# Patient Record
Sex: FEMALE | Race: WHITE | Employment: OTHER | URBAN - METROPOLITAN AREA
[De-identification: names, ages, dates, MRNs, and addresses within clinical notes are randomized per-mention and may not be internally consistent; named-entity substitution may affect disease eponyms.]

---

## 2017-01-10 ENCOUNTER — ONCOLOGY VISIT (OUTPATIENT)
Dept: ONCOLOGY | Facility: CLINIC | Age: 62
End: 2017-01-10
Attending: PHYSICIAN ASSISTANT
Payer: COMMERCIAL

## 2017-01-10 ENCOUNTER — OFFICE VISIT (OUTPATIENT)
Dept: AUDIOLOGY | Facility: CLINIC | Age: 62
End: 2017-01-10

## 2017-01-10 ENCOUNTER — APPOINTMENT (OUTPATIENT)
Dept: LAB | Facility: CLINIC | Age: 62
End: 2017-01-10
Attending: PHYSICIAN ASSISTANT
Payer: COMMERCIAL

## 2017-01-10 ENCOUNTER — OFFICE VISIT (OUTPATIENT)
Dept: OTOLARYNGOLOGY | Facility: CLINIC | Age: 62
End: 2017-01-10

## 2017-01-10 VITALS
BODY MASS INDEX: 20.11 KG/M2 | OXYGEN SATURATION: 98 % | TEMPERATURE: 96.9 F | WEIGHT: 110 LBS | HEART RATE: 98 BPM | SYSTOLIC BLOOD PRESSURE: 126 MMHG | RESPIRATION RATE: 76 BRPM | DIASTOLIC BLOOD PRESSURE: 78 MMHG

## 2017-01-10 VITALS — RESPIRATION RATE: 20 BRPM

## 2017-01-10 DIAGNOSIS — C79.31 BRAIN METASTASIS: ICD-10-CM

## 2017-01-10 DIAGNOSIS — C50.919 METASTATIC BREAST CANCER: Primary | ICD-10-CM

## 2017-01-10 DIAGNOSIS — Z71.9 COUNSELING, UNSPECIFIED: ICD-10-CM

## 2017-01-10 DIAGNOSIS — H90.11 CONDUCTIVE HEARING LOSS IN RIGHT EAR: ICD-10-CM

## 2017-01-10 DIAGNOSIS — R53.0 NEOPLASTIC MALIGNANT RELATED FATIGUE: ICD-10-CM

## 2017-01-10 DIAGNOSIS — H90.5 UNILATERAL SENSORINEURAL HEARING LOSS: ICD-10-CM

## 2017-01-10 DIAGNOSIS — H90.71 MIXED HEARING LOSS OF RIGHT EAR: ICD-10-CM

## 2017-01-10 DIAGNOSIS — C50.919 CARCINOMA OF BREAST METASTATIC TO MULTIPLE SITES, UNSPECIFIED LATERALITY (H): ICD-10-CM

## 2017-01-10 DIAGNOSIS — H65.21 CHRONIC SEROUS OTITIS MEDIA, RIGHT EAR: Primary | ICD-10-CM

## 2017-01-10 DIAGNOSIS — H61.23 EXCESSIVE CERUMEN IN BOTH EAR CANALS: ICD-10-CM

## 2017-01-10 DIAGNOSIS — H90.5 SENSORINEURAL HEARING LOSS OF LEFT EAR: Primary | ICD-10-CM

## 2017-01-10 LAB
ALBUMIN SERPL-MCNC: 3.9 G/DL (ref 3.4–5)
ALP SERPL-CCNC: 130 U/L (ref 40–150)
ALT SERPL W P-5'-P-CCNC: 40 U/L (ref 0–50)
ANION GAP SERPL CALCULATED.3IONS-SCNC: 6 MMOL/L (ref 3–14)
AST SERPL W P-5'-P-CCNC: 37 U/L (ref 0–45)
BASOPHILS # BLD AUTO: 0.1 10E9/L (ref 0–0.2)
BASOPHILS NFR BLD AUTO: 1.1 %
BILIRUB SERPL-MCNC: 0.4 MG/DL (ref 0.2–1.3)
BUN SERPL-MCNC: 13 MG/DL (ref 7–30)
CALCIUM SERPL-MCNC: 8.5 MG/DL (ref 8.5–10.1)
CANCER AG27-29 SERPL-ACNC: 22 U/ML (ref 0–39)
CEA SERPL-MCNC: 1.3 UG/L (ref 0–2.5)
CHLORIDE SERPL-SCNC: 108 MMOL/L (ref 94–109)
CO2 SERPL-SCNC: 27 MMOL/L (ref 20–32)
CREAT SERPL-MCNC: 0.83 MG/DL (ref 0.52–1.04)
DIFFERENTIAL METHOD BLD: ABNORMAL
EOSINOPHIL # BLD AUTO: 0 10E9/L (ref 0–0.7)
EOSINOPHIL NFR BLD AUTO: 0 %
ERYTHROCYTE [DISTWIDTH] IN BLOOD BY AUTOMATED COUNT: 16.8 % (ref 10–15)
GFR SERPL CREATININE-BSD FRML MDRD: 70 ML/MIN/1.7M2
GLUCOSE SERPL-MCNC: 83 MG/DL (ref 70–99)
HCT VFR BLD AUTO: 33.8 % (ref 35–47)
HGB BLD-MCNC: 11.2 G/DL (ref 11.7–15.7)
IMM GRANULOCYTES # BLD: 0.1 10E9/L (ref 0–0.4)
IMM GRANULOCYTES NFR BLD: 1.6 %
LDH SERPL L TO P-CCNC: 243 U/L (ref 81–234)
LYMPHOCYTES # BLD AUTO: 1.2 10E9/L (ref 0.8–5.3)
LYMPHOCYTES NFR BLD AUTO: 18.6 %
MAGNESIUM SERPL-MCNC: 2.5 MG/DL (ref 1.6–2.3)
MCH RBC QN AUTO: 33.6 PG (ref 26.5–33)
MCHC RBC AUTO-ENTMCNC: 33.1 G/DL (ref 31.5–36.5)
MCV RBC AUTO: 102 FL (ref 78–100)
MONOCYTES # BLD AUTO: 0.8 10E9/L (ref 0–1.3)
MONOCYTES NFR BLD AUTO: 13.4 %
NEUTROPHILS # BLD AUTO: 4.1 10E9/L (ref 1.6–8.3)
NEUTROPHILS NFR BLD AUTO: 65.3 %
NRBC # BLD AUTO: 0 10*3/UL
NRBC BLD AUTO-RTO: 1 /100
PHOSPHATE SERPL-MCNC: 2.5 MG/DL (ref 2.5–4.5)
PLATELET # BLD AUTO: 275 10E9/L (ref 150–450)
POTASSIUM SERPL-SCNC: 4 MMOL/L (ref 3.4–5.3)
PROT SERPL-MCNC: 6.9 G/DL (ref 6.8–8.8)
RBC # BLD AUTO: 3.33 10E12/L (ref 3.8–5.2)
SODIUM SERPL-SCNC: 140 MMOL/L (ref 133–144)
URATE SERPL-MCNC: 3.5 MG/DL (ref 2.6–6)
WBC # BLD AUTO: 6.3 10E9/L (ref 4–11)

## 2017-01-10 PROCEDURE — 86300 IMMUNOASSAY TUMOR CA 15-3: CPT | Performed by: PHYSICIAN ASSISTANT

## 2017-01-10 PROCEDURE — 99214 OFFICE O/P EST MOD 30 MIN: CPT | Mod: ZP | Performed by: PHYSICIAN ASSISTANT

## 2017-01-10 PROCEDURE — 96375 TX/PRO/DX INJ NEW DRUG ADDON: CPT

## 2017-01-10 PROCEDURE — 80053 COMPREHEN METABOLIC PANEL: CPT | Performed by: PHYSICIAN ASSISTANT

## 2017-01-10 PROCEDURE — 84550 ASSAY OF BLOOD/URIC ACID: CPT | Performed by: PHYSICIAN ASSISTANT

## 2017-01-10 PROCEDURE — 96417 CHEMO IV INFUS EACH ADDL SEQ: CPT

## 2017-01-10 PROCEDURE — 84100 ASSAY OF PHOSPHORUS: CPT | Performed by: PHYSICIAN ASSISTANT

## 2017-01-10 PROCEDURE — 99212 OFFICE O/P EST SF 10 MIN: CPT | Mod: ZF

## 2017-01-10 PROCEDURE — 40000141 ZZH STATISTIC PERIPHERAL IV START W/O US GUIDANCE: Mod: ZF

## 2017-01-10 PROCEDURE — 96413 CHEMO IV INFUSION 1 HR: CPT

## 2017-01-10 PROCEDURE — 25000128 H RX IP 250 OP 636: Mod: ZF | Performed by: PHYSICIAN ASSISTANT

## 2017-01-10 PROCEDURE — 83615 LACTATE (LD) (LDH) ENZYME: CPT | Performed by: PHYSICIAN ASSISTANT

## 2017-01-10 PROCEDURE — 83735 ASSAY OF MAGNESIUM: CPT | Performed by: PHYSICIAN ASSISTANT

## 2017-01-10 PROCEDURE — 25000125 ZZHC RX 250: Mod: ZF | Performed by: PHYSICIAN ASSISTANT

## 2017-01-10 PROCEDURE — 85025 COMPLETE CBC W/AUTO DIFF WBC: CPT | Performed by: PHYSICIAN ASSISTANT

## 2017-01-10 PROCEDURE — 82378 CARCINOEMBRYONIC ANTIGEN: CPT | Performed by: PHYSICIAN ASSISTANT

## 2017-01-10 RX ORDER — OFLOXACIN 3 MG/ML
SOLUTION AURICULAR (OTIC)
Qty: 5 ML | Refills: 0 | Status: SHIPPED | OUTPATIENT
Start: 2017-01-10 | End: 2017-01-27

## 2017-01-10 RX ADMIN — SODIUM CHLORIDE 250 ML: 9 INJECTION, SOLUTION INTRAVENOUS at 11:05

## 2017-01-10 RX ADMIN — TRASTUZUMAB 292 MG: KIT at 12:05

## 2017-01-10 RX ADMIN — GEMCITABINE 1460 MG: 38 INJECTION, SOLUTION INTRAVENOUS at 11:24

## 2017-01-10 RX ADMIN — DEXAMETHASONE SODIUM PHOSPHATE 12 MG: 10 INJECTION, SOLUTION INTRAMUSCULAR; INTRAVENOUS at 11:05

## 2017-01-10 ASSESSMENT — PAIN SCALES - GENERAL: PAINLEVEL: NO PAIN (1)

## 2017-01-10 NOTE — Clinical Note
1/10/2017       RE: Keren Erickson  [address]  [facility] [address]     Dear Colleague,    Thank you for referring your patient, Keren Erickson, to the [facility] EAR NOSE AND THROAT at [facility]. Please see a copy of my visit note below.    Dear Kelly Mireles:    I had the pleasure of meeting Keren Erickson in consultation today at the [facility] at your request.    HISTORY OF PRESENT ILLNESS: HISTORY OF PRESENT ILLNESS:  The patient is a 61-year-old worked into the clinic today.  She was in Audiology and found to have decreased hearing on the right side with an apparent right serous otitis.  She has metastatic breast cancer and has had whole brain radiation.  She has noticed decreased hearing in the right ear since October.  Just pressure and fullness there as well, mild ache but not significant.  There has been no drainage.  Feels a little off balance as well.         ALLERGIES:    Allergies   Allergen Reactions     Nka [No Known Allergies]      Nkda [No Known Drug Allergies]        HABITS:   Alcohol Use: Yes 0.6 oz/week 1 Standard drinks or equivalent per week   Comment: glass of wine wvery once in a while but nothing recently     History   Smoking status     Never Smoker    Smokeless tobacco     Never Used         PAST MEDICAL HISTORY: Please see today's intake form (for the remainder of the PMH) which I reviewed and signed.  Past Medical History   Diagnosis Date     Arthritis      Breast mass, left      bx and told it was benign yrs ago     Breast cancer (H) 9/26/13     Lt breast       FAMILY HISTORY/SOCIAL HISTORY:   Family History   Problem Relation Age of Onset     Hypertension Father      Aneurysm Father 91     AAA     Hypertension Mother      Arthritis Mother      CANCER No family hx of     Social History     Social History     Marital Status: Single     Spouse Name: N/A     Number of Children:  0     Years of Education: N/A     Occupational History     chiropractor Self     Social History Main Topics     Smoking status: Never Smoker      Smokeless tobacco: Never Used     Alcohol Use: 0.6 oz/week     1 Standard drinks or equivalent per week      Comment: glass of wine wvery once in a while but nothing recently     Drug Use: No     Sexual Activity: Not Currently     Other Topics Concern     Not on file     Social History Narrative    Lives alone, has pet chickens       REVIEW OF SYSTEMS: Please see today's intake form (for the remainder of the ROS) which I have reviewed and signed.    PHYSICIAL EXAMINATION:  Constitutional: The patient was well-groomed and in no acute distress.   Skin: Warm and pink.  Psychiatric: The patient's affect was calm, cooperative, and appropriate.   Respiratory: Breathing comfortably without stridor or exertion of accessory muscles.  Eyes: Pupils were equal and reactive. Extraocular movement intact.   Head: Normocephalic and atraumatic. No lesions or scars.  Ears: Both ears examined.  She does have bilateral cerumen.  She was placed in the microscope.  Under high-powered magnification, the right side was cleaned with curettes.  Following cleaning, the tympanic membrane looks intact.  It looks like she has a full middle ear effusion.  Left ear was cleaned with curette and microscope using similar techniques.  Tympanic membrane and middle ear look normal after cleaning.   Nose: Sinuses were nontender. Anterior rhinoscopy revealed midline septum and absence of purulence or polyps.  Oral Cavity: Normal tongue, floor of moth, buccal mucosa, and palate. No lesions or masses on inspection or palpation. No abnormal lymph tissue in the oropharynx.   Neck: The parotid is soft without masses. Supple with normal laryngeal and tracheal landmarks.   Lymphatic: There is no palpable lymphadenopathy or other masses in the neck.   Neurologic: Alert and oriented x 3. Cranial nerves III-XI within  normal limits. Voice quality normal.  Cerebellar Function Tests:  Grossly normal    Audiogram:  AUDIOGRAM:  Audiogram was done and shows a bilateral high-frequency sensorineural hearing loss with a moderate conductive component to the right ear and flat tympanogram.  Tuning forks confirm the conductive loss on the right side.         IMPRESSION AND PLAN: I talked with her for some time and went over the serous otitis, hearing loss, probably secondary to the radiation.  I talked with her about the trial of nasal spray and Valsalvas or just placing a tube.  With her other health issues and a full effusion present I would recommend we just go ahead and place a tube.  Risks of this were all discussed and she agrees and desires to have a tube placed.  Tube was placed without incident.  I am going to have her put drops in the ear for three days to prevent any infection.  If she does well I am just going follow up in six months.      PROCEDURE NOTE:  The patient again placed supine under the microscope.  Under high-powered magnification, the right ear was examined.  A drop of phenol was placed on the anterior inferior quadrant.  A myringotomy was made with the myringotomy blade.  A large amount of serous fluid was found and suctioned free.  A Paparella type 1 tube was placed and the procedure was terminated.         Thank you very much for the opportunity to participate in the care of your patient.    Rick L Nissen MD

## 2017-01-10 NOTE — NURSING NOTE
Chief Complaint   Patient presents with     Blood Draw     Labs drawn via vpt.      /78 mmHg  Pulse 98  Temp(Src) 96.9  F (36.1  C) (Oral)  Resp 76  Wt 49.896 kg (110 lb)  SpO2 98%  LAbs drawn via her right arm. vpt.

## 2017-01-10 NOTE — PROGRESS NOTES
AUDIOLOGY REPORT    SUBJECTIVE:  Keren Erickson is a 61 year old female was seen in Audiology at the [facility] on 1/10/2017 for a hearing evaluation.  The patient has a history of breast cancer, which was diagnosed in 2013. She has since been diagnosed with brain metastases and has undergone both chemotherapy treatment and whole brain radiation. The patient reports that she underwent whole brain radiation treatment in May 2016, and she has since noted an increase in imbalance and right sided aural fullness. That patient reports that the right aural fullness increased in October 2016, and she has now noted a decrease in right sided hearing. She continues to have imbalance, for which she was assisted by a cane and friend when ambulating today.       OBJECTIVE:  Otoscopic exam indicated cerumen in the right ear, which was removed without incident. Left ear was clear of cerumen.      Pure Tone Thresholds assessed using conventional audiometry with good, reliability from 250-8000 Hz bilaterally using insert earphones and circumaural headphones     RIGHT: mild sloping to severe mixed hearing loss    LEFT:   normal sloping to moderate-severe sensorineural hearing loss    High frequency audiometry from 9,000-20,000 Hz was performed and results were in the moderately-severe sloping to profound range bilaterally.  Results were below aged norms, with the exception of 23225 and 91827 Hz in the left ear, which were within aged norms.     Distortion product otoacoustic emissions were performed from 1500-19011 Hz and were generally absent bilaterally. Present emissions were noted at 4000 Hz in the right ear, and 1500 Hz in the left ear.       Tympanogram:    RIGHT: reduced eardrum mobility    LEFT:  normal eardrum mobility    Reflexes (reported by stimulus ear):  RIGHT: Ipsilateral: Did not test due to reduced eardrum mobility  RIGHT: Contralateral: present at an elevated  level  LEFT:   Ipsilateral: present at normal levels  LEFT:   Contralateral: Did not test due to reduced eardrum  mobility       Speech Reception Threshold:    RIGHT: 40 dB HL    LEFT:   25 dB HL  Word Recognition Score:     RIGHT: 92% at 90 dB HL using NU-6 recorded word list.    LEFT:   100% at 65 dB HL using NU-6 recorded word list.      ASSESSMENT:   Results show left normal to moderately-severe sensorienural hearing loss, and right mild to moderately-severe mixed hearing loss. The grade of the patient's hearing loss today is grade 3.     GRADE 1: Adults enrolled on a Monitoring  Program (on a 1, 2, 3, 4, 6 and  8 kHz audiogram): Threshold  shift of 15 - 25 dB averaged at 2  contiguous test frequencies in at  least one ear.  Adults not enrolled in Monitoring  Program: subjective change in  hearing in the absence of  documented hearing loss.    GRADE 2:Adults enrolled in Monitoring  Program (on a 1, 2, 3, 4, 6 and  8 kHz audiogram): Threshold  shift of >25 dB averaged at 2  contiguous test frequencies in at  least one ear.  Adults not enrolled in Monitoring  Program: hearing loss but  hearing aid or intervention not  indicated; limiting instrumental  ADL.    GRADE 3:  Adults enrolled in Monitoring  Program (on a 1, 2, 3, 4, 6 and  8 kHz audiogram): Threshold  shift of >25 dB averaged at 3  contiguous test frequencies in at  least one ear; therapeutic  intervention indicated.  Adults not enrolled in Monitoring  Program: hearing loss with  hearing aid or intervention  indicated; limiting self care ADL.    GRADE 4:  Adults: Decrease in hearing to  profound bilateral loss (absolute  threshold >80 dB HL at 2 kHz  and above); non-servicable  Hearing.      CTCAE4.03 Scale: Grade 3 on the right  CTCAE4.03 Scale: Grade 3 on the left                                                                           PLAN:  Patient was counseled regarding hearing loss and impact on communication.  It is recommended that the patient  follow up with an ear, nose, and throat (ENT) physician due to right middle ear dysfunction and mixed hearing loss. Pending medical treatment and medical clearance, the patient is a candidate for bilateral hearing aids. She should return for a hearing aid consultation after medical treatment. It is also recommended that she continue to monitor her hearing according to her referring physician's recommendations. Please call this clinic with questions regarding these results or recommendations.        Judi Briseno.  Licensed Audiologist  [place] #[id number]

## 2017-01-10 NOTE — PROGRESS NOTES
HEMATOLOGY/ONCOLOGY PROGRESS NOTE  Waqas 10, 2017    REASON FOR VISIT: follow-up of metastatic breast cancer, triple positive    DIAGNOSIS:   The patient is a 60-year-old female who presented to the hospital initially in 09/2013 with complaints of dyspnea. Workup revealed a large pericardial effusion and pleural effusion. Physical examination revealed a large untreated left breast mass encompassing the entire left breast. Biopsies revealed these were ER, NV and HER2-positive breast cancer. She had a thoracentesis and pericardial sclerosis performed. She was originally on Arimidex and Herceptin. In 01/2014, she was switched to tamoxifen and Herceptin due to arthralgias, and she ultimately developed progressive disease and was switched to Faslodex and Herceptin. In 01/2015, she was found to have progressive disease and was switched to T-DM1.     Initially when she was seen in late 02/2015, she complained of some V5 sensory deficit. This was subjective. I was not able to elicit this on examination. This persisted and further workup was performed with a brain MRI. This revealed what was initially thought to be 3 punctate brain metastases. She originally saw Radiation Oncology and Neurosurgery as well as underwent a lumbar puncture. Cytology from the lumbar puncture showed no evidence of leptomeningeal disease. She was treated with Gamma Knife radiation to 6 lesions, performed on 04/16/2015.  She initiated therapy with TDM1 in January 2015 and continued this through 6/26/2015 with overall stable disease. She has since been on a break from treatment. The patient presented earlier this month with recurrent shortness of breath.  Imaging revealed recurrent right-sided pleural effusion.  She has since undergone thoracentesis with cytology pending.  Clinically, however, there is evidence of disease progression. PET scan performed on 11/25/2015 demonstrated progression of disease at multiple sites. She restarted TDM1 on  11/27/2015, however experienced a mild transfusion reaction with shortness of breath and chest tightness, resolved upon stopping TDM1 and 125 mg of SoluMedrol. She had progression of disease on repeat imaging 2/17/2016, as well as new brain metastases. She started TDM1 with Perjeta on 3/4/2016. She underwent gamma knife to brain mets on 3/8/16.       In late May, she was found to have progressive brain metastases.  She received whole brain radiation.  She had a follow up brain MRI August 2016 that was stable.     In September 2016, she enrolled on [facility]  trial and was randomized to the standard of care arm; she is now on gemzar and herceptin.     INTERVAL HISTORY:  Dominga presents with a friend today.  She feels really well. She has not been doing her PT exercises and has felt more weakness in her big muscles in her legs. She is eatnig and drinking better and gaining weight, but her strength has stalled. She is no longer doing formal PT. She mentions she still remains intermittenly wobbly, which she also clarifies today by saying this is not new for her and has been ongoing for months. She doesn't think it is worse. No headaches or new neurologic symptoms. She continues to have some mild aches after treatment. She has no breathing changes, fevers, chills, rashes, swelling, new sites of pain, or bleeding.  ROS: 10 point ROS was conducted and was otherwise negative except for as above.     PHYSICAL EXAMINATION:   /78 mmHg  Pulse 98  Temp(Src) 96.9  F (36.1  C) (Oral)  Resp 76  Wt 49.896 kg (110 lb)  SpO2 98%   Wt Readings from Last 4 Encounters:   12/27/16 49.4 kg (108 lb 14.5 oz)   12/19/16 48.807 kg (107 lb 9.6 oz)   12/07/16 47.4 kg (104 lb 8 oz)   11/29/16 47.31 kg (104 lb 4.8 oz)     Constitutional: Alert, oriented, cachectic female in no visible distress. Walking without difficulty  Eyes: PERRL. Anicteric sclerae.    ENT/Mouth: OM moist and pink without lesions or thrush.  R TM obstructed  by cerumen today  CV: RRR, no murmurs appreciated.  Resp: CTAB slightly diminished R base. Breathing comfortably.  Breasts: R breast soft with nodules or masses, L breast - overall much softer  Abdomen: Soft, non-tender, non-distended. Bowel sounds present. No masses appreciated. No organomegaly noted.  Extremities: No lower extremity edema appreciated.  Skin: Warm, dry. No bruising or petechiae noted. No rash  Lymph: Small posterior cervical node R neck about 1.5 cm. No other cervical, supraclavicular, or axillary nodes palpable  MSK:  No tenderness over spinous processes.  Neuro: CN II-XII grossly intact. Baseline decreased sensation over mandibular branch of facial nerve    LABS:   Results for HEIDI RUIZ (MRN [id number]) as of 1/10/2017 10:04   Ref. Range 1/10/2017 09:19   Sodium Latest Ref Range: 133-144 mmol/L 140   Potassium Latest Ref Range: 3.4-5.3 mmol/L 4.0   Chloride Latest Ref Range:  mmol/L 108   Carbon Dioxide Latest Ref Range: 20-32 mmol/L 27   Urea Nitrogen Latest Ref Range: 7-30 mg/dL 13   Creatinine Latest Ref Range: 0.52-1.04 mg/dL 0.83   GFR Estimate Latest Ref Range: >60 mL/min/1.7m2 70   GFR Estimate If Black Latest Ref Range: >60 mL/min/1.7m2 85   Calcium Latest Ref Range: 8.5-10.1 mg/dL 8.5   Anion Gap Latest Ref Range: 3-14 mmol/L 6   Magnesium Latest Ref Range: 1.6-2.3 mg/dL 2.5 (H)   Phosphorus Latest Ref Range: 2.5-4.5 mg/dL 2.5   Albumin Latest Ref Range: 3.4-5.0 g/dL 3.9   Protein Total Latest Ref Range: 6.8-8.8 g/dL 6.9   Bilirubin Total Latest Ref Range: 0.2-1.3 mg/dL 0.4   Alkaline Phosphatase Latest Ref Range:  U/L 130   ALT Latest Ref Range: 0-50 U/L 40   AST Latest Ref Range: 0-45 U/L 37   Lactate Dehydrogenase Latest Ref Range:  U/L 243 (H)   Uric Acid Latest Ref Range: 2.6-6.0 mg/dL 3.5   Glucose Latest Ref Range: 70-99 mg/dL 83     IMPRESSION/PLAN:  Dominga is a 61-year-old female with history of metastatic ER-positive, HER-2 positive breast  "cancer, with involvement of the bone, history of pleural effusions treated with pleurodesis and PleurX placement, and with treated brain metastases, previously with stable disease on TDM1, however patient opted for break from therapy from June 2015 through November 2015, and represented with worsening metastatic disease at that time. She restarted TDM1 on 11/27/2015. She had progressive disease 2/17/16 and Perjeta was added to TDM1. She had gamma knife to brain mets on 3/8.  She received WBRT.  She is now on gemzar and herceptin on Christian .    1. Breast cancer:  Here today for cycle 6 day 1 gemcitabine/Hercfeptin. Tolerating therapy well.  Will continue current treatment.   She will have restaging with CT CAP and brain MRI at the end of January. She would like to see Dr. Harper, will inquire if this is possible with scheduling.    2. Pleural effusion: Improvement in cough and breathing. No effusion today on exam.     3. Brain mets: Numbness of tongue and V2 and V3 on right are improving. Follow-up brain MRI at end of January. She continues to endorse \"wobbliness\" today, which I do suspect is more related to deconditioning versus chronic serous effusion of her R ear, as she had similar symptoms at time of brain MRI last month. Discussed repeating this to err on the side of caution, however she did not feel strongly about this and wished to keep her appointment for later this month.     4. FEN: Cr, lytes, weight stable.    5. Pain:minimal, uses Aleve prn. No new sites of pain. Not needing oxycodone but has some available if needed.    6. GERD: Continue prilosec.    7. Bone mets: on Xgeva.  Due 1/16.    8. Mood: She is overall coping well.     9. She has an advanced directive.  She has a POLST.  DNR/DNI.  Her power of  is listed in the computer.     10. ENT: Urged Dominga to make ENT appointment for chronic serous effusion of R ear. Unable to visualize 2/2 cerumen today, but effusion has been present on " previous exams.     11. Deconditioning: Encouraged her to continue her PT exercises. She is now covered by a different insurance which does not cover PT per her report. Asked that she call her insurance provider to confirm this.    Deyanira Costello PA-C  Hematology, Oncology, and Transplant  [facility]  [address] [place] [address] [id number] [phone]

## 2017-01-10 NOTE — PATIENT INSTRUCTIONS
Contact Numbers    [facility] Main Line: [phone]  [facility] Triage:  [phone]    Call triage with chills and/or temperature greater than or equal to 100.5, uncontrolled nausea/vomiting, diarrhea, constipation, dizziness, shortness of breath, chest pain, bleeding, unexplained bruising, or any new/concerning symptoms, questions/concerns.     If you are having any concerning symptoms or wish to speak to a provider before your next infusion visit, please call your care coordinator or triage to notify them so we can adequately serve you.       After Hours: [phone]    If after hours, weekends, or holidays, call main hospital  and ask for Oncology doctor on call.         January 2017 Sunday Monday Tuesday Wednesday Thursday Friday Saturday   1     2     3     4     5     6     7       8     9     10     [facility] DRAW    8:45 AM   (15 min.)    [facility] DRAW   [facility] Lab Draw     [facility] RETURN    9:20 AM   (50 min.)   Deyanira Costello PA-C   [facility]     [facility] ONC INFUSION 180   10:30 AM   (180 min.)    ONCOLOGY INFUSION   [facility]     CHEMO AUDIOGRAM    1:30 PM   (60 min.)   Samia Kendrick AUD   [facility] 11     12     13     14       15     16     17     [facility] DRAW   11:15 AM   (15 min.)    [facility] DRAW   [facility] Lab Draw     [facility] RETURN   11:50 AM   (50 min.)   Deyanira Costello PA-C   [facility]    1:00 PM   (60 min.)    ONCOLOGY INFUSION   [facility] 18     19     20     21       22     23     24     25     ECH COMPLETE    9:00 AM   (60 min.)   ECHCR4   [facility] Echo     MR BRAIN WWO   10:00 AM   (45 min.)   MR[address] [place] [address] MRI     CT CHEST/ABDOMEN/PELVIS W   11:20 AM   (20 min.)   CT[address] [place] [address] CT 26     27     28       29     30     [facility] DRAW    3:00 PM   (15 min.)   [facility]  LAB DRAW   [facility] Lab Draw     [facility] RETURN    3:30 PM   (30 min.)   Marlen Harper MD   [facility] 31     [facility] NEW   10:30 AM   (30 min.)   Nissen, Rick L, MD   [facility]     [facility] ONC INFUSION 180   12:30 PM   (180 min.)   [facility]COLOGY INFUSION   [facility]                                February 2017 Sunday Monday Tuesday Wednesday Thursday Friday Saturday                  1     2     3     4       5     6     7     [facility] LAB DRAW   11:15 AM   (15 min.)   [facility] LAB DRAW   [facility] Lab Draw     [facility] RETURN   11:50 AM   (50 min.)   Deyanira Costello PA-C   [facility] ONC INFUSION 60    1:00 PM   (60 min.)    ONCOLOGY INFUSION   [facility] 8     9     10     11       12     13     14     15     16     17     18       19     20     21     22     23     24     25       26     27     28                                      Lab Results:  Recent Results (from the past 12 hour(s))   CBC with platelets differential    Collection Time: 01/10/17  9:19 AM   Result Value Ref Range    WBC 6.3 4.0 - 11.0 10e9/L    RBC Count 3.33 (L) 3.8 - 5.2 10e12/L    Hemoglobin 11.2 (L) 11.7 - 15.7 g/dL    Hematocrit 33.8 (L) 35.0 - 47.0 %     (H) 78 - 100 fl    MCH 33.6 (H) 26.5 - 33.0 pg    MCHC 33.1 31.5 - 36.5 g/dL    RDW 16.8 (H) 10.0 - 15.0 %    Platelet Count 275 150 - 450 10e9/L    Diff Method Automated Method     % Neutrophils 65.3 %    % Lymphocytes 18.6 %    % Monocytes 13.4 %    % Eosinophils 0.0 %    % Basophils 1.1 %    % Immature Granulocytes 1.6 %    Nucleated RBCs 1 (H) 0 /100    Absolute Neutrophil 4.1 1.6 - 8.3 10e9/L    Absolute Lymphocytes 1.2 0.8 - 5.3 10e9/L    Absolute Monocytes 0.8 0.0 - 1.3 10e9/L    Absolute Eosinophils 0.0 0.0 - 0.7 10e9/L    Absolute Basophils 0.1 0.0 - 0.2 10e9/L    Abs Immature Granulocytes 0.1 0 - 0.4 10e9/L    Absolute Nucleated RBC 0.0     Comprehensive metabolic panel    Collection Time: 01/10/17  9:19 AM   Result Value Ref Range    Sodium 140 133 - 144 mmol/L    Potassium 4.0 3.4 - 5.3 mmol/L    Chloride 108 94 - 109 mmol/L    Carbon Dioxide 27 20 - 32 mmol/L    Anion Gap 6 3 - 14 mmol/L    Glucose 83 70 - 99 mg/dL    Urea Nitrogen 13 7 - 30 mg/dL    Creatinine 0.83 0.52 - 1.04 mg/dL    GFR Estimate 70 >60 mL/min/1.7m2    GFR Estimate If Black 85 >60 mL/min/1.7m2    Calcium 8.5 8.5 - 10.1 mg/dL    Bilirubin Total 0.4 0.2 - 1.3 mg/dL    Albumin 3.9 3.4 - 5.0 g/dL    Protein Total 6.9 6.8 - 8.8 g/dL    Alkaline Phosphatase 130 40 - 150 U/L    ALT 40 0 - 50 U/L    AST 37 0 - 45 U/L   Magnesium    Collection Time: 01/10/17  9:19 AM   Result Value Ref Range    Magnesium 2.5 (H) 1.6 - 2.3 mg/dL   Phosphorus    Collection Time: 01/10/17  9:19 AM   Result Value Ref Range    Phosphorus 2.5 2.5 - 4.5 mg/dL   Lactate Dehydrogenase    Collection Time: 01/10/17  9:19 AM   Result Value Ref Range    Lactate Dehydrogenase 243 (H) 81 - 234 U/L   Uric acid    Collection Time: 01/10/17  9:19 AM   Result Value Ref Range    Uric Acid 3.5 2.6 - 6.0 mg/dL

## 2017-01-10 NOTE — PROGRESS NOTES
Infusion Nursing Note:  Keren Erickson presents today for Cycle 6, day 1 Gemzar and Herceptin.    Patient seen by provider today: Yes: RBAULIO Chandra    Note: Patient has no questions or concerns.    Intravenous Access:  Peripheral IV placed by vascular access.    Treatment Conditions:  HGB     11.2   1/10/2017  WBC      6.3   1/10/2017   ANEU      4.1   1/10/2017  PLT      275   1/10/2017     NA      140   1/10/2017                POTASSIUM      4.0   1/10/2017        MAG      2.5   1/10/2017         CR     0.83   1/10/2017                OMA      8.5   1/10/2017             BILITOTAL      0.4   1/10/2017        ALBUMIN      3.9   1/10/2017                 ALT       40   1/10/2017        AST       37   1/10/2017  Results reviewed, labs MET treatment parameters, ok to proceed with treatment.    Post Infusion Assessment:  Patient tolerated infusion without incident.  Patient states sometimes has discomfort during Gemzar.  Infused at 500cc with no complaints.  Blood return noted pre and post infusion.  Site patent and intact, free from redness, edema or discomfort.  No evidence of extravasations.  Access discontinued per protocol.    Discharge Plan:   Patient declined prescription refills.  Discharge instructions reviewed with: Patient.  Patient and/or family verbalized understanding of discharge instructions and all questions answered.  Copy of AVS reviewed with patient and/or family.  Patient will return 1/17/2017 for next appointment.  Departure Mode: Ambulatory.    Britney Ball RN

## 2017-01-10 NOTE — MR AVS SNAPSHOT
After Visit Summary   1/10/2017    Keren Erickson    MRN: [id number]           Patient Information     Date Of Birth          1955        Visit Information        Provider Department      1/10/2017 10:30 AM  22 ATC;  ONCOLOGY INFUSION [facility]        Today's Diagnoses     Metastatic breast cancer (H)    -  1     Carcinoma of breast metastatic to multiple sites, unspecified laterality (H)         Brain metastasis (H)           Care Instructions    Contact Numbers    [facility] Main Line: [phone]  [facility] Triage:  [phone]    Call triage with chills and/or temperature greater than or equal to 100.5, uncontrolled nausea/vomiting, diarrhea, constipation, dizziness, shortness of breath, chest pain, bleeding, unexplained bruising, or any new/concerning symptoms, questions/concerns.     If you are having any concerning symptoms or wish to speak to a provider before your next infusion visit, please call your care coordinator or triage to notify them so we can adequately serve you.       After Hours: [phone]    If after hours, weekends, or holidays, call main hospital  and ask for Oncology doctor on call.         January 2017 Sunday Monday Tuesday Wednesday Thursday Friday Saturday   1     2     3     4     5     6     7       8     9     10     P [facility] DRAW    8:45 AM   (15 min.)    [facility] DRAW   [facility] Lab Draw     [facility] RETURN    9:20 AM   (50 min.)   Deyanira Costello PA-C M [facility]     [facility] ONC INFUSION 180   10:30 AM   (180 min.)    ONCOLOGY INFUSION   [facility]     CHEMO AUDIOGRAM    1:30 PM   (60 min.)   Samia Kendrick AUD M [facility] 11     12     13     14       15     16     17     [facility] DRAW   11:15 AM   (15 min.)    [facility] LAB DRAW   [facility] Lab Draw     UMP RETURN   11:50 AM   (50 min.)   Deyanira Costello PA-C M [facility]  [facility]     UMP ONC INFUSION 60    1:00 PM   (60 min.)    ONCOLOGY INFUSION   [facility] 18     19     20     21       22     23     24     25     ECH COMPLETE    9:00 AM   (60 min.)   ECHCR4   [facility] Echo     MR BRAIN WWO   10:00 AM   (45 min.)   MR1   [facility] MRI     CT CHEST/ABDOMEN/PELVIS W   11:20 AM   (20 min.)   CT1   [facility] CT 26     27     28       29     30     [facility] DRAW    3:00 PM   (15 min.)    [facility] LAB DRAW   [facility] Lab Draw     UMP RETURN    3:30 PM   (30 min.)   Marlen Harper MD   [facility] 31     UMP NEW   10:30 AM   (30 min.)   Nissen, Rick L, MD   [facility]     [facility] ONC INFUSION 180   12:30 PM   (180 min.)    ONCOLOGY INFUSION   [facility]                                February 2017 Sunday Monday Tuesday Wednesday Thursday Friday Saturday                  1     2     3     4       5     6     7     [facility] LAB DRAW   11:15 AM   (15 min.)   [facility] DRAW   [facility] Lab Draw     UMP RETURN   11:50 AM   (50 min.)   [facility]ello [facility]    1:00 PM   (60 min.)    ONCOLOGY INFUSION   [facility] 8     9     10     11       12     13     14     15     16     17     18       19     20     21     22     23     24     25       26     27     28                                      Lab Results:  Recent Results (from the past 12 hour(s))   CBC with platelets differential    Collection Time: 01/10/17  9:19 AM   Result Value Ref Range    WBC 6.3 4.0 - 11.0 10e9/L    RBC Count 3.33 (L) 3.8 - 5.2 10e12/L    Hemoglobin 11.2 (L) 11.7 - 15.7 g/dL    Hematocrit 33.8 (L) 35.0 - 47.0 %     (H) 78 - 100 fl    MCH 33.6 (H) 26.5 - 33.0 pg    MCHC 33.1 31.5 - 36.5 g/dL    RDW 16.8 (H) 10.0 - 15.0 %    Platelet Count 275 150 - 450 10e9/L    Diff  Method Automated Method     % Neutrophils 65.3 %    % Lymphocytes 18.6 %    % Monocytes 13.4 %    % Eosinophils 0.0 %    % Basophils 1.1 %    % Immature Granulocytes 1.6 %    Nucleated RBCs 1 (H) 0 /100    Absolute Neutrophil 4.1 1.6 - 8.3 10e9/L    Absolute Lymphocytes 1.2 0.8 - 5.3 10e9/L    Absolute Monocytes 0.8 0.0 - 1.3 10e9/L    Absolute Eosinophils 0.0 0.0 - 0.7 10e9/L    Absolute Basophils 0.1 0.0 - 0.2 10e9/L    Abs Immature Granulocytes 0.1 0 - 0.4 10e9/L    Absolute Nucleated RBC 0.0    Comprehensive metabolic panel    Collection Time: 01/10/17  9:19 AM   Result Value Ref Range    Sodium 140 133 - 144 mmol/L    Potassium 4.0 3.4 - 5.3 mmol/L    Chloride 108 94 - 109 mmol/L    Carbon Dioxide 27 20 - 32 mmol/L    Anion Gap 6 3 - 14 mmol/L    Glucose 83 70 - 99 mg/dL    Urea Nitrogen 13 7 - 30 mg/dL    Creatinine 0.83 0.52 - 1.04 mg/dL    GFR Estimate 70 >60 mL/min/1.7m2    GFR Estimate If Black 85 >60 mL/min/1.7m2    Calcium 8.5 8.5 - 10.1 mg/dL    Bilirubin Total 0.4 0.2 - 1.3 mg/dL    Albumin 3.9 3.4 - 5.0 g/dL    Protein Total 6.9 6.8 - 8.8 g/dL    Alkaline Phosphatase 130 40 - 150 U/L    ALT 40 0 - 50 U/L    AST 37 0 - 45 U/L   Magnesium    Collection Time: 01/10/17  9:19 AM   Result Value Ref Range    Magnesium 2.5 (H) 1.6 - 2.3 mg/dL   Phosphorus    Collection Time: 01/10/17  9:19 AM   Result Value Ref Range    Phosphorus 2.5 2.5 - 4.5 mg/dL   Lactate Dehydrogenase    Collection Time: 01/10/17  9:19 AM   Result Value Ref Range    Lactate Dehydrogenase 243 (H) 81 - 234 U/L   Uric acid    Collection Time: 01/10/17  9:19 AM   Result Value Ref Range    Uric Acid 3.5 2.6 - 6.0 mg/dL               Follow-ups after your visit        Your next 10 appointments already scheduled     Waqas 10, 2017  1:30 PM   (Arrive by 1:15 PM)   Chemo Audiogram with SHANNAN [facility] ([facility])    [address] [address] [phone]             Jan 17, 2017 11:15 AM   [facility] Lab Draw with  [facility] LAB DRAW   [facility] Lab Draw ([facility])    [address]  2nd Floor  [facility] [address]   [phone]            Jan 17, 2017 11:50 AM   (Arrive by 11:35 AM)   Return Visit with Deyanira Costello PA-C   [facility] ([facility])    [address]  2nd Floor  [facility] [address]   [phone]            Jan 17, 2017  1:00 PM   Infusion 60 with  ONCOLOGY INFUSION,  11 ATC   [facility] ([facility])    [address]  2nd Floor  [facility] [address]   [phone]            Jan 25, 2017  9:00 AM   Ech Complete with UCECHCR4   [facility] Echo ([facility])    [address]  3rd Floor  [facility] [address] [phone]           1.  Please bring or wear a comfortable two-piece outfit. 2.  You may eat, drink and take your normal medicines. 3.  For any questions that cannot be answered, please contact the ordering physician            Jan 25, 2017 10:00 AM   (Arrive by 9:45 AM)   MR BRAIN W/O & W CONTRAST with [facility]   [facility] MRI ([facility])    [address]  [address] [facility] [address] [phone]           Take your medicines as usual, unless your doctor tells you not to. Bring a list of your current medicines to your exam (including vitamins, minerals and over-the-counter drugs).  You will be given intravenous contrast for this exam. To prepare:   The day before your exam, drink extra fluids at least six 8-ounce glasses (unless your doctor tells you to restrict your fluids).   Have a blood test (creatinine test) within 30 days of your exam. Go to your clinic or Diagnostic Imaging Department for this test.  The MRI machine uses a strong magnet. Please wear clothes without metal (snaps, zippers).  A sweatsuit works well, or we may give you a hospital gown.  Please remove any body piercings and hair extensions before you arrive. You will also remove watches, jewelry, hairpins, wallets, dentures, partial dental plates and hearing aids. You may wear contact lenses, and you may be able to wear your rings. We have a safe place to keep your personal items, but it is safer to leave them at home.   **IMPORTANT** THE INSTRUCTIONS BELOW ARE ONLY FOR THOSE PATIENTS WHO HAVE BEEN TOLD THEY WILL RECEIVE SEDATION OR GENERAL ANESTHESIA DURING THEIR MRI PROCEDURE:  IF YOU WILL RECEIVE SEDATION (take medicine to help you relax during your exam):   You must get the medicine from your doctor before you arrive. Bring the medicine to the exam. Do not take it at home.   Arrive one hour early. Bring someone who can take you home after the test. Your medicine will make you sleepy. After the exam, you may not drive, take a bus or take a taxi by yourself.   No eating 8 hours before your exam. You may have clear liquids up until 4 hours before your exam. (Clear liquids include water, clear tea, black coffee and fruit juice without pulp.)  IF YOU WILL RECEIVE ANESTHESIA (be asleep for your exam):   Arrive 1 1/2 hours early. Bring someone who can take you home after the test. You may not drive, take a bus or take a taxi by yourself.   No eating 8 hours before your exam. You may have clear liquids up until 4 hours before your exam. (Clear liquids include water, clear tea, black coffee and fruit juice without pulp.)  Please call the Imaging Department at your exam site with any questions.            Jan 25, 2017 11:20 AM   (Arrive by 11:05 AM)   CT CHEST/ABDOMEN/PELVIS W CONTRAST with [facility]   [facility] CT ([facility])    [address]  [address] [facility] [address] [phone]           Please bring any scans or X-rays taken at other hospitals, if similar tests were done. Also bring a  list of your medicines, including vitamins, minerals and over-the-counter drugs. It is safest to leave personal items at home.  Be sure to tell your doctor:   If you have any allergies.   If there s any chance you are pregnant.   If you are breastfeeding.   If you have any special needs.  You may have contrast for this exam. To prepare:   Do not eat or drink for 2 hours before your exam. If you need to take medicine, you may take it with small sips of water. (We may ask you to take liquid medicine as well.)   The day before your exam, drink extra fluids at least six 8-ounce glasses (unless your doctor tells you to restrict your fluids).  Patients over 70 or patients with diabetes or kidney problems:   If you haven t had a blood test (creatinine test) within the last 30 days, go to your clinic or Diagnostic Imaging Department for this test.  If you have diabetes:   If your kidney function is normal, continue taking your metformin (Avandamet, Glucophage, Glucovance, Metaglip) on the day of your exam.   If your kidney function is abnormal, wait 48 hours before restarting this medicine.  You will have oral contrast for this exam:   You will drink the contrast at home. Get this from your clinic or Diagnostic Imaging Department. Please follow the directions given.  Please wear loose clothing, such as a sweat suit or jogging clothes. Avoid snaps, zippers and other metal. We may ask you to undress and put on a hospital gown.  If you have any questions, please call the Imaging Department where you will have your exam.            Jan 30, 2017  3:00 PM   [facility] Lab Draw with  [facility] LAB DRAW   [facility] Lab Draw ([facility])    [address] [place] [id number] [phone]            Jan 30, 2017  3:30 PM   (Arrive by 3:15 PM)   Return Visit with Marlen Harper MD   [facility] ([facility])    [address] [place] [address]  [address]  2nd Floor  [facility] [address] [phone]              Who to contact     If you have questions or need follow up information about today's clinic visit or your schedule please contact [facility] directly at [phone].  Normal or non-critical lab and imaging results will be communicated to you by MyChart, letter or phone within 4 business days after the clinic has received the results. If you do not hear from us within 7 days, please contact the clinic through [employer] or phone. If you have a critical or abnormal lab result, we will notify you by phone as soon as possible.  Submit refill requests through [employer]se[employer] or call your pharmacy and they will forward the refill request to us. Please allow 3 business days for your refill to be completed.          Additional Information About Your Visit        [employer] Information     [employer]secrets gives you secure access to your electronic health record. If you see a primary care provider, you can also send messages to your care team and make appointments. If you have questions, please call your primary care clinic.  If you do not have a primary care provider, please call [phone] and they will assist you.        Care EveryWhere ID     This is your Care EveryWhere ID. This could be used by other organizations to access your [place] medical records  [id number]        Your Vitals Were     Respirations                   20            Blood Pressure from Last 3 Encounters:   01/10/17 126/78   12/27/16 123/80   12/19/16 126/83    Weight from Last 3 Encounters:   01/10/17 49.896 kg (110 lb)   12/27/16 49.4 kg (108 lb 14.5 oz)   12/19/16 48.807 kg (107 lb 9.6 oz)              We Performed the Following     Beta HCG qual IFA urine     CBC with platelets differential     Comprehensive metabolic panel     Lactate Dehydrogenase     Magnesium     Phosphorus     Research Lab     Treatment Conditions     Uric acid        Primary Care Provider Office Phone #  Fax #    Kelly Bg Hart [phone]       [facility] [address] [facility] [address]        Thank you!     Thank you for choosing [facility]  for your care. Our goal is always to provide you with excellent care. Hearing back from our patients is one way we can continue to improve our services. Please take a few minutes to complete the written survey that you may receive in the mail after your visit with us. Thank you!             Your Updated Medication List - Protect others around you: Learn how to safely use, store and throw away your medicines at www.disposemymeds.org.          This list is accurate as of: 1/10/17 11:51 AM.  Always use your most recent med list.                   Brand Name Dispense Instructions for use    ALEVE PO      Take 220 mg by mouth daily       LORazepam 0.5 MG tablet    ATIVAN    30 tablet    Take 1 tablet (0.5 mg) by mouth every 4 hours as needed (Anxiety, Nausea/Vomiting or Sleep)       Multi-vitamin Tabs tablet      Take 1 tablet by mouth daily       omeprazole 40 MG capsule    priLOSEC    90 capsule    Take 1 capsule (40 mg) by mouth as needed       ondansetron 8 MG tablet    ZOFRAN    30 tablet    Take 1 tablet (8 mg) by mouth every 8 hours as needed for nausea       * order for DME     1 Units    Walker to ensure safe ambulation       * order for DME     1 Units    Cranial prosthesis       oxyCODONE 5 MG IR tablet    ROXICODONE    30 tablet    Take 1 tablet (5 mg) by mouth every 4 hours as needed for moderate to severe pain       PROBIOTIC DAILY PO      States she is eating yogurt       prochlorperazine 10 MG tablet    COMPAZINE    30 tablet    Take 1 tablet (10 mg) by mouth every 6 hours as needed (Nausea/Vomiting)       * Notice:  This list has 2 medication(s) that are the same as other medications prescribed for you. Read the directions carefully, and ask your doctor or other care provider to review them with you.

## 2017-01-10 NOTE — NURSING NOTE
"Keren Erickson is a 61 year old female who presents for:  Chief Complaint   Patient presents with     Blood Draw     Labs drawn via vpt.      Oncology Clinic Visit     breast ca        Initial Vitals:  /78 mmHg  Pulse 98  Temp(Src) 96.9  F (36.1  C) (Oral)  Resp 76  Wt 49.896 kg (110 lb)  SpO2 98% Estimated body mass index is 20.11 kg/(m^2) as calculated from the following:    Height as of 11/15/16: 1.575 m (5' 2\").    Weight as of this encounter: 49.896 kg (110 lb).. There is no height on file to calculate BSA. BP completed using cuff size: NA (Not Taken)  Data Unavailable No LMP recorded. Patient is postmenopausal. Allergies and medications reviewed.     Medications: Medication refills not needed today.  Pharm[facility] entered into [facility]: [facility] [address] [place] [address] [address] AT [facility] [address]    Comments: pt denies pain. patient states all meds are the same and no changes    5 minutes for nursing intake (face to face time)   Jill Mobley CMA          "

## 2017-01-10 NOTE — Clinical Note
1/10/2017      RE: Keren Erickson  [address] [facility] [address]       HEMATOLOGY/ONCOLOGY PROGRESS NOTE  Waqas 10, 2017    REASON FOR VISIT: follow-up of metastatic breast cancer, triple positive    DIAGNOSIS:   The patient is a 60-year-old female who presented to the hospital initially in 09/2013 with complaints of dyspnea. Workup revealed a large pericardial effusion and pleural effusion. Physical examination revealed a large untreated left breast mass encompassing the entire left breast. Biopsies revealed these were ER, VT and HER2-positive breast cancer. She had a thoracentesis and pericardial sclerosis performed. She was originally on Arimidex and Herceptin. In 01/2014, she was switched to tamoxifen and Herceptin due to arthralgias, and she ultimately developed progressive disease and was switched to Faslodex and Herceptin. In 01/2015, she was found to have progressive disease and was switched to T-DM1.     Initially when she was seen in late 02/2015, she complained of some V5 sensory deficit. This was subjective. I was not able to elicit this on examination. This persisted and further workup was performed with a brain MRI. This revealed what was initially thought to be 3 punctate brain metastases. She originally saw Radiation Oncology and Neurosurgery as well as underwent a lumbar puncture. Cytology from the lumbar puncture showed no evidence of leptomeningeal disease. She was treated with Gamma Knife radiation to 6 lesions, performed on 04/16/2015.  She initiated therapy with TDM1 in January 2015 and continued this through 6/26/2015 with overall stable disease. She has since been on a break from treatment. The patient presented earlier this month with recurrent shortness of breath.  Imaging revealed recurrent right-sided pleural effusion.  She has since undergone thoracentesis with cytology pending.  Clinically, however, there is evidence of disease progression. PET scan performed on 11/25/2015  demonstrated progression of disease at multiple sites. She restarted TDM1 on 11/27/2015, however experienced a mild transfusion reaction with shortness of breath and chest tightness, resolved upon stopping TDM1 and 125 mg of SoluMedrol. She had progression of disease on repeat imaging 2/17/2016, as well as new brain metastases. She started TDM1 with Perjeta on 3/4/2016. She underwent gamma knife to brain mets on 3/8/16.       In late May, she was found to have progressive brain metastases.  She received whole brain radiation.  She had a follow up brain MRI August 2016 that was stable.     In September 2016, she enrolled on [place]  trial and was randomized to the standard of care arm; she is now on gemzar and herceptin.     INTERVAL HISTORY:  Dominga presents with a friend today.  She feels really well. She has not been doing her PT exercises and has felt more weakness in her big muscles in her legs. She is eatnig and drinking better and gaining weight, but her strength has stalled. She is no longer doing formal PT. She mentions she still remains intermittenly wobbly, which she also clarifies today by saying this is not new for her and has been ongoing for months. She doesn't think it is worse. No headaches or new neurologic symptoms. She continues to have some mild aches after treatment. She has no breathing changes, fevers, chills, rashes, swelling, new sites of pain, or bleeding.  ROS: 10 point ROS was conducted and was otherwise negative except for as above.     PHYSICAL EXAMINATION:   /78 mmHg  Pulse 98  Temp(Src) 96.9  F (36.1  C) (Oral)  Resp 76  Wt 49.896 kg (110 lb)  SpO2 98%   Wt Readings from Last 4 Encounters:   12/27/16 49.4 kg (108 lb 14.5 oz)   12/19/16 48.807 kg (107 lb 9.6 oz)   12/07/16 47.4 kg (104 lb 8 oz)   11/29/16 47.31 kg (104 lb 4.8 oz)     Constitutional: Alert, oriented, cachectic female in no visible distress. Walking without difficulty  Eyes: PERRL. Anicteric sclerae.     ENT/Mouth: OM moist and pink without lesions or thrush.  R TM obstructed by cerumen today  CV: RRR, no murmurs appreciated.  Resp: CTAB slightly diminished R base. Breathing comfortably.  Breasts: R breast soft with nodules or masses, L breast - overall much softer  Abdomen: Soft, non-tender, non-distended. Bowel sounds present. No masses appreciated. No organomegaly noted.  Extremities: No lower extremity edema appreciated.  Skin: Warm, dry. No bruising or petechiae noted. No rash  Lymph: Small posterior cervical node R neck about 1.5 cm. No other cervical, supraclavicular, or axillary nodes palpable  MSK:  No tenderness over spinous processes.  Neuro: CN II-XII grossly intact. Baseline decreased sensation over mandibular branch of facial nerve    LABS:   Results for HEIDI RUIZ (MRN [id number]) as of 1/10/2017 10:04   Ref. Range 1/10/2017 09:19   Sodium Latest Ref Range: 133-144 mmol/L 140   Potassium Latest Ref Range: 3.4-5.3 mmol/L 4.0   Chloride Latest Ref Range:  mmol/L 108   Carbon Dioxide Latest Ref Range: 20-32 mmol/L 27   Urea Nitrogen Latest Ref Range: 7-30 mg/dL 13   Creatinine Latest Ref Range: 0.52-1.04 mg/dL 0.83   GFR Estimate Latest Ref Range: >60 mL/min/1.7m2 70   GFR Estimate If Black Latest Ref Range: >60 mL/min/1.7m2 85   Calcium Latest Ref Range: 8.5-10.1 mg/dL 8.5   Anion Gap Latest Ref Range: 3-14 mmol/L 6   Magnesium Latest Ref Range: 1.6-2.3 mg/dL 2.5 (H)   Phosphorus Latest Ref Range: 2.5-4.5 mg/dL 2.5   Albumin Latest Ref Range: 3.4-5.0 g/dL 3.9   Protein Total Latest Ref Range: 6.8-8.8 g/dL 6.9   Bilirubin Total Latest Ref Range: 0.2-1.3 mg/dL 0.4   Alkaline Phosphatase Latest Ref Range:  U/L 130   ALT Latest Ref Range: 0-50 U/L 40   AST Latest Ref Range: 0-45 U/L 37   Lactate Dehydrogenase Latest Ref Range:  U/L 243 (H)   Uric Acid Latest Ref Range: 2.6-6.0 mg/dL 3.5   Glucose Latest Ref Range: 70-99 mg/dL 83     IMPRESSION/PLAN:  Dominga ca  "61-year-old female with history of metastatic ER-positive, HER-2 positive breast cancer, with involvement of the bone, history of pleural effusions treated with pleurodesis and PleurX placement, and with treated brain metastases, previously with stable disease on TDM1, however patient opted for break from therapy from June 2015 through November 2015, and represented with worsening metastatic disease at that time. She restarted TDM1 on 11/27/2015. She had progressive disease 2/17/16 and Perjeta was added to TDM1. She had gamma knife to brain mets on 3/8.  She received WBRT.  She is now on gemzar and herceptin on [place] .    1. Breast cancer:  Here today for cycle 6 day 1 gemcitabine/Hercfeptin. Tolerating therapy well.  Will continue current treatment.   She will have restaging with CT CAP and brain MRI at the end of January. She would like to see Dr. Harper, will inquire if this is possible with scheduling.    2. Pleural effusion: Improvement in cough and breathing. No effusion today on exam.     3. Brain mets: Numbness of tongue and V2 and V3 on right are improving. Follow-up brain MRI at end of January. She continues to endorse \"wobbliness\" today, which I do suspect is more related to deconditioning versus chronic serous effusion of her R ear, as she had similar symptoms at time of brain MRI last month. Discussed repeating this to err on the side of caution, however she did not feel strongly about this and wished to keep her appointment for later this month.     4. FEN: Cr, lytes, weight stable.    5. Pain:minimal, uses Aleve prn. No new sites of pain. Not needing oxycodone but has some available if needed.    6. GERD: Continue prilosec.    7. Bone mets: on Xgeva.  Due 1/16.    8. Mood: She is overall coping well.     9. She has an advanced directive.  She has a POLST.  DNR/DNI.  Her power of  is listed in the computer.     10. ENT: Urged Dominga to make ENT appointment for chronic serous effusion " of R ear. Unable to visualize 2/2 cerumen today, but effusion has been present on previous exams.     11. Deconditioning: Encouraged her to continue her PT exercises. She is now covered by a different insurance which does not cover PT per her report. Asked that she call her insurance provider to confirm this.    Deyanira Costello PA-C  Hematology, Oncology, and Transplant  [facility] [place] [address]  [phone]      Research Note for [place] Breast Trial. Pt her for C6D1 of Gemzar/herceptin (MD choice Arm) and continues to do well. Pt surveys completed today and scheduled restaging for 1/25/17. Reviewed con-med and AE tracking-no new AEs noted today. She will have plugged ear assessed today as it is not resolving with OTC ear drops.  Oralia Mcleod RN/ Research [phone]        Deyanira Costello PA-C

## 2017-01-10 NOTE — Clinical Note
1/10/2017       RE: Keren Erickson  [address] [facility] [address]     Dear Colleague,    Thank you for referring your patient, Keren Erickson, to the [facility]. Please see a copy of my visit note below.    HEMATOLOGY/ONCOLOGY PROGRESS NOTE  Waqas 10, 2017    REASON FOR VISIT: follow-up of metastatic breast cancer, triple positive    DIAGNOSIS:   The patient is a 60-year-old female who presented to the hospital initially in 09/2013 with complaints of dyspnea. Workup revealed a large pericardial effusion and pleural effusion. Physical examination revealed a large untreated left breast mass encompassing the entire left breast. Biopsies revealed these were ER, NC and HER2-positive breast cancer. She had a thoracentesis and pericardial sclerosis performed. She was originally on Arimidex and Herceptin. In 01/2014, she was switched to tamoxifen and Herceptin due to arthralgias, and she ultimately developed progressive disease and was switched to Faslodex and Herceptin. In 01/2015, she was found to have progressive disease and was switched to T-DM1.     Initially when she was seen in late 02/2015, she complained of some V5 sensory deficit. This was subjective. I was not able to elicit this on examination. This persisted and further workup was performed with a brain MRI. This revealed what was initially thought to be 3 punctate brain metastases. She originally saw Radiation Oncology and Neurosurgery as well as underwent a lumbar puncture. Cytology from the lumbar puncture showed no evidence of leptomeningeal disease. She was treated with Gamma Knife radiation to 6 lesions, performed on 04/16/2015.  She initiated therapy with TDM1 in January 2015 and continued this through 6/26/2015 with overall stable disease. She has since been on a break from treatment. The patient presented earlier this month with recurrent shortness of breath.  Imaging revealed recurrent right-sided pleural  effusion.  She has since undergone thoracentesis with cytology pending.  Clinically, however, there is evidence of disease progression. PET scan performed on 11/25/2015 demonstrated progression of disease at multiple sites. She restarted TDM1 on 11/27/2015, however experienced a mild transfusion reaction with shortness of breath and chest tightness, resolved upon stopping TDM1 and 125 mg of SoluMedrol. She had progression of disease on repeat imaging 2/17/2016, as well as new brain metastases. She started TDM1 with Perjeta on 3/4/2016. She underwent gamma knife to brain mets on 3/8/16.       In late May, she was found to have progressive brain metastases.  She received whole brain radiation.  She had a follow up brain MRI August 2016 that was stable.     In September 2016, she enrolled on [place]  trial and was randomized to the standard of care arm; she is now on gemzar and herceptin.     INTERVAL HISTORY:  Dominga presents with a friend today.  She feels really well. ***She continues to have some mild aches after treatment. She has no breathing changes, fevers, chills, rashes, swelling, new sites of pain, or bleeding.  ROS: 10 point ROS was conducted and was otherwise negative except for as above.     PHYSICAL EXAMINATION:   There were no vitals taken for this visit.   Wt Readings from Last 4 Encounters:   12/27/16 49.4 kg (108 lb 14.5 oz)   12/19/16 48.807 kg (107 lb 9.6 oz)   12/07/16 47.4 kg (104 lb 8 oz)   11/29/16 47.31 kg (104 lb 4.8 oz)     Constitutional: Alert, oriented, cachectic female in no visible distress. Walking without difficulty  Eyes: PERRL. Anicteric sclerae.    ENT/Mouth: OM moist and pink without lesions or thrush.  TMs clear bilaterally  CV: RRR, no murmurs appreciated.  Resp: CTAB slightly diminished R base. Breathing comfortably.  Breasts: R breast soft with nodules or masses, L breast - overall much softer  Abdomen: Soft, non-tender, non-distended. Bowel sounds present. No masses  appreciated. No organomegaly noted.  Extremities: No lower extremity edema appreciated.  Skin: Warm, dry. No bruising or petechiae noted. No rash  Lymph: Small posterior cervical node R neck about 1.5 cm. No other cervical, supraclavicular, or axillary nodes palpable  MSK:  No tenderness over spinous processes.  Neuro: CN II-XII grossly intact.    LABS:   Results for HEIDI RUIZ (MRN [id number]) as of 1/10/2017 10:04   Ref. Range 1/10/2017 09:19   Sodium Latest Ref Range: 133-144 mmol/L 140   Potassium Latest Ref Range: 3.4-5.3 mmol/L 4.0   Chloride Latest Ref Range:  mmol/L 108   Carbon Dioxide Latest Ref Range: 20-32 mmol/L 27   Urea Nitrogen Latest Ref Range: 7-30 mg/dL 13   Creatinine Latest Ref Range: 0.52-1.04 mg/dL 0.83   GFR Estimate Latest Ref Range: >60 mL/min/1.7m2 70   GFR Estimate If Black Latest Ref Range: >60 mL/min/1.7m2 85   Calcium Latest Ref Range: 8.5-10.1 mg/dL 8.5   Anion Gap Latest Ref Range: 3-14 mmol/L 6   Magnesium Latest Ref Range: 1.6-2.3 mg/dL 2.5 (H)   Phosphorus Latest Ref Range: 2.5-4.5 mg/dL 2.5   Albumin Latest Ref Range: 3.4-5.0 g/dL 3.9   Protein Total Latest Ref Range: 6.8-8.8 g/dL 6.9   Bilirubin Total Latest Ref Range: 0.2-1.3 mg/dL 0.4   Alkaline Phosphatase Latest Ref Range:  U/L 130   ALT Latest Ref Range: 0-50 U/L 40   AST Latest Ref Range: 0-45 U/L 37   Lactate Dehydrogenase Latest Ref Range:  U/L 243 (H)   Uric Acid Latest Ref Range: 2.6-6.0 mg/dL 3.5   Glucose Latest Ref Range: 70-99 mg/dL 83     IMPRESSION/PLAN:  Dominga is a 61-year-old female with history of metastatic ER-positive, HER-2 positive breast cancer, with involvement of the bone, history of pleural effusions treated with pleurodesis and PleurX placement, and with treated brain metastases, previously with stable disease on TDM1, however patient opted for break from therapy from June 2015 through November 2015, and represented with worsening metastatic disease at that time. She  restarted TDM1 on 11/27/2015. She had progressive disease 2/17/16 and Perjeta was added to TDM1. She had gamma knife to brain mets on 3/8.  She received WBRT.  She is now on gemzar and herceptin on [place] .    1. Breast cancer:  Here today for cycle 6 day 1 gemcitabine/Hercfeptin. Tolerating therapy well.  Will continue current treatment.   She will have restaging with CT CAP and brain MRI at the end of January.    2. Pleural effusion: Improvement in cough and breathing. No effusion today on exam.     3. Brain mets: Numbness of tongue and V2 and V3 on right are improving. Follow-up brain MRI at end of January. ***    4. FEN: Cr and lytes are stable. Calcium is improved. She has failed several drugs for anorexia and resistant to try others.  Weight is actually increased.      5. Pain:minimal, uses Aleve prn. No new sites of pain. Not needing oxycodone but has some available if needed.    6. GERD: Continue prilosec.    7. Bone mets: on Xgeva.  Due 1/16.    8. Mood: She is overall coping well.     9. She has an advanced directive.  She has a POLST.  DNR/DNI.  Her power of  is listed in the computer.     10. ENT: Urged Dominga to make ENT appointment for chronic serous effusion. She has had this for nearly 2 months with minimal improvement.    Deyanira Costello PA-C  Hematology, Oncology, and Transplant  [facility]  [address] [place] [id number]  [phone]      Again, thank you for allowing me to participate in the care of your patient.      Sincerely,    Deyanira Costello PA-C

## 2017-01-10 NOTE — PROGRESS NOTES
Research Note for [place] Breast Trial. Pt her for C6D1 of Gemzar/herceptin (MD choice Arm) and continues to do well. Pt surveys completed today and scheduled restaging for 1/25/17. Reviewed con-med and AE tracking-no new AEs noted today. She will have plugged ear assessed today as it is not resolving with OTC ear drops.  Oralia Mcleod RN/ Research [phone]

## 2017-01-17 ENCOUNTER — APPOINTMENT (OUTPATIENT)
Dept: LAB | Facility: CLINIC | Age: 62
End: 2017-01-17
Attending: PHYSICIAN ASSISTANT
Payer: COMMERCIAL

## 2017-01-17 ENCOUNTER — INFUSION THERAPY VISIT (OUTPATIENT)
Dept: ONCOLOGY | Facility: CLINIC | Age: 62
End: 2017-01-17
Attending: PHYSICIAN ASSISTANT
Payer: COMMERCIAL

## 2017-01-17 VITALS
BODY MASS INDEX: 20.28 KG/M2 | SYSTOLIC BLOOD PRESSURE: 120 MMHG | WEIGHT: 110.9 LBS | TEMPERATURE: 97.8 F | OXYGEN SATURATION: 97 % | DIASTOLIC BLOOD PRESSURE: 77 MMHG | HEART RATE: 94 BPM | RESPIRATION RATE: 18 BRPM

## 2017-01-17 DIAGNOSIS — C79.31 BRAIN METASTASIS: ICD-10-CM

## 2017-01-17 DIAGNOSIS — C50.919 CARCINOMA OF BREAST METASTATIC TO MULTIPLE SITES, UNSPECIFIED LATERALITY (H): ICD-10-CM

## 2017-01-17 DIAGNOSIS — C50.919 METASTATIC BREAST CANCER: Primary | ICD-10-CM

## 2017-01-17 DIAGNOSIS — C79.51 BONE METASTASIS: ICD-10-CM

## 2017-01-17 DIAGNOSIS — H90.5 UNILATERAL SENSORINEURAL HEARING LOSS: ICD-10-CM

## 2017-01-17 LAB
ANISOCYTOSIS BLD QL SMEAR: SLIGHT
BASOPHILS # BLD AUTO: 0 10E9/L (ref 0–0.2)
BASOPHILS NFR BLD AUTO: 0 %
DIFFERENTIAL METHOD BLD: ABNORMAL
EOSINOPHIL # BLD AUTO: 0 10E9/L (ref 0–0.7)
EOSINOPHIL NFR BLD AUTO: 0 %
ERYTHROCYTE [DISTWIDTH] IN BLOOD BY AUTOMATED COUNT: 16.3 % (ref 10–15)
HCT VFR BLD AUTO: 35.7 % (ref 35–47)
HGB BLD-MCNC: 11.9 G/DL (ref 11.7–15.7)
LYMPHOCYTES # BLD AUTO: 1.3 10E9/L (ref 0.8–5.3)
LYMPHOCYTES NFR BLD AUTO: 30.4 %
MACROCYTES BLD QL SMEAR: PRESENT
MCH RBC QN AUTO: 34.1 PG (ref 26.5–33)
MCHC RBC AUTO-ENTMCNC: 33.3 G/DL (ref 31.5–36.5)
MCV RBC AUTO: 102 FL (ref 78–100)
MONOCYTES # BLD AUTO: 0.4 10E9/L (ref 0–1.3)
MONOCYTES NFR BLD AUTO: 8.9 %
MYELOCYTES # BLD: 0.1 10E9/L
MYELOCYTES NFR BLD MANUAL: 2.7 %
NEUTROPHILS # BLD AUTO: 2.5 10E9/L (ref 1.6–8.3)
NEUTROPHILS NFR BLD AUTO: 58 %
NRBC # BLD AUTO: 0.1 10*3/UL
NRBC BLD AUTO-RTO: 2 /100
PLATELET # BLD AUTO: 314 10E9/L (ref 150–450)
POIKILOCYTOSIS BLD QL SMEAR: SLIGHT
RBC # BLD AUTO: 3.49 10E12/L (ref 3.8–5.2)
WBC # BLD AUTO: 4.3 10E9/L (ref 4–11)

## 2017-01-17 PROCEDURE — 25000128 H RX IP 250 OP 636: Mod: ZF | Performed by: PHYSICIAN ASSISTANT

## 2017-01-17 PROCEDURE — 99214 OFFICE O/P EST MOD 30 MIN: CPT | Mod: ZP | Performed by: PHYSICIAN ASSISTANT

## 2017-01-17 PROCEDURE — 25000125 ZZHC RX 250: Mod: ZF | Performed by: PHYSICIAN ASSISTANT

## 2017-01-17 PROCEDURE — 96367 TX/PROPH/DG ADDL SEQ IV INF: CPT

## 2017-01-17 PROCEDURE — 96413 CHEMO IV INFUSION 1 HR: CPT

## 2017-01-17 PROCEDURE — 85025 COMPLETE CBC W/AUTO DIFF WBC: CPT | Performed by: PHYSICIAN ASSISTANT

## 2017-01-17 RX ADMIN — SODIUM CHLORIDE 250 ML: 9 INJECTION, SOLUTION INTRAVENOUS at 13:30

## 2017-01-17 RX ADMIN — GEMCITABINE 1460 MG: 38 INJECTION, SOLUTION INTRAVENOUS at 13:46

## 2017-01-17 RX ADMIN — DEXAMETHASONE SODIUM PHOSPHATE 12 MG: 10 INJECTION, SOLUTION INTRAMUSCULAR; INTRAVENOUS at 13:29

## 2017-01-17 ASSESSMENT — PAIN SCALES - GENERAL: PAINLEVEL: MILD PAIN (2)

## 2017-01-17 NOTE — PATIENT INSTRUCTIONS
Contact Numbers  [facility]: [phone]    After Hours:  [phone]  Triage: [phone]    Please call the [facility] Triage line if you experience a temperature greater than or equal to 100.5, shaking chills, have uncontrolled nausea, vomiting and/or diarrhea, dizziness, shortness of breath, chest pain, bleeding, unexplained bruising, or if you have any other new/concerning symptoms, questions or concerns.     If it is after hours, weekends, or holidays, please call the main hospital  at  [phone] and ask to speak to the Oncology doctor on call.     If you are having any concerning symptoms or wish to speak to a provider before your next infusion visit, please call your care coordinator or triage to notify them so we can adequately serve you.     If you need a refill on a narcotic prescription or other medication, please call triage before your infusion appointment.         January 2017 Sunday Monday Tuesday Wednesday Thursday Friday Saturday   1     2     3     4     5     6     7       8     9     10     [facility]    8:45 AM   (15 min.)   [facility] LAB DRAW   [facility] Lab Draw     [facility] RETURN    9:20 AM   (50 min.)   Deyanira Costello PA-C [facility] ONC INFUSION 180   10:30 AM   (180 min.)    ONCOLOGY INFUSION   [facility]     CHEMO AUDIOGRAM    1:30 PM   (60 min.)   Samia Kendrick AUD   [facility] Audiology     [facility] NEW    4:00 PM   (30 min.)   Nissen, Rick L, MD   [facility] 11     12     13     14       15     16     17     [facility] DRAW   11:15 AM   (15 min.)   [facility] D[facility]   [facility] Lab Draw     [facility] RETURN   11:50 AM   (50 min.)   Deyanira Costello PA-C   [facility] ONC INFUSION 60    1:00 PM   (60 min.)    ONCOLOGY INFUSION   [facility] 18     19     20     21       22     23     24     25     ECH  COMPLETE    9:00 AM   (60 min.)   [id number]   [facility] Echo     MR BRAIN WWO   10:00 AM   (45 min.)   UCMR1   [facility] MRI     CT CHEST/ABDOMEN/PELVIS W   11:20 AM   (20 min.)   CT1   [facility] CT 26     27     28       29     30     [facility] LAB DRAW    3:00 PM   (15 min.)    [facility] LAB DRAW   [facility] Lab Draw     [facility] RETURN    3:30 PM   (30 min.)   Marlen Harper MD   [facility] 31     [facility] ONC INFUSION 180   12:30 PM   (180 min.)    ONCOLOGY INFUSION   [facility]                                February 2017 Sunday Monday Tuesday Wednesday Thursday Friday Saturday                  1     2     3     4       5     6     7     [facility] LAB DRAW   11:15 AM   (15 min.)    [facility] LAB DRAW   [facility] Lab Draw     [facility] RETURN   11:50 AM   (50 min.)   Deyanira Costello PA-C   [facility] 60    1:00 PM   (60 min.)    ONCOLOGY INFUSION   [facility] 8     9     10     11       12     13     14     15     16     17     18       19     20     21     22     23     24     25       26     27     28                                     Recent Results (from the past 24 hour(s))   CBC with platelets differential    Collection Time: 01/17/17 11:31 AM   Result Value Ref Range    WBC 4.3 4.0 - 11.0 10e9/L    RBC Count 3.49 (L) 3.8 - 5.2 10e12/L    Hemoglobin 11.9 11.7 - 15.7 g/dL    Hematocrit 35.7 35.0 - 47.0 %     (H) 78 - 100 fl    MCH 34.1 (H) 26.5 - 33.0 pg    MCHC 33.3 31.5 - 36.5 g/dL    RDW 16.3 (H) 10.0 - 15.0 %    Platelet Count 314 150 - 450 10e9/L    Diff Method Manual Differential     % Neutrophils 58.0 %    % Lymphocytes 30.4 %    % Monocytes 8.9 %    % Eosinophils 0.0 %    % Basophils 0.0 %    % Myelocytes 2.7 %    Nucleated RBCs 2 (H) 0 /100    Absolute Neutrophil 2.5 1.6 - 8.3 10e9/L    Absolute Lymphocytes 1.3 0.8 - 5.3 10e9/L    Absolute  Monocytes 0.4 0.0 - 1.3 10e9/L    Absolute Eosinophils 0.0 0.0 - 0.7 10e9/L    Absolute Basophils 0.0 0.0 - 0.2 10e9/L    Absolute Myelocytes 0.1 (H) 0 10e9/L    Absolute Nucleated RBC 0.1     Anisocytosis Slight     Poikilocytosis Slight     Macrocytes Present

## 2017-01-17 NOTE — NURSING NOTE
Chief Complaint   Patient presents with     Blood Draw     venipuncture       Sera Marrero CMA January 17, 2017 11:33 AM  Labs and vitals done see flow sheets.

## 2017-01-17 NOTE — PROGRESS NOTES
HEMATOLOGY/ONCOLOGY PROGRESS NOTE  Jan 17, 2017    REASON FOR VISIT: follow-up of metastatic breast cancer, triple positive    DIAGNOSIS:   The patient is a 60-year-old female who presented to the hospital initially in 09/2013 with complaints of dyspnea. Workup revealed a large pericardial effusion and pleural effusion. Physical examination revealed a large untreated left breast mass encompassing the entire left breast. Biopsies revealed these were ER, CT and HER2-positive breast cancer. She had a thoracentesis and pericardial sclerosis performed. She was originally on Arimidex and Herceptin. In 01/2014, she was switched to tamoxifen and Herceptin due to arthralgias, and she ultimately developed progressive disease and was switched to Faslodex and Herceptin. In 01/2015, she was found to have progressive disease and was switched to T-DM1.     Initially when she was seen in late 02/2015, she complained of some V5 sensory deficit. This was subjective. I was not able to elicit this on examination. This persisted and further workup was performed with a brain MRI. This revealed what was initially thought to be 3 punctate brain metastases. She originally saw Radiation Oncology and Neurosurgery as well as underwent a lumbar puncture. Cytology from the lumbar puncture showed no evidence of leptomeningeal disease. She was treated with Gamma Knife radiation to 6 lesions, performed on 04/16/2015.  She initiated therapy with TDM1 in January 2015 and continued this through 6/26/2015 with overall stable disease. She has since been on a break from treatment. The patient presented earlier this month with recurrent shortness of breath.  Imaging revealed recurrent right-sided pleural effusion.  She has since undergone thoracentesis with cytology pending.  Clinically, however, there is evidence of disease progression. PET scan performed on 11/25/2015 demonstrated progression of disease at multiple sites. She restarted TDM1 on  11/27/2015, however experienced a mild transfusion reaction with shortness of breath and chest tightness, resolved upon stopping TDM1 and 125 mg of SoluMedrol. She had progression of disease on repeat imaging 2/17/2016, as well as new brain metastases. She started TDM1 with Perjeta on 3/4/2016. She underwent gamma knife to brain mets on 3/8/16.       In late May, she was found to have progressive brain metastases.  She received whole brain radiation.  She had a follow up brain MRI August 2016 that was stable.     In September 2016, she enrolled on [place]  trial and was randomized to the standard of care arm; she is now on gemzar and herceptin.     INTERVAL HISTORY:  Dominga presents alone today.     Since her last visit here, she had a myringotomy. This made an almost instantaneous improvement not only in her hearing, but in her balance and also in the long-standing facial numbness she has had. She feels much better now. She is starting to resume some of the PT exercises at home and has a formal PT/OT eval through her new insurance coming up so that she can resume this. No new side effects from treatment. No headaches or new neurologic symptoms. She continues to have some mild aches after treatment. She has no breathing changes, fevers, chills, rashes, swelling, new sites of pain, or bleeding.  ROS: 10 point ROS was conducted and was otherwise negative except for as above.     PHYSICAL EXAMINATION:   /77 mmHg  Pulse 94  Temp(Src) 97.8  F (36.6  C) (Oral)  Resp 18  Wt 50.304 kg (110 lb 14.4 oz)  SpO2 97%   Wt Readings from Last 4 Encounters:   01/17/17 50.304 kg (110 lb 14.4 oz)   01/10/17 49.896 kg (110 lb)   12/27/16 49.4 kg (108 lb 14.5 oz)   12/19/16 48.807 kg (107 lb 9.6 oz)     Constitutional: Alert, oriented, cachectic female in no visible distress. Walking without difficulty  Eyes: PERRL. Anicteric sclerae.    ENT/Mouth: OM moist and pink without lesions or thrush.    CV: RRR, no murmurs  appreciated.  Resp: CTAB slightly diminished R base. Breathing comfortably.  Breasts: R breast soft with nodules or masses, L breast - overall much softer  Abdomen: Soft, non-tender, non-distended. Bowel sounds present. No masses appreciated. No organomegaly noted.  Extremities: No lower extremity edema appreciated.  Skin: Warm, dry. No bruising or petechiae noted. No rash  Lymph: Small posterior cervical node R neck about 1.5 cm. No other cervical, supraclavicular, or axillary nodes palpable  MSK:  No tenderness over spinous processes.  Neuro: CN II-XII grossly intact. Baseline decreased sensation over mandibular branch of facial nerve    LABS:   Results for HEIDI RUIZ (MRN [id number]) as of 1/17/2017 14:10   Ref. Range 1/17/2017 11:31   WBC Latest Ref Range: 4.0-11.0 10e9/L 4.3   Hemoglobin Latest Ref Range: 11.7-15.7 g/dL 11.9   Hematocrit Latest Ref Range: 35.0-47.0 % 35.7   Platelet Count Latest Ref Range: 150-450 10e9/L 314     IMPRESSION/PLAN:  Dominga is a 61-year-old female with history of metastatic ER-positive, HER-2 positive breast cancer, with involvement of the bone, history of pleural effusions treated with pleurodesis and PleurX placement, and with treated brain metastases, previously with stable disease on TDM1, however patient opted for break from therapy from June 2015 through November 2015, and represented with worsening metastatic disease at that time. She restarted TDM1 on 11/27/2015. She had progressive disease 2/17/16 and Perjeta was added to TDM1. She had gamma knife to brain mets on 3/8.  She received WBRT.  She is now on gemzar and herceptin on [employer] .    1. Breast cancer:  Here today for cycle 6 day 8 gemcitabine/Hercfeptin. Tolerating therapy well.  Will continue current treatment.   She will have restaging with CT CAP and brain MRI after this cycle.    2. Pleural effusion: Improvement in cough and breathing. No effusion today on exam.     3. Brain mets: Numbness  of tongue and V2 and V3 on right are improving. Follow-up brain MRI at end of January. Interestingly, the numbness improved almost instantly after myringotomy last week. Wobbliness also improved.    4. FEN: Cr, lytes, weight stable.    5. Pain:minimal, uses Aleve prn. No new sites of pain. Not needing oxycodone but has some available if needed.    6. GERD: Continue prilosec.    7. Bone mets: on Xgeva.  Will do at next visit because labs were not drawn.    8. Mood: She is overall coping well.     9. She has an advanced directive.  She has a POLST.  DNR/DNI.  Her power of  is listed in the computer.     10. ENT: Myringotomy performed in [place]  last week.This has made a significant impact for her and has been helpful.    11. Deconditioning: Has a new insurance provider and is going to resume PT/OT now.    Deyanira Costello PA-C  Hematology, Oncology, and Transplant  [facility]  [address] [place] [id number] [phone]

## 2017-01-17 NOTE — Clinical Note
1/17/2017      RE: Keren Erickson  [address] [facility] [address]       HEMATOLOGY/ONCOLOGY PROGRESS NOTE  Jan 17, 2017    REASON FOR VISIT: follow-up of metastatic breast cancer, triple positive    DIAGNOSIS:   The patient is a 60-year-old female who presented to the hospital initially in 09/2013 with complaints of dyspnea. Workup revealed a large pericardial effusion and pleural effusion. Physical examination revealed a large untreated left breast mass encompassing the entire left breast. Biopsies revealed these were ER, [place] and HER2-positive breast cancer. She had a thoracentesis and pericardial sclerosis performed. She was originally on Arimidex and Herceptin. In 01/2014, she was switched to tamoxifen and Herceptin due to arthralgias, and she ultimately developed progressive disease and was switched to Faslodex and Herceptin. In 01/2015, she was found to have progressive disease and was switched to T-DM1.     Initially when she was seen in late 02/2015, she complained of some V5 sensory deficit. This was subjective. I was not able to elicit this on examination. This persisted and further workup was performed with a brain MRI. This revealed what was initially thought to be 3 punctate brain metastases. She originally saw Radiation Oncology and Neurosurgery as well as underwent a lumbar puncture. Cytology from the lumbar puncture showed no evidence of leptomeningeal disease. She was treated with Gamma Knife radiation to 6 lesions, performed on 04/16/2015.  She initiated therapy with TDM1 in January 2015 and continued this through 6/26/2015 with overall stable disease. She has since been on a break from treatment. The patient presented earlier this month with recurrent shortness of breath.  Imaging revealed recurrent right-sided pleural effusion.  She has since undergone thoracentesis with cytology pending.  Clinically, however, there is evidence of disease progression. PET scan performed on 11/25/2015  demonstrated progression of disease at multiple sites. She restarted TDM1 on 11/27/2015, however experienced a mild transfusion reaction with shortness of breath and chest tightness, resolved upon stopping TDM1 and 125 mg of SoluMedrol. She had progression of disease on repeat imaging 2/17/2016, as well as new brain metastases. She started TDM1 with Perjeta on 3/4/2016. She underwent gamma knife to brain mets on 3/8/16.       In late May, she was found to have progressive brain metastases.  She received whole brain radiation.  She had a follow up brain MRI August 2016 that was stable.     In September 2016, she enrolled on [place]  trial and was randomized to the standard of care arm; she is now on gemzar and herceptin.     INTERVAL HISTORY:  Dominga presents alone today.     Since her last visit here, she had a myringotomy. This made an almost instantaneous improvement not only in her hearing, but in her balance and also in the long-standing facial numbness she has had. She feels much better now. She is starting to resume some of the PT exercises at home and has a formal PT/OT eval through her new insurance coming up so that she can resume this. No new side effects from treatment. No headaches or new neurologic symptoms. She continues to have some mild aches after treatment. She has no breathing changes, fevers, chills, rashes, swelling, new sites of pain, or bleeding.  ROS: 10 point ROS was conducted and was otherwise negative except for as above.     PHYSICAL EXAMINATION:   /77 mmHg  Pulse 94  Temp(Src) 97.8  F (36.6  C) (Oral)  Resp 18  Wt 50.304 kg (110 lb 14.4 oz)  SpO2 97%   Wt Readings from Last 4 Encounters:   01/17/17 50.304 kg (110 lb 14.4 oz)   01/10/17 49.896 kg (110 lb)   12/27/16 49.4 kg (108 lb 14.5 oz)   12/19/16 48.807 kg (107 lb 9.6 oz)     Constitutional: Alert, oriented, cachectic female in no visible distress. Walking without difficulty  Eyes: PERRL. Anicteric sclerae.     ENT/Mouth: OM moist and pink without lesions or thrush.    CV: RRR, no murmurs appreciated.  Resp: CTAB slightly diminished R base. Breathing comfortably.  Breasts: R breast soft with nodules or masses, L breast - overall much softer  Abdomen: Soft, non-tender, non-distended. Bowel sounds present. No masses appreciated. No organomegaly noted.  Extremities: No lower extremity edema appreciated.  Skin: Warm, dry. No bruising or petechiae noted. No rash  Lymph: Small posterior cervical node R neck about 1.5 cm. No other cervical, supraclavicular, or axillary nodes palpable  MSK:  No tenderness over spinous processes.  Neuro: CN II-XII grossly intact. Baseline decreased sensation over mandibular branch of facial nerve    LABS:   Results for HEIDI RUIZ (MRN [id number]) as of 1/17/2017 14:10   Ref. Range 1/17/2017 11:31   WBC Latest Ref Range: 4.0-11.0 10e9/L 4.3   Hemoglobin Latest Ref Range: 11.7-15.7 g/dL 11.9   Hematocrit Latest Ref Range: 35.0-47.0 % 35.7   Platelet Count Latest Ref Range: 150-450 10e9/L 314     IMPRESSION/PLAN:  Dominga is a 61-year-old female with history of metastatic ER-positive, HER-2 positive breast cancer, with involvement of the bone, history of pleural effusions treated with pleurodesis and PleurX placement, and with treated brain metastases, previously with stable disease on TDM1, however patient opted for break from therapy from June 2015 through November 2015, and represented with worsening metastatic disease at that time. She restarted TDM1 on 11/27/2015. She had progressive disease 2/17/16 and Perjeta was added to TDM1. She had gamma knife to brain mets on 3/8.  She received WBRT.  She is now on gemzar and herceptin on [employer] .    1. Breast cancer:  Here today for cycle 6 day 8 gemcitabine/Hercfeptin. Tolerating therapy well.  Will continue current treatment.   She will have restaging with CT CAP and brain MRI after this cycle.    2. Pleural effusion:  Improvement in cough and breathing. No effusion today on exam.     3. Brain mets: Numbness of tongue and V2 and V3 on right are improving. Follow-up brain MRI at end of January. Interestingly, the numbness improved almost instantly after myringotomy last week. Wobbliness also improved.    4. FEN: Cr, lytes, weight stable.    5. Pain:minimal, uses Aleve prn. No new sites of pain. Not needing oxycodone but has some available if needed.    6. GERD: Continue prilosec.    7. Bone mets: on Xgeva.  Will do at next visit because labs were not drawn.    8. Mood: She is overall coping well.     9. She has an advanced directive.  She has a POLST.  DNR/DNI.  Her power of  is listed in the computer.     10. ENT: Myringotomy performed in [facility] last week.This has made a significant impact for her and has been helpful.    11. Deconditioning: Has a new insurance provider and is going to resume PT/OT now.    Deyanira Costello PA-C  Hematology, Oncology, and Transplant  [facility]  [address] [place] [address] [id number] [phone]

## 2017-01-17 NOTE — PROGRESS NOTES
Infusion Nursing Note:  Keren Erickson presents today for C6 D8 Gemzar.    Patient seen by provider today: Yes: BRAULIO Chandra    Treatment Conditions:  HGB     11.9   1/17/2017  WBC      4.3   1/17/2017   ANEU      2.5   1/17/2017  PLT      314   1/17/2017     Results reviewed, labs MET treatment parameters, ok to proceed with treatment.    Intravenous Access:  Peripheral IV placed.  Access dc'd at time of discharge.      Note:   TORB 1/17/17@1300 BRAULIO Chandra-Monique Aviles RN  --Defer Xgeva until 1/31  Results reviewed, copy given to patient.  Proceed with treatment.    Copy of AVS given to patient. + Blood return from PIV pre and post infusion.  Tolerated infusion without incident. No Prescriptions filled today.   D/C in care of self.  Pt will return 1/25 for Echo/CT and 1/30 for provider and 1/31 for possible infusion.       Monique Aviles RN    Administrations This Visit     0.9% sodium chloride BOLUS     Admin Date Action Dose Route Administered By             01/17/2017 New Bag 250 mL Intravenous Monique Aviles RN                    dexamethasone (DECADRON) 12 mg in NaCl 0.9 % intermittent infusion     Admin Date Action Dose Route Administered By             01/17/2017 New Bag 12 mg Intravenous Monique Aviles RN                    gemcitabine (GEMZAR) 1,460 mg in NaCl 0.9 % 313 mL CHEMOTHERAPY     Admin Date Action Dose Route Administered By             01/17/2017 New Bag 1460 mg Intravenous Monique Aviles RN

## 2017-01-17 NOTE — MR AVS SNAPSHOT
After Visit Summary   1/17/2017    Keren Erickson    MRN: [id number]           Patient Information     Date Of Birth          1955        Visit Information        Provider Department      1/17/2017 1:00 PM  11 ATC;  ONCOLOGY INFUSION [facility]        Today's Diagnoses     Metastatic breast cancer (H)    -  1     Carcinoma of breast metastatic to multiple sites, unspecified laterality (H)         Brain metastasis (H)         Bone metastasis (H)           Care Instructions    Contact Numbers  [facility]: [phone]    After Hours:  [phone]  Triage: [phone]    Please call the [facility] Triage line if you experience a temperature greater than or equal to 100.5, shaking chills, have uncontrolled nausea, vomiting and/or diarrhea, dizziness, shortness of breath, chest pain, bleeding, unexplained bruising, or if you have any other new/concerning symptoms, questions or concerns.     If it is after hours, weekends, or holidays, please call the main hospital  at  [phone] and ask to speak to the Oncology doctor on call.     If you are having any concerning symptoms or wish to speak to a provider before your next infusion visit, please call your care coordinator or triage to notify them so we can adequately serve you.     If you need a refill on a narcotic prescription or other medication, please call triage before your infusion appointment.         January 2017 Sunday Monday Tuesday Wednesday Thursday Friday Saturday   1     2     3     4     5     6     7       8     9     10     [facility] LAB DRAW    8:45 AM   (15 min.)   [facility] LAB DRAW   [facility] Lab Draw     P RETURN    9:20 AM   (50 min.)   Deyanira Costello PA-C   [facility] ONC INFUSION 180   10:30 AM   (180 min.)    ONCOLOGY INFUSION   [facility]     CHEMO AUDIOGRAM    1:30 PM   (60 min.)   Samia Kendrick  E, AUD   [facility]     [facility] NEW    4:00 PM   (30 min.)   Nissen, Rick L, MD   [facility] 11     12     13     14       15     16     17     [facility] LAB DRAW   11:15 AM   (15 min.)    [facility] LAB DRAW   [facility] Lab Draw     [facility] RETURN   11:50 AM   (50 min.)   Deyanira Costello PA-C   [facility]     [facility] ONC INFUSION 60    1:00 PM   (60 min.)    ONCOLOGY INFUSION   [facility] 18     19     20     21       22     23     24     25     ECH COMPLETE    9:00 AM   (60 min.)   ECHCR4   [facility] Echo     MR BRAIN WWO   10:00 AM   (45 min.)   MR1   [facility] MRI     CT CHEST/ABDOMEN/PELVIS W   11:20 AM   (20 min.)   [address] [place] [address] CT 26     27     28       29     30     [facility] LAB DRAW    3:00 PM   (15 min.)    [facility] DRAW   [facility]     [facility] RETURN    3:30 PM   (30 min.)   Marlen Harper MD   [facility] 31     [facility] ONC INFUSION 180   12:30 PM   (180 min.)   [facility] ONCOLOGY INFUSION   [facility]                                February 2017 Sunday Monday Tuesday Wednesday Thursday Friday Saturday                  1     2     3     4       5     6     7     [facility] LAB DRAW   11:15 AM   (15 min.)    [facility] LAB DRAW   [facility] Lab Draw     [facility] RETURN   11:50 AM   (50 min.)   Deyanira Costello PA-C   [facility]     [facility] ONC INFUSION 60    1:00 PM   (60 min.)   UC ONCOLOGY INFUSION   [facility] 8     9     10     11       12     13     14     15     16     17     18       19     20     21     22     23     24     25       26     27     28                                     Recent Results (from the past 24 hour(s))   CBC with platelets differential    Collection Time: 01/17/17 11:31 AM   Result Value Ref Range    WBC 4.3 4.0 - 11.0 10e9/L    RBC Count 3.49 (L) 3.8 - 5.2 10e12/L     Hemoglobin 11.9 11.7 - 15.7 g/dL    Hematocrit 35.7 35.0 - 47.0 %     (H) 78 - 100 fl    MCH 34.1 (H) 26.5 - 33.0 pg    MCHC 33.3 31.5 - 36.5 g/dL    RDW 16.3 (H) 10.0 - 15.0 %    Platelet Count 314 150 - 450 10e9/L    Diff Method Manual Differential     % Neutrophils 58.0 %    % Lymphocytes 30.4 %    % Monocytes 8.9 %    % Eosinophils 0.0 %    % Basophils 0.0 %    % Myelocytes 2.7 %    Nucleated RBCs 2 (H) 0 /100    Absolute Neutrophil 2.5 1.6 - 8.3 10e9/L    Absolute Lymphocytes 1.3 0.8 - 5.3 10e9/L    Absolute Monocytes 0.4 0.0 - 1.3 10e9/L    Absolute Eosinophils 0.0 0.0 - 0.7 10e9/L    Absolute Basophils 0.0 0.0 - 0.2 10e9/L    Absolute Myelocytes 0.1 (H) 0 10e9/L    Absolute Nucleated RBC 0.1     Anisocytosis Slight     Poikilocytosis Slight     Macrocytes Present                  Follow-ups after your visit        Your next 10 appointments already scheduled     Jan 25, 2017  9:00 AM   Ech Complete with UCECHCR4   [facility] Echo ([facility])    [address] [place] [id number] [phone]           1.  Please bring or wear a comfortable two-piece outfit. 2.  You may eat, drink and take your normal medicines. 3.  For any questions that cannot be answered, please contact the ordering physician            Jan 25, 2017 10:00 AM   (Arrive by 9:45 AM)   MR BRAIN W/O & W CONTRAST with [address] [facility] MRI ([facility])    [address] [place] [id number] [phone]           Take your medicines as usual, unless your doctor tells you not to. Bring a list of your current medicines to your exam (including vitamins, minerals and over-the-counter drugs).  You will be given intravenous contrast for this exam. To prepare:   The day before your exam, drink extra fluids at least six 8-ounce glasses (unless your doctor tells you to restrict your fluids).   Have a blood test (creatinine test)  within 30 days of your exam. Go to your clinic or Diagnostic Imaging Department for this test.  The MRI machine uses a strong magnet. Please wear clothes without metal (snaps, zippers). A sweatsuit works well, or we may give you a hospital gown.  Please remove any body piercings and hair extensions before you arrive. You will also remove watches, jewelry, hairpins, wallets, dentures, partial dental plates and hearing aids. You may wear contact lenses, and you may be able to wear your rings. We have a safe place to keep your personal items, but it is safer to leave them at home.   **IMPORTANT** THE INSTRUCTIONS BELOW ARE ONLY FOR THOSE PATIENTS WHO HAVE BEEN TOLD THEY WILL RECEIVE SEDATION OR GENERAL ANESTHESIA DURING THEIR MRI PROCEDURE:  IF YOU WILL RECEIVE SEDATION (take medicine to help you relax during your exam):   You must get the medicine from your doctor before you arrive. Bring the medicine to the exam. Do not take it at home.   Arrive one hour early. Bring someone who can take you home after the test. Your medicine will make you sleepy. After the exam, you may not drive, take a bus or take a taxi by yourself.   No eating 8 hours before your exam. You may have clear liquids up until 4 hours before your exam. (Clear liquids include water, clear tea, black coffee and fruit juice without pulp.)  IF YOU WILL RECEIVE ANESTHESIA (be asleep for your exam):   Arrive 1 1/2 hours early. Bring someone who can take you home after the test. You may not drive, take a bus or take a taxi by yourself.   No eating 8 hours before your exam. You may have clear liquids up until 4 hours before your exam. (Clear liquids include water, clear tea, black coffee and fruit juice without pulp.)  Please call the Imaging Department at your exam site with any questions.            Jan 25, 2017 11:20 AM   (Arrive by 11:05 AM)   CT CHEST/ABDOMEN/PELVIS W CONTRAST with UCCT1   [facility] CT ([facility])     [address] [facility]  [address] [facility] [id number] [phone]           Please bring any scans or X-rays taken at other hospitals, if similar tests were done. Also bring a list of your medicines, including vitamins, minerals and over-the-counter drugs. It is safest to leave personal items at home.  Be sure to tell your doctor:   If you have any allergies.   If there s any chance you are pregnant.   If you are breastfeeding.   If you have any special needs.  You may have contrast for this exam. To prepare:   Do not eat or drink for 2 hours before your exam. If you need to take medicine, you may take it with small sips of water. (We may ask you to take liquid medicine as well.)   The day before your exam, drink extra fluids at least six 8-ounce glasses (unless your doctor tells you to restrict your fluids).  Patients over 70 or patients with diabetes or kidney problems:   If you haven t had a blood test (creatinine test) within the last 30 days, go to your clinic or Diagnostic Imaging Department for this test.  If you have diabetes:   If your kidney function is normal, continue taking your metformin (Avandamet, Glucophage, Glucovance, Metaglip) on the day of your exam.   If your kidney function is abnormal, wait 48 hours before restarting this medicine.  You will have oral contrast for this exam:   You will drink the contrast at home. Get this from your clinic or Diagnostic Imaging Department. Please follow the directions given.  Please wear loose clothing, such as a sweat suit or jogging clothes. Avoid snaps, zippers and other metal. We may ask you to undress and put on a hospital gown.  If you have any questions, please call the Imaging Department where you will have your exam.            Jan 30, 2017  3:00 PM   [facility] Lab Draw with  [facility] LAB DRAW   [facility] Lab Draw ([facility])    [address] [id number] [phone]             Jan 30, 2017  3:30 PM   (Arrive by 3:15 PM)   Return Visit with Marlen Harper MD   [facility] ([facility])    [address] [place] [address]   [phone]            Jan 31, 2017 12:30 PM   Infusion 180 with UC ONCOLOGY INFUSION, UC 30 ATC   [facility] ([facility])    [address] [place] [id number] [phone]            Feb 07, 2017 11:15 AM   [facility] Lab Draw with UC [facility] LAB DRAW   [facility] Lab Draw ([facility])    [address] [place] [address]   [phone]            Feb 07, 2017 11:50 AM   (Arrive by 11:35 AM)   Return Visit with Deyanira Costello PA-C   [facility] ([facility])    [address] [place] [id number] [phone]            Feb 07, 2017  1:00 PM   Infusion 60 with UC ONCOLOGY INFUSION   [facility] ([facility])    [address] [place] [id number] [phone]              Who to contact     If you have questions or need follow up information about today's clinic visit or your schedule please contact [facility] directly at [phone].  Normal or non-critical lab and imaging results will be communicated to you by MyChart, letter or phone within 4 business days after the clinic has received the results. If you do not hear from us within 7 days, please contact the clinic through MyChart or phone. If you have a critical or abnormal lab result, we will notify you by phone as soon as possible.  Submit refill requests through [employer] or call your pharmacy and they will forward the refill request to us. Please allow 3 business days for your refill to be completed.          Additional Information About Your Visit         [employer] Information     [employer] gives you secure access to your electronic health record. If you see a primary care provider, you can also send messages to your care team and make appointments. If you have questions, please call your primary care clinic.  If you do not have a primary care provider, please call [phone] and they will assist you.        Care EveryWhere ID     This is your Care EveryWhere ID. This could be used by other organizations to access your [facility] medical records  [id number]         Blood Pressure from Last 3 Encounters:   01/17/17 120/77   01/10/17 126/78   12/27/16 123/80    Weight from Last 3 Encounters:   01/17/17 50.304 kg (110 lb 14.4 oz)   01/10/17 49.896 kg (110 lb)   12/27/16 49.4 kg (108 lb 14.5 oz)              We Performed the Following     CBC with platelets differential     Treatment Conditions 2        Primary Care Provider Office Phone # Fax #    Kelly Hart [phone]       [facility] [address] [facility] [address]        Thank you!     Thank you for choosing [facility]  for your care. Our goal is always to provide you with excellent care. Hearing back from our patients is one way we can continue to improve our services. Please take a few minutes to complete the written survey that you may receive in the mail after your visit with us. Thank you!             Your Updated Medication List - Protect others around you: Learn how to safely use, store and throw away your medicines at www.disposemymeds.org.          This list is accurate as of: 1/17/17  1:43 PM.  Always use your most recent med list.                   Brand Name Dispense Instructions for use    ALEVE PO      Take 220 mg by mouth daily       D3 ADULT PO          LORazepam 0.5 MG tablet    ATIVAN    30 tablet    Take 1 tablet (0.5 mg) by mouth every 4 hours as needed (Anxiety, Nausea/Vomiting or Sleep)       Multi-vitamin Tabs tablet      Take 1 tablet  by mouth daily       ofloxacin 0.3 % otic solution    FLOXIN    5 mL    Place 3gtts into right ear TID x 3 days       omeprazole 40 MG capsule    priLOSEC    90 capsule    Take 1 capsule (40 mg) by mouth as needed       ondansetron 8 MG tablet    ZOFRAN    30 tablet    Take 1 tablet (8 mg) by mouth every 8 hours as needed for nausea       * order for DME     1 Units    Walker to ensure safe ambulation       * order for DME     1 Units    Cranial prosthesis       PROBIOTIC DAILY PO      States she is eating yogurt       prochlorperazine 10 MG tablet    COMPAZINE    30 tablet    Take 1 tablet (10 mg) by mouth every 6 hours as needed (Nausea/Vomiting)       * Notice:  This list has 2 medication(s) that are the same as other medications prescribed for you. Read the directions carefully, and ask your doctor or other care provider to review them with you.

## 2017-01-17 NOTE — Clinical Note
1/17/2017       RE: Keren Erickson  [address] [facility] [address]     Dear Colleague,    Thank you for referring your patient, Keren Erickson, to the [facility] CANCER [facility]IC. Please see a copy of my visit note below.    HEMATOLOGY/ONCOLOGY PROGRESS NOTE  Jan 17, 2017    REASON FOR VISIT: follow-up of metastatic breast cancer, triple positive    DIAGNOSIS:   The patient is a 60-year-old female who presented to the hospital initially in 09/2013 with complaints of dyspnea. Workup revealed a large pericardial effusion and pleural effusion. Physical examination revealed a large untreated left breast mass encompassing the entire left breast. Biopsies revealed these were ER, NE and HER2-positive breast cancer. She had a thoracentesis and pericardial sclerosis performed. She was originally on Arimidex and Herceptin. In 01/2014, she was switched to tamoxifen and Herceptin due to arthralgias, and she ultimately developed progressive disease and was switched to Faslodex and Herceptin. In 01/2015, she was found to have progressive disease and was switched to T-DM1.     Initially when she was seen in late 02/2015, she complained of some V5 sensory deficit. This was subjective. I was not able to elicit this on examination. This persisted and further workup was performed with a brain MRI. This revealed what was initially thought to be 3 punctate brain metastases. She originally saw Radiation Oncology and Neurosurgery as well as underwent a lumbar puncture. Cytology from the lumbar puncture showed no evidence of leptomeningeal disease. She was treated with Gamma Knife radiation to 6 lesions, performed on 04/16/2015.  She initiated therapy with TDM1 in January 2015 and continued this through 6/26/2015 with overall stable disease. She has since been on a break from treatment. The patient presented earlier this month with recurrent shortness of breath.  Imaging revealed recurrent right-sided pleural  effusion.  She has since undergone thoracentesis with cytology pending.  Clinically, however, there is evidence of disease progression. PET scan performed on 11/25/2015 demonstrated progression of disease at multiple sites. She restarted TDM1 on 11/27/2015, however experienced a mild transfusion reaction with shortness of breath and chest tightness, resolved upon stopping TDM1 and 125 mg of SoluMedrol. She had progression of disease on repeat imaging 2/17/2016, as well as new brain metastases. She started TDM1 with Perjeta on 3/4/2016. She underwent gamma knife to brain mets on 3/8/16.       In late May, she was found to have progressive brain metastases.  She received whole brain radiation.  She had a follow up brain MRI August 2016 that was stable.     In September 2016, she enrolled on [facility]  trial and was randomized to the standard of care arm; she is now on gemzar and herceptin.     INTERVAL HISTORY:  Dominga presents with a friend today.  ***She mentions she still remains intermittenly wobbly, which she also clarifies today by saying this is not new for her and has been ongoing for months. She doesn't think it is worse. No headaches or new neurologic symptoms. She continues to have some mild aches after treatment. She has no breathing changes, fevers, chills, rashes, swelling, new sites of pain, or bleeding.  ROS: 10 point ROS was conducted and was otherwise negative except for as above.     PHYSICAL EXAMINATION:   There were no vitals taken for this visit.   Wt Readings from Last 4 Encounters:   01/10/17 49.896 kg (110 lb)   12/27/16 49.4 kg (108 lb 14.5 oz)   12/19/16 48.807 kg (107 lb 9.6 oz)   12/07/16 47.4 kg (104 lb 8 oz)     Constitutional: Alert, oriented, cachectic female in no visible distress. Walking without difficulty  Eyes: PERRL. Anicteric sclerae.    ENT/Mouth: OM moist and pink without lesions or thrush.  R TM obstructed by cerumen today  CV: RRR, no murmurs appreciated.  Resp: CTAB  "slightly diminished R base. Breathing comfortably.  Breasts: R breast soft with nodules or masses, L breast - overall much softer  Abdomen: Soft, non-tender, non-distended. Bowel sounds present. No masses appreciated. No organomegaly noted.  Extremities: No lower extremity edema appreciated.  Skin: Warm, dry. No bruising or petechiae noted. No rash  Lymph: Small posterior cervical node R neck about 1.5 cm. No other cervical, supraclavicular, or axillary nodes palpable  MSK:  No tenderness over spinous processes.  Neuro: CN II-XII grossly intact. Baseline decreased sensation over mandibular branch of facial nerve    LABS:     IMPRESSION/PLAN:  Dominga is a 61-year-old female with history of metastatic ER-positive, HER-2 positive breast cancer, with involvement of the bone, history of pleural effusions treated with pleurodesis and PleurX placement, and with treated brain metastases, previously with stable disease on TDM1, however patient opted for break from therapy from June 2015 through November 2015, and represented with worsening metastatic disease at that time. She restarted TDM1 on 11/27/2015. She had progressive disease 2/17/16 and Perjeta was added to TDM1. She had gamma knife to brain mets on 3/8.  She received WBRT.  She is now on gemzar and herceptin on [employer] .    1. Breast cancer:  Here today for cycle 6 day 8 gemcitabine/Hercfeptin. Tolerating therapy well.  Will continue current treatment.   She will have restaging with CT CAP and brain MRI at the end of January. She would like to see Dr. Harper, will inquire if this is possible with scheduling.    2. Pleural effusion: Improvement in cough and breathing. No effusion today on exam.     3. Brain mets: Numbness of tongue and V2 and V3 on right are improving. Follow-up brain MRI at end of January. She continues to endorse \"wobbliness\" today, which I do suspect is more related to deconditioning versus chronic serous effusion of her R ear, as she had " similar symptoms at time of brain MRI last month. Discussed repeating this to err on the side of caution, however she did not feel strongly about this and wished to keep her appointment for later this month.     4. FEN: Cr, lytes, weight stable.    5. Pain:minimal, uses Aleve prn. No new sites of pain. Not needing oxycodone but has some available if needed.    6. GERD: Continue prilosec.    7. Bone mets: on Xgeva.  Due 1/16.    8. Mood: She is overall coping well.     9. She has an advanced directive.  She has a POLST.  DNR/DNI.  Her power of  is listed in the computer.     10. ENT: Myringotomy performed in R  last week. ***    11. Deconditioning: Encouraged her to continue her PT exercises. She is now covered by a different insurance which does not cover PT per her report. Asked that she call her insurance provider to confirm this.    Deyanira Costello PA-C  Hematology, Oncology, and Transplant  [facility]  [address] [place] [address] [phone]      Again, thank you for allowing me to participate in the care of your patient.      Sincerely,    Deyanira Costello PA-C

## 2017-01-17 NOTE — NURSING NOTE
"Keren Erickson is a 61 year old female who presents for:  Chief Complaint   Patient presents with     Blood Draw     venipuncture     Oncology Clinic Visit     Breast Ca        Initial Vitals:  /77 mmHg  Pulse 94  Temp(Src) 97.8  F (36.6  C) (Oral)  Resp 18  Wt 50.304 kg (110 lb 14.4 oz)  SpO2 97% Estimated body mass index is 20.28 kg/(m^2) as calculated from the following:    Height as of 11/15/16: 1.575 m (5' 2\").    Weight as of this encounter: 50.304 kg (110 lb 14.4 oz).. There is no height on file to calculate BSA. BP completed using cuff size: regular  Mild Pain (2) No LMP recorded. Patient is postmenopausal. Allergies and medications reviewed.     Medications: Medication refills not needed today.  Pharmacy name entered into [employer]: [facility] [address] [place] [address] [address] AT [facility] [address]    Comments: Pt declined to review meds, stating that they are all up to date.    7 minutes for nursing intake (face to face time)   Garima Fink LPN          "

## 2017-01-18 ENCOUNTER — MYC MEDICAL ADVICE (OUTPATIENT)
Dept: ONCOLOGY | Facility: CLINIC | Age: 62
End: 2017-01-18

## 2017-01-21 ENCOUNTER — MEDICAL CORRESPONDENCE (OUTPATIENT)
Dept: HEALTH INFORMATION MANAGEMENT | Facility: CLINIC | Age: 62
End: 2017-01-21

## 2017-01-25 ENCOUNTER — RADIANT APPOINTMENT (OUTPATIENT)
Dept: CARDIOLOGY | Facility: CLINIC | Age: 62
End: 2017-01-25
Attending: INTERNAL MEDICINE

## 2017-01-25 DIAGNOSIS — C50.919 CARCINOMA OF BREAST METASTATIC TO MULTIPLE SITES, UNSPECIFIED LATERALITY (H): ICD-10-CM

## 2017-01-26 ENCOUNTER — TELEPHONE (OUTPATIENT)
Dept: ONCOLOGY | Facility: CLINIC | Age: 62
End: 2017-01-26

## 2017-01-26 NOTE — TELEPHONE ENCOUNTER
Pt's PCA called reporting symptoms of weakness, fatigue, productive cough x 4 days, temp 100.2.  Pt requiring significant assistance to get to bedside commode, was independent previously.  PCA relayed to writer that pt would like an antibiotic ordered.  Writer explained that pt would need to be evaluated before any meds were ordered.  Due to pt's significant decline, writer strongly urged the pt to present to the emergency department to be evaluated.  Caller voiced understanding.

## 2017-01-27 ENCOUNTER — HOSPITAL ENCOUNTER (INPATIENT)
Facility: CLINIC | Age: 62
LOS: 7 days | Discharge: HOME HEALTH | DRG: 194 | End: 2017-02-03
Attending: EMERGENCY MEDICINE | Admitting: INTERNAL MEDICINE
Payer: COMMERCIAL

## 2017-01-27 ENCOUNTER — TELEPHONE (OUTPATIENT)
Dept: NURSING | Facility: CLINIC | Age: 62
End: 2017-01-27

## 2017-01-27 ENCOUNTER — APPOINTMENT (OUTPATIENT)
Dept: CT IMAGING | Facility: CLINIC | Age: 62
DRG: 194 | End: 2017-01-27
Attending: EMERGENCY MEDICINE
Payer: COMMERCIAL

## 2017-01-27 ENCOUNTER — APPOINTMENT (OUTPATIENT)
Dept: GENERAL RADIOLOGY | Facility: CLINIC | Age: 62
DRG: 194 | End: 2017-01-27
Attending: EMERGENCY MEDICINE
Payer: COMMERCIAL

## 2017-01-27 DIAGNOSIS — E83.39 HYPOPHOSPHATEMIA: ICD-10-CM

## 2017-01-27 DIAGNOSIS — E87.6 HYPOKALEMIA: Primary | ICD-10-CM

## 2017-01-27 DIAGNOSIS — E23.2 DIABETES INSIPIDUS, NEUROHYPOPHYSEAL (H): ICD-10-CM

## 2017-01-27 DIAGNOSIS — C50.919 METASTATIC BREAST CANCER: ICD-10-CM

## 2017-01-27 DIAGNOSIS — J18.9 PNEUMONIA OF BOTH LUNGS DUE TO INFECTIOUS ORGANISM, UNSPECIFIED PART OF LUNG: ICD-10-CM

## 2017-01-27 DIAGNOSIS — R19.7 DIARRHEA, UNSPECIFIED TYPE: ICD-10-CM

## 2017-01-27 LAB
ALBUMIN SERPL-MCNC: 3.1 G/DL (ref 3.4–5)
ALP SERPL-CCNC: 162 U/L (ref 40–150)
ALT SERPL W P-5'-P-CCNC: 33 U/L (ref 0–50)
ANION GAP SERPL CALCULATED.3IONS-SCNC: 12 MMOL/L (ref 3–14)
AST SERPL W P-5'-P-CCNC: 36 U/L (ref 0–45)
BASE EXCESS BLDV CALC-SCNC: 0.3 MMOL/L
BASOPHILS # BLD AUTO: 0 10E9/L (ref 0–0.2)
BASOPHILS NFR BLD AUTO: 0.3 %
BILIRUB SERPL-MCNC: 0.6 MG/DL (ref 0.2–1.3)
BUN SERPL-MCNC: 12 MG/DL (ref 7–30)
CALCIUM SERPL-MCNC: 8.8 MG/DL (ref 8.5–10.1)
CHLORIDE SERPL-SCNC: 109 MMOL/L (ref 94–109)
CO2 BLDCOV-SCNC: 21 MMOL/L (ref 21–28)
CO2 SERPL-SCNC: 21 MMOL/L (ref 20–32)
CREAT SERPL-MCNC: 0.72 MG/DL (ref 0.52–1.04)
DIFFERENTIAL METHOD BLD: ABNORMAL
EOSINOPHIL # BLD AUTO: 0 10E9/L (ref 0–0.7)
EOSINOPHIL NFR BLD AUTO: 0.2 %
ERYTHROCYTE [DISTWIDTH] IN BLOOD BY AUTOMATED COUNT: 16.5 % (ref 10–15)
FLUAV+FLUBV AG SPEC QL: NEGATIVE
FLUAV+FLUBV AG SPEC QL: NORMAL
GFR SERPL CREATININE-BSD FRML MDRD: 82 ML/MIN/1.7M2
GLUCOSE SERPL-MCNC: 107 MG/DL (ref 70–99)
HCO3 BLDV-SCNC: 24 MMOL/L (ref 21–28)
HCT VFR BLD AUTO: 33.6 % (ref 35–47)
HGB BLD-MCNC: 11.1 G/DL (ref 11.7–15.7)
IMM GRANULOCYTES # BLD: 0.2 10E9/L (ref 0–0.4)
IMM GRANULOCYTES NFR BLD: 1.6 %
LACTATE BLD-SCNC: 1.6 MMOL/L (ref 0.7–2.1)
LACTATE BLD-SCNC: 1.9 MMOL/L (ref 0.7–2.1)
LYMPHOCYTES # BLD AUTO: 1.1 10E9/L (ref 0.8–5.3)
LYMPHOCYTES NFR BLD AUTO: 9.5 %
MAGNESIUM SERPL-MCNC: 2.4 MG/DL (ref 1.6–2.3)
MCH RBC QN AUTO: 32.5 PG (ref 26.5–33)
MCHC RBC AUTO-ENTMCNC: 33 G/DL (ref 31.5–36.5)
MCV RBC AUTO: 98 FL (ref 78–100)
MONOCYTES # BLD AUTO: 2.1 10E9/L (ref 0–1.3)
MONOCYTES NFR BLD AUTO: 18.5 %
NEUTROPHILS # BLD AUTO: 8 10E9/L (ref 1.6–8.3)
NEUTROPHILS NFR BLD AUTO: 69.9 %
NRBC # BLD AUTO: 0.1 10*3/UL
NRBC BLD AUTO-RTO: 1 /100
PCO2 BLDV: 29 MM HG (ref 40–50)
PCO2 BLDV: 32 MM HG (ref 40–50)
PH BLDV: 7.48 PH (ref 7.32–7.43)
PH BLDV: 7.48 PH (ref 7.32–7.43)
PHOSPHATE SERPL-MCNC: 2.5 MG/DL (ref 2.5–4.5)
PLATELET # BLD AUTO: 153 10E9/L (ref 150–450)
PO2 BLDV: 59 MM HG (ref 25–47)
PO2 BLDV: 60 MM HG (ref 25–47)
POTASSIUM SERPL-SCNC: 3.4 MMOL/L (ref 3.4–5.3)
PROCALCITONIN SERPL-MCNC: 0.66 NG/ML
PROT SERPL-MCNC: 7.2 G/DL (ref 6.8–8.8)
RBC # BLD AUTO: 3.42 10E12/L (ref 3.8–5.2)
SAO2 % BLDV FROM PO2: 93 %
SODIUM SERPL-SCNC: 142 MMOL/L (ref 133–144)
SPECIMEN SOURCE: NORMAL
WBC # BLD AUTO: 11.4 10E9/L (ref 4–11)

## 2017-01-27 PROCEDURE — 96366 THER/PROPH/DIAG IV INF ADDON: CPT | Performed by: EMERGENCY MEDICINE

## 2017-01-27 PROCEDURE — 25000128 H RX IP 250 OP 636: Performed by: EMERGENCY MEDICINE

## 2017-01-27 PROCEDURE — 25500064 ZZH RX 255 OP 636: Performed by: RADIOLOGY

## 2017-01-27 PROCEDURE — 71020 XR CHEST 2 VW: CPT

## 2017-01-27 PROCEDURE — 99285 EMERGENCY DEPT VISIT HI MDM: CPT | Performed by: EMERGENCY MEDICINE

## 2017-01-27 PROCEDURE — 84145 PROCALCITONIN (PCT): CPT | Performed by: EMERGENCY MEDICINE

## 2017-01-27 PROCEDURE — 83605 ASSAY OF LACTIC ACID: CPT | Performed by: EMERGENCY MEDICINE

## 2017-01-27 PROCEDURE — 94640 AIRWAY INHALATION TREATMENT: CPT | Performed by: EMERGENCY MEDICINE

## 2017-01-27 PROCEDURE — 36415 COLL VENOUS BLD VENIPUNCTURE: CPT | Performed by: EMERGENCY MEDICINE

## 2017-01-27 PROCEDURE — 87804 INFLUENZA ASSAY W/OPTIC: CPT | Performed by: EMERGENCY MEDICINE

## 2017-01-27 PROCEDURE — 83735 ASSAY OF MAGNESIUM: CPT | Performed by: EMERGENCY MEDICINE

## 2017-01-27 PROCEDURE — 96367 TX/PROPH/DG ADDL SEQ IV INF: CPT | Performed by: EMERGENCY MEDICINE

## 2017-01-27 PROCEDURE — 25000125 ZZHC RX 250: Performed by: RADIOLOGY

## 2017-01-27 PROCEDURE — 96361 HYDRATE IV INFUSION ADD-ON: CPT | Performed by: EMERGENCY MEDICINE

## 2017-01-27 PROCEDURE — 84100 ASSAY OF PHOSPHORUS: CPT | Performed by: EMERGENCY MEDICINE

## 2017-01-27 PROCEDURE — 82803 BLOOD GASES ANY COMBINATION: CPT

## 2017-01-27 PROCEDURE — 99285 EMERGENCY DEPT VISIT HI MDM: CPT | Mod: Z6 | Performed by: EMERGENCY MEDICINE

## 2017-01-27 PROCEDURE — 80053 COMPREHEN METABOLIC PANEL: CPT | Performed by: EMERGENCY MEDICINE

## 2017-01-27 PROCEDURE — 25000128 H RX IP 250 OP 636: Performed by: INTERNAL MEDICINE

## 2017-01-27 PROCEDURE — 71260 CT THORAX DX C+: CPT

## 2017-01-27 PROCEDURE — 96365 THER/PROPH/DIAG IV INF INIT: CPT | Performed by: EMERGENCY MEDICINE

## 2017-01-27 PROCEDURE — 25000308 HC RX OP HPI UCR WEL MED 250 IP 250: Performed by: EMERGENCY MEDICINE

## 2017-01-27 PROCEDURE — 25000125 ZZHC RX 250: Performed by: INTERNAL MEDICINE

## 2017-01-27 PROCEDURE — 12000001 ZZH R&B MED SURG/OB UMMC

## 2017-01-27 PROCEDURE — 82803 BLOOD GASES ANY COMBINATION: CPT | Performed by: EMERGENCY MEDICINE

## 2017-01-27 PROCEDURE — 83605 ASSAY OF LACTIC ACID: CPT

## 2017-01-27 PROCEDURE — 87040 BLOOD CULTURE FOR BACTERIA: CPT | Performed by: EMERGENCY MEDICINE

## 2017-01-27 PROCEDURE — 85025 COMPLETE CBC W/AUTO DIFF WBC: CPT | Performed by: EMERGENCY MEDICINE

## 2017-01-27 PROCEDURE — 99223 1ST HOSP IP/OBS HIGH 75: CPT | Mod: AI | Performed by: INTERNAL MEDICINE

## 2017-01-27 PROCEDURE — 25000125 ZZHC RX 250: Performed by: EMERGENCY MEDICINE

## 2017-01-27 RX ORDER — POTASSIUM CHLORIDE 7.45 MG/ML
10 INJECTION INTRAVENOUS
Status: DISCONTINUED | OUTPATIENT
Start: 2017-01-27 | End: 2017-02-03 | Stop reason: HOSPADM

## 2017-01-27 RX ORDER — PIPERACILLIN SODIUM, TAZOBACTAM SODIUM 4; .5 G/20ML; G/20ML
4.5 INJECTION, POWDER, LYOPHILIZED, FOR SOLUTION INTRAVENOUS EVERY 6 HOURS
Status: DISCONTINUED | OUTPATIENT
Start: 2017-01-27 | End: 2017-01-31

## 2017-01-27 RX ORDER — LORAZEPAM 2 MG/ML
.5-1 INJECTION INTRAMUSCULAR EVERY 6 HOURS PRN
Status: DISCONTINUED | OUTPATIENT
Start: 2017-01-27 | End: 2017-01-28

## 2017-01-27 RX ORDER — LEVOFLOXACIN 5 MG/ML
750 INJECTION, SOLUTION INTRAVENOUS ONCE
Status: COMPLETED | OUTPATIENT
Start: 2017-01-27 | End: 2017-01-27

## 2017-01-27 RX ORDER — VANCOMYCIN HYDROCHLORIDE 1 G/200ML
1000 INJECTION, SOLUTION INTRAVENOUS EVERY 12 HOURS
Status: DISCONTINUED | OUTPATIENT
Start: 2017-01-27 | End: 2017-01-29

## 2017-01-27 RX ORDER — ALBUTEROL SULFATE 0.83 MG/ML
2.5 SOLUTION RESPIRATORY (INHALATION) ONCE
Status: COMPLETED | OUTPATIENT
Start: 2017-01-27 | End: 2017-01-27

## 2017-01-27 RX ORDER — POTASSIUM CHLORIDE 29.8 MG/ML
20 INJECTION INTRAVENOUS
Status: DISCONTINUED | OUTPATIENT
Start: 2017-01-27 | End: 2017-02-03 | Stop reason: HOSPADM

## 2017-01-27 RX ORDER — ACETAMINOPHEN 325 MG/1
650 TABLET ORAL EVERY 4 HOURS PRN
Status: DISCONTINUED | OUTPATIENT
Start: 2017-01-27 | End: 2017-02-03 | Stop reason: HOSPADM

## 2017-01-27 RX ORDER — POTASSIUM CHLORIDE 750 MG/1
20-40 TABLET, EXTENDED RELEASE ORAL
Status: DISCONTINUED | OUTPATIENT
Start: 2017-01-27 | End: 2017-02-03 | Stop reason: HOSPADM

## 2017-01-27 RX ORDER — MAGNESIUM SULFATE HEPTAHYDRATE 40 MG/ML
4 INJECTION, SOLUTION INTRAVENOUS EVERY 4 HOURS PRN
Status: DISCONTINUED | OUTPATIENT
Start: 2017-01-27 | End: 2017-02-03 | Stop reason: HOSPADM

## 2017-01-27 RX ORDER — ONDANSETRON 2 MG/ML
4 INJECTION INTRAMUSCULAR; INTRAVENOUS EVERY 6 HOURS PRN
Status: DISCONTINUED | OUTPATIENT
Start: 2017-01-27 | End: 2017-02-03 | Stop reason: HOSPADM

## 2017-01-27 RX ORDER — POTASSIUM CHLORIDE 1.5 G/1.58G
20-40 POWDER, FOR SOLUTION ORAL
Status: DISCONTINUED | OUTPATIENT
Start: 2017-01-27 | End: 2017-02-03 | Stop reason: HOSPADM

## 2017-01-27 RX ORDER — PIPERACILLIN SODIUM, TAZOBACTAM SODIUM 4; .5 G/20ML; G/20ML
4.5 INJECTION, POWDER, LYOPHILIZED, FOR SOLUTION INTRAVENOUS ONCE
Status: COMPLETED | OUTPATIENT
Start: 2017-01-27 | End: 2017-01-27

## 2017-01-27 RX ORDER — LEVOFLOXACIN 5 MG/ML
750 INJECTION, SOLUTION INTRAVENOUS EVERY 24 HOURS
Status: DISCONTINUED | OUTPATIENT
Start: 2017-01-28 | End: 2017-02-02

## 2017-01-27 RX ORDER — LORAZEPAM 0.5 MG/1
0.5 TABLET ORAL EVERY 4 HOURS PRN
Status: DISCONTINUED | OUTPATIENT
Start: 2017-01-27 | End: 2017-02-03 | Stop reason: HOSPADM

## 2017-01-27 RX ORDER — NAPROXEN 250 MG/1
250 TABLET ORAL DAILY
Status: DISCONTINUED | OUTPATIENT
Start: 2017-01-28 | End: 2017-02-03 | Stop reason: HOSPADM

## 2017-01-27 RX ORDER — ONDANSETRON 8 MG/1
8 TABLET, FILM COATED ORAL EVERY 8 HOURS PRN
Status: DISCONTINUED | OUTPATIENT
Start: 2017-01-27 | End: 2017-02-03 | Stop reason: HOSPADM

## 2017-01-27 RX ORDER — MULTIPLE VITAMINS W/ MINERALS TAB 9MG-400MCG
1 TAB ORAL DAILY
Status: DISCONTINUED | OUTPATIENT
Start: 2017-01-28 | End: 2017-02-03 | Stop reason: HOSPADM

## 2017-01-27 RX ORDER — LORAZEPAM 0.5 MG/1
.5-1 TABLET ORAL EVERY 6 HOURS PRN
Status: DISCONTINUED | OUTPATIENT
Start: 2017-01-27 | End: 2017-01-28

## 2017-01-27 RX ORDER — PROCHLORPERAZINE MALEATE 5 MG
5-10 TABLET ORAL EVERY 6 HOURS PRN
Status: DISCONTINUED | OUTPATIENT
Start: 2017-01-27 | End: 2017-02-03 | Stop reason: HOSPADM

## 2017-01-27 RX ORDER — SODIUM CHLORIDE 9 MG/ML
INJECTION, SOLUTION INTRAVENOUS CONTINUOUS
Status: DISCONTINUED | OUTPATIENT
Start: 2017-01-27 | End: 2017-01-28

## 2017-01-27 RX ORDER — PROCHLORPERAZINE MALEATE 10 MG
10 TABLET ORAL EVERY 6 HOURS PRN
Status: DISCONTINUED | OUTPATIENT
Start: 2017-01-27 | End: 2017-01-27

## 2017-01-27 RX ORDER — NAPROXEN SODIUM 220 MG
220 TABLET ORAL DAILY
Status: DISCONTINUED | OUTPATIENT
Start: 2017-01-28 | End: 2017-01-27

## 2017-01-27 RX ORDER — IOPAMIDOL 755 MG/ML
50 INJECTION, SOLUTION INTRAVASCULAR ONCE
Status: COMPLETED | OUTPATIENT
Start: 2017-01-27 | End: 2017-01-27

## 2017-01-27 RX ORDER — SODIUM CHLORIDE 9 MG/ML
INJECTION, SOLUTION INTRAVENOUS
Status: DISCONTINUED
Start: 2017-01-27 | End: 2017-01-27 | Stop reason: HOSPADM

## 2017-01-27 RX ADMIN — PIPERACILLIN AND TAZOBACTAM 4.5 G: 4; .5 INJECTION, POWDER, FOR SOLUTION INTRAVENOUS at 21:19

## 2017-01-27 RX ADMIN — SODIUM CHLORIDE: 9 INJECTION, SOLUTION INTRAVENOUS at 19:55

## 2017-01-27 RX ADMIN — VANCOMYCIN HYDROCHLORIDE 1000 MG: 1 INJECTION, SOLUTION INTRAVENOUS at 16:26

## 2017-01-27 RX ADMIN — PIPERACILLIN SODIUM,TAZOBACTAM SODIUM 4.5 G: 4; .5 INJECTION, POWDER, FOR SOLUTION INTRAVENOUS at 13:36

## 2017-01-27 RX ADMIN — SODIUM CHLORIDE: 9 INJECTION, SOLUTION INTRAVENOUS at 21:19

## 2017-01-27 RX ADMIN — SODIUM CHLORIDE: 9 INJECTION, SOLUTION INTRAVENOUS at 11:00

## 2017-01-27 RX ADMIN — SODIUM CHLORIDE, PRESERVATIVE FREE 82 ML: 5 INJECTION INTRAVENOUS at 11:14

## 2017-01-27 RX ADMIN — ENOXAPARIN SODIUM 40 MG: 40 INJECTION SUBCUTANEOUS at 21:19

## 2017-01-27 RX ADMIN — LEVOFLOXACIN 750 MG: 5 INJECTION, SOLUTION INTRAVENOUS at 15:00

## 2017-01-27 RX ADMIN — IOPAMIDOL 50 ML: 755 INJECTION, SOLUTION INTRAVENOUS at 11:13

## 2017-01-27 RX ADMIN — ALBUTEROL SULFATE 2.5 MG: 2.5 SOLUTION RESPIRATORY (INHALATION) at 10:41

## 2017-01-27 NOTE — TELEPHONE ENCOUNTER
Call Type: Triage Call    Presenting Problem: Person Care assistance Tomlin Peterson calling  with Patient  .  Currently : no chest pain . Hx of Cancer in lung  &  pleural effusion . Has cold / cough  .  Doesn't have any o2 or  albuterol Neb available.  Triage Note:  Guideline Title: Breathing Problems  Recommended Disposition: Activate   Original Inclination: Did not know what to do  Override Disposition:  Intended Action: Go to Hospital / ED  Physician Contacted: No  Sudden onset of severe breathing difficulty ?  YES  Not breathing (no air movement from nose/mouth; no chest or abdomen movement) ? NO  Physician Instructions:  Care Advice: Do not give the patient anything to eat or drink.  IMMEDIATE ACTION

## 2017-01-27 NOTE — IP AVS SNAPSHOT
MRN:[id number]                      After Visit Summary   1/27/2017    Keren Erickson    MRN: [id number]           Thank you!     Thank you for choosing [facility] for your care. Our goal is always to provide you with excellent care. Hearing back from our patients is one way we can continue to improve our services. Please take a few minutes to complete the written survey that you may receive in the mail after you visit with us. Thank you!        Patient Information     Date Of Birth          1955        About your hospital stay     You were admitted on:  January 27, 2017 You last received care in the:  [address] [facility]    You were discharged on:  February 3, 2017        Reason for your hospital stay       You had pneumonia that was making you feel short of breath requiring oxygen use and antibiotics.                  Who to Call     For medical emergencies, please call 911.  For non-urgent questions about your medical care, please call your primary care provider or clinic, [phone]          Attending Provider     Provider    Garret Donald MD Fujioka, Naomi, MD       Primary Care Provider Office Phone # Fax #    Kelly Hart [phone]       [facility] [address] [facility] [address]         When to contact your care team       Please call the [facility] at [phone] (Monday - Friday; 8 AM - 4:30 PM) if your shortness of breath worsens, if you have a temperature above 100.4, or for chest pain, headaches, vision changes, bleeding, uncontrolled nausea, vomiting, diarrhea, or pain.    For weekends or after hours, call the main hospital number at [phone], and request to speak with the on-call hematology/oncology physician.                  After Care Instructions     Activity       Your activity upon discharge: activity as tolerated            Diet       Follow this diet upon  discharge: Regular diet                  Follow-up Appointments     Adult [facility] Follow-up and recommended labs and tests       You should follow-up in clinic with oncology sometime this week. We will make an appointment for you and contact you with the day and time of the appointment.    Appointments on [place] and/or [facility] campus (with [facility] or [facility] provider or service). Call [phone] if you haven't heard regarding these appointments within 7 days of discharge.                  Your next 10 appointments already scheduled     Feb 07, 2017 11:15 AM   [facility] with [facility]   [facility] ([facility])    [address] [place] [id number] [phone]            Feb 07, 2017 11:50 AM   (Arrive by 11:35 AM)   Return Visit with Deyanira Costello PA-C   [facility] ([facility])    [address] [place] [id number] [phone]            Feb 07, 2017  1:00 PM   Infusion 60 with  ONCOLOGY INFUSION, UC 11 ATC   [facility] ([facility])    [address] [place] [id number] [phone]              Additional Services     Home Care OT Referral for Hospital Discharge       OT to eval and treat    _____________________  [facility]  Phone  [phone]  Fax  [phone]    ____________________            Home Care PT Referral for Hospital Discharge       PT to eval and treat    _____________________  [facility]  Phone  [phone]  Fax  [phone]    ____________________            Home care nursing referral       The following homecare is recommended:  RN skilled nursing visit. RN to assess vital signs and weight, respiratory and cardiac status, pain level and activity tolerance, hydration, nutrition and bowel status and home safety.  RN to teach medication  "management.  ______________________  [facility]  Phone  [phone]  Fax  [phone]  _____________________                  Future tests that were ordered for you     CBC with platelets differential       Last Lab Result: HEMOGLOBIN (g/dL)       Date                     Value                 02/02/2017               8.7*             ----------            Basic metabolic panel           Magnesium           Phosphorus                 Further instructions from your care team       ____________________________________________________________  D/C'ing nurse; Please fax to home care agency at time of patient d/c.    _____________________  [facility]    Fax  [phone]    ____________________    ____________________________________________________________    Pending Results     No orders found from 1/26/2017 to 1/28/2017.            Statement of Approval     Ordered          02/03/17 1051  I have reviewed and agree with all the recommendations and orders detailed in this document.   EFFECTIVE NOW     Approved and electronically signed by:  Keren Briggs PA-C             Admission Information        Department Dept Phone    1/27/2017 [facility] [phone]      Your Vitals Were     Blood Pressure Pulse Temperature    137/84 mmHg 78 97.3  F (36.3  C) (Oral)    Respirations Height Weight    20 1.6 m (5' 3\") 47.537 kg (104 lb 12.8 oz)    BMI (Body Mass Index) Pulse Oximetry       18.57 kg/m2 93%       MyChart Information     [employer] gives you secure access to your electronic health record. If you see a primary care provider, you can also send messages to your care team and make appointments. If you have questions, please call your primary care clinic.  If you do not have a primary care provider, please call [phone] and they will assist you.        Care EveryWhere ID     This is your Care EveryWhere ID. This could be used by other organizations to access your [facility] medical records  [id number]   "         Review of your medicines      START taking        Dose / Directions    desmopressin 0.1 MG tablet   Commonly known as:  DDAVP   Used for:  Diabetes insipidus, neurohypophyseal (H)        Dose:  0.1 mg   Take 1 tablet (100 mcg) by mouth At Bedtime   Quantity:  30 tablet   Refills:  0       guaiFENesin 600 MG 12 hr tablet   Commonly known as:  MUCINEX   Used for:  Pneumonia of both lungs due to infectious organism, unspecified part of lung        Dose:  600 mg   Take 1 tablet (600 mg) by mouth 2 times daily   Quantity:  60 tablet   Refills:  0       levofloxacin 750 MG tablet   Commonly known as:  LEVAQUIN   Indication:  Healthcare-Associated Pneumonia   Used for:  Pneumonia of both lungs due to infectious organism, unspecified part of lung        Dose:  750 mg   Take 1 tablet (750 mg) by mouth daily for 6 days   Quantity:  6 tablet   Refills:  0       loperamide 2 MG capsule   Commonly known as:  IMODIUM   Used for:  Diarrhea, unspecified type        Dose:  2 mg   Take 1 capsule (2 mg) by mouth 4 times daily as needed for diarrhea   Quantity:  20 capsule   Refills:  0       potassium phosphate (monobasic) 500 MG tablet   Commonly known as:  K-PHOS   Used for:  Hypophosphatemia        Dose:  500 mg   Take 1 tablet (500 mg) by mouth 4 times daily   Quantity:  28 tablet   Refills:  0         CONTINUE these medicines which have NOT CHANGED        Dose / Directions    ALEVE PO        Dose:  220 mg   Take 220 mg by mouth daily   Refills:  0       D3 ADULT PO        Dose:  5000 Units   Take 5,000 Units by mouth daily   Refills:  0       LORazepam 0.5 MG tablet   Commonly known as:  ATIVAN   Used for:  Metastatic breast cancer (H), Carcinoma of breast metastatic to multiple sites, unspecified laterality (H), Brain metastasis (H)        Dose:  0.5 mg   Take 1 tablet (0.5 mg) by mouth every 4 hours as needed (Anxiety, Nausea/Vomiting or Sleep)   Quantity:  30 tablet   Refills:  5       Multi-vitamin Tabs tablet         Dose:  1 tablet   Take 1 tablet by mouth daily   Refills:  0       omeprazole 40 MG capsule   Commonly known as:  priLOSEC   Used for:  Gastroesophageal reflux disease without esophagitis        Dose:  40 mg   Take 1 capsule (40 mg) by mouth as needed   Quantity:  90 capsule   Refills:  3       ondansetron 8 MG tablet   Commonly known as:  ZOFRAN   Used for:  Metastatic breast cancer (H), Carcinoma of breast metastatic to multiple sites, unspecified laterality (H), Brain metastasis (H)        Dose:  8 mg   Take 1 tablet (8 mg) by mouth every 8 hours as needed for nausea   Quantity:  30 tablet   Refills:  3       * order for DME   Used for:  Carcinoma of breast metastatic to multiple sites (H)        Walker to ensure safe ambulation   Quantity:  1 Units   Refills:  1       * order for DME   Used for:  Carcinoma of breast metastatic to multiple sites (H)        Cranial prosthesis   Quantity:  1 Units   Refills:  1       prochlorperazine 10 MG tablet   Commonly known as:  COMPAZINE   Used for:  Metastatic breast cancer (H), Carcinoma of breast metastatic to multiple sites, unspecified laterality (H), Brain metastasis (H)        Dose:  10 mg   Take 1 tablet (10 mg) by mouth every 6 hours as needed (Nausea/Vomiting)   Quantity:  30 tablet   Refills:  5       * Notice:  This list has 2 medication(s) that are the same as other medications prescribed for you. Read the directions carefully, and ask your doctor or other care provider to review them with you.         Where to get your medicines      These medications were sent to [facility] - [place], [place] - [address]  [address] [address]     Phone:  [phone]    - desmopressin 0.1 MG tablet  - guaiFENesin 600 MG 12 hr tablet  - levofloxacin 750 MG tablet  - potassium phosphate (monobasic) 500 MG tablet      Some of these will need a paper prescription and others can be bought over the counter. Ask your nurse if you have  questions.     You don't need a prescription for these medications    - loperamide 2 MG capsule             Protect others around you: Learn how to safely use, store and throw away your medicines at www.disposemymeds.org.             Medication List: This is a list of all your medications and when to take them. Check marks below indicate your daily home schedule. Keep this list as a reference.      Medications           Morning Afternoon Evening Bedtime As Needed    ALEVE PO   Take 220 mg by mouth daily                                D3 ADULT PO   Take 5,000 Units by mouth daily                                desmopressin 0.1 MG tablet   Commonly known as:  DDAVP   Take 1 tablet (100 mcg) by mouth At Bedtime   Last time this was given:  100 mcg on 2/2/2017 10:10 PM                                guaiFENesin 600 MG 12 hr tablet   Commonly known as:  MUCINEX   Take 1 tablet (600 mg) by mouth 2 times daily   Last time this was given:  600 mg on 2/3/2017  8:07 AM                                levofloxacin 750 MG tablet   Commonly known as:  LEVAQUIN   Take 1 tablet (750 mg) by mouth daily for 6 days   Last time this was given:  750 mg on 2/2/2017  3:47 PM                                loperamide 2 MG capsule   Commonly known as:  IMODIUM   Take 1 capsule (2 mg) by mouth 4 times daily as needed for diarrhea   Last time this was given:  2 mg on 1/31/2017  7:59 PM                                LORazepam 0.5 MG tablet   Commonly known as:  ATIVAN   Take 1 tablet (0.5 mg) by mouth every 4 hours as needed (Anxiety, Nausea/Vomiting or Sleep)   Last time this was given:  0.5 mg on 1/31/2017  7:59 PM                                Multi-vitamin Tabs tablet   Take 1 tablet by mouth daily   Last time this was given:  1 tablet on 2/3/2017  8:07 AM                                omeprazole 40 MG capsule   Commonly known as:  priLOSEC   Take 1 capsule (40 mg) by mouth as needed   Last time this was given:  40 mg on 2/3/2017  8:07  AM                                ondansetron 8 MG tablet   Commonly known as:  ZOFRAN   Take 1 tablet (8 mg) by mouth every 8 hours as needed for nausea   Last time this was given:  8 mg on 2/3/2017  7:46 AM                                * order for DME   Walker to ensure safe ambulation                                * order for DME   Cranial prosthesis                                potassium phosphate (monobasic) 500 MG tablet   Commonly known as:  K-PHOS   Take 1 tablet (500 mg) by mouth 4 times daily                                prochlorperazine 10 MG tablet   Commonly known as:  COMPAZINE   Take 1 tablet (10 mg) by mouth every 6 hours as needed (Nausea/Vomiting)                                * Notice:  This list has 2 medication(s) that are the same as other medications prescribed for you. Read the directions carefully, and ask your doctor or other care provider to review them with you.

## 2017-01-27 NOTE — IP AVS SNAPSHOT
[address] [address]    Phone:  [phone]                                       After Visit Summary   1/27/2017    Keren Erickson    MRN: [id number]           After Visit Summary Signature Page     I have received my discharge instructions, and my questions have been answered. I have discussed any challenges I see with this plan with the nurse or doctor.    ..........................................................................................................................................  Patient/Patient Representative Signature      ..........................................................................................................................................  Patient Representative Print Name and Relationship to Patient    ..................................................               ................................................  Date                                            Time    ..........................................................................................................................................  Reviewed by Signature/Title    ...................................................              ..............................................  Date                                                            Time

## 2017-01-27 NOTE — PHARMACY-VANCOMYCIN DOSING SERVICE
Pharmacy Vancomycin Initial Note  Date of Service 2017  Patient's  1955  61 year old, female    Indication: Healthcare-Associated Pneumonia    Current estimated CrCl = Estimated Creatinine Clearance: 64.8 mL/min (based on Cr of 0.72).    Creatinine for last 3 days  2017:  9:56 AM Creatinine 0.72 mg/dL    Recent Vancomycin Level(s) for last 3 days  No results found for requested labs within last 3 days.      Vancomycin IV Administrations (past 72 hours)      No vancomycin orders with administrations in past 72 hours.                Nephrotoxins and other renal medications (Future)    Start     Dose/Rate Route Frequency Ordered Stop    17 1300  vancomycin (VANCOCIN) 1000 mg in dextrose 5% 200 mL PREMIX      1,000 mg Intravenous EVERY 12 HOURS 17 1250      17 1245  piperacillin-tazobactam (ZOSYN) 4.5 g vial to attach to  mL bag      4.5 g  over 1 Hours Intravenous ONCE 17 1244            Contrast Orders - past 72 hours (72h ago through future)    Start     Dose/Rate Route Frequency Ordered Stop    17 1059  iopamidol (ISOVUE-370) solution 50 mL      50 mL Intravenous ONCE 17 1058 17 1113           Plan:  1.  Start vancomycin  1000 mg (20 mg/kg) IV q12h.   2.  Goal Trough Level: 15-20 mg/L. Consult also requested a higher goal trough of 15-20 mg/dL.  3.  Pharmacy will check trough levels as appropriate in 1-3 Days.    4. Serum creatinine levels will be ordered daily for the first week of therapy and at least twice weekly for subsequent weeks. Closely watch renal function given administration of IV contrast on  and high dose Zosyn.  5. [place] method utilized to dose vancomycin therapy: Method 2. Per chart review, patient did not have any historical doses of vancomycin.     Kendal Penn, RoseD

## 2017-01-27 NOTE — ED PROVIDER NOTES
History     Chief Complaint   Patient presents with     Nasal Congestion     increasing     Cough     increasing; CA-breast     HPI  Keren Erickson is a 61 year old metastatic breast cancer patient who states that she got breast cancer 3 years ago and that it spread pretty much everywhere. The patient states she s been treated with radiation and chemo but no surgery. The patient states that she recently underwent a brain MRI as well as a chest CT but states that over the past couple of days she has developed an upper respiratory infection with some nasal congestion and a cough that s been nonproductive in nature with worsening shortness of breath. The patient apparently had a temperature of 103 at home, according to the daughter, and the patient states that she feels that she has pneumonia because she is more short of breath than normal. The patient states she s had no vomiting, no diarrhea, no melena or bright blood per rectum, and she presents here to the ER for evaluation.    I have reviewed the Medications, Allergies, Past Medical and Surgical History, and Social History in the Epic system.    Previous Medications    CHOLECALCIFEROL (D3 ADULT PO)        LORAZEPAM (ATIVAN) 0.5 MG TABLET    Take 1 tablet (0.5 mg) by mouth every 4 hours as needed (Anxiety, Nausea/Vomiting or Sleep)    MULTIVITAMIN, THERAPEUTIC WITH MINERALS (MULTI-VITAMIN) TABS    Take 1 tablet by mouth daily    NAPROXEN SODIUM (ALEVE PO)    Take 220 mg by mouth daily    OFLOXACIN (FLOXIN) 0.3 % OTIC SOLUTION    Place 3gtts into right ear TID x 3 days    OMEPRAZOLE (PRILOSEC) 40 MG CAPSULE    Take 1 capsule (40 mg) by mouth as needed    ONDANSETRON (ZOFRAN) 8 MG TABLET    Take 1 tablet (8 mg) by mouth every 8 hours as needed for nausea    ORDER FOR DME    Walker to ensure safe ambulation    ORDER FOR DME    Cranial prosthesis    PROBIOTIC PRODUCT (PROBIOTIC DAILY PO)    States she is eating yogurt    PROCHLORPERAZINE (COMPAZINE) 10 MG  TABLET    Take 1 tablet (10 mg) by mouth every 6 hours as needed (Nausea/Vomiting)     Past Medical History   Diagnosis Date     Arthritis      Breast mass, left      bx and told it was benign yrs ago     Breast cancer (H) 9/26/13     Lt breast       Past Surgical History   Procedure Laterality Date     Orthopedic surgery       L humerous Fracture     Insert chest tube  10/3/2013     Procedure: INSERT CHEST TUBE;  Percutaneous Right Pleur X Placement ;  Surgeon: Luis Alanis MD;  Location: [facility] OR       Family History   Problem Relation Age of Onset     Hypertension Father      Aneurysm Father 91     AAA     Hypertension Mother      Arthritis Mother      CANCER No family hx of        Social History   Substance Use Topics     Smoking status: Never Smoker      Smokeless tobacco: Never Used     Alcohol Use: 0.6 oz/week     1 Standard drinks or equivalent per week      Comment: glass of wine wvery once in a while but nothing recently        Allergies   Allergen Reactions     Nka [No Known Allergies]      Nkda [No Known Drug Allergies]        Review of Systems   ROS: 10 point ROS neg other than the symptoms noted above in the HPI.    Physical Exam   Resp: 20  SpO2: 96 %  Physical Exam   Constitutional: She is oriented to person, place, and time.   Elderly alert conversant and somewhat cachectic   HENT:   Head: Atraumatic.   Eyes: EOM are normal. Pupils are equal, round, and reactive to light.   Neck: Neck supple.   Cardiovascular: Normal heart sounds.    Pulmonary/Chest:   Patient does have some rhonchi bilaterally   Abdominal: Soft. There is no tenderness.   Musculoskeletal: She exhibits no edema or tenderness.   Neurological: She is alert and oriented to person, place, and time.   Grossly intact and symmetric   Skin: Skin is warm.   Psychiatric: She has a normal mood and affect.       ED Course     Procedures          Results for orders placed or performed during the hospital encounter of 01/27/17   XR Chest  2 Views    Impression    Impression:   1. Stable patchy right basilar opacity.  2. Findings consistent with diffuse sclerotic metastatic disease of  the thorax.   Chest CT, IV contrast only - PE protocol    Impression    Impression:    1. No evidence for pulmonary embolus.  2. New bilateral diffuse poorly defined groundglass nodularity  throughout the lungs, which may represent metastatic disease, atypical  infection or drug reaction.  3. New nodularity of the lung bases which may represent  aspiration/infection.  4. Unchanged diffuse sclerotic metastatic disease to the bony thorax.  5. Unchanged nodularity of the right breast.   CBC with platelets differential   Result Value Ref Range    WBC 11.4 (H) 4.0 - 11.0 10e9/L    RBC Count 3.42 (L) 3.8 - 5.2 10e12/L    Hemoglobin 11.1 (L) 11.7 - 15.7 g/dL    Hematocrit 33.6 (L) 35.0 - 47.0 %    MCV 98 78 - 100 fl    MCH 32.5 26.5 - 33.0 pg    MCHC 33.0 31.5 - 36.5 g/dL    RDW 16.5 (H) 10.0 - 15.0 %    Platelet Count 153 150 - 450 10e9/L    Diff Method Automated Method     % Neutrophils 69.9 %    % Lymphocytes 9.5 %    % Monocytes 18.5 %    % Eosinophils 0.2 %    % Basophils 0.3 %    % Immature Granulocytes 1.6 %    Nucleated RBCs 1 (H) 0 /100    Absolute Neutrophil 8.0 1.6 - 8.3 10e9/L    Absolute Lymphocytes 1.1 0.8 - 5.3 10e9/L    Absolute Monocytes 2.1 (H) 0.0 - 1.3 10e9/L    Absolute Eosinophils 0.0 0.0 - 0.7 10e9/L    Absolute Basophils 0.0 0.0 - 0.2 10e9/L    Abs Immature Granulocytes 0.2 0 - 0.4 10e9/L    Absolute Nucleated RBC 0.1    Comprehensive metabolic panel   Result Value Ref Range    Sodium 142 133 - 144 mmol/L    Potassium 3.4 3.4 - 5.3 mmol/L    Chloride 109 94 - 109 mmol/L    Carbon Dioxide 21 20 - 32 mmol/L    Anion Gap 12 3 - 14 mmol/L    Glucose 107 (H) 70 - 99 mg/dL    Urea Nitrogen 12 7 - 30 mg/dL    Creatinine 0.72 0.52 - 1.04 mg/dL    GFR Estimate 82 >60 mL/min/1.7m2    GFR Estimate If Black >90   GFR Calc   >60 mL/min/1.7m2     Calcium 8.8 8.5 - 10.1 mg/dL    Bilirubin Total 0.6 0.2 - 1.3 mg/dL    Albumin 3.1 (L) 3.4 - 5.0 g/dL    Protein Total 7.2 6.8 - 8.8 g/dL    Alkaline Phosphatase 162 (H) 40 - 150 U/L    ALT 33 0 - 50 U/L    AST 36 0 - 45 U/L   Blood gas venous   Result Value Ref Range    Ph Venous 7.48 (H) 7.32 - 7.43 pH    PCO2 Venous 32 (L) 40 - 50 mm Hg    PO2 Venous 59 (H) 25 - 47 mm Hg    Bicarbonate Venous 24 21 - 28 mmol/L    Base Excess Venous 0.3 mmol/L   Lactic acid whole blood   Result Value Ref Range    Lactic Acid 1.9 0.7 - 2.1 mmol/L   Procalcitonin   Result Value Ref Range    Procalcitonin 0.66 ng/ml   ISTAT gases lactate kristin POCT   Result Value Ref Range    Ph Venous 7.48 (H) 7.32 - 7.43 pH    PCO2 Venous 29 (L) 40 - 50 mm Hg    PO2 Venous 60 (H) 25 - 47 mm Hg    Bicarbonate Venous 21 21 - 28 mmol/L    O2 Sat Venous 93 %    Lactic Acid 1.6 0.7 - 2.1 mmol/L   Influenza A/B antigen   Result Value Ref Range    Influenza A/B Agn Specimen Nasopharyngeal     Influenza A Negative NEG    Influenza B  NEG     Negative   Test results must be correlated with clinical data. If necessary, results   should be confirmed by a molecular assay or viral culture.     Blood culture   Result Value Ref Range    Specimen Description Blood Right Hand     Culture Micro Pending     Micro Report Status Pending        Labs Ordered and Resulted from Time of ED Arrival Up to the Time of Departure from the ED   CBC WITH PLATELETS DIFFERENTIAL - Abnormal; Notable for the following:     WBC 11.4 (*)     RBC Count 3.42 (*)     Hemoglobin 11.1 (*)     Hematocrit 33.6 (*)     RDW 16.5 (*)     Nucleated RBCs 1 (*)     Absolute Monocytes 2.1 (*)     All other components within normal limits   COMPREHENSIVE METABOLIC PANEL - Abnormal; Notable for the following:     Glucose 107 (*)     Albumin 3.1 (*)     Alkaline Phosphatase 162 (*)     All other components within normal limits   BLOOD GAS VENOUS - Abnormal; Notable for the following:     Ph Venous 7.48  (*)     PCO2 Venous 32 (*)     PO2 Venous 59 (*)     All other components within normal limits   ISTAT  GASES LACTATE NANY POCT - Abnormal; Notable for the following:     Ph Venous 7.48 (*)     PCO2 Venous 29 (*)     PO2 Venous 60 (*)     All other components within normal limits   LACTIC ACID WHOLE BLOOD   PROCALCITONIN   PULSE OXIMETRY NURSING   PERIPHERAL IV CATHETER   INFLUENZA A/B ANTIGEN   BLOOD CULTURE   SPUTUM CULTURE AEROBIC BACTERIAL   GRAM STAIN   BLOOD CULTURE       Assessments & Plan (with Medical Decision Making)     I have reviewed the nursing notes.    Medications   sodium chloride (PF) 0.9% PF flush 3 mL (not administered)   sodium chloride (PF) 0.9% PF flush 3 mL (3 mLs Intracatheter Not Given 1/27/17 1207)   0.9% sodium chloride infusion (not administered)   piperacillin-tazobactam (ZOSYN) 4.5 g vial to attach to  mL bag (not administered)   levofloxacin (LEVAQUIN) infusion 750 mg (not administered)   vancomycin (VANCOCIN) 1000 mg in dextrose 5% 200 mL PREMIX (not administered)   NaCl 0.9 % infusion (not administered)   albuterol neb solution 2.5 mg (2.5 mg Nebulization Given 1/27/17 1041)   0.9% sodium chloride BOLUS ( Intravenous New Bag 1/27/17 1100)   iopamidol (ISOVUE-370) solution 50 mL (50 mLs Intravenous Given 1/27/17 1113)   sodium chloride 0.9 % for CT scan flush dose 82 mL (82 mLs Intravenous Given 1/27/17 1114)     Case discussed with oncology and the patient will be admitted at this time.    I have reviewed the findings, diagnosis, and plan with the patient.    Final diagnoses:   Pneumonia of both lungs due to infectious organism, unspecified part of lung   Metastatic breast cancer (H)     Garret Donald MD    1/27/2017   [facility], [place], EMERGENCY DEPARTMENT      Garret Donald MD  01/27/17 1311

## 2017-01-27 NOTE — LETTER
Transition Communication Hand-off for Care Transitions to Next Level of Care Provider    Name: Keren Erickson  MRN #: [id number]  Primary Care Provider: Kelly Hart     Primary Clinic: [facility] [address]  [facility] [address]     Reason for Hospitalization:  Metastatic breast cancer (H) [C50.919, C79.9]  Pneumonia of both lungs due to infectious organism, unspecified part of lung [J18.9]  Admit Date/Time: 1/27/2017  9:12 AM  Discharge Date: 02/01/2017    Discharge Plan:    62 y/o female with met breast CA, admitted with pneumonia.          Discharge Needs Assessment:  Needs       Most Recent Value    Anticipated Changes Related to Illness inability to care for self    Equipment Currently Used at Home cane, straight, other (see comments) [4WW]    Transportation Available family or friend will provide    Home Care [facility] [phone], Fax: [phone] [RN/PT/OT]        Follow-up plan:  Future Appointments  Date Time Provider Department Center   2/2/2017 6:00 AM Yas Sweeney, PT [facility]   2/2/2017 8:45 AM  [facility] DRAW [facility]   2/2/2017 9:10 AM Deyanira Costello PA-C [facility]   2/7/2017 11:15 AM  [facility] DRAW [facility]   2/7/2017 11:50 AM Deyanira Costello PA-C [facility]   2/7/2017 1:00 PM  ONCOLOGY INFUSION [facility]       Any outstanding tests or procedures:        Referrals     Future Labs/Procedures    Home care nursing referral     Comments:    The following homecare is recommended:  RN skilled nursing visit. RN to assess vital signs and weight, respiratory and cardiac status, pain level and activity tolerance, hydration, nutrition and bowel status and home safety.  RN to teach medication management.  ______________________  [facility]  Phone  [phone]  Fax  [phone]  _____________________    Home Care OT Referral for Hospital Discharge     Comments:    OT to eval and  treat    _____________________  [facility]  Phone  [phone]  Fax  [phone]    ____________________    Home Care PT Referral for Hospital Discharge     Comments:    PT to eval and treat    _____________________  [facility]  Phone  [phone]  Fax  [phone]    ____________________    MED THERAPY MANAGE REFERRAL     Comments:    Your provider has referred you to: **[facility] Scheduling (numerous locations) [phone]   [email or web address]  [facility]: [facility] [phone]   [email or web address]    Reason for Referral: Identified as a potential candidate for MTM services.    The [facility] department will contact you to schedule an appointment.  You may also schedule the appointment by calling [phone].  For [facility] patients, please call [phone] to confirm/schedule your appointment on the next business day.    This service is designed to help you get the most from your medications.  A specially trained Pharmacist will work closely with you and your providers to solve any questions, concerns, issues or problems related to your medications.    Please bring all of your prescription and non-prescription medications (such as vitamins, over-the-counter medications, and herbals) or a detailed medication list to your appointment.    If you have a glucose meter or other home monitoring information, please also bring this to your appointment (i.e. blood glucose log, blood pressure log, pain log, etc.).            ________________________________________  Aniyah Andrade RN, BSN    7D/Oncology Care Coordinator  Pager  [phone]  Phone [phone]

## 2017-01-27 NOTE — H&P
"[facility], [place]  History and Physical  Hematology / Oncology     Date of Admission:  1/27/2017  Date of Service (when I saw the patient): 01/27/2017    Assessment and Plan  Keren Erickson is a 61 year old female with metastatic breast cancer who presents with shortness of breath and cough.    #Shortness of breath and cough. Very likely pneumonia. Given acute development of symptoms, as well as rapid increase in the opacities on CT today compared with 1/25 per my review.   Influenza negative.  -Mild leukocytosis with WBC count 11.4  -On Levaquin, Zosyn, Vanco. Blood cultures done. Sputum culture ordered.    #Metastatic breast cancer  -Primary oncologist is Dr. Harper  -Per review of outpatient records: \"60-year-old female who presented to the hospital initially in 09/2013 with complaints of dyspnea. Workup revealed a large pericardial effusion and pleural effusion. Physical examination revealed a large untreated left breast mass encompassing the entire left breast. Biopsies revealed these were ER, TN and HER2-positive breast cancer. She had a thoracentesis and pericardial sclerosis performed. She was originally on Arimidex and Herceptin. In 01/2014, she was switched to tamoxifen and Herceptin due to arthralgias, and she ultimately developed progressive disease and was switched to Faslodex and Herceptin. In 01/2015, she was found to have progressive disease and was switched to T-DM1.    Initially when she was seen in late 02/2015, she complained of some V5 sensory deficit. This was subjective. I was not able to elicit this on examination. This persisted and further workup was performed with a brain MRI. This revealed what was initially thought to be 3 punctate brain metastases. She originally saw Radiation Oncology and Neurosurgery as well as underwent a lumbar puncture. Cytology from the lumbar puncture showed no evidence of leptomeningeal disease. She was treated with Gamma " "Knife radiation to 6 lesions, performed on 04/16/2015.  She initiated therapy with TDM1 in January 2015 and continued this through 6/26/2015 with overall stable disease. She has since been on a break from treatment. The patient presented earlier this month with recurrent shortness of breath.  Imaging revealed recurrent right-sided pleural effusion.  She has since undergone thoracentesis with cytology pending.  Clinically, however, there is evidence of disease progression. PET scan performed on 11/25/2015 demonstrated progression of disease at multiple sites. She restarted TDM1 on 11/27/2015, however experienced a mild transfusion reaction with shortness of breath and chest tightness, resolved upon stopping TDM1 and 125 mg of SoluMedrol. She had progression of disease on repeat imaging 2/17/2016, as well as new brain metastases. She started TDM1 with Perjeta on 3/4/2016. She underwent gamma knife to brain mets on 3/8/16.    In late May, she was found to have progressive brain metastases.  She received whole brain radiation.  She had a follow up brain MRI August 2016 that was stable.   In September 2016, she enrolled on [place]  trial and was randomized to the standard of care arm; she is now on gemzar and herceptin.\" Last treatment 1/17/2017. Per notes, was tolerating this well. Had restaging brain MRI and CT chest/abdomen/pelvis on 1/25/17. MRI brain stable. CT scan stable other than nodular groundglass opacities as above.  Her counts are okay.    #FEN-IVF hydration due to poor oral intake in past 2 days.   Regular diet as tolerated.    Code Status  Requested DNR / DNI    Primary Care Physician  Kelly Hart    Chief Complaint  SOB, cough.    History is obtained from the patient and electronic health record    History of Present Illness  Keren Erickson is a 61 year old female with metastatic breast cancer who presents with cough and shortness of breath.    Keren Erickson is a 61 year " old metastatic breast cancer patient who states that she got breast cancer 3 years ago and that it spread pretty much everywhere. The patient states she s been treated with radiation and chemo but no surgery. The patient states that she recently underwent a brain MRI as well as a chest CT but states that over the past couple of days she has developed an upper respiratory infection with some nasal congestion and a cough that s been nonproductive in nature with worsening shortness of breath. She had temp of 99.9. No CP, mild sore throat she thinks it is from dry air. Poor appetite in last 2 days.  The patient states she s had no vomiting, no diarrhea, no melena or bright blood per rectum, and she presents here to the ER for evaluation.  Denies any other symptoms on full ROS.    Past Medical History   I have reviewed this patient's medical history and updated it with pertinent information if needed.   Past Medical History   Diagnosis Date     Arthritis      Breast mass, left      bx and told it was benign yrs ago     Breast cancer (H) 9/26/13     Lt breast       Past Surgical History  I have reviewed this patient's surgical history and updated it with pertinent information if needed.  Past Surgical History   Procedure Laterality Date     Orthopedic surgery       L humerous Fracture     Insert chest tube  10/3/2013     Procedure: INSERT CHEST TUBE;  Percutaneous Right Pleur X Placement ;  Surgeon: Luis Alanis MD;  Location: [facility] OR       Prior to Admission Medications  Prior to Admission Medications   Prescriptions Last Dose Informant Patient Reported? Taking?   Cholecalciferol (D3 ADULT PO)   Yes No   LORazepam (ATIVAN) 0.5 MG tablet   No No   Sig: Take 1 tablet (0.5 mg) by mouth every 4 hours as needed (Anxiety, Nausea/Vomiting or Sleep)   Naproxen Sodium (ALEVE PO)   Yes No   Sig: Take 220 mg by mouth daily   Probiotic Product (PROBIOTIC DAILY PO)  Self Yes No   Sig: States she is eating yogurt    multivitamin, therapeutic with minerals (MULTI-VITAMIN) TABS  Self Yes No   Sig: Take 1 tablet by mouth daily   ofloxacin (FLOXIN) 0.3 % otic solution   No No   Sig: Place 3gtts into right ear TID x 3 days   omeprazole (PRILOSEC) 40 MG capsule   No No   Sig: Take 1 capsule (40 mg) by mouth as needed   ondansetron (ZOFRAN) 8 MG tablet   No No   Sig: Take 1 tablet (8 mg) by mouth every 8 hours as needed for nausea   order for DME   No No   Sig: Walker to ensure safe ambulation   order for DME   No No   Sig: Cranial prosthesis   prochlorperazine (COMPAZINE) 10 MG tablet   No No   Sig: Take 1 tablet (10 mg) by mouth every 6 hours as needed (Nausea/Vomiting)      Facility-Administered Medications: None     Allergies  Allergies   Allergen Reactions     Nka [No Known Allergies]      Nkda [No Known Drug Allergies]        Social History  I have reviewed this patient's social history and updated it with pertinent information if needed. Keren Erickson  reports that she has never smoked. She has never used smokeless tobacco. She reports that she drinks about 0.6 oz of alcohol per week. She reports that she does not use illicit drugs.    Family History  I have reviewed this patient's family history and updated it with pertinent information if needed.   Family History   Problem Relation Age of Onset     Hypertension Father      Aneurysm Father 91     AAA     Hypertension Mother      Arthritis Mother      CANCER No family hx of        Review of Systems  The 5 point Review of Systems is negative other than noted in the HPI or here.     Physical Exam  Temp: 98.8  F (37.1  C) Temp src: Oral BP: 121/83 mmHg Pulse: 88 Heart Rate: 88 Resp: 20 SpO2: 93 % O2 Device: Nasal cannula Oxygen Delivery: 5 LPM  Vital Signs with Ranges  Temp:  [98.8  F (37.1  C)] 98.8  F (37.1  C)  Pulse:  [86-88] 88  Heart Rate:  [86-88] 88  Resp:  [20] 20  BP: (101-122)/(54-89) 121/83 mmHg  SpO2:  [89 %-99 %] 93 %  110 lbs 3.68 oz    Constitutional:  Awake, alert, cooperative, no apparent distress, and appears stated age.  Eyes: Lids and lashes normal, pupils equal, round and reactive to light, extra ocular muscles intact, sclera clear, conjunctiva normal.  ENT: Normocephalic, without obvious abnormality, atraumatic, dry mucosae, no erythema or thrush.  Respiratory: No increased work of breathing, good air exchange, clear to auscultation bilaterally, few scattered crackles, diminshed AE bases.  Cardiovascular: Normal apical impulse, regular rate and rhythm, normal S1 and S2, no S3 or S4, and no murmur noted.  GI:  normal bowel sounds, soft, non-distended, non-tender, no masses palpated, no hepatosplenomegaly.  Lymph/Hematologic: No cervical lymphadenopathy and no supraclavicular lymphadenopathy.  Genitourinary:    deferred  Skin: No bruising or bleeding, normal skin color, texture, turgor, no redness, warmth, or swelling, no rashes, no lesions, no abnormal moles, nails normal without discoloration or clubbing and no jaundice.  Musculoskeletal: There is no redness, warmth, or swelling of the joints. No edema.   Neurologic: Awake, alert, oriented to name, place and time.  No gross focal defciits.       Data  Results for orders placed or performed during the hospital encounter of 01/27/17 (from the past 24 hour(s))   CBC with platelets differential   Result Value Ref Range    WBC 11.4 (H) 4.0 - 11.0 10e9/L    RBC Count 3.42 (L) 3.8 - 5.2 10e12/L    Hemoglobin 11.1 (L) 11.7 - 15.7 g/dL    Hematocrit 33.6 (L) 35.0 - 47.0 %    MCV 98 78 - 100 fl    MCH 32.5 26.5 - 33.0 pg    MCHC 33.0 31.5 - 36.5 g/dL    RDW 16.5 (H) 10.0 - 15.0 %    Platelet Count 153 150 - 450 10e9/L    Diff Method Automated Method     % Neutrophils 69.9 %    % Lymphocytes 9.5 %    % Monocytes 18.5 %    % Eosinophils 0.2 %    % Basophils 0.3 %    % Immature Granulocytes 1.6 %    Nucleated RBCs 1 (H) 0 /100    Absolute Neutrophil 8.0 1.6 - 8.3 10e9/L    Absolute Lymphocytes 1.1 0.8 - 5.3 10e9/L     Absolute Monocytes 2.1 (H) 0.0 - 1.3 10e9/L    Absolute Eosinophils 0.0 0.0 - 0.7 10e9/L    Absolute Basophils 0.0 0.0 - 0.2 10e9/L    Abs Immature Granulocytes 0.2 0 - 0.4 10e9/L    Absolute Nucleated RBC 0.1    Comprehensive metabolic panel   Result Value Ref Range    Sodium 142 133 - 144 mmol/L    Potassium 3.4 3.4 - 5.3 mmol/L    Chloride 109 94 - 109 mmol/L    Carbon Dioxide 21 20 - 32 mmol/L    Anion Gap 12 3 - 14 mmol/L    Glucose 107 (H) 70 - 99 mg/dL    Urea Nitrogen 12 7 - 30 mg/dL    Creatinine 0.72 0.52 - 1.04 mg/dL    GFR Estimate 82 >60 mL/min/1.7m2    GFR Estimate If Black >90   GFR Calc   >60 mL/min/1.7m2    Calcium 8.8 8.5 - 10.1 mg/dL    Bilirubin Total 0.6 0.2 - 1.3 mg/dL    Albumin 3.1 (L) 3.4 - 5.0 g/dL    Protein Total 7.2 6.8 - 8.8 g/dL    Alkaline Phosphatase 162 (H) 40 - 150 U/L    ALT 33 0 - 50 U/L    AST 36 0 - 45 U/L   Lactic acid whole blood   Result Value Ref Range    Lactic Acid 1.9 0.7 - 2.1 mmol/L   Procalcitonin   Result Value Ref Range    Procalcitonin 0.66 ng/ml   Blood culture   Result Value Ref Range    Specimen Description Blood Right Hand     Culture Micro No growth after 2 hours     Micro Report Status Pending    Blood gas venous   Result Value Ref Range    Ph Venous 7.48 (H) 7.32 - 7.43 pH    PCO2 Venous 32 (L) 40 - 50 mm Hg    PO2 Venous 59 (H) 25 - 47 mm Hg    Bicarbonate Venous 24 21 - 28 mmol/L    Base Excess Venous 0.3 mmol/L   ISTAT gases lactate kristin POCT   Result Value Ref Range    Ph Venous 7.48 (H) 7.32 - 7.43 pH    PCO2 Venous 29 (L) 40 - 50 mm Hg    PO2 Venous 60 (H) 25 - 47 mm Hg    Bicarbonate Venous 21 21 - 28 mmol/L    O2 Sat Venous 93 %    Lactic Acid 1.6 0.7 - 2.1 mmol/L   XR Chest 2 Views    Narrative    XR CHEST 2 VW, 1/27/2017 10:34 AM.    Comparison: 12/7/2015.    History: Shortness of breath.    Findings:   Cardiomediastinal silhouette and pulmonary vasculature within normal  limits. Stable patchy right basilar opacity. No pleural  effusion or  pneumothorax. No soft tissue abnormalities. Diffuse increased density  of bones throughout the chest. Degenerative findings degenerative  findings of shoulders.      Impression    Impression:   1. Stable patchy right basilar opacity.  2. Findings consistent with diffuse sclerotic metastatic disease of  the thorax.    I have personally reviewed the examination and initial interpretation  and I agree with the findings.    KAYLEE CHICAS MD   Influenza A/B antigen   Result Value Ref Range    Influenza A/B Agn Specimen Nasopharyngeal     Influenza A Negative NEG    Influenza B  NEG     Negative   Test results must be correlated with clinical data. If necessary, results   should be confirmed by a molecular assay or viral culture.     Chest CT, IV contrast only - PE protocol    Narrative    CT pulmonary angiogram    History: Cough, hypoxia, cancer. History metastatic breast cancer.    Comparison: 1/25/2017, 12/6/2016, 9/7/2016, 1/27/2014    Technique: Helical acquisition of CT images of the chest from the lung  apices to the kidneys were acquired after the administration of  intravenous contrast according to the CT pulmonary angiogram protocol.  Axial images were reconstructed. Coronal reconstructions performed.    Findings:   The contrast bolus is adequate.  There is no pulmonary embolism. No  right heart strain.     Heart size is prominent. No pericardial effusion.  Normal thoracic  vasculature. Stable 1 cm right hilar lymph node. No pleural effusion  or pneumothorax. Increase in bilateral perihilar predominant  groundglass opacities with multiple areas of interlobular and  intralobular septal thickening. Multiple areas of groundglass  nodularity can be seen scattered throughout the lungs which is new.  Mild debris can be seen, especially in the right main bronchus. Volume  loss with consolidation can be seen in the right middle lobe, most  likely atelectasis. Mild consolidative changes in the lateral  lower  lobes. Scattered nodular opacities in the bilateral lung bases.    Bones and soft tissues: Diffuse sclerotic changes can be seen  throughout the thoracic vertebral bodies, scapula, on the ribs head  and the sternum, which appears similar to prior. Old healing posterior  right-sided rib fractures.    Partially imaged upper abdomen: Limited. Mild fluid within the  esophagus. Small hiatal hernia. Unchanged nodules in the right breast.  Unchanged nipple inversion on the left with foci of calcification  within the left breast.      Impression    Impression:    1. No evidence for pulmonary embolus.  2. New bilateral diffuse poorly defined groundglass nodularity  throughout the lungs, which may represent metastatic disease, atypical  infection or drug reaction.  3. New nodularity of the lung bases which may represent  aspiration/infection.  4. Unchanged diffuse sclerotic metastatic disease to the bony thorax.  5. Unchanged nodularity of the right breast.    I have personally reviewed the examination and initial interpretation  and I agree with the findings.    KAYLEE CHICAS MD   Blood Culture ONE site   Result Value Ref Range    Specimen Description Blood Left Arm     Culture Micro Pending     Micro Report Status Pending

## 2017-01-27 NOTE — LETTER
Health Information Management Services               Recipient:[facility].  Phone  [phone]  Fax  [phone]            Sender:________________________________________  Aniyah LAWRENCEEthan Andrade, RN, BSN    7D/Oncology Care Coordinator  Pager  [phone]  Phone [phone]          Date: February 3, 2017  Patient Name:  Keren Erickson  Routing Message:      D/c orders.      The documents accompanying this notice contain confidential information belonging to the sender.  This information is intended only for the use of the individual or entity named above.  The authorized recipient of this information is prohibited from disclosing this information to any other party and is required to destroy the information after its stated need has been fulfilled, unless otherwise required by state law.      If you are not the intended recipient, you are hereby notified that any disclosure, copying, distribution or action taken in reliance on the contents of these documents is strictly prohibited. If you have received this document in error, please notify [facility] immediately at [phone].  You may return the document via fax ([phone]) or return mail  (Health Information Management, , [address] [place] [address]).

## 2017-01-27 NOTE — ED NOTES
Bed: ED09  Expected date: 1/27/17  Expected time: 9:05 AM  Means of arrival: Ambulance  Comments:  Noel [address]    60 y/o Female    Congestion/possible pneumonia  Hx:  Cancer    Triaged:  Yellow

## 2017-01-27 NOTE — PHARMACY-ADMISSION MEDICATION HISTORY
Admission medication history interview status for the 1/27/2017 admission is complete. See Epic admission navigator for allergy information, pharmacy, prior to admission medications and immunization status.     Medication history interview sources:  Patient    Changes made to PTA medication list (reason)  Added: None  Deleted:   -ofloxacin 0.3% otic solution, Place 3 gtts into right ear TID x 3 days (per patient, she is no longer taking and confirmed that this medication could be deleted)  -probiotic product, eating yogurt (per patient, she is no longer taking and confirmed that this could be removed)  Changed:   -added sig to cholecalciferol-->5000 units PO daily    Additional medication history information (including reliability of information, actions taken by pharmacist):  -Patient was an excellent historian and knew the name, dose, and frequency of her medications.  -Patient reports that she last took cholecalciferol 4 days ago and is currently holding off on taking it. She did express that she wanted the medication to remain on her PTA medication list.  -Patient reports that she quit taking her multivitamin but still wanted the medication to remain on her PTA medication list.  -Patient notes that she takes omeprazole only as needed for which she last used it 2 days ago.  -Patient reports that she alternates between ondansetron and prochlorperazine as needed for nausea.  -Patient reports that she has not received her influenza vaccination this year and prefers to not receive one.    Prior to Admission medications    Medication Sig Last Dose Taking? Auth Provider   omeprazole (PRILOSEC) 40 MG capsule Take 1 capsule (40 mg) by mouth as needed 1/25/2017 Yes Deyanira Costello PA-C   ondansetron (ZOFRAN) 8 MG tablet Take 1 tablet (8 mg) by mouth every 8 hours as needed for nausea 1/26/2017 at AM Yes Mica Gil MD   prochlorperazine (COMPAZINE) 10 MG tablet Take 1 tablet (10 mg) by mouth  every 6 hours as needed (Nausea/Vomiting) 1/23/2017 Yes Mica Gil MD   Naproxen Sodium (ALEVE PO) Take 220 mg by mouth daily 1/27/2017 at AM Yes Reported, Patient   Cholecalciferol (D3 ADULT PO) Take 5,000 Units by mouth daily  1/23/2017  Reported, Patient   LORazepam (ATIVAN) 0.5 MG tablet Take 1 tablet (0.5 mg) by mouth every 4 hours as needed (Anxiety, Nausea/Vomiting or Sleep) More than a month at Unknown time  Mica Gil MD   order for DME Cranial prosthesis   Marlen Harper MD   order for DME Walker to ensure safe ambulation   Marlen Harper MD   multivitamin, therapeutic with minerals (MULTI-VITAMIN) TABS Take 1 tablet by mouth daily More than a month at Unknown time  Reported, Patient     Medication history completed by: Kendal Penn, RoseD

## 2017-01-27 NOTE — LETTER
Health Information Management Services               Recipient:  Admissions           Sender:  CHIN Garcia  Pager: [phone]          Date: February 2, 2017  Patient Name:  Keren Erickson  Routing Message:  Please review for TCU admissions. Pt medically stable and ready for DC to TCU. Thanks!  7D RN: [phone]          The documents accompanying this notice contain confidential information belonging to the sender.  This information is intended only for the use of the individual or entity named above.  The authorized recipient of this information is prohibited from disclosing this information to any other party and is required to destroy the information after its stated need has been fulfilled, unless otherwise required by state law.      If you are not the intended recipient, you are hereby notified that any disclosure, copying, distribution or action taken in reliance on the contents of these documents is strictly prohibited. If you have received this document in error, please notify [facility] immediately at [phone].  You may return the document via fax ([phone]) or return mail  (Health Information Management, , [address] [place] [address]).

## 2017-01-27 NOTE — ED NOTES
"ED TRIAGE    Medical / Trauma C/o:  61-yr female patient - presenting to ED for eval of increasing congestion, cough and joint/body aches; states she may have caught a virus (respiratory?) earlier in the week.  O2 sat's of 85-86%, RA, at home for EMS.  EMS placed patient on 10L, NRB, with subsequent O2 sat's of 96%.  Patient feels, generalized weakness, fatigue, lethargy.  Concern for pneumonia.    Duration of C/o:  For the last few days    Contributing Factors / Concerning HX:  HX of breast CA and metastases     Significant Med's / Tx's:  See med's    Febrile / Afebrile:  Afebrile    Patient Vitals for the past 24 hrs:   BP Temp Temp src Pulse Heart Rate Resp SpO2 Height Weight   01/27/17 0934 - 98.8  F (37.1  C) Oral 88 88 20 93 % - -   01/27/17 0931 116/85 mmHg 98.8  F (37.1  C) Oral 86 86 20 90 % 1.651 m (5' 5\") 50 kg (110 lb 3.7 oz)   01/27/17 0927 - - - - - 20 96 % - -       Alvaro Castillo  January 27, 2017  9:44 AM    "

## 2017-01-28 LAB
ANION GAP SERPL CALCULATED.3IONS-SCNC: 10 MMOL/L (ref 3–14)
BASOPHILS # BLD AUTO: 0 10E9/L (ref 0–0.2)
BASOPHILS NFR BLD AUTO: 0.2 %
BUN SERPL-MCNC: 8 MG/DL (ref 7–30)
CALCIUM SERPL-MCNC: 7.8 MG/DL (ref 8.5–10.1)
CHLORIDE SERPL-SCNC: 114 MMOL/L (ref 94–109)
CO2 SERPL-SCNC: 22 MMOL/L (ref 20–32)
CREAT SERPL-MCNC: 0.64 MG/DL (ref 0.52–1.04)
DIFFERENTIAL METHOD BLD: ABNORMAL
EOSINOPHIL # BLD AUTO: 0.1 10E9/L (ref 0–0.7)
EOSINOPHIL NFR BLD AUTO: 1.4 %
ERYTHROCYTE [DISTWIDTH] IN BLOOD BY AUTOMATED COUNT: 16.7 % (ref 10–15)
GFR SERPL CREATININE-BSD FRML MDRD: ABNORMAL ML/MIN/1.7M2
GLUCOSE SERPL-MCNC: 136 MG/DL (ref 70–99)
GRAM STN SPEC: NORMAL
HCT VFR BLD AUTO: 28.8 % (ref 35–47)
HGB BLD-MCNC: 9.2 G/DL (ref 11.7–15.7)
IMM GRANULOCYTES # BLD: 0.2 10E9/L (ref 0–0.4)
IMM GRANULOCYTES NFR BLD: 2.8 %
LYMPHOCYTES # BLD AUTO: 0.7 10E9/L (ref 0.8–5.3)
LYMPHOCYTES NFR BLD AUTO: 8.4 %
Lab: NORMAL
MAGNESIUM SERPL-MCNC: 2.3 MG/DL (ref 1.6–2.3)
MCH RBC QN AUTO: 31.9 PG (ref 26.5–33)
MCHC RBC AUTO-ENTMCNC: 31.9 G/DL (ref 31.5–36.5)
MCV RBC AUTO: 100 FL (ref 78–100)
MICRO REPORT STATUS: NORMAL
MONOCYTES # BLD AUTO: 1.3 10E9/L (ref 0–1.3)
MONOCYTES NFR BLD AUTO: 15.2 %
NEUTROPHILS # BLD AUTO: 6.1 10E9/L (ref 1.6–8.3)
NEUTROPHILS NFR BLD AUTO: 72 %
NRBC # BLD AUTO: 0.1 10*3/UL
NRBC BLD AUTO-RTO: 1 /100
PHOSPHATE SERPL-MCNC: 1.5 MG/DL (ref 2.5–4.5)
PLATELET # BLD AUTO: 127 10E9/L (ref 150–450)
POTASSIUM SERPL-SCNC: 3.1 MMOL/L (ref 3.4–5.3)
RBC # BLD AUTO: 2.88 10E12/L (ref 3.8–5.2)
SODIUM SERPL-SCNC: 147 MMOL/L (ref 133–144)
SPECIMEN SOURCE: NORMAL
WBC # BLD AUTO: 8.5 10E9/L (ref 4–11)

## 2017-01-28 PROCEDURE — 36415 COLL VENOUS BLD VENIPUNCTURE: CPT | Performed by: INTERNAL MEDICINE

## 2017-01-28 PROCEDURE — 84100 ASSAY OF PHOSPHORUS: CPT | Performed by: INTERNAL MEDICINE

## 2017-01-28 PROCEDURE — 87205 SMEAR GRAM STAIN: CPT | Performed by: INTERNAL MEDICINE

## 2017-01-28 PROCEDURE — 25000132 ZZH RX MED GY IP 250 OP 250 PS 637: Performed by: INTERNAL MEDICINE

## 2017-01-28 PROCEDURE — 25000125 ZZHC RX 250: Performed by: PHYSICIAN ASSISTANT

## 2017-01-28 PROCEDURE — 25000132 ZZH RX MED GY IP 250 OP 250 PS 637

## 2017-01-28 PROCEDURE — 12000001 ZZH R&B MED SURG/OB UMMC

## 2017-01-28 PROCEDURE — 40000141 ZZH STATISTIC PERIPHERAL IV START W/O US GUIDANCE

## 2017-01-28 PROCEDURE — 87070 CULTURE OTHR SPECIMN AEROBIC: CPT | Performed by: INTERNAL MEDICINE

## 2017-01-28 PROCEDURE — 27210995 ZZH RX 272

## 2017-01-28 PROCEDURE — 25000132 ZZH RX MED GY IP 250 OP 250 PS 637: Performed by: PHYSICIAN ASSISTANT

## 2017-01-28 PROCEDURE — 25000125 ZZHC RX 250: Performed by: INTERNAL MEDICINE

## 2017-01-28 PROCEDURE — 80048 BASIC METABOLIC PNL TOTAL CA: CPT | Performed by: INTERNAL MEDICINE

## 2017-01-28 PROCEDURE — 85025 COMPLETE CBC W/AUTO DIFF WBC: CPT | Performed by: INTERNAL MEDICINE

## 2017-01-28 PROCEDURE — 25000125 ZZHC RX 250: Performed by: EMERGENCY MEDICINE

## 2017-01-28 PROCEDURE — 87633 RESP VIRUS 12-25 TARGETS: CPT | Performed by: PHYSICIAN ASSISTANT

## 2017-01-28 PROCEDURE — 83735 ASSAY OF MAGNESIUM: CPT | Performed by: INTERNAL MEDICINE

## 2017-01-28 PROCEDURE — 99233 SBSQ HOSP IP/OBS HIGH 50: CPT | Performed by: INTERNAL MEDICINE

## 2017-01-28 RX ORDER — ALBUTEROL SULFATE 0.83 MG/ML
2.5 SOLUTION RESPIRATORY (INHALATION) EVERY 6 HOURS PRN
Status: DISCONTINUED | OUTPATIENT
Start: 2017-01-28 | End: 2017-02-03 | Stop reason: HOSPADM

## 2017-01-28 RX ORDER — GUAIFENESIN 600 MG/1
600 TABLET, EXTENDED RELEASE ORAL 2 TIMES DAILY
Status: DISCONTINUED | OUTPATIENT
Start: 2017-01-28 | End: 2017-02-03 | Stop reason: HOSPADM

## 2017-01-28 RX ORDER — SODIUM CHLORIDE AND POTASSIUM CHLORIDE 150; 450 MG/100ML; MG/100ML
INJECTION, SOLUTION INTRAVENOUS CONTINUOUS
Status: DISCONTINUED | OUTPATIENT
Start: 2017-01-28 | End: 2017-01-29

## 2017-01-28 RX ADMIN — ENOXAPARIN SODIUM 40 MG: 40 INJECTION SUBCUTANEOUS at 20:44

## 2017-01-28 RX ADMIN — VANCOMYCIN HYDROCHLORIDE 1000 MG: 1 INJECTION, SOLUTION INTRAVENOUS at 04:18

## 2017-01-28 RX ADMIN — POTASSIUM CHLORIDE 10 MEQ: 14.9 INJECTION, SOLUTION, CONCENTRATE PARENTERAL at 22:19

## 2017-01-28 RX ADMIN — OMEPRAZOLE 40 MG: 20 CAPSULE, DELAYED RELEASE ORAL at 09:18

## 2017-01-28 RX ADMIN — SODIUM PHOSPHATE, MONOBASIC, MONOHYDRATE AND SODIUM PHOSPHATE, DIBASIC, ANHYDROUS 20 MMOL: 276; 142 INJECTION, SOLUTION INTRAVENOUS at 22:11

## 2017-01-28 RX ADMIN — NAPROXEN 250 MG: 250 TABLET ORAL at 09:18

## 2017-01-28 RX ADMIN — GUAIFENESIN 600 MG: 600 TABLET, EXTENDED RELEASE ORAL at 13:01

## 2017-01-28 RX ADMIN — PIPERACILLIN AND TAZOBACTAM 4.5 G: 4; .5 INJECTION, POWDER, FOR SOLUTION INTRAVENOUS at 03:02

## 2017-01-28 RX ADMIN — MULTIPLE VITAMINS W/ MINERALS TAB 1 TABLET: TAB at 09:18

## 2017-01-28 RX ADMIN — PIPERACILLIN AND TAZOBACTAM 4.5 G: 4; .5 INJECTION, POWDER, FOR SOLUTION INTRAVENOUS at 15:45

## 2017-01-28 RX ADMIN — PIPERACILLIN AND TAZOBACTAM 4.5 G: 4; .5 INJECTION, POWDER, FOR SOLUTION INTRAVENOUS at 09:18

## 2017-01-28 RX ADMIN — GUAIFENESIN 600 MG: 600 TABLET, EXTENDED RELEASE ORAL at 20:41

## 2017-01-28 RX ADMIN — VANCOMYCIN HYDROCHLORIDE 1000 MG: 1 INJECTION, SOLUTION INTRAVENOUS at 17:14

## 2017-01-28 RX ADMIN — PIPERACILLIN AND TAZOBACTAM 4.5 G: 4; .5 INJECTION, POWDER, FOR SOLUTION INTRAVENOUS at 20:32

## 2017-01-28 RX ADMIN — POTASSIUM CHLORIDE 10 MEQ: 14.9 INJECTION, SOLUTION, CONCENTRATE PARENTERAL at 19:30

## 2017-01-28 RX ADMIN — POTASSIUM CHLORIDE 10 MEQ: 14.9 INJECTION, SOLUTION, CONCENTRATE PARENTERAL at 20:42

## 2017-01-28 RX ADMIN — POTASSIUM CHLORIDE AND SODIUM CHLORIDE: 450; 150 INJECTION, SOLUTION INTRAVENOUS at 09:40

## 2017-01-28 ASSESSMENT — PAIN DESCRIPTION - DESCRIPTORS: DESCRIPTORS: ACHING

## 2017-01-28 NOTE — PROGRESS NOTES
"[facility], [place]    Hematology / Oncology Progress Note    Date of Service (when I saw the patient): 01/28/2017     Assessment and Plan  Keren Erickson is a 61 year old female with metastatic breast cancer who presents with shortness of breath and cough.    #Shortness of breath and cough. Likely 2/2 pneumonia. New opacities seen on 1/27 imaging compared to 1/25. Feels she is already improving on antibiotics.  - Remains afebrile. Mild leukocytosis with WBC count 11.4 --> improved to WNL today  - influenza screen negative, but will r/o additonal virus with RVP  - surveillance BCx NGTD  - awaiting sputum culture if can produce  - continue Levaquin, Zosyn, Vanco.   - add mucinex for expectorant    #Metastatic breast cancer. Primary oncologist is Dr. Harper. Significant history as described in H&P. Last treatment 1/17/2017. Per notes, was tolerating this well. Had restaging brain MRI and CT chest/abdomen/pelvis on 1/25/17. MRI brain stable. CT scan stable other than nodular groundglass opacities as above.    #FEN  - continue IVF hydration (adjusted to 0.45 NS + KCl given hypernatremia/hypokalemia) due to poor oral intake in past 2 days. Creat ok.   - Regular diet as tolerated.  - will also need Phos replaced today    PPx/Misc:  - Lovenox ppx  - continue PPI  - PT consult (pt lives independently at home, and would expect she could return)    Ria Puentes PA-C  Heme/Onc  [phone]    Interval History  Admitted overnight. She has been afebrile. Today, she reports doing \"better than yesterday\". Her talking and ability to get deeper breaths in are better to her. She continues with loose cough, sounding stuck in her throat. She is trying to give a sputum sample but having a difficult time doing so and trying not to force it. States this came on within the last 2 days; she had been doing well at her 1/25 visit. Denies any sinus congestion/pressure, rhinorrhea, or sore throat. Feels \"sore\" " in her chest and abdomen from coughing. No current nausea.     Physical Exam  Temp: 98.3  F (36.8  C) Temp src: Oral BP: 124/82 mmHg Pulse: 87 Heart Rate: 106 Resp: 20 SpO2: 93 % O2 Device: Oxymask Oxygen Delivery: 4 LPM  Filed Vitals:    01/27/17 0931 01/27/17 2033 01/28/17 0915   Weight: 50 kg (110 lb 3.7 oz) 49.5 kg (109 lb 2 oz) 49.17 kg (108 lb 6.4 oz)     Vital Signs with Ranges  Temp:  [95.6  F (35.3  C)-98.3  F (36.8  C)] 98.3  F (36.8  C)  Pulse:  [87-99] 87  Heart Rate:  [] 106  Resp:  [20-24] 20  BP: ()/(61-95) 124/82 mmHg  SpO2:  [89 %-98 %] 93 %  I/O last 3 completed shifts:  In: 765 [I.V.:765]  Out: 625 [Urine:625]  Constitutional: Pleasant, chronically-ill appearing female seen sitting up in bed. She is alert, interactive, NAD. Frequent loose cough.   HEENT: NC/AT, alopecia. No rhinorrhea appreciated. OP pink and moist without erythema/lesions.   Respiratory: Breathing even, non-labored on 5L oximask. Able to speak in avitia sentences, even with having mask off part of discussion. Lung with crackles/course sounding in R base but otherwise clear. Frequent loose cough in upper chest/throat.   Cardiovascular: Mildly tachy but regular rhythm, no murmur noted.  GI: Normal BS. Abd soft, non-distended, non-tender.  Skin: Pale, clean/dry, intact.   Musculoskeletal: Thin, no edema.   Neurologic: Awake, alert, oriented. Grossly nonfocal.   Neuropsychiatric: Calm, normal eye contact, alert, normal affect, memory for past and recent events intact and thought process normal.    Medications    0.45% sodium chloride + KCl 20 mEq/L 75 mL/hr at 01/28/17 0940     - MEDICATION INSTRUCTIONS -         guaiFENesin  600 mg Oral BID     sodium chloride (PF)  3 mL Intracatheter Q8H     vancomycin (VANCOCIN) IV  1,000 mg Intravenous Q12H     [START ON 1/31/2017] cholecalciferol  5,000 Units Oral Daily     multivitamin, therapeutic with minerals  1 tablet Oral Daily     omeprazole  40 mg Oral QAM      piperacillin-tazobactam  4.5 g Intravenous Q6H     levofloxacin  750 mg Intravenous Q24H     enoxaparin  40 mg Subcutaneous Q24H     naproxen  250 mg Oral Daily       Data  Results for orders placed or performed during the hospital encounter of 01/27/17 (from the past 24 hour(s))   Blood Culture ONE site   Result Value Ref Range    Specimen Description Blood Left Arm     Culture Micro No growth after 14 hours     Micro Report Status Pending    CBC with platelets differential   Result Value Ref Range    WBC 8.5 4.0 - 11.0 10e9/L    RBC Count 2.88 (L) 3.8 - 5.2 10e12/L    Hemoglobin 9.2 (L) 11.7 - 15.7 g/dL    Hematocrit 28.8 (L) 35.0 - 47.0 %     78 - 100 fl    MCH 31.9 26.5 - 33.0 pg    MCHC 31.9 31.5 - 36.5 g/dL    RDW 16.7 (H) 10.0 - 15.0 %    Platelet Count 127 (L) 150 - 450 10e9/L    Diff Method Automated Method     % Neutrophils 72.0 %    % Lymphocytes 8.4 %    % Monocytes 15.2 %    % Eosinophils 1.4 %    % Basophils 0.2 %    % Immature Granulocytes 2.8 %    Nucleated RBCs 1 (H) 0 /100    Absolute Neutrophil 6.1 1.6 - 8.3 10e9/L    Absolute Lymphocytes 0.7 (L) 0.8 - 5.3 10e9/L    Absolute Monocytes 1.3 0.0 - 1.3 10e9/L    Absolute Eosinophils 0.1 0.0 - 0.7 10e9/L    Absolute Basophils 0.0 0.0 - 0.2 10e9/L    Abs Immature Granulocytes 0.2 0 - 0.4 10e9/L    Absolute Nucleated RBC 0.1    Basic metabolic panel   Result Value Ref Range    Sodium 147 (H) 133 - 144 mmol/L    Potassium 3.1 (L) 3.4 - 5.3 mmol/L    Chloride 114 (H) 94 - 109 mmol/L    Carbon Dioxide 22 20 - 32 mmol/L    Anion Gap 10 3 - 14 mmol/L    Glucose 136 (H) 70 - 99 mg/dL    Urea Nitrogen 8 7 - 30 mg/dL    Creatinine 0.64 0.52 - 1.04 mg/dL    GFR Estimate >90  Non  GFR Calc   >60 mL/min/1.7m2    GFR Estimate If Black >90   GFR Calc   >60 mL/min/1.7m2    Calcium 7.8 (L) 8.5 - 10.1 mg/dL   Magnesium   Result Value Ref Range    Magnesium 2.3 1.6 - 2.3 mg/dL   Phosphorus   Result Value Ref Range    Phosphorus 1.5  (L) 2.5 - 4.5 mg/dL

## 2017-01-28 NOTE — ED NOTES
1830  Attempted to call report to 7D x5 attempts; room not available yet at each call.  Report accepted at 1830 but room cleaning is in process now. 7D to call ED when room ready.    1906  Room still not clean on 7D.

## 2017-01-28 NOTE — PLAN OF CARE
Problem: Pneumonia (Adult)  Goal: Signs and Symptoms of Listed Potential Problems Will be Absent or Manageable (Pneumonia)  Signs and symptoms of listed potential problems will be absent or manageable by discharge/transition of care (reference Pneumonia (Adult) CPG).   Outcome: No Change    Afebrile. Tachycardic 100's, On 2 LPM O2 via Oxy mask, sating 93-94%.  Pain is comfortably managed with schedule naproxen.  Productive weak cough, sputum specimen collected,  Up with assist of 1, uses bedside commode.  Pt reported pain and discomfort of PIV on R forearm, infusion stopped Vas Acc consult order placed.  K= 3.1 and Phos =1.5 will be replaced on evenings.  Continue to monitor and POC.

## 2017-01-28 NOTE — PROGRESS NOTES
Nursing Focus: Admission  D: Arrived at 2040 from ED via patient transport. Patient accompanied by self. Admitted for SOB and cough. Reports Oxygen adequate with 6L Oxymask.  I: Admission process began.  Patient oriented to room, enviroment, call light.  MD (Dr. Carolina) notified of patients arrival on unit; seen pt in ED; S&H orders released.   A: O2 @ 95% via Oxymask. Vital signs stable, afebrile.  Patient stable at this time.  Denies pain, nausea. Skin intact. Reports decreased appetite in last 3 days; no BM in last 3 days.  P: Implement plan of care when available. Continue to monitor patient. Nursing interventions as appropriate. Notify MD with changes in pt status.

## 2017-01-28 NOTE — PLAN OF CARE
Problem: Goal Outcome Summary  Goal: Goal Outcome Summary  Outcome: No Change  O2 @ 5L Oxymask, sating 96-97%. Pt reports does not wear any oxygen at home. OVSS, afebrile. Reports general bone pain, comfortably managed. Productive and weak cough, awaiting sputum collection; pt reports nonproductive overnight. Uses commode at bedside; Assist x1, generalized weakness BLE. Continue with POC.

## 2017-01-29 ENCOUNTER — APPOINTMENT (OUTPATIENT)
Dept: PHYSICAL THERAPY | Facility: CLINIC | Age: 62
DRG: 194 | End: 2017-01-29
Attending: PHYSICIAN ASSISTANT
Payer: COMMERCIAL

## 2017-01-29 LAB
ALBUMIN SERPL-MCNC: 2 G/DL (ref 3.4–5)
ALP SERPL-CCNC: 122 U/L (ref 40–150)
ALT SERPL W P-5'-P-CCNC: 19 U/L (ref 0–50)
ANION GAP SERPL CALCULATED.3IONS-SCNC: 11 MMOL/L (ref 3–14)
ANION GAP SERPL CALCULATED.3IONS-SCNC: 9 MMOL/L (ref 3–14)
ANISOCYTOSIS BLD QL SMEAR: SLIGHT
AST SERPL W P-5'-P-CCNC: 32 U/L (ref 0–45)
BASOPHILS # BLD AUTO: 0.1 10E9/L (ref 0–0.2)
BASOPHILS NFR BLD AUTO: 0.9 %
BILIRUB SERPL-MCNC: 0.4 MG/DL (ref 0.2–1.3)
BUN SERPL-MCNC: 5 MG/DL (ref 7–30)
BUN SERPL-MCNC: 7 MG/DL (ref 7–30)
CALCIUM SERPL-MCNC: 7.2 MG/DL (ref 8.5–10.1)
CALCIUM SERPL-MCNC: 7.4 MG/DL (ref 8.5–10.1)
CHLORIDE SERPL-SCNC: 115 MMOL/L (ref 94–109)
CHLORIDE SERPL-SCNC: 121 MMOL/L (ref 94–109)
CO2 SERPL-SCNC: 20 MMOL/L (ref 20–32)
CO2 SERPL-SCNC: 22 MMOL/L (ref 20–32)
CREAT SERPL-MCNC: 0.63 MG/DL (ref 0.52–1.04)
CREAT SERPL-MCNC: 0.65 MG/DL (ref 0.52–1.04)
DACRYOCYTES BLD QL SMEAR: ABNORMAL
DIFFERENTIAL METHOD BLD: ABNORMAL
EOSINOPHIL # BLD AUTO: 0.1 10E9/L (ref 0–0.7)
EOSINOPHIL NFR BLD AUTO: 0.9 %
ERYTHROCYTE [DISTWIDTH] IN BLOOD BY AUTOMATED COUNT: 16.7 % (ref 10–15)
FLUAV H1 2009 PAND RNA SPEC QL NAA+PROBE: NEGATIVE
FLUAV H1 RNA SPEC QL NAA+PROBE: NEGATIVE
FLUAV H3 RNA SPEC QL NAA+PROBE: NEGATIVE
FLUAV RNA SPEC QL NAA+PROBE: NEGATIVE
FLUBV RNA SPEC QL NAA+PROBE: NEGATIVE
GFR SERPL CREATININE-BSD FRML MDRD: ABNORMAL ML/MIN/1.7M2
GFR SERPL CREATININE-BSD FRML MDRD: ABNORMAL ML/MIN/1.7M2
GLUCOSE SERPL-MCNC: 111 MG/DL (ref 70–99)
GLUCOSE SERPL-MCNC: 77 MG/DL (ref 70–99)
HADV DNA SPEC QL NAA+PROBE: NEGATIVE
HADV DNA SPEC QL NAA+PROBE: NEGATIVE
HCT VFR BLD AUTO: 26.6 % (ref 35–47)
HGB BLD-MCNC: 8.7 G/DL (ref 11.7–15.7)
HMPV RNA SPEC QL NAA+PROBE: NEGATIVE
HPIV1 RNA SPEC QL NAA+PROBE: NEGATIVE
HPIV2 RNA SPEC QL NAA+PROBE: NEGATIVE
HPIV3 RNA SPEC QL NAA+PROBE: NEGATIVE
LYMPHOCYTES # BLD AUTO: 0.9 10E9/L (ref 0.8–5.3)
LYMPHOCYTES NFR BLD AUTO: 10.6 %
MAGNESIUM SERPL-MCNC: 2.1 MG/DL (ref 1.6–2.3)
MCH RBC QN AUTO: 32.2 PG (ref 26.5–33)
MCHC RBC AUTO-ENTMCNC: 32.7 G/DL (ref 31.5–36.5)
MCV RBC AUTO: 99 FL (ref 78–100)
METAMYELOCYTES # BLD: 0.1 10E9/L
METAMYELOCYTES NFR BLD MANUAL: 1.8 %
MICROBIOLOGIST REVIEW: NORMAL
MONOCYTES # BLD AUTO: 0.5 10E9/L (ref 0–1.3)
MONOCYTES NFR BLD AUTO: 6.2 %
MYELOCYTES # BLD: 0.1 10E9/L
MYELOCYTES NFR BLD MANUAL: 0.9 %
NEUTROPHILS # BLD AUTO: 6.5 10E9/L (ref 1.6–8.3)
NEUTROPHILS NFR BLD AUTO: 77.8 %
PHOSPHATE SERPL-MCNC: 2.2 MG/DL (ref 2.5–4.5)
PLATELET # BLD AUTO: 156 10E9/L (ref 150–450)
PLATELET # BLD EST: ABNORMAL 10*3/UL
POIKILOCYTOSIS BLD QL SMEAR: ABNORMAL
POTASSIUM SERPL-SCNC: 2.7 MMOL/L (ref 3.4–5.3)
POTASSIUM SERPL-SCNC: 3.4 MMOL/L (ref 3.4–5.3)
PROMYELOCYTES # BLD MANUAL: 0.1 10E9/L
PROMYELOCYTES NFR BLD MANUAL: 0.9 %
PROT SERPL-MCNC: 5.3 G/DL (ref 6.8–8.8)
RBC # BLD AUTO: 2.7 10E12/L (ref 3.8–5.2)
RHINOVIRUS RNA SPEC QL NAA+PROBE: NEGATIVE
RSV RNA SPEC QL NAA+PROBE: NEGATIVE
RSV RNA SPEC QL NAA+PROBE: NEGATIVE
SODIUM SERPL-SCNC: 146 MMOL/L (ref 133–144)
SODIUM SERPL-SCNC: 149 MMOL/L (ref 133–144)
SODIUM SERPL-SCNC: 152 MMOL/L (ref 133–144)
SPECIMEN SOURCE: NORMAL
TOXIC GRANULES BLD QL SMEAR: PRESENT
WBC # BLD AUTO: 8.3 10E9/L (ref 4–11)

## 2017-01-29 PROCEDURE — 97530 THERAPEUTIC ACTIVITIES: CPT | Mod: GP | Performed by: REHABILITATION PRACTITIONER

## 2017-01-29 PROCEDURE — 40000141 ZZH STATISTIC PERIPHERAL IV START W/O US GUIDANCE

## 2017-01-29 PROCEDURE — 12000008 ZZH R&B INTERMEDIATE UMMC

## 2017-01-29 PROCEDURE — 25000132 ZZH RX MED GY IP 250 OP 250 PS 637

## 2017-01-29 PROCEDURE — 40000193 ZZH STATISTIC PT WARD VISIT: Performed by: REHABILITATION PRACTITIONER

## 2017-01-29 PROCEDURE — 36415 COLL VENOUS BLD VENIPUNCTURE: CPT | Performed by: INTERNAL MEDICINE

## 2017-01-29 PROCEDURE — 25000132 ZZH RX MED GY IP 250 OP 250 PS 637: Performed by: PHYSICIAN ASSISTANT

## 2017-01-29 PROCEDURE — 25000132 ZZH RX MED GY IP 250 OP 250 PS 637: Performed by: INTERNAL MEDICINE

## 2017-01-29 PROCEDURE — 83735 ASSAY OF MAGNESIUM: CPT | Performed by: INTERNAL MEDICINE

## 2017-01-29 PROCEDURE — 84295 ASSAY OF SERUM SODIUM: CPT | Performed by: PHYSICIAN ASSISTANT

## 2017-01-29 PROCEDURE — 99233 SBSQ HOSP IP/OBS HIGH 50: CPT | Performed by: INTERNAL MEDICINE

## 2017-01-29 PROCEDURE — 80053 COMPREHEN METABOLIC PANEL: CPT | Performed by: INTERNAL MEDICINE

## 2017-01-29 PROCEDURE — 25000125 ZZHC RX 250: Performed by: EMERGENCY MEDICINE

## 2017-01-29 PROCEDURE — 80048 BASIC METABOLIC PNL TOTAL CA: CPT | Performed by: PHYSICIAN ASSISTANT

## 2017-01-29 PROCEDURE — 25000125 ZZHC RX 250: Performed by: INTERNAL MEDICINE

## 2017-01-29 PROCEDURE — 85025 COMPLETE CBC W/AUTO DIFF WBC: CPT | Performed by: INTERNAL MEDICINE

## 2017-01-29 PROCEDURE — 27210995 ZZH RX 272

## 2017-01-29 PROCEDURE — 97161 PT EVAL LOW COMPLEX 20 MIN: CPT | Mod: GP | Performed by: REHABILITATION PRACTITIONER

## 2017-01-29 PROCEDURE — 97110 THERAPEUTIC EXERCISES: CPT | Mod: GP | Performed by: REHABILITATION PRACTITIONER

## 2017-01-29 PROCEDURE — 36415 COLL VENOUS BLD VENIPUNCTURE: CPT | Performed by: PHYSICIAN ASSISTANT

## 2017-01-29 PROCEDURE — 25000125 ZZHC RX 250

## 2017-01-29 PROCEDURE — 25000125 ZZHC RX 250: Performed by: PHYSICIAN ASSISTANT

## 2017-01-29 PROCEDURE — 84100 ASSAY OF PHOSPHORUS: CPT | Performed by: INTERNAL MEDICINE

## 2017-01-29 RX ORDER — DEXTROSE MONOHYDRATE 50 MG/ML
INJECTION, SOLUTION INTRAVENOUS CONTINUOUS
Status: DISCONTINUED | OUTPATIENT
Start: 2017-01-29 | End: 2017-01-29

## 2017-01-29 RX ADMIN — POTASSIUM CHLORIDE 10 MEQ: 14.9 INJECTION, SOLUTION, CONCENTRATE PARENTERAL at 00:16

## 2017-01-29 RX ADMIN — SODIUM PHOSPHATE, MONOBASIC, MONOHYDRATE AND SODIUM PHOSPHATE, DIBASIC, ANHYDROUS 15 MMOL: 276; 142 INJECTION, SOLUTION INTRAVENOUS at 10:55

## 2017-01-29 RX ADMIN — POTASSIUM CHLORIDE 10 MEQ: 14.9 INJECTION, SOLUTION, CONCENTRATE PARENTERAL at 23:28

## 2017-01-29 RX ADMIN — ENOXAPARIN SODIUM 40 MG: 40 INJECTION SUBCUTANEOUS at 20:15

## 2017-01-29 RX ADMIN — DEXTROSE MONOHYDRATE: 50 INJECTION, SOLUTION INTRAVENOUS at 08:54

## 2017-01-29 RX ADMIN — POTASSIUM CHLORIDE 10 MEQ: 14.9 INJECTION, SOLUTION, CONCENTRATE PARENTERAL at 22:24

## 2017-01-29 RX ADMIN — NAPROXEN 250 MG: 250 TABLET ORAL at 08:40

## 2017-01-29 RX ADMIN — OMEPRAZOLE 40 MG: 20 CAPSULE, DELAYED RELEASE ORAL at 08:40

## 2017-01-29 RX ADMIN — PIPERACILLIN AND TAZOBACTAM 4.5 G: 4; .5 INJECTION, POWDER, FOR SOLUTION INTRAVENOUS at 03:08

## 2017-01-29 RX ADMIN — VANCOMYCIN HYDROCHLORIDE 1000 MG: 1 INJECTION, SOLUTION INTRAVENOUS at 03:08

## 2017-01-29 RX ADMIN — LORAZEPAM 0.5 MG: 0.5 TABLET ORAL at 20:16

## 2017-01-29 RX ADMIN — PIPERACILLIN AND TAZOBACTAM 4.5 G: 4; .5 INJECTION, POWDER, FOR SOLUTION INTRAVENOUS at 14:16

## 2017-01-29 RX ADMIN — GUAIFENESIN 600 MG: 600 TABLET, EXTENDED RELEASE ORAL at 20:16

## 2017-01-29 RX ADMIN — GUAIFENESIN 600 MG: 600 TABLET, EXTENDED RELEASE ORAL at 08:40

## 2017-01-29 RX ADMIN — LEVOFLOXACIN 750 MG: 5 INJECTION, SOLUTION INTRAVENOUS at 16:29

## 2017-01-29 RX ADMIN — MULTIPLE VITAMINS W/ MINERALS TAB 1 TABLET: TAB at 08:40

## 2017-01-29 RX ADMIN — PIPERACILLIN AND TAZOBACTAM 4.5 G: 4; .5 INJECTION, POWDER, FOR SOLUTION INTRAVENOUS at 08:55

## 2017-01-29 NOTE — PROGRESS NOTES
01/29/17 1642   Quick Adds   Type of Visit Initial PT Evaluation   Living Environment   Lives With alone   Living Arrangements house   Home Accessibility stairs to enter home;stairs (1 railing present)   Number of Stairs to Enter Home 5   Stair Railings at Home outside, present on right side   Transportation Available family or friend will provide   Living Environment Comment Pt reports she lives at home alone, but has 6.5 hours of PCA care daily. Pt also has home care RN, PT and OT services.    Self-Care   Dominant Hand right   Usual Activity Tolerance fair   Current Activity Tolerance poor   Regular Exercise yes   Activity/Exercise Type other (see comments);walking  (Pt reports she had just started home OT.)   Exercise Amount/Frequency 3-5 times/wk   Equipment Currently Used at Home cane, straight;other (see comments)  (4WW)   Activity/Exercise/Self-Care Comment Pt reports she has 6.5 hours of PCA care daily for cleaning and shopping. Pt reports she also receives meals from a service that supplies a week of meals every week.    Functional Level Prior   Ambulation 1-->assistive equipment   Transferring 1-->assistive equipment   Toileting 1-->assistive equipment   Bathing 1-->assistive equipment   Dressing 1-->assistive equipment   Eating 0-->independent   Communication 0-->understands/communicates without difficulty   Swallowing 0-->swallows foods/liquids without difficulty   Cognition 0 - no cognition issues reported   Fall history within last six months yes   Number of times patient has fallen within last six months 2   Which of the above functional risks had a recent onset or change? ambulation   Prior Functional Level Comment Pt reports she had just started home OT services, but had not yet started PT services. Pt also reports that she receives weekly meals. Pt was using 4WW and SPC for mobility, 4WW for community distance or clinic visits.   General Information   Onset of Illness/Injury or Date of Surgery -  "Date 01/27/17   Referring Physician Ria Puentes PA-C   Patient/Family Goals Statement \"I want to get stronger\"   Pertinent History of Current Problem (include personal factors and/or comorbidities that impact the POC) Pt admitted to [facility] with metastatic breast cancer who presents with shortness of breath and cough. Pt reports she has good support of PCA, family and meals delivered weekly. Pt has been working on gaining weight, building strength and improving endurance. Pt has few co-morbidities and personal factors that will impact POC.    Precautions/Limitations fall precautions;oxygen therapy device and L/min  (3 lpm via Oxyplus facemask)   Heart Disease Risk Factors Age;Gender;Medical history   General Observations Pt in supine position at PT arrival, pt on 3 lpm via oxyplus.   Cognitive Status Examination   Orientation orientation to person, place and time   Level of Consciousness alert   Follows Commands and Answers Questions 100% of the time   Personal Safety and Judgment intact   Memory intact   Pain Assessment   Patient Currently in Pain No   Integumentary/Edema   Integumentary/Edema no deficits were identifed   Posture    Posture Forward head position   Range of Motion (ROM)   ROM Quick Adds No deficits were identified   Strength   Strength Comments BLE 3/5 throughout   Bed Mobility   Bed Mobility Comments Pt tx supine->sit with supervision.   Transfer Skills   Transfer Comments Pt tx sit->stand with SBA.    Gait   Gait Comments Pt performed marching in place at bedside but did not feel that she could progress to walking, PT brought in 4WW.   Balance   Balance no deficits were identified   Sensory Examination   Sensory Perception no deficits were identified   General Therapy Interventions   Planned Therapy Interventions gait training;neuromuscular re-education;transfer training;strengthening;progressive activity/exercise;home program guidelines   Clinical Impression   Criteria for Skilled " "Therapeutic Intervention yes, treatment indicated   PT Diagnosis Activity Intolerance   Influenced by the following impairments decreased strength, poor endurance, activity intolerance   Functional limitations due to impairments gait, balance, transfers, endurance   Clinical Presentation Stable/Uncomplicated   Clinical Presentation Rationale Pt presents to PT with decreased strength, poor endurance with a h/o metastatic cancer. Pt will benefit from skilled PT to address strength, endurance, balance and stair training in preparation for home discharge.    Clinical Decision Making (Complexity) Low complexity   Therapy Frequency` daily   Predicted Duration of Therapy Intervention (days/wks) 2/7/17   Anticipated Discharge Disposition Home with Assist;Home with Home Therapy   Risk & Benefits of therapy have been explained Yes   Patient, Family & other staff in agreement with plan of care Yes   [facility] [employer] \"6 Clicks\"   2016, Trustees of [facility], under license to [employer].  All rights reserved.   6 Clicks Short Forms Basic Mobility Inpatient Short Form   [facility] [employer]  \"6 Clicks\" V.2 Basic Mobility Inpatient Short Form   1. Turning from your back to your side while in a flat bed without using bedrails? 4 - None   2. Moving from lying on your back to sitting on the side of a flat bed without using bedrails? 4 - None   3. Moving to and from a bed to a chair (including a wheelchair)? 4 - None   4. Standing up from a chair using your arms (e.g., wheelchair, or bedside chair)? 4 - None   5. To walk in hospital room? 3 - A Little   6. Climbing 3-5 steps with a railing? 3 - A Little   Basic Mobility Raw Score (Score out of 24.Lower scores equate to lower levels of function) 22   Total Evaluation Time   Total Evaluation Time (Minutes) 10     "

## 2017-01-29 NOTE — PROGRESS NOTES
[facility], [place]    Hematology / Oncology Progress Note    Date of Service (when I saw the patient): 01/29/2017     Assessment and Plan  Keren Erickson is a 61 year old female with metastatic breast cancer who presents with shortness of breath and cough.    #Shortness of breath and cough.   #Presumed healthcare associated PNA. New opacities seen on 1/27 imaging compared to 1/25. No PE identified on scan. Procal 0.66 on admission. Pt notes ongoing symptomatic improvement since admission. Supplemental O2 needs improved.  - Remains afebrile. Mild leukocytosis on admission (WBC count 11.4K) --> improved to WNL and stable today  - influenza screen negative, but will r/o additonal virus with RVP (pending)  - surveillance BCx (1/27) NGTD  - 1/28 sputum culture with mixed gram positive/gram negative bacteria on GS, culture with normal [place] to date.  - continue Levaquin, Zosyn  - will d/c Vanco today given afebrile, no positive cultures thus far  - continue mucinex for expectorant, pt thinks this is helping  - wean O2 as tolerated    #Metastatic breast cancer. Primary oncologist is Dr. Harper. Significant history as described in H&P. Last treatment 1/17/2017. Per notes, was tolerating this well. Had restaging brain MRI and CT chest/abdomen/pelvis on 1/25/17. MRI brain stable. CT scan stable other than nodular groundglass opacities as above.    #FEN. Regular diet as tolerated.  #Hypernatremia. Asymptomatic. Na 147 (1/28)-->152 this AM. Adjusted to D5W @75 cc/hr. Recheck after ~4hrs is 149. Next check in ~6hrs to determine further fluid management (signed out to cross-cover).    PPx/Misc:  - Lovenox ppx  - continue PPI  - PT consult (pt lives independently at home, and would expect she could return)    Ria Puentes PA-C  Heme/Onc  [phone]    Interval History  No acute events overnight. Afebrile. This morning she is just waking up so hard to say how she is doing, but does think she feels  better again today. She notes less coughing with deeper breathing and talking compared to yesterday. Her cough is less rattling and she has been better able to cough stuff up. Soreness in her chest and abdomen from coughing is also better today. Denies any current nausea or change in bowels. Has been using the bedside commode on her own.     Physical Exam  Temp: 95.5  F (35.3  C) Temp src: Oral BP: 130/82 mmHg Pulse: 86 Heart Rate: 90 Resp: 20 SpO2: 94 % O2 Device: Oxymask Oxygen Delivery: 3 LPM  Filed Vitals:    01/27/17 0931 01/27/17 2033 01/28/17 0915   Weight: 50 kg (110 lb 3.7 oz) 49.5 kg (109 lb 2 oz) 49.17 kg (108 lb 6.4 oz)     Vital Signs with Ranges  Temp:  [95.5  F (35.3  C)-98.6  F (37  C)] 95.5  F (35.3  C)  Pulse:  [86-88] 86  Heart Rate:  [85-92] 90  Resp:  [18-20] 20  BP: (111-133)/(74-92) 130/82 mmHg  SpO2:  [89 %-95 %] 94 %  I/O last 3 completed shifts:  In: 1839 [P.O.:489; I.V.:1350]  Out: 1950 [Urine:1950]  Constitutional: Pleasant, chronically-ill appearing female seen sitting up in bed. She is alert, interactive, NAD.  HEENT: NC/AT, alopecia. No rhinorrhea appreciated. OP pink and moist without erythema/lesions.   Respiratory: Breathing even, non-labored on 2L oximask. Able to speak in full sentences, even with having mask off at start of assessment again today. Lungs slightly course in R base, but otherwise clear. Less frequent and less loose-sounding cough today.   Cardiovascular: RRR, no murmur noted.  GI: Normal BS. Abd soft, non-distended, non-tender.  Skin: Pale, clean/dry, intact.   Musculoskeletal: Thin, no edema.   Neurologic: Awake, alert, oriented. Grossly nonfocal.   Neuropsychiatric: Calm, normal eye contact, alert, affect congruent.     Medications    D5W 75 mL/hr at 01/29/17 0854     - MEDICATION INSTRUCTIONS -         dextrose 5% and 0.9% NaCl         guaiFENesin  600 mg Oral BID     sodium chloride (PF)  3 mL Intracatheter Q8H     [START ON 1/31/2017] cholecalciferol  5,000  Units Oral Daily     multivitamin, therapeutic with minerals  1 tablet Oral Daily     omeprazole  40 mg Oral QAM     piperacillin-tazobactam  4.5 g Intravenous Q6H     levofloxacin  750 mg Intravenous Q24H     enoxaparin  40 mg Subcutaneous Q24H     naproxen  250 mg Oral Daily       Data  Results for orders placed or performed during the hospital encounter of 01/27/17 (from the past 24 hour(s))   CBC with platelets differential   Result Value Ref Range    WBC 8.3 4.0 - 11.0 10e9/L    RBC Count 2.70 (L) 3.8 - 5.2 10e12/L    Hemoglobin 8.7 (L) 11.7 - 15.7 g/dL    Hematocrit 26.6 (L) 35.0 - 47.0 %    MCV 99 78 - 100 fl    MCH 32.2 26.5 - 33.0 pg    MCHC 32.7 31.5 - 36.5 g/dL    RDW 16.7 (H) 10.0 - 15.0 %    Platelet Count 156 150 - 450 10e9/L    Diff Method Manual Differential     % Neutrophils 77.8 %    % Lymphocytes 10.6 %    % Monocytes 6.2 %    % Eosinophils 0.9 %    % Basophils 0.9 %    % Metamyelocytes 1.8 %    % Myelocytes 0.9 %    % Promyelocytes 0.9 %    Absolute Neutrophil 6.5 1.6 - 8.3 10e9/L    Absolute Lymphocytes 0.9 0.8 - 5.3 10e9/L    Absolute Monocytes 0.5 0.0 - 1.3 10e9/L    Absolute Eosinophils 0.1 0.0 - 0.7 10e9/L    Absolute Basophils 0.1 0.0 - 0.2 10e9/L    Absolute Metamyelocytes 0.1 (H) 0 10e9/L    Absolute Myelocytes 0.1 (H) 0 10e9/L    Absolute Promyeloctyes 0.1 (H) 0 10e9/L    Anisocytosis Slight     Poikilocytosis Moderate     Teardrop Cells Moderate     Toxic Granulation Present     Platelet Estimate Confirming automated cell count    Comprehensive metabolic panel   Result Value Ref Range    Sodium 152 (H) 133 - 144 mmol/L    Potassium 3.4 3.4 - 5.3 mmol/L    Chloride 121 (H) 94 - 109 mmol/L    Carbon Dioxide 22 20 - 32 mmol/L    Anion Gap 9 3 - 14 mmol/L    Glucose 111 (H) 70 - 99 mg/dL    Urea Nitrogen 7 7 - 30 mg/dL    Creatinine 0.65 0.52 - 1.04 mg/dL    GFR Estimate >90  Non  GFR Calc   >60 mL/min/1.7m2    GFR Estimate If Black >90   GFR Calc   >60  mL/min/1.7m2    Calcium 7.2 (L) 8.5 - 10.1 mg/dL    Bilirubin Total 0.4 0.2 - 1.3 mg/dL    Albumin 2.0 (L) 3.4 - 5.0 g/dL    Protein Total 5.3 (L) 6.8 - 8.8 g/dL    Alkaline Phosphatase 122 40 - 150 U/L    ALT 19 0 - 50 U/L    AST 32 0 - 45 U/L   Magnesium   Result Value Ref Range    Magnesium 2.1 1.6 - 2.3 mg/dL   Phosphorus   Result Value Ref Range    Phosphorus 2.2 (L) 2.5 - 4.5 mg/dL   Sodium   Result Value Ref Range    Sodium 149 (H) 133 - 144 mmol/L

## 2017-01-29 NOTE — PLAN OF CARE
"Problem: Pneumonia (Adult)  Goal: Signs and Symptoms of Listed Potential Problems Will be Absent or Manageable (Pneumonia)  Signs and symptoms of listed potential problems will be absent or manageable by discharge/transition of care (reference Pneumonia (Adult) CPG).   Outcome: No Change    Phos 2.2, replaced and recheck with 1/30/17 am labs.  Afebrile.  VSS sating 94% on 3 LPM O2 via Oxymask.  RV panel negative, droplet precautions d/c. Na slightly elevated, IVs fluids d/c, encouraged pt to increase water intake.  Appetite/ PO intake slightly improving.  Uses bedside commode independently.  Please NS lock PIV whenever possible, pt states she feels \"confined and restricted\".  Pt will work with PT this afternoon.  Continue to monitor and POC.        "

## 2017-01-29 NOTE — PLAN OF CARE
Problem: Goal Outcome Summary  Goal: Goal Outcome Summary    Afebrile. BP trending around 130's/90's, provider notified. On 2 LPM O2 via Oxy mask, sating 92-94%. No complaints of pain. Productive weak cough; RSV sample pending, contact/droplet until then. Up with assist of 1, uses bedside commode. PIV on R forearm taken out, new one placed and working well. K= 3.1, running replacement dose 3 of 4, one more dose needed, recheck 2 hours after last dose. Phos =1.5, replacement running, recheck two hours after/with morning labs. Continue to monitor and POC.

## 2017-01-29 NOTE — PLAN OF CARE
Problem: Goal Outcome Summary  Goal: Goal Outcome Summary  PT-7D- PT Evaluation Completed, intervention initiated, recommend home with home PCA services, home care RN, PT and OT. Pt with significant weakness and activity intolerance, pt unable to progress to ambulation this date. Pt performed standing marching 3 sets x 10 reps at EOB on 6 lpm via oxyplus O2 sat 95%, HR 99 bpm. Pt will need to demonstrate ambulation and stairs prior to home discharge.

## 2017-01-29 NOTE — PLAN OF CARE
Problem: Goal Outcome Summary  Goal: Goal Outcome Summary  Outcome: No Change  O2 @ 90% on 2L, mild SOB, increased O2 to 3L. OVSS, afebrile. Frequent cough, clear . Pain comfortably managed. Dose #4/4 of Potassium IV given, recheck this AM. Phos replacement completed infusing, recheck this AM. Otherwise, slept well in between cares. Continue with POC.

## 2017-01-30 ENCOUNTER — APPOINTMENT (OUTPATIENT)
Dept: PHYSICAL THERAPY | Facility: CLINIC | Age: 62
DRG: 194 | End: 2017-01-30
Payer: COMMERCIAL

## 2017-01-30 LAB
ALBUMIN UR-MCNC: NEGATIVE MG/DL
ANION GAP SERPL CALCULATED.3IONS-SCNC: 10 MMOL/L (ref 3–14)
APPEARANCE UR: CLEAR
BACTERIA SPEC CULT: NORMAL
BILIRUB UR QL STRIP: NEGATIVE
BUN SERPL-MCNC: 5 MG/DL (ref 7–30)
C DIFF TOX B STL QL: NORMAL
CALCIUM SERPL-MCNC: 7.3 MG/DL (ref 8.5–10.1)
CHLORIDE SERPL-SCNC: 125 MMOL/L (ref 94–109)
CO2 SERPL-SCNC: 21 MMOL/L (ref 20–32)
COLOR UR AUTO: NORMAL
CREAT SERPL-MCNC: 0.63 MG/DL (ref 0.52–1.04)
ERYTHROCYTE [DISTWIDTH] IN BLOOD BY AUTOMATED COUNT: 17 % (ref 10–15)
GFR SERPL CREATININE-BSD FRML MDRD: ABNORMAL ML/MIN/1.7M2
GLUCOSE SERPL-MCNC: 86 MG/DL (ref 70–99)
GLUCOSE UR STRIP-MCNC: NEGATIVE MG/DL
HCT VFR BLD AUTO: 26.6 % (ref 35–47)
HGB BLD-MCNC: 8.7 G/DL (ref 11.7–15.7)
HGB UR QL STRIP: NEGATIVE
KETONES UR STRIP-MCNC: NEGATIVE MG/DL
LEUKOCYTE ESTERASE UR QL STRIP: NEGATIVE
MAGNESIUM SERPL-MCNC: 1.7 MG/DL (ref 1.6–2.3)
MCH RBC QN AUTO: 32.3 PG (ref 26.5–33)
MCHC RBC AUTO-ENTMCNC: 32.7 G/DL (ref 31.5–36.5)
MCV RBC AUTO: 99 FL (ref 78–100)
MICRO REPORT STATUS: NORMAL
NITRATE UR QL: NEGATIVE
OSMOLALITY SERPL: 301 MMOL/KG (ref 280–301)
OSMOLALITY SERPL: 310 MMOL/KG (ref 280–301)
OSMOLALITY UR: 131 MMOL/KG (ref 100–1200)
PH UR STRIP: 6 PH (ref 5–7)
PHOSPHATE SERPL-MCNC: 1.6 MG/DL (ref 2.5–4.5)
PHOSPHATE SERPL-MCNC: 2 MG/DL (ref 2.5–4.5)
PLATELET # BLD AUTO: 195 10E9/L (ref 150–450)
POTASSIUM SERPL-SCNC: 2.9 MMOL/L (ref 3.4–5.3)
POTASSIUM SERPL-SCNC: 3.8 MMOL/L (ref 3.4–5.3)
RBC # BLD AUTO: 2.69 10E12/L (ref 3.8–5.2)
RBC #/AREA URNS AUTO: 0 /HPF (ref 0–2)
SODIUM SERPL-SCNC: 148 MMOL/L (ref 133–144)
SODIUM SERPL-SCNC: 156 MMOL/L (ref 133–144)
SODIUM UR-SCNC: 40 MMOL/L
SP GR UR STRIP: 1 (ref 1–1.03)
SPECIMEN SOURCE: NORMAL
SPECIMEN SOURCE: NORMAL
URN SPEC COLLECT METH UR: NORMAL
UROBILINOGEN UR STRIP-MCNC: NORMAL MG/DL (ref 0–2)
WBC # BLD AUTO: 8.7 10E9/L (ref 4–11)
WBC #/AREA URNS AUTO: 0 /HPF (ref 0–2)

## 2017-01-30 PROCEDURE — 83935 ASSAY OF URINE OSMOLALITY: CPT | Performed by: INTERNAL MEDICINE

## 2017-01-30 PROCEDURE — 25000132 ZZH RX MED GY IP 250 OP 250 PS 637: Performed by: INTERNAL MEDICINE

## 2017-01-30 PROCEDURE — 12000008 ZZH R&B INTERMEDIATE UMMC

## 2017-01-30 PROCEDURE — 97530 THERAPEUTIC ACTIVITIES: CPT | Mod: GP | Performed by: PHYSICAL THERAPIST

## 2017-01-30 PROCEDURE — 80048 BASIC METABOLIC PNL TOTAL CA: CPT | Performed by: INTERNAL MEDICINE

## 2017-01-30 PROCEDURE — 85027 COMPLETE CBC AUTOMATED: CPT | Performed by: INTERNAL MEDICINE

## 2017-01-30 PROCEDURE — 84100 ASSAY OF PHOSPHORUS: CPT | Performed by: INTERNAL MEDICINE

## 2017-01-30 PROCEDURE — 25000125 ZZHC RX 250: Performed by: INTERNAL MEDICINE

## 2017-01-30 PROCEDURE — 83930 ASSAY OF BLOOD OSMOLALITY: CPT | Performed by: INTERNAL MEDICINE

## 2017-01-30 PROCEDURE — 84295 ASSAY OF SERUM SODIUM: CPT | Performed by: INTERNAL MEDICINE

## 2017-01-30 PROCEDURE — 81001 URINALYSIS AUTO W/SCOPE: CPT | Performed by: INTERNAL MEDICINE

## 2017-01-30 PROCEDURE — 25000132 ZZH RX MED GY IP 250 OP 250 PS 637

## 2017-01-30 PROCEDURE — 84300 ASSAY OF URINE SODIUM: CPT | Performed by: INTERNAL MEDICINE

## 2017-01-30 PROCEDURE — 83930 ASSAY OF BLOOD OSMOLALITY: CPT | Performed by: PHYSICIAN ASSISTANT

## 2017-01-30 PROCEDURE — 25000132 ZZH RX MED GY IP 250 OP 250 PS 637: Performed by: PHYSICIAN ASSISTANT

## 2017-01-30 PROCEDURE — 83735 ASSAY OF MAGNESIUM: CPT | Performed by: INTERNAL MEDICINE

## 2017-01-30 PROCEDURE — 97116 GAIT TRAINING THERAPY: CPT | Mod: GP | Performed by: PHYSICAL THERAPIST

## 2017-01-30 PROCEDURE — 36415 COLL VENOUS BLD VENIPUNCTURE: CPT | Performed by: INTERNAL MEDICINE

## 2017-01-30 PROCEDURE — 87493 C DIFF AMPLIFIED PROBE: CPT | Performed by: PHYSICIAN ASSISTANT

## 2017-01-30 PROCEDURE — 40000193 ZZH STATISTIC PT WARD VISIT: Performed by: PHYSICAL THERAPIST

## 2017-01-30 PROCEDURE — 84132 ASSAY OF SERUM POTASSIUM: CPT | Performed by: INTERNAL MEDICINE

## 2017-01-30 PROCEDURE — 25000128 H RX IP 250 OP 636: Performed by: INTERNAL MEDICINE

## 2017-01-30 PROCEDURE — 99233 SBSQ HOSP IP/OBS HIGH 50: CPT | Performed by: INTERNAL MEDICINE

## 2017-01-30 PROCEDURE — 87086 URINE CULTURE/COLONY COUNT: CPT | Performed by: INTERNAL MEDICINE

## 2017-01-30 RX ORDER — DEXTROSE MONOHYDRATE 50 MG/ML
INJECTION, SOLUTION INTRAVENOUS CONTINUOUS
Status: DISCONTINUED | OUTPATIENT
Start: 2017-01-30 | End: 2017-02-01

## 2017-01-30 RX ORDER — GUAIFENESIN 600 MG/1
600 TABLET, EXTENDED RELEASE ORAL 2 TIMES DAILY
Qty: 60 TABLET | Refills: 0 | Status: SHIPPED | OUTPATIENT
Start: 2017-01-30 | End: 2017-02-08

## 2017-01-30 RX ADMIN — LORAZEPAM 0.5 MG: 0.5 TABLET ORAL at 19:30

## 2017-01-30 RX ADMIN — NAPROXEN 250 MG: 250 TABLET ORAL at 08:36

## 2017-01-30 RX ADMIN — POTASSIUM CHLORIDE 10 MEQ: 14.9 INJECTION, SOLUTION, CONCENTRATE PARENTERAL at 23:06

## 2017-01-30 RX ADMIN — POTASSIUM CHLORIDE 10 MEQ: 14.9 INJECTION, SOLUTION, CONCENTRATE PARENTERAL at 02:39

## 2017-01-30 RX ADMIN — PIPERACILLIN AND TAZOBACTAM 4.5 G: 4; .5 INJECTION, POWDER, FOR SOLUTION INTRAVENOUS at 21:45

## 2017-01-30 RX ADMIN — OMEPRAZOLE 40 MG: 20 CAPSULE, DELAYED RELEASE ORAL at 08:36

## 2017-01-30 RX ADMIN — LEVOFLOXACIN 750 MG: 5 INJECTION, SOLUTION INTRAVENOUS at 17:35

## 2017-01-30 RX ADMIN — POTASSIUM CHLORIDE 10 MEQ: 14.9 INJECTION, SOLUTION, CONCENTRATE PARENTERAL at 06:00

## 2017-01-30 RX ADMIN — GUAIFENESIN 600 MG: 600 TABLET, EXTENDED RELEASE ORAL at 08:36

## 2017-01-30 RX ADMIN — GUAIFENESIN 600 MG: 600 TABLET, EXTENDED RELEASE ORAL at 19:26

## 2017-01-30 RX ADMIN — POTASSIUM CHLORIDE 10 MEQ: 14.9 INJECTION, SOLUTION, CONCENTRATE PARENTERAL at 00:40

## 2017-01-30 RX ADMIN — SODIUM PHOSPHATE, MONOBASIC, MONOHYDRATE AND SODIUM PHOSPHATE, DIBASIC, ANHYDROUS 20 MMOL: 276; 142 INJECTION, SOLUTION INTRAVENOUS at 09:33

## 2017-01-30 RX ADMIN — MULTIPLE VITAMINS W/ MINERALS TAB 1 TABLET: TAB at 08:36

## 2017-01-30 RX ADMIN — POTASSIUM CHLORIDE 10 MEQ: 14.9 INJECTION, SOLUTION, CONCENTRATE PARENTERAL at 19:26

## 2017-01-30 RX ADMIN — DEXTROSE MONOHYDRATE: 50 INJECTION, SOLUTION INTRAVENOUS at 16:01

## 2017-01-30 RX ADMIN — POTASSIUM CHLORIDE 10 MEQ: 14.9 INJECTION, SOLUTION, CONCENTRATE PARENTERAL at 04:49

## 2017-01-30 RX ADMIN — POTASSIUM CHLORIDE 10 MEQ: 14.9 INJECTION, SOLUTION, CONCENTRATE PARENTERAL at 20:46

## 2017-01-30 RX ADMIN — PIPERACILLIN AND TAZOBACTAM 4.5 G: 4; .5 INJECTION, POWDER, FOR SOLUTION INTRAVENOUS at 09:33

## 2017-01-30 RX ADMIN — ENOXAPARIN SODIUM 40 MG: 40 INJECTION SUBCUTANEOUS at 23:06

## 2017-01-30 RX ADMIN — DEXTROSE MONOHYDRATE: 50 INJECTION, SOLUTION INTRAVENOUS at 08:43

## 2017-01-30 RX ADMIN — PIPERACILLIN AND TAZOBACTAM 4.5 G: 4; .5 INJECTION, POWDER, FOR SOLUTION INTRAVENOUS at 03:46

## 2017-01-30 RX ADMIN — PIPERACILLIN AND TAZOBACTAM 4.5 G: 4; .5 INJECTION, POWDER, FOR SOLUTION INTRAVENOUS at 16:01

## 2017-01-30 NOTE — PROGRESS NOTES
[facility], [place]    Hematology / Oncology Progress Note    Date of Service (when I saw the patient): 01/30/2017     Assessment and Plan  Keren Erickson is a 61 year old female with metastatic breast cancer who presents with shortness of breath and cough.    #Shortness of breath and cough.   #Presumed healthcare associated PNA. New opacities seen on 1/27 imaging compared to 1/25. No PE identified on scan. Procal 0.66 on admission. Pt notes ongoing symptomatic improvement since admission. Supplemental O2 needs improved, no O2 needed during PT session today. Vanc d/c on 1/29 given afebrile, no positive cultures thus far.  - Remains afebrile. Mild leukocytosis on admission (WBC count 11.4K) --> improved to WNL and stable today  - influenza screen negative, RVP negative  - surveillance BCx (1/27) NGTD  - 1/28 sputum culture with mixed gram positive/gram negative bacteria on GS, culture with normal rolando to date.  - continue Levaquin, Zosyn  - continue mucinex for expectorant, pt thinks this is helping  - wean O2 as tolerated    #Metastatic breast cancer. Primary oncologist is Dr. Harper. Significant history as described in H&P. Last treatment 1/17/2017. Per notes, was tolerating this well. Had restaging brain MRI and CT chest/abdomen/pelvis on 1/25/17. MRI brain stable. CT scan stable other than nodular groundglass opacities as above.    #Hypernatremia. Asymptomatic. Na 147 (1/28)-->156 this AM. Improved with D5W 1/29, but now increased again.  - Endocrinology consulted, appreciate recs  - D5W @75 cc/hr  - Recheck Na at 1600    #FEN. Regular diet as tolerated.    PPx/Misc:  - Lovenox ppx  - continue PPI  - PT consult - home with PCA    Keren Briggs PA-C  Hematology/Oncology  Pager: [phone]     Interval History  No acute events overnight. Afebrile.Reports it is hard to know how she is doing until around 10 AM,but she states she notices increased energy compared to  yesterday so far. She is able to talk without losing her breath as much. Her cough is less rattling and she has been better able to cough stuff up. She has been using O2 less.    Physical Exam  Temp: 95.3  F (35.2  C) Temp src: Oral BP: 133/87 mmHg Pulse: 78 Heart Rate: 77 Resp: 18 SpO2: 98 % O2 Device: Oxymask Oxygen Delivery: 3 LPM  Filed Vitals:    01/27/17 2033 01/28/17 0915 01/30/17 0905   Weight: 49.5 kg (109 lb 2 oz) 49.17 kg (108 lb 6.4 oz) 50.395 kg (111 lb 1.6 oz)     Vital Signs with Ranges  Temp:  [95.3  F (35.2  C)-98.2  F (36.8  C)] 95.3  F (35.2  C)  Pulse:  [78-92] 78  Heart Rate:  [77-80] 77  Resp:  [18-20] 18  BP: (106-134)/(58-88) 133/87 mmHg  SpO2:  [94 %-98 %] 98 %  I/O last 3 completed shifts:  In: 3160 [P.O.:1380; I.V.:1780]  Out: 3200 [Urine:1600; Other:1000; Stool:600]  Constitutional: Pleasant, chronically-ill appearing female seen sitting up in bed. She is alert, interactive, NAD.  HEENT: NC/AT, alopecia. No rhinorrhea appreciated. OP pink and moist without erythema/lesions.   Respiratory: Breathing even, non-labored room air. Able to speak in full sentences. Lungs slightly course b/l in upper fields, decrease breath sounds at bases.  Cardiovascular: RRR, no murmur noted.  GI: Normal BS. Abd soft, non-distended, non-tender.  Skin: Pale, clean/dry, intact.   Musculoskeletal: Thin, no edema.   Neurologic: Awake, alert, oriented. Grossly nonfocal.   Neuropsychiatric: Calm, normal eye contact, alert, affect congruent.     Medications    D5W 75 mL/hr at 01/30/17 0843     - MEDICATION INSTRUCTIONS -         guaiFENesin  600 mg Oral BID     sodium chloride (PF)  3 mL Intracatheter Q8H     [START ON 1/31/2017] cholecalciferol  5,000 Units Oral Daily     multivitamin, therapeutic with minerals  1 tablet Oral Daily     omeprazole  40 mg Oral QAM     piperacillin-tazobactam  4.5 g Intravenous Q6H     levofloxacin  750 mg Intravenous Q24H     enoxaparin  40 mg Subcutaneous Q24H     naproxen  250 mg  Oral Daily       Data  Results for orders placed or performed during the hospital encounter of 01/27/17 (from the past 24 hour(s))   Basic metabolic panel   Result Value Ref Range    Sodium 146 (H) 133 - 144 mmol/L    Potassium 2.7 (L) 3.4 - 5.3 mmol/L    Chloride 115 (H) 94 - 109 mmol/L    Carbon Dioxide 20 20 - 32 mmol/L    Anion Gap 11 3 - 14 mmol/L    Glucose 77 70 - 99 mg/dL    Urea Nitrogen 5 (L) 7 - 30 mg/dL    Creatinine 0.63 0.52 - 1.04 mg/dL    GFR Estimate >90  Non  GFR Calc   >60 mL/min/1.7m2    GFR Estimate If Black >90   GFR Calc   >60 mL/min/1.7m2    Calcium 7.4 (L) 8.5 - 10.1 mg/dL   Basic metabolic panel   Result Value Ref Range    Sodium 156 (H) 133 - 144 mmol/L    Potassium 3.8 3.4 - 5.3 mmol/L    Chloride 125 (H) 94 - 109 mmol/L    Carbon Dioxide 21 20 - 32 mmol/L    Anion Gap 10 3 - 14 mmol/L    Glucose 86 70 - 99 mg/dL    Urea Nitrogen 5 (L) 7 - 30 mg/dL    Creatinine 0.63 0.52 - 1.04 mg/dL    GFR Estimate >90  Non  GFR Calc   >60 mL/min/1.7m2    GFR Estimate If Black >90   GFR Calc   >60 mL/min/1.7m2    Calcium 7.3 (L) 8.5 - 10.1 mg/dL   CBC with platelets   Result Value Ref Range    WBC 8.7 4.0 - 11.0 10e9/L    RBC Count 2.69 (L) 3.8 - 5.2 10e12/L    Hemoglobin 8.7 (L) 11.7 - 15.7 g/dL    Hematocrit 26.6 (L) 35.0 - 47.0 %    MCV 99 78 - 100 fl    MCH 32.3 26.5 - 33.0 pg    MCHC 32.7 31.5 - 36.5 g/dL    RDW 17.0 (H) 10.0 - 15.0 %    Platelet Count 195 150 - 450 10e9/L   Magnesium   Result Value Ref Range    Magnesium 1.7 1.6 - 2.3 mg/dL   Phosphorus   Result Value Ref Range    Phosphorus 1.6 (L) 2.5 - 4.5 mg/dL

## 2017-01-30 NOTE — DISCHARGE INSTRUCTIONS
____________________________________________________________  D/C'ing nurse; Please fax to home care agency at time of patient d/c.    _____________________  [facility]    Fax  [phone]    ____________________    ____________________________________________________________

## 2017-01-30 NOTE — PLAN OF CARE
Problem: Goal Outcome Summary  Goal: Goal Outcome Summary  Outcome: No Change  O2 @ 95-97% 3L Oxymask. Stable. OVSS, afebrile. Denies pain, nausea. Potassium 2.7, replacement given, recheck at 0900. Pt slept well in between cares. Continue with POC.

## 2017-01-30 NOTE — CONSULTS
[facility] Endocrinology Consultation    Keren Erickson MRN# [id number]   Age: 61 year old YOB: 1955   Date of Admission: 1/27/2017     Reason for consult: Hypernatremia.        Requesting physician Heme/Onc team.        Level of consult: Consult and follow for daily recommendations           Assessment and Plan:   Assessment and Plan: 60 yo female with metastatic breast cancer s/p chemoradiation treatment  who was admitted on 1/27/2017 with possible health care associated pneumonia on antibiotics; reports interval improvement of symptoms.  Patient was clinically doing well from pneumonia stand point when she was noted to have a rising sodium level. Consulted for the same.     Hypernatremia: Given her metastatic lesion to the brain from the primary tumor; the concern is does she have Diabetes Insipidus? Based on her clinical symptoms and metastatic lesion not close to the pituitary stack/hypothalamus; we don't think she has diabetes insipidus.  However, can't rule it out. Work up as listed below.  We checked her chemotherapy regimen to see if these might be contributing; however we didn't find supportive evidence on that line.  DDx for hypernatremia is narrow: unreplaced GI loss is a possible but less likely given no impairment in thirst or access to water.     We recommend strict input and output measurement, close monitoring of Na and correct as needed and checking plasma and urine osmolality and urine Na.         Check strict input and output.     Check serial Na levels (monitor closely q 6 hours).     Check plasma and urine osmolality and urine Na.     Plan discussed with primary team.              Chief Complaint:   Hypernatremia.     60 yo female with past medical history of metastatic breast cancer; currently on [place]  trial.  She is on Xgeva, Gemzar and Herceptin. Her last treatment/infusion was 1/17/2017.  She has been on this trial since September 2016.    She came  in with worsening shortness of breath and cough.  CT scan showed new poorly defined groundglass nodularity and new nodularity of the lung bases which might represent  infection/aspiration per radiology report.  She is currently on antibiotics and she reports that her symptoms are overall stable.       Ref. Range 1/28/2017 06:15 1/29/2017 05:44 1/29/2017 10:22 1/29/2017 21:22 1/30/2017 06:23   Sodium Latest Ref Range: 133-144 mmol/L 147 (H) 152 (H) 149 (H) 146 (H) 156 (H)     She was noted to have Na level rising up starting from 1/28/17; baseline 140-144. She denies polyuria, polydipsia or nocturia.  She noticed diarrhea up to 4 BMs since yesterday. No nausea or vomiting. No change in her mental status. Since it was noted that her Na is rising up she has been getting D5; currently on D5 @ 75 cc/hr.          Past Medical History:     Past Medical History   Diagnosis Date     Arthritis      Breast mass, left      bx and told it was benign yrs ago     Breast cancer (H) 9/26/13     Lt breast             Past Surgical History:     Past Surgical History   Procedure Laterality Date     Orthopedic surgery       L humerous Fracture     Insert chest tube  10/3/2013     Procedure: INSERT CHEST TUBE;  Percutaneous Right Pleur X Placement ;  Surgeon: Luis Alanis MD;  Location:  OR             Social History:     Social History   Substance Use Topics     Smoking status: Never Smoker      Smokeless tobacco: Never Used     Alcohol Use: 0.6 oz/week     1 Standard drinks or equivalent per week      Comment: glass of wine wvery once in a while but nothing recently             Family History:     Family History   Problem Relation Age of Onset     Hypertension Father      Aneurysm Father 91     AAA     Hypertension Mother      Arthritis Mother      CANCER No family hx of             Allergies:     Allergies   Allergen Reactions     Nka [No Known Allergies]      Nkda [No Known Drug Allergies]              Medications:  "      guaiFENesin  600 mg Oral BID     sodium chloride (PF)  3 mL Intracatheter Q8H     [START ON 1/31/2017] cholecalciferol  5,000 Units Oral Daily     multivitamin, therapeutic with minerals  1 tablet Oral Daily     omeprazole  40 mg Oral QAM     piperacillin-tazobactam  4.5 g Intravenous Q6H     levofloxacin  750 mg Intravenous Q24H     enoxaparin  40 mg Subcutaneous Q24H     naproxen  250 mg Oral Daily             Review of Systems:   The Review of Systems is negative other than noted in the HPI; reported that since she has had the radiation treatment to the brain; she has been having issues with balance and coordination.     Physical examination:  /89 mmHg  Pulse 78  Temp(Src) 96.8  F (36  C) (Oral)  Resp 18  Ht 1.6 m (5' 3\")  Wt 50.395 kg (111 lb 1.6 oz)  BMI 19.69 kg/m2  SpO2 96%  Body mass index is 19.69 kg/(m^2).  Constitutional: on oxygen, mild cardiopulmonary distress. Very pleasant.   Eyes: no lid lag or retraction  Ears, Nose and Throat: dry buccal mucosa, no thyromegaly.  Cardiovascular: regular rate and rhythm, normal S1 and S2.   Respiratory: coarse crackles scattered.    Gastrointestinal: positive bowel sounds, nontender, ecchymosis    Musculoskeletal:no edema   Skin: no jaundice   Neurological: alert and oriented. Reflexes at patella and ankle normal.   Psychological: appropriate mood.          Data:   Last Basic Metabolic Panel:  NA      156   1/30/2017   POTASSIUM      3.8   1/30/2017  CHLORIDE      125   1/30/2017  OMA      7.3   1/30/2017  CO2       21   1/30/2017  BUN        5   1/30/2017  CR     0.63   1/30/2017  GLC       86   1/30/2017     Patient seen and discussed with Endocrinology Attending .     Mitra Juarez MD  Endocrinology Fellow/[phone]      Patient seen by me with fellow Dr Juarez.  Very pleasant patient with onset of hypernatremia noted during hospitalization for pneumonia.  Pt does not give a clear history to suggest polydipsia/polyuria as outpt.  " Has been having some diarrhea in hospital - possible source of free water loss.  Multiple sodium levels in past have always been normal altho recent ones have been in the low-mid 140 range.  Will eval with ur and sr osm, strict IO; may need DDAVP challenge if this appears to be DI.  Findings and plan as above.    Ritesh Mendieta MD   [phone]

## 2017-01-30 NOTE — PLAN OF CARE
Problem: Goal Outcome Summary  Goal: Goal Outcome Summary    Afebrile. VSS sating 94% on 3 LPM O2 via Oxymask. Continue to encouraged fluids. Uses bedside commode independently. Given 1x PRN ativan this evening to help her relax and have a better evening then last night. Pt had two PIV taken out this shift and one new one placed.     Currently running dose #1 of potassium 10 mEq w/ lidocaine. Needs five more doses this evening with two hour check after last dose. Potassium came back this evening at 2.7.  Continue to monitor and POC.

## 2017-01-30 NOTE — PROGRESS NOTES
Care Coordinator- Discharge Planning     Admission Date/Time:  1/27/2017  Attending MD:  Esmer Wright MD     Data  Date of initial CC assessment:  1/27  Chart reviewed, discussed with interdisciplinary team.   Patient was admitted for:   1. Hypokalemia    2. Pneumonia of both lungs due to infectious organism, unspecified part of lung    3. Metastatic breast cancer (H)         Assessment  --Currently receives home RN/PT/OT per [facility].  Resumption instructions entered into AVS.  Will meet w/ pt prior to d/c.        Plan  Anticipated Discharge Date:  1-2 days  Anticipated Discharge Plan:  home    CTS Handoff completed:  At d/c.    ________________________________________  Aniyah Andrade, RN, BSN    7D/Oncology Care Coordinator  Pager  [phone]  Phone [phone]

## 2017-01-30 NOTE — PLAN OF CARE
Problem: Goal Outcome Summary  Goal: Goal Outcome Summary  PT 7D- pt was taken off O2 and she had 96% O2 and HR of 83 at rest. While walking on RA pt had O2 sat of 94%. After climbed stairs pt had O2 sat of 94%. When doing last walk of 90 feet pt had O2 sat of 91% and HR of 93. Pt did 4 walks with 4ww for distances of 130 feet, 55 feet, 80 feet, and 90 feet. Pt climbed up and down 4 stairs using 2 railings. Pt fatigued quickly but was safe with her walking using a 4ww as she has at home. Recommend that pt return home with assist and home PT,OT, RN, PCA services.

## 2017-01-30 NOTE — PLAN OF CARE
Problem: Goal Outcome Summary  Goal: Goal Outcome Summary    Sodium 156. D5W started at 75 cc/hr. Pt alert and oriented. Urine tests ordered- pt aware and hat is in commode. Watery stools today- she states she had 3 yesterday and numerous today. Sample sent for c-diff. Wet, congested cough- pt walked in the halls and did stairs with PT on RA and sats remained in the 90's. Wearing oxygen for comfort while in bed. Reviewed use of IS with patient today and she is using independently.

## 2017-01-31 ENCOUNTER — APPOINTMENT (OUTPATIENT)
Dept: PHYSICAL THERAPY | Facility: CLINIC | Age: 62
DRG: 194 | End: 2017-01-31
Payer: COMMERCIAL

## 2017-01-31 LAB
ANION GAP SERPL CALCULATED.3IONS-SCNC: 10 MMOL/L (ref 3–14)
BACTERIA SPEC CULT: NO GROWTH
BUN SERPL-MCNC: 3 MG/DL (ref 7–30)
CALCIUM SERPL-MCNC: 7.8 MG/DL (ref 8.5–10.1)
CHLORIDE SERPL-SCNC: 124 MMOL/L (ref 94–109)
CO2 SERPL-SCNC: 21 MMOL/L (ref 20–32)
CREAT SERPL-MCNC: 0.67 MG/DL (ref 0.52–1.04)
ERYTHROCYTE [DISTWIDTH] IN BLOOD BY AUTOMATED COUNT: 17.3 % (ref 10–15)
GFR SERPL CREATININE-BSD FRML MDRD: 89 ML/MIN/1.7M2
GLUCOSE SERPL-MCNC: 102 MG/DL (ref 70–99)
HCT VFR BLD AUTO: 28.4 % (ref 35–47)
HGB BLD-MCNC: 9.4 G/DL (ref 11.7–15.7)
Lab: NORMAL
MCH RBC QN AUTO: 32.6 PG (ref 26.5–33)
MCHC RBC AUTO-ENTMCNC: 33.1 G/DL (ref 31.5–36.5)
MCV RBC AUTO: 99 FL (ref 78–100)
MICRO REPORT STATUS: NORMAL
OSMOLALITY UR: 192 MMOL/KG (ref 100–1200)
OSMOLALITY UR: 413 MMOL/KG (ref 100–1200)
OSMOLALITY UR: 504 MMOL/KG (ref 100–1200)
OSMOLALITY UR: 530 MMOL/KG (ref 100–1200)
PHOSPHATE SERPL-MCNC: 1.7 MG/DL (ref 2.5–4.5)
PHOSPHATE SERPL-MCNC: 3.2 MG/DL (ref 2.5–4.5)
PLATELET # BLD AUTO: 240 10E9/L (ref 150–450)
POTASSIUM SERPL-SCNC: 3.7 MMOL/L (ref 3.4–5.3)
POTASSIUM SERPL-SCNC: 4 MMOL/L (ref 3.4–5.3)
RBC # BLD AUTO: 2.88 10E12/L (ref 3.8–5.2)
SODIUM SERPL-SCNC: 143 MMOL/L (ref 133–144)
SODIUM SERPL-SCNC: 152 MMOL/L (ref 133–144)
SODIUM SERPL-SCNC: 155 MMOL/L (ref 133–144)
SPECIMEN SOURCE: NORMAL
WBC # BLD AUTO: 9.5 10E9/L (ref 4–11)

## 2017-01-31 PROCEDURE — 83935 ASSAY OF URINE OSMOLALITY: CPT | Performed by: PHYSICIAN ASSISTANT

## 2017-01-31 PROCEDURE — 25000125 ZZHC RX 250: Performed by: INTERNAL MEDICINE

## 2017-01-31 PROCEDURE — 83935 ASSAY OF URINE OSMOLALITY: CPT | Performed by: INTERNAL MEDICINE

## 2017-01-31 PROCEDURE — 25000308 HC RX OP HPI UCR WEL MED 250 IP 250: Performed by: PHYSICIAN ASSISTANT

## 2017-01-31 PROCEDURE — 99233 SBSQ HOSP IP/OBS HIGH 50: CPT | Performed by: INTERNAL MEDICINE

## 2017-01-31 PROCEDURE — 25000132 ZZH RX MED GY IP 250 OP 250 PS 637: Performed by: INTERNAL MEDICINE

## 2017-01-31 PROCEDURE — 25000132 ZZH RX MED GY IP 250 OP 250 PS 637

## 2017-01-31 PROCEDURE — 84295 ASSAY OF SERUM SODIUM: CPT | Performed by: PHYSICIAN ASSISTANT

## 2017-01-31 PROCEDURE — 84100 ASSAY OF PHOSPHORUS: CPT | Performed by: INTERNAL MEDICINE

## 2017-01-31 PROCEDURE — 25000128 H RX IP 250 OP 636: Performed by: INTERNAL MEDICINE

## 2017-01-31 PROCEDURE — 25000125 ZZHC RX 250: Performed by: PHYSICIAN ASSISTANT

## 2017-01-31 PROCEDURE — 85027 COMPLETE CBC AUTOMATED: CPT | Performed by: INTERNAL MEDICINE

## 2017-01-31 PROCEDURE — 36415 COLL VENOUS BLD VENIPUNCTURE: CPT | Performed by: INTERNAL MEDICINE

## 2017-01-31 PROCEDURE — 80048 BASIC METABOLIC PNL TOTAL CA: CPT | Performed by: INTERNAL MEDICINE

## 2017-01-31 PROCEDURE — 40000193 ZZH STATISTIC PT WARD VISIT: Performed by: PHYSICAL THERAPIST

## 2017-01-31 PROCEDURE — 25000132 ZZH RX MED GY IP 250 OP 250 PS 637: Performed by: PHYSICIAN ASSISTANT

## 2017-01-31 PROCEDURE — 84295 ASSAY OF SERUM SODIUM: CPT | Performed by: INTERNAL MEDICINE

## 2017-01-31 PROCEDURE — 97110 THERAPEUTIC EXERCISES: CPT | Mod: GP | Performed by: PHYSICAL THERAPIST

## 2017-01-31 PROCEDURE — 12000008 ZZH R&B INTERMEDIATE UMMC

## 2017-01-31 PROCEDURE — 94640 AIRWAY INHALATION TREATMENT: CPT

## 2017-01-31 PROCEDURE — 40000275 ZZH STATISTIC RCP TIME EA 10 MIN

## 2017-01-31 PROCEDURE — 84132 ASSAY OF SERUM POTASSIUM: CPT | Performed by: INTERNAL MEDICINE

## 2017-01-31 RX ORDER — DESMOPRESSIN ACETATE 4 UG/ML
2 INJECTION, SOLUTION INTRAVENOUS; SUBCUTANEOUS ONCE
Status: COMPLETED | OUTPATIENT
Start: 2017-01-31 | End: 2017-01-31

## 2017-01-31 RX ORDER — DESMOPRESSIN ACETATE 0.1 MG/1
100 TABLET ORAL AT BEDTIME
Status: DISCONTINUED | OUTPATIENT
Start: 2017-01-31 | End: 2017-02-03 | Stop reason: HOSPADM

## 2017-01-31 RX ORDER — LOPERAMIDE HCL 2 MG
2 CAPSULE ORAL 4 TIMES DAILY PRN
Status: DISCONTINUED | OUTPATIENT
Start: 2017-01-31 | End: 2017-02-03 | Stop reason: HOSPADM

## 2017-01-31 RX ADMIN — POTASSIUM PHOSPHATE, MONOBASIC AND POTASSIUM PHOSPHATE, DIBASIC 20 MMOL: 224; 236 INJECTION, SOLUTION INTRAVENOUS at 13:31

## 2017-01-31 RX ADMIN — POTASSIUM PHOSPHATE, MONOBASIC AND POTASSIUM PHOSPHATE, DIBASIC 15 MMOL: 224; 236 INJECTION, SOLUTION INTRAVENOUS at 04:26

## 2017-01-31 RX ADMIN — LEVOFLOXACIN 750 MG: 5 INJECTION, SOLUTION INTRAVENOUS at 18:03

## 2017-01-31 RX ADMIN — ALBUTEROL SULFATE 2.5 MG: 2.5 SOLUTION RESPIRATORY (INHALATION) at 09:54

## 2017-01-31 RX ADMIN — NAPROXEN 250 MG: 250 TABLET ORAL at 08:34

## 2017-01-31 RX ADMIN — MULTIPLE VITAMINS W/ MINERALS TAB 1 TABLET: TAB at 08:34

## 2017-01-31 RX ADMIN — OMEPRAZOLE 40 MG: 20 CAPSULE, DELAYED RELEASE ORAL at 08:34

## 2017-01-31 RX ADMIN — GUAIFENESIN 600 MG: 600 TABLET, EXTENDED RELEASE ORAL at 19:59

## 2017-01-31 RX ADMIN — DESMOPRESSIN ACETATE 2 MCG: 4 INJECTION INTRAVENOUS at 13:27

## 2017-01-31 RX ADMIN — ENOXAPARIN SODIUM 40 MG: 40 INJECTION SUBCUTANEOUS at 19:59

## 2017-01-31 RX ADMIN — DESMOPRESSIN ACETATE 100 MCG: 0.1 TABLET ORAL at 21:46

## 2017-01-31 RX ADMIN — PIPERACILLIN AND TAZOBACTAM 4.5 G: 4; .5 INJECTION, POWDER, FOR SOLUTION INTRAVENOUS at 03:19

## 2017-01-31 RX ADMIN — CHOLECALCIFEROL CAP 125 MCG (5000 UNIT) 5000 UNITS: 125 CAP at 08:34

## 2017-01-31 RX ADMIN — POTASSIUM CHLORIDE 10 MEQ: 14.9 INJECTION, SOLUTION, CONCENTRATE PARENTERAL at 01:08

## 2017-01-31 RX ADMIN — LORAZEPAM 0.5 MG: 0.5 TABLET ORAL at 19:59

## 2017-01-31 RX ADMIN — LOPERAMIDE HYDROCHLORIDE 2 MG: 2 CAPSULE ORAL at 19:59

## 2017-01-31 RX ADMIN — GUAIFENESIN 600 MG: 600 TABLET, EXTENDED RELEASE ORAL at 08:34

## 2017-01-31 RX ADMIN — POTASSIUM CHLORIDE 10 MEQ: 14.9 INJECTION, SOLUTION, CONCENTRATE PARENTERAL at 00:09

## 2017-01-31 RX ADMIN — POTASSIUM CHLORIDE 10 MEQ: 14.9 INJECTION, SOLUTION, CONCENTRATE PARENTERAL at 02:11

## 2017-01-31 RX ADMIN — LOPERAMIDE HYDROCHLORIDE 2 MG: 2 CAPSULE ORAL at 08:34

## 2017-01-31 ASSESSMENT — VISUAL ACUITY: OU: BASELINE

## 2017-01-31 NOTE — PROGRESS NOTES
[facility], [place]    Hematology / Oncology Progress Note    Date of Service (when I saw the patient): 01/31/2017     Assessment and Plan  Keren Erickson is a 61 year old female with metastatic breast cancer who presents with shortness of breath and cough.    #Shortness of breath and cough.   #Presumed healthcare associated PNA. New opacities seen on 1/27 imaging compared to 1/25. No PE identified on scan. Procal 0.66 on admission. Pt notes ongoing symptomatic improvement since admission. Supplemental O2 needs improved, no O2 needed during PT session today. Vanc d/c on 1/29 given afebrile, no positive cultures thus far.  - Remains afebrile. Mild leukocytosis on admission (WBC count 11.4K) --> improved to WNL and stable today  - influenza screen negative, RVP negative  - surveillance BCx (1/27) NGTD  - 1/28 sputum culture with mixed gram positive/gram negative bacteria on GS, culture with normal rloando to date.  - continue Levaquin, discontinue Zosyn  - continue mucinex for expectorant, pt thinks this is helping  - wean O2 as tolerated    #Metastatic breast cancer. Primary oncologist is Dr. Harper. Currently enrolled in [place]  trial, randomized to standard care. Last treatment with Gemzar and Herceptin 1/17/2017. Per notes, was tolerating this well. Had restaging brain MRI and CT chest/abdomen/pelvis on 1/25/17. MRI brain stable. CT scan stable other than nodular groundglass opacities as above.    #Hypernatremia. Asymptomatic. Na 147 (1/28)-->156 this AM. Improved with D5W 1/29, but now increased again.  - Endocrinology consulted, appreciate recs  - D5W @ 100 cc/hr  - Recheck Na q6h  - Desmopressin challenge this afternoon with urine osmolality and volume collected q30 mins after administered for 2 hours    # Diarrhea. Several loose bowel movements yesterday and today. C diff negative. Relieved with Imodium.    #FEN. Regular diet as tolerated.    PPx/Misc:  - Lovenox  ppx  - continue PPI  - PT consult - home with PCA    Keren Briggs PA-C  Hematology/Oncology  Pager: [phone]     Interval History  No acute events overnight. Afebrile. Reports cough is less frequent, able to cough up sputum. Breathing continues to improve, no longer using O2. Has had several episodes of diarrhea, which has been bothersome.    Physical Exam  Temp: 97.2  F (36.2  C) Temp src: Oral BP: 118/80 mmHg   Heart Rate: 87 Resp: 20 SpO2: 94 % O2 Device: None (Room air) Oxygen Delivery: 3 LPM  Filed Vitals:    01/28/17 0915 01/30/17 0905 01/31/17 0723   Weight: 49.17 kg (108 lb 6.4 oz) 50.395 kg (111 lb 1.6 oz) 49.805 kg (109 lb 12.8 oz)     Vital Signs with Ranges  Temp:  [95.4  F (35.2  C)-97.2  F (36.2  C)] 97.2  F (36.2  C)  Heart Rate:  [64-87] 87  Resp:  [16-20] 20  BP: (114-133)/(69-89) 118/80 mmHg  SpO2:  [91 %-96 %] 94 %  I/O last 3 completed shifts:  In: 4335 [P.O.:2360; I.V.:1975]  Out: 6925 [Urine:3925; Other:2400; Stool:600]  Constitutional: Pleasant, chronically-ill appearing female seen sitting up in bed. She is alert, interactive, NAD.  HEENT: NC/AT, alopecia. No rhinorrhea appreciated. OP pink and moist without erythema/lesions.   Respiratory: Breathing even, non-labored room air. Able to speak in full sentences. Lungs slightly course b/l in upper fields with rhonchi, decrease breath sounds at bases.  Cardiovascular: RRR, no murmur noted.  GI: Normal BS. Abd soft, non-distended, non-tender.  Skin: Pale, clean/dry, intact.   Musculoskeletal: Thin, no edema.   Neurologic: Awake, alert, oriented. Grossly nonfocal.   Neuropsychiatric: Calm, normal eye contact, alert, affect congruent.     Medications    D5W Stopped (01/31/17 1332)     - MEDICATION INSTRUCTIONS -         guaiFENesin  600 mg Oral BID     sodium chloride (PF)  3 mL Intracatheter Q8H     cholecalciferol  5,000 Units Oral Daily     multivitamin, therapeutic with minerals  1 tablet Oral Daily     omeprazole  40 mg Oral QAM      levofloxacin  750 mg Intravenous Q24H     enoxaparin  40 mg Subcutaneous Q24H     naproxen  250 mg Oral Daily       Data  Results for orders placed or performed during the hospital encounter of 01/27/17 (from the past 24 hour(s))   Osmolality urine   Result Value Ref Range    Urine Osmolality 131 100 - 1200 mmol/kg   Sodium random urine   Result Value Ref Range    Sodium Urine mmol/L 40 mmol/L   Urine Culture Aerobic Bacterial   Result Value Ref Range    Specimen Description Midstream Urine     Special Requests Specimen received in preservative     Culture Micro Culture negative < 24 hours, reincubate     Micro Report Status Pending    UA with Microscopic reflex to Culture   Result Value Ref Range    Color Urine Straw     Appearance Urine Clear     Glucose Urine Negative NEG mg/dL    Bilirubin Urine Negative NEG    Ketones Urine Negative NEG mg/dL    Specific Gravity Urine 1.003 1.003 - 1.035    Blood Urine Negative NEG    pH Urine 6.0 5.0 - 7.0 pH    Protein Albumin Urine Negative NEG mg/dL    Urobilinogen mg/dL Normal 0.0 - 2.0 mg/dL    Nitrite Urine Negative NEG    Leukocyte Esterase Urine Negative NEG    Source Midstream Urine     WBC Urine 0 0 - 2 /HPF    RBC Urine 0 0 - 2 /HPF   Phosphorus   Result Value Ref Range    Phosphorus 2.0 (L) 2.5 - 4.5 mg/dL   Potassium   Result Value Ref Range    Potassium 2.9 (L) 3.4 - 5.3 mmol/L   Osmolality   Result Value Ref Range    Osmolality 301 280 - 301 mmol/kg   Sodium   Result Value Ref Range    Sodium 148 (H) 133 - 144 mmol/L   Basic metabolic panel   Result Value Ref Range    Sodium 155 (H) 133 - 144 mmol/L    Potassium 4.0 3.4 - 5.3 mmol/L    Chloride 124 (H) 94 - 109 mmol/L    Carbon Dioxide 21 20 - 32 mmol/L    Anion Gap 10 3 - 14 mmol/L    Glucose 102 (H) 70 - 99 mg/dL    Urea Nitrogen 3 (L) 7 - 30 mg/dL    Creatinine 0.67 0.52 - 1.04 mg/dL    GFR Estimate 89 >60 mL/min/1.7m2    GFR Estimate If Black >90   GFR Calc   >60 mL/min/1.7m2    Calcium  7.8 (L) 8.5 - 10.1 mg/dL   CBC with platelets   Result Value Ref Range    WBC 9.5 4.0 - 11.0 10e9/L    RBC Count 2.88 (L) 3.8 - 5.2 10e12/L    Hemoglobin 9.4 (L) 11.7 - 15.7 g/dL    Hematocrit 28.4 (L) 35.0 - 47.0 %    MCV 99 78 - 100 fl    MCH 32.6 26.5 - 33.0 pg    MCHC 33.1 31.5 - 36.5 g/dL    RDW 17.3 (H) 10.0 - 15.0 %    Platelet Count 240 150 - 450 10e9/L   Sodium   Result Value Ref Range    Sodium 152 (H) 133 - 144 mmol/L   Phosphorus   Result Value Ref Range    Phosphorus 1.7 (L) 2.5 - 4.5 mg/dL

## 2017-01-31 NOTE — PLAN OF CARE
Problem: Goal Outcome Summary  Goal: Goal Outcome Summary  Outcome: Improving  Patient remains stable with vitals in the normal range. Reports he appetite is picking up slowly. Electrolytes are out of norm. Soldium was 148, which is better than 156 on days. Potassium was 2.9. Replacement is underway. She will need 3*10meq of potassium chloride with lidocaine bags. Will also need a phosphorus replpacement for 2.0. C-diff lab was negative. Patient reported no diarrhea this evening. Will discontinue enteric isolation if continues without diarrhea.

## 2017-01-31 NOTE — PLAN OF CARE
Problem: Goal Outcome Summary  Goal: Goal Outcome Summary  Outcome: No Change  Patients last sodium check was done  on afternoon shift and trending down but per note from endocrinology sodium values were  to be drawn q 6 hours but none ordered - Patient has received 30 meq kcl  on night shift with a total of 60 meq kcl in all' Also potassium phos was started after 04 and rescheduled for phosphorous - lab draw at 1030. Denies pain and reports nausea controlled. Up voiding large amounts.Follow POC.

## 2017-01-31 NOTE — PROGRESS NOTES
"[facility] Endocrinology Progress Note          Assessment and Plan:   Assessment and Plan: 60 yo female with metastatic breast cancer s/p chemoradiation treatment  who was admitted on 1/27/2017 with possible health care associated pneumonia on antibiotics; reports interval improvement of symptoms from the pneumonia.  Patient was clinically doing well from pneumonia stand point when she was noted to have a rising sodium level. Consulted for the same.     Hypernatremia: Given her metastatic lesion to the brain from the primary tumor; the concern is does she have Diabetes Insipidus? Based on her clinical symptoms and metastatic lesion not close to the pituitary stack/hypothalamus; we didn't think she has diabetes insipidus.  However, urine osmolality low at 131 with urine Na of 40 in a setting of plasma osmolality at 310 and plasma sodium 155; suggests that she might have DI.      We will proceed with a challenge with DDAVP 2 mcg IV once and measure urine volume and osmolality. Continue to monitor sodium q 6 hours.            Give one dose of desmopressin 2 mcg IV     Measure urine osmolality and volume every 30 minutes for two hours after administration of desmopressin.      Further plan based on the results.                       Interval History:   Reports polydipsia (increased thirst) and polyuria.   Urine output over the last 6 hours was: ->1 liters.            Medications:       guaiFENesin  600 mg Oral BID     sodium chloride (PF)  3 mL Intracatheter Q8H     cholecalciferol  5,000 Units Oral Daily     multivitamin, therapeutic with minerals  1 tablet Oral Daily     omeprazole  40 mg Oral QAM     piperacillin-tazobactam  4.5 g Intravenous Q6H     levofloxacin  750 mg Intravenous Q24H     enoxaparin  40 mg Subcutaneous Q24H     naproxen  250 mg Oral Daily        Physical Examinations:  /75 mmHg  Pulse 78  Temp(Src) 95.4  F (35.2  C) (Oral)  Resp 20  Ht 1.6 m (5' 3\")  Wt 49.805 kg (109 lb 12.8 " oz)  BMI 19.46 kg/m2  SpO2 93%  Body mass index is 19.46 kg/(m^2).  Constitutional: mild cardiopulmonary distress. Very pleasant.    Neurological: alert and oriented.            Data:   Results for HEIDI RUIZ (MRN [id number]) as of 1/31/2017 08:37   Ref. Range 1/30/2017 15:21   Urine Osmolality Latest Ref Range: 100-1200 mmol/kg 131   Results for HEIDI RUIZ (MRN [id number]) as of 1/31/2017 08:37   Ref. Range 1/30/2017 15:21   Sodium Urine mmol/L Latest Units: mmol/L 40   Results for HEIDI RUIZ (MRN [id number]) as of 1/31/2017 08:37   Ref. Range 1/31/2017 06:41   Sodium Latest Ref Range: 133-144 mmol/L 155 (H)   Results for HEIDI RUIZ (MRN [id number]) as of 1/31/2017 08:37   Ref. Range 1/30/2017 06:23   Osmolality Latest Ref Range: 280-301 mmol/kg 310 (H)     Patient's management discussed with Endocrinology attending Dr. Mendieta.       Mitra Juarez MD  Endocrinology Fellow /[phone]      Patient seen by me with fellow.  Findings and plan as above.    Ritesh Mendieta MD   [phone]

## 2017-01-31 NOTE — PLAN OF CARE
Problem: Goal Outcome Summary  Goal: Goal Outcome Summary  PT 7D- pt fatigued today and declined out of bed activity. Pt performed 12 reps in bed LE exercise. Pt will need to get up and move at next session. She should be able to go home with assist.

## 2017-01-31 NOTE — PROGRESS NOTES
"[facility] Endocrinology Progress Note          Assessment and Plan:   Assessment and Plan: 60 yo female with metastatic breast cancer s/p chemoradiation treatment  who was admitted on 1/27/2017 with possible health care associated pneumonia on antibiotics; reports interval improvement of symptoms from the pneumonia.  Patient was clinically doing well from pneumonia stand point when she was noted to have a rising sodium level. Consulted for the same.     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  There seems to be some clinical improvement in the frequency of urination but too early to say. Urine osmolality @ 60 minutes went up to 413 from 192.  Urine osmolality from 60 and 90 minutes pending. If urine osmallity continues to rise up; please given desmopressin 100 mcg @ hs.               Awaiting Urine osmolality from 90 minutes.    Give desmopressin 100 mcg at bedtime tonight.       Addendum: urine osmolality following @60 and 90 minutes 530 and 504 respectively.  Again confirming likely central DI.  Will proceed with the above plan of giving additional desmopressin oral at bedtime.                            Interval History:   Reports there might be some improvement in the frequency of urination since she received the desmopressin at 1:30.            Medications:       guaiFENesin  600 mg Oral BID     sodium chloride (PF)  3 mL Intracatheter Q8H     cholecalciferol  5,000 Units Oral Daily     multivitamin, therapeutic with minerals  1 tablet Oral Daily     omeprazole  40 mg Oral QAM     levofloxacin  750 mg Intravenous Q24H     enoxaparin  40 mg Subcutaneous Q24H     naproxen  250 mg Oral Daily        Physical Examinations:  /89 mmHg  Pulse 78  Temp(Src) 96.9  F (36.1  C) (Oral)  Resp 20  Ht 1.6 m (5' 3\")  Wt 49.805 kg (109 lb 12.8 oz)  BMI 19.46 kg/m2  SpO2 91%  Body mass index is 19.46 kg/(m^2).    Constitutional: mild cardiopulmonary " distress. Very pleasant.    Neurological: alert and oriented.          Data:   Results for HEIDI RUIZ (MRN [id number]) as of 1/31/2017 16:40   Ref. Range 1/31/2017 14:33   Urine Osmolality Latest Ref Range: 100-1200 mmol/kg 413     Patient seen and discussed with Endocrinology attending Dr. Mendieta.       Mitra Juarez MD  Endocrinology Fellow /[phone]      Patient seen by me with fellow Dr Juarez.  Clinical picture c/w diabetes insipidus and response to DDAVP today indicates that this is central.  ? Etiology - ? Brain mets, hx of whole brain radiation.  Will start HS DDAVP tonight and monitor UOP, sr Na.  Findings and plan as above.    Ritesh Mendieta MD   [phone]

## 2017-01-31 NOTE — PLAN OF CARE
Problem: Goal Outcome Summary  Goal: Goal Outcome Summary    Diarrhea this am and Immodium given. No further BM afterwards. Lungs with wheezes this am and nebulizer given by RT.  DDAVP given IV and currently collecting urine osmolalities q 30 min for 2 hours. Phosphorus re-check was 1.7 and phosphorus infusing now over 4 hours.

## 2017-02-01 ENCOUNTER — APPOINTMENT (OUTPATIENT)
Dept: PHYSICAL THERAPY | Facility: CLINIC | Age: 62
DRG: 194 | End: 2017-02-01
Payer: COMMERCIAL

## 2017-02-01 LAB
ANION GAP SERPL CALCULATED.3IONS-SCNC: 10 MMOL/L (ref 3–14)
ANISOCYTOSIS BLD QL SMEAR: SLIGHT
BASOPHILS # BLD AUTO: 0 10E9/L (ref 0–0.2)
BASOPHILS NFR BLD AUTO: 0 %
BUN SERPL-MCNC: 2 MG/DL (ref 7–30)
CALCIUM SERPL-MCNC: 6.8 MG/DL (ref 8.5–10.1)
CHLORIDE SERPL-SCNC: 108 MMOL/L (ref 94–109)
CO2 SERPL-SCNC: 21 MMOL/L (ref 20–32)
CREAT SERPL-MCNC: 0.58 MG/DL (ref 0.52–1.04)
DIFFERENTIAL METHOD BLD: ABNORMAL
EOSINOPHIL # BLD AUTO: 0.3 10E9/L (ref 0–0.7)
EOSINOPHIL NFR BLD AUTO: 2.6 %
ERYTHROCYTE [DISTWIDTH] IN BLOOD BY AUTOMATED COUNT: 17.5 % (ref 10–15)
GFR SERPL CREATININE-BSD FRML MDRD: ABNORMAL ML/MIN/1.7M2
GLUCOSE SERPL-MCNC: 84 MG/DL (ref 70–99)
HCT VFR BLD AUTO: 25.3 % (ref 35–47)
HGB BLD-MCNC: 8.3 G/DL (ref 11.7–15.7)
LYMPHOCYTES # BLD AUTO: 0.8 10E9/L (ref 0.8–5.3)
LYMPHOCYTES NFR BLD AUTO: 7 %
MAGNESIUM SERPL-MCNC: 1.9 MG/DL (ref 1.6–2.3)
MCH RBC QN AUTO: 32.2 PG (ref 26.5–33)
MCHC RBC AUTO-ENTMCNC: 32.8 G/DL (ref 31.5–36.5)
MCV RBC AUTO: 98 FL (ref 78–100)
METAMYELOCYTES # BLD: 0.5 10E9/L
METAMYELOCYTES NFR BLD MANUAL: 4.4 %
MONOCYTES # BLD AUTO: 0.5 10E9/L (ref 0–1.3)
MONOCYTES NFR BLD AUTO: 4.4 %
MYELOCYTES # BLD: 1.1 10E9/L
MYELOCYTES NFR BLD MANUAL: 9.6 %
NEUTROPHILS # BLD AUTO: 7.5 10E9/L (ref 1.6–8.3)
NEUTROPHILS NFR BLD AUTO: 68.5 %
NRBC # BLD AUTO: 0.3 10*3/UL
NRBC BLD AUTO-RTO: 3 /100
OSMOLALITY UR: 469 MMOL/KG (ref 100–1200)
OVALOCYTES BLD QL SMEAR: SLIGHT
PHOSPHATE SERPL-MCNC: 1.8 MG/DL (ref 2.5–4.5)
PHOSPHATE SERPL-MCNC: 2.3 MG/DL (ref 2.5–4.5)
PLATELET # BLD AUTO: 225 10E9/L (ref 150–450)
POIKILOCYTOSIS BLD QL SMEAR: SLIGHT
POTASSIUM SERPL-SCNC: 3.2 MMOL/L (ref 3.4–5.3)
POTASSIUM SERPL-SCNC: 4.3 MMOL/L (ref 3.4–5.3)
PROMYELOCYTES # BLD MANUAL: 0.4 10E9/L
PROMYELOCYTES NFR BLD MANUAL: 3.5 %
RBC # BLD AUTO: 2.58 10E12/L (ref 3.8–5.2)
SODIUM SERPL-SCNC: 137 MMOL/L (ref 133–144)
SODIUM SERPL-SCNC: 139 MMOL/L (ref 133–144)
SODIUM SERPL-SCNC: 140 MMOL/L (ref 133–144)
SODIUM SERPL-SCNC: NORMAL MMOL/L (ref 133–144)
WBC # BLD AUTO: 11 10E9/L (ref 4–11)

## 2017-02-01 PROCEDURE — 85025 COMPLETE CBC W/AUTO DIFF WBC: CPT | Performed by: INTERNAL MEDICINE

## 2017-02-01 PROCEDURE — 84295 ASSAY OF SERUM SODIUM: CPT | Performed by: STUDENT IN AN ORGANIZED HEALTH CARE EDUCATION/TRAINING PROGRAM

## 2017-02-01 PROCEDURE — 40000193 ZZH STATISTIC PT WARD VISIT

## 2017-02-01 PROCEDURE — 25000125 ZZHC RX 250: Performed by: STUDENT IN AN ORGANIZED HEALTH CARE EDUCATION/TRAINING PROGRAM

## 2017-02-01 PROCEDURE — 84132 ASSAY OF SERUM POTASSIUM: CPT | Performed by: STUDENT IN AN ORGANIZED HEALTH CARE EDUCATION/TRAINING PROGRAM

## 2017-02-01 PROCEDURE — 80076 HEPATIC FUNCTION PANEL: CPT | Performed by: STUDENT IN AN ORGANIZED HEALTH CARE EDUCATION/TRAINING PROGRAM

## 2017-02-01 PROCEDURE — 99233 SBSQ HOSP IP/OBS HIGH 50: CPT | Performed by: INTERNAL MEDICINE

## 2017-02-01 PROCEDURE — 83735 ASSAY OF MAGNESIUM: CPT | Performed by: STUDENT IN AN ORGANIZED HEALTH CARE EDUCATION/TRAINING PROGRAM

## 2017-02-01 PROCEDURE — 25000132 ZZH RX MED GY IP 250 OP 250 PS 637: Performed by: INTERNAL MEDICINE

## 2017-02-01 PROCEDURE — 84100 ASSAY OF PHOSPHORUS: CPT | Performed by: STUDENT IN AN ORGANIZED HEALTH CARE EDUCATION/TRAINING PROGRAM

## 2017-02-01 PROCEDURE — 84295 ASSAY OF SERUM SODIUM: CPT | Performed by: INTERNAL MEDICINE

## 2017-02-01 PROCEDURE — 27210995 ZZH RX 272: Performed by: STUDENT IN AN ORGANIZED HEALTH CARE EDUCATION/TRAINING PROGRAM

## 2017-02-01 PROCEDURE — 36415 COLL VENOUS BLD VENIPUNCTURE: CPT | Performed by: STUDENT IN AN ORGANIZED HEALTH CARE EDUCATION/TRAINING PROGRAM

## 2017-02-01 PROCEDURE — 82040 ASSAY OF SERUM ALBUMIN: CPT | Performed by: PHYSICIAN ASSISTANT

## 2017-02-01 PROCEDURE — 80048 BASIC METABOLIC PNL TOTAL CA: CPT | Performed by: STUDENT IN AN ORGANIZED HEALTH CARE EDUCATION/TRAINING PROGRAM

## 2017-02-01 PROCEDURE — 25000125 ZZHC RX 250: Performed by: INTERNAL MEDICINE

## 2017-02-01 PROCEDURE — 25000132 ZZH RX MED GY IP 250 OP 250 PS 637: Performed by: PHYSICIAN ASSISTANT

## 2017-02-01 PROCEDURE — 97530 THERAPEUTIC ACTIVITIES: CPT | Mod: GP

## 2017-02-01 PROCEDURE — 25000132 ZZH RX MED GY IP 250 OP 250 PS 637

## 2017-02-01 PROCEDURE — 40000141 ZZH STATISTIC PERIPHERAL IV START W/O US GUIDANCE

## 2017-02-01 PROCEDURE — 25000128 H RX IP 250 OP 636: Performed by: INTERNAL MEDICINE

## 2017-02-01 PROCEDURE — 12000008 ZZH R&B INTERMEDIATE UMMC

## 2017-02-01 PROCEDURE — 36415 COLL VENOUS BLD VENIPUNCTURE: CPT | Performed by: INTERNAL MEDICINE

## 2017-02-01 PROCEDURE — 83935 ASSAY OF URINE OSMOLALITY: CPT | Performed by: PHYSICIAN ASSISTANT

## 2017-02-01 RX ADMIN — POTASSIUM CHLORIDE 40 MEQ: 750 TABLET, EXTENDED RELEASE ORAL at 09:31

## 2017-02-01 RX ADMIN — GUAIFENESIN 600 MG: 600 TABLET, EXTENDED RELEASE ORAL at 20:17

## 2017-02-01 RX ADMIN — GUAIFENESIN 600 MG: 600 TABLET, EXTENDED RELEASE ORAL at 09:30

## 2017-02-01 RX ADMIN — SODIUM CHLORIDE: 234 INJECTION INTRAMUSCULAR; INTRAVENOUS; SUBCUTANEOUS at 04:37

## 2017-02-01 RX ADMIN — MULTIPLE VITAMINS W/ MINERALS TAB 1 TABLET: TAB at 09:30

## 2017-02-01 RX ADMIN — CHOLECALCIFEROL CAP 125 MCG (5000 UNIT) 5000 UNITS: 125 CAP at 09:31

## 2017-02-01 RX ADMIN — OMEPRAZOLE 40 MG: 20 CAPSULE, DELAYED RELEASE ORAL at 09:29

## 2017-02-01 RX ADMIN — ONDANSETRON HYDROCHLORIDE 8 MG: 8 TABLET, FILM COATED ORAL at 09:53

## 2017-02-01 RX ADMIN — ENOXAPARIN SODIUM 40 MG: 40 INJECTION SUBCUTANEOUS at 20:18

## 2017-02-01 RX ADMIN — LEVOFLOXACIN 750 MG: 5 INJECTION, SOLUTION INTRAVENOUS at 16:14

## 2017-02-01 RX ADMIN — POTASSIUM PHOSPHATE, MONOBASIC AND POTASSIUM PHOSPHATE, DIBASIC 20 MMOL: 224; 236 INJECTION, SOLUTION INTRAVENOUS at 09:38

## 2017-02-01 RX ADMIN — POTASSIUM PHOSPHATE, MONOBASIC AND POTASSIUM PHOSPHATE, DIBASIC 15 MMOL: 224; 236 INJECTION, SOLUTION INTRAVENOUS at 22:48

## 2017-02-01 RX ADMIN — DESMOPRESSIN ACETATE 100 MCG: 0.1 TABLET ORAL at 22:48

## 2017-02-01 RX ADMIN — POTASSIUM CHLORIDE 20 MEQ: 750 TABLET, EXTENDED RELEASE ORAL at 14:01

## 2017-02-01 RX ADMIN — NAPROXEN 250 MG: 250 TABLET ORAL at 09:30

## 2017-02-01 ASSESSMENT — VISUAL ACUITY
OU: BASELINE

## 2017-02-01 NOTE — PROGRESS NOTES
[facility] Endocrinology Progress Note          Assessment and Plan:   Assessment and Plan: 60 yo female with metastatic breast cancer s/p chemoradiation treatment  who was admitted on 1/27/2017 for health care associated pneumonia.  Treated with antibiotics with interval improvement however, developed hypernatremia.     Work up for the hypernatremia consistent with diabetes insipidus; based on low urine osmolality in a setting of high serum Na level.  Challenged with desmopressin and urine osmolality improved >100% with resolution of clinical symptoms suggesting the diabetes insipidus is central in origin.     Central diabetes insipidus:  She has known metastasis to the brain from the breast cancer and her latest MRI from 1/25/17 was reviewed with neuroradiology.  Based on this brain MRI which is not dedicated for evaluation of the pituitary; there is no gross abnormality in the hypothalamus, pituitary or infundibulum area. Therefore the central DI could be from very small metastasis we are not able to see on the MRI from 1/25/17 or previous radiation treatment. Getting a dedicated pituitary MRI will not  however, might be reasonable to check other pituitary axis.     She responded well to the desmopressin.  Plan: continue desmopressin 100 mcg at bed time. This issues could very well be transient therefore she needs close monitoring of serum sodium after discharge.  Agree with observing her in the hospital one more day with her current dose of DDAVP off IV fluids.  Please discontinue all IV fluids.     Other Pituitary axis work up: Please check pituitary axis labs including: early am cortisol @ 8:00 am with ACTH, TSH with free T4 level and FSH with morning labs. Will follow up on the results.     Needs follow up at endocrinology clinic after discharge in 1 month; close follow up with oncology or primary in the meantime.      ADDENDUM: reviewed pituitary axis lab results: cortisol pending  "other labs unremarkable except low FSH. Low FSH could be in a setting of acute illness; can be followed as an outpatient with another test.  Await cortisol result. Regarding DI no change in management; continue bedtime desmopressin with close monitoring of electrolytes as an outpatient and clinical symptoms.                             Reviewed and discussed with fellow.  Plan to continue HS DDAVP for now and follow UOP, Na levels.    Ritesh Mendieta MD   [phone]            Interval History:   'I didn't feel well this morning but now I have started feeling better'  Ate breakfast for the first time. Didn't have frequent urination last night.            Medications:       desmopressin  100 mcg Oral At Bedtime     guaiFENesin  600 mg Oral BID     sodium chloride (PF)  3 mL Intracatheter Q8H     cholecalciferol  5,000 Units Oral Daily     multivitamin, therapeutic with minerals  1 tablet Oral Daily     omeprazole  40 mg Oral QAM     levofloxacin  750 mg Intravenous Q24H     enoxaparin  40 mg Subcutaneous Q24H     naproxen  250 mg Oral Daily        Physical Examinations:  BP 95/62 mmHg  Pulse 78  Temp(Src) 97.8  F (36.6  C) (Oral)  Resp 20  Ht 1.6 m (5' 3\")  Wt 50.077 kg (110 lb 6.4 oz)  BMI 19.56 kg/m2  SpO2 90%  Body mass index is 19.56 kg/(m^2).    Neuro examination: alert and oriented.          Data:   Last Basic Metabolic Panel:  NA      137   2/1/2017   POTASSIUM      3.2   2/1/2017  CHLORIDE      108   2/1/2017  OMA      6.8   2/1/2017  CO2       21   2/1/2017  BUN        2   2/1/2017  CR     0.58   2/1/2017  GLC       84   2/1/2017    Patient seen and discussed with Endocrinology attending: Dr. Mendieta.      Mitra Juarez MD  Endocrinology Fellow /[phone]        Patient seen by me with fellow Dr Juarez.  Pt c/o overall weakness.  No N/V, not lot of appetite but no N/V.  Taking fluids ok.  Appropriate response to DDAVP yest evening.  Would stop IV fluids so we can assess pts status with ad prosper " intake and DDAVP.  Clinically does not appear to have anterior pituitary def but will assess with appropriate hormone tests.  Findings and plan as above.    Ritesh Mendieta MD   [phone]

## 2017-02-01 NOTE — PLAN OF CARE
Problem: Goal Outcome Summary  Goal: Goal Outcome Summary  Outcome: Improving  Patient remains stable with vitals in the normal range. Phosphorus was 3.2, potassium 3.7 and sodium 143. Receiving D5W at 50 ml/hr. Also received 100 mcg of desmopressin at bed time. Base line neuro check done and are with in normal limits. Reported that her appetite is not much better today. Eating oatmeal, cashew milk and apple juice. Reported one loose stool and received a loperamide. Hoping to go home in the next few days.

## 2017-02-01 NOTE — PLAN OF CARE
Afebrile, VSS. Weaned off o2. Pt c/o increasing fatigue/weakness, see PT note. IVMF d/c'd as sodium is stable. K replaced and Phos infusing. Cluster recheck with next Na draw. Pt doesn't want ativan this osmel as she feels this is what is making her so weak and in a fog. Call [facility] with updates or changes. Continue with POC.

## 2017-02-01 NOTE — PLAN OF CARE
Problem: Goal Outcome Summary  Goal: Goal Outcome Summary  PT/7D: Pt w/ inc. Fatigue this day. VSS throughout, O2 > 92% on rm air. Pt mod I for all bed mobility w/ HOB elevated and use of hand rails. Pt declining ambulation secondary to fatigue/weakness. Pt performed 3x STS w/ 4WW, SBA for safety, and elevated bed height. Pt unable to tolerate standing for > 1 min. Pt w/ significant difficutly performing standing marches and is unable to clear BLE from ground at this time. Discussed decreased functional mobility w/ RN and NP    Due to increased weakness/fatigue and decreased functional mobility, recommend DC to TCU to improve function and strength for safe return home as pt is not safe to do so at this time .

## 2017-02-01 NOTE — PROGRESS NOTES
[facility], [place]    Hematology / Oncology Progress Note    Date of Service (when I saw the patient): 02/01/2017     Assessment and Plan  Keren Erickson is a 61 year old female with metastatic breast cancer who presents with shortness of breath and cough.    #Shortness of breath and cough.   #Presumed healthcare associated PNA. New opacities seen on 1/27 imaging compared to 1/25. No PE identified on scan. Procal 0.66 on admission. Pt notes ongoing symptomatic improvement since admission. No longer needed supplemental O2. Vanc d/c on 1/29 given afebrile, no positive cultures thus far.   - Remains afebrile. Mild leukocytosis on admission (WBC count 11.4K) --> improved to WNL and stable today  - influenza screen negative, RVP negative  - surveillance BCx (1/27) NGTD  - 1/28 sputum culture with mixed gram positive/gram negative bacteria on GS, culture with normal [place] to date.  - continue Levaquin   - continue mucinex for expectorant, pt thinks this is helping    #Metastatic breast cancer. Primary oncologist is Dr. Harper. Currently enrolled in [place]  trial, randomized to standard care. Last treatment with Gemzar and Herceptin 1/17/2017. Per notes, was tolerating this well. Had restaging brain MRI and CT chest/abdomen/pelvis on 1/25/17. MRI brain stable. CT scan stable other than nodular groundglass opacities as above.    #Hypernatremia. Asymptomatic. Na 147 (1/28)-->156 this AM. Improved with D5W 1/29, but increased again when D5 stopped. D5 restarted. Endocrinology consulted, appreciate recs. Responded to DDVAP challenge yesterday, suggests central DI. Na WNL for 24 hrs.  - Continue desmopressin 100 mcg tonight  - Stop IVF  - continue Na checks q6h    # Diarrhea. Several loose bowel movements yesterday and today. C diff negative. Relieved with Imodium.    #FEN. Regular diet as tolerated.    PPx/Misc:  - Lovenox ppx  - continue PPI  - PT consult - now recommending  TCU    Disposition: PT now recommending TCU given poor performance during therapy today. Will reassess tomorrow as previously she has been doing well.    The above plan of care has been discussed with Dr. Meneses.    Keren Briggs PA-C  Hematology/Oncology  Pager: [phone]     Interval History  Denia is feeling more weak today than yesterday. She says her legs don't feel like they can hold her up. Reports continued improvement in cough. Using 1.5 L O2 overnight, but now doing well on room air.     Physical Exam  Temp: 97.8  F (36.6  C) Temp src: Oral BP: 95/62 mmHg   Heart Rate: 89 Resp: 20 SpO2: 90 % O2 Device: None (Room air) Oxygen Delivery: 1 LPM  Filed Vitals:    01/30/17 0905 01/31/17 0723 02/01/17 0727   Weight: 50.395 kg (111 lb 1.6 oz) 49.805 kg (109 lb 12.8 oz) 50.077 kg (110 lb 6.4 oz)     Vital Signs with Ranges  Temp:  [96.2  F (35.7  C)-97.8  F (36.6  C)] 97.8  F (36.6  C)  Heart Rate:  [77-94] 89  Resp:  [18-20] 20  BP: ()/(62-89) 95/62 mmHg  SpO2:  [89 %-93 %] 90 %  I/O last 3 completed shifts:  In: 3015 [P.O.:1280; I.V.:1735]  Out: 3160 [Urine:2510; Other:650]  Constitutional: Pleasant, chronically-ill appearing female seen sitting up in bed. She is alert, interactive, NAD.  HEENT: NC/AT, alopecia. No rhinorrhea appreciated. OP pink and moist without erythema/lesions.   Respiratory: Breathing even, non-labored room air. Able to speak in full sentences. Lungs slightly course b/l in upper fields with rhonchi, decrease breath sounds at bases.  Cardiovascular: RRR, no murmur noted.  GI: Normal BS. Abd soft, non-distended, non-tender.  Skin: Pale, clean/dry, intact.   Musculoskeletal: Thin, no edema.   Neurologic: Awake, alert, oriented. Grossly nonfocal.   Neuropsychiatric: Calm, normal eye contact, alert, affect congruent.     Medications    2% sodium chloride infusion 50 mL/hr at 02/01/17 0437     - MEDICATION INSTRUCTIONS -         desmopressin  100 mcg Oral At Bedtime      guaiFENesin  600 mg Oral BID     sodium chloride (PF)  3 mL Intracatheter Q8H     cholecalciferol  5,000 Units Oral Daily     multivitamin, therapeutic with minerals  1 tablet Oral Daily     omeprazole  40 mg Oral QAM     levofloxacin  750 mg Intravenous Q24H     enoxaparin  40 mg Subcutaneous Q24H     naproxen  250 mg Oral Daily       Data  Results for orders placed or performed during the hospital encounter of 01/27/17 (from the past 24 hour(s))   Osmolality urine   Result Value Ref Range    Urine Osmolality 192 100 - 1200 mmol/kg   Osmolality urine   Result Value Ref Range    Urine Osmolality 413 100 - 1200 mmol/kg   Osmolality urine   Result Value Ref Range    Urine Osmolality 530 100 - 1200 mmol/kg   Osmolality urine   Result Value Ref Range    Urine Osmolality 504 100 - 1200 mmol/kg   Sodium   Result Value Ref Range    Sodium 143 133 - 144 mmol/L   Potassium   Result Value Ref Range    Potassium 3.7 3.4 - 5.3 mmol/L   Phosphorus   Result Value Ref Range    Phosphorus 3.2 2.5 - 4.5 mg/dL   Sodium   Result Value Ref Range    Sodium 139 133 - 144 mmol/L   CBC with platelets differential   Result Value Ref Range    WBC 11.0 4.0 - 11.0 10e9/L    RBC Count 2.58 (L) 3.8 - 5.2 10e12/L    Hemoglobin 8.3 (L) 11.7 - 15.7 g/dL    Hematocrit 25.3 (L) 35.0 - 47.0 %    MCV 98 78 - 100 fl    MCH 32.2 26.5 - 33.0 pg    MCHC 32.8 31.5 - 36.5 g/dL    RDW 17.5 (H) 10.0 - 15.0 %    Platelet Count 225 150 - 450 10e9/L    Diff Method Manual Differential     % Neutrophils 68.5 %    % Lymphocytes 7.0 %    % Monocytes 4.4 %    % Eosinophils 2.6 %    % Basophils 0.0 %    % Metamyelocytes 4.4 %    % Myelocytes 9.6 %    % Promyelocytes 3.5 %    Nucleated RBCs 3 (H) 0 /100    Absolute Neutrophil 7.5 1.6 - 8.3 10e9/L    Absolute Lymphocytes 0.8 0.8 - 5.3 10e9/L    Absolute Monocytes 0.5 0.0 - 1.3 10e9/L    Absolute Eosinophils 0.3 0.0 - 0.7 10e9/L    Absolute Basophils 0.0 0.0 - 0.2 10e9/L    Absolute Metamyelocytes 0.5 (H) 0 10e9/L     Absolute Myelocytes 1.1 (H) 0 10e9/L    Absolute Promyeloctyes 0.4 (H) 0 10e9/L    Absolute Nucleated RBC 0.3     Anisocytosis Slight     Poikilocytosis Slight     Ovalocytes Slight    Basic metabolic panel   Result Value Ref Range    Sodium 140 133 - 144 mmol/L    Potassium 3.2 (L) 3.4 - 5.3 mmol/L    Chloride 108 94 - 109 mmol/L    Carbon Dioxide 21 20 - 32 mmol/L    Anion Gap 10 3 - 14 mmol/L    Glucose 84 70 - 99 mg/dL    Urea Nitrogen 2 (L) 7 - 30 mg/dL    Creatinine 0.58 0.52 - 1.04 mg/dL    GFR Estimate >90  Non  GFR Calc   >60 mL/min/1.7m2    GFR Estimate If Black >90   GFR Calc   >60 mL/min/1.7m2    Calcium 6.8 (L) 8.5 - 10.1 mg/dL   Magnesium   Result Value Ref Range    Magnesium 1.9 1.6 - 2.3 mg/dL   Phosphorus   Result Value Ref Range    Phosphorus 1.8 (L) 2.5 - 4.5 mg/dL   Osmolality urine   Result Value Ref Range    Urine Osmolality 469 100 - 1200 mmol/kg   Sodium   Result Value Ref Range    Sodium 137 133 - 144 mmol/L

## 2017-02-02 ENCOUNTER — APPOINTMENT (OUTPATIENT)
Dept: PHYSICAL THERAPY | Facility: CLINIC | Age: 62
DRG: 194 | End: 2017-02-02
Payer: COMMERCIAL

## 2017-02-02 LAB
ACTH PLAS-MCNC: 10 PG/ML
ALBUMIN SERPL-MCNC: 2.1 G/DL (ref 3.4–5)
ALP SERPL-CCNC: 110 U/L (ref 40–150)
ALT SERPL W P-5'-P-CCNC: 20 U/L (ref 0–50)
ANION GAP SERPL CALCULATED.3IONS-SCNC: 10 MMOL/L (ref 3–14)
ANISOCYTOSIS BLD QL SMEAR: SLIGHT
AST SERPL W P-5'-P-CCNC: 32 U/L (ref 0–45)
BACTERIA SPEC CULT: NO GROWTH
BACTERIA SPEC CULT: NO GROWTH
BASOPHILS # BLD AUTO: 0 10E9/L (ref 0–0.2)
BASOPHILS NFR BLD AUTO: 0 %
BILIRUB DIRECT SERPL-MCNC: <0.1 MG/DL (ref 0–0.2)
BILIRUB SERPL-MCNC: 0.2 MG/DL (ref 0.2–1.3)
BUN SERPL-MCNC: 3 MG/DL (ref 7–30)
CALCIUM SERPL-MCNC: 7.7 MG/DL (ref 8.5–10.1)
CHLORIDE SERPL-SCNC: 103 MMOL/L (ref 94–109)
CO2 SERPL-SCNC: 22 MMOL/L (ref 20–32)
CORTIS SERPL-MCNC: 22.7 UG/DL (ref 4–22)
CREAT SERPL-MCNC: 0.58 MG/DL (ref 0.52–1.04)
DACRYOCYTES BLD QL SMEAR: SLIGHT
DIFFERENTIAL METHOD BLD: ABNORMAL
EOSINOPHIL # BLD AUTO: 0.2 10E9/L (ref 0–0.7)
EOSINOPHIL NFR BLD AUTO: 1.8 %
ERYTHROCYTE [DISTWIDTH] IN BLOOD BY AUTOMATED COUNT: 17.2 % (ref 10–15)
FSH SERPL-ACNC: 5.5 IU/L
GFR SERPL CREATININE-BSD FRML MDRD: ABNORMAL ML/MIN/1.7M2
GLUCOSE SERPL-MCNC: 77 MG/DL (ref 70–99)
HCT VFR BLD AUTO: 26.3 % (ref 35–47)
HGB BLD-MCNC: 8.7 G/DL (ref 11.7–15.7)
LYMPHOCYTES # BLD AUTO: 1.1 10E9/L (ref 0.8–5.3)
LYMPHOCYTES NFR BLD AUTO: 8.9 %
MCH RBC QN AUTO: 32 PG (ref 26.5–33)
MCHC RBC AUTO-ENTMCNC: 33.1 G/DL (ref 31.5–36.5)
MCV RBC AUTO: 97 FL (ref 78–100)
METAMYELOCYTES # BLD: 0.7 10E9/L
METAMYELOCYTES NFR BLD MANUAL: 6.2 %
MICRO REPORT STATUS: NORMAL
MICRO REPORT STATUS: NORMAL
MONOCYTES # BLD AUTO: 0.5 10E9/L (ref 0–1.3)
MONOCYTES NFR BLD AUTO: 4.5 %
MYELOCYTES # BLD: 0.4 10E9/L
MYELOCYTES NFR BLD MANUAL: 3.6 %
NEUTROPHILS # BLD AUTO: 8.7 10E9/L (ref 1.6–8.3)
NEUTROPHILS NFR BLD AUTO: 73.2 %
OVALOCYTES BLD QL SMEAR: SLIGHT
PHOSPHATE SERPL-MCNC: 2.4 MG/DL (ref 2.5–4.5)
PLATELET # BLD AUTO: 252 10E9/L (ref 150–450)
PLATELET # BLD EST: ABNORMAL 10*3/UL
POIKILOCYTOSIS BLD QL SMEAR: SLIGHT
POTASSIUM SERPL-SCNC: 4.1 MMOL/L (ref 3.4–5.3)
PROMYELOCYTES # BLD MANUAL: 0.2 10E9/L
PROMYELOCYTES NFR BLD MANUAL: 1.8 %
PROT SERPL-MCNC: 5.2 G/DL (ref 6.8–8.8)
RBC # BLD AUTO: 2.72 10E12/L (ref 3.8–5.2)
SODIUM SERPL-SCNC: 133 MMOL/L (ref 133–144)
SODIUM SERPL-SCNC: 134 MMOL/L (ref 133–144)
SPECIMEN SOURCE: NORMAL
SPECIMEN SOURCE: NORMAL
T4 FREE SERPL-MCNC: 0.97 NG/DL (ref 0.76–1.46)
TSH SERPL DL<=0.05 MIU/L-ACNC: 3.67 MU/L (ref 0.4–4)
WBC # BLD AUTO: 11.9 10E9/L (ref 4–11)

## 2017-02-02 PROCEDURE — 36415 COLL VENOUS BLD VENIPUNCTURE: CPT | Performed by: STUDENT IN AN ORGANIZED HEALTH CARE EDUCATION/TRAINING PROGRAM

## 2017-02-02 PROCEDURE — 82533 TOTAL CORTISOL: CPT | Performed by: PHYSICIAN ASSISTANT

## 2017-02-02 PROCEDURE — 25000125 ZZHC RX 250: Performed by: INTERNAL MEDICINE

## 2017-02-02 PROCEDURE — 25000132 ZZH RX MED GY IP 250 OP 250 PS 637: Performed by: INTERNAL MEDICINE

## 2017-02-02 PROCEDURE — 99232 SBSQ HOSP IP/OBS MODERATE 35: CPT | Performed by: INTERNAL MEDICINE

## 2017-02-02 PROCEDURE — 84295 ASSAY OF SERUM SODIUM: CPT | Performed by: STUDENT IN AN ORGANIZED HEALTH CARE EDUCATION/TRAINING PROGRAM

## 2017-02-02 PROCEDURE — 97530 THERAPEUTIC ACTIVITIES: CPT | Mod: GP | Performed by: PHYSICAL THERAPIST

## 2017-02-02 PROCEDURE — 85025 COMPLETE CBC W/AUTO DIFF WBC: CPT | Performed by: INTERNAL MEDICINE

## 2017-02-02 PROCEDURE — 25000128 H RX IP 250 OP 636: Performed by: INTERNAL MEDICINE

## 2017-02-02 PROCEDURE — 12000008 ZZH R&B INTERMEDIATE UMMC

## 2017-02-02 PROCEDURE — 84439 ASSAY OF FREE THYROXINE: CPT | Performed by: INTERNAL MEDICINE

## 2017-02-02 PROCEDURE — 25000132 ZZH RX MED GY IP 250 OP 250 PS 637: Performed by: PHYSICIAN ASSISTANT

## 2017-02-02 PROCEDURE — 25000132 ZZH RX MED GY IP 250 OP 250 PS 637

## 2017-02-02 PROCEDURE — 97110 THERAPEUTIC EXERCISES: CPT | Mod: GP | Performed by: PHYSICAL THERAPIST

## 2017-02-02 PROCEDURE — 80048 BASIC METABOLIC PNL TOTAL CA: CPT | Performed by: INTERNAL MEDICINE

## 2017-02-02 PROCEDURE — 83001 ASSAY OF GONADOTROPIN (FSH): CPT | Performed by: INTERNAL MEDICINE

## 2017-02-02 PROCEDURE — 84443 ASSAY THYROID STIM HORMONE: CPT | Performed by: INTERNAL MEDICINE

## 2017-02-02 PROCEDURE — 36415 COLL VENOUS BLD VENIPUNCTURE: CPT | Performed by: INTERNAL MEDICINE

## 2017-02-02 PROCEDURE — 84100 ASSAY OF PHOSPHORUS: CPT | Performed by: INTERNAL MEDICINE

## 2017-02-02 PROCEDURE — 40000193 ZZH STATISTIC PT WARD VISIT: Performed by: PHYSICAL THERAPIST

## 2017-02-02 PROCEDURE — 82024 ASSAY OF ACTH: CPT | Performed by: PHYSICIAN ASSISTANT

## 2017-02-02 PROCEDURE — 97116 GAIT TRAINING THERAPY: CPT | Mod: GP | Performed by: PHYSICAL THERAPIST

## 2017-02-02 RX ORDER — LEVOFLOXACIN 750 MG/1
750 TABLET, FILM COATED ORAL DAILY
Qty: 6 TABLET | Refills: 0 | Status: SHIPPED | OUTPATIENT
Start: 2017-02-02 | End: 2017-02-08

## 2017-02-02 RX ORDER — LOPERAMIDE HCL 2 MG
2 CAPSULE ORAL 4 TIMES DAILY PRN
Qty: 20 CAPSULE | COMMUNITY
Start: 2017-02-02 | End: 2017-03-07

## 2017-02-02 RX ORDER — LEVOFLOXACIN 750 MG/1
750 TABLET, FILM COATED ORAL DAILY
Status: DISCONTINUED | OUTPATIENT
Start: 2017-02-02 | End: 2017-02-03 | Stop reason: HOSPADM

## 2017-02-02 RX ADMIN — ENOXAPARIN SODIUM 40 MG: 40 INJECTION SUBCUTANEOUS at 20:29

## 2017-02-02 RX ADMIN — GUAIFENESIN 600 MG: 600 TABLET, EXTENDED RELEASE ORAL at 20:29

## 2017-02-02 RX ADMIN — MULTIPLE VITAMINS W/ MINERALS TAB 1 TABLET: TAB at 07:58

## 2017-02-02 RX ADMIN — DESMOPRESSIN ACETATE 100 MCG: 0.1 TABLET ORAL at 22:10

## 2017-02-02 RX ADMIN — PROCHLORPERAZINE EDISYLATE 10 MG: 5 INJECTION INTRAMUSCULAR; INTRAVENOUS at 08:19

## 2017-02-02 RX ADMIN — ONDANSETRON HYDROCHLORIDE 8 MG: 8 TABLET, FILM COATED ORAL at 19:15

## 2017-02-02 RX ADMIN — LEVOFLOXACIN 750 MG: 750 TABLET, FILM COATED ORAL at 15:47

## 2017-02-02 RX ADMIN — ONDANSETRON HYDROCHLORIDE 8 MG: 8 TABLET, FILM COATED ORAL at 03:45

## 2017-02-02 RX ADMIN — POTASSIUM PHOSPHATE, MONOBASIC AND POTASSIUM PHOSPHATE, DIBASIC 15 MMOL: 224; 236 INJECTION, SOLUTION INTRAVENOUS at 08:57

## 2017-02-02 RX ADMIN — CHOLECALCIFEROL CAP 125 MCG (5000 UNIT) 5000 UNITS: 125 CAP at 07:58

## 2017-02-02 RX ADMIN — OMEPRAZOLE 40 MG: 20 CAPSULE, DELAYED RELEASE ORAL at 07:58

## 2017-02-02 RX ADMIN — GUAIFENESIN 600 MG: 600 TABLET, EXTENDED RELEASE ORAL at 07:58

## 2017-02-02 RX ADMIN — NAPROXEN 250 MG: 250 TABLET ORAL at 07:58

## 2017-02-02 ASSESSMENT — VISUAL ACUITY
OU: BASELINE

## 2017-02-02 NOTE — PLAN OF CARE
Problem: Goal Outcome Summary  Goal: Goal Outcome Summary  Outcome: Improving  Patient remains stable with vitals in the normal range. Sodium was 137 again this evening and stable, potassium 4.3 and phosphorus 2.3. Phosphorus replacement is underway. Neuro status is with in normal limits. Hopes to discharge tomorrow.

## 2017-02-02 NOTE — PROGRESS NOTES
[facility], [place]    Hematology / Oncology Progress Note    Date of Service (when I saw the patient): 02/02/2017     Assessment and Plan  Keren Erickson is a 61 year old female with metastatic breast cancer who presents with shortness of breath and cough.    #Shortness of breath and cough.   #Presumed healthcare associated PNA. New opacities seen on 1/27 imaging compared to 1/25. No PE identified on scan. Procal 0.66 on admission. Pt notes ongoing symptomatic improvement since admission. No longer needed supplemental O2. Vanc d/c on 1/29 given afebrile, no positive cultures thus far.   - Remains afebrile. Mild leukocytosis on admission (WBC count 11.4K)  -- slight bump in WBCs this morning at 11.9  - influenza screen negative, RVP negative  - surveillance BCx (1/27) NGTD  - 1/28 sputum culture with mixed gram positive/gram negative bacteria on GS, culture with normal rolando to date.  - continue Levaquin (x1/28)  - continue mucinex for expectorant prn    #Metastatic breast cancer. Primary oncologist is Dr. Harper. Currently enrolled in [place]  trial, randomized to standard care. Last treatment with Gemzar and Herceptin 1/17/2017. Per notes, was tolerating this well. Had restaging brain MRI and CT chest/abdomen/pelvis on 1/25/17. MRI brain stable. CT scan stable other than nodular groundglass opacities as above.    #Hypernatremia. Asymptomatic. Na 147 (1/28)-->156. Improved with D5W 1/29, but increased again when D5 stopped. Endocrinology consulted, appreciate recs. Responded to DDVAP challenge, suggests central DI. Na WNL for 24 hrs w/o IVF.  - Continue desmopressin 100 mcg tonight  - Additional hormone labs added per Endocrine: thyroid function, ACTH appear normal, awaiting additional labs    # Diarrhea. Resolved. Several loose bowel movements yesterday and today. C diff negative. Relieved with Imodium.    #FEN.   - Regular diet as tolerated.  - Lytes replaced per  protocol    PPx/Misc:  - Lovenox ppx  - continue PPI  - PT consult - now recommending TCU    Disposition: PT now recommending TCU given poor performance during therapy yesterday and today.     The above plan of care has been discussed with Dr. Meneses.    Keren Briggs PA-C  Hematology/Oncology  Pager: [phone]     Interval History  Denia is feeling more weak today than yesterday. She says her legs don't feel like they can hold her up. Reports continued improvement in cough. Using 1.5 L O2 overnight, but now doing well on room air.     Physical Exam  Temp: 95.7  F (35.4  C) Temp src: Oral BP: 120/81 mmHg   Heart Rate: 79 Resp: 16 SpO2: 91 % O2 Device: None (Room air)    Filed Vitals:    01/31/17 0723 02/01/17 0727 02/02/17 0722   Weight: 49.805 kg (109 lb 12.8 oz) 50.077 kg (110 lb 6.4 oz) 49.578 kg (109 lb 4.8 oz)     Vital Signs with Ranges  Temp:  [95.7  F (35.4  C)-97.3  F (36.3  C)] 95.7  F (35.4  C)  Heart Rate:  [76-86] 79  Resp:  [16-20] 16  BP: (109-125)/(71-81) 120/81 mmHg  SpO2:  [90 %-94 %] 91 %  I/O last 3 completed shifts:  In: -   Out: 2540 [Urine:1240; Other:1300]  Constitutional: Pleasant, chronically-ill appearing female seen sitting up in bed. She is alert, interactive, NAD.  HEENT: NC/AT, alopecia. No rhinorrhea appreciated. OP pink and moist without erythema/lesions.   Respiratory: Breathing even, non-labored room air. Able to speak in full sentences. Lungs slightly course b/l in upper fields with rhonchi, decrease breath sounds at bases.  Cardiovascular: RRR, no murmur noted.  GI: Normal BS. Abd soft, non-distended, non-tender.  Skin: Pale, clean/dry, intact.   Musculoskeletal: Thin, no edema.   Neurologic: Awake, alert, oriented. Grossly nonfocal.   Neuropsychiatric: Calm, normal eye contact, alert, affect congruent.     Medications    - MEDICATION INSTRUCTIONS -         levofloxacin  750 mg Oral Daily     desmopressin  100 mcg Oral At Bedtime     guaiFENesin  600 mg Oral BID      sodium chloride (PF)  3 mL Intracatheter Q8H     cholecalciferol  5,000 Units Oral Daily     multivitamin, therapeutic with minerals  1 tablet Oral Daily     omeprazole  40 mg Oral QAM     enoxaparin  40 mg Subcutaneous Q24H     naproxen  250 mg Oral Daily       Data  Results for orders placed or performed during the hospital encounter of 01/27/17 (from the past 24 hour(s))   Sodium   Result Value Ref Range    Sodium 137 133 - 144 mmol/L   Sodium   Result Value Ref Range    Sodium 137 133 - 144 mmol/L   Potassium   Result Value Ref Range    Potassium 4.3 3.4 - 5.3 mmol/L   Phosphorus   Result Value Ref Range    Phosphorus 2.3 (L) 2.5 - 4.5 mg/dL   Sodium   Result Value Ref Range    Sodium  133 - 144 mmol/L     Duplicate request   Canceled, Test credited  SEE [id number]     Sodium   Result Value Ref Range    Sodium 134 133 - 144 mmol/L   Basic metabolic panel   Result Value Ref Range    Sodium 134 133 - 144 mmol/L    Potassium 4.1 3.4 - 5.3 mmol/L    Chloride 103 94 - 109 mmol/L    Carbon Dioxide 22 20 - 32 mmol/L    Anion Gap 10 3 - 14 mmol/L    Glucose 77 70 - 99 mg/dL    Urea Nitrogen 3 (L) 7 - 30 mg/dL    Creatinine 0.58 0.52 - 1.04 mg/dL    GFR Estimate >90  Non  GFR Calc   >60 mL/min/1.7m2    GFR Estimate If Black >90   GFR Calc   >60 mL/min/1.7m2    Calcium 7.7 (L) 8.5 - 10.1 mg/dL   CBC with platelets differential   Result Value Ref Range    WBC 11.9 (H) 4.0 - 11.0 10e9/L    RBC Count 2.72 (L) 3.8 - 5.2 10e12/L    Hemoglobin 8.7 (L) 11.7 - 15.7 g/dL    Hematocrit 26.3 (L) 35.0 - 47.0 %    MCV 97 78 - 100 fl    MCH 32.0 26.5 - 33.0 pg    MCHC 33.1 31.5 - 36.5 g/dL    RDW 17.2 (H) 10.0 - 15.0 %    Platelet Count 252 150 - 450 10e9/L    Diff Method Manual Differential     % Neutrophils 73.2 %    % Lymphocytes 8.9 %    % Monocytes 4.5 %    % Eosinophils 1.8 %    % Basophils 0.0 %    % Metamyelocytes 6.2 %    % Myelocytes 3.6 %    % Promyelocytes 1.8 %    Absolute Neutrophil 8.7 (H) 1.6  - 8.3 10e9/L    Absolute Lymphocytes 1.1 0.8 - 5.3 10e9/L    Absolute Monocytes 0.5 0.0 - 1.3 10e9/L    Absolute Eosinophils 0.2 0.0 - 0.7 10e9/L    Absolute Basophils 0.0 0.0 - 0.2 10e9/L    Absolute Metamyelocytes 0.7 (H) 0 10e9/L    Absolute Myelocytes 0.4 (H) 0 10e9/L    Absolute Promyeloctyes 0.2 (H) 0 10e9/L    Anisocytosis Slight     Poikilocytosis Slight     Teardrop Cells Slight     Ovalocytes Slight     Platelet Estimate Confirming automated cell count    Adrenal corticotropin   Result Value Ref Range    Adrenal Corticotropin 10 <47 pg/mL   TSH   Result Value Ref Range    TSH 3.67 0.40 - 4.00 mU/L   T4 free   Result Value Ref Range    T4 Free 0.97 0.76 - 1.46 ng/dL   Follicle stimulating hormone   Result Value Ref Range    FSH 5.5 IU/L   Phosphorus   Result Value Ref Range    Phosphorus 2.4 (L) 2.5 - 4.5 mg/dL   Sodium   Result Value Ref Range    Sodium 134 133 - 144 mmol/L

## 2017-02-02 NOTE — PROGRESS NOTES
Social Work Services Progress Note    Hospital Day: 7  Collaborated with:  Oncology team, 7D RN staff, pt's PCA (see RN note), and TCU admissions  Data:  Received update from team re: medically ready for DC to TCU. PT/OT recommendation for TCU.  Pt's bedside RN spoke with pt's PCA whom states both pt and PCA in agreement with TCU and request [facility].  Intervention:  Referral made to [facility], currently assessing.  Anticipate able to accept, pending insurance auth.  Assessment:  see bedside RN, PT/OT and provider notes  Plan:    Anticipated Disposition:  Facility:  TCU    Barriers to d/c plan:  Acceptance / insurance auth    Follow Up:  SW will continue to follow and assist with DC planning.    CHIN Garcia, MSW  7D  (Hem/Onc)  Pager: [phone]  2/2/2017

## 2017-02-02 NOTE — PLAN OF CARE
Problem: Goal Outcome Summary  Goal: Goal Outcome Summary  Outcome: No Change  Patients neuros unchanged from 24 hours ago. Patient reports feeling better and stronger.Has some nausea treated with zofran.  No new reports of nausea.Afebrile ovss.Last sodium = 134.Sodium phos replacement completed by 0330. Lab ordered for 0530 for recheck

## 2017-02-02 NOTE — PLAN OF CARE
Problem: Goal Outcome Summary  Goal: Goal Outcome Summary  PT 7D- pt seen this am for gait, transfers and LE exercise. Pt very fatigued and weak. Pt was exhausted just getting on and off the commode. Pt only able to walk 22 feet with 4ww before needed to sit and rest. Pt unable to climb stairs today 2nd fatigue. Her muscles shake with any activity. At this time would recommend TCU at discharge. Pt is too weak to climb the 12 steps she has at home. Even though she has 6 hrs PCA care do not think pt would be safe at home at this time. PT discussed this with pt who reluctantly agreed.

## 2017-02-02 NOTE — PLAN OF CARE
Afebrile, vss. Fatigue fatigue/weakness unchanged. Plan is for [facility] at [facility], pending insurance. Phos replaced. Please recheck with next Na draw to cluster needle sticks. C/o nausea this am relieved with zofran. Decreased appetite. Call [facility] with updates or changes [phone]. Continue to monitor.

## 2017-02-03 ENCOUNTER — CARE COORDINATION (OUTPATIENT)
Dept: CARDIOLOGY | Facility: CLINIC | Age: 62
End: 2017-02-03

## 2017-02-03 ENCOUNTER — APPOINTMENT (OUTPATIENT)
Dept: PHYSICAL THERAPY | Facility: CLINIC | Age: 62
DRG: 194 | End: 2017-02-03
Payer: COMMERCIAL

## 2017-02-03 VITALS
OXYGEN SATURATION: 93 % | HEIGHT: 63 IN | HEART RATE: 78 BPM | BODY MASS INDEX: 18.57 KG/M2 | SYSTOLIC BLOOD PRESSURE: 137 MMHG | WEIGHT: 104.8 LBS | TEMPERATURE: 97.3 F | DIASTOLIC BLOOD PRESSURE: 84 MMHG | RESPIRATION RATE: 20 BRPM

## 2017-02-03 LAB
ANION GAP SERPL CALCULATED.3IONS-SCNC: 10 MMOL/L (ref 3–14)
ANISOCYTOSIS BLD QL SMEAR: ABNORMAL
BASOPHILS # BLD AUTO: 0 10E9/L (ref 0–0.2)
BASOPHILS NFR BLD AUTO: 0 %
BUN SERPL-MCNC: 5 MG/DL (ref 7–30)
CALCIUM SERPL-MCNC: 7.8 MG/DL (ref 8.5–10.1)
CHLORIDE SERPL-SCNC: 107 MMOL/L (ref 94–109)
CO2 SERPL-SCNC: 23 MMOL/L (ref 20–32)
CREAT SERPL-MCNC: 0.68 MG/DL (ref 0.52–1.04)
DIFFERENTIAL METHOD BLD: ABNORMAL
EOSINOPHIL # BLD AUTO: 0.1 10E9/L (ref 0–0.7)
EOSINOPHIL NFR BLD AUTO: 0.9 %
ERYTHROCYTE [DISTWIDTH] IN BLOOD BY AUTOMATED COUNT: 17.4 % (ref 10–15)
GFR SERPL CREATININE-BSD FRML MDRD: 88 ML/MIN/1.7M2
GLUCOSE SERPL-MCNC: 78 MG/DL (ref 70–99)
HCT VFR BLD AUTO: 26.7 % (ref 35–47)
HGB BLD-MCNC: 8.9 G/DL (ref 11.7–15.7)
LYMPHOCYTES # BLD AUTO: 1 10E9/L (ref 0.8–5.3)
LYMPHOCYTES NFR BLD AUTO: 8.6 %
MACROCYTES BLD QL SMEAR: PRESENT
MAGNESIUM SERPL-MCNC: 2.3 MG/DL (ref 1.6–2.3)
MCH RBC QN AUTO: 32 PG (ref 26.5–33)
MCHC RBC AUTO-ENTMCNC: 33.3 G/DL (ref 31.5–36.5)
MCV RBC AUTO: 96 FL (ref 78–100)
METAMYELOCYTES # BLD: 0.9 10E9/L
METAMYELOCYTES NFR BLD MANUAL: 7.8 %
MONOCYTES # BLD AUTO: 0.6 10E9/L (ref 0–1.3)
MONOCYTES NFR BLD AUTO: 5.2 %
MYELOCYTES # BLD: 1.1 10E9/L
MYELOCYTES NFR BLD MANUAL: 10.3 %
NEUTROPHILS # BLD AUTO: 7.1 10E9/L (ref 1.6–8.3)
NEUTROPHILS NFR BLD AUTO: 63.8 %
OVALOCYTES BLD QL SMEAR: SLIGHT
PHOSPHATE SERPL-MCNC: 2.2 MG/DL (ref 2.5–4.5)
PLATELET # BLD AUTO: 297 10E9/L (ref 150–450)
PLATELET # BLD EST: ABNORMAL 10*3/UL
POIKILOCYTOSIS BLD QL SMEAR: SLIGHT
POTASSIUM SERPL-SCNC: 4.1 MMOL/L (ref 3.4–5.3)
PROMYELOCYTES # BLD MANUAL: 0.4 10E9/L
PROMYELOCYTES NFR BLD MANUAL: 3.4 %
RBC # BLD AUTO: 2.78 10E12/L (ref 3.8–5.2)
SODIUM SERPL-SCNC: 139 MMOL/L (ref 133–144)
WBC # BLD AUTO: 11.1 10E9/L (ref 4–11)

## 2017-02-03 PROCEDURE — 80048 BASIC METABOLIC PNL TOTAL CA: CPT | Performed by: INTERNAL MEDICINE

## 2017-02-03 PROCEDURE — 25000132 ZZH RX MED GY IP 250 OP 250 PS 637: Performed by: PHYSICIAN ASSISTANT

## 2017-02-03 PROCEDURE — 25000132 ZZH RX MED GY IP 250 OP 250 PS 637

## 2017-02-03 PROCEDURE — 25000132 ZZH RX MED GY IP 250 OP 250 PS 637: Performed by: INTERNAL MEDICINE

## 2017-02-03 PROCEDURE — 84100 ASSAY OF PHOSPHORUS: CPT | Performed by: INTERNAL MEDICINE

## 2017-02-03 PROCEDURE — 36415 COLL VENOUS BLD VENIPUNCTURE: CPT | Performed by: INTERNAL MEDICINE

## 2017-02-03 PROCEDURE — 83735 ASSAY OF MAGNESIUM: CPT | Performed by: INTERNAL MEDICINE

## 2017-02-03 PROCEDURE — 97116 GAIT TRAINING THERAPY: CPT | Mod: GP

## 2017-02-03 PROCEDURE — 25000125 ZZHC RX 250: Performed by: INTERNAL MEDICINE

## 2017-02-03 PROCEDURE — 85025 COMPLETE CBC W/AUTO DIFF WBC: CPT | Performed by: INTERNAL MEDICINE

## 2017-02-03 PROCEDURE — 40000193 ZZH STATISTIC PT WARD VISIT

## 2017-02-03 PROCEDURE — 99238 HOSP IP/OBS DSCHRG MGMT 30/<: CPT | Performed by: INTERNAL MEDICINE

## 2017-02-03 RX ORDER — DESMOPRESSIN ACETATE 0.1 MG/1
0.1 TABLET ORAL AT BEDTIME
Qty: 30 TABLET | Refills: 0 | Status: SHIPPED | OUTPATIENT
Start: 2017-02-03 | End: 2017-02-28

## 2017-02-03 RX ADMIN — NAPROXEN 250 MG: 250 TABLET ORAL at 08:07

## 2017-02-03 RX ADMIN — MULTIPLE VITAMINS W/ MINERALS TAB 1 TABLET: TAB at 08:07

## 2017-02-03 RX ADMIN — OMEPRAZOLE 40 MG: 20 CAPSULE, DELAYED RELEASE ORAL at 08:07

## 2017-02-03 RX ADMIN — POTASSIUM PHOSPHATE, MONOBASIC AND POTASSIUM PHOSPHATE, DIBASIC 15 MMOL: 224; 236 INJECTION, SOLUTION INTRAVENOUS at 10:33

## 2017-02-03 RX ADMIN — ONDANSETRON HYDROCHLORIDE 8 MG: 8 TABLET, FILM COATED ORAL at 07:46

## 2017-02-03 RX ADMIN — GUAIFENESIN 600 MG: 600 TABLET, EXTENDED RELEASE ORAL at 08:07

## 2017-02-03 RX ADMIN — CHOLECALCIFEROL CAP 125 MCG (5000 UNIT) 5000 UNITS: 125 CAP at 08:07

## 2017-02-03 ASSESSMENT — VISUAL ACUITY
OU: BASELINE
OU: BASELINE

## 2017-02-03 NOTE — DISCHARGE SUMMARY
[facility], [place]    Discharge Summary  Hematology / Oncology    Date of Admission:  1/27/2017  Date of Discharge:  02/03/2017  Discharging Provider: Keren Briggs  Date of Service (when I saw the patient): 02/03/2017    Discharge Diagnoses  Metastatic breast cancer  Presumed HCAP  Central DI  Hypophosphatemia    History of Present Illness  Adapted from H&P:    Keren Erickson is an 61 year old female with metastatic breast cancer patient who states that she got breast cancer 3 years ago and that it spread pretty much everywhere. The patient states she s been treated with radiation and chemo but no surgery. The patient states that she recently underwent a brain MRI as well as a chest CT but states that over the past couple of days she has developed an upper respiratory infection with some nasal congestion and a cough that s been nonproductive in nature with worsening shortness of breath. She had temp of 99.9. No CP, mild sore throat she thinks it is from dry air. Poor appetite in last 2 days.  The patient states she s had no vomiting, no diarrhea, no melena or bright blood per rectum, and she presents here to the ER for evaluation. Denies any other symptoms on full ROS.    Hospital Course  Keren Erickson was admitted on 1/27/2017.  The following problems were addressed during her hospitalization:    #Shortness of breath and cough.    #Presumed healthcare associated PNA.   Afebrile while admitted. No positive cultures. Mild leukocytosis on discharge at 11.1. Will continue Levaquin until 2/7. Continue mucinex for expectorant prn.    #Metastatic breast cancer. Primary oncologist is Dr. Harper. Currently enrolled in [place]  trial, randomized to standard care. Last treatment with Gemzar and Herceptin 1/17/2017. Per notes, was tolerating this well. Had restaging brain MRI and CT chest/abdomen/pelvis on 1/25/17. MRI brain stable. CT scan stable other than  nodular groundglass opacities as above. Follow-up in clinic with Deyaniraroby Costello on 2/7    #Hypernatremia.   #Central DI.  Asymptomatic. Endocrinology consulted. Responded to DDVAP challenge, which suggests neurogenic DI. Will continue on desmopressin 100 mcg qhs. Additional labs showed normal thyroid and adrenal function, but low FSH. Follow-up as outpatient with Endocrinology in 1 month. Will have labs checked on f/u appointment.    #Diarrhea. Resolved. C diff negative. Relieved with Imodium.    #Hypophosphatemia. She has received phosphorous replacement for past several days. Will discharge with one week K phos 500 mg QID. Labs to be rechecked on 2/7, could consider discontinuing phos replacement.    Above plan has been discussed with Dr. Meneses.    Keren Briggs PA-C  Hematology/Oncology  Pager: [phone]    Code Status  DNR / DNI    Primary Care Physician  Kelly Hart    Vital Signs with Ranges  Temp:  [96.4  F (35.8  C)-97.5  F (36.4  C)] 97.3  F (36.3  C)  Heart Rate:  [79-87] 83  Resp:  [16-20] 20  BP: (104-137)/(67-85) 137/84 mmHg  SpO2:  [92 %-94 %] 93 %  104 lbs 12.8 oz    Physical Exam  Constitutional: Pleasant, chronically-ill appearing female seen sitting up in bed. She is alert, interactive, NAD.  HEENT: NC/AT, alopecia. No rhinorrhea appreciated. OP pink and moist without erythema/lesions.    Respiratory: Breathing even, non-labored room air. Able to speak in full sentences. Lungs slightly course b/l in upper fields with rhonchi, decrease breath sounds at bases.  Cardiovascular: RRR, no murmur noted.  GI: Normal BS. Abd soft, non-distended, non-tender.  Skin: Pale, clean/dry, intact.    Musculoskeletal: Thin, no edema.    Neurologic: Awake, alert, oriented. Grossly nonfocal.    Neuropsychiatric: Calm, normal eye contact, alert, affect congruent.     Discharge Disposition  Discharged to home  Condition at discharge: Good    Consultations This Hospital Stay  PHARMACY TO DOSE  VANCO  MEDICATION HISTORY IP PHARMACY CONSULT  PHARMACY TO DOSE VANCO  PHYSICAL THERAPY ADULT IP CONSULT  VASCULAR ACCESS CARE ADULT IP CONSULT  VASCULAR ACCESS CARE ADULT IP CONSULT  ENDOCRINOLOGY IP CONSULT  NEPHROLOGY GENERAL ADULT IP CONSULT  VASCULAR ACCESS CARE ADULT IP CONSULT  VASCULAR ACCESS CARE ADULT IP CONSULT    Discharge Orders    Phosphorus     Basic metabolic panel     CBC with platelets differential   Last Lab Result: HEMOGLOBIN (g/dL)      Date                     Value                02/02/2017               8.7*             ----------     Magnesium     Home care nursing referral     Home Care PT Referral for Hospital Discharge     Home Care OT Referral for Hospital Discharge     Reason for your hospital stay   You had pneumonia that was making you feel short of breath requiring oxygen use and antibiotics.     Adult [facility] Follow-up and recommended labs and tests   You should follow-up in clinic with oncology sometime this week. We will make an appointment for you and contact you with the day and time of the appointment.    Appointments on [place] and/or [facility] (with [facility] or [facility] provider or service). Call [phone] if you haven't heard regarding these appointments within 7 days of discharge.     When to contact your care team   Please call the [facility] at [phone] (Monday - Friday; 8 AM - 4:30 PM) if your shortness of breath worsens, if you have a temperature above 100.4, or for chest pain, headaches, vision changes, bleeding, uncontrolled nausea, vomiting, diarrhea, or pain.    For weekends or after hours, call the main hospital number at [phone], and request to speak with the on-call hematology/oncology physician.     Activity   Your activity upon discharge: activity as tolerated     Adult [facility] Follow-up and recommended labs and tests   Follow-up appointment with Endocrinology for Diabetes Insipidus and low FSH labs  in 1 month.    Appointments on [place] and/or [facility] (with [facility] or [facility] provider or service). Call [phone] if you haven't heard regarding these appointments within 7 days of discharge.     DNR/DNI     Diet   Follow this diet upon discharge: Regular diet       Discharge Medications  Discharge Medication List as of 2/3/2017  2:28 PM      START taking these medications    Details   desmopressin (DDAVP) 0.1 MG tablet Take 1 tablet (100 mcg) by mouth At Bedtime, Disp-30 tablet, R-0, E-Prescribe      levofloxacin (LEVAQUIN) 750 MG tablet Take 1 tablet (750 mg) by mouth daily for 6 days, Disp-6 tablet, R-0, E-Prescribe      potassium phosphate, monobasic, (K-PHOS) 500 MG tablet Take 1 tablet (500 mg) by mouth 4 times daily, Disp-28 tablet, R-0, E-Prescribe      loperamide (IMODIUM) 2 MG capsule Take 1 capsule (2 mg) by mouth 4 times daily as needed for diarrhea, Disp-20 capsule, OTC      guaiFENesin (MUCINEX) 600 MG 12 hr tablet Take 1 tablet (600 mg) by mouth 2 times daily, Disp-60 tablet, R-0, E-Prescribe         CONTINUE these medications which have NOT CHANGED    Details   omeprazole (PRILOSEC) 40 MG capsule Take 1 capsule (40 mg) by mouth as needed, Disp-90 capsule, R-3, E-Prescribe      ondansetron (ZOFRAN) 8 MG tablet Take 1 tablet (8 mg) by mouth every 8 hours as needed for nausea, Disp-30 tablet, R-3, E-Prescribe      prochlorperazine (COMPAZINE) 10 MG tablet Take 1 tablet (10 mg) by mouth every 6 hours as needed (Nausea/Vomiting), Disp-30 tablet, R-5, E-Prescribe      Naproxen Sodium (ALEVE PO) Take 220 mg by mouth daily, Historical      Cholecalciferol (D3 ADULT PO) Take 5,000 Units by mouth daily , Historical      LORazepam (ATIVAN) 0.5 MG tablet Take 1 tablet (0.5 mg) by mouth every 4 hours as needed (Anxiety, Nausea/Vomiting or Sleep), Disp-30 tablet, R-5, Local Print      !! order for DME Cranial prosthesisDisp-1 Units, R-1, Local Print      !! order for DME [facility] to ensure safe  ambulationDisp-1 Units, R-1, Local Print      multivitamin, therapeutic with minerals (MULTI-VITAMIN) TABS Take 1 tablet by mouth daily, Historical       !! - Potential duplicate medications found. Please discuss with provider.        Allergies  Allergies   Allergen Reactions     Nka [No Known Allergies]      Nkda [No Known Drug Allergies]        Data  Most Recent 3 CBC's:  Recent Labs   Lab Test  02/03/17   0621 02/02/17 0616  02/01/17   0410   WBC  11.1*  11.9*  11.0   HGB  8.9*  8.7*  8.3*   MCV  96  97  98   PLT  297  252  225     Most Recent 3 BMP's:  Recent Labs   Lab Test  02/03/17   0621 02/02/17   1322  02/02/17   0914  02/02/17 0616 02/01/17   1947   02/01/17   0410   NA  139  133  134  134   < >  Duplicate request   Canceled, Test credited  SEE [id number]    137   < >  140   POTASSIUM  4.1   --    --   4.1   --   4.3   --   3.2*   CHLORIDE  107   --    --   103   --    --    --   108   CO2  23   --    --   22   --    --    --   21   BUN  5*   --    --   3*   --    --    --   2*   CR  0.68   --    --   0.58   --    --    --   0.58   ANIONGAP  10   --    --   10   --    --    --   10   OMA  7.8*   --    --   7.7*   --    --    --   6.8*   GLC  78   --    --   77   --    --    --   84    < > = values in this interval not displayed.     Most Recent 2 LFT's:  Recent Labs   Lab Test  02/01/17 0410 01/29/17   0544   AST  32  32   ALT  20  19   ALKPHOS  110  122   BILITOTAL  0.2  0.4     Most Recent TSH and T4:  Recent Labs   Lab Test  02/02/17 0616   TSH  3.67   T4  0.97

## 2017-02-03 NOTE — PROGRESS NOTES
Social Work Services Progress Note    Hospital Day: 8  Collaborated with:  RN, medical team  Data:  SW providing coverage for unit 7D .  Chart reviewed and discussed during rounds.   Intervention:  Discharge planning.   Assessment:  SW received call from bedside RN who reports that PT came to assess pt and reports that pt can now return home with home care and PCA services.  Rn reports that she will update medical team.  SW put referral to [facility] on hold.   Plan:    Anticipated Disposition:  Likely home    Barriers to d/c plan:  Pt's medical stability     Follow Up:  Continue to follow to facilitate discharge    KASHMIR Ibrahim for 7C  [phone] or [phone]

## 2017-02-03 NOTE — PLAN OF CARE
Problem: Goal Outcome Summary  Goal: Goal Outcome Summary  Outcome: No Change  AVSS, afebrile. Denies pain. Reports mild nausea, Zofran PO x1 given, good relief, no emesis. Rested well. Ambulated to bedside commode w/ SBA. Move independently in bed. Continue with POC.

## 2017-02-03 NOTE — PLAN OF CARE
Problem: Goal Outcome Summary  Goal: Goal Outcome Summary  PT/7D: Patient demonstrates improved tolerance to activity today. Able to negotiate 4 stairs x 2 with B rails and SBA. Ambulates 200' with FWW and SBA. VSS, denies SOB or needing a rest break during walk. Patient states she has HEP established and can get help as needed at home. PCA will be able to help with stairs to get into the home. Patient agreeable to resume home care therapies as well.     Recommend discharge to home with assist/PCA services and home care therapy.      Physical Therapy Discharge Summary    Reason for therapy discharge:    Discharged to home with home therapy.    Progress towards therapy goal(s). See goals on Care Plan in [facility] electronic health record for goal details.  Goals partially met.  Barriers to achieving goals:   discharge from facility.    Therapy recommendation(s):    Continued therapy is recommended.  Rationale/Recommendations:  to continue to progress strength and improve independence with all functional mobility.

## 2017-02-03 NOTE — PLAN OF CARE
Nursing Focus: Discharge    D: Patient discharged to home at 1400. Patient stable and accompanied by PCA.    I: Discharge prescriptions sent to discharge pharmacy to be filled. All discharge medications and instructions reviewed with pt and PCA. Patient instructed to call clinic triage nurse if she experiences a fever >100.4, uncontrolled nausea, vomiting, diarrhea, or pain; or experiences any signs or symptoms of bleeding. Other phone numbers to call with questions or concerns after discharge reviewed. PIV removed. Education completed.    A: pt verbalized understanding of discharge medications and instructions. Patient will  medications at discharge pharmacy.     P: Patient to follow-up in clinic on 2/7 with Deyanira Costello.

## 2017-02-03 NOTE — PROGRESS NOTES
Patient will return soon next week, before 7 days after DC so she can have post DC follow up at Return Visit on February 7th, 2017 Feb 07, 2017 11:50 AM   (Arrive by 11:35 AM)   Return Visit with Deyanira Costello PA-C   [facility] ([facility])

## 2017-02-07 ENCOUNTER — ONCOLOGY VISIT (OUTPATIENT)
Dept: ONCOLOGY | Facility: CLINIC | Age: 62
End: 2017-02-07
Attending: PHYSICIAN ASSISTANT
Payer: COMMERCIAL

## 2017-02-07 ENCOUNTER — APPOINTMENT (OUTPATIENT)
Dept: LAB | Facility: CLINIC | Age: 62
End: 2017-02-07
Attending: PHYSICIAN ASSISTANT
Payer: COMMERCIAL

## 2017-02-07 ENCOUNTER — OFFICE VISIT (OUTPATIENT)
Dept: PHARMACY | Facility: CLINIC | Age: 62
End: 2017-02-07
Payer: COMMERCIAL

## 2017-02-07 VITALS
TEMPERATURE: 97.8 F | HEART RATE: 94 BPM | BODY MASS INDEX: 19.1 KG/M2 | DIASTOLIC BLOOD PRESSURE: 81 MMHG | OXYGEN SATURATION: 96 % | SYSTOLIC BLOOD PRESSURE: 112 MMHG | RESPIRATION RATE: 16 BRPM | WEIGHT: 107.8 LBS

## 2017-02-07 DIAGNOSIS — C79.31 BRAIN METASTASIS: ICD-10-CM

## 2017-02-07 DIAGNOSIS — C50.919 METASTATIC BREAST CANCER: ICD-10-CM

## 2017-02-07 DIAGNOSIS — C50.919 METASTATIC BREAST CANCER: Primary | ICD-10-CM

## 2017-02-07 DIAGNOSIS — E87.6 HYPOKALEMIA: ICD-10-CM

## 2017-02-07 DIAGNOSIS — C50.919 CARCINOMA OF BREAST METASTATIC TO MULTIPLE SITES, UNSPECIFIED LATERALITY (H): ICD-10-CM

## 2017-02-07 DIAGNOSIS — E83.39 HYPOPHOSPHATEMIA: Primary | ICD-10-CM

## 2017-02-07 DIAGNOSIS — E23.2 DIABETES INSIPIDUS, NEUROHYPOPHYSEAL (H): ICD-10-CM

## 2017-02-07 DIAGNOSIS — J18.9 PNEUMONIA OF BOTH LUNGS DUE TO INFECTIOUS ORGANISM, UNSPECIFIED PART OF LUNG: ICD-10-CM

## 2017-02-07 DIAGNOSIS — C50.919 CARCINOMA OF BREAST METASTATIC TO MULTIPLE SITES, UNSPECIFIED LATERALITY (H): Primary | ICD-10-CM

## 2017-02-07 DIAGNOSIS — E83.39 HYPOPHOSPHATEMIA: ICD-10-CM

## 2017-02-07 DIAGNOSIS — M19.90 OSTEOARTHRITIS, UNSPECIFIED OSTEOARTHRITIS TYPE, UNSPECIFIED SITE: ICD-10-CM

## 2017-02-07 DIAGNOSIS — J18.9 HCAP (HEALTHCARE-ASSOCIATED PNEUMONIA): Primary | ICD-10-CM

## 2017-02-07 DIAGNOSIS — R11.2 CHEMOTHERAPY INDUCED NAUSEA AND VOMITING: ICD-10-CM

## 2017-02-07 DIAGNOSIS — E63.9 NUTRITIONAL DEFICIENCY: ICD-10-CM

## 2017-02-07 DIAGNOSIS — E23.2 DIABETES INSIPIDUS (H): ICD-10-CM

## 2017-02-07 DIAGNOSIS — T45.1X5A CHEMOTHERAPY INDUCED NAUSEA AND VOMITING: ICD-10-CM

## 2017-02-07 DIAGNOSIS — C79.51 BONE METASTASIS: ICD-10-CM

## 2017-02-07 LAB
ANION GAP SERPL CALCULATED.3IONS-SCNC: 9 MMOL/L (ref 3–14)
ANISOCYTOSIS BLD QL SMEAR: ABNORMAL
BASOPHILS # BLD AUTO: 0.2 10E9/L (ref 0–0.2)
BASOPHILS NFR BLD AUTO: 1.8 %
BUN SERPL-MCNC: 13 MG/DL (ref 7–30)
CALCIUM SERPL-MCNC: 9.5 MG/DL (ref 8.5–10.1)
CANCER AG27-29 SERPL-ACNC: 26 U/ML (ref 0–39)
CEA SERPL-MCNC: 1.7 UG/L (ref 0–2.5)
CHLORIDE SERPL-SCNC: 104 MMOL/L (ref 94–109)
CO2 SERPL-SCNC: 24 MMOL/L (ref 20–32)
CREAT SERPL-MCNC: 0.68 MG/DL (ref 0.52–1.04)
DIFFERENTIAL METHOD BLD: ABNORMAL
EOSINOPHIL # BLD AUTO: 0 10E9/L (ref 0–0.7)
EOSINOPHIL NFR BLD AUTO: 0 %
ERYTHROCYTE [DISTWIDTH] IN BLOOD BY AUTOMATED COUNT: 18.6 % (ref 10–15)
GFR SERPL CREATININE-BSD FRML MDRD: 88 ML/MIN/1.7M2
GLUCOSE SERPL-MCNC: 92 MG/DL (ref 70–99)
HCT VFR BLD AUTO: 32.8 % (ref 35–47)
HGB BLD-MCNC: 10.9 G/DL (ref 11.7–15.7)
LYMPHOCYTES # BLD AUTO: 1.8 10E9/L (ref 0.8–5.3)
LYMPHOCYTES NFR BLD AUTO: 16.1 %
MAGNESIUM SERPL-MCNC: 2.3 MG/DL (ref 1.6–2.3)
MCH RBC QN AUTO: 33.1 PG (ref 26.5–33)
MCHC RBC AUTO-ENTMCNC: 33.2 G/DL (ref 31.5–36.5)
MCV RBC AUTO: 100 FL (ref 78–100)
METAMYELOCYTES # BLD: 0.4 10E9/L
METAMYELOCYTES NFR BLD MANUAL: 3.6 %
MONOCYTES # BLD AUTO: 0.9 10E9/L (ref 0–1.3)
MONOCYTES NFR BLD AUTO: 8 %
MYELOCYTES # BLD: 0.1 10E9/L
MYELOCYTES NFR BLD MANUAL: 0.9 %
NEUTROPHILS # BLD AUTO: 7.9 10E9/L (ref 1.6–8.3)
NEUTROPHILS NFR BLD AUTO: 69.6 %
NRBC # BLD AUTO: 0.1 10*3/UL
NRBC BLD AUTO-RTO: 1 /100
PHOSPHATE SERPL-MCNC: 3.8 MG/DL (ref 2.5–4.5)
PLATELET # BLD AUTO: 475 10E9/L (ref 150–450)
POIKILOCYTOSIS BLD QL SMEAR: SLIGHT
POLYCHROMASIA BLD QL SMEAR: SLIGHT
POTASSIUM SERPL-SCNC: 4.6 MMOL/L (ref 3.4–5.3)
RBC # BLD AUTO: 3.29 10E12/L (ref 3.8–5.2)
SODIUM SERPL-SCNC: 138 MMOL/L (ref 133–144)
WBC # BLD AUTO: 11.3 10E9/L (ref 4–11)

## 2017-02-07 PROCEDURE — 99605 MTMS BY PHARM NP 15 MIN: CPT | Performed by: PHARMACIST

## 2017-02-07 PROCEDURE — 25000125 ZZHC RX 250: Mod: ZF | Performed by: PHYSICIAN ASSISTANT

## 2017-02-07 PROCEDURE — 85025 COMPLETE CBC W/AUTO DIFF WBC: CPT | Performed by: PHYSICIAN ASSISTANT

## 2017-02-07 PROCEDURE — 40000141 ZZH STATISTIC PERIPHERAL IV START W/O US GUIDANCE: Mod: ZF

## 2017-02-07 PROCEDURE — 99214 OFFICE O/P EST MOD 30 MIN: CPT | Mod: ZP | Performed by: PHYSICIAN ASSISTANT

## 2017-02-07 PROCEDURE — 36415 COLL VENOUS BLD VENIPUNCTURE: CPT | Performed by: PHYSICIAN ASSISTANT

## 2017-02-07 PROCEDURE — 84100 ASSAY OF PHOSPHORUS: CPT | Performed by: PHYSICIAN ASSISTANT

## 2017-02-07 PROCEDURE — 82378 CARCINOEMBRYONIC ANTIGEN: CPT | Performed by: PHYSICIAN ASSISTANT

## 2017-02-07 PROCEDURE — 25000128 H RX IP 250 OP 636: Mod: ZF | Performed by: PHYSICIAN ASSISTANT

## 2017-02-07 PROCEDURE — 83735 ASSAY OF MAGNESIUM: CPT | Performed by: PHYSICIAN ASSISTANT

## 2017-02-07 PROCEDURE — 86300 IMMUNOASSAY TUMOR CA 15-3: CPT | Performed by: PHYSICIAN ASSISTANT

## 2017-02-07 PROCEDURE — 80048 BASIC METABOLIC PNL TOTAL CA: CPT | Performed by: PHYSICIAN ASSISTANT

## 2017-02-07 PROCEDURE — 96367 TX/PROPH/DG ADDL SEQ IV INF: CPT

## 2017-02-07 PROCEDURE — 96372 THER/PROPH/DIAG INJ SC/IM: CPT | Mod: 59

## 2017-02-07 PROCEDURE — 96413 CHEMO IV INFUSION 1 HR: CPT

## 2017-02-07 RX ADMIN — SODIUM CHLORIDE 250 ML: 9 INJECTION, SOLUTION INTRAVENOUS at 15:18

## 2017-02-07 RX ADMIN — DEXAMETHASONE SODIUM PHOSPHATE 12 MG: 10 INJECTION, SOLUTION INTRAMUSCULAR; INTRAVENOUS at 15:20

## 2017-02-07 RX ADMIN — DENOSUMAB 120 MG: 120 INJECTION SUBCUTANEOUS at 14:32

## 2017-02-07 RX ADMIN — TRASTUZUMAB 292 MG: KIT at 15:48

## 2017-02-07 NOTE — PROGRESS NOTES
Research note- pt seen today for f/u after hospitalization for bilat pneumonia and also was dx with diabetes insipidus. GIL also saw patient today and wanted to give her another week without gemzar but give herceptin today. Per the protocol and discussion with Neelam Ribera Research RN ok to give herceptin without the gemzar today. Orders to be written by GIL for treatment today and then the dose of gemzar next week. Infusion called as wel  To double check being able to give herceptin today, Dr Harper had already given ok at that point per Cherise in infusion.   Kristy Salcedo -[phone]

## 2017-02-07 NOTE — Clinical Note
2/7/2017      RE: Keren Erickson  [address] [facility] [id number]       HEMATOLOGY/ONCOLOGY PROGRESS NOTE  Feb 7, 2017    REASON FOR VISIT: follow-up of metastatic breast cancer, triple positive    DIAGNOSIS:   The patient is a 60-year-old female who presented to the hospital initially in 09/2013 with complaints of dyspnea. Workup revealed a large pericardial effusion and pleural effusion. Physical examination revealed a large untreated left breast mass encompassing the entire left breast. Biopsies revealed these were ER, ND and HER2-positive breast cancer. She had a thoracentesis and pericardial sclerosis performed. She was originally on Arimidex and Herceptin. In 01/2014, she was switched to tamoxifen and Herceptin due to arthralgias, and she ultimately developed progressive disease and was switched to Faslodex and Herceptin. In 01/2015, she was found to have progressive disease and was switched to T-DM1.     Initially when she was seen in late 02/2015, she complained of some V5 sensory deficit. This was subjective. I was not able to elicit this on examination. This persisted and further workup was performed with a brain MRI. This revealed what was initially thought to be 3 punctate brain metastases. She originally saw Radiation Oncology and Neurosurgery as well as underwent a lumbar puncture. Cytology from the lumbar puncture showed no evidence of leptomeningeal disease. She was treated with Gamma Knife radiation to 6 lesions, performed on 04/16/2015.  She initiated therapy with TDM1 in January 2015 and continued this through 6/26/2015 with overall stable disease. She has since been on a break from treatment. The patient presented earlier this month with recurrent shortness of breath.  Imaging revealed recurrent right-sided pleural effusion.  She has since undergone thoracentesis with cytology pending.  Clinically, however, there is evidence of disease progression. PET scan performed on 11/25/2015  "demonstrated progression of disease at multiple sites. She restarted TDM1 on 11/27/2015, however experienced a mild transfusion reaction with shortness of breath and chest tightness, resolved upon stopping TDM1 and 125 mg of SoluMedrol. She had progression of disease on repeat imaging 2/17/2016, as well as new brain metastases. She started TDM1 with Perjeta on 3/4/2016. She underwent gamma knife to brain mets on 3/8/16.       In late May, she was found to have progressive brain metastases.  She received whole brain radiation.  She had a follow up brain MRI August 2016 that was stable.     In September 2016, she enrolled on [place]  trial and was randomized to the standard of care arm; she is now on gemzar and herceptin. She was recently hospitalized 1/27-2/3/17 for presumed HCAP.    INTERVAL HISTORY:  Dominga presents for follow-up today.  She feels she is largely improved but still working on getting back her strength. She has had no SOB but has a residual cough, which is improving. She still has sensation of ear fullness and popping b/l, which is improving. She had this in the hospital and at one point, was having kathleen clear drainage from her R ear (which has tubes). She is still feeling deconditioned and is back in the wheelchair today to \"conserve energy\". She is back to using the walker at home. PT/OT re-evaluated her and she is going to resume this later this week. She feels the new medicine for the diabetes insipidus is helping tremendously; she feels she is no longer constantly thirsty and urinating normally now. She continues to have some body aches, which she gets after chemo, but also thinks this is now more related to the increased PT activities she has been doing. She hasn't had any other new infection symptoms, no new neurologic symptoms. Otherwise feeling well.  ROS: 10 point ROS was conducted and was otherwise negative except for as above.     PHYSICAL EXAMINATION:   /81 mmHg  Pulse " 94  Temp(Src) 97.8  F (36.6  C) (Oral)  Resp 16  Wt 48.898 kg (107 lb 12.8 oz)  SpO2 96%   Wt Readings from Last 4 Encounters:   02/07/17 48.898 kg (107 lb 12.8 oz)   02/03/17 47.537 kg (104 lb 12.8 oz)   01/17/17 50.304 kg (110 lb 14.4 oz)   01/10/17 49.896 kg (110 lb)     Constitutional: Alert, oriented, cachectic female in no visible distress. In wheelchair today  Eyes: PERRL. Anicteric sclerae.    ENT/Mouth: OM moist and pink without lesions or thrush.    CV: RRR, no murmurs appreciated.  Resp: CTAB slightly diminished R base, stable. Breathing comfortably.  Abdomen: Soft, non-tender, non-distended. Bowel sounds present. No masses appreciated. No organomegaly noted.  Extremities: No lower extremity edema appreciated.  Skin: Warm, dry. No bruising or petechiae noted. No rash  Lymph: Small posterior cervical node R neck about 1.5 cm. No other cervical, supraclavicular, or axillary nodes palpable  MSK:  No tenderness over spinous processes.  Neuro: CN II-XII grossly intact. Baseline decreased sensation over mandibular branch of facial nerve    LABS:   Results for HEIDI RUIZ (MRN [id number]) as of 2/7/2017 12:40   Ref. Range 2/3/2017 06:21 2/7/2017 11:54   Sodium Latest Ref Range: 133-144 mmol/L 139 138   Potassium Latest Ref Range: 3.4-5.3 mmol/L 4.1 4.6   Chloride Latest Ref Range:  mmol/L 107 104   Carbon Dioxide Latest Ref Range: 20-32 mmol/L 23 24   Urea Nitrogen Latest Ref Range: 7-30 mg/dL 5 (L) 13   Creatinine Latest Ref Range: 0.52-1.04 mg/dL 0.68 0.68   GFR Estimate Latest Ref Range: >60 mL/min/1.7m2 88 88   GFR Estimate If Black Latest Ref Range: >60 mL/min/1.7m2 >90... >90...   Calcium Latest Ref Range: 8.5-10.1 mg/dL 7.8 (L) 9.5   Anion Gap Latest Ref Range: 3-14 mmol/L 10 9   Magnesium Latest Ref Range: 1.6-2.3 mg/dL 2.3 2.3   Phosphorus Latest Ref Range: 2.5-4.5 mg/dL 2.2 (L) 3.8   Results for LUISANABEATRIZ HEIDIARVIND MOSER (MRN [id number]) as of 2/7/2017 12:30   Ref. Range 2/2/2017  06:16 2/3/2017 06:21 2/7/2017 11:54   WBC Latest Ref Range: 4.0-11.0 10e9/L 11.9 (H) 11.1 (H) 11.3 (H)   Hemoglobin Latest Ref Range: 11.7-15.7 g/dL 8.7 (L) 8.9 (L) 10.9 (L)   Hematocrit Latest Ref Range: 35.0-47.0 % 26.3 (L) 26.7 (L) 32.8 (L)   Platelet Count Latest Ref Range: 150-450 10e9/L 252 297 475 (H)   RBC Count Latest Ref Range: 3.8-5.2 10e12/L 2.72 (L) 2.78 (L) 3.29 (L)   MCV Latest Ref Range:  fl 97 96 100   MCH Latest Ref Range: 26.5-33.0 pg 32.0 32.0 33.1 (H)   MCHC Latest Ref Range: 31.5-36.5 g/dL 33.1 33.3 33.2   RDW Latest Ref Range: 10.0-15.0 % 17.2 (H) 17.4 (H) 18.6 (H)     IMPRESSION/PLAN:  Dominga is a 61-year-old female with history of metastatic ER-positive, HER-2 positive breast cancer, with involvement of the bone, history of pleural effusions treated with pleurodesis and PleurX placement, and with treated brain metastases, previously with stable disease on TDM1, however patient opted for break from therapy from June 2015 through November 2015, and represented with worsening metastatic disease at that time. She restarted TDM1 on 11/27/2015. She had progressive disease 2/17/16 and Perjeta was added to TDM1. She had gamma knife to brain mets on 3/8.  She received WBRT.  She is now on gemzar and herceptin on [place] .    1. Breast cancer:  Continues on Gemzar/Herceptin on [place] trial with stable disease and tumor markers. Restaging brain MRI and CT CAP 1/25/2017 with stable disease. Her treatment has been on hold due to HCAP, delayed by one week. She is due for cycle 7 day 1 Herceptin/Gemzar today. Due to her deconditioning and that she is not yet completed with antibiotics and still with mild leukocytosis, I'd like to give her Herceptin only today, and give her an additional week to recover prior to restarting chemotherapy. She does get fatigued and worn down with Gemcitabine. Discussed with research RN that this is acceptable on the trial. She will return next week for GIL  visit and Gemcitabine per study protocol.    2. Pulm/HCAP: Continues on Levaquin through tomorrow. She feels she is largely improved. Some residual cough. She still has sensation of ear fullness and popping b/l, which is improving. She understands to call with worsening symptoms or fevers.  - History of pleural effusion requiring thoracentesis with progressive disease off treatment. This has not recurred since resuming treatment. Exam is stable.    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 She started desmopressin 100 mcg with good response clinically.   - Follow-up with endocrine in 1 month    4. Brain mets: Numbness of tongue and V2 and V3 on right are improving. Most recent brain MRI stable.    5. FEN: Cr, lytes, weight stable. OK to stop the Kphos neutral supplement. Will recheck K, Phos next week to ensure these are stable off supplementation.    6. Pain: Chronic mild aches/pains secondary to disease and with myalgias on Gemzar. No new sites of pain. Not needing oxycodone but has some available if needed. Manages with one Aleve daily.    7. GERD: Continue prilosec.    8. Bone mets: on Xgeva.  Due today.     9. Mood: She is overall coping well.     10. She has an advanced directive.  She has a POLST.  DNR/DNI.  Her power of  is listed in the computer.     10. Deconditioning: She had been walking independently or with cane in clinic prior to pneumonia, now back in wheelchair here and using walker at home. Met with PT/OT this week for consultation, and starting therapy later this week.    Deyanira Costello PA-C  Hematology, Oncology, and Transplant  [facility]  [address] [place] [id number]  [phone]      Research note- pt seen today for f/u after hospitalization for bilat pneumonia and also was dx with diabetes insipidus. GIL also saw patient today and wanted to give her another week without gemzar but give herceptin today. Per the protocol and  discussion with Neelam Ambrose RN ok to give herceptin without the gemzar today. Orders to be written by GIL for treatment today and then the dose of gemzar next week. Infusion called as wel  To double check being able to give herceptin today, Dr Harper had already given ok at that point per Cherise in infusion.   Kristy Salcedo -[phone]      Deyanira Costello PA-C

## 2017-02-07 NOTE — PROGRESS NOTES
Infusion Nursing Note:  Keren Erickson presents today for C7D1 Herceptin, C9D1 Xgeva   Patient seen by provider today: Yes: Deyanira COOK    Note: TORB: Dr. Harper/ Deyanira COOK/ Cherise Patel RN:   - OK to hold Gemzar today but still treat with Herceptin despite study guidelines.    Intravenous Access:  Peripheral IV placed by vascular access      Treatment Conditions:  HGB     10.9   2/7/2017  WBC     11.3   2/7/2017   ANEU      7.9   2/7/2017  PLT      475   2/7/2017     NA      138   2/7/2017                POTASSIUM      4.6   2/7/2017        MAG      2.3   2/7/2017         CR     0.68   2/7/2017                OMA      9.5   2/7/2017             BILITOTAL      0.2   2/1/2017        ALBUMIN      2.1   2/1/2017                 ALT       20   2/1/2017        AST       32   2/1/2017  Results reviewed, labs MET treatment parameters, ok to proceed with treatment.  ECHO/MUGA completed 1/25/17  EF 60-65%.      Post Infusion Assessment:  Patient tolerated infusion without incident.  Patient tolerated one Xgeva injection without incident to right abdomen.  Blood return noted pre and post infusion.  Site patent and intact, free from redness, edema or discomfort.  No evidence of extravasations.  Access discontinued per protocol.    Discharge Plan:   Patient declined prescription refills.  Discharge instructions reviewed with: Patient.  Patient and/or family verbalized understanding of discharge instructions and all questions answered.  Copy of AVS reviewed with patient and/or family.  Patient will return 2/14/17 for next appointment.  Patient discharged in stable condition accompanied by: friend.  Departure Mode: wheelchair.    Cherise Patel, RN

## 2017-02-07 NOTE — NURSING NOTE
Chief Complaint   Patient presents with     Blood Draw     IV placed in by RN, Vitals taken by EDILIA Esteves, EDILIA

## 2017-02-07 NOTE — PATIENT INSTRUCTIONS
Contact Numbers    [facility] Main Line: [phone]    Triage Nurse Line: [phone]      Please call the [facility] Nurse Triage line if you experience a temperature greater than or equal to 100.5, shaking chills, have uncontrolled nausea, vomiting and/or diarrhea, dizziness, shortness of breath, bleeding not relieved with pressure, or if you have any other questions or concerns.     If it is after hours, weekends, or holidays, please call the main hospital  at  [phone] and ask to speak to the adult Oncology doctor on call.     If you are having any concerning symptoms or wish to speak to a provider before your next infusion visit, please call your care coordinator or triage them so we can adequately serve you.      If you need to refill your narcotic prescription or other medication, please call triage before your infusion appointment.        February 2017 Sunday Monday Tuesday Wednesday Thursday Friday Saturday                  1     IP TREATMENT    6:00 AM   (30 min.)   Charity Zelaya, PT   [facility], [place], Physical Therapy 2     IP TREATMENT    6:00 AM   (30 min.)   Yas Sweeney, PT   [facility], [place], Physical Therapy 3     IP TREATMENT    6:00 AM   (30 min.)   Abbey Ray   [facility], [place], Physical Therapy 4       5     6     7     [facility] DRAW   11:15 AM   (15 min.)    [facility] LAB DRAW   [facility] Lab Draw     [facility] RETURN   11:50 AM   (50 min.)   Deyanira Costello PA-C   [facility]     LONG    1:00 PM   (60 min.)   Barbara Lakhani [facility] Medication Therapy Management     [facility] ONC INFUSION 60    1:00 PM   (60 min.)    ONCOLOGY INFUSION   [facility] 8     9     10     11       12     13     14     [facility] DRAW    3:45 PM   (15 min.)    [facility] LAB DRAW   [facility] Lab Draw     [facility] RETURN    4:20 PM   (50 min.)   Esmer Oconnor PA   [facility] ONC  INFUSION 60    5:00 PM   (60 min.)   [facility] ONCOLOGY INFUSION   [facility] 15     16     17     18       19     20     21     22     23     24     25       26     27     28     [facility] DRAW   11:30 AM   (15 min.)    [facility] LAB DRAW   [facility] Lab Draw     [facility] RETURN   12:10 PM   (50 min.)   Esmer Oconnor PA   [facility]     [facility] ONC INFUSION 120    2:00 PM   (120 min.)   [facility] ONCOLOGY INFUSION   [facility]                                March 2017 Sunday Monday Tuesday Wednesday Thursday Friday Saturday                  1     2     3     4       5     6     7     [facility] DRAW   11:30 AM   (15 min.)    [facility] LAB DRAW   [facility] Lab Draw     [facility] RETURN   12:10 PM   (50 min.)   Esmer Oconnor PA   [facility]     [facility] ONC INFUSION 120    1:30 PM   (120 min.)   [facility] ONCOLOGY INFUSION   [facility] 8     9     10     11       12     13     [facility] RETURN ENDOCRINE   12:00 PM   (60 min.)   Rolando Mishra MD   [facility] Endocrinology 14     15     16     17     18       19     20     21     22     23     24     25       26     27     28     29     30     31                      Recent Results (from the past 24 hour(s))   Phosphorus    Collection Time: 02/07/17 11:54 AM   Result Value Ref Range    Phosphorus 3.8 2.5 - 4.5 mg/dL   Basic metabolic panel    Collection Time: 02/07/17 11:54 AM   Result Value Ref Range    Sodium 138 133 - 144 mmol/L    Potassium 4.6 3.4 - 5.3 mmol/L    Chloride 104 94 - 109 mmol/L    Carbon Dioxide 24 20 - 32 mmol/L    Anion Gap 9 3 - 14 mmol/L    Glucose 92 70 - 99 mg/dL    Urea Nitrogen 13 7 - 30 mg/dL    Creatinine 0.68 0.52 - 1.04 mg/dL    GFR Estimate 88 >60 mL/min/1.7m2    GFR Estimate If Black >90   GFR Calc   >60 mL/min/1.7m2    Calcium 9.5 8.5 - 10.1 mg/dL   CBC with platelets differential    Collection Time: 02/07/17  11:54 AM   Result Value Ref Range    WBC 11.3 (H) 4.0 - 11.0 10e9/L    RBC Count 3.29 (L) 3.8 - 5.2 10e12/L    Hemoglobin 10.9 (L) 11.7 - 15.7 g/dL    Hematocrit 32.8 (L) 35.0 - 47.0 %     78 - 100 fl    MCH 33.1 (H) 26.5 - 33.0 pg    MCHC 33.2 31.5 - 36.5 g/dL    RDW 18.6 (H) 10.0 - 15.0 %    Platelet Count 475 (H) 150 - 450 10e9/L    Diff Method Manual Differential     % Neutrophils 69.6 %    % Lymphocytes 16.1 %    % Monocytes 8.0 %    % Eosinophils 0.0 %    % Basophils 1.8 %    % Metamyelocytes 3.6 %    % Myelocytes 0.9 %    Nucleated RBCs 1 (H) 0 /100    Absolute Neutrophil 7.9 1.6 - 8.3 10e9/L    Absolute Lymphocytes 1.8 0.8 - 5.3 10e9/L    Absolute Monocytes 0.9 0.0 - 1.3 10e9/L    Absolute Eosinophils 0.0 0.0 - 0.7 10e9/L    Absolute Basophils 0.2 0.0 - 0.2 10e9/L    Absolute Metamyelocytes 0.4 (H) 0 10e9/L    Absolute Myelocytes 0.1 (H) 0 10e9/L    Absolute Nucleated RBC 0.1     Anisocytosis Moderate     Poikilocytosis Slight     Polychromasia Slight    Magnesium    Collection Time: 02/07/17 11:54 AM   Result Value Ref Range    Magnesium 2.3 1.6 - 2.3 mg/dL   CEA    Collection Time: 02/07/17 11:54 AM   Result Value Ref Range    CEA 1.7 0 - 2.5 ug/L   Ca27.29  breast tumor marker    Collection Time: 02/07/17 11:54 AM   Result Value Ref Range    CA 27-29 26 0 - 39 U/mL

## 2017-02-07 NOTE — MR AVS SNAPSHOT
After Visit Summary   2/7/2017    Keren Erickson    MRN: [id number]           Patient Information     Date Of Birth          1955        Visit Information        Provider Department      2/7/2017 1:00 PM  11 ATC;  ONCOLOGY INFUSION [facility]        Today's Diagnoses     Hypophosphatemia    -  1     Hypokalemia         Metastatic breast cancer (H)         Carcinoma of breast metastatic to multiple sites, unspecified laterality (H)         Brain metastasis (H)         Bone metastasis (H)           Care Instructions    Contact Numbers    [facility] Main Line: [phone]    Triage Nurse Line: [phone]      Please call the [facility] Nurse Triage line if you experience a temperature greater than or equal to 100.5, shaking chills, have uncontrolled nausea, vomiting and/or diarrhea, dizziness, shortness of breath, bleeding not relieved with pressure, or if you have any other questions or concerns.     If it is after hours, weekends, or holidays, please call the main hospital  at  [phone] and ask to speak to the adult Oncology doctor on call.     If you are having any concerning symptoms or wish to speak to a provider before your next infusion visit, please call your care coordinator or triage them so we can adequately serve you.      If you need to refill your narcotic prescription or other medication, please call triage before your infusion appointment.        February 2017 Sunday Monday Tuesday Wednesday Thursday Friday Saturday                  1     IP TREATMENT    6:00 AM   (30 min.)   Charity Zelaya, PT   [facility] [place], Physical Therapy 2     IP TREATMENT    6:00 AM   (30 min.)   Yas Sweeney, PT   [facility] [place], Physical Therapy 3     IP TREATMENT    6:00 AM   (30 min.)   Abbey Ray   [facility] [place], Physical Therapy 4       5     6     7     [facility]   11:15 AM   (15 min.)    [facility] DRAW   [facility]  [facility] Draw     [facility] RETURN   11:50 AM   (50 min.)   Deyanira Costello PA-C   [facility]     LONG    1:00 PM   (60 min.)   Barbara Lakhani [facility] Medication Therapy Management     [facility] ONC INFUSION 60    1:00 PM   (60 min.)    ONCOLOGY INFUSION   [facility] 8     9     10     11       12     13     14     [facility] DRAW    3:45 PM   (15 min.)    [facility] LAB DRAW   [facility] Lab Draw     [facility] RETURN    4:20 PM   (50 min.)   Esmer Oconnor PA   [facility]     [facility] ONC INFUSION 60    5:00 PM   (60 min.)    ONCOLOGY INFUSION   [facility] 15     16     17     18       19     20     21     22     23     24     25       26     27     28     [facility] DRAW   11:30 AM   (15 min.)    [facility] DRAW   [facility] Lab Draw     [facility] RETURN   12:10 PM   (50 min.)   Esmer Oconnor PA   [facility]     [facility] ONC INFUSION 120    2:00 PM   (120 min.)    ONCOLOGY INFUSION   [facility]                                March 2017 Sunday Monday Tuesday Wednesday Thursday Friday Saturday                  1     2     3     4       5     6     7     [facility] DRAW   11:30 AM   (15 min.)    [facility] DRAW   [facility] Lab Draw     [facility] RETURN   12:10 PM   (50 min.)   Esmer Oconnor PA   [facility]     [facility] ONC INFUSION 120    1:30 PM   (120 min.)    ONCOLOGY INFUSION   [facility] 8     9     10     11       12     13     [facility] RETURN ENDOCRINE   12:00 PM   (60 min.)   Rolando Mishra MD   [facility] Endocrinology 14     15     16     17     18       19     20     21     22     23     24     25       26     27     28     29     30     31                      Recent Results (from the past 24 hour(s))   Phosphorus    Collection Time: 02/07/17 11:54 AM   Result Value Ref Range    Phosphorus  3.8 2.5 - 4.5 mg/dL   Basic metabolic panel    Collection Time: 02/07/17 11:54 AM   Result Value Ref Range    Sodium 138 133 - 144 mmol/L    Potassium 4.6 3.4 - 5.3 mmol/L    Chloride 104 94 - 109 mmol/L    Carbon Dioxide 24 20 - 32 mmol/L    Anion Gap 9 3 - 14 mmol/L    Glucose 92 70 - 99 mg/dL    Urea Nitrogen 13 7 - 30 mg/dL    Creatinine 0.68 0.52 - 1.04 mg/dL    GFR Estimate 88 >60 mL/min/1.7m2    GFR Estimate If Black >90   GFR Calc   >60 mL/min/1.7m2    Calcium 9.5 8.5 - 10.1 mg/dL   CBC with platelets differential    Collection Time: 02/07/17 11:54 AM   Result Value Ref Range    WBC 11.3 (H) 4.0 - 11.0 10e9/L    RBC Count 3.29 (L) 3.8 - 5.2 10e12/L    Hemoglobin 10.9 (L) 11.7 - 15.7 g/dL    Hematocrit 32.8 (L) 35.0 - 47.0 %     78 - 100 fl    MCH 33.1 (H) 26.5 - 33.0 pg    MCHC 33.2 31.5 - 36.5 g/dL    RDW 18.6 (H) 10.0 - 15.0 %    Platelet Count 475 (H) 150 - 450 10e9/L    Diff Method Manual Differential     % Neutrophils 69.6 %    % Lymphocytes 16.1 %    % Monocytes 8.0 %    % Eosinophils 0.0 %    % Basophils 1.8 %    % Metamyelocytes 3.6 %    % Myelocytes 0.9 %    Nucleated RBCs 1 (H) 0 /100    Absolute Neutrophil 7.9 1.6 - 8.3 10e9/L    Absolute Lymphocytes 1.8 0.8 - 5.3 10e9/L    Absolute Monocytes 0.9 0.0 - 1.3 10e9/L    Absolute Eosinophils 0.0 0.0 - 0.7 10e9/L    Absolute Basophils 0.2 0.0 - 0.2 10e9/L    Absolute Metamyelocytes 0.4 (H) 0 10e9/L    Absolute Myelocytes 0.1 (H) 0 10e9/L    Absolute Nucleated RBC 0.1     Anisocytosis Moderate     Poikilocytosis Slight     Polychromasia Slight    Magnesium    Collection Time: 02/07/17 11:54 AM   Result Value Ref Range    Magnesium 2.3 1.6 - 2.3 mg/dL   CEA    Collection Time: 02/07/17 11:54 AM   Result Value Ref Range    CEA 1.7 0 - 2.5 ug/L   Ca27.29  breast tumor marker    Collection Time: 02/07/17 11:54 AM   Result Value Ref Range    CA 27-29 26 0 - 39 U/mL                 Follow-ups after your visit        Your next 10  appointments already scheduled     Feb 14, 2017  3:45 PM   [facility] Draw with [facility] DRAW   [facility] Draw ([facility])    [address] [address]   [phone]            Feb 14, 2017  4:20 PM   (Arrive by 4:05 PM)   Return Visit with BRAULIO Van   [facility] ([facility])    [address] [address]   [phone]            Feb 14, 2017  5:00 PM   Infusion 60 with UC ONCOLOGY INFUSION, UC 10 ATC   [facility] ([facility])    [address] [address]   [phone]            Feb 28, 2017 11:30 AM   [facility] Draw with [facility] DRAW   [facility] Draw ([facility])    [address] [address]   [phone]            Feb 28, 2017 12:10 PM   (Arrive by 11:55 AM)   Return Visit with BRAULIO Van   [facility] ([facility])    [address] [address]   [phone]            Feb 28, 2017  2:00 PM   Infusion 120 with UC ONCOLOGY INFUSION, UC 14 ATC   [facility] ([facility])    [address] [address]   [phone]            Mar 07, 2017 11:30 AM   [facility] Draw with [facility] DRAW   [facility] Draw ([facility])    [address] [address]   [phone]            Mar 07, 2017 12:10 PM   (Arrive by 11:55 AM)   Return Visit with BRAULIO Van   [facility] ([facility])    [address] [address]   [phone]              Who to contact     If you have questions  or need follow up information about today's clinic visit or your schedule please contact [facility] directly at [phone].  Normal or non-critical lab and imaging results will be communicated to you by MyChart, letter or phone within 4 business days after the clinic has received the results. If you do not hear from us within 7 days, please contact the clinic through [employer] or phone. If you have a critical or abnormal lab result, we will notify you by phone as soon as possible.  Submit refill requests through [employer] or call your pharmacy and they will forward the refill request to us. Please allow 3 business days for your refill to be completed.          Additional Information About Your Visit        [employer] Information     [employer] gives you secure access to your electronic health record. If you see a primary care provider, you can also send messages to your care team and make appointments. If you have questions, please call your primary care clinic.  If you do not have a primary care provider, please call [phone] and they will assist you.        Care EveryWhere ID     This is your Care EveryWhere ID. This could be used by other organizations to access your [place] medical records  [id number]         Blood Pressure from Last 3 Encounters:   02/07/17 112/81   02/03/17 137/84   01/17/17 120/77    Weight from Last 3 Encounters:   02/07/17 48.898 kg (107 lb 12.8 oz)   02/03/17 47.537 kg (104 lb 12.8 oz)   01/17/17 50.304 kg (110 lb 14.4 oz)              We Performed the Following     Basic metabolic panel     Ca27.29  breast tumor marker     CBC with platelets differential     CEA     Magnesium     Phosphorus        Primary Care Provider Office Phone # Fax #    Kelly Hart [phone]       [facility] [address]  [facility] [address]        Thank you!     Thank you for choosing [facility]  for your care. Our goal is always to provide  you with excellent care. Hearing back from our patients is one way we can continue to improve our services. Please take a few minutes to complete the written survey that you may receive in the mail after your visit with us. Thank you!             Your Updated Medication List - Protect others around you: Learn how to safely use, store and throw away your medicines at www.disposemymeds.org.          This list is accurate as of: 2/7/17  3:51 PM.  Always use your most recent med list.                   Brand Name Dispense Instructions for use    ALEVE PO      Take 220 mg by mouth daily       D3 ADULT PO      Take 5,000 Units by mouth daily       desmopressin 0.1 MG tablet    DDAVP    30 tablet    Take 1 tablet (100 mcg) by mouth At Bedtime       guaiFENesin 600 MG 12 hr tablet    MUCINEX    60 tablet    Take 1 tablet (600 mg) by mouth 2 times daily       levofloxacin 750 MG tablet    LEVAQUIN    6 tablet    Take 1 tablet (750 mg) by mouth daily for 6 days       loperamide 2 MG capsule    IMODIUM    20 capsule    Take 1 capsule (2 mg) by mouth 4 times daily as needed for diarrhea       LORazepam 0.5 MG tablet    ATIVAN    30 tablet    Take 1 tablet (0.5 mg) by mouth every 4 hours as needed (Anxiety, Nausea/Vomiting or Sleep)       Multi-vitamin Tabs tablet      Take 1 tablet by mouth daily       omeprazole 40 MG capsule    priLOSEC    90 capsule    Take 1 capsule (40 mg) by mouth as needed       ondansetron 8 MG tablet    ZOFRAN    30 tablet    Take 1 tablet (8 mg) by mouth every 8 hours as needed for nausea       * order for DME     1 Units    Walker to ensure safe ambulation       * order for DME     1 Units    Cranial prosthesis       potassium phosphate (monobasic) 500 MG tablet    K-PHOS    28 tablet    Take 1 tablet (500 mg) by mouth 4 times daily       prochlorperazine 10 MG tablet    COMPAZINE    30 tablet    Take 1 tablet (10 mg) by mouth every 6 hours as needed (Nausea/Vomiting)       * Notice:  This list has 2  medication(s) that are the same as other medications prescribed for you. Read the directions carefully, and ask your doctor or other care provider to review them with you.

## 2017-02-07 NOTE — PROGRESS NOTES
HEMATOLOGY/ONCOLOGY PROGRESS NOTE  Feb 7, 2017    REASON FOR VISIT: follow-up of metastatic breast cancer, triple positive    DIAGNOSIS:   The patient is a 60-year-old female who presented to the hospital initially in 09/2013 with complaints of dyspnea. Workup revealed a large pericardial effusion and pleural effusion. Physical examination revealed a large untreated left breast mass encompassing the entire left breast. Biopsies revealed these were ER, MS and HER2-positive breast cancer. She had a thoracentesis and pericardial sclerosis performed. She was originally on Arimidex and Herceptin. In 01/2014, she was switched to tamoxifen and Herceptin due to arthralgias, and she ultimately developed progressive disease and was switched to Faslodex and Herceptin. In 01/2015, she was found to have progressive disease and was switched to T-DM1.     Initially when she was seen in late 02/2015, she complained of some V5 sensory deficit. This was subjective. I was not able to elicit this on examination. This persisted and further workup was performed with a brain MRI. This revealed what was initially thought to be 3 punctate brain metastases. She originally saw Radiation Oncology and Neurosurgery as well as underwent a lumbar puncture. Cytology from the lumbar puncture showed no evidence of leptomeningeal disease. She was treated with Gamma Knife radiation to 6 lesions, performed on 04/16/2015.  She initiated therapy with TDM1 in January 2015 and continued this through 6/26/2015 with overall stable disease. She has since been on a break from treatment. The patient presented earlier this month with recurrent shortness of breath.  Imaging revealed recurrent right-sided pleural effusion.  She has since undergone thoracentesis with cytology pending.  Clinically, however, there is evidence of disease progression. PET scan performed on 11/25/2015 demonstrated progression of disease at multiple sites. She restarted TDM1 on  "11/27/2015, however experienced a mild transfusion reaction with shortness of breath and chest tightness, resolved upon stopping TDM1 and 125 mg of SoluMedrol. She had progression of disease on repeat imaging 2/17/2016, as well as new brain metastases. She started TDM1 with Perjeta on 3/4/2016. She underwent gamma knife to brain mets on 3/8/16.       In late May, she was found to have progressive brain metastases.  She received whole brain radiation.  She had a follow up brain MRI August 2016 that was stable.     In September 2016, she enrolled on [place]  trial and was randomized to the standard of care arm; she is now on gemzar and herceptin. She was recently hospitalized 1/27-2/3/17 for presumed HCAP.    INTERVAL HISTORY:  Dominga presents for follow-up today.  She feels she is largely improved but still working on getting back her strength. She has had no SOB but has a residual cough, which is improving. She still has sensation of ear fullness and popping b/l, which is improving. She had this in the hospital and at one point, was having kathleen clear drainage from her R ear (which has tubes). She is still feeling deconditioned and is back in the wheelchair today to \"conserve energy\". She is back to using the walker at home. PT/OT re-evaluated her and she is going to resume this later this week. She feels the new medicine for the diabetes insipidus is helping tremendously; she feels she is no longer constantly thirsty and urinating normally now. She continues to have some body aches, which she gets after chemo, but also thinks this is now more related to the increased PT activities she has been doing. She hasn't had any other new infection symptoms, no new neurologic symptoms. Otherwise feeling well.  ROS: 10 point ROS was conducted and was otherwise negative except for as above.     PHYSICAL EXAMINATION:   /81 mmHg  Pulse 94  Temp(Src) 97.8  F (36.6  C) (Oral)  Resp 16  Wt 48.898 kg (107 lb 12.8 " oz)  SpO2 96%   Wt Readings from Last 4 Encounters:   02/07/17 48.898 kg (107 lb 12.8 oz)   02/03/17 47.537 kg (104 lb 12.8 oz)   01/17/17 50.304 kg (110 lb 14.4 oz)   01/10/17 49.896 kg (110 lb)     Constitutional: Alert, oriented, cachectic female in no visible distress. In wheelchair today  Eyes: PERRL. Anicteric sclerae.    ENT/Mouth: OM moist and pink without lesions or thrush.    CV: RRR, no murmurs appreciated.  Resp: CTAB slightly diminished R base, stable. Breathing comfortably.  Abdomen: Soft, non-tender, non-distended. Bowel sounds present. No masses appreciated. No organomegaly noted.  Extremities: No lower extremity edema appreciated.  Skin: Warm, dry. No bruising or petechiae noted. No rash  Lymph: Small posterior cervical node R neck about 1.5 cm. No other cervical, supraclavicular, or axillary nodes palpable  MSK:  No tenderness over spinous processes.  Neuro: CN II-XII grossly intact. Baseline decreased sensation over mandibular branch of facial nerve    LABS:   Results for HEIDI RUIZ (MRN [id number]) as of 2/7/2017 12:40   Ref. Range 2/3/2017 06:21 2/7/2017 11:54   Sodium Latest Ref Range: 133-144 mmol/L 139 138   Potassium Latest Ref Range: 3.4-5.3 mmol/L 4.1 4.6   Chloride Latest Ref Range:  mmol/L 107 104   Carbon Dioxide Latest Ref Range: 20-32 mmol/L 23 24   Urea Nitrogen Latest Ref Range: 7-30 mg/dL 5 (L) 13   Creatinine Latest Ref Range: 0.52-1.04 mg/dL 0.68 0.68   GFR Estimate Latest Ref Range: >60 mL/min/1.7m2 88 88   GFR Estimate If Black Latest Ref Range: >60 mL/min/1.7m2 >90... >90...   Calcium Latest Ref Range: 8.5-10.1 mg/dL 7.8 (L) 9.5   Anion Gap Latest Ref Range: 3-14 mmol/L 10 9   Magnesium Latest Ref Range: 1.6-2.3 mg/dL 2.3 2.3   Phosphorus Latest Ref Range: 2.5-4.5 mg/dL 2.2 (L) 3.8   Results for SARA HEIDI MOSER (MRN [id number]) as of 2/7/2017 12:30   Ref. Range 2/2/2017 06:16 2/3/2017 06:21 2/7/2017 11:54   WBC Latest Ref Range: 4.0-11.0 10e9/L  11.9 (H) 11.1 (H) 11.3 (H)   Hemoglobin Latest Ref Range: 11.7-15.7 g/dL 8.7 (L) 8.9 (L) 10.9 (L)   Hematocrit Latest Ref Range: 35.0-47.0 % 26.3 (L) 26.7 (L) 32.8 (L)   Platelet Count Latest Ref Range: 150-450 10e9/L 252 297 475 (H)   RBC Count Latest Ref Range: 3.8-5.2 10e12/L 2.72 (L) 2.78 (L) 3.29 (L)   MCV Latest Ref Range:  fl 97 96 100   MCH Latest Ref Range: 26.5-33.0 pg 32.0 32.0 33.1 (H)   MCHC Latest Ref Range: 31.5-36.5 g/dL 33.1 33.3 33.2   RDW Latest Ref Range: 10.0-15.0 % 17.2 (H) 17.4 (H) 18.6 (H)     IMPRESSION/PLAN:  Dominga is a 61-year-old female with history of metastatic ER-positive, HER-2 positive breast cancer, with involvement of the bone, history of pleural effusions treated with pleurodesis and PleurX placement, and with treated brain metastases, previously with stable disease on TDM1, however patient opted for break from therapy from June 2015 through November 2015, and represented with worsening metastatic disease at that time. She restarted TDM1 on 11/27/2015. She had progressive disease 2/17/16 and Perjeta was added to TDM1. She had gamma knife to brain mets on 3/8.  She received WBRT.  She is now on gemzar and herceptin on [place] .    1. Breast cancer:  Continues on Gemzar/Herceptin on [place] trial with stable disease and tumor markers. Restaging brain MRI and CT CAP 1/25/2017 with stable disease. Her treatment has been on hold due to HCAP, delayed by one week. She is due for cycle 7 day 1 Herceptin/Gemzar today. Due to her deconditioning and that she is not yet completed with antibiotics and still with mild leukocytosis, I'd like to give her Herceptin only today, and give her an additional week to recover prior to restarting chemotherapy. She does get fatigued and worn down with Gemcitabine. Discussed with research RN that this is acceptable on the trial. She will return next week for GIL visit and Gemcitabine per study protocol.    2. Pulm/HCAP: Continues on  Levaquin through tomorrow. She feels she is largely improved. Some residual cough. She still has sensation of ear fullness and popping b/l, which is improving. She understands to call with worsening symptoms or fevers.  - History of pleural effusion requiring thoracentesis with progressive disease off treatment. This has not recurred since resuming treatment. Exam is stable.    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 She started desmopressin 100 mcg with good response clinically.   - Follow-up with endocrine in 1 month    4. Brain mets: Numbness of tongue and V2 and V3 on right are improving. Most recent brain MRI stable.    5. FEN: Cr, lytes, weight stable. OK to stop the Kphos neutral supplement. Will recheck K, Phos next week to ensure these are stable off supplementation.    6. Pain: Chronic mild aches/pains secondary to disease and with myalgias on Gemzar. No new sites of pain. Not needing oxycodone but has some available if needed. Manages with one Aleve daily.    7. GERD: Continue prilosec.    8. Bone mets: on Xgeva.  Due today.     9. Mood: She is overall coping well.     10. She has an advanced directive.  She has a POLST.  DNR/DNI.  Her power of  is listed in the computer.     10. Deconditioning: She had been walking independently or with cane in clinic prior to pneumonia, now back in wheelchair here and using walker at home. Met with PT/OT this week for consultation, and starting therapy later this week.    Deyanira Costello PA-C  Hematology, Oncology, and Transplant  [facility]  [address] [place] [id number]  [phone]

## 2017-02-07 NOTE — Clinical Note
2/7/2017       RE: Keren Erickson  [address] [facility] [address]     Dear Colleague,    Thank you for referring your patient, Keren Erickson, to the [facility]. Please see a copy of my visit note below.    HEMATOLOGY/ONCOLOGY PROGRESS NOTE  Feb 7, 2017    REASON FOR VISIT: follow-up of metastatic breast cancer, triple positive    DIAGNOSIS:   The patient is a 60-year-old female who presented to the hospital initially in 09/2013 with complaints of dyspnea. Workup revealed a large pericardial effusion and pleural effusion. Physical examination revealed a large untreated left breast mass encompassing the entire left breast. Biopsies revealed these were ER, MN and HER2-positive breast cancer. She had a thoracentesis and pericardial sclerosis performed. She was originally on Arimidex and Herceptin. In 01/2014, she was switched to tamoxifen and Herceptin due to arthralgias, and she ultimately developed progressive disease and was switched to Faslodex and Herceptin. In 01/2015, she was found to have progressive disease and was switched to T-DM1.     Initially when she was seen in late 02/2015, she complained of some V5 sensory deficit. This was subjective. I was not able to elicit this on examination. This persisted and further workup was performed with a brain MRI. This revealed what was initially thought to be 3 punctate brain metastases. She originally saw Radiation Oncology and Neurosurgery as well as underwent a lumbar puncture. Cytology from the lumbar puncture showed no evidence of leptomeningeal disease. She was treated with Gamma Knife radiation to 6 lesions, performed on 04/16/2015.  She initiated therapy with TDM1 in January 2015 and continued this through 6/26/2015 with overall stable disease. She has since been on a break from treatment. The patient presented earlier this month with recurrent shortness of breath.  Imaging revealed recurrent right-sided pleural  "effusion.  She has since undergone thoracentesis with cytology pending.  Clinically, however, there is evidence of disease progression. PET scan performed on 11/25/2015 demonstrated progression of disease at multiple sites. She restarted TDM1 on 11/27/2015, however experienced a mild transfusion reaction with shortness of breath and chest tightness, resolved upon stopping TDM1 and 125 mg of SoluMedrol. She had progression of disease on repeat imaging 2/17/2016, as well as new brain metastases. She started TDM1 with Perjeta on 3/4/2016. She underwent gamma knife to brain mets on 3/8/16.       In late May, she was found to have progressive brain metastases.  She received whole brain radiation.  She had a follow up brain MRI August 2016 that was stable.     In September 2016, she enrolled on [place]  trial and was randomized to the standard of care arm; she is now on gemzar and herceptin. She was recently hospitalized 1/27-2/3/17 for presumed HCAP.    INTERVAL HISTORY:  Dominga presents for follow-up today.  She feels she is largely improved but still working on getting back her strength. She has had no SOB but has a residual cough, which is improving. She still has sensation of ear fullness and popping b/l, which is improving. She had this in the hospital and at one point, was having kathleen clear drainage from her R ear (which has tubes). She is still feeling deconditioned and is back in the wheelchair today to \"conserve energy\". She is back to using the walker at home. PT/OT re-evaluated her and she is going to resume this later this week. She feels the new medicine for the diabetes insipidus is helping tremendously; she feels she is no longer constantly thirsty and urinating normally now.   ROS: 10 point ROS was conducted and was otherwise negative except for as above.     PHYSICAL EXAMINATION:   /81 mmHg  Pulse 94  Temp(Src) 97.8  F (36.6  C) (Oral)  Resp 16  Wt 48.898 kg (107 lb 12.8 oz)  SpO2 " 96%   Wt Readings from Last 4 Encounters:   02/07/17 48.898 kg (107 lb 12.8 oz)   02/03/17 47.537 kg (104 lb 12.8 oz)   01/17/17 50.304 kg (110 lb 14.4 oz)   01/10/17 49.896 kg (110 lb)     Constitutional: Alert, oriented, cachectic female in no visible distress. Walking without difficulty  Eyes: PERRL. Anicteric sclerae.    ENT/Mouth: OM moist and pink without lesions or thrush.    CV: RRR, no murmurs appreciated.  Resp: CTAB slightly diminished R base. Breathing comfortably.  Breasts: R breast soft with nodules or masses, L breast - overall much softer  Abdomen: Soft, non-tender, non-distended. Bowel sounds present. No masses appreciated. No organomegaly noted.  Extremities: No lower extremity edema appreciated.  Skin: Warm, dry. No bruising or petechiae noted. No rash  Lymph: Small posterior cervical node R neck about 1.5 cm. No other cervical, supraclavicular, or axillary nodes palpable  MSK:  No tenderness over spinous processes.  Neuro: CN II-XII grossly intact. Baseline decreased sensation over mandibular branch of facial nerve    LABS:   Results for HEIDI RUIZ (MRN [id number]) as of 2/7/2017 12:40   Ref. Range 2/3/2017 06:21 2/7/2017 11:54   Sodium Latest Ref Range: 133-144 mmol/L 139 138   Potassium Latest Ref Range: 3.4-5.3 mmol/L 4.1 4.6   Chloride Latest Ref Range:  mmol/L 107 104   Carbon Dioxide Latest Ref Range: 20-32 mmol/L 23 24   Urea Nitrogen Latest Ref Range: 7-30 mg/dL 5 (L) 13   Creatinine Latest Ref Range: 0.52-1.04 mg/dL 0.68 0.68   GFR Estimate Latest Ref Range: >60 mL/min/1.7m2 88 88   GFR Estimate If Black Latest Ref Range: >60 mL/min/1.7m2 >90... >90...   Calcium Latest Ref Range: 8.5-10.1 mg/dL 7.8 (L) 9.5   Anion Gap Latest Ref Range: 3-14 mmol/L 10 9   Magnesium Latest Ref Range: 1.6-2.3 mg/dL 2.3 2.3   Phosphorus Latest Ref Range: 2.5-4.5 mg/dL 2.2 (L) 3.8   Results for HEIDI RUIZ (MRN [id number]) as of 2/7/2017 12:30   Ref. Range 2/2/2017 06:16  2/3/2017 06:21 2/7/2017 11:54   WBC Latest Ref Range: 4.0-11.0 10e9/L 11.9 (H) 11.1 (H) 11.3 (H)   Hemoglobin Latest Ref Range: 11.7-15.7 g/dL 8.7 (L) 8.9 (L) 10.9 (L)   Hematocrit Latest Ref Range: 35.0-47.0 % 26.3 (L) 26.7 (L) 32.8 (L)   Platelet Count Latest Ref Range: 150-450 10e9/L 252 297 475 (H)   RBC Count Latest Ref Range: 3.8-5.2 10e12/L 2.72 (L) 2.78 (L) 3.29 (L)   MCV Latest Ref Range:  fl 97 96 100   MCH Latest Ref Range: 26.5-33.0 pg 32.0 32.0 33.1 (H)   MCHC Latest Ref Range: 31.5-36.5 g/dL 33.1 33.3 33.2   RDW Latest Ref Range: 10.0-15.0 % 17.2 (H) 17.4 (H) 18.6 (H)     IMPRESSION/PLAN:  Dominga is a 61-year-old female with history of metastatic ER-positive, HER-2 positive breast cancer, with involvement of the bone, history of pleural effusions treated with pleurodesis and PleurX placement, and with treated brain metastases, previously with stable disease on TDM1, however patient opted for break from therapy from June 2015 through November 2015, and represented with worsening metastatic disease at that time. She restarted TDM1 on 11/27/2015. She had progressive disease 2/17/16 and Perjeta was added to TDM1. She had gamma knife to brain mets on 3/8.  She received WBRT.  She is now on gemzar and herceptin on [place] .    1. Breast cancer:  Restaging brain MRI and CT CAP 1/25/2017 with stable disease. Her treatment has been on hold due to HCAP. *** Due for cycle 7 day 1 today. Due to her deconditioning and that she is not yet completed with antibiotics and still with mild leukocytosis, I'd like to give her Herceptin only today, and give her an additional week to recover prior to restarting chemotherapy. She does get fatigued and worn down with Gemcitabine.    2. Pulm/HCAP: Continues on Levaquin through tomorrow. She feels she is largely improved. Some residual cough. She still has sensation of ear fullness and popping b/l, which is improving. She is still feeling deconditioned and is back  in the wheelchair today. Still with slight leukocytosis at 11.3.    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 She started desmopressin 100 mcg with good response clinically.  - Follow-up with endocrine in 1 month    4. Brain mets: Numbness of tongue and V2 and V3 on right are improving. Most recent brain MRI stable.    5. FEN: Cr, lytes, weight stable. OK to stop the Kphos neutral supplement.    6. Pain:minimal, uses Aleve prn. No new sites of pain. Not needing oxycodone but has some available if needed.    7. GERD: Continue prilosec.    8. Bone mets: on Xgeva.  Due today.     9. Mood: She is overall coping well.     10. She has an advanced directive.  She has a POLST.  DNR/DNI.  Her power of  is listed in the computer.     10. Deconditioning: Has a new insurance provider and is going to resume PT/OT now.    Deyanira Costello PA-C  Hematology, Oncology, and Transplant  [facility]  [address] [place] [id number]  [phone]      Again, thank you for allowing me to participate in the care of your patient.      Sincerely,    Deyanira Costello PA-C

## 2017-02-07 NOTE — NURSING NOTE
"Keren Erickson is a 61 year old female who presents for:  Chief Complaint   Patient presents with     Blood Draw     IV placed in by RN, Vitals taken by [facility]     Oncology Clinic Visit     Return patient visit- Breast Ca        Initial Vitals:  /81 mmHg  Pulse 94  Temp(Src) 97.8  F (36.6  C) (Oral)  Resp 16  Wt 48.898 kg (107 lb 12.8 oz)  SpO2 96% Estimated body mass index is 19.1 kg/(m^2) as calculated from the following:    Height as of 1/27/17: 1.6 m (5' 3\").    Weight as of this encounter: 48.898 kg (107 lb 12.8 oz).. There is no height on file to calculate BSA. BP completed using cuff size: NA (Not Taken)  Data Unavailable No LMP recorded. Patient is postmenopausal. Allergies and medications reviewed.     Medications: Medication refills not needed today.  Pharmacy name entered into [employer]: [facility] [id number] [place] [address] [address] AT [facility] [address]    Comments: vitals taken in lab    5 minutes for nursing intake (face to face time)   Valerie Olivares CMA          "

## 2017-02-08 NOTE — PROGRESS NOTES
SUBJECTIVE/OBJECTIVE:                                                    Keren Erickson is a 61 year old female coming in for a transitions of care visit.  She was discharged from [facility] on 2/3/17 for pneumonia.     Chief Complaint: medication review.    The primary oncologist for this patient is Dr Marlen Harper.  The PCP for this patient is Dr Kelly Hart.    Cancer diagnosis: Breast cancer    Allergies/ADRs: None  Tobacco: No tobacco use   Alcohol: occasional  Caffeine: 1-2 cups/day of coffee  Activity: none  PMH: Reviewed in Epic    Medication Adherence: no issues reported    Breast cancer:  Current medications include: Current treatment includes Gemzar and Herceptin.  Today, the Gemzar is being held to allow patient more time to recover from pneumonia.      Pneumonia:  Current medications include: levofloxacin 750mg daily x 6 days.  Treatment will be finished tomorrow.  Patient states her cough is better.  This did cause her diarrhea and nausea, but these are better.    Central diabetes insipidus:  Current medications include: desmopressin 0.1mg PO QHS.  Patient reports that her nighttime urination has decreased, and she's happy about that.  No side effects noted.    Nausea:  Current medications include: prochlorperazine PRN, PRN ondansetron, and lorazepam.  Patient states these work OK to control her nausea.  She doesn't like taking the lorazepam as it causes some morning hangover.    Arthritis:  Current medications include: naproxen 220mg daily. Patient says this is working well for her and she denies stomach upset.    Supplements:  Current medications include: multivitamin daily.    Of note, patient is not longer needing the following medications: Imodium, Mucinex, omeprazole.     Current labs include:  BP Readings from Last 3 Encounters:   02/07/17 112/81   02/03/17 137/84   01/17/17 120/77     Latest Ht and Wt:  Wt Readings from Last 2 Encounters:   02/07/17 107 lb 12.8 oz (48.898 kg)   02/03/17 104  "lb 12.8 oz (47.537 kg)     Ht Readings from Last 2 Encounters:   01/27/17 5' 3\" (1.6 m)   11/15/16 5' 2\" (1.575 m)        Latest vitals:  /81 P 94    Current labs include:  BUN       13   2/7/2017  CR     0.68   2/7/2017  POTASSIUM      4.6   2/7/2017  ALT       20   2/1/2017  AST       32   2/1/2017  BILITOTAL      0.2   2/1/2017  ALBUMIN      2.1   2/1/2017  WBC     11.3   2/7/2017  HGB     10.9   2/7/2017  PLT      475   2/7/2017  ABSOLUTE NEUTROPHIL   Date Value Ref Range Status   02/07/2017 7.9 1.6 - 8.3 10e9/L Final       ASSESSMENT:                                                       Current medications were reviewed today.     Medication Adherence: no issues identified    Breast cancer: Stable. No changes at this time.    Pneumonia: Stable. Patient would benefit from finishing antibiotic course.    Central diabetes insipidus: Improved. Current treatment effective/sodium level normalized.    Nausea: Stable. Current treatment effective.    Arthritis: Stable. No change needed.    Supplements: Stable. No change needed.        PLAN:                                                      Post Discharge Medication Reconciliation Status: discharge medications reconciled, continue medications without change.    No changes needed today.    I spent 30 minutes with this patient today.  A copy of the visit note was provided to the patient's primary care provider.    Will follow up in 1-2 months.    The patient was sent via [employer] a summary of these recommendations as an after visit summary.    Barbara Lakhani, PharmD, BCOP, BCPS  Clinical Pharmacy Specialist/  Oncology Medication Therapy Management Pharmacist  [facility]  Pager [phone]  Phone [phone]          "

## 2017-02-08 NOTE — PATIENT INSTRUCTIONS
Recommendations from today's MTM visit:                                                    MTM (medication therapy management) is a service provided by a clinical pharmacist designed to help you get the most of out of your medicines.   Today we reviewed what your medicines are for, how to know if they are working, that your medicines are safe and how to make your medicine regimen as easy as possible.     1. No medication changes at this time.    Next MTM visit: 1-2 days    To schedule another MTM appointment, please call the clinic directly or you may call the MTM scheduling line at [phone] or toll-free at [phone].     My Clinical Pharmacist's contact information:                                                      It was a pleasure seeing you today!  Please feel free to contact me with any questions or concerns you have.      Barbara Lakhani, PharmD, BCOP, BCPS  Clinical Pharmacy Specialist/  Oncology Medication Therapy Management Pharmacist  [facility]  Pager [phone]  Phone [phone]          You may receive a survey about the MTM services you received.  I would appreciate your feedback to help me serve you better in the future. Please fill it out and return it when you can. Your comments will be anonymous.

## 2017-02-14 ENCOUNTER — ONCOLOGY VISIT (OUTPATIENT)
Dept: ONCOLOGY | Facility: CLINIC | Age: 62
End: 2017-02-14
Attending: INTERNAL MEDICINE
Payer: COMMERCIAL

## 2017-02-14 VITALS
TEMPERATURE: 97.9 F | WEIGHT: 108 LBS | HEART RATE: 79 BPM | OXYGEN SATURATION: 93 % | BODY MASS INDEX: 19.13 KG/M2 | SYSTOLIC BLOOD PRESSURE: 119 MMHG | DIASTOLIC BLOOD PRESSURE: 80 MMHG

## 2017-02-14 VITALS
TEMPERATURE: 97.9 F | WEIGHT: 108.9 LBS | SYSTOLIC BLOOD PRESSURE: 119 MMHG | OXYGEN SATURATION: 93 % | HEART RATE: 76 BPM | BODY MASS INDEX: 19.29 KG/M2 | DIASTOLIC BLOOD PRESSURE: 80 MMHG

## 2017-02-14 DIAGNOSIS — C50.919 METASTATIC BREAST CANCER: Primary | ICD-10-CM

## 2017-02-14 DIAGNOSIS — C50.919 CARCINOMA OF BREAST METASTATIC TO MULTIPLE SITES, UNSPECIFIED LATERALITY (H): ICD-10-CM

## 2017-02-14 DIAGNOSIS — C79.31 BRAIN METASTASIS: ICD-10-CM

## 2017-02-14 DIAGNOSIS — C50.919 METASTATIC BREAST CANCER: ICD-10-CM

## 2017-02-14 DIAGNOSIS — C50.919 CARCINOMA OF BREAST METASTATIC TO MULTIPLE SITES, UNSPECIFIED LATERALITY (H): Primary | ICD-10-CM

## 2017-02-14 LAB
ANION GAP SERPL CALCULATED.3IONS-SCNC: 10 MMOL/L (ref 3–14)
BASOPHILS # BLD AUTO: 0.1 10E9/L (ref 0–0.2)
BASOPHILS NFR BLD AUTO: 1.1 %
BUN SERPL-MCNC: 14 MG/DL (ref 7–30)
CALCIUM SERPL-MCNC: 8.7 MG/DL (ref 8.5–10.1)
CHLORIDE SERPL-SCNC: 104 MMOL/L (ref 94–109)
CO2 SERPL-SCNC: 24 MMOL/L (ref 20–32)
CREAT SERPL-MCNC: 0.71 MG/DL (ref 0.52–1.04)
DIFFERENTIAL METHOD BLD: ABNORMAL
EOSINOPHIL # BLD AUTO: 0 10E9/L (ref 0–0.7)
EOSINOPHIL NFR BLD AUTO: 0 %
ERYTHROCYTE [DISTWIDTH] IN BLOOD BY AUTOMATED COUNT: 18.3 % (ref 10–15)
GFR SERPL CREATININE-BSD FRML MDRD: 84 ML/MIN/1.7M2
GLUCOSE SERPL-MCNC: 100 MG/DL (ref 70–99)
HCT VFR BLD AUTO: 33.6 % (ref 35–47)
HGB BLD-MCNC: 11.2 G/DL (ref 11.7–15.7)
IMM GRANULOCYTES # BLD: 0.2 10E9/L (ref 0–0.4)
IMM GRANULOCYTES NFR BLD: 2.1 %
LYMPHOCYTES # BLD AUTO: 1.7 10E9/L (ref 0.8–5.3)
LYMPHOCYTES NFR BLD AUTO: 18 %
MCH RBC QN AUTO: 33.3 PG (ref 26.5–33)
MCHC RBC AUTO-ENTMCNC: 33.3 G/DL (ref 31.5–36.5)
MCV RBC AUTO: 100 FL (ref 78–100)
MONOCYTES # BLD AUTO: 1 10E9/L (ref 0–1.3)
MONOCYTES NFR BLD AUTO: 10.9 %
NEUTROPHILS # BLD AUTO: 6.3 10E9/L (ref 1.6–8.3)
NEUTROPHILS NFR BLD AUTO: 67.9 %
NRBC # BLD AUTO: 0.1 10*3/UL
NRBC BLD AUTO-RTO: 1 /100
PHOSPHATE SERPL-MCNC: 2.7 MG/DL (ref 2.5–4.5)
PLATELET # BLD AUTO: 345 10E9/L (ref 150–450)
POTASSIUM SERPL-SCNC: 3.8 MMOL/L (ref 3.4–5.3)
RBC # BLD AUTO: 3.36 10E12/L (ref 3.8–5.2)
SODIUM SERPL-SCNC: 139 MMOL/L (ref 133–144)
WBC # BLD AUTO: 9.2 10E9/L (ref 4–11)

## 2017-02-14 PROCEDURE — 96413 CHEMO IV INFUSION 1 HR: CPT

## 2017-02-14 PROCEDURE — 80048 BASIC METABOLIC PNL TOTAL CA: CPT | Performed by: PHYSICIAN ASSISTANT

## 2017-02-14 PROCEDURE — 84100 ASSAY OF PHOSPHORUS: CPT | Performed by: PHYSICIAN ASSISTANT

## 2017-02-14 PROCEDURE — 85025 COMPLETE CBC W/AUTO DIFF WBC: CPT | Performed by: PHYSICIAN ASSISTANT

## 2017-02-14 PROCEDURE — 96375 TX/PRO/DX INJ NEW DRUG ADDON: CPT

## 2017-02-14 PROCEDURE — 36415 COLL VENOUS BLD VENIPUNCTURE: CPT

## 2017-02-14 PROCEDURE — 99212 OFFICE O/P EST SF 10 MIN: CPT | Mod: ZF

## 2017-02-14 PROCEDURE — 25000128 H RX IP 250 OP 636: Mod: ZF | Performed by: PHYSICIAN ASSISTANT

## 2017-02-14 PROCEDURE — 99214 OFFICE O/P EST MOD 30 MIN: CPT | Mod: ZP | Performed by: PHYSICIAN ASSISTANT

## 2017-02-14 PROCEDURE — 25000125 ZZHC RX 250: Mod: ZF | Performed by: PHYSICIAN ASSISTANT

## 2017-02-14 RX ADMIN — GEMCITABINE 1460 MG: 38 INJECTION, SOLUTION INTRAVENOUS at 17:33

## 2017-02-14 RX ADMIN — SODIUM CHLORIDE 250 ML: 9 INJECTION, SOLUTION INTRAVENOUS at 17:21

## 2017-02-14 RX ADMIN — DEXAMETHASONE SODIUM PHOSPHATE 12 MG: 10 INJECTION, SOLUTION INTRAMUSCULAR; INTRAVENOUS at 17:22

## 2017-02-14 ASSESSMENT — PAIN SCALES - GENERAL: PAINLEVEL: MILD PAIN (2)

## 2017-02-14 NOTE — MR AVS SNAPSHOT
After Visit Summary   2/14/2017    Keren Erickson    MRN: [id number]           Patient Information     Date Of Birth          1955        Visit Information        Provider Department      2/14/2017 5:00 PM  10 ATC;  ONCOLOGY INFUSION [facility]        Today's Diagnoses     Metastatic breast cancer (H)    -  1    Carcinoma of breast metastatic to multiple sites, unspecified laterality (H)        Brain metastasis (H)          Care Instructions    Contact Numbers  [facility] Nurse Triage: [phone]  After Hours Nurse Line:  [phone]  Hospital Main Line:  [phone]    Please call the [facility] Triage line if you experience a temperature greater than or equal to 100.5, shaking chills, have uncontrolled nausea, vomiting and/or diarrhea, dizziness, shortness of breath, chest pain, bleeding, unexplained bruising, or if you have any other new/concerning symptoms, questions or concerns.     If it is after hours, weekends, or holidays, please call either the after hours nurse line listed above or the main hospital  at  [phone] and ask to speak to the Oncology doctor on call.     If you are having any concerning symptoms or wish to speak to a provider before your next infusion visit, please call your care coordinator or triage to notify them so we can adequately serve you.     If you need a refill on a narcotic prescription or other medication, please call triage before your infusion appointment.   .[email or web address]          Follow-ups after your visit        Your next 10 appointments already scheduled     Feb 28, 2017 11:30 AM [facility] Lab Draw with [facility] LAB DRAW   [facility] Lab Draw ([facility])    [address] [place] [id number] [phone]            Feb 28, 2017 12:10 PM CST   (Arrive by 11:55 AM)   Return Visit with BRAULIO Van   [facility] (  [facility])    [address] [facility]  2nd [facility] [address]   [phone]            Feb 28, 2017  2:00 PM CST   Infusion 120 with UC ONCOLOGY INFUSION, UC 14 ATC   [facility] ([facility])    [address] [place] [address]   [phone]            Mar 07, 2017 11:30 AM CST   [facility] Lab Draw with [facility] LAB DRAW   [facility] Lab Draw ([facility])    [address] [place] [address]   [phone]            Mar 07, 2017 12:10 PM CST   (Arrive by 11:55 AM)   Return Visit with BRAULIO Van   [facility] ([facility])    [address] [place] [address]   [phone]            Mar 07, 2017  1:30 PM CST   Infusion 120 with UC ONCOLOGY INFUSION, UC 27 ATC   [facility] ([facility])    [address] [place] [address]   [phone]            Mar 13, 2017 12:00 PM CDT   (Arrive by 11:45 AM)   RETURN ENDOCRINE with Rolando Mishra MD   [facility] Endocrinology ([facility])    [address] [place] [address]  3rd [facility] [address] [phone]              Future tests that were ordered for you today     Open Future Orders        Priority Expected Expires Ordered    CT Chest/Abdomen/Pelvis w Contrast Routine  2/15/2018 2/14/2017    Echocardiogram Complete Routine  2/14/2018 2/14/2017            Who to contact     If you have questions or need follow up information about today's clinic visit or your schedule please contact [facility] directly at [phone].  Normal or non-critical lab and imaging results will be communicated to you by MyChart, letter or phone within 4 business days after the clinic has received the results. If you do not  hear from us within 7 days, please contact the clinic through [employer] or phone. If you have a critical or abnormal lab result, we will notify you by phone as soon as possible.  Submit refill requests through [employer] or call your pharmacy and they will forward the refill request to us. Please allow 3 business days for your refill to be completed.          Additional Information About Your Visit        [email or web address] Information     [employer] gives you secure access to your electronic health record. If you see a primary care provider, you can also send messages to your care team and make appointments. If you have questions, please call your primary care clinic.  If you do not have a primary care provider, please call [phone] and they will assist you.        Care EveryWhere ID     This is your Care EveryWhere ID. This could be used by other organizations to access your [facility] medical records  [id number]         Blood Pressure from Last 3 Encounters:   02/14/17 119/80   02/14/17 119/80   02/07/17 112/81    Weight from Last 3 Encounters:   02/14/17 49 kg (108 lb)   02/14/17 49.4 kg (108 lb 14.4 oz)   02/07/17 48.9 kg (107 lb 12.8 oz)              We Performed the Following     Basic metabolic panel     CBC with platelets differential     Phosphorus     Treatment Conditions 2          Today's Medication Changes          These changes are accurate as of: 2/14/17  6:03 PM.  If you have any questions, ask your nurse or doctor.               Stop taking these medicines if you haven't already. Please contact your care team if you have questions.     D3 ADULT PO   Stopped by:  Deyanira Costello PA-C                    Primary Care Provider Office Phone # Fax #    Kelly Hart [phone]       [facility] [address] [facility] [address]        Thank you!     Thank you for choosing [facility]  for your care. Our goal is always to provide you with excellent care. Hearing  back from our patients is one way we can continue to improve our services. Please take a few minutes to complete the written survey that you may receive in the mail after your visit with us. Thank you!             Your Updated Medication List - Protect others around you: Learn how to safely use, store and throw away your medicines at www.disposemymeds.org.          This list is accurate as of: 2/14/17  6:03 PM.  Always use your most recent med list.                   Brand Name Dispense Instructions for use    ALEVE PO      Take 220 mg by mouth daily       desmopressin 0.1 MG tablet    DDAVP    30 tablet    Take 1 tablet (100 mcg) by mouth At Bedtime       loperamide 2 MG capsule    IMODIUM    20 capsule    Take 1 capsule (2 mg) by mouth 4 times daily as needed for diarrhea       LORazepam 0.5 MG tablet    ATIVAN    30 tablet    Take 1 tablet (0.5 mg) by mouth every 4 hours as needed (Anxiety, Nausea/Vomiting or Sleep)       Multi-vitamin Tabs tablet      Take 1 tablet by mouth daily       omeprazole 40 MG capsule    priLOSEC    90 capsule    Take 1 capsule (40 mg) by mouth as needed       ondansetron 8 MG tablet    ZOFRAN    30 tablet    Take 1 tablet (8 mg) by mouth every 8 hours as needed for nausea       * order for DME     1 Units    Walker to ensure safe ambulation       * order for DME     1 Units    Cranial prosthesis       prochlorperazine 10 MG tablet    COMPAZINE    30 tablet    Take 1 tablet (10 mg) by mouth every 6 hours as needed (Nausea/Vomiting)       * Notice:  This list has 2 medication(s) that are the same as other medications prescribed for you. Read the directions carefully, and ask your doctor or other care provider to review them with you.

## 2017-02-14 NOTE — PROGRESS NOTES
Infusion Nursing Note:  Keren Erickson presents today for Cycle 7 Day 8 Gemzar.    Patient seen by provider today: Yes: BRAULIO Chandra    Intravenous Access:  Peripheral IV placed.    Treatment Conditions:  Lab Results   Component Value Date    HGB 11.2 02/14/2017     Lab Results   Component Value Date    WBC 9.2 02/14/2017      Lab Results   Component Value Date    ANEU 6.3 02/14/2017     Lab Results   Component Value Date     02/14/2017      Lab Results   Component Value Date     02/14/2017                   Lab Results   Component Value Date    POTASSIUM 3.8 02/14/2017           Lab Results   Component Value Date    MAG 2.3 02/07/2017            Lab Results   Component Value Date    CR 0.71 02/14/2017                   Lab Results   Component Value Date    OMA 8.7 02/14/2017                Lab Results   Component Value Date    BILITOTAL 0.2 02/01/2017           Lab Results   Component Value Date    ALBUMIN 2.1 02/01/2017                    Lab Results   Component Value Date    ALT 20 02/01/2017           Lab Results   Component Value Date    AST 32 02/01/2017     Results reviewed, labs MET treatment parameters, ok to proceed with treatment.      Post Infusion Assessment:  Patient tolerated infusion without incident.  Blood return noted pre and post infusion.  Site patent and intact, free from redness, edema or discomfort.  No evidence of extravasations. Access discontinued per protocol.    Discharge Plan:   Copy of AVS reviewed with patient and/or family.  Patient will return 2/28 for next appointment.  Patient discharged in stable condition accompanied by: friend.  Departure Mode: Ambulatory.    Taty Pompa RN

## 2017-02-14 NOTE — MR AVS SNAPSHOT
After Visit Summary   2/14/2017    Keren Erickson    MRN: [id number]           Patient Information     Date Of Birth          1955        Visit Information        Provider Department      2/14/2017 4:10 PM Deyanira Costello PA-C [facility]        Today's Diagnoses     Carcinoma of breast metastatic to multiple sites, unspecified laterality (H)    -  1       Follow-ups after your visit        Your next 10 appointments already scheduled     Feb 28, 2017 11:30 AM CST   [facility] Draw with [facility] DRAW   [facility] ([facility])    [address] [place] [address]   [phone]            Feb 28, 2017 12:10 PM CST   (Arrive by 11:55 AM)   Return Visit with BRAULIO Van   [facility] ([facility])    [address] [place] [address]   [phone]            Feb 28, 2017  2:00 PM CST   Infusion 120 with UC ONCOLOGY INFUSION, UC 14 ATC   [facility] ([facility])    [address] [place] [address]   [phone]            Mar 07, 2017 11:30 AM CST   [facility] Lab Draw with [facility] LAB DRAW   [facility] ([facility])    [address] [address]   [phone]            Mar 07, 2017 12:10 PM CST   (Arrive by 11:55 AM)   Return Visit with BRAULIO Van   [facility] ([facility])    [address] [place] [address]   [phone]            Mar 07, 2017  1:30 PM CST   Infusion 120 with UC ONCOLOGY INFUSION, UC 27 ATC   [facility] ([facility])    [address] [place] [address]   [phone]            Mar 13, 2017 12:00 PM  CDT   (Arrive by 11:45 AM)   RETURN ENDOCRINE with Rolando Mishra MD   [facility] ([facility])    [address] [facility]  3rd Floor  [facility] [address] [phone]              Future tests that were ordered for you today     Open Future Orders        Priority Expected Expires Ordered    CT Chest/Abdomen/Pelvis w Contrast Routine  2/15/2018 2/14/2017    Echocardiogram Complete Routine  2/14/2018 2/14/2017            Who to contact     If you have questions or need follow up information about today's clinic visit or your schedule please contact [facility] directly at [phone].  Normal or non-critical lab and imaging results will be communicated to you by [employer], letter or phone within 4 business days after the clinic has received the results. If you do not hear from us within 7 days, please contact the clinic through [employer] or phone. If you have a critical or abnormal lab result, we will notify you by phone as soon as possible.  Submit refill requests through [employer] or call your pharmacy and they will forward the refill request to us. Please allow 3 business days for your refill to be completed.          Additional Information About Your Visit        [employer] Information     [employer] gives you secure access to your electronic health record. If you see a primary care provider, you can also send messages to your care team and make appointments. If you have questions, please call your primary care clinic.  If you do not have a primary care provider, please call [phone] and they will assist you.        Care EveryWhere ID     This is your Care EveryWhere ID. This could be used by other organizations to access your [facility] medical records  [id number]        Your Vitals Were     Pulse Temperature Pulse Oximetry BMI (Body Mass Index)          79 97.9  F (36.6  C) (Oral) 93% 19.13 kg/m2         Blood Pressure from Last 3 Encounters:    02/14/17 119/80   02/14/17 119/80   02/07/17 112/81    Weight from Last 3 Encounters:   02/14/17 49 kg (108 lb)   02/14/17 49.4 kg (108 lb 14.4 oz)   02/07/17 48.9 kg (107 lb 12.8 oz)                 Today's Medication Changes          These changes are accurate as of: 2/14/17  5:31 PM.  If you have any questions, ask your nurse or doctor.               Stop taking these medicines if you haven't already. Please contact your care team if you have questions.     D3 ADULT PO   Stopped by:  Deyanira Costello PA-C                    Primary Care Provider Office Phone # Fax #    Kelly Hart [phone]       [facility] [address] [facility] [address]        Thank you!     Thank you for choosing [facility]  for your care. Our goal is always to provide you with excellent care. Hearing back from our patients is one way we can continue to improve our services. Please take a few minutes to complete the written survey that you may receive in the mail after your visit with us. Thank you!             Your Updated Medication List - Protect others around you: Learn how to safely use, store and throw away your medicines at www.disposemymeds.org.          This list is accurate as of: 2/14/17  5:31 PM.  Always use your most recent med list.                   Brand Name Dispense Instructions for use    ALEVE PO      Take 220 mg by mouth daily       desmopressin 0.1 MG tablet    DDAVP    30 tablet    Take 1 tablet (100 mcg) by mouth At Bedtime       loperamide 2 MG capsule    IMODIUM    20 capsule    Take 1 capsule (2 mg) by mouth 4 times daily as needed for diarrhea       LORazepam 0.5 MG tablet    ATIVAN    30 tablet    Take 1 tablet (0.5 mg) by mouth every 4 hours as needed (Anxiety, Nausea/Vomiting or Sleep)       Multi-vitamin Tabs tablet      Take 1 tablet by mouth daily       omeprazole 40 MG capsule    priLOSEC    90 capsule    Take 1 capsule (40 mg) by mouth as  needed       ondansetron 8 MG tablet    ZOFRAN    30 tablet    Take 1 tablet (8 mg) by mouth every 8 hours as needed for nausea       * order for DME     1 Units    Walker to ensure safe ambulation       * order for DME     1 Units    Cranial prosthesis       prochlorperazine 10 MG tablet    COMPAZINE    30 tablet    Take 1 tablet (10 mg) by mouth every 6 hours as needed (Nausea/Vomiting)       * Notice:  This list has 2 medication(s) that are the same as other medications prescribed for you. Read the directions carefully, and ask your doctor or other care provider to review them with you.

## 2017-02-14 NOTE — PROGRESS NOTES
HEMATOLOGY/ONCOLOGY PROGRESS NOTE  Feb 14, 2017    REASON FOR VISIT: follow-up of metastatic breast cancer, triple positive    DIAGNOSIS:   The patient is a 60-year-old female who presented to the hospital initially in 09/2013 with complaints of dyspnea. Workup revealed a large pericardial effusion and pleural effusion. Physical examination revealed a large untreated left breast mass encompassing the entire left breast. Biopsies revealed these were ER, [place] and HER2-positive breast cancer. She had a thoracentesis and pericardial sclerosis performed. She was originally on Arimidex and Herceptin. In 01/2014, she was switched to tamoxifen and Herceptin due to arthralgias, and she ultimately developed progressive disease and was switched to Faslodex and Herceptin. In 01/2015, she was found to have progressive disease and was switched to T-DM1.     Initially when she was seen in late 02/2015, she complained of some V5 sensory deficit. This was subjective. I was not able to elicit this on examination. This persisted and further workup was performed with a brain MRI. This revealed what was initially thought to be 3 punctate brain metastases. She originally saw Radiation Oncology and Neurosurgery as well as underwent a lumbar puncture. Cytology from the lumbar puncture showed no evidence of leptomeningeal disease. She was treated with Gamma Knife radiation to 6 lesions, performed on 04/16/2015.  She initiated therapy with TDM1 in January 2015 and continued this through 6/26/2015 with overall stable disease. She has since been on a break from treatment. The patient presented earlier this month with recurrent shortness of breath.  Imaging revealed recurrent right-sided pleural effusion.  She has since undergone thoracentesis with cytology pending.  Clinically, however, there is evidence of disease progression. PET scan performed on 11/25/2015 demonstrated progression of disease at multiple sites. She restarted TDM1 on  "11/27/2015, however experienced a mild transfusion reaction with shortness of breath and chest tightness, resolved upon stopping TDM1 and 125 mg of SoluMedrol. She had progression of disease on repeat imaging 2/17/2016, as well as new brain metastases. She started TDM1 with Perjeta on 3/4/2016. She underwent gamma knife to brain mets on 3/8/16.       In late May, she was found to have progressive brain metastases.  She received whole brain radiation.  She had a follow up brain MRI August 2016 that was stable.     In September 2016, she enrolled on [place]  trial and was randomized to the standard of care arm/Physician's Choice; started on gemzar and herceptin. She was recently hospitalized 1/27-2/3/17 for presumed HCAP.    INTERVAL HISTORY:  Dominga presents for follow-up today.  She is feeling very good today. She continues to work with PT and get stronger. She is feeling she has more strength now. All of the PNA symptoms have completely resolved; no further fevers, no cough, and breathing without SOB or PASTOR. She has some \"popping\" in her L ear intermittently, which is also improved. No ear pain. She hasn't had any nausea apart from nasuea on the days she comes here, anticipatory to coming here. No vomiting. Eating and drinking well. Bowels improving with 2 semi-formed stools daily now. No bleeding. Urination remains better with the DDAVP. No new pains. She is sore from her exercises but this is typical for her after performing these maneuvers. She feels well to continue with treatment today.    ROS: 10 point ROS was conducted and was otherwise negative except for as above.     PHYSICAL EXAMINATION:     Vital Signs 2/14/2017   Systolic 119   Diastolic 80   Pulse 76   Temperature 97.9   Respirations    Weight (LB) 108 lb 14.4 oz   Height    BMI    Pain    O2 93     Wt Readings from Last 4 Encounters:   02/14/17 49.4 kg (108 lb 14.4 oz)   02/07/17 48.9 kg (107 lb 12.8 oz)   02/03/17 47.5 kg (104 lb 12.8 oz) "   01/17/17 50.3 kg (110 lb 14.4 oz)     Constitutional: Alert, oriented, cachectic female in no visible distress. In wheelchair today  Eyes: PERRL. Anicteric sclerae.    ENT/Mouth: OM moist and pink without lesions or thrush.    CV: RRR, no murmurs appreciated.  Resp: CTAB slightly diminished R base, stable. Breathing comfortably.  Abdomen: Soft, non-tender, non-distended. Bowel sounds present. No masses appreciated. No organomegaly noted.  Extremities: No lower extremity edema appreciated.  Skin: Warm, dry. No bruising or petechiae noted. No rash  Lymph: Small posterior cervical node R neck about 1.5 cm. No other cervical, supraclavicular, or axillary nodes palpable  MSK:  No tenderness over spinous processes.  Neuro: CN II-XII grossly intact. Baseline decreased sensation over mandibular branch of facial nerve    LABS:   Results for HEIDI RUIZ (MRN [id number]) as of 2/14/2017 17:06   Ref. Range 2/14/2017 16:44   WBC Latest Ref Range: 4.0 - 11.0 10e9/L 9.2   Hemoglobin Latest Ref Range: 11.7 - 15.7 g/dL 11.2 (L)   Hematocrit Latest Ref Range: 35.0 - 47.0 % 33.6 (L)   Platelet Count Latest Ref Range: 150 - 450 10e9/L 345     IMPRESSION/PLAN:  Dominga is a 61-year-old female with history of metastatic ER-positive, HER-2 positive breast cancer, with involvement of the bone, history of pleural effusions treated with pleurodesis and PleurX placement, and with treated brain metastases, previously with stable disease on TDM1, however patient opted for break from therapy from June 2015 through November 2015, and represented with worsening metastatic disease at that time. She restarted TDM1 on 11/27/2015. She had progressive disease 2/17/16 and Perjeta was added to TDM1. She had gamma knife to brain mets on 3/8.  She received WBRT.  She was started on [place]  clinical trial and randomized to physician's choice, and started on Gemzar/Herceptin.    1. Breast cancer:  Continues on Gemzar/Herceptin with  stable disease on restaging 1/25. Cycle 7 was delayed secondary to HCAP. She resumed cycle 7 on 2/7 however received Herceptin alone given she was still recovering from PNA. Today is cycle 7 day 8, Gemzar. Her PNA symptoms are completely resolved and strenght is improved. Will continue today. The clinical trial is now closed and thus will continue on Gemzar/Herceptin off study. With this, and given that Dominga tolerates this well, will follow-up with day 1 of cycles only from now on.    2. Pulm/HCAP: Completed course of Levaquin 2/8 with resolution of symptoms.   - History of pleural effusion requiring thoracentesis with progressive disease off treatment. This has not recurred since resuming treatment. Exam is stable.    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 She started desmopressin 100 mcg with good response clinically.   - Follow-up with endocrine in 1 month    4. Brain mets: Numbness of tongue and V2 and V3 on right are improving. Most recent brain MRI stable.    5. FEN: Cr, lytes stable. Weight improved back to pre-PNA baseline.    6. Pain: Chronic mild aches/pains secondary to disease and with myalgias on Gemzar. No new sites of pain. Not needing oxycodone but has some available if needed. Manages with one Aleve daily.    7. GERD: Continue prilosec.    8. Bone mets: on Xgeva.     9. Mood: She is overall coping well.     10. She has an advanced directive.  She has a POLST.  DNR/DNI.  Her power of  is listed in the computer.     10. Deconditioning: She had been walking independently or with cane in clinic prior to pneumonia, working on getting her strength back.  - Continue PT/OT      Deyanira Costello PA-C  Hematology, Oncology, and Transplant  [facility]  [address] [place] [address] [phone]

## 2017-02-14 NOTE — LETTER
2/14/2017      RE: Keren Erickson  [address] [facility] [address]       HEMATOLOGY/ONCOLOGY PROGRESS NOTE  Feb 14, 2017    REASON FOR VISIT: follow-up of metastatic breast cancer, triple positive    DIAGNOSIS:   The patient is a 60-year-old female who presented to the hospital initially in 09/2013 with complaints of dyspnea. Workup revealed a large pericardial effusion and pleural effusion. Physical examination revealed a large untreated left breast mass encompassing the entire left breast. Biopsies revealed these were ER, [place] and HER2-positive breast cancer. She had a thoracentesis and pericardial sclerosis performed. She was originally on Arimidex and Herceptin. In 01/2014, she was switched to tamoxifen and Herceptin due to arthralgias, and she ultimately developed progressive disease and was switched to Faslodex and Herceptin. In 01/2015, she was found to have progressive disease and was switched to T-DM1.     Initially when she was seen in late 02/2015, she complained of some V5 sensory deficit. This was subjective. I was not able to elicit this on examination. This persisted and further workup was performed with a brain MRI. This revealed what was initially thought to be 3 punctate brain metastases. She originally saw Radiation Oncology and Neurosurgery as well as underwent a lumbar puncture. Cytology from the lumbar puncture showed no evidence of leptomeningeal disease. She was treated with Gamma Knife radiation to 6 lesions, performed on 04/16/2015.  She initiated therapy with TDM1 in January 2015 and continued this through 6/26/2015 with overall stable disease. She has since been on a break from treatment. The patient presented earlier this month with recurrent shortness of breath.  Imaging revealed recurrent right-sided pleural effusion.  She has since undergone thoracentesis with cytology pending.  Clinically, however, there is evidence of disease progression. PET scan performed on 11/25/2015  "demonstrated progression of disease at multiple sites. She restarted TDM1 on 11/27/2015, however experienced a mild transfusion reaction with shortness of breath and chest tightness, resolved upon stopping TDM1 and 125 mg of SoluMedrol. She had progression of disease on repeat imaging 2/17/2016, as well as new brain metastases. She started TDM1 with Perjeta on 3/4/2016. She underwent gamma knife to brain mets on 3/8/16.       In late May, she was found to have progressive brain metastases.  She received whole brain radiation.  She had a follow up brain MRI August 2016 that was stable.     In September 2016, she enrolled on [place]  trial and was randomized to the standard of care arm/Physician's Choice; started on gemzar and herceptin. She was recently hospitalized 1/27-2/3/17 for presumed HCAP.    INTERVAL HISTORY:  Dominga presents for follow-up today.  She is feeling very good today. She continues to work with PT and get stronger. She is feeling she has more strength now. All of the PNA symptoms have completely resolved; no further fevers, no cough, and breathing without SOB or PASTOR. She has some \"popping\" in her L ear intermittently, which is also improved. No ear pain. She hasn't had any nausea apart from nasuea on the days she comes here, anticipatory to coming here. No vomiting. Eating and drinking well. Bowels improving with 2 semi-formed stools daily now. No bleeding. Urination remains better with the DDAVP. No new pains. She is sore from her exercises but this is typical for her after performing these maneuvers. She feels well to continue with treatment today.    ROS: 10 point ROS was conducted and was otherwise negative except for as above.     PHYSICAL EXAMINATION:     Vital Signs 2/14/2017   Systolic 119   Diastolic 80   Pulse 76   Temperature 97.9   Respirations    Weight (LB) 108 lb 14.4 oz   Height    BMI    Pain    O2 93     Wt Readings from Last 4 Encounters:   02/14/17 49.4 kg (108 lb 14.4 " oz)   02/07/17 48.9 kg (107 lb 12.8 oz)   02/03/17 47.5 kg (104 lb 12.8 oz)   01/17/17 50.3 kg (110 lb 14.4 oz)     Constitutional: Alert, oriented, cachectic female in no visible distress. In wheelchair today  Eyes: PERRL. Anicteric sclerae.    ENT/Mouth: OM moist and pink without lesions or thrush.    CV: RRR, no murmurs appreciated.  Resp: CTAB slightly diminished R base, stable. Breathing comfortably.  Abdomen: Soft, non-tender, non-distended. Bowel sounds present. No masses appreciated. No organomegaly noted.  Extremities: No lower extremity edema appreciated.  Skin: Warm, dry. No bruising or petechiae noted. No rash  Lymph: Small posterior cervical node R neck about 1.5 cm. No other cervical, supraclavicular, or axillary nodes palpable  MSK:  No tenderness over spinous processes.  Neuro: CN II-XII grossly intact. Baseline decreased sensation over mandibular branch of facial nerve    LABS:   Results for HEIDI RUIZ (MRN [id number]) as of 2/14/2017 17:06   Ref. Range 2/14/2017 16:44   WBC Latest Ref Range: 4.0 - 11.0 10e9/L 9.2   Hemoglobin Latest Ref Range: 11.7 - 15.7 g/dL 11.2 (L)   Hematocrit Latest Ref Range: 35.0 - 47.0 % 33.6 (L)   Platelet Count Latest Ref Range: 150 - 450 10e9/L 345     IMPRESSION/PLAN:  Dominga is a 61-year-old female with history of metastatic ER-positive, HER-2 positive breast cancer, with involvement of the bone, history of pleural effusions treated with pleurodesis and PleurX placement, and with treated brain metastases, previously with stable disease on TDM1, however patient opted for break from therapy from June 2015 through November 2015, and represented with worsening metastatic disease at that time. She restarted TDM1 on 11/27/2015. She had progressive disease 2/17/16 and Perjeta was added to TDM1. She had gamma knife to brain mets on 3/8.  She received WBRT.  She was started on [place]  clinical trial and randomized to physician's choice, and started on  Gemzar/Herceptin.    1. Breast cancer:  Continues on Gemzar/Herceptin with stable disease on restaging 1/25. Cycle 7 was delayed secondary to HCAP. She resumed cycle 7 on 2/7 however received Herceptin alone given she was still recovering from PNA. Today is cycle 7 day 8, Gemzar. Her PNA symptoms are completely resolved and strenght is improved. Will continue today. The clinical trial is now closed and thus will continue on Gemzar/Herceptin off study. With this, and given that Dominga tolerates this well, will follow-up with day 1 of cycles only from now on.    2. Pulm/HCAP: Completed course of Levaquin 2/8 with resolution of symptoms.   - History of pleural effusion requiring thoracentesis with progressive disease off treatment. This has not recurred since resuming treatment. Exam is stable.    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 She started desmopressin 100 mcg with good response clinically.   - Follow-up with endocrine in 1 month    4. Brain mets: Numbness of tongue and V2 and V3 on right are improving. Most recent brain MRI stable.    5. FEN: Cr, lytes stable. Weight improved back to pre-PNA baseline.    6. Pain: Chronic mild aches/pains secondary to disease and with myalgias on Gemzar. No new sites of pain. Not needing oxycodone but has some available if needed. Manages with one Aleve daily.    7. GERD: Continue prilosec.    8. Bone mets: on Xgeva.     9. Mood: She is overall coping well.     10. She has an advanced directive.  She has a POLST.  DNR/DNI.  Her power of  is listed in the computer.     10. Deconditioning: She had been walking independently or with cane in clinic prior to pneumonia, working on getting her strength back.  - Continue PT/OT      Deyanira Costello PA-C  Hematology, Oncology, and Transplant  [facility]  [address] [place] [id number]  [phone]

## 2017-02-14 NOTE — NURSING NOTE
Chief Complaint   Patient presents with     Blood Draw     Pt with hx of breast ca labs collected via venipuncture.

## 2017-02-14 NOTE — Clinical Note
Not urgent, but if possible to make Blaes visit in advance, that could be helpful given she is full most of the time Going to infusion

## 2017-02-14 NOTE — NURSING NOTE
"Keren Erickson is a 61 year old female who presents for:  Chief Complaint   Patient presents with     Blood Draw     Pt with hx of breast ca labs collected via venipuncture.      Oncology Clinic Visit     Return: Metastatic breast cancer         Initial Vitals:  /80  Pulse 79  Temp 97.9  F (36.6  C) (Oral)  Wt 49 kg (108 lb)  SpO2 93%  BMI 19.13 kg/m2 Estimated body mass index is 19.13 kg/(m^2) as calculated from the following:    Height as of 1/27/17: 1.6 m (5' 3\").    Weight as of this encounter: 49 kg (108 lb).. Body surface area is 1.48 meters squared. BP completed using cuff size: BP was taken in LAB INTAKE  Mild Pain (2) No LMP recorded. Patient is postmenopausal. Allergies and medications reviewed.     Medications: Medication refills not needed today.  Pharmacy name entered into [employer]: [facility] [address] [place] [address] [address] AT [facility] [address]    Comments:     6 minutes for nursing intake (face to face time)   Liudmila Esteves CMA          "

## 2017-02-15 ENCOUNTER — RESEARCH ENCOUNTER (OUTPATIENT)
Dept: ONCOLOGY | Facility: CLINIC | Age: 62
End: 2017-02-15

## 2017-02-15 NOTE — PROGRESS NOTES
Research Note: [place] Trial MM-302 VS MD choice in advanced Breast Cancer ([id number]) has been suspended and closed to further patient participation. Patient was being treated on the MD Choice of Gemzar and Herceptin and may continue on this therapy but further off study oversight. Patient was informed of this at her infusion visit yesterday. All her questions were answered and understands this change in her treatment plan.   Oralia Mcleod RN/ Research [phone]

## 2017-02-15 NOTE — PATIENT INSTRUCTIONS
Contact Numbers  [facility] Nurse Triage: [phone]  After Hours Nurse Line:  [phone]  Hospital Main Line:  [phone]    Please call the [facility] Triage line if you experience a temperature greater than or equal to 100.5, shaking chills, have uncontrolled nausea, vomiting and/or diarrhea, dizziness, shortness of breath, chest pain, bleeding, unexplained bruising, or if you have any other new/concerning symptoms, questions or concerns.     If it is after hours, weekends, or holidays, please call either the after hours nurse line listed above or the main hospital  at  [phone] and ask to speak to the Oncology doctor on call.     If you are having any concerning symptoms or wish to speak to a provider before your next infusion visit, please call your care coordinator or triage to notify them so we can adequately serve you.     If you need a refill on a narcotic prescription or other medication, please call triage before your infusion appointment.   .[email or web address]

## 2017-02-21 DIAGNOSIS — C50.919: Primary | ICD-10-CM

## 2017-02-21 RX ORDER — LIDOCAINE/PRILOCAINE 2.5 %-2.5%
CREAM (GRAM) TOPICAL
Qty: 30 G | Refills: 1 | Status: SHIPPED | OUTPATIENT
Start: 2017-02-21 | End: 2017-03-13

## 2017-02-23 RX ORDER — HEPARIN SODIUM (PORCINE) LOCK FLUSH IV SOLN 100 UNIT/ML 100 UNIT/ML
5 SOLUTION INTRAVENOUS
Status: COMPLETED | OUTPATIENT
Start: 2017-02-23 | End: 2017-02-27

## 2017-02-24 ENCOUNTER — ANESTHESIA EVENT (OUTPATIENT)
Dept: SURGERY | Facility: AMBULATORY SURGERY CENTER | Age: 62
End: 2017-02-24

## 2017-02-27 ENCOUNTER — HOSPITAL ENCOUNTER (OUTPATIENT)
Facility: AMBULATORY SURGERY CENTER | Age: 62
End: 2017-02-27
Attending: PHYSICIAN ASSISTANT

## 2017-02-27 ENCOUNTER — ANESTHESIA (OUTPATIENT)
Dept: SURGERY | Facility: AMBULATORY SURGERY CENTER | Age: 62
End: 2017-02-27

## 2017-02-27 VITALS
RESPIRATION RATE: 16 BRPM | OXYGEN SATURATION: 99 % | SYSTOLIC BLOOD PRESSURE: 128 MMHG | DIASTOLIC BLOOD PRESSURE: 85 MMHG | TEMPERATURE: 97.6 F

## 2017-02-27 LAB — INR PPP: 0.94 (ref 0.86–1.14)

## 2017-02-27 DEVICE — IMPLANTABLE DEVICE: Type: IMPLANTABLE DEVICE | Site: CHEST  WALL | Status: FUNCTIONAL

## 2017-02-27 RX ORDER — NALOXONE HYDROCHLORIDE 0.4 MG/ML
.1-.4 INJECTION, SOLUTION INTRAMUSCULAR; INTRAVENOUS; SUBCUTANEOUS
Status: DISCONTINUED | OUTPATIENT
Start: 2017-02-27 | End: 2017-02-28 | Stop reason: HOSPADM

## 2017-02-27 RX ORDER — ONDANSETRON 2 MG/ML
4 INJECTION INTRAMUSCULAR; INTRAVENOUS EVERY 30 MIN PRN
Status: DISCONTINUED | OUTPATIENT
Start: 2017-02-27 | End: 2017-02-28 | Stop reason: HOSPADM

## 2017-02-27 RX ORDER — ONDANSETRON 4 MG/1
4 TABLET, ORALLY DISINTEGRATING ORAL EVERY 30 MIN PRN
Status: DISCONTINUED | OUTPATIENT
Start: 2017-02-27 | End: 2017-02-28 | Stop reason: HOSPADM

## 2017-02-27 RX ORDER — LIDOCAINE 40 MG/G
CREAM TOPICAL
Status: DISCONTINUED | OUTPATIENT
Start: 2017-02-27 | End: 2017-02-28 | Stop reason: HOSPADM

## 2017-02-27 RX ORDER — ACETAMINOPHEN 325 MG/1
975 TABLET ORAL ONCE
Status: DISCONTINUED | OUTPATIENT
Start: 2017-02-27 | End: 2017-02-28 | Stop reason: HOSPADM

## 2017-02-27 RX ORDER — LIDOCAINE HYDROCHLORIDE 20 MG/ML
INJECTION, SOLUTION INFILTRATION; PERINEURAL PRN
Status: DISCONTINUED | OUTPATIENT
Start: 2017-02-27 | End: 2017-02-27

## 2017-02-27 RX ORDER — HEPARIN SODIUM,PORCINE 10 UNIT/ML
5 VIAL (ML) INTRAVENOUS EVERY 24 HOURS
Status: DISCONTINUED | OUTPATIENT
Start: 2017-02-27 | End: 2017-02-28 | Stop reason: HOSPADM

## 2017-02-27 RX ORDER — SODIUM CHLORIDE, SODIUM LACTATE, POTASSIUM CHLORIDE, CALCIUM CHLORIDE 600; 310; 30; 20 MG/100ML; MG/100ML; MG/100ML; MG/100ML
INJECTION, SOLUTION INTRAVENOUS CONTINUOUS
Status: DISCONTINUED | OUTPATIENT
Start: 2017-02-27 | End: 2017-02-28 | Stop reason: HOSPADM

## 2017-02-27 RX ORDER — SODIUM CHLORIDE 9 MG/ML
INJECTION, SOLUTION INTRAVENOUS CONTINUOUS
Status: DISCONTINUED | OUTPATIENT
Start: 2017-02-27 | End: 2017-02-28 | Stop reason: HOSPADM

## 2017-02-27 RX ORDER — FENTANYL CITRATE 50 UG/ML
25-50 INJECTION, SOLUTION INTRAMUSCULAR; INTRAVENOUS
Status: DISCONTINUED | OUTPATIENT
Start: 2017-02-27 | End: 2017-02-28 | Stop reason: HOSPADM

## 2017-02-27 RX ORDER — HEPARIN SODIUM (PORCINE) LOCK FLUSH IV SOLN 100 UNIT/ML 100 UNIT/ML
5 SOLUTION INTRAVENOUS
Status: DISCONTINUED | OUTPATIENT
Start: 2017-02-27 | End: 2017-02-28 | Stop reason: HOSPADM

## 2017-02-27 RX ORDER — PROPOFOL 10 MG/ML
INJECTION, EMULSION INTRAVENOUS PRN
Status: DISCONTINUED | OUTPATIENT
Start: 2017-02-27 | End: 2017-02-27

## 2017-02-27 RX ORDER — GABAPENTIN 300 MG/1
300 CAPSULE ORAL ONCE
Status: DISCONTINUED | OUTPATIENT
Start: 2017-02-27 | End: 2017-02-28 | Stop reason: HOSPADM

## 2017-02-27 RX ADMIN — PROPOFOL 10 MG: 10 INJECTION, EMULSION INTRAVENOUS at 12:41

## 2017-02-27 RX ADMIN — LIDOCAINE HYDROCHLORIDE 50 MG: 20 INJECTION, SOLUTION INFILTRATION; PERINEURAL at 12:27

## 2017-02-27 RX ADMIN — PROPOFOL 20 MG: 10 INJECTION, EMULSION INTRAVENOUS at 12:31

## 2017-02-27 RX ADMIN — SODIUM CHLORIDE, SODIUM LACTATE, POTASSIUM CHLORIDE, CALCIUM CHLORIDE: 600; 310; 30; 20 INJECTION, SOLUTION INTRAVENOUS at 12:24

## 2017-02-27 RX ADMIN — PROPOFOL 15 MG: 10 INJECTION, EMULSION INTRAVENOUS at 12:38

## 2017-02-27 RX ADMIN — PROPOFOL 10 MG: 10 INJECTION, EMULSION INTRAVENOUS at 12:54

## 2017-02-27 RX ADMIN — PROPOFOL 10 MG: 10 INJECTION, EMULSION INTRAVENOUS at 12:34

## 2017-02-27 NOTE — ANESTHESIA CARE TRANSFER NOTE
Patient: Keren Erickson    Procedure(s):  Single Lumen Chest Power Port Placement - Wound Class: I-Clean    Diagnosis: Carcinoma of Breast Metastatic to Multiple Site  Diagnosis Additional Information: No value filed.    Anesthesia Type:   MAC     Note:  Airway :Room Air  Patient transferred to:Phase II  Comments: To Phase 2.   Alert and oriented.   VSS   Denies discomfort.   Report to RN      Vitals: (Last set prior to Anesthesia Care Transfer)    CRNA VITALS  2/27/2017 1246 - 2/27/2017 1316      2/27/2017             Pulse: 70    SpO2: 92 %    Resp Rate (set): 10                Electronically Signed By: ESPINOZA Wang CRNA  February 27, 2017  1:16 PM

## 2017-02-27 NOTE — PROGRESS NOTES
Interventional Radiology Pre-Procedure Focused H&P Assessment   Time of Assessment: 10:53 AM    Procedure: Chest port placement    ROS: Review of systems negative since discharge from hospital 3 weeks ago when she was diagnosed and treated for pneumonia and found out she had diabetes insipitus.    Mallampati: Grade 2:  Soft palate, base of uvula, tonsillar pillars, and portion of posterior pharyngeal wall visible    Lungs: Lungs Clear with good breath sounds bilaterally    Heart: Normal heart sounds and rate    Gorge Kebede PA-C

## 2017-02-27 NOTE — DISCHARGE INSTRUCTIONS
Discharge Instructions following Vascular Access Device Placement    Today you had a vascular access device placed. Derma Mart (Skin Glue) was used to cover the incision(s) where the device was inserted into your skin. Absorbable sutures were also used under your skin. If after 10 days there are visible sutures, they may be trimmed at the infusion clinic or by your primary doctor. At your next visit to the infusion clinic, nursing staff will instruct you on the maintenance and care of your device. If you have questions prior to this appointment, contact the Infusion Care Coordinator at [phone].    Site Care:  - Do not apply any ointment over the port site or insertion site. The thin layer of glue will wear off in 7-10 days. If still present after 10 days, you may gently remove it with soap and water.   - Do not submerge in a tub bath, hot tub, or swimming pool if you have a central venous catheter. If you have a port, wait for 14 days before you submerge yourself under water.  -It is okay to shower and get the insertion site wet, but immediately pat dry after showering. If you have a central venous catheter, or your port is accessed, completely cover the dressing with plastic to keep it dry when showering.  -Do not put Emla cream over the port area for 7 days.  -If there is any oozing or bleeding from the site, apply direct pressure for 5-10 minutes with a gauze pad.  -If bleeding continues after 10 minutes, call the infusion care coordinator. If bleeding can not be controlled with direct pressure, call [phone].    Contact the Infusion Care Coordinator at [phone] if:  -Bleeding, as above  -Swelling in your neck or arm on the side of the port insertion  -Sudden shortness of breath, light headedness, or palpitations  -Fever greater than 100.5 degrees F.  -Other signs of infection such as redness, tenderness, or drainage from the site    Additional Instructions:  -You may resume a regular diet  -No heavy lifting  greater than 10 pounds for three days  -You may use ice packs 3-4 times per day for 15 minutes for minor swelling or pain  -You may take acetaminophen or ibuprofen as needed if you have discomfort (and if okay with your primary care physician)  -If you are on Coumadin, restart tonight. Follow up with your Coumadin Clinic or Primary Care MD to have your INR rechecked.      DID YOU RECEIVE SEDATION TODAY?  Yes    If you received sedation please follow these additional safety measures:  Sedation medicine, if given, may remain active for many hours. It is important for the next 24 hours that you do not:  -Drive a car  -Operate machines or power tools  -Consume alcohol  -Sign any important papers or legal documents    You must have a responsible adult take you home and stay with you for a least 6 hours.    To contact a Doctor, call:  [phone] Monday thru Friday 8:00 am to 4:30 pm,   or Toll Free:  [phone]                            After hours:  dial [phone] and ask for the resident on call for:  Interventional Radiology  For emergency care, call the: [place]:  [phone] (TTY for hearing impaired: [phone])

## 2017-02-27 NOTE — PROCEDURES
Interventional Radiology Brief Post Procedure Note    Procedure: Chest port placement    Proceduralist: Rajan Kebede PA-C    Assistant: None    Time Out: Prior to the start of the procedure and with procedural staff participation, I verbally confirmed the patient s identity using two indicators, relevant allergies, that the procedure was appropriate and matched the consent or emergent situation, and that the correct equipment/implants were available. Immediately prior to starting the procedure I conducted the Time Out with the procedural staff and re-confirmed the patient s name, procedure, and site/side. (The Joint Commission universal protocol was followed.)  Yes    Sedation: Monitored Anesthesia Care (MAC) administered and documented by Anesthesia Care Provider    Findings: Completed image-guided placement of 6 Malay 21.5 cm single lumen power-injectable central venous port via right IJ. Aspirates and flushes freely, heparin locked and is ready for immediate use.    Estimated Blood Loss: Less than 10 ml    Fluoroscopy Time: 0.6 minutes    SPECIMENS: None    Complications: 1. None     Condition: Stable    Plan: Follow-up per primary team. Return for removal as indicated.    Comments: See dictated procedure note for full details.    Gorge Kebede PA-C

## 2017-02-27 NOTE — IP AVS SNAPSHOT
[facility]    [address] [place] [id number]    Phone:  [phone]    Fax:  [phone]                                       After Visit Summary   2/27/2017    Keren Erickson    MRN: [id number]           After Visit Summary Signature Page     I have received my discharge instructions, and my questions have been answered. I have discussed any challenges I see with this plan with the nurse or doctor.    ..........................................................................................................................................  Patient/Patient Representative Signature      ..........................................................................................................................................  Patient Representative Print Name and Relationship to Patient    ..................................................               ................................................  Date                                            Time    ..........................................................................................................................................  Reviewed by Signature/Title    ...................................................              ..............................................  Date                                                            Time

## 2017-02-27 NOTE — ANESTHESIA POSTPROCEDURE EVALUATION
Patient: Keren Erickson    Procedure(s):  Single Lumen Chest Power Port Placement - Wound Class: I-Clean    Diagnosis:Carcinoma of Breast Metastatic to Multiple Site  Diagnosis Additional Information: No value filed.    Anesthesia Type:  MAC    Note:  Anesthesia Post Evaluation    Patient location during evaluation: PACU  Patient participation: Able to fully participate in evaluation  Level of consciousness: awake and alert  Pain management: adequate  Airway patency: patent  Cardiovascular status: hemodynamically stable  Respiratory status: nonlabored ventilation, spontaneous ventilation and room air  Hydration status: euvolemic  PONV: none     Anesthetic complications: None          Last vitals:  Vitals:    02/27/17 1042   BP: 132/85   Resp: 16   SpO2: 100%         Electronically Signed By: Abdulaziz Bro MD  February 27, 2017  1:19 PM

## 2017-02-27 NOTE — ANESTHESIA PREPROCEDURE EVALUATION
Anesthesia Evaluation     . Pt has had prior anesthetic.       ROS/MED HX    ENT/Pulmonary: Comment: Brain metastases, central diabetes insipidus on desmopressin PO      Neurologic:  - neg neurologic ROS     Cardiovascular:  - neg cardiovascular ROS       METS/Exercise Tolerance:     Hematologic:  - neg hematologic  ROS       Musculoskeletal:  - neg musculoskeletal ROS       GI/Hepatic:  - neg GI/hepatic ROS       Renal/Genitourinary:  - ROS Renal section negative       Endo:  - neg endo ROS       Psychiatric:  - neg psychiatric ROS       Infectious Disease:  - neg infectious disease ROS       Malignancy:   (+) Malignancy History of Breast          Other:               Physical Exam  Normal systems: dental    Airway   Mallampati: II  TM distance: >3 FB  Neck ROM: full    Dental     Cardiovascular   Rhythm and rate: regular      Pulmonary    breath sounds clear to auscultation                    Anesthesia Plan      History & Physical Review  History and physical reviewed and following examination; no interval change.    ASA Status:  2 .    NPO Status:  > 8 hours    Plan for MAC with Intravenous induction. Maintenance will be TIVA.    PONV prophylaxis:  Ondansetron (or other 5HT-3)       Postoperative Care  Postoperative pain management:  Oral pain medications.      Consents  Anesthetic plan, risks, benefits and alternatives discussed with:  Patient..                          .

## 2017-02-27 NOTE — IP AVS SNAPSHOT
MRN:[id number]                      After Visit Summary   2/27/2017    Keren Erickson    MRN: [id number]           Thank you!     Thank you for choosing [facility] for your care. Our goal is always to provide you with excellent care. Hearing back from our patients is one way we can continue to improve our services. Please take a few minutes to complete the written survey that you may receive in the mail after you visit with us. Thank you!        Patient Information     Date Of Birth          1955        About your hospital stay     You were admitted on:  February 27, 2017 You last received care in the:  [facility]    You were discharged on:  February 27, 2017       Who to Call     For medical emergencies, please call 911.  For non-urgent questions about your medical care, please call your primary care provider or clinic, [phone]  For questions related to your surgery, please call your surgery clinic        Attending Provider     Provider Rajan Gaitan PA-C Physician Assistant       Primary Care Provider Office Phone # Fax #    Kelly Hart [phone]       [place] [address] [address] [facility] [address]        Your next 10 appointments already scheduled     Feb 28, 2017 11:30 AM CST   Masonic Lab Draw with  MASONIC LAB DRAW   [facility] Lab Draw ([facility])    [address] [place] [id number] [phone]            Feb 28, 2017 12:10 PM CST   (Arrive by 11:55 AM)   Return Visit with BRAULIO aVn   [facility] ([facility])    [address] [place] [id number] [phone]            Feb 28, 2017  2:00 PM CST   Infusion 120 with UC ONCOLOGY INFUSION, UC 14 ATC   [facility] ([facility])    [address] [place] [address]  [facility] [address]   [phone]            Mar 07, 2017 11:30 AM CST   Masonic Lab Draw with  MASONIC LAB DRAW   [facility] Lab Draw ([facility])    [address] [place] [address]   [phone]            Mar 07, 2017 12:10 PM CST   (Arrive by 11:55 AM)   Return Visit with BRAULIO Van   [facility] ([facility])    [address] [place] [address]   [phone]            Mar 07, 2017  1:30 PM CST   Infusion 120 with  ONCOLOGY INFUSION, UC 27 ATC   [facility] ([facility])    [address] [place] [address]   [phone]            Mar 13, 2017 12:00 PM CDT   (Arrive by 11:45 AM)   RETURN ENDOCRINE with Rolando Mishra MD   [facility])    [address] [place] [address]   [phone]              Further instructions from your care team       Discharge Instructions following Vascular Access Device Placement    Today you had a vascular access device placed. Derma Mart (Skin Glue) was used to cover the incision(s) where the device was inserted into your skin. Absorbable sutures were also used under your skin. If after 10 days there are visible sutures, they may be trimmed at the infusion clinic or by your primary doctor. At your next visit to the infusion clinic, nursing staff will instruct you on the maintenance and care of your device. If you have questions prior to this appointment, contact the Infusion Care Coordinator at [phone].    Site Care:  - Do not apply any ointment over the port site or insertion site. The thin layer of glue will wear off in 7-10 days. If still present after 10 days, you may gently remove it with soap and water.   - Do not submerge in a tub bath, hot tub, or  swimming pool if you have a central venous catheter. If you have a port, wait for 14 days before you submerge yourself under water.  -It is okay to shower and get the insertion site wet, but immediately pat dry after showering. If you have a central venous catheter, or your port is accessed, completely cover the dressing with plastic to keep it dry when showering.  -Do not put Emla cream over the port area for 7 days.  -If there is any oozing or bleeding from the site, apply direct pressure for 5-10 minutes with a gauze pad.  -If bleeding continues after 10 minutes, call the infusion care coordinator. If bleeding can not be controlled with direct pressure, call 911.    Contact the Infusion Care Coordinator at [phone] if:  -Bleeding, as above  -Swelling in your neck or arm on the side of the port insertion  -Sudden shortness of breath, light headedness, or palpitations  -Fever greater than 100.5 degrees F.  -Other signs of infection such as redness, tenderness, or drainage from the site    Additional Instructions:  -You may resume a regular diet  -No heavy lifting greater than 10 pounds for three days  -You may use ice packs 3-4 times per day for 15 minutes for minor swelling or pain  -You may take acetaminophen or ibuprofen as needed if you have discomfort (and if okay with your primary care physician)  -If you are on Coumadin, restart tonight. Follow up with your Coumadin Clinic or Primary Care MD to have your INR rechecked.      DID YOU RECEIVE SEDATION TODAY?  Yes    If you received sedation please follow these additional safety measures:  Sedation medicine, if given, may remain active for many hours. It is important for the next 24 hours that you do not:  -Drive a car  -Operate machines or power tools  -Consume alcohol  -Sign any important papers or legal documents    You must have a responsible adult take you home and stay with you for a least 6 hours.    To contact a Doctor, call:  [phone] Monday  thru Friday 8:00 am to 4:30 pm,   or Toll Free:  [phone]                            After hours:  dial [phone] and ask for the resident on call for:  Interventional Radiology  For emergency care, call the: [place]:  [phone] (TTY for hearing impaired: [phone])          Pending Results     Date and Time Order Name Status Description    2/27/2017 1206 IR CHEST PORT PLACEMENT > 5 YRS OF AGE In process             Admission Information     Date & Time Provider Department Dept. Phone    2/27/2017 Rajan Kebede PA-C [facility] [phone]      Your Vitals Were     Blood Pressure Respirations Pulse Oximetry             132/85 (Cuff Size: Adult Small) 16 100%         [employer] Information     [employer] gives you secure access to your electronic health record. If you see a primary care provider, you can also send messages to your care team and make appointments. If you have questions, please call your primary care clinic.  If you do not have a primary care provider, please call [phone] and they will assist you.      [employer] is an electronic gateway that provides easy, online access to your medical records. With [employer], you can request a clinic appointment, read your test results, renew a prescription or communicate with your care team.     To access your existing account, please contact your [facility] or call [phone] for assistance.        Care EveryWhere ID     This is your Care EveryWhere ID. This could be used by other organizations to access your [facility] medical records  [id number]           Review of your medicines      UNREVIEWED medicines. Ask your doctor about these medicines        Dose / Directions    ALEVE PO        Dose:  220 mg   Take 220 mg by mouth daily   Refills:  0       desmopressin 0.1 MG tablet   Commonly known as:  DDAVP   Used for:  Diabetes insipidus, neurohypophyseal (H)        Dose:  0.1 mg   Take 1  tablet (100 mcg) by mouth At Bedtime   Quantity:  30 tablet   Refills:  0       lidocaine-prilocaine cream   Commonly known as:  EMLA   Used for:  Carcinoma of breast metastatic to multiple sites (H)        Apply to port site one hour prior to port access 10 days to two weeks after glue from incision is gone   Quantity:  30 g   Refills:  1       loperamide 2 MG capsule   Commonly known as:  IMODIUM   Used for:  Diarrhea, unspecified type        Dose:  2 mg   Take 1 capsule (2 mg) by mouth 4 times daily as needed for diarrhea   Quantity:  20 capsule   Refills:  0       LORazepam 0.5 MG tablet   Commonly known as:  ATIVAN   Used for:  Metastatic breast cancer (H), Carcinoma of breast metastatic to multiple sites, unspecified laterality (H), Brain metastasis (H)        Dose:  0.5 mg   Take 1 tablet (0.5 mg) by mouth every 4 hours as needed (Anxiety, Nausea/Vomiting or Sleep)   Quantity:  30 tablet   Refills:  5       Multi-vitamin Tabs tablet        Dose:  1 tablet   Take 1 tablet by mouth daily   Refills:  0       omeprazole 40 MG capsule   Commonly known as:  priLOSEC   Used for:  Gastroesophageal reflux disease without esophagitis        Dose:  40 mg   Take 1 capsule (40 mg) by mouth as needed   Quantity:  90 capsule   Refills:  3       ondansetron 8 MG tablet   Commonly known as:  ZOFRAN   Used for:  Metastatic breast cancer (H), Carcinoma of breast metastatic to multiple sites, unspecified laterality (H), Brain metastasis (H)        Dose:  8 mg   Take 1 tablet (8 mg) by mouth every 8 hours as needed for nausea   Quantity:  30 tablet   Refills:  3       prochlorperazine 10 MG tablet   Commonly known as:  COMPAZINE   Used for:  Metastatic breast cancer (H), Carcinoma of breast metastatic to multiple sites, unspecified laterality (H), Brain metastasis (H)        Dose:  10 mg   Take 1 tablet (10 mg) by mouth every 6 hours as needed (Nausea/Vomiting)   Quantity:  30 tablet   Refills:  5         CONTINUE these medicines  which have NOT CHANGED        Dose / Directions    * order for DME   Used for:  Carcinoma of breast metastatic to multiple sites (H)        Walker to ensure safe ambulation   Quantity:  1 Units   Refills:  1       * order for DME   Used for:  Carcinoma of breast metastatic to multiple sites (H)        Cranial prosthesis   Quantity:  1 Units   Refills:  1       * Notice:  This list has 2 medication(s) that are the same as other medications prescribed for you. Read the directions carefully, and ask your doctor or other care provider to review them with you.             Protect others around you: Learn how to safely use, store and throw away your medicines at www.disposemymeds.org.             Medication List: This is a list of all your medications and when to take them. Check marks below indicate your daily home schedule. Keep this list as a reference.      Medications           Morning Afternoon Evening Bedtime As Needed    ALEVE PO   Take 220 mg by mouth daily                                desmopressin 0.1 MG tablet   Commonly known as:  DDAVP   Take 1 tablet (100 mcg) by mouth At Bedtime                                lidocaine-prilocaine cream   Commonly known as:  EMLA   Apply to port site one hour prior to port access 10 days to two weeks after glue from incision is gone                                loperamide 2 MG capsule   Commonly known as:  IMODIUM   Take 1 capsule (2 mg) by mouth 4 times daily as needed for diarrhea                                LORazepam 0.5 MG tablet   Commonly known as:  ATIVAN   Take 1 tablet (0.5 mg) by mouth every 4 hours as needed (Anxiety, Nausea/Vomiting or Sleep)                                Multi-vitamin Tabs tablet   Take 1 tablet by mouth daily                                omeprazole 40 MG capsule   Commonly known as:  priLOSEC   Take 1 capsule (40 mg) by mouth as needed                                ondansetron 8 MG tablet   Commonly known as:  ZOFRAN   Take 1 tablet  (8 mg) by mouth every 8 hours as needed for nausea                                * order for DME   Walker to ensure safe ambulation                                * order for DME   Cranial prosthesis                                prochlorperazine 10 MG tablet   Commonly known as:  COMPAZINE   Take 1 tablet (10 mg) by mouth every 6 hours as needed (Nausea/Vomiting)                                * Notice:  This list has 2 medication(s) that are the same as other medications prescribed for you. Read the directions carefully, and ask your doctor or other care provider to review them with you.

## 2017-02-28 ENCOUNTER — ONCOLOGY VISIT (OUTPATIENT)
Dept: ONCOLOGY | Facility: CLINIC | Age: 62
End: 2017-02-28
Attending: INTERNAL MEDICINE
Payer: COMMERCIAL

## 2017-02-28 VITALS
SYSTOLIC BLOOD PRESSURE: 118 MMHG | BODY MASS INDEX: 19.29 KG/M2 | OXYGEN SATURATION: 96 % | RESPIRATION RATE: 16 BRPM | WEIGHT: 108.9 LBS | DIASTOLIC BLOOD PRESSURE: 78 MMHG | TEMPERATURE: 98.2 F | HEART RATE: 95 BPM

## 2017-02-28 DIAGNOSIS — C79.31 BRAIN METASTASIS: ICD-10-CM

## 2017-02-28 DIAGNOSIS — C50.919 CARCINOMA OF BREAST METASTATIC TO MULTIPLE SITES, UNSPECIFIED LATERALITY (H): Primary | ICD-10-CM

## 2017-02-28 DIAGNOSIS — M21.861 HYPEREXTENSION DEFORMITY OF RIGHT KNEE: ICD-10-CM

## 2017-02-28 DIAGNOSIS — C50.919 CARCINOMA OF BREAST METASTATIC TO MULTIPLE SITES, UNSPECIFIED LATERALITY (H): ICD-10-CM

## 2017-02-28 DIAGNOSIS — C50.919 METASTATIC BREAST CANCER: Primary | ICD-10-CM

## 2017-02-28 DIAGNOSIS — E23.2 DIABETES INSIPIDUS, NEUROHYPOPHYSEAL (H): ICD-10-CM

## 2017-02-28 LAB
ALBUMIN SERPL-MCNC: 3.3 G/DL (ref 3.4–5)
ALP SERPL-CCNC: 113 U/L (ref 40–150)
ALT SERPL W P-5'-P-CCNC: 23 U/L (ref 0–50)
ANION GAP SERPL CALCULATED.3IONS-SCNC: 9 MMOL/L (ref 3–14)
AST SERPL W P-5'-P-CCNC: 25 U/L (ref 0–45)
BASOPHILS # BLD AUTO: 0.1 10E9/L (ref 0–0.2)
BASOPHILS NFR BLD AUTO: 0.7 %
BILIRUB SERPL-MCNC: 0.4 MG/DL (ref 0.2–1.3)
BUN SERPL-MCNC: 12 MG/DL (ref 7–30)
CALCIUM SERPL-MCNC: 8.9 MG/DL (ref 8.5–10.1)
CANCER AG27-29 SERPL-ACNC: 28 U/ML (ref 0–39)
CEA SERPL-MCNC: 1.5 UG/L (ref 0–2.5)
CHLORIDE SERPL-SCNC: 106 MMOL/L (ref 94–109)
CO2 SERPL-SCNC: 25 MMOL/L (ref 20–32)
CREAT SERPL-MCNC: 0.62 MG/DL (ref 0.52–1.04)
DIFFERENTIAL METHOD BLD: ABNORMAL
EOSINOPHIL # BLD AUTO: 0 10E9/L (ref 0–0.7)
EOSINOPHIL NFR BLD AUTO: 0.1 %
ERYTHROCYTE [DISTWIDTH] IN BLOOD BY AUTOMATED COUNT: 17.6 % (ref 10–15)
GFR SERPL CREATININE-BSD FRML MDRD: ABNORMAL ML/MIN/1.7M2
GLUCOSE SERPL-MCNC: 80 MG/DL (ref 70–99)
HCT VFR BLD AUTO: 32.2 % (ref 35–47)
HGB BLD-MCNC: 10.8 G/DL (ref 11.7–15.7)
IMM GRANULOCYTES # BLD: 0 10E9/L (ref 0–0.4)
IMM GRANULOCYTES NFR BLD: 0.4 %
LDH SERPL L TO P-CCNC: 213 U/L (ref 81–234)
LYMPHOCYTES # BLD AUTO: 1.2 10E9/L (ref 0.8–5.3)
LYMPHOCYTES NFR BLD AUTO: 17.2 %
MAGNESIUM SERPL-MCNC: 2.4 MG/DL (ref 1.6–2.3)
MCH RBC QN AUTO: 32.6 PG (ref 26.5–33)
MCHC RBC AUTO-ENTMCNC: 33.5 G/DL (ref 31.5–36.5)
MCV RBC AUTO: 97 FL (ref 78–100)
MONOCYTES # BLD AUTO: 0.8 10E9/L (ref 0–1.3)
MONOCYTES NFR BLD AUTO: 11.9 %
NEUTROPHILS # BLD AUTO: 4.8 10E9/L (ref 1.6–8.3)
NEUTROPHILS NFR BLD AUTO: 69.7 %
NRBC # BLD AUTO: 0 10*3/UL
NRBC BLD AUTO-RTO: 0 /100
PHOSPHATE SERPL-MCNC: 2.3 MG/DL (ref 2.5–4.5)
PLATELET # BLD AUTO: 244 10E9/L (ref 150–450)
POTASSIUM SERPL-SCNC: 3.9 MMOL/L (ref 3.4–5.3)
PROT SERPL-MCNC: 6.8 G/DL (ref 6.8–8.8)
RBC # BLD AUTO: 3.31 10E12/L (ref 3.8–5.2)
SODIUM SERPL-SCNC: 140 MMOL/L (ref 133–144)
URATE SERPL-MCNC: 2.9 MG/DL (ref 2.6–6)
WBC # BLD AUTO: 6.9 10E9/L (ref 4–11)

## 2017-02-28 PROCEDURE — 84100 ASSAY OF PHOSPHORUS: CPT | Performed by: INTERNAL MEDICINE

## 2017-02-28 PROCEDURE — 25000125 ZZHC RX 250: Mod: ZF | Performed by: PHYSICIAN ASSISTANT

## 2017-02-28 PROCEDURE — 84550 ASSAY OF BLOOD/URIC ACID: CPT | Performed by: INTERNAL MEDICINE

## 2017-02-28 PROCEDURE — 80053 COMPREHEN METABOLIC PANEL: CPT | Performed by: INTERNAL MEDICINE

## 2017-02-28 PROCEDURE — 96375 TX/PRO/DX INJ NEW DRUG ADDON: CPT

## 2017-02-28 PROCEDURE — 25000128 H RX IP 250 OP 636: Mod: ZF | Performed by: PHYSICIAN ASSISTANT

## 2017-02-28 PROCEDURE — 85025 COMPLETE CBC W/AUTO DIFF WBC: CPT | Performed by: INTERNAL MEDICINE

## 2017-02-28 PROCEDURE — 83615 LACTATE (LD) (LDH) ENZYME: CPT | Performed by: INTERNAL MEDICINE

## 2017-02-28 PROCEDURE — 96417 CHEMO IV INFUS EACH ADDL SEQ: CPT

## 2017-02-28 PROCEDURE — 86300 IMMUNOASSAY TUMOR CA 15-3: CPT | Performed by: INTERNAL MEDICINE

## 2017-02-28 PROCEDURE — 82378 CARCINOEMBRYONIC ANTIGEN: CPT | Performed by: INTERNAL MEDICINE

## 2017-02-28 PROCEDURE — 99215 OFFICE O/P EST HI 40 MIN: CPT | Mod: ZP | Performed by: PHYSICIAN ASSISTANT

## 2017-02-28 PROCEDURE — 25000128 H RX IP 250 OP 636: Mod: ZF | Performed by: INTERNAL MEDICINE

## 2017-02-28 PROCEDURE — 25000128 H RX IP 250 OP 636: Performed by: INTERNAL MEDICINE

## 2017-02-28 PROCEDURE — 96413 CHEMO IV INFUSION 1 HR: CPT

## 2017-02-28 PROCEDURE — 83735 ASSAY OF MAGNESIUM: CPT | Performed by: INTERNAL MEDICINE

## 2017-02-28 RX ORDER — ALBUTEROL SULFATE 0.83 MG/ML
2.5 SOLUTION RESPIRATORY (INHALATION)
Status: CANCELLED | OUTPATIENT
Start: 2017-03-07

## 2017-02-28 RX ORDER — DIPHENHYDRAMINE HYDROCHLORIDE 50 MG/ML
50 INJECTION INTRAMUSCULAR; INTRAVENOUS
Status: CANCELLED
Start: 2017-02-28

## 2017-02-28 RX ORDER — SODIUM CHLORIDE 9 MG/ML
1000 INJECTION, SOLUTION INTRAVENOUS CONTINUOUS PRN
Status: CANCELLED
Start: 2017-02-28

## 2017-02-28 RX ORDER — ALBUTEROL SULFATE 90 UG/1
1-2 AEROSOL, METERED RESPIRATORY (INHALATION)
Status: CANCELLED
Start: 2017-02-28

## 2017-02-28 RX ORDER — LORAZEPAM 2 MG/ML
0.5 INJECTION INTRAMUSCULAR EVERY 4 HOURS PRN
Status: CANCELLED
Start: 2017-03-07

## 2017-02-28 RX ORDER — EPINEPHRINE 0.3 MG/.3ML
0.3 INJECTION SUBCUTANEOUS EVERY 5 MIN PRN
Status: CANCELLED | OUTPATIENT
Start: 2017-02-28

## 2017-02-28 RX ORDER — EPINEPHRINE 0.3 MG/.3ML
0.3 INJECTION SUBCUTANEOUS EVERY 5 MIN PRN
Status: CANCELLED | OUTPATIENT
Start: 2017-03-07

## 2017-02-28 RX ORDER — ALBUTEROL SULFATE 0.83 MG/ML
2.5 SOLUTION RESPIRATORY (INHALATION)
Status: CANCELLED | OUTPATIENT
Start: 2017-02-28

## 2017-02-28 RX ORDER — ALBUTEROL SULFATE 90 UG/1
1-2 AEROSOL, METERED RESPIRATORY (INHALATION)
Status: CANCELLED
Start: 2017-03-07

## 2017-02-28 RX ORDER — MEPERIDINE HYDROCHLORIDE 25 MG/ML
25 INJECTION INTRAMUSCULAR; INTRAVENOUS; SUBCUTANEOUS EVERY 30 MIN PRN
Status: CANCELLED | OUTPATIENT
Start: 2017-02-28

## 2017-02-28 RX ORDER — DIPHENHYDRAMINE HCL 25 MG
50 CAPSULE ORAL
Status: CANCELLED | OUTPATIENT
Start: 2017-02-28

## 2017-02-28 RX ORDER — HEPARIN SODIUM (PORCINE) LOCK FLUSH IV SOLN 100 UNIT/ML 100 UNIT/ML
5 SOLUTION INTRAVENOUS ONCE
Status: COMPLETED | OUTPATIENT
Start: 2017-02-28 | End: 2017-02-28

## 2017-02-28 RX ORDER — MEPERIDINE HYDROCHLORIDE 25 MG/ML
25 INJECTION INTRAMUSCULAR; INTRAVENOUS; SUBCUTANEOUS EVERY 30 MIN PRN
Status: CANCELLED | OUTPATIENT
Start: 2017-03-07

## 2017-02-28 RX ORDER — DESMOPRESSIN ACETATE 0.1 MG/1
0.1 TABLET ORAL AT BEDTIME
Qty: 30 TABLET | Refills: 0 | Status: SHIPPED | OUTPATIENT
Start: 2017-02-28 | End: 2017-03-29

## 2017-02-28 RX ORDER — SODIUM CHLORIDE 9 MG/ML
1000 INJECTION, SOLUTION INTRAVENOUS CONTINUOUS PRN
Status: CANCELLED
Start: 2017-03-07

## 2017-02-28 RX ORDER — LORAZEPAM 2 MG/ML
0.5 INJECTION INTRAMUSCULAR EVERY 4 HOURS PRN
Status: CANCELLED
Start: 2017-02-28

## 2017-02-28 RX ORDER — METHYLPREDNISOLONE SODIUM SUCCINATE 125 MG/2ML
125 INJECTION, POWDER, LYOPHILIZED, FOR SOLUTION INTRAMUSCULAR; INTRAVENOUS
Status: CANCELLED
Start: 2017-03-07

## 2017-02-28 RX ORDER — HEPARIN SODIUM (PORCINE) LOCK FLUSH IV SOLN 100 UNIT/ML 100 UNIT/ML
5 SOLUTION INTRAVENOUS EVERY 8 HOURS
Status: DISCONTINUED | OUTPATIENT
Start: 2017-02-28 | End: 2017-03-08 | Stop reason: HOSPADM

## 2017-02-28 RX ORDER — METHYLPREDNISOLONE SODIUM SUCCINATE 125 MG/2ML
125 INJECTION, POWDER, LYOPHILIZED, FOR SOLUTION INTRAMUSCULAR; INTRAVENOUS
Status: CANCELLED
Start: 2017-02-28

## 2017-02-28 RX ORDER — DIPHENHYDRAMINE HYDROCHLORIDE 50 MG/ML
50 INJECTION INTRAMUSCULAR; INTRAVENOUS
Status: CANCELLED
Start: 2017-03-07

## 2017-02-28 RX ORDER — ACETAMINOPHEN 325 MG/1
650 TABLET ORAL
Status: CANCELLED | OUTPATIENT
Start: 2017-02-28

## 2017-02-28 RX ORDER — LIDOCAINE/PRILOCAINE 2.5 %-2.5%
CREAM (GRAM) TOPICAL
Qty: 30 G | Refills: 1 | Status: SHIPPED | OUTPATIENT
Start: 2017-02-28 | End: 2017-03-13

## 2017-02-28 RX ADMIN — DEXAMETHASONE SODIUM PHOSPHATE 12 MG: 10 INJECTION, SOLUTION INTRAMUSCULAR; INTRAVENOUS at 14:03

## 2017-02-28 RX ADMIN — GEMCITABINE 1460 MG: 38 INJECTION, SOLUTION INTRAVENOUS at 14:19

## 2017-02-28 RX ADMIN — SODIUM CHLORIDE 250 ML: 9 INJECTION, SOLUTION INTRAVENOUS at 14:02

## 2017-02-28 RX ADMIN — TRASTUZUMAB 292 MG: KIT at 14:52

## 2017-02-28 RX ADMIN — SODIUM CHLORIDE, PRESERVATIVE FREE 5 ML: 5 INJECTION INTRAVENOUS at 15:26

## 2017-02-28 RX ADMIN — SODIUM CHLORIDE, PRESERVATIVE FREE 5 ML: 5 INJECTION INTRAVENOUS at 11:35

## 2017-02-28 ASSESSMENT — PAIN SCALES - GENERAL: PAINLEVEL: MODERATE PAIN (5)

## 2017-02-28 NOTE — MR AVS SNAPSHOT
After Visit Summary   2/28/2017    Keren Erickson    MRN: [id number]           Patient Information     Date Of Birth          1955        Visit Information        Provider Department      2/28/2017 12:10 PM Esmer Oconnor PA [facility]        Today's Diagnoses     Carcinoma of breast metastatic to multiple sites, unspecified laterality (H)    -  1    Hyperextension deformity of right knee        Diabetes insipidus, neurohypophyseal (H)        Brain metastasis (H)           Follow-ups after your visit        Your next 10 appointments already scheduled     Feb 28, 2017  2:00 PM CST   Infusion 120 with UC ONCOLOGY INFUSION, UC 14 ATC   [facility] ([facility])    [address] [place] [address]  [address]nd [facility] [address]   [phone]            Mar 07, 2017  1:00 PM CST   Masonic Lab Draw with UC MASONIC LAB DRAW   [facility] Lab Draw ([facility])    [address] [place] [address]  [address] [facility] [address]   [phone]            Mar 07, 2017  1:30 PM CST   Infusion 120 with UC ONCOLOGY INFUSION, UC 27 ATC   [facility] ([facility])    [address] [place] [address]  [address] [facility] [address]   [phone]            Mar 13, 2017 12:00 PM CDT   (Arrive by 11:45 AM)   RETURN ENDOCRINE with Rolando Mishra MD   [facility] ([facility])    [address] [place] [address]  3rd Floor  [facility] [address] [phone]              Future tests that were ordered for you today     Open Future Orders        Priority Expected Expires Ordered    MRI Brain w & w/o contrast Routine 4/11/2017 5/29/2017 2/28/2017            Who to contact     If you have questions or need follow up information about today's clinic visit or your schedule please contact [facility] directly at  [phone].  Normal or non-critical lab and imaging results will be communicated to you by [employer], letter or phone within 4 business days after the clinic has received the results. If you do not hear from us within 7 days, please contact the clinic through [employer] or phone. If you have a critical or abnormal lab result, we will notify you by phone as soon as possible.  Submit refill requests through [employer] or call your pharmacy and they will forward the refill request to us. Please allow 3 business days for your refill to be completed.          Additional Information About Your Visit        [employer] Information     [employer] gives you secure access to your electronic health record. If you see a primary care provider, you can also send messages to your care team and make appointments. If you have questions, please call your primary care clinic.  If you do not have a primary care provider, please call [phone] and they will assist you.        Care EveryWhere ID     This is your Care EveryWhere ID. This could be used by other organizations to access your [facility] medical records  [id number]        Your Vitals Were     Pulse Temperature Respirations Pulse Oximetry BMI (Body Mass Index)       95 98.2  F (36.8  C) (Oral) 16 96% 19.29 kg/m2        Blood Pressure from Last 3 Encounters:   02/28/17 118/78   02/27/17 128/85   02/14/17 119/80    Weight from Last 3 Encounters:   02/28/17 49.4 kg (108 lb 14.4 oz)   02/14/17 49 kg (108 lb)   02/14/17 49.4 kg (108 lb 14.4 oz)                 Today's Medication Changes          These changes are accurate as of: 2/28/17 12:57 PM.  If you have any questions, ask your nurse or doctor.               These medicines have changed or have updated prescriptions.        Dose/Directions    * lidocaine-prilocaine cream   Commonly known as:  EMLA   This may have changed:  Another medication with the same name was added. Make sure you understand how and when to take each.   Used for:   Carcinoma of breast metastatic to multiple sites (H)   Changed by:  Shobha Alanis, RN        Apply to port site one hour prior to port access 10 days to two weeks after glue from incision is gone   Quantity:  30 g   Refills:  1       * lidocaine-prilocaine cream   Commonly known as:  EMLA   This may have changed:  You were already taking a medication with the same name, and this prescription was added. Make sure you understand how and when to take each.   Used for:  Carcinoma of breast metastatic to multiple sites, unspecified laterality (H)   Changed by:  Esmer Oconnor PA        Apply a thick layer over port site 45 minutes in advance of port access   Quantity:  30 g   Refills:  1       * order for DME   This may have changed:  Another medication with the same name was added. Make sure you understand how and when to take each.   Used for:  Carcinoma of breast metastatic to multiple sites (H)   Changed by:  Marlen Harper MD        Walker to ensure safe ambulation   Quantity:  1 Units   Refills:  1       * order for DME   This may have changed:  Another medication with the same name was added. Make sure you understand how and when to take each.   Used for:  Carcinoma of breast metastatic to multiple sites (H)   Changed by:  Marlen Harper MD        Cranial prosthesis   Quantity:  1 Units   Refills:  1       * order for DME   This may have changed:  You were already taking a medication with the same name, and this prescription was added. Make sure you understand how and when to take each.   Used for:  Hyperextension deformity of right knee   Changed by:  Esmer Oconnor PA        Equipment being ordered: R knee brace--please measure and fit. To avoid knee hyperextension   Quantity:  1 Units   Refills:  0       * Notice:  This list has 5 medication(s) that are the same as other medications prescribed for you. Read the directions carefully, and ask your doctor or other care provider to  review them with you.         Where to get your medicines      These medications were sent to [facility] - [place], [place] - [address]  [address], [facility] [address]    Hours:  TRANSPLANT PHONE NUMBER [phone] Phone:  [phone]     desmopressin 0.1 MG tablet    lidocaine-prilocaine cream         Some of these will need a paper prescription and others can be bought over the counter.  Ask your nurse if you have questions.     Bring a paper prescription for each of these medications     order for DME                Primary Care Provider Office Phone # Fax #    Kelly Hart [phone]       [facility] [address]  [facility] [address]        Thank you!     Thank you for choosing [facility]  for your care. Our goal is always to provide you with excellent care. Hearing back from our patients is one way we can continue to improve our services. Please take a few minutes to complete the written survey that you may receive in the mail after your visit with us. Thank you!             Your Updated Medication List - Protect others around you: Learn how to safely use, store and throw away your medicines at www.disposemymeds.org.          This list is accurate as of: 2/28/17 12:57 PM.  Always use your most recent med list.                   Brand Name Dispense Instructions for use    ALEVE PO      Take 220 mg by mouth daily       desmopressin 0.1 MG tablet    DDAVP    30 tablet    Take 1 tablet (100 mcg) by mouth At Bedtime       * lidocaine-prilocaine cream    EMLA    30 g    Apply to port site one hour prior to port access 10 days to two weeks after glue from incision is gone       * lidocaine-prilocaine cream    EMLA    30 g    Apply a thick layer over port site 45 minutes in advance of port access       loperamide 2 MG capsule    IMODIUM    20 capsule    Take 1 capsule (2 mg) by mouth 4 times daily as needed  for diarrhea       LORazepam 0.5 MG tablet    ATIVAN    30 tablet    Take 1 tablet (0.5 mg) by mouth every 4 hours as needed (Anxiety, Nausea/Vomiting or Sleep)       Multi-vitamin Tabs tablet      Take 1 tablet by mouth daily       omeprazole 40 MG capsule    priLOSEC    90 capsule    Take 1 capsule (40 mg) by mouth as needed       ondansetron 8 MG tablet    ZOFRAN    30 tablet    Take 1 tablet (8 mg) by mouth every 8 hours as needed for nausea       * order for DME     1 Units    Walker to ensure safe ambulation       * order for DME     1 Units    Cranial prosthesis       * order for DME     1 Units    Equipment being ordered: R knee brace--please measure and fit. To avoid knee hyperextension       prochlorperazine 10 MG tablet    COMPAZINE    30 tablet    Take 1 tablet (10 mg) by mouth every 6 hours as needed (Nausea/Vomiting)       * Notice:  This list has 5 medication(s) that are the same as other medications prescribed for you. Read the directions carefully, and ask your doctor or other care provider to review them with you.

## 2017-02-28 NOTE — NURSING NOTE
"Keren Erickson is a 61 year old female who presents for:  Chief Complaint   Patient presents with     Blood Draw     port draw by RN, vitals taken by [facility]     Oncology Clinic Visit     Return for Breast Ca         Initial Vitals:  /78 (BP Location: Left arm, Patient Position: Chair, Cuff Size: Adult Regular)  Pulse 95  Temp 98.2  F (36.8  C) (Oral)  Resp 16  Wt 49.4 kg (108 lb 14.4 oz)  SpO2 96%  BMI 19.29 kg/m2 Estimated body mass index is 19.29 kg/(m^2) as calculated from the following:    Height as of 1/27/17: 1.6 m (5' 3\").    Weight as of this encounter: 49.4 kg (108 lb 14.4 oz).. Body surface area is 1.48 meters squared. BP completed using cuff size: NA (Not Taken)  Moderate Pain (5) No LMP recorded. Patient is postmenopausal. Allergies and medications reviewed.     Medications: MEDICATION REFILLS NEEDED TODAY.  Pharmacy name entered into [employer]: [id number] - [place], [place] - [address] AT [facility] [address]    Comments:     5  minutes for nursing intake (face to face time)   Marlyn Newton MA        "

## 2017-02-28 NOTE — PROGRESS NOTES
Infusion Nursing Note:  Keren Erickson presents today for Cycle 8, day 1 Gemzar and Herceptin.    Patient seen by provider today: Yes: BRAULIO Beaulieu    Note: Patient presents to clinic today feeling well with no questions.      Intravenous Access:  Implanted Port.    Treatment Conditions:  Lab Results   Component Value Date    HGB 10.8 02/28/2017     Lab Results   Component Value Date    WBC 6.9 02/28/2017      Lab Results   Component Value Date    ANEU 4.8 02/28/2017     Lab Results   Component Value Date     02/28/2017      Lab Results   Component Value Date     02/28/2017                   Lab Results   Component Value Date    POTASSIUM 3.9 02/28/2017           Lab Results   Component Value Date    MAG 2.4 02/28/2017            Lab Results   Component Value Date    CR 0.62 02/28/2017                   Lab Results   Component Value Date    OMA 8.9 02/28/2017                Lab Results   Component Value Date    BILITOTAL 0.4 02/28/2017           Lab Results   Component Value Date    ALBUMIN 3.3 02/28/2017                    Lab Results   Component Value Date    ALT 23 02/28/2017           Lab Results   Component Value Date    AST 25 02/28/2017     Results reviewed, labs MET treatment parameters, ok to proceed with treatment.  ECHO/MUGA completed 1/25/17  EF 60-65%.    Post Infusion Assessment:  Patient tolerated infusion without incident.  Blood return noted pre and post infusion.  Site patent and intact.  No evidence of extravasations.  Access discontinued per protocol.    Discharge Plan:   Prescription refills given for Emla cream and DDAVP will be mailed.  Discharge instructions reviewed with: Patient.  Patient and/or family verbalized understanding of discharge instructions and all questions answered.  Copy of AVS reviewed with patient and/or family.  Patient will return 3/7/2017 for next appointment.  Departure Mode: Wheelchair.    Britney Ball RN

## 2017-02-28 NOTE — LETTER
2/28/2017      RE: Keren Erickson  [address] [facility] [id number]       HEMATOLOGY/ONCOLOGY PROGRESS NOTE  Feb 28, 2017    REASON FOR VISIT: follow-up of metastatic breast cancer, triple positive    DIAGNOSIS:   The patient is a 60-year-old female who presented to the hospital initially in 09/2013 with complaints of dyspnea. Workup revealed a large pericardial effusion and pleural effusion. Physical examination revealed a large untreated left breast mass encompassing the entire left breast. Biopsies revealed these were ER, HI and HER2-positive breast cancer. She had a thoracentesis and pericardial sclerosis performed. She was originally on Arimidex and Herceptin. In 01/2014, she was switched to tamoxifen and Herceptin due to arthralgias, and she ultimately developed progressive disease and was switched to Faslodex and Herceptin. In 01/2015, she was found to have progressive disease and was switched to T-DM1.     Initially when she was seen in late 02/2015, she complained of some V5 sensory deficit. This was subjective. I was not able to elicit this on examination. This persisted and further workup was performed with a brain MRI. This revealed what was initially thought to be 3 punctate brain metastases. She originally saw Radiation Oncology and Neurosurgery as well as underwent a lumbar puncture. Cytology from the lumbar puncture showed no evidence of leptomeningeal disease. She was treated with Gamma Knife radiation to 6 lesions, performed on 04/16/2015.  She initiated therapy with TDM1 in January 2015 and continued this through 6/26/2015 with overall stable disease. She has since been on a break from treatment. The patient presented earlier this month with recurrent shortness of breath.  Imaging revealed recurrent right-sided pleural effusion.  She has since undergone thoracentesis with cytology pending.  Clinically, however, there is evidence of disease progression. PET scan performed on 11/25/2015  "demonstrated progression of disease at multiple sites. She restarted TDM1 on 11/27/2015, however experienced a mild transfusion reaction with shortness of breath and chest tightness, resolved upon stopping TDM1 and 125 mg of SoluMedrol. She had progression of disease on repeat imaging 2/17/2016, as well as new brain metastases. She started TDM1 with Perjeta on 3/4/2016. She underwent gamma knife to brain mets on 3/8/16.       In late May, she was found to have progressive brain metastases.  She received whole brain radiation.  She had a follow up brain MRI August 2016 that was stable.     In September 2016, she enrolled on [place]  trial and was randomized to the standard of care arm/Physician's Choice; started on gemzar and herceptin. She was recently hospitalized 1/27-2/3/17 for presumed HCAP. Trial was suspended. She continued on gemzar and heceptin with stable disease.     INTERVAL HISTORY:  Dominga presents for follow-up today prior to next cycle of treatment. She is doing well. No concerns. She continues to recover from PNA. Still feels a little weaker and more tired from PNA but PT will be starting and she continues OT. Her weight has improved and is almost back to her baseline. Tolerating chemotherapy really well with mild nausea. Bowels have improved since being on desmopressin. She is less thirsty, urinating less, overall feeling less \"foggy\". She is reaching fluid goals. No new pains and pain is well-controlled on 1 aleve per day. She had port placed yesterday. Feels a little sore from this. Facial and tongue paraesthesias having improved. No new neuro sxs. No fevers/chills or infectious concerns.     ROS: 10 point ROS was conducted and was otherwise negative except for as above.     PHYSICAL EXAMINATION:     /78 (BP Location: Left arm, Patient Position: Chair, Cuff Size: Adult Regular)  Pulse 95  Temp 98.2  F (36.8  C) (Oral)  Resp 16  Wt 49.4 kg (108 lb 14.4 oz)  SpO2 96%  BMI 19.29 " kg/m2    Wt Readings from Last 4 Encounters:   02/28/17 49.4 kg (108 lb 14.4 oz)   02/14/17 49 kg (108 lb)   02/14/17 49.4 kg (108 lb 14.4 oz)   02/07/17 48.9 kg (107 lb 12.8 oz)     Constitutional: Alert, oriented, cachectic female in no visible distress. In wheelchair today  Eyes: PERRL. Anicteric sclerae.    ENT/Mouth: OM moist and pink without lesions or thrush.    CV: RRR, no murmurs appreciated.  Resp: CTAB slightly diminished R base, stable. Breathing comfortably.  Abdomen: Soft, non-tender, non-distended. Bowel sounds present. No masses appreciated. No organomegaly noted.  Extremities: No lower extremity edema appreciated.  Skin: Warm, dry. No bruising or petechiae noted. No rash  Lymph: Small posterior cervical node R neck about 1.5 cm. No other cervical, supraclavicular, or axillary nodes palpable  Neuro: CN II-XII grossly intact. Baseline decreased sensation over mandibular branch of facial nerve    LABS:   2/28/2017 11:27   Sodium 140   Potassium 3.9   Chloride 106   Carbon Dioxide 25   Urea Nitrogen 12   Creatinine 0.62   GFR Estimate >90...   GFR Estimate If Black >90...   Calcium 8.9   Anion Gap 9   Magnesium 2.4 (H)   Phosphorus 2.3 (L)   Albumin 3.3 (L)   Protein Total 6.8   Bilirubin Total 0.4   Alkaline Phosphatase 113   ALT 23   AST 25   Lactate Dehydrogenase 213   Uric Acid 2.9   Glucose 80   WBC 6.9   Hemoglobin 10.8 (L)   Hematocrit 32.2 (L)   Platelet Count 244   RBC Count 3.31 (L)   MCV 97   MCH 32.6   MCHC 33.5   RDW 17.6 (H)   Diff Method Automated Method   % Neutrophils 69.7   % Lymphocytes 17.2   % Monocytes 11.9   % Eosinophils 0.1   % Basophils 0.7   % Immature Granulocytes 0.4   Nucleated RBCs 0   Absolute Neutrophil 4.8   Absolute Lymphocytes 1.2   Absolute Monocytes 0.8   Absolute Eosinophils 0.0   Absolute Basophils 0.1   Abs Immature Granulocytes 0.0   Absolute Nucleated RBC 0.0         IMPRESSION/PLAN:  Dominga is a 61-year-old female with history of metastatic ER-positive,  HER-2 positive breast cancer, with involvement of the bone, history of pleural effusions treated with pleurodesis and PleurX placement, and with treated brain metastases, previously with stable disease on TDM1, however patient opted for break from therapy from June 2015 through November 2015, and represented with worsening metastatic disease at that time. She restarted TDM1 on 11/27/2015. She had progressive disease 2/17/16 and Perjeta was added to TDM1. She had gamma knife to brain mets on 3/8.  She received WBRT.  She was started on [place]  clinical trial and randomized to physician's choice, and started on Gemzar/Herceptin.    1. Breast cancer:  Continues on Gemzar/Herceptin with stable disease on restaging 1/25. Cycle 7 was delayed secondary to HCAP. She resumed cycle 7 on 2/7 however received Herceptin alone given she was still recovering from PNA. Gemzar resumed day 8. She continues to recovered so will proceed with Gemcitabine and herceptin today. No follow-up needed with day 8. Will follow-up in 3 weeks and plan for restaging in 6 weeks. Tumor markers having been normal, pending today.     2. Pulm/HCAP: Completed course of Levaquin 2/8 with resolution of symptoms.   - History of pleural effusion requiring thoracentesis with progressive disease off treatment. This has not recurred since resuming treatment. Exam is stable.    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 She started desmopressin 100 mcg with good response clinically.   - Follow-up with endocrine 3/13    4. Brain mets: Numbness of tongue and V2 and V3 on right are improving. Most recent brain MRI stable.    5. FEN: Cr, Na, and K stable. Mildly low phos--dietary interventions reviewed. Weight improved back to pre-PNA baseline.    6. Pain: Chronic mild aches/pains secondary to disease and with myalgias on Gemzar. No new sites of pain. Not needing oxycodone but has some available if needed. Manages with  one Aleve daily.    7. GERD: Continue prilosec.    8. Bone mets: on Xgeva. Due next week.     9. Mood: She is overall coping well.     10. She has an advanced directive.  She has a POLST.  DNR/DNI.  Her power of  is listed in the computer.     11. Deconditioning: She had been walking independently or with cane in clinic prior to pneumonia, working on getting her strength back.  - Continue PT/OT. Ordered knee brace.     Esmer Oconnor PA-C    [facility]  [address] [place] [id number]  [phone]                  Chief Complaint   Patient presents with     Blood Draw     port draw by RN, vitals taken by EDILIA Esteves CMA

## 2017-02-28 NOTE — MR AVS SNAPSHOT
After Visit Summary   2/28/2017    Keren Erickson    MRN: [id number]           Patient Information     Date Of Birth          1955        Visit Information        Provider Department      2/28/2017 2:00 PM  14 ATC;  ONCOLOGY INFUSION [facility]        Today's Diagnoses     Metastatic breast cancer (H)    -  1    Carcinoma of breast metastatic to multiple sites, unspecified laterality (H)        Brain metastasis (H)          Care Instructions    Contact Numbers    [facility] [place]: [phone]  [facility] Triage:  [phone]    Call triage with chills and/or temperature greater than or equal to 100.5, uncontrolled nausea/vomiting, diarrhea, constipation, dizziness, shortness of breath, chest pain, bleeding, unexplained bruising, or any new/concerning symptoms, questions/concerns.     If you are having any concerning symptoms or wish to speak to a provider before your next infusion visit, please call your care coordinator or triage to notify them so we can adequately serve you.       After Hours: [phone]    If after hours, weekends, or holidays, call main hospital  and ask for Oncology doctor on call.         February 2017 Sunday Monday Tuesday Wednesday Thursday Friday Saturday                  1     IP TREATMENT    6:00 AM   (30 min.)   Charity Zelaya, PT   [facility] [place], Physical Therapy 2     IP TREATMENT    6:00 AM   (30 min.)   Yas Sweeney, PT   [facility] [place], Physical Therapy 3     IP TREATMENT    6:00 AM   (30 min.)   Abbey Ray   [facility] [place], Physical Therapy 4       5     6     7     [facility] MASONIC LAB DRAW   11:15 AM   (15 min.)    MASONIC LAB DRAW   [facility] Masonic Lab Draw     [facility] RETURN   11:35 AM   (50 min.)   Deyanira Costello PA-C   [facility]     LONG   12:45 PM   (60 min.)   Barbara Lakhani, [facility] Medication Therapy Management     [facility] ONC INFUSION 60    1:00 PM   (60 min.)    ONCOLOGY  INFUSION   [facility] 8     9     10     11       12     13     14     [facility] DRAW    3:45 PM   (15 min.)    MASONIC LAB DRAW   [facility] Masonic Lab Draw     [facility] RETURN    3:55 PM   (50 min.)   Deyanira Costello PA-C   [facility]     [facility] ONC INFUSION 60    5:00 PM   (60 min.)    ONCOLOGY INFUSION   [facility] 15     16     17     18       19     20     21     22     23     24     25       26     27     Outpatient Visit    9:38 AM   [facility]     IR CHEST PORT PLACEMENT >5 YRS   11:00 AM   (60 min.)   [id number]   [facility] ASC Imaging     INSERT PORT VASCULAR ACCESS   11:00 AM   Rajan Kebede PA-C   [facility] OR 28     [facility] LAB DRAW   11:30 AM   (15 min.)    MASONIC LAB DRAW   [facility] Lab Draw     [facility] RETURN   11:55 AM   (50 min.)   Esmer Oconnor PA   [facility]     [facility] ONC INFUSION 120    2:00 PM   (120 min.)    ONCOLOGY INFUSION   [facility]                                March 2017 Sunday Monday Tuesday Wednesday Thursday Friday Saturday                  1     2     3     4       5     6     7     [facility] LAB DRAW    1:00 PM   (15 min.)    MASONIC LAB DRAW   [facility] Lab Draw     [facility] ONC INFUSION 120    1:30 PM   (120 min.)    ONCOLOGY INFUSION   [facility] 8     9     10     11       12     13     [facility] RETURN ENDOCRINE   11:45 AM   (60 min.)   Rolando Mishra MD   [facility] Endocrinology 14     15     16     17     18       19     20     21     22     23     24     25       26     27     28     29     30     31                       Lab Results:  Recent Results (from the past 12 hour(s))   CBC with platelets differential    Collection Time: 02/28/17 11:27 AM   Result Value Ref Range    WBC 6.9 4.0 - 11.0 10e9/L    RBC Count 3.31 (L) 3.8 - 5.2 10e12/L    Hemoglobin 10.8 (L) 11.7 - 15.7 g/dL     Hematocrit 32.2 (L) 35.0 - 47.0 %    MCV 97 78 - 100 fl    MCH 32.6 26.5 - 33.0 pg    MCHC 33.5 31.5 - 36.5 g/dL    RDW 17.6 (H) 10.0 - 15.0 %    Platelet Count 244 150 - 450 10e9/L    Diff Method Automated Method     % Neutrophils 69.7 %    % Lymphocytes 17.2 %    % Monocytes 11.9 %    % Eosinophils 0.1 %    % Basophils 0.7 %    % Immature Granulocytes 0.4 %    Nucleated RBCs 0 0 /100    Absolute Neutrophil 4.8 1.6 - 8.3 10e9/L    Absolute Lymphocytes 1.2 0.8 - 5.3 10e9/L    Absolute Monocytes 0.8 0.0 - 1.3 10e9/L    Absolute Eosinophils 0.0 0.0 - 0.7 10e9/L    Absolute Basophils 0.1 0.0 - 0.2 10e9/L    Abs Immature Granulocytes 0.0 0 - 0.4 10e9/L    Absolute Nucleated RBC 0.0    Comprehensive metabolic panel    Collection Time: 02/28/17 11:27 AM   Result Value Ref Range    Sodium 140 133 - 144 mmol/L    Potassium 3.9 3.4 - 5.3 mmol/L    Chloride 106 94 - 109 mmol/L    Carbon Dioxide 25 20 - 32 mmol/L    Anion Gap 9 3 - 14 mmol/L    Glucose 80 70 - 99 mg/dL    Urea Nitrogen 12 7 - 30 mg/dL    Creatinine 0.62 0.52 - 1.04 mg/dL    GFR Estimate >90  Non  GFR Calc   >60 mL/min/1.7m2    GFR Estimate If Black >90   GFR Calc   >60 mL/min/1.7m2    Calcium 8.9 8.5 - 10.1 mg/dL    Bilirubin Total 0.4 0.2 - 1.3 mg/dL    Albumin 3.3 (L) 3.4 - 5.0 g/dL    Protein Total 6.8 6.8 - 8.8 g/dL    Alkaline Phosphatase 113 40 - 150 U/L    ALT 23 0 - 50 U/L    AST 25 0 - 45 U/L   Magnesium    Collection Time: 02/28/17 11:27 AM   Result Value Ref Range    Magnesium 2.4 (H) 1.6 - 2.3 mg/dL   Phosphorus    Collection Time: 02/28/17 11:27 AM   Result Value Ref Range    Phosphorus 2.3 (L) 2.5 - 4.5 mg/dL   Lactate Dehydrogenase    Collection Time: 02/28/17 11:27 AM   Result Value Ref Range    Lactate Dehydrogenase 213 81 - 234 U/L   Uric acid    Collection Time: 02/28/17 11:27 AM   Result Value Ref Range    Uric Acid 2.9 2.6 - 6.0 mg/dL   CEA    Collection Time: 02/28/17 11:27 AM   Result Value Ref Range     CEA 1.5 0 - 2.5 ug/L   Ca27.29  breast tumor marker    Collection Time: 02/28/17 11:27 AM   Result Value Ref Range    CA 27-29 28 0 - 39 U/mL             Follow-ups after your visit        Your next 10 appointments already scheduled     Mar 07, 2017  1:00 PM CST   Masonic Lab Draw with UC [facility]ON[facility] LAB DRAW   [facility] Lab Draw ([facility])    [address]  [address] [facility] [address] [phone]            Mar 07, 2017  1:30 PM CST   Infusion 120 with UC ONCOLOGY INFUSION, UC 27 ATC   [facility] ([facility])    [address]  [address] [facility] [id number] [phone]            Mar 13, 2017 12:00 PM CDT   (Arrive by 11:45 AM)   RETURN ENDOCRINE with Rolando Mishra MD   [facility] ([facility])    [address] [place] [address]  [address] [facility] [id number] [phone]              Future tests that were ordered for you today     Open Future Orders        Priority Expected Expires Ordered    MRI Brain w & w/o contrast Routine 4/11/2017 5/29/2017 2/28/2017            Who to contact     If you have questions or need follow up information about today's clinic visit or your schedule please contact [facility] CANCER [facility] directly at [phone].  Normal or non-critical lab and imaging results will be communicated to you by MyChart, letter or phone within 4 business days after the clinic has received the results. If you do not hear from us within 7 days, please contact the clinic through MyChart or phone. If you have a critical or abnormal lab result, we will notify you by phone as soon as possible.  Submit refill requests through [employer] or call your pharmacy and they will forward the refill request to us. Please allow 3 business days for your refill to be completed.          Additional Information About Your Visit        MyChart Information      [employer] gives you secure access to your electronic health record. If you see a primary care provider, you can also send messages to your care team and make appointments. If you have questions, please call your primary care clinic.  If you do not have a primary care provider, please call [phone] and they will assist you.        Care EveryWhere ID     This is your Care EveryWhere ID. This could be used by other organizations to access your [place] medical records  [id number]         Blood Pressure from Last 3 Encounters:   02/28/17 118/78   02/27/17 128/85   02/14/17 119/80    Weight from Last 3 Encounters:   02/28/17 49.4 kg (108 lb 14.4 oz)   02/14/17 49 kg (108 lb)   02/14/17 49.4 kg (108 lb 14.4 oz)              We Performed the Following     Ca27.29  breast tumor marker     CBC with platelets differential     CEA     Comprehensive metabolic panel     Lactate Dehydrogenase     Magnesium     Phosphorus     Treatment Conditions     Uric acid          Today's Medication Changes          These changes are accurate as of: 2/28/17  2:39 PM.  If you have any questions, ask your nurse or doctor.               These medicines have changed or have updated prescriptions.        Dose/Directions    * lidocaine-prilocaine cream   Commonly known as:  EMLA   This may have changed:  Another medication with the same name was added. Make sure you understand how and when to take each.   Used for:  Carcinoma of breast metastatic to multiple sites (H)   Changed by:  Shobha Alanis, RN        Apply to port site one hour prior to port access 10 days to two weeks after glue from incision is gone   Quantity:  30 g   Refills:  1       * lidocaine-prilocaine cream   Commonly known as:  EMLA   This may have changed:  You were already taking a medication with the same name, and this prescription was added. Make sure you understand how and when to take each.   Used for:  Carcinoma of breast metastatic to multiple sites, unspecified  laterality (H)   Changed by:  Esmer Oconnor PA        Apply a thick layer over port site 45 minutes in advance of port access   Quantity:  30 g   Refills:  1       * order for DME   This may have changed:  Another medication with the same name was added. Make sure you understand how and when to take each.   Used for:  Carcinoma of breast metastatic to multiple sites (H)   Changed by:  Marlen Harper MD        Walker to ensure safe ambulation   Quantity:  1 Units   Refills:  1       * order for DME   This may have changed:  Another medication with the same name was added. Make sure you understand how and when to take each.   Used for:  Carcinoma of breast metastatic to multiple sites (H)   Changed by:  Marlen Harper MD        Cranial prosthesis   Quantity:  1 Units   Refills:  1       * order for DME   This may have changed:  You were already taking a medication with the same name, and this prescription was added. Make sure you understand how and when to take each.   Used for:  Hyperextension deformity of right knee   Changed by:  Esmre Oconnor PA        Equipment being ordered: R knee brace--please measure and fit. To avoid knee hyperextension   Quantity:  1 Units   Refills:  0       * Notice:  This list has 5 medication(s) that are the same as other medications prescribed for you. Read the directions carefully, and ask your doctor or other care provider to review them with you.         Where to get your medicines      These medications were sent to [facility] [place], [place] - [address] [address]  [address] [place] [address] [facility] [address]    Hours:  TRANSPLANT PHONE NUMBER [phone] Phone:  [phone]     desmopressin 0.1 MG tablet    lidocaine-prilocaine cream         Some of these will need a paper prescription and others can be bought over the counter.  Ask your nurse if you have questions.     Bring a paper prescription for each of these  medications     order for DME                Primary Care Provider Office Phone # Fax #    Kelly Bg Hart [phone]       [facility] [address]  [facility] [address]        Thank you!     Thank you for choosing [facility] CANC[facility]IC  for your care. Our goal is always to provide you with excellent care. Hearing back from our patients is one way we can continue to improve our services. Please take a few minutes to complete the written survey that you may receive in the mail after your visit with us. Thank you!             Your Updated Medication List - Protect others around you: Learn how to safely use, store and throw away your medicines at www.disposemymeds.org.          This list is accurate as of: 2/28/17  2:39 PM.  Always use your most recent med list.                   Brand Name Dispense Instructions for use    ALEVE PO      Take 220 mg by mouth daily       desmopressin 0.1 MG tablet    DDAVP    30 tablet    Take 1 tablet (100 mcg) by mouth At Bedtime       * lidocaine-prilocaine cream    EMLA    30 g    Apply to port site one hour prior to port access 10 days to two weeks after glue from incision is gone       * lidocaine-prilocaine cream    EMLA    30 g    Apply a thick layer over port site 45 minutes in advance of port access       loperamide 2 MG capsule    IMODIUM    20 capsule    Take 1 capsule (2 mg) by mouth 4 times daily as needed for diarrhea       LORazepam 0.5 MG tablet    ATIVAN    30 tablet    Take 1 tablet (0.5 mg) by mouth every 4 hours as needed (Anxiety, Nausea/Vomiting or Sleep)       Multi-vitamin Tabs tablet      Take 1 tablet by mouth daily       omeprazole 40 MG capsule    priLOSEC    90 capsule    Take 1 capsule (40 mg) by mouth as needed       ondansetron 8 MG tablet    ZOFRAN    30 tablet    Take 1 tablet (8 mg) by mouth every 8 hours as needed for nausea       * order for DME     1 Units    Walker to ensure safe ambulation       * order for DME      1 Units    Cranial prosthesis       * order for DME     1 Units    Equipment being ordered: R knee brace--please measure and fit. To avoid knee hyperextension       prochlorperazine 10 MG tablet    COMPAZINE    30 tablet    Take 1 tablet (10 mg) by mouth every 6 hours as needed (Nausea/Vomiting)       * Notice:  This list has 5 medication(s) that are the same as other medications prescribed for you. Read the directions carefully, and ask your doctor or other care provider to review them with you.

## 2017-02-28 NOTE — PATIENT INSTRUCTIONS
Contact Numbers    [facility] Main Line: [phone]  [facility] Triage:  [phone]    Call triage with chills and/or temperature greater than or equal to 100.5, uncontrolled nausea/vomiting, diarrhea, constipation, dizziness, shortness of breath, chest pain, bleeding, unexplained bruising, or any new/concerning symptoms, questions/concerns.     If you are having any concerning symptoms or wish to speak to a provider before your next infusion visit, please call your care coordinator or triage to notify them so we can adequately serve you.       After Hours: [phone]    If after hours, weekends, or holidays, call main hospital  and ask for Oncology doctor on call.         February 2017 Sunday Monday Tuesday Wednesday Thursday Friday Saturday                  1     IP TREATMENT    6:00 AM   (30 min.)   Charity Zelaya, PT   [facility] [place], Physical Therapy 2     IP TREATMENT    6:00 AM   (30 min.)   Yas Sweeney, PT   [facility] [place], Physical Therapy 3     IP TREATMENT    6:00 AM   (30 min.)   Abbey Ray   [facility] [place], Physical Therapy 4       5     6     7     [facility] MASONIC LAB DRAW   11:15 AM   (15 min.)    MASONIC LAB DRAW   [facility]     P RETURN   11:35 AM   (50 min.)   Deyanira Costello PA-C   [facility]     LONG   12:45 PM   (60 min.)   Barbara Lakhani, [facility] Medication Therapy Management     [facility] ONC INFUSION 60    1:00 PM   (60 min.)    ONCOLOGY INFUSION   [facility] 8     9     10     11       12     13     14     P MASONIC LAB DRAW    3:45 PM   (15 min.)    MASONIC LAB DRAW   [facility] Lab Draw     [facility] RETURN    3:55 PM   (50 min.)   Deyanira Costello PA-C   [facility] ONC INFUSION 60    5:00 PM   (60 min.)    ONCOLOGY INFUSION   [facility] 15     16     17     18       19     20     21     22     23     24     25       26     27     Outpatient  Visit    9:38 AM   [facility]     IR CHEST PORT PLACEMENT >5 YRS   11:00 AM   (60 min.)   ASCCARM6   [facility] ASC Imaging     INSERT PORT VASCULAR ACCESS   11:00 AM   Rajan Kebede PA-C   [facility] OR 28     [facility] LAB DRAW   11:30 AM   (15 min.)    [facility]     [facility] RETURN   11:55 AM   (50 min.)   Esmer Oconnor PA   [facility]     [facility] ONC INFUSION 120    2:00 PM   (120 min.)    ONCOLOGY INFUSION   [facility]                                March 2017 Sunday Monday Tuesday Wednesday Thursday Friday Saturday                  1     2     3     4       5     6     7     [facility] LAB DRAW    1:00 PM   (15 min.)    MASONIC LAB DRAW   [facility] Masonic Lab Draw     [facility] ONC INFUSION 120    1:30 PM   (120 min.)    ONCOLOGY INFUSION   [facility] 8     9     10     11       12     13     [facility] RETURN ENDOCRINE   11:45 AM   (60 min.)   Rolando Mishra MD   [facility] Endocrinology 14     15     16     17     18       19     20     21     22     23     24     25       26     27     28     29     30     31                       Lab Results:  Recent Results (from the past 12 hour(s))   CBC with platelets differential    Collection Time: 02/28/17 11:27 AM   Result Value Ref Range    WBC 6.9 4.0 - 11.0 10e9/L    RBC Count 3.31 (L) 3.8 - 5.2 10e12/L    Hemoglobin 10.8 (L) 11.7 - 15.7 g/dL    Hematocrit 32.2 (L) 35.0 - 47.0 %    MCV 97 78 - 100 fl    MCH 32.6 26.5 - 33.0 pg    MCHC 33.5 31.5 - 36.5 g/dL    RDW 17.6 (H) 10.0 - 15.0 %    Platelet Count 244 150 - 450 10e9/L    Diff Method Automated Method     % Neutrophils 69.7 %    % Lymphocytes 17.2 %    % Monocytes 11.9 %    % Eosinophils 0.1 %    % Basophils 0.7 %    % Immature Granulocytes 0.4 %    Nucleated RBCs 0 0 /100    Absolute Neutrophil 4.8 1.6 - 8.3 10e9/L    Absolute Lymphocytes 1.2 0.8 - 5.3 10e9/L    Absolute  Monocytes 0.8 0.0 - 1.3 10e9/L    Absolute Eosinophils 0.0 0.0 - 0.7 10e9/L    Absolute Basophils 0.1 0.0 - 0.2 10e9/L    Abs Immature Granulocytes 0.0 0 - 0.4 10e9/L    Absolute Nucleated RBC 0.0    Comprehensive metabolic panel    Collection Time: 02/28/17 11:27 AM   Result Value Ref Range    Sodium 140 133 - 144 mmol/L    Potassium 3.9 3.4 - 5.3 mmol/L    Chloride 106 94 - 109 mmol/L    Carbon Dioxide 25 20 - 32 mmol/L    Anion Gap 9 3 - 14 mmol/L    Glucose 80 70 - 99 mg/dL    Urea Nitrogen 12 7 - 30 mg/dL    Creatinine 0.62 0.52 - 1.04 mg/dL    GFR Estimate >90  Non  GFR Calc   >60 mL/min/1.7m2    GFR Estimate If Black >90   GFR Calc   >60 mL/min/1.7m2    Calcium 8.9 8.5 - 10.1 mg/dL    Bilirubin Total 0.4 0.2 - 1.3 mg/dL    Albumin 3.3 (L) 3.4 - 5.0 g/dL    Protein Total 6.8 6.8 - 8.8 g/dL    Alkaline Phosphatase 113 40 - 150 U/L    ALT 23 0 - 50 U/L    AST 25 0 - 45 U/L   Magnesium    Collection Time: 02/28/17 11:27 AM   Result Value Ref Range    Magnesium 2.4 (H) 1.6 - 2.3 mg/dL   Phosphorus    Collection Time: 02/28/17 11:27 AM   Result Value Ref Range    Phosphorus 2.3 (L) 2.5 - 4.5 mg/dL   Lactate Dehydrogenase    Collection Time: 02/28/17 11:27 AM   Result Value Ref Range    Lactate Dehydrogenase 213 81 - 234 U/L   Uric acid    Collection Time: 02/28/17 11:27 AM   Result Value Ref Range    Uric Acid 2.9 2.6 - 6.0 mg/dL   CEA    Collection Time: 02/28/17 11:27 AM   Result Value Ref Range    CEA 1.5 0 - 2.5 ug/L   Ca27.29  breast tumor marker    Collection Time: 02/28/17 11:27 AM   Result Value Ref Range    CA 27-29 28 0 - 39 U/mL

## 2017-02-28 NOTE — PROGRESS NOTES
HEMATOLOGY/ONCOLOGY PROGRESS NOTE  Feb 28, 2017    REASON FOR VISIT: follow-up of metastatic breast cancer, triple positive    DIAGNOSIS:   The patient is a 60-year-old female who presented to the hospital initially in 09/2013 with complaints of dyspnea. Workup revealed a large pericardial effusion and pleural effusion. Physical examination revealed a large untreated left breast mass encompassing the entire left breast. Biopsies revealed these were ER, GA and HER2-positive breast cancer. She had a thoracentesis and pericardial sclerosis performed. She was originally on Arimidex and Herceptin. In 01/2014, she was switched to tamoxifen and Herceptin due to arthralgias, and she ultimately developed progressive disease and was switched to Faslodex and Herceptin. In 01/2015, she was found to have progressive disease and was switched to T-DM1.     Initially when she was seen in late 02/2015, she complained of some V5 sensory deficit. This was subjective. I was not able to elicit this on examination. This persisted and further workup was performed with a brain MRI. This revealed what was initially thought to be 3 punctate brain metastases. She originally saw Radiation Oncology and Neurosurgery as well as underwent a lumbar puncture. Cytology from the lumbar puncture showed no evidence of leptomeningeal disease. She was treated with Gamma Knife radiation to 6 lesions, performed on 04/16/2015.  She initiated therapy with TDM1 in January 2015 and continued this through 6/26/2015 with overall stable disease. She has since been on a break from treatment. The patient presented earlier this month with recurrent shortness of breath.  Imaging revealed recurrent right-sided pleural effusion.  She has since undergone thoracentesis with cytology pending.  Clinically, however, there is evidence of disease progression. PET scan performed on 11/25/2015 demonstrated progression of disease at multiple sites. She restarted TDM1 on  "11/27/2015, however experienced a mild transfusion reaction with shortness of breath and chest tightness, resolved upon stopping TDM1 and 125 mg of SoluMedrol. She had progression of disease on repeat imaging 2/17/2016, as well as new brain metastases. She started TDM1 with Perjeta on 3/4/2016. She underwent gamma knife to brain mets on 3/8/16.       In late May, she was found to have progressive brain metastases.  She received whole brain radiation.  She had a follow up brain MRI August 2016 that was stable.     In September 2016, she enrolled on [place]  trial and was randomized to the standard of care arm/Physician's Choice; started on gemzar and herceptin. She was recently hospitalized 1/27-2/3/17 for presumed HCAP. Trial was suspended. She continued on gemzar and heceptin with stable disease.     INTERVAL HISTORY:  Dominga presents for follow-up today prior to next cycle of treatment. She is doing well. No concerns. She continues to recover from PNA. Still feels a little weaker and more tired from PNA but PT will be starting and she continues OT. Her weight has improved and is almost back to her baseline. Tolerating chemotherapy really well with mild nausea. Bowels have improved since being on desmopressin. She is less thirsty, urinating less, overall feeling less \"foggy\". She is reaching fluid goals. No new pains and pain is well-controlled on 1 aleve per day. She had port placed yesterday. Feels a little sore from this. Facial and tongue paraesthesias having improved. No new neuro sxs. No fevers/chills or infectious concerns.     ROS: 10 point ROS was conducted and was otherwise negative except for as above.     PHYSICAL EXAMINATION:     /78 (BP Location: Left arm, Patient Position: Chair, Cuff Size: Adult Regular)  Pulse 95  Temp 98.2  F (36.8  C) (Oral)  Resp 16  Wt 49.4 kg (108 lb 14.4 oz)  SpO2 96%  BMI 19.29 kg/m2    Wt Readings from Last 4 Encounters:   02/28/17 49.4 kg (108 lb 14.4 oz) "   02/14/17 49 kg (108 lb)   02/14/17 49.4 kg (108 lb 14.4 oz)   02/07/17 48.9 kg (107 lb 12.8 oz)     Constitutional: Alert, oriented, cachectic female in no visible distress. In wheelchair today  Eyes: PERRL. Anicteric sclerae.    ENT/Mouth: OM moist and pink without lesions or thrush.    CV: RRR, no murmurs appreciated.  Resp: CTAB slightly diminished R base, stable. Breathing comfortably.  Abdomen: Soft, non-tender, non-distended. Bowel sounds present. No masses appreciated. No organomegaly noted.  Extremities: No lower extremity edema appreciated.  Skin: Warm, dry. No bruising or petechiae noted. No rash  Lymph: Small posterior cervical node R neck about 1.5 cm. No other cervical, supraclavicular, or axillary nodes palpable  Neuro: CN II-XII grossly intact. Baseline decreased sensation over mandibular branch of facial nerve    LABS:   2/28/2017 11:27   Sodium 140   Potassium 3.9   Chloride 106   Carbon Dioxide 25   Urea Nitrogen 12   Creatinine 0.62   GFR Estimate >90...   GFR Estimate If Black >90...   Calcium 8.9   Anion Gap 9   Magnesium 2.4 (H)   Phosphorus 2.3 (L)   Albumin 3.3 (L)   Protein Total 6.8   Bilirubin Total 0.4   Alkaline Phosphatase 113   ALT 23   AST 25   Lactate Dehydrogenase 213   Uric Acid 2.9   Glucose 80   WBC 6.9   Hemoglobin 10.8 (L)   Hematocrit 32.2 (L)   Platelet Count 244   RBC Count 3.31 (L)   MCV 97   MCH 32.6   MCHC 33.5   RDW 17.6 (H)   Diff Method Automated Method   % Neutrophils 69.7   % Lymphocytes 17.2   % Monocytes 11.9   % Eosinophils 0.1   % Basophils 0.7   % Immature Granulocytes 0.4   Nucleated RBCs 0   Absolute Neutrophil 4.8   Absolute Lymphocytes 1.2   Absolute Monocytes 0.8   Absolute Eosinophils 0.0   Absolute Basophils 0.1   Abs Immature Granulocytes 0.0   Absolute Nucleated RBC 0.0         IMPRESSION/PLAN:  Dominga is a 61-year-old female with history of metastatic ER-positive, HER-2 positive breast cancer, with involvement of the bone, history of pleural  effusions treated with pleurodesis and PleurX placement, and with treated brain metastases, previously with stable disease on TDM1, however patient opted for break from therapy from June 2015 through November 2015, and represented with worsening metastatic disease at that time. She restarted TDM1 on 11/27/2015. She had progressive disease 2/17/16 and Perjeta was added to TDM1. She had gamma knife to brain mets on 3/8.  She received WBRT.  She was started on [place]  clinical trial and randomized to physician's choice, and started on Gemzar/Herceptin.    1. Breast cancer:  Continues on Gemzar/Herceptin with stable disease on restaging 1/25. Cycle 7 was delayed secondary to HCAP. She resumed cycle 7 on 2/7 however received Herceptin alone given she was still recovering from PNA. Gemzar resumed day 8. She continues to recovered so will proceed with Gemcitabine and herceptin today. No follow-up needed with day 8. Will follow-up in 3 weeks and plan for restaging in 6 weeks. Tumor markers having been normal, pending today.     2. Pulm/HCAP: Completed course of Levaquin 2/8 with resolution of symptoms.   - History of pleural effusion requiring thoracentesis with progressive disease off treatment. This has not recurred since resuming treatment. Exam is stable.    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 She started desmopressin 100 mcg with good response clinically.   - Follow-up with endocrine 3/13    4. Brain mets: Numbness of tongue and V2 and V3 on right are improving. Most recent brain MRI stable.    5. FEN: Cr, Na, and K stable. Mildly low phos--dietary interventions reviewed. Weight improved back to pre-PNA baseline.    6. Pain: Chronic mild aches/pains secondary to disease and with myalgias on Gemzar. No new sites of pain. Not needing oxycodone but has some available if needed. Manages with one Aleve daily.    7. GERD: Continue prilosec.    8. Bone mets: on Xgeva. Due  next week.     9. Mood: She is overall coping well.     10. She has an advanced directive.  She has a POLST.  DNR/DNI.  Her power of  is listed in the computer.     11. Deconditioning: She had been walking independently or with cane in clinic prior to pneumonia, working on getting her strength back.  - Continue PT/OT. Ordered knee brace.     Esmer Oconnor PA-C    [facility]  [address] [place] [id number]  [phone]

## 2017-02-28 NOTE — PROGRESS NOTES
Chief Complaint   Patient presents with     Blood Draw     port draw by RN, vitals taken by EDILIA Esteves CMA

## 2017-03-07 ENCOUNTER — APPOINTMENT (OUTPATIENT)
Dept: LAB | Facility: CLINIC | Age: 62
End: 2017-03-07
Attending: INTERNAL MEDICINE
Payer: COMMERCIAL

## 2017-03-07 ENCOUNTER — INFUSION THERAPY VISIT (OUTPATIENT)
Dept: ONCOLOGY | Facility: CLINIC | Age: 62
End: 2017-03-07
Attending: INTERNAL MEDICINE
Payer: COMMERCIAL

## 2017-03-07 VITALS
RESPIRATION RATE: 14 BRPM | OXYGEN SATURATION: 96 % | HEART RATE: 84 BPM | WEIGHT: 109.4 LBS | TEMPERATURE: 97.2 F | BODY MASS INDEX: 19.38 KG/M2 | SYSTOLIC BLOOD PRESSURE: 116 MMHG | DIASTOLIC BLOOD PRESSURE: 81 MMHG

## 2017-03-07 DIAGNOSIS — C50.919 CARCINOMA OF BREAST METASTATIC TO MULTIPLE SITES, UNSPECIFIED LATERALITY (H): ICD-10-CM

## 2017-03-07 DIAGNOSIS — C79.51 BONE METASTASIS: ICD-10-CM

## 2017-03-07 DIAGNOSIS — C50.919 METASTATIC BREAST CANCER: Primary | ICD-10-CM

## 2017-03-07 DIAGNOSIS — C79.31 BRAIN METASTASIS: ICD-10-CM

## 2017-03-07 LAB
ALBUMIN SERPL-MCNC: 3.6 G/DL (ref 3.4–5)
ALP SERPL-CCNC: 104 U/L (ref 40–150)
ALT SERPL W P-5'-P-CCNC: 23 U/L (ref 0–50)
ANION GAP SERPL CALCULATED.3IONS-SCNC: 9 MMOL/L (ref 3–14)
AST SERPL W P-5'-P-CCNC: 25 U/L (ref 0–45)
BASOPHILS # BLD AUTO: 0.1 10E9/L (ref 0–0.2)
BASOPHILS NFR BLD AUTO: 1.6 %
BILIRUB SERPL-MCNC: 0.4 MG/DL (ref 0.2–1.3)
BUN SERPL-MCNC: 15 MG/DL (ref 7–30)
CALCIUM SERPL-MCNC: 9 MG/DL (ref 8.5–10.1)
CHLORIDE SERPL-SCNC: 106 MMOL/L (ref 94–109)
CO2 SERPL-SCNC: 26 MMOL/L (ref 20–32)
CREAT SERPL-MCNC: 0.58 MG/DL (ref 0.52–1.04)
DIFFERENTIAL METHOD BLD: ABNORMAL
EOSINOPHIL # BLD AUTO: 0 10E9/L (ref 0–0.7)
EOSINOPHIL NFR BLD AUTO: 0 %
ERYTHROCYTE [DISTWIDTH] IN BLOOD BY AUTOMATED COUNT: 17.2 % (ref 10–15)
GFR SERPL CREATININE-BSD FRML MDRD: NORMAL ML/MIN/1.7M2
GLUCOSE SERPL-MCNC: 83 MG/DL (ref 70–99)
HCT VFR BLD AUTO: 33.6 % (ref 35–47)
HGB BLD-MCNC: 11.1 G/DL (ref 11.7–15.7)
IMM GRANULOCYTES # BLD: 0.2 10E9/L (ref 0–0.4)
IMM GRANULOCYTES NFR BLD: 4.1 %
LYMPHOCYTES # BLD AUTO: 1.3 10E9/L (ref 0.8–5.3)
LYMPHOCYTES NFR BLD AUTO: 33.6 %
MCH RBC QN AUTO: 32.8 PG (ref 26.5–33)
MCHC RBC AUTO-ENTMCNC: 33 G/DL (ref 31.5–36.5)
MCV RBC AUTO: 99 FL (ref 78–100)
MONOCYTES # BLD AUTO: 0.5 10E9/L (ref 0–1.3)
MONOCYTES NFR BLD AUTO: 12.1 %
NEUTROPHILS # BLD AUTO: 1.9 10E9/L (ref 1.6–8.3)
NEUTROPHILS NFR BLD AUTO: 48.6 %
NRBC # BLD AUTO: 0 10*3/UL
NRBC BLD AUTO-RTO: 1 /100
PLATELET # BLD AUTO: 311 10E9/L (ref 150–450)
POTASSIUM SERPL-SCNC: 3.9 MMOL/L (ref 3.4–5.3)
PROT SERPL-MCNC: 7 G/DL (ref 6.8–8.8)
RBC # BLD AUTO: 3.38 10E12/L (ref 3.8–5.2)
SODIUM SERPL-SCNC: 141 MMOL/L (ref 133–144)
WBC # BLD AUTO: 3.9 10E9/L (ref 4–11)

## 2017-03-07 PROCEDURE — 85025 COMPLETE CBC W/AUTO DIFF WBC: CPT | Performed by: PHYSICIAN ASSISTANT

## 2017-03-07 PROCEDURE — 96413 CHEMO IV INFUSION 1 HR: CPT

## 2017-03-07 PROCEDURE — 96372 THER/PROPH/DIAG INJ SC/IM: CPT | Mod: XS

## 2017-03-07 PROCEDURE — 25000128 H RX IP 250 OP 636: Mod: ZF | Performed by: PHYSICIAN ASSISTANT

## 2017-03-07 PROCEDURE — 25000125 ZZHC RX 250: Mod: ZF | Performed by: PHYSICIAN ASSISTANT

## 2017-03-07 PROCEDURE — 25000128 H RX IP 250 OP 636: Mod: ZF | Performed by: INTERNAL MEDICINE

## 2017-03-07 PROCEDURE — 96401 CHEMO ANTI-NEOPL SQ/IM: CPT

## 2017-03-07 PROCEDURE — 80053 COMPREHEN METABOLIC PANEL: CPT | Performed by: INTERNAL MEDICINE

## 2017-03-07 PROCEDURE — 96367 TX/PROPH/DG ADDL SEQ IV INF: CPT

## 2017-03-07 RX ORDER — HEPARIN SODIUM (PORCINE) LOCK FLUSH IV SOLN 100 UNIT/ML 100 UNIT/ML
500 SOLUTION INTRAVENOUS ONCE
Status: COMPLETED | OUTPATIENT
Start: 2017-03-07 | End: 2017-03-07

## 2017-03-07 RX ADMIN — GEMCITABINE 1460 MG: 38 INJECTION, SOLUTION INTRAVENOUS at 14:41

## 2017-03-07 RX ADMIN — SODIUM CHLORIDE, PRESERVATIVE FREE 500 UNITS: 5 INJECTION INTRAVENOUS at 13:38

## 2017-03-07 RX ADMIN — DEXAMETHASONE SODIUM PHOSPHATE 12 MG: 10 INJECTION, SOLUTION INTRAMUSCULAR; INTRAVENOUS at 14:23

## 2017-03-07 RX ADMIN — SODIUM CHLORIDE 250 ML: 9 INJECTION, SOLUTION INTRAVENOUS at 14:23

## 2017-03-07 RX ADMIN — DENOSUMAB 120 MG: 120 INJECTION SUBCUTANEOUS at 15:22

## 2017-03-07 RX ADMIN — SODIUM CHLORIDE, PRESERVATIVE FREE 500 UNITS: 5 INJECTION INTRAVENOUS at 15:15

## 2017-03-07 NOTE — PROGRESS NOTES
Infusion Nursing Note:  Keren Erickson presents today for Cycle 8 Day 8 Gemzar and xgeva    Patient seen by provider today: No    Treatment Conditions:  Lab Results   Component Value Date    HGB 11.1 03/07/2017     Lab Results   Component Value Date    WBC 3.9 03/07/2017      Lab Results   Component Value Date    ANEU 1.9 03/07/2017     Lab Results   Component Value Date     03/07/2017      Lab Results   Component Value Date     02/28/2017                   Lab Results   Component Value Date    POTASSIUM 3.9 02/28/2017           Lab Results   Component Value Date    MAG 2.4 02/28/2017            Lab Results   Component Value Date    CR 0.62 02/28/2017                   Lab Results   Component Value Date    OMA 8.9 02/28/2017                Lab Results   Component Value Date    BILITOTAL 0.4 02/28/2017           Lab Results   Component Value Date    ALBUMIN 3.3 02/28/2017                    Lab Results   Component Value Date    ALT 23 02/28/2017           Lab Results   Component Value Date    AST 25 02/28/2017     Results reviewed, labs MET treatment parameters, ok to proceed with treatment.    Intravenous Access:  Implanted Port.    Note: Pt with no concerns.       Post Infusion Assessment:  Patient tolerated infusion and injection without incident.  Blood return noted pre and post infusion.  Access discontinued per protocol.    Discharge Plan:   Patient declined prescription refills.  Discharge instructions reviewed with: Patient.  Patient and/or family verbalized understanding of discharge instructions and all questions answered.  Copy of AVS reviewed with patient and/or family.  Patient will return 3/22/2017 for next appointment.  Patient discharged in stable condition accompanied by: daughter.  Departure Mode: Ambulatory.  Face to Face time: 0.    Rolanda Godinez RN

## 2017-03-07 NOTE — PATIENT INSTRUCTIONS
Contact Numbers    [facility] Main Line: [phone]  [facility] Triage:  [phone]    Call triage with chills and/or temperature greater than or equal to 100.5, uncontrolled nausea/vomiting, diarrhea, constipation, dizziness, shortness of breath, chest pain, bleeding, unexplained bruising, or any new/concerning symptoms, questions/concerns.     If you are having any concerning symptoms or wish to speak to a provider before your next infusion visit, please call your care coordinator or triage to notify them so we can adequately serve you.       After Hours: [phone]    If after hours, weekends, or holidays, call main hospital  and ask for Oncology doctor on call.           March 2017 Sunday Monday Tuesday Wednesday Thursday Friday Saturday                  1     2     3     4       5     6     7     [facility]    1:00 PM   (15 min.)   [facility]   [facility]     [facility]C INFUSION 120    1:30 PM   (120 min.)    ONCOLOGY INFUSION   [facility] 8     9     10     11       12     13     [facility] RETURN ENDOCRINE   11:45 AM   (60 min.)   Rolando Mishra MD   [facility] 14     15     16     17     18       19     20     21     22     [facility] DRAW   12:30 PM   (15 min.)   [facility]   [facility] Draw     [facility] RETURN   12:45 PM   (50 min.)   Esmer Oconnor PA   [facility]     [facility] ONC INFUSION 120    1:30 PM   (120 min.)   [facility] ONCOLOGY INFUSION   [facility] 23     24     25       26     27     28     [facility] D[facility]    2:30 PM   (15 min.)   [facility] D[facility]   [facility] Draw     [facility] ONC INFUSION 60    3:00 PM   (60 min.)   [facility] ONCOLOGY INFUSION   [facility] 29     30     31 April 2017 Sunday Monday Tuesday Wednesday Thursday Friday Saturday                                 1       2     3     4     5     6     7      8       9     10     [facility] DRAW   12:30 PM   (15 min.)   [facility] D[facility]   [facility] Lab Draw     [facility] ONC INFUSION 120    1:00 PM   (120 min.)    ONCOLOGY INFUSION   [facility] 11     12     13     14     15       16     17     18     [facility] DRAW    2:00 PM   (15 min.)   [facility]   [facility] Lab Draw     [facility] ONC INFUSION 60    2:30 PM   (60 min.)    ONCOLOGY INFUSION   [facility] 19     20     21     22       23     24     25     26     27     28     29       30                                               Recent Results (from the past 24 hour(s))   CBC with platelets differential    Collection Time: 03/07/17  1:48 PM   Result Value Ref Range    WBC 3.9 (L) 4.0 - 11.0 10e9/L    RBC Count 3.38 (L) 3.8 - 5.2 10e12/L    Hemoglobin 11.1 (L) 11.7 - 15.7 g/dL    Hematocrit 33.6 (L) 35.0 - 47.0 %    MCV 99 78 - 100 fl    MCH 32.8 26.5 - 33.0 pg    MCHC 33.0 31.5 - 36.5 g/dL    RDW 17.2 (H) 10.0 - 15.0 %    Platelet Count 311 150 - 450 10e9/L    Diff Method Automated Method     % Neutrophils 48.6 %    % Lymphocytes 33.6 %    % Monocytes 12.1 %    % Eosinophils 0.0 %    % Basophils 1.6 %    % Immature Granulocytes 4.1 %    Nucleated RBCs 1 (H) 0 /100    Absolute Neutrophil 1.9 1.6 - 8.3 10e9/L    Absolute Lymphocytes 1.3 0.8 - 5.3 10e9/L    Absolute Monocytes 0.5 0.0 - 1.3 10e9/L    Absolute Eosinophils 0.0 0.0 - 0.7 10e9/L    Absolute Basophils 0.1 0.0 - 0.2 10e9/L    Abs Immature Granulocytes 0.2 0 - 0.4 10e9/L    Absolute Nucleated RBC 0.0    Comprehensive metabolic panel    Collection Time: 03/07/17  1:49 PM   Result Value Ref Range    Sodium 141 133 - 144 mmol/L    Potassium 3.9 3.4 - 5.3 mmol/L    Chloride 106 94 - 109 mmol/L    Carbon Dioxide 26 20 - 32 mmol/L    Anion Gap 9 3 - 14 mmol/L    Glucose 83 70 - 99 mg/dL    Urea Nitrogen 15 7 - 30 mg/dL    Creatinine 0.58 0.52 - 1.04 mg/dL    GFR Estimate >90  Non   GFR Calc   >60 mL/min/1.7m2    GFR Estimate If Black >90   GFR Calc   >60 mL/min/1.7m2    Calcium 9.0 8.5 - 10.1 mg/dL    Bilirubin Total 0.4 0.2 - 1.3 mg/dL    Albumin 3.6 3.4 - 5.0 g/dL    Protein Total 7.0 6.8 - 8.8 g/dL    Alkaline Phosphatase 104 40 - 150 U/L    ALT 23 0 - 50 U/L    AST 25 0 - 45 U/L

## 2017-03-07 NOTE — MR AVS SNAPSHOT
After Visit Summary   3/7/2017    Keren Erickson    MRN: [id number]           Patient Information     Date Of Birth          1955        Visit Information        Provider Department      3/7/2017 1:30 PM  27 ATC;  ONCOLOGY INFUSION [facility]        Today's Diagnoses     Metastatic breast cancer (H)    -  1    Carcinoma of breast metastatic to multiple sites, unspecified laterality (H)        Brain metastasis (H)        Bone metastasis (H)          Care Instructions    Contact Numbers    [facility] Main Line: [phone]  [facility] Triage:  [phone]    Call triage with chills and/or temperature greater than or equal to 100.5, uncontrolled nausea/vomiting, diarrhea, constipation, dizziness, shortness of breath, chest pain, bleeding, unexplained bruising, or any new/concerning symptoms, questions/concerns.     If you are having any concerning symptoms or wish to speak to a provider before your next infusion visit, please call your care coordinator or triage to notify them so we can adequately serve you.       After Hours: [phone]    If after hours, weekends, or holidays, call main hospital  and ask for Oncology doctor on call.           March 2017 Sunday Monday Tuesday Wednesday Thursday Friday Saturday                  1     2     3     4       5     6     7     [facility]    1:00 PM   (15 min.)    [facility] DRAW   [facility] Lab Draw     [facility] ONC INFUSION 120    1:30 PM   (120 min.)    ONCOLOGY INFUSION   [facility] 8     9     10     11       12     13     [facility] RETURN ENDOCRINE   11:45 AM   (60 min.)   Rolando Mishra MD   [facility] Endocrinology 14     15     16     17     18       19     20     21     22     [facility] D[facility]   12:30 PM   (15 min.)    [facility] DRAW   [facility] Lab Draw     [facility] RETURN   12:45 PM   (50 min.)   Esmer Oconnor PA   [facility]      [facility] ONC INFUSION 120    1:30 PM   (120 min.)    ONCOLOGY INFUSION   [facility] 23     24     25       26     27     28     [facility] DRAW    2:30 PM   (15 min.)    [facility] DRAW   [facility] Draw     [facility] ONC INFUSION 60    3:00 PM   (60 min.)    ONCOLOGY INFUSION   [facility] 29     30     31 April 2017 Sunday Monday Tuesday Wednesday Thursday Friday Saturday                                 1       2     3     4     5     6     7     8       9     10     [facility] D[facility]   12:30 PM   (15 min.)    [facility]   [facility]     [facility] ONC INFUSION 120    1:00 PM   (120 min.)    ONCOLOGY INFUSION   [facility] 11     12     13     14     15       16     17     18     [facility] D[facility]    2:00 PM   (15 min.)    [facility] DRAW   [facility] Draw     [facility] ONC INFUSION 60    2:30 PM   (60 min.)    ONCOLOGY INFUSION   [facility] 19     20     21     22       23     24     25     26     27     28     29       30                                               Recent Results (from the past 24 hour(s))   CBC with platelets differential    Collection Time: 03/07/17  1:48 PM   Result Value Ref Range    WBC 3.9 (L) 4.0 - 11.0 10e9/L    RBC Count 3.38 (L) 3.8 - 5.2 10e12/L    Hemoglobin 11.1 (L) 11.7 - 15.7 g/dL    Hematocrit 33.6 (L) 35.0 - 47.0 %    MCV 99 78 - 100 fl    MCH 32.8 26.5 - 33.0 pg    MCHC 33.0 31.5 - 36.5 g/dL    RDW 17.2 (H) 10.0 - 15.0 %    Platelet Count 311 150 - 450 10e9/L    Diff Method Automated Method     % Neutrophils 48.6 %    % Lymphocytes 33.6 %    % Monocytes 12.1 %    % Eosinophils 0.0 %    % Basophils 1.6 %    % Immature Granulocytes 4.1 %    Nucleated RBCs 1 (H) 0 /100    Absolute Neutrophil 1.9 1.6 - 8.3 10e9/L    Absolute Lymphocytes 1.3 0.8 - 5.3 10e9/L    Absolute Monocytes 0.5 0.0 - 1.3 10e9/L    Absolute Eosinophils 0.0 0.0 - 0.7  10e9/L    Absolute Basophils 0.1 0.0 - 0.2 10e9/L    Abs Immature Granulocytes 0.2 0 - 0.4 10e9/L    Absolute Nucleated RBC 0.0    Comprehensive metabolic panel    Collection Time: 03/07/17  1:49 PM   Result Value Ref Range    Sodium 141 133 - 144 mmol/L    Potassium 3.9 3.4 - 5.3 mmol/L    Chloride 106 94 - 109 mmol/L    Carbon Dioxide 26 20 - 32 mmol/L    Anion Gap 9 3 - 14 mmol/L    Glucose 83 70 - 99 mg/dL    Urea Nitrogen 15 7 - 30 mg/dL    Creatinine 0.58 0.52 - 1.04 mg/dL    GFR Estimate >90  Non  GFR Calc   >60 mL/min/1.7m2    GFR Estimate If Black >90   GFR Calc   >60 mL/min/1.7m2    Calcium 9.0 8.5 - 10.1 mg/dL    Bilirubin Total 0.4 0.2 - 1.3 mg/dL    Albumin 3.6 3.4 - 5.0 g/dL    Protein Total 7.0 6.8 - 8.8 g/dL    Alkaline Phosphatase 104 40 - 150 U/L    ALT 23 0 - 50 U/L    AST 25 0 - 45 U/L             Follow-ups after your visit        Your next 10 appointments already scheduled     Mar 13, 2017 12:00 PM CDT   (Arrive by 11:45 AM)   RETURN ENDOCRINE with Rolando Mishra MD   [facility] Endocrinology [facility])    [address] [place] [address]  3rd [facility] [id number] [phone]            Mar 22, 2017 12:30 PM CDT   [facility] Draw with [facility]   [facility] Lab Draw ([facility])    [address] [place] [address]  2nd [facility] [id number] [phone]            Mar 22, 2017  1:00 PM CDT   (Arrive by 12:45 PM)   Return Visit with BRAULIO Van   [facility] ([facility])    [address] [place] [id number] [phone]            Mar 22, 2017  1:30 PM CDT   Infusion 120 with  ONCOLOGY INFUSION, UC 32 ATC   [facility] ([facility])    [address] [place] [address] Floor  [facility] [address]   [phone]            Mar 28, 2017  2:30 PM LUZ Em  Lab Draw with [facility] DRAW   [facility] Lab Draw ([facility])    [address] [facility]  2nd [facility] [address]   [phone]            Mar 28, 2017  3:00 PM CDT   Infusion 60 with UC ONCOLOGY INFUSION, UC 26 ATC   [facility] ([facility])    [address] [address]   [phone]            Apr 10, 2017 12:30 PM CDT   [facility]   [facility] Lab Draw ([facility])    [address] [address]   [phone]            Apr 10, 2017  1:00 PM CDT   Infusion 120 with UC ONCOLOGY INFUSION   [facility] ([facility])    [address] [place] [address]   [phone]              Who to contact     If you have questions or need follow up information about today's clinic visit or your schedule please contact [facility] directly at [phone].  Normal or non-critical lab and imaging results will be communicated to you by [employer], letter or phone within 4 business days after the clinic has received the results. If you do not hear from us within 7 days, please contact the clinic through [employer] or phone. If you have a critical or abnormal lab result, we will notify you by phone as soon as possible.  Submit refill requests through "[employer] or call your pharmacy and they will forward the refill request to us. Please allow 3 business days for your refill to be completed.          Additional Information About Your Visit        [employer] Information     "[employer] gives you secure access to your electronic health record. If you see a primary care provider, you can also send messages to your care team and make appointments. If you have questions, please call your primary care clinic.  If you do not have a primary care  provider, please call [phone] and they will assist you.        Care EveryWhere ID     This is your Care EveryWhere ID. This could be used by other organizations to access your [facility] medical records  [id number]        Your Vitals Were     Pulse Temperature Respirations Pulse Oximetry BMI (Body Mass Index)       84 97.2  F (36.2  C) (Oral) 14 96% 19.38 kg/m2        Blood Pressure from Last 3 Encounters:   03/07/17 116/81   02/28/17 118/78   02/27/17 128/85    Weight from Last 3 Encounters:   03/07/17 49.6 kg (109 lb 6.4 oz)   02/28/17 49.4 kg (108 lb 14.4 oz)   02/14/17 49 kg (108 lb)              We Performed the Following     CBC with platelets differential     Comprehensive metabolic panel     MD Instruction for Protocol     Nursing Communication 1     Treatment Conditions 2        Primary Care Provider Office Phone # Fax #    Kelly Hart [phone]       [facility] [address] [facility] [address]        Thank you!     Thank you for choosing [facility]  for your care. Our goal is always to provide you with excellent care. Hearing back from our patients is one way we can continue to improve our services. Please take a few minutes to complete the written survey that you may receive in the mail after your visit with us. Thank you!             Your Updated Medication List - Protect others around you: Learn how to safely use, store and throw away your medicines at www.disposemymeds.org.          This list is accurate as of: 3/7/17  3:01 PM.  Always use your most recent med list.                   Brand Name Dispense Instructions for use    ALEVE PO      Take 220 mg by mouth daily       desmopressin 0.1 MG tablet    DDAVP    30 tablet    Take 1 tablet (100 mcg) by mouth At Bedtime       * lidocaine-prilocaine cream    EMLA    30 g    Apply to port site one hour prior to port access 10 days to two weeks after glue from incision is gone       *  lidocaine-prilocaine cream    EMLA    30 g    Apply a thick layer over port site 45 minutes in advance of port access       LORazepam 0.5 MG tablet    ATIVAN    30 tablet    Take 1 tablet (0.5 mg) by mouth every 4 hours as needed (Anxiety, Nausea/Vomiting or Sleep)       Multi-vitamin Tabs tablet      Take 1 tablet by mouth daily       omeprazole 40 MG capsule    priLOSEC    90 capsule    Take 1 capsule (40 mg) by mouth as needed       ondansetron 8 MG tablet    ZOFRAN    30 tablet    Take 1 tablet (8 mg) by mouth every 8 hours as needed for nausea       * order for DME     1 Units    Walker to ensure safe ambulation       * order for DME     1 Units    Cranial prosthesis       * order for DME     1 Units    Equipment being ordered: R knee brace--please measure and fit. To avoid knee hyperextension       prochlorperazine 10 MG tablet    COMPAZINE    30 tablet    Take 1 tablet (10 mg) by mouth every 6 hours as needed (Nausea/Vomiting)       * Notice:  This list has 5 medication(s) that are the same as other medications prescribed for you. Read the directions carefully, and ask your doctor or other care provider to review them with you.

## 2017-03-13 ENCOUNTER — OFFICE VISIT (OUTPATIENT)
Dept: ENDOCRINOLOGY | Facility: CLINIC | Age: 62
End: 2017-03-13

## 2017-03-13 VITALS
DIASTOLIC BLOOD PRESSURE: 81 MMHG | SYSTOLIC BLOOD PRESSURE: 118 MMHG | HEART RATE: 89 BPM | WEIGHT: 113 LBS | BODY MASS INDEX: 20.02 KG/M2 | HEIGHT: 63 IN

## 2017-03-13 DIAGNOSIS — E23.2 DIABETES INSIPIDUS (H): Primary | ICD-10-CM

## 2017-03-13 ASSESSMENT — PAIN SCALES - GENERAL: PAINLEVEL: NO PAIN (0)

## 2017-03-13 NOTE — LETTER
3/13/2017       RE: Keren Erickson  [address]  [facility] [address]     Dear Colleague,    Thank you for referring your patient, Keren Erickson, to the [facility] ENDOCRINOLOGY at [facility]. Please see a copy of my visit note below.    Endocrinology follow up note  Patient is seen in the clinic for follow up. Had seen Dr Mendieta as inpatient in Jan 2017    Hypernatremia.   60 yo female with history of ER DE and HER-2 positive metastatic breast cancer;   -- Treated initially with Arimidex and Herceptin  -- Switched to Tamoxifen and Herceptin in 1/14  -- Sensory deficit in 2/2015 --> MRI showed 3 punctate brain mets  -- Gamma Knife radiation to 6 lesions, performed on 04/16/2015  -- TDM1 in January 2015 and continued this through 6/26/2015   --  PET scan performed on 11/25/2015 demonstrated progression of disease at multiple sites  -- She restarted TDM1 on 11/27/2015, however experienced a mild transfusion reaction with shortness of breath and chest tightness, resolved upon stopping TDM1 and 125 mg of SoluMedrol  -- In late May, she was found to have progressive brain metastases. She received whole brain radiation.  -- currently on [facility]  trial randomized to standard care arm. She was on Xgeva, Gemzar and Herceptin. Her last treatment/infusion was 1/17/2017. Trial suspended after HCAP 1/17  -- Now resuming Gemzar and Herceptin.     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   -- Labs showed high Na and low urine osm   -- more than 100 % improved with Desmopressin.      Ref. Range 1/28/2017 06:15 1/29/2017 05:44 1/29/2017 10:22 1/29/2017 21:22 1/30/2017 06:23   Sodium Latest Ref Range: 133-144 mmol/L 147 (H) 152 (H) 149 (H) 146 (H) 156 (H)       Na normalized since starting Desmopressin at  night.   Currently on 50 mcg (0.5 tab at night)     Review of systems    She feels well today, and is here with friend Iekr (Steffanie)   She does not wake up at night and her thinking has become clearer after being on Desmopressin    HPA [place]:   Medications Not on steroids  Feeling well, has fatigue but is chronic and unchanged.   No dizziness or syncope.   Energy level is better since being on Desmopressin.     Thyroid Axis  Energy level at baseline  Appetite is improved.   Some weight gain  BM normal  Is not feeling excessively hot or cold  No skin or hair changes    Sex hormones.   History of normal FSH for post-menopausal female.   Labs tested during acute illness.       Posterior pit  No polyuria or polydipsia after being on desmopressin.   No nocturia  Urination slightly increased during day but states that it is <2 L a day     Local compression  No headaches   No loss of vision  No diplopia  Has nystagmus, fast component to left.     Past Medical History   Diagnosis Date     Arthritis      Breast cancer (H) 9/26/13     Lt breast     Breast mass, left      bx and told it was benign yrs ago     Family History   Problem Relation Age of Onset     Hypertension Father      Aneurysm Father 91     AAA     Hypertension Mother      Arthritis Mother      CANCER No family hx of      Social History     Social History     Marital status: Single     Spouse name: N/A     Number of children: 0     Years of education: N/A     Occupational History     chiropractor Self     Social History Main Topics     Smoking status: Never Smoker     Smokeless tobacco: Never Used     Alcohol use 0.6 oz/week     1 Standard drinks or equivalent per week      Comment: glass of wine wvery once in a while but nothing recently     Drug use: No     Sexual activity: Not Currently     Other Topics Concern     Not on file     Social History Narrative    Lives alone, has pet chickens       Current Outpatient Prescriptions   Medication     desmopressin  "(DDAVP) 0.1 MG tablet     Naproxen Sodium (ALEVE PO)     No current facility-administered medications for this visit.      Menstrual history  Had been pregnant but does not have children  Cycles were regular in past. During college years had 10 month of amenorrhea.   Menopause 55 year age.    Exam    /81  Pulse 89  Ht 1.6 m (5' 3\")  Wt 51.3 kg (113 lb)  BMI 20.02 kg/m2  General no distress, appears older than stated age.   Eyes Normal visual field, nystagmus (fast component to left, chronic)   Diplopia +  ENT No adenopathy  Thyroid no goiter  Chest Clear to auscultation.   CVS S1 S2   Abdomen soft BS+  nontender  Reflexes normal,   Skin no rash  No peripheral edema.   Gait is unsteady. Uses a cane.     Assessment  61 year old female with metastatic breast cancer with whole brain irradiation who was found to have DI with low urine osm in the setting of high Na improved with desmopressin.    Central diabetes insipidus:  She has known metastasis to the brain from the breast cancer and her latest MRI from 1/25/17 showed no gross abnormality in the hypothalamus, pituitary or infundibulum area. Therefore the central DI could be from very small metastasis we are not able to see on the MRI from 1/25/17 or previous radiation treatment.     ACTH, TSH and FSH tested, FSH is normal - but expected to be high in postmenopausal female. We will repeat this test along with other pituitary hormones. She was pregnant in past, but does not have children, post menopausal since age 55 with brief menopausal symptoms.     Plan: Obtain pituitary labs and electrolytes with next set of labs, preferably in the morning.   Continue Desmopressin. If increased thirst and urination during the day time, plan to start 25 mcg daily   Symptoms of pituitary insuff explained, patient to notify with change in symptoms and will do labs if she has new issues.     Orders Placed This Encounter   Procedures     TSH with free T4 reflex     Lutropin     " Follicle stimulating hormone     Prolactin     Basic metabolic panel     Cortisol     Adrenal corticotropin     Insulin growth factor 1     Osmolality urine     Rolando Mishra MD  [id number]  Endocrinology Service

## 2017-03-13 NOTE — NURSING NOTE
Chief Complaint   Patient presents with     RECHECK     F/U DIABETES INSIPIDUS     [facility]  Endocrinology & Diabetes 3G

## 2017-03-13 NOTE — MR AVS SNAPSHOT
After Visit Summary   3/13/2017    Keren Erickson    MRN: [id number]           Patient Information     Date Of Birth          1955        Visit Information        Provider Department      3/13/2017 12:00 PM Rolando Mishra MD [facility]        Today's Diagnoses     Diabetes insipidus (H)    -  1      Care Instructions    Continue Desmopressin 50 mcg a day at night     Recheck pituitary labs with your next labs.     Contact clinic if there are new symptoms or change in symptoms.         Follow-ups after your visit        Follow-up notes from your care team     Return in about 6 months (around 9/13/2017).      Your next 10 appointments already scheduled     Mar 22, 2017 12:30 PM CDT   [facility] Lab Draw with UC MASONIC LAB DRAW   [facility] ([facility]    [address] [place] [address]   [phone]            Mar 22, 2017  1:00 PM CDT   (Arrive by 12:45 PM)   Return Visit with BRAULIO Van   [facility] ([facility])    [address] [place] [address]   [phone]            Mar 22, 2017  1:30 PM CDT   Infusion 120 with UC ONCOLOGY INFUSION, UC 32 ATC   [facility] ([facility])    [address] [place] [address]   [phone]            Mar 28, 2017  2:30 PM CDT   Masonic Lab Draw with UC MASONIC LAB DRAW   [facility] Masonic Lab Draw ([facility])    [address] [place] [address]   [phone]            Mar 28, 2017  3:00 PM CDT   Infusion 60 with UC ONCOLOGY INFUSION, UC 26 ATC   [facility] ([facility])    [address] [place] [address]   [phone]            Apr 07, 2017 11:00 AM CDT   Masonic Lab Draw with UC  MASONIC LAB DRAW   [facility] Lab Draw ([facility])    [address]  2nd Floor  [facility] [id number] [phone]            Apr 07, 2017 11:40 AM CDT   (Arrive by 11:25 AM)   CT CHEST/ABDOMEN/PELVIS W CONTRAST with UCCT1   [facility] CT ([facility])    [address]  [address] [facility] [id number] [phone]           Please bring any scans or X-rays taken at other hospitals, if similar tests were done. Also bring a list of your medicines, including vitamins, minerals and over-the-counter drugs. It is safest to leave personal items at home.  Be sure to tell your doctor:   If you have any allergies.   If there s any chance you are pregnant.   If you are breastfeeding.   If you have any special needs.  You may have contrast for this exam. To prepare:   Do not eat or drink for 2 hours before your exam. If you need to take medicine, you may take it with small sips of water. (We may ask you to take liquid medicine as well.)   The day before your exam, drink extra fluids at least six 8-ounce glasses (unless your doctor tells you to restrict your fluids).  Patients over 70 or patients with diabetes or kidney problems:   If you haven t had a blood test (creatinine test) within the last 30 days, go to your clinic or Diagnostic Imaging Department for this test.  If you have diabetes:   If your kidney function is normal, continue taking your metformin (Avandamet, Glucophage, Glucovance, Metaglip) on the day of your exam.   If your kidney function is abnormal, wait 48 hours before restarting this medicine.  You will have oral contrast for this exam:   You will drink the contrast at home. Get this from your clinic or Diagnostic Imaging Department. Please follow the directions given.  Please wear loose clothing, such as a sweat suit or jogging clothes. Avoid snaps, zippers and other metal. We may ask you to undress and put on a  hospital gown.  If you have any questions, please call the Imaging Department where you will have your exam.            Apr 07, 2017 12:15 PM CDT   (Arrive by 12:00 PM)   MR BRAIN W/O & W CONTRAST with [address] MRI ([facility])    [address] [address] [phone]           Take your medicines as usual, unless your doctor tells you not to. Bring a list of your current medicines to your exam (including vitamins, minerals and over-the-counter drugs).  You will be given intravenous contrast for this exam. To prepare:   The day before your exam, drink extra fluids at least six 8-ounce glasses (unless your doctor tells you to restrict your fluids).   Have a blood test (creatinine test) within 30 days of your exam. Go to your clinic or Diagnostic Imaging Department for this test.  The MRI machine uses a strong magnet. Please wear clothes without metal (snaps, zippers). A sweatsuit works well, or we may give you a hospital gown.  Please remove any body piercings and hair extensions before you arrive. You will also remove watches, jewelry, hairpins, wallets, dentures, partial dental plates and hearing aids. You may wear contact lenses, and you may be able to wear your rings. We have a safe place to keep your personal items, but it is safer to leave them at home.   **IMPORTANT** THE INSTRUCTIONS BELOW ARE ONLY FOR THOSE PATIENTS WHO HAVE BEEN TOLD THEY WILL RECEIVE SEDATION OR GENERAL ANESTHESIA DURING THEIR MRI PROCEDURE:  IF YOU WILL RECEIVE SEDATION (take medicine to help you relax during your exam):   You must get the medicine from your doctor before you arrive. Bring the medicine to the exam. Do not take it at home.   Arrive one hour early. Bring someone who can take you home after the test. Your medicine will make you sleepy. After the exam, you may not drive, take a bus or take a taxi by yourself.   No eating 8 hours before your  exam. You may have clear liquids up until 4 hours before your exam. (Clear liquids include water, clear tea, black coffee and fruit juice without pulp.)  IF YOU WILL RECEIVE ANESTHESIA (be asleep for your exam):   Arrive 1 1/2 hours early. Bring someone who can take you home after the test. You may not drive, take a bus or take a taxi by yourself.   No eating 8 hours before your exam. You may have clear liquids up until 4 hours before your exam. (Clear liquids include water, clear tea, black coffee and fruit juice without pulp.)  Please call the Imaging Department at your exam site with any questions.              Future tests that were ordered for you today     Open Future Orders        Priority Expected Expires Ordered    TSH with free T4 reflex Routine  3/8/2018 3/13/2017    Lutropin Routine  3/8/2018 3/13/2017    Follicle stimulating hormone Routine  3/8/2018 3/13/2017    Prolactin Routine  3/8/2018 3/13/2017    Basic metabolic panel Routine  3/13/2018 3/13/2017    Cortisol Routine  3/13/2018 3/13/2017    Adrenal corticotropin Routine  3/13/2018 3/13/2017    Insulin growth factor 1 Routine  3/13/2018 3/13/2017    Osmolality urine Routine  3/13/2018 3/13/2017            Who to contact     Please call your clinic at [phone] to:    Ask questions about your health    Make or cancel appointments    Discuss your medicines    Learn about your test results    Speak to your doctor   If you have compliments or concerns about an experience at your clinic, or if you wish to file a complaint, please contact [facility] Patient Relations at [phone] or email us at [email or web address]         Additional Information About Your Visit        MyChart Information     [email or web address] gives you secure access to your electronic health record. If you see a primary care provider, you can also send messages to your care team and make appointments. If you have questions, please call your primary care  "clinic.  If you do not have a primary care provider, please call [phone] and they will assist you.      [employer] is an electronic gateway that provides easy, online access to your medical records. With [employer], you can request a clinic appointment, read your test results, renew a prescription or communicate with your care team.     To access your existing account, please contact your [facility] or call [phone] for assistance.        Care EveryWhere ID     This is your Care EveryWhere ID. This could be used by other organizations to access your [place] medical records  [id number]        Your Vitals Were     Pulse Height BMI (Body Mass Index)             89 1.6 m (5' 3\") 20.02 kg/m2          Blood Pressure from Last 3 Encounters:   03/13/17 118/81   03/07/17 116/81   02/28/17 118/78    Weight from Last 3 Encounters:   03/13/17 51.3 kg (113 lb)   03/07/17 49.6 kg (109 lb 6.4 oz)   02/28/17 49.4 kg (108 lb 14.4 oz)                 Today's Medication Changes          These changes are accurate as of: 3/13/17  2:13 PM.  If you have any questions, ask your nurse or doctor.               Stop taking these medicines if you haven't already. Please contact your care team if you have questions.     lidocaine-prilocaine cream   Commonly known as:  EMLA   Stopped by:  Rolando Mishra MD           LORazepam 0.5 MG tablet   Commonly known as:  ATIVAN   Stopped by:  Rolando Mishra MD           Multi-vitamin Tabs tablet   Stopped by:  Rolando Mishra MD           omeprazole 40 MG capsule   Commonly known as:  priLOSEC   Stopped by:  Rolando Mishra MD           ondansetron 8 MG tablet   Commonly known as:  ZOFRAN   Stopped by:  Rolando Mishra MD           order for DME   Stopped by:  Rolando Mishra MD           prochlorperazine 10 MG tablet   Commonly known as:  COMPAZINE   Stopped by:  Rolando Mishra" Jaron Harding MD                    Primary Care Provider Office Phone # Fax #    Kelly Bg Hart [phone]       [facility] [address] [facility] [address]        Thank you!     Thank you for choosing [facility]  for your care. Our goal is always to provide you with excellent care. Hearing back from our patients is one way we can continue to improve our services. Please take a few minutes to complete the written survey that you may receive in the mail after your visit with us. Thank you!             Your Updated Medication List - Protect others around you: Learn how to safely use, store and throw away your medicines at www.disposemymeds.org.          This list is accurate as of: 3/13/17  2:13 PM.  Always use your most recent med list.                   Brand Name Dispense Instructions for use    ALEVE PO      Take 220 mg by mouth daily       desmopressin 0.1 MG tablet    DDAVP    30 tablet    Take 1 tablet (100 mcg) by mouth At Bedtime

## 2017-03-13 NOTE — PROGRESS NOTES
Endocrinology follow up note  Patient is seen in the clinic for follow up. Had seen Dr Mendieta as inpatient in Jan 2017    Hypernatremia.   60 yo female with history of ER NC and HER-2 positive metastatic breast cancer;   -- Treated initially with Arimidex and Herceptin  -- Switched to Tamoxifen and Herceptin in 1/14  -- Sensory deficit in 2/2015 --> MRI showed 3 punctate brain mets  -- Gamma Knife radiation to 6 lesions, performed on 04/16/2015  -- TDM1 in January 2015 and continued this through 6/26/2015   --  PET scan performed on 11/25/2015 demonstrated progression of disease at multiple sites  -- She restarted TDM1 on 11/27/2015, however experienced a mild transfusion reaction with shortness of breath and chest tightness, resolved upon stopping TDM1 and 125 mg of SoluMedrol  -- In late May, she was found to have progressive brain metastases. She received whole brain radiation.  -- currently on Grand  trial randomized to standard care arm. She was on Xgeva, Gemzar and Herceptin. Her last treatment/infusion was 1/17/2017. Trial suspended after HCAP 1/17  -- Now resuming Gemzar and Herceptin.     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   -- Labs showed high Na and low urine osm   -- more than 100 % improved with Desmopressin.      Ref. Range 1/28/2017 06:15 1/29/2017 05:44 1/29/2017 10:22 1/29/2017 21:22 1/30/2017 06:23   Sodium Latest Ref Range: 133-144 mmol/L 147 (H) 152 (H) 149 (H) 146 (H) 156 (H)       Na normalized since starting Desmopressin at night.   Currently on 50 mcg (0.5 tab at night)     Review of systems    She feels well today, and is here with friend Iker (Steffanie)   She does not wake up at night and her thinking has become clearer after being on Desmopressin    HPA [place]:   Medications Not on steroids  Feeling well, has  fatigue but is chronic and unchanged.   No dizziness or syncope.   Energy level is better since being on Desmopressin.     Thyroid Axis  Energy level at baseline  Appetite is improved.   Some weight gain  BM normal  Is not feeling excessively hot or cold  No skin or hair changes    Sex hormones.   History of normal FSH for post-menopausal female.   Labs tested during acute illness.       Posterior pit  No polyuria or polydipsia after being on desmopressin.   No nocturia  Urination slightly increased during day but states that it is <2 L a day     Local compression  No headaches   No loss of vision  No diplopia  Has nystagmus, fast component to left.     Past Medical History   Diagnosis Date     Arthritis      Breast cancer (H) 9/26/13     Lt breast     Breast mass, left      bx and told it was benign yrs ago     Family History   Problem Relation Age of Onset     Hypertension Father      Aneurysm Father 91     AAA     Hypertension Mother      Arthritis Mother      CANCER No family hx of      Social History     Social History     Marital status: Single     Spouse name: N/A     Number of children: 0     Years of education: N/A     Occupational History     chiropractor Self     Social History Main Topics     Smoking status: Never Smoker     Smokeless tobacco: Never Used     Alcohol use 0.6 oz/week     1 Standard drinks or equivalent per week      Comment: glass of wine wvery once in a while but nothing recently     Drug use: No     Sexual activity: Not Currently     Other Topics Concern     Not on file     Social History Narrative    Lives alone, has pet chickens       Current Outpatient Prescriptions   Medication     desmopressin (DDAVP) 0.1 MG tablet     Naproxen Sodium (ALEVE PO)     No current facility-administered medications for this visit.      Menstrual history  Had been pregnant but does not have children  Cycles were regular in past. During college years had 10 month of amenorrhea.   Menopause 55 year  "age.    Exam    /81  Pulse 89  Ht 1.6 m (5' 3\")  Wt 51.3 kg (113 lb)  BMI 20.02 kg/m2  General no distress, appears older than stated age.   Eyes Normal visual field, nystagmus (fast component to left, chronic)   Diplopia +  ENT No adenopathy  Thyroid no goiter  Chest Clear to auscultation.   CVS S1 S2   Abdomen soft BS+  nontender  Reflexes normal,   Skin no rash  No peripheral edema.   Gait is unsteady. Uses a cane.     Assessment  61 year old female with metastatic breast cancer with whole brain irradiation who was found to have DI with low urine osm in the setting of high Na improved with desmopressin.    Central diabetes insipidus:  She has known metastasis to the brain from the breast cancer and her latest MRI from 1/25/17 showed no gross abnormality in the hypothalamus, pituitary or infundibulum area. Therefore the central DI could be from very small metastasis we are not able to see on the MRI from 1/25/17 or previous radiation treatment.     ACTH, TSH and FSH tested, FSH is normal - but expected to be high in postmenopausal female. We will repeat this test along with other pituitary hormones. She was pregnant in past, but does not have children, post menopausal since age 55 with brief menopausal symptoms.     Plan: Obtain pituitary labs and electrolytes with next set of labs, preferably in the morning.   Continue Desmopressin. If increased thirst and urination during the day time, plan to start 25 mcg daily   Symptoms of pituitary insuff explained, patient to notify with change in symptoms and will do labs if she has new issues.     Orders Placed This Encounter   Procedures     TSH with free T4 reflex     Lutropin     Follicle stimulating hormone     Prolactin     Basic metabolic panel     Cortisol     Adrenal corticotropin     Insulin growth factor 1     Osmolality urine     Rolando Mishra MD  [id number]  Endocrinology Service    "

## 2017-03-13 NOTE — PATIENT INSTRUCTIONS
Continue Desmopressin 50 mcg a day at night     Recheck pituitary labs with your next labs.     Contact clinic if there are new symptoms or change in symptoms.

## 2017-03-22 ENCOUNTER — ONCOLOGY VISIT (OUTPATIENT)
Dept: ONCOLOGY | Facility: CLINIC | Age: 62
End: 2017-03-22
Attending: INTERNAL MEDICINE
Payer: COMMERCIAL

## 2017-03-22 ENCOUNTER — APPOINTMENT (OUTPATIENT)
Dept: LAB | Facility: CLINIC | Age: 62
End: 2017-03-22
Attending: INTERNAL MEDICINE
Payer: COMMERCIAL

## 2017-03-22 VITALS
HEART RATE: 82 BPM | RESPIRATION RATE: 14 BRPM | WEIGHT: 113.1 LBS | TEMPERATURE: 96.8 F | DIASTOLIC BLOOD PRESSURE: 85 MMHG | BODY MASS INDEX: 20.03 KG/M2 | OXYGEN SATURATION: 98 % | SYSTOLIC BLOOD PRESSURE: 133 MMHG

## 2017-03-22 DIAGNOSIS — C50.919 METASTATIC BREAST CANCER: Primary | ICD-10-CM

## 2017-03-22 DIAGNOSIS — E23.2 DIABETES INSIPIDUS (H): ICD-10-CM

## 2017-03-22 DIAGNOSIS — C50.919 CARCINOMA OF BREAST METASTATIC TO MULTIPLE SITES, UNSPECIFIED LATERALITY (H): ICD-10-CM

## 2017-03-22 DIAGNOSIS — C79.31 BRAIN METASTASIS: ICD-10-CM

## 2017-03-22 LAB
ALBUMIN SERPL-MCNC: 3.7 G/DL (ref 3.4–5)
ALP SERPL-CCNC: 95 U/L (ref 40–150)
ALT SERPL W P-5'-P-CCNC: 20 U/L (ref 0–50)
ANION GAP SERPL CALCULATED.3IONS-SCNC: 7 MMOL/L (ref 3–14)
AST SERPL W P-5'-P-CCNC: 24 U/L (ref 0–45)
BASOPHILS # BLD AUTO: 0.1 10E9/L (ref 0–0.2)
BASOPHILS NFR BLD AUTO: 0.8 %
BILIRUB SERPL-MCNC: 0.4 MG/DL (ref 0.2–1.3)
BUN SERPL-MCNC: 15 MG/DL (ref 7–30)
CALCIUM SERPL-MCNC: 8.9 MG/DL (ref 8.5–10.1)
CANCER AG27-29 SERPL-ACNC: 25 U/ML (ref 0–39)
CEA SERPL-MCNC: 1.3 UG/L (ref 0–2.5)
CHLORIDE SERPL-SCNC: 106 MMOL/L (ref 94–109)
CO2 SERPL-SCNC: 27 MMOL/L (ref 20–32)
CORTIS SERPL-MCNC: 12.8 UG/DL (ref 4–22)
CREAT SERPL-MCNC: 0.59 MG/DL (ref 0.52–1.04)
DIFFERENTIAL METHOD BLD: ABNORMAL
EOSINOPHIL # BLD AUTO: 0 10E9/L (ref 0–0.7)
EOSINOPHIL NFR BLD AUTO: 0.1 %
ERYTHROCYTE [DISTWIDTH] IN BLOOD BY AUTOMATED COUNT: 17.3 % (ref 10–15)
FSH SERPL-ACNC: 7.8 IU/L
GFR SERPL CREATININE-BSD FRML MDRD: NORMAL ML/MIN/1.7M2
GLUCOSE SERPL-MCNC: 74 MG/DL (ref 70–99)
HCT VFR BLD AUTO: 33.9 % (ref 35–47)
HGB BLD-MCNC: 11.1 G/DL (ref 11.7–15.7)
IMM GRANULOCYTES # BLD: 0.1 10E9/L (ref 0–0.4)
IMM GRANULOCYTES NFR BLD: 0.8 %
LDH SERPL L TO P-CCNC: 243 U/L (ref 81–234)
LH SERPL-ACNC: 3 IU/L
LYMPHOCYTES # BLD AUTO: 1.4 10E9/L (ref 0.8–5.3)
LYMPHOCYTES NFR BLD AUTO: 17.2 %
MAGNESIUM SERPL-MCNC: 2.4 MG/DL (ref 1.6–2.3)
MCH RBC QN AUTO: 32.6 PG (ref 26.5–33)
MCHC RBC AUTO-ENTMCNC: 32.7 G/DL (ref 31.5–36.5)
MCV RBC AUTO: 100 FL (ref 78–100)
MONOCYTES # BLD AUTO: 1 10E9/L (ref 0–1.3)
MONOCYTES NFR BLD AUTO: 12.5 %
NEUTROPHILS # BLD AUTO: 5.4 10E9/L (ref 1.6–8.3)
NEUTROPHILS NFR BLD AUTO: 68.6 %
NRBC # BLD AUTO: 0 10*3/UL
NRBC BLD AUTO-RTO: 0 /100
OSMOLALITY UR: 306 MMOL/KG (ref 100–1200)
PHOSPHATE SERPL-MCNC: 2.8 MG/DL (ref 2.5–4.5)
PLATELET # BLD AUTO: 409 10E9/L (ref 150–450)
POTASSIUM SERPL-SCNC: 3.9 MMOL/L (ref 3.4–5.3)
PROLACTIN SERPL-MCNC: 10 UG/L (ref 3–27)
PROT SERPL-MCNC: 6.9 G/DL (ref 6.8–8.8)
RBC # BLD AUTO: 3.4 10E12/L (ref 3.8–5.2)
SODIUM SERPL-SCNC: 141 MMOL/L (ref 133–144)
T4 FREE SERPL-MCNC: 0.93 NG/DL (ref 0.76–1.46)
TSH SERPL DL<=0.005 MIU/L-ACNC: 4.99 MU/L (ref 0.4–4)
URATE SERPL-MCNC: 3 MG/DL (ref 2.6–6)
WBC # BLD AUTO: 7.9 10E9/L (ref 4–11)

## 2017-03-22 PROCEDURE — 84443 ASSAY THYROID STIM HORMONE: CPT | Performed by: INTERNAL MEDICINE

## 2017-03-22 PROCEDURE — 85025 COMPLETE CBC W/AUTO DIFF WBC: CPT | Performed by: INTERNAL MEDICINE

## 2017-03-22 PROCEDURE — 80053 COMPREHEN METABOLIC PANEL: CPT | Performed by: INTERNAL MEDICINE

## 2017-03-22 PROCEDURE — 83002 ASSAY OF GONADOTROPIN (LH): CPT | Performed by: INTERNAL MEDICINE

## 2017-03-22 PROCEDURE — 83735 ASSAY OF MAGNESIUM: CPT | Performed by: INTERNAL MEDICINE

## 2017-03-22 PROCEDURE — 84439 ASSAY OF FREE THYROXINE: CPT | Performed by: INTERNAL MEDICINE

## 2017-03-22 PROCEDURE — 83615 LACTATE (LD) (LDH) ENZYME: CPT | Performed by: INTERNAL MEDICINE

## 2017-03-22 PROCEDURE — 25000125 ZZHC RX 250: Mod: ZF | Performed by: PHYSICIAN ASSISTANT

## 2017-03-22 PROCEDURE — 82378 CARCINOEMBRYONIC ANTIGEN: CPT | Performed by: INTERNAL MEDICINE

## 2017-03-22 PROCEDURE — 82533 TOTAL CORTISOL: CPT | Performed by: INTERNAL MEDICINE

## 2017-03-22 PROCEDURE — 84550 ASSAY OF BLOOD/URIC ACID: CPT | Performed by: INTERNAL MEDICINE

## 2017-03-22 PROCEDURE — 84146 ASSAY OF PROLACTIN: CPT | Performed by: INTERNAL MEDICINE

## 2017-03-22 PROCEDURE — 84305 ASSAY OF SOMATOMEDIN: CPT | Performed by: INTERNAL MEDICINE

## 2017-03-22 PROCEDURE — 96413 CHEMO IV INFUSION 1 HR: CPT

## 2017-03-22 PROCEDURE — 83001 ASSAY OF GONADOTROPIN (FSH): CPT | Performed by: INTERNAL MEDICINE

## 2017-03-22 PROCEDURE — 96375 TX/PRO/DX INJ NEW DRUG ADDON: CPT

## 2017-03-22 PROCEDURE — 84100 ASSAY OF PHOSPHORUS: CPT | Performed by: INTERNAL MEDICINE

## 2017-03-22 PROCEDURE — 82024 ASSAY OF ACTH: CPT | Performed by: INTERNAL MEDICINE

## 2017-03-22 PROCEDURE — 83935 ASSAY OF URINE OSMOLALITY: CPT | Performed by: INTERNAL MEDICINE

## 2017-03-22 PROCEDURE — 25000128 H RX IP 250 OP 636: Mod: ZF | Performed by: PHYSICIAN ASSISTANT

## 2017-03-22 PROCEDURE — 96417 CHEMO IV INFUS EACH ADDL SEQ: CPT

## 2017-03-22 PROCEDURE — 86300 IMMUNOASSAY TUMOR CA 15-3: CPT | Performed by: INTERNAL MEDICINE

## 2017-03-22 PROCEDURE — 99214 OFFICE O/P EST MOD 30 MIN: CPT | Mod: ZP | Performed by: PHYSICIAN ASSISTANT

## 2017-03-22 RX ORDER — ALBUTEROL SULFATE 90 UG/1
1-2 AEROSOL, METERED RESPIRATORY (INHALATION)
Status: CANCELLED
Start: 2017-03-28

## 2017-03-22 RX ORDER — EPINEPHRINE 0.3 MG/.3ML
0.3 INJECTION SUBCUTANEOUS EVERY 5 MIN PRN
Status: CANCELLED | OUTPATIENT
Start: 2017-03-28

## 2017-03-22 RX ORDER — METHYLPREDNISOLONE SODIUM SUCCINATE 125 MG/2ML
125 INJECTION, POWDER, LYOPHILIZED, FOR SOLUTION INTRAMUSCULAR; INTRAVENOUS
Status: CANCELLED
Start: 2017-03-28

## 2017-03-22 RX ORDER — EPINEPHRINE 0.3 MG/.3ML
0.3 INJECTION SUBCUTANEOUS EVERY 5 MIN PRN
Status: CANCELLED | OUTPATIENT
Start: 2017-03-22

## 2017-03-22 RX ORDER — ALBUTEROL SULFATE 90 UG/1
1-2 AEROSOL, METERED RESPIRATORY (INHALATION)
Status: CANCELLED
Start: 2017-03-22

## 2017-03-22 RX ORDER — HEPARIN SODIUM (PORCINE) LOCK FLUSH IV SOLN 100 UNIT/ML 100 UNIT/ML
500 SOLUTION INTRAVENOUS ONCE
Status: COMPLETED | OUTPATIENT
Start: 2017-03-22 | End: 2017-03-22

## 2017-03-22 RX ORDER — LORAZEPAM 2 MG/ML
0.5 INJECTION INTRAMUSCULAR EVERY 4 HOURS PRN
Status: CANCELLED
Start: 2017-03-22

## 2017-03-22 RX ORDER — ALBUTEROL SULFATE 0.83 MG/ML
2.5 SOLUTION RESPIRATORY (INHALATION)
Status: CANCELLED | OUTPATIENT
Start: 2017-03-22

## 2017-03-22 RX ORDER — DIPHENHYDRAMINE HYDROCHLORIDE 50 MG/ML
50 INJECTION INTRAMUSCULAR; INTRAVENOUS
Status: CANCELLED
Start: 2017-03-28

## 2017-03-22 RX ORDER — SODIUM CHLORIDE 9 MG/ML
1000 INJECTION, SOLUTION INTRAVENOUS CONTINUOUS PRN
Status: CANCELLED
Start: 2017-03-22

## 2017-03-22 RX ORDER — HEPARIN SODIUM (PORCINE) LOCK FLUSH IV SOLN 100 UNIT/ML 100 UNIT/ML
5 SOLUTION INTRAVENOUS EVERY 8 HOURS
Status: DISCONTINUED | OUTPATIENT
Start: 2017-03-22 | End: 2017-03-22 | Stop reason: HOSPADM

## 2017-03-22 RX ORDER — METHYLPREDNISOLONE SODIUM SUCCINATE 125 MG/2ML
125 INJECTION, POWDER, LYOPHILIZED, FOR SOLUTION INTRAMUSCULAR; INTRAVENOUS
Status: CANCELLED
Start: 2017-03-22

## 2017-03-22 RX ORDER — HEPARIN SODIUM (PORCINE) LOCK FLUSH IV SOLN 100 UNIT/ML 100 UNIT/ML
5 SOLUTION INTRAVENOUS EVERY 8 HOURS
Status: CANCELLED
Start: 2017-03-28

## 2017-03-22 RX ORDER — DIPHENHYDRAMINE HCL 25 MG
50 CAPSULE ORAL
Status: CANCELLED | OUTPATIENT
Start: 2017-03-22

## 2017-03-22 RX ORDER — ALBUTEROL SULFATE 0.83 MG/ML
2.5 SOLUTION RESPIRATORY (INHALATION)
Status: CANCELLED | OUTPATIENT
Start: 2017-03-28

## 2017-03-22 RX ORDER — ACETAMINOPHEN 325 MG/1
650 TABLET ORAL
Status: CANCELLED | OUTPATIENT
Start: 2017-03-22

## 2017-03-22 RX ORDER — MEPERIDINE HYDROCHLORIDE 25 MG/ML
25 INJECTION INTRAMUSCULAR; INTRAVENOUS; SUBCUTANEOUS EVERY 30 MIN PRN
Status: CANCELLED | OUTPATIENT
Start: 2017-03-28

## 2017-03-22 RX ORDER — SODIUM CHLORIDE 9 MG/ML
1000 INJECTION, SOLUTION INTRAVENOUS CONTINUOUS PRN
Status: CANCELLED
Start: 2017-03-28

## 2017-03-22 RX ORDER — LORAZEPAM 2 MG/ML
0.5 INJECTION INTRAMUSCULAR EVERY 4 HOURS PRN
Status: CANCELLED
Start: 2017-03-28

## 2017-03-22 RX ORDER — MEPERIDINE HYDROCHLORIDE 25 MG/ML
25 INJECTION INTRAMUSCULAR; INTRAVENOUS; SUBCUTANEOUS EVERY 30 MIN PRN
Status: CANCELLED | OUTPATIENT
Start: 2017-03-22

## 2017-03-22 RX ORDER — DIPHENHYDRAMINE HYDROCHLORIDE 50 MG/ML
50 INJECTION INTRAMUSCULAR; INTRAVENOUS
Status: CANCELLED
Start: 2017-03-22

## 2017-03-22 RX ORDER — HEPARIN SODIUM (PORCINE) LOCK FLUSH IV SOLN 100 UNIT/ML 100 UNIT/ML
5 SOLUTION INTRAVENOUS EVERY 8 HOURS
Status: CANCELLED
Start: 2017-03-22

## 2017-03-22 RX ADMIN — SODIUM CHLORIDE 250 ML: 9 INJECTION, SOLUTION INTRAVENOUS at 13:51

## 2017-03-22 RX ADMIN — DEXAMETHASONE SODIUM PHOSPHATE 12 MG: 10 INJECTION, SOLUTION INTRAMUSCULAR; INTRAVENOUS at 13:51

## 2017-03-22 RX ADMIN — SODIUM CHLORIDE, PRESERVATIVE FREE 500 UNITS: 5 INJECTION INTRAVENOUS at 12:43

## 2017-03-22 RX ADMIN — GEMCITABINE 1460 MG: 38 INJECTION, SOLUTION INTRAVENOUS at 14:42

## 2017-03-22 RX ADMIN — SODIUM CHLORIDE, PRESERVATIVE FREE 5 ML: 5 INJECTION INTRAVENOUS at 15:45

## 2017-03-22 RX ADMIN — TRASTUZUMAB 300 MG: KIT at 15:15

## 2017-03-22 ASSESSMENT — PAIN SCALES - GENERAL: PAINLEVEL: NO PAIN (1)

## 2017-03-22 NOTE — NURSING NOTE
"Keren Erickson is a 61 year old female who presents for:  Chief Complaint   Patient presents with     Port Draw     Labs Drawn      Oncology Clinic Visit     Breast Cancer        Initial Vitals:  /85  Pulse 82  Temp 96.8  F (36  C) (Oral)  Resp 14  Wt 51.3 kg (113 lb 1.6 oz)  SpO2 98%  BMI 20.03 kg/m2 Estimated body mass index is 20.03 kg/(m^2) as calculated from the following:    Height as of 3/13/17: 1.6 m (5' 3\").    Weight as of this encounter: 51.3 kg (113 lb 1.6 oz).. Body surface area is 1.51 meters squared. BP completed using cuff size: NA (Not Taken)  No Pain (1) No LMP recorded. Patient is postmenopausal. Allergies and medications reviewed.     Medications: Medication refills not needed today.  Pharmacy [facility] entered into [employer]: [facility] [address] [place] [address] [address] AT [facility] [address]    Comments: Patient is at a pain level of 1 today, R hip and knee pain.     6 minutes for nursing intake (face to face time)   Tamika Tate CMA       "

## 2017-03-22 NOTE — MR AVS SNAPSHOT
After Visit Summary   3/22/2017    Keren Erickson    MRN: [id number]           Patient Information     Date Of Birth          1955        Visit Information        Provider Department      3/22/2017 1:30 PM  32 ATC;  ONCOLOGY INFUSION [facility]        Today's Diagnoses     Metastatic breast cancer (H)    -  1    Carcinoma of breast metastatic to multiple sites, unspecified laterality (H)        Brain metastasis (H)        Diabetes insipidus (H)          Care Instructions    Contact Numbers  [facility] Nurse Triage: [phone]  After Hours Nurse Line:  [phone]    Please call the [facility] Nurse Triage line or after hours number if you experience a temperature greater than or equal to 100.5, shaking chills, have uncontrolled nausea, vomiting and/or diarrhea, dizziness, shortness of breath, chest pain, bleeding, unexplained bruising, or if you have any other new/concerning symptoms, questions or concerns.     If you are having any concerning symptoms or wish to speak to a provider before your next infusion visit, please call your care coordinator or triage to notify them so we can adequately serve you.     If you need a refill on a narcotic prescription or other medication, please call triage before your infusion appointment.         March 2017 Sunday Monday Tuesday Wednesday Thursday Friday Saturday                  1     2     3     4       5     6     7     [facility] LAB DRAW    1:00 PM   (15 min.)   [facility] DRAW   [facility] Lab Draw     [facility] ONC INFUSION 120    1:30 PM   (120 min.)    ONCOLOGY INFUSION   [facility] 8     9     10     11       12     13     [facility] RETURN ENDOCRINE   11:45 AM   (60 min.)   Rolando Mishra MD   [facility] Endocrinology 14     15     16     17     18       19     20     21     22     P [facility]ON[facility] LAB DRAW   12:30 PM   (15 min.)   [facility] DRAW   [facility] Lab  Draw     [facility] RETURN   12:45 PM   (50 min.)   Esmer Oconnor PA   [facility]     [facility] ONC INFUSION 120    1:30 PM   (120 min.)   UC ONCOLOGY INFUSION   [facility] 23     24     25       26     27     28     [facility] DRAW    2:30 PM   (15 min.)   [facility]   [facility]     [facility] ONC INFUSION 60    3:00 PM   (60 min.)   UC ONCOLOGY INFUSION   [facility] 29 30 31 April 2017 Sunday Monday Tuesday Wednesday Thursday Friday Saturday                                 1       2     3     4     5     6     7     [facility] DRAW   11:00 AM   (15 min.)    M[facility] LAB DRAW   [facility] Mason[facility] Lab Draw     CT CHEST/ABDOMEN/PELVIS W   11:25 AM   (20 min.)   [address] [place] [address] CT     MR BRAIN WWO   12:00 PM   (45 min.)   [address] [place] [address] MRI 8       9     10     [facility] DRAW    1:00 PM   (15 min.)    MASONIC LAB DRAW   [facility] Lab Draw     [facility] RETURN    1:15 PM   (30 min.)   Marlen Harper MD   [facility]     [facility] ONC INFUSION 120    2:00 PM   (120 min.)    ONCOLOGY INFUSION   [facility] 11     12     13     14     15       16     17     18     [facility] DRAW    2:00 PM   (15 min.)    MASON[facility] LAB DRAW   [facility] Lab Draw     [facility] ONC INFUSION 60    2:30 PM   (60 min.)    ONCOLOGY INFUSION   [facility] 19     20     21     22       23     24     25     26     27     28     29       30                                                Lab Results:  Recent Results (from the past 12 hour(s))   CBC with platelets differential    Collection Time: 03/22/17 12:54 PM   Result Value Ref Range    WBC 7.9 4.0 - 11.0 10e9/L    RBC Count 3.40 (L) 3.8 - 5.2 10e12/L    Hemoglobin 11.1 (L) 11.7 - 15.7 g/dL    Hematocrit 33.9 (L) 35.0 - 47.0 %     78 - 100 fl    MCH 32.6 26.5 - 33.0 pg     MCHC 32.7 31.5 - 36.5 g/dL    RDW 17.3 (H) 10.0 - 15.0 %    Platelet Count 409 150 - 450 10e9/L    Diff Method Automated Method     % Neutrophils 68.6 %    % Lymphocytes 17.2 %    % Monocytes 12.5 %    % Eosinophils 0.1 %    % Basophils 0.8 %    % Immature Granulocytes 0.8 %    Nucleated RBCs 0 0 /100    Absolute Neutrophil 5.4 1.6 - 8.3 10e9/L    Absolute Lymphocytes 1.4 0.8 - 5.3 10e9/L    Absolute Monocytes 1.0 0.0 - 1.3 10e9/L    Absolute Eosinophils 0.0 0.0 - 0.7 10e9/L    Absolute Basophils 0.1 0.0 - 0.2 10e9/L    Abs Immature Granulocytes 0.1 0 - 0.4 10e9/L    Absolute Nucleated RBC 0.0    Comprehensive metabolic panel    Collection Time: 03/22/17 12:54 PM   Result Value Ref Range    Sodium 141 133 - 144 mmol/L    Potassium 3.9 3.4 - 5.3 mmol/L    Chloride 106 94 - 109 mmol/L    Carbon Dioxide 27 20 - 32 mmol/L    Anion Gap 7 3 - 14 mmol/L    Glucose 74 70 - 99 mg/dL    Urea Nitrogen 15 7 - 30 mg/dL    Creatinine 0.59 0.52 - 1.04 mg/dL    GFR Estimate >90  Non  GFR Calc   >60 mL/min/1.7m2    GFR Estimate If Black >90   GFR Calc   >60 mL/min/1.7m2    Calcium 8.9 8.5 - 10.1 mg/dL    Bilirubin Total 0.4 0.2 - 1.3 mg/dL    Albumin 3.7 3.4 - 5.0 g/dL    Protein Total 6.9 6.8 - 8.8 g/dL    Alkaline Phosphatase 95 40 - 150 U/L    ALT 20 0 - 50 U/L    AST 24 0 - 45 U/L   Magnesium    Collection Time: 03/22/17 12:54 PM   Result Value Ref Range    Magnesium 2.4 (H) 1.6 - 2.3 mg/dL   Phosphorus    Collection Time: 03/22/17 12:54 PM   Result Value Ref Range    Phosphorus 2.8 2.5 - 4.5 mg/dL   Lactate Dehydrogenase    Collection Time: 03/22/17 12:54 PM   Result Value Ref Range    Lactate Dehydrogenase 243 (H) 81 - 234 U/L   Uric acid    Collection Time: 03/22/17 12:54 PM   Result Value Ref Range    Uric Acid 3.0 2.6 - 6.0 mg/dL   TSH with free T4 reflex    Collection Time: 03/22/17 12:54 PM   Result Value Ref Range    TSH 4.99 (H) 0.40 - 4.00 mU/L             Follow-ups after your visit         Your next 10 appointments already scheduled     Mar 28, 2017  2:30 PM CDT   [facility] Lab Draw with UC [facility]ON[facility] LAB DRAW   [facility] Mason[facility] Lab Draw ([facility])    [address]  2nd Floor  [facility] [address]   [phone]            Mar 28, 2017  3:00 PM CDT   Infusion 60 with UC ONCOLOGY INFUSION, UC 26 ATC   [facility]    [address]  2nd [facility] [address]   [phone]            Apr 07, 2017 11:00 AM CDT   Masonic Lab Draw with UC [facility]ON[facility] LAB DRAW   [facility] Lab Draw ([facility])    [address]  2nd [facility] [address]   [phone]            Apr 07, 2017 11:40 AM CDT   (Arrive by 11:25 AM)   CT CHEST/ABDOMEN/PELVIS W CONTRAST with UCCT1   [facility] CT ([facility])    [address] [place] [address]  1st Floor  [facility] [address] [phone]           Please bring any scans or X-rays taken at other hospitals, if similar tests were done. Also bring a list of your medicines, including vitamins, minerals and over-the-counter drugs. It is safest to leave personal items at home.  Be sure to tell your doctor:   If you have any allergies.   If there s any chance you are pregnant.   If you are breastfeeding.   If you have any special needs.  You may have contrast for this exam. To prepare:   Do not eat or drink for 2 hours before your exam. If you need to take medicine, you may take it with small sips of water. (We may ask you to take liquid medicine as well.)   The day before your exam, drink extra fluids at least six 8-ounce glasses (unless your doctor tells you to restrict your fluids).  Patients over 70 or patients with diabetes or kidney problems:   If you haven t had a blood test (creatinine test) within the last 30 days, go to your clinic or Diagnostic Imaging Department for this test.  If you  have diabetes:   If your kidney function is normal, continue taking your metformin (Avandamet, Glucophage, Glucovance, Metaglip) on the day of your exam.   If your kidney function is abnormal, wait 48 hours before restarting this medicine.  You will have oral contrast for this exam:   You will drink the contrast at home. Get this from your clinic or Diagnostic Imaging Department. Please follow the directions given.  Please wear loose clothing, such as a sweat suit or jogging clothes. Avoid snaps, zippers and other metal. We may ask you to undress and put on a hospital gown.  If you have any questions, please call the Imaging Department where you will have your exam.            Apr 07, 2017 12:15 PM CDT   (Arrive by 12:00 PM)   MR BRAIN W/O & W CONTRAST with [address] MRI ([facility])    [address] [address] [phone]           Take your medicines as usual, unless your doctor tells you not to. Bring a list of your current medicines to your exam (including vitamins, minerals and over-the-counter drugs).  You will be given intravenous contrast for this exam. To prepare:   The day before your exam, drink extra fluids at least six 8-ounce glasses (unless your doctor tells you to restrict your fluids).   Have a blood test (creatinine test) within 30 days of your exam. Go to your clinic or Diagnostic Imaging Department for this test.  The MRI machine uses a strong magnet. Please wear clothes without metal (snaps, zippers). A sweatsuit works well, or we may give you a hospital gown.  Please remove any body piercings and hair extensions before you arrive. You will also remove watches, jewelry, hairpins, wallets, dentures, partial dental plates and hearing aids. You may wear contact lenses, and you may be able to wear your rings. We have a safe place to keep your personal items, but it is safer to leave them at home.   **IMPORTANT** THE  INSTRUCTIONS BELOW ARE ONLY FOR THOSE PATIENTS WHO HAVE BEEN TOLD THEY WILL RECEIVE SEDATION OR GENERAL ANESTHESIA DURING THEIR MRI PROCEDURE:  IF YOU WILL RECEIVE SEDATION (take medicine to help you relax during your exam):   You must get the medicine from your doctor before you arrive. Bring the medicine to the exam. Do not take it at home.   Arrive one hour early. Bring someone who can take you home after the test. Your medicine will make you sleepy. After the exam, you may not drive, take a bus or take a taxi by yourself.   No eating 8 hours before your exam. You may have clear liquids up until 4 hours before your exam. (Clear liquids include water, clear tea, black coffee and fruit juice without pulp.)  IF YOU WILL RECEIVE ANESTHESIA (be asleep for your exam):   Arrive 1 1/2 hours early. Bring someone who can take you home after the test. You may not drive, take a bus or take a taxi by yourself.   No eating 8 hours before your exam. You may have clear liquids up until 4 hours before your exam. (Clear liquids include water, clear tea, black coffee and fruit juice without pulp.)  Please call the Imaging Department at your exam site with any questions.            Apr 10, 2017  1:00 PM CDT   Masonic Lab Draw with  MASONIC LAB DRAW   [facility] Lab Draw ([facility])    [address] [place] [place] [id number] [phone]            Apr 10, 2017  1:30 PM CDT   (Arrive by 1:15 PM)   Return Visit with Marlen Harper MD   [facility] ([facility])    [address] [place] [place] [id number] [phone]            Apr 10, 2017  2:00 PM CDT   Infusion 120 with  ONCOLOGY INFUSION, UC 18 ATC   [facility] ([facility])    [address] [place] [place] [id number] [phone]              Who to contact     If you have questions or need  follow up information about today's clinic visit or your schedule please contact [facility]ER CLINIC directly at [phone].  Normal or non-critical lab and imaging results will be communicated to you by Apprityhart, letter or phone within 4 business days after the clinic has received the results. If you do not hear from us within 7 days, please contact the clinic through Apprityhart or phone. If you have a critical or abnormal lab result, we will notify you by phone as soon as possible.  Submit refill requests through in2apps or call your pharmacy and they will forward the refill request to us. Please allow 3 business days for your refill to be completed.          Additional Information About Your Visit        ApprityharFriendfer Information     in2apps gives you secure access to your electronic health record. If you see a primary care provider, you can also send messages to your care team and make appointments. If you have questions, please call your primary care clinic.  If you do not have a primary care provider, please call [phone] and they will assist you.        Care EveryWhere ID     This is your Care EveryWhere ID. This could be used by other organizations to access your [place] medical records  [id number]         Blood Pressure from Last 3 Encounters:   03/22/17 133/85   03/13/17 118/81   03/07/17 116/81    Weight from Last 3 Encounters:   03/22/17 51.3 kg (113 lb 1.6 oz)   03/13/17 51.3 kg (113 lb)   03/07/17 49.6 kg (109 lb 6.4 oz)              We Performed the Following     Adrenal corticotropin     Ca27.29  breast tumor marker     CBC with platelets differential     CEA     Comprehensive metabolic panel     Cortisol     Follicle stimulating hormone     Insulin growth factor 1     Lactate Dehydrogenase     Lutropin     Magnesium     Osmolality urine     Phosphorus     Prolactin     T4 free     Treatment Conditions     TSH with free T4 reflex     Uric acid        Primary Care Provider Office Phone # Fax  #    Kelly Hart [phone]       [facility] [address] [facility] [address]        Thank you!     Thank you for choosing [facility] CANCER [facility]  for your care. Our goal is always to provide you with excellent care. Hearing back from our patients is one way we can continue to improve our services. Please take a few minutes to complete the written survey that you may receive in the mail after your visit with us. Thank you!             Your Updated Medication List - Protect others around you: Learn how to safely use, store and throw away your medicines at www.disposemymeds.org.          This list is accurate as of: 3/22/17  2:40 PM.  Always use your most recent med list.                   Brand Name Dispense Instructions for use    ALEVE PO      Take 220 mg by mouth daily       desmopressin 0.1 MG tablet    DDAVP    30 tablet    Take 1 tablet (100 mcg) by mouth At Bedtime

## 2017-03-22 NOTE — LETTER
3/22/2017      RE: Keren Erickson  [address] [facility] [address]       HEMATOLOGY/ONCOLOGY PROGRESS NOTE  Mar 22, 2017    REASON FOR VISIT: follow-up of metastatic breast cancer, triple positive    DIAGNOSIS:   The patient is a 60-year-old female who presented to the hospital initially in 09/2013 with complaints of dyspnea. Workup revealed a large pericardial effusion and pleural effusion. Physical examination revealed a large untreated left breast mass encompassing the entire left breast. Biopsies revealed these were ER, NE and HER2-positive breast cancer. She had a thoracentesis and pericardial sclerosis performed. She was originally on Arimidex and Herceptin. In 01/2014, she was switched to tamoxifen and Herceptin due to arthralgias, and she ultimately developed progressive disease and was switched to Faslodex and Herceptin. In 01/2015, she was found to have progressive disease and was switched to T-DM1.     Initially when she was seen in late 02/2015, she complained of some V5 sensory deficit. This was subjective. I was not able to elicit this on examination. This persisted and further workup was performed with a brain MRI. This revealed what was initially thought to be 3 punctate brain metastases. She originally saw Radiation Oncology and Neurosurgery as well as underwent a lumbar puncture. Cytology from the lumbar puncture showed no evidence of leptomeningeal disease. She was treated with Gamma Knife radiation to 6 lesions, performed on 04/16/2015.  She initiated therapy with TDM1 in January 2015 and continued this through 6/26/2015 with overall stable disease. She has since been on a break from treatment. The patient presented earlier this month with recurrent shortness of breath.  Imaging revealed recurrent right-sided pleural effusion.  She has since undergone thoracentesis with cytology pending.  Clinically, however, there is evidence of disease progression. PET scan performed on 11/25/2015  demonstrated progression of disease at multiple sites. She restarted TDM1 on 11/27/2015, however experienced a mild transfusion reaction with shortness of breath and chest tightness, resolved upon stopping TDM1 and 125 mg of SoluMedrol. She had progression of disease on repeat imaging 2/17/2016, as well as new brain metastases. She started TDM1 with Perjeta on 3/4/2016. She underwent gamma knife to brain mets on 3/8/16.       In late May, she was found to have progressive brain metastases.  She received whole brain radiation.  She had a follow up brain MRI August 2016 that was stable.     In September 2016, she enrolled on [place]  trial and was randomized to the standard of care arm/Physician's Choice; started on gemzar and herceptin. She was recently hospitalized 1/27-2/3/17 for presumed HCAP. Trial was suspended. She continued on gemzar and heceptin with stable disease.     INTERVAL HISTORY:  Dominga presents for follow-up today prior to next cycle of treatment. She is doing well. No concerns today. She has continued to recover from PNA and feels to be at 80% of baseline. She is now out of the wheelchair and using a walker. Has been having some intermittent nausea and GERD from chemotherapy. Not worse right after infusions. No bowel changes. She has some mild fatigue from treatment as well but again is able to build stamina. Weight is up to 113. No new pains and pain is well-controlled on 1 aleve per day. Facial and tongue paraesthesias having improved. No new neuro sxs. No fevers/chills or infectious concerns.     ROS: 10 point ROS was conducted and was otherwise negative except for as above.     PHYSICAL EXAMINATION:     /85  Pulse 82  Temp 96.8  F (36  C) (Oral)  Resp 14  Wt 51.3 kg (113 lb 1.6 oz)  SpO2 98%  BMI 20.03 kg/m2    Wt Readings from Last 4 Encounters:   03/13/17 51.3 kg (113 lb)   03/07/17 49.6 kg (109 lb 6.4 oz)   02/28/17 49.4 kg (108 lb 14.4 oz)   02/14/17 49 kg (108 lb)      Constitutional: Alert, oriented, cachectic female in no visible distress. Examined in chair   Eyes: PERRL. Anicteric sclerae.    ENT/Mouth: OM moist and pink without lesions or thrush.    CV: RRR, no murmurs appreciated.  Resp: CTAB slightly diminished R base, stable. Breathing comfortably.  Abdomen: Soft, non-tender, non-distended. Bowel sounds present. No masses appreciated. No organomegaly noted.  Extremities: No lower extremity edema appreciated.  Skin: Warm, dry. No bruising or petechiae noted. No rash  Lymph: No palpable LAD  Neuro: CN II-XII grossly intact. Baseline decreased sensation over mandibular branch of facial nerve    LABS:    3/22/2017 12:54   WBC 7.9   Hemoglobin 11.1 (L)   Hematocrit 33.9 (L)   Platelet Count 409   RBC Count 3.40 (L)      MCH 32.6   MCHC 32.7   RDW 17.3 (H)   Diff Method Automated Method   % Neutrophils 68.6   % Lymphocytes 17.2   % Monocytes 12.5   % Eosinophils 0.1   % Basophils 0.8   % Immature Granulocytes 0.8   Nucleated RBCs 0   Absolute Neutrophil 5.4   Absolute Lymphocytes 1.4   Absolute Monocytes 1.0   Absolute Eosinophils 0.0   Absolute Basophils 0.1   Abs Immature Granulocytes 0.1   Absolute Nucleated RBC 0.0     CMP pending      IMPRESSION/PLAN:  Dominga is a 61-year-old female with history of metastatic ER-positive, HER-2 positive breast cancer, with involvement of the bone, history of pleural effusions treated with pleurodesis and PleurX placement, and with treated brain metastases, previously with stable disease on TDM1, however patient opted for break from therapy from June 2015 through November 2015, and represented with worsening metastatic disease at that time. She restarted TDM1 on 11/27/2015. She had progressive disease 2/17/16 and Perjeta was added to TDM1. She had gamma knife to brain mets on 3/8.  She received WBRT.  She was started on [place]  clinical trial and randomized to physician's choice, and started on Gemzar/Herceptin.    1.  Breast cancer:  Continues on Gemzar/Herceptin with stable disease on restaging 1/25. Cycle 7 was delayed secondary to HCAP. She resumed cycle 7 on 2/7 however received Herceptin alone given she was still recovering from PNA. Gemzar resumed day 8. She continues to recovered so will proceed with Gemcitabine and herceptin today. No follow-up needed with day 8. Will follow-up with restaging in 3 weeks. Tumor markers having been normal, pending today.     2. Pulm/HCAP: Completed course of Levaquin 2/8 with resolution of symptoms.   - History of pleural effusion requiring thoracentesis with progressive disease off treatment. This has not recurred since resuming treatment. Exam is stable.    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 She started desmopressin 50 mcg with good response clinically.   -Had endocrine follow-up on 3/20 with no changes    4. Brain mets: Numbness of tongue and V2 and V3 on right are improving. Most recent brain MRI stable.    5. FEN: CMP pending today. Weight continues to improve.     6. Pain: Chronic mild aches/pains secondary to disease and with myalgias on Gemzar. No new sites of pain. Not needing oxycodone but has some available if needed. Manages with one Aleve daily.    7. GERD/nausea: Continue prilosec. Antiemetics prn.     8. Bone mets: on Xgeva. Due in 3 weeks.     9. Mood: She is overall coping well.     10. She has an advanced directive.  She has a POLST.  DNR/DNI.  Her power of  is listed in the computer.     11. Deconditioning: She had been walking independently or with cane in clinic prior to pneumonia, working on getting her strength back.  - Recently completed PT/OT. Continue home exercises. She is now using knee brace.     Esmer Oconnor PA-C    [facility]  [address] [place] [id number]  [phone]

## 2017-03-22 NOTE — MR AVS SNAPSHOT
After Visit Summary   3/22/2017    Keren Erickson    MRN: [id number]           Patient Information     Date Of Birth          1955        Visit Information        Provider Department      3/22/2017 1:00 PM Esmer Oconnor PA [facility]        Today's Diagnoses     Metastatic breast cancer (H)    -  1       Follow-ups after your visit        Your next 10 appointments already scheduled     Mar 28, 2017  2:30 PM CDT   M[facility]ic Lab Draw with [facility] LAB DRAW   [facility]    [address] [place] [id number]   [phone]            Mar 28, 2017  3:00 PM CDT   Infusion 60 with UC ONCOLOGY INFUSION, UC 26 ATC   [facility] ([facility])    [address] [place] [id number]   [phone]            Apr 07, 2017 11:00 AM CDT   Masonic Lab Draw with UC MASONIC LAB DRAW   [facility] Draw ([facility])    [address] [place] [id number]   [phone]            Apr 07, 2017 11:40 AM CDT   (Arrive by 11:25 AM)   CT CHEST/ABDOMEN/PELVIS W CONTRAST with UCCT1   [facility] ([facility])    [address] [place] [address]   [phone]           Please bring any scans or X-rays taken at other hospitals, if similar tests were done. Also bring a list of your medicines, including vitamins, minerals and over-the-counter drugs. It is safest to leave personal items at home.  Be sure to tell your doctor:   If you have any allergies.   If there s any chance you are pregnant.   If you are breastfeeding.   If you have any special needs.  You may have contrast for this exam. To prepare:   Do not eat or drink for 2 hours before your exam. If you need to take medicine, you may take it with small sips of water. (We  may ask you to take liquid medicine as well.)   The day before your exam, drink extra fluids at least six 8-ounce glasses (unless your doctor tells you to restrict your fluids).  Patients over 70 or patients with diabetes or kidney problems:   If you haven t had a blood test (creatinine test) within the last 30 days, go to your clinic or Diagnostic Imaging Department for this test.  If you have diabetes:   If your kidney function is normal, continue taking your metformin (Avandamet, Glucophage, Glucovance, Metaglip) on the day of your exam.   If your kidney function is abnormal, wait 48 hours before restarting this medicine.  You will have oral contrast for this exam:   You will drink the contrast at home. Get this from your clinic or Diagnostic Imaging Department. Please follow the directions given.  Please wear loose clothing, such as a sweat suit or jogging clothes. Avoid snaps, zippers and other metal. We may ask you to undress and put on a hospital gown.  If you have any questions, please call the Imaging Department where you will have your exam.            Apr 07, 2017 12:15 PM CDT   (Arrive by 12:00 PM)   MR BRAIN W/O & W CONTRAST with [address] [facility] ([facility])    [address] [address] [phone]           Take your medicines as usual, unless your doctor tells you not to. Bring a list of your current medicines to your exam (including vitamins, minerals and over-the-counter drugs).  You will be given intravenous contrast for this exam. To prepare:   The day before your exam, drink extra fluids at least six 8-ounce glasses (unless your doctor tells you to restrict your fluids).   Have a blood test (creatinine test) within 30 days of your exam. Go to your clinic or Diagnostic Imaging Department for this test.  The MRI machine uses a strong magnet. Please wear clothes without metal (snaps, zippers). A sweatsuit works well, or  we may give you a hospital gown.  Please remove any body piercings and hair extensions before you arrive. You will also remove watches, jewelry, hairpins, wallets, dentures, partial dental plates and hearing aids. You may wear contact lenses, and you may be able to wear your rings. We have a safe place to keep your personal items, but it is safer to leave them at home.   **IMPORTANT** THE INSTRUCTIONS BELOW ARE ONLY FOR THOSE PATIENTS WHO HAVE BEEN TOLD THEY WILL RECEIVE SEDATION OR GENERAL ANESTHESIA DURING THEIR MRI PROCEDURE:  IF YOU WILL RECEIVE SEDATION (take medicine to help you relax during your exam):   You must get the medicine from your doctor before you arrive. Bring the medicine to the exam. Do not take it at home.   Arrive one hour early. Bring someone who can take you home after the test. Your medicine will make you sleepy. After the exam, you may not drive, take a bus or take a taxi by yourself.   No eating 8 hours before your exam. You may have clear liquids up until 4 hours before your exam. (Clear liquids include water, clear tea, black coffee and fruit juice without pulp.)  IF YOU WILL RECEIVE ANESTHESIA (be asleep for your exam):   Arrive 1 1/2 hours early. Bring someone who can take you home after the test. You may not drive, take a bus or take a taxi by yourself.   No eating 8 hours before your exam. You may have clear liquids up until 4 hours before your exam. (Clear liquids include water, clear tea, black coffee and fruit juice without pulp.)  Please call the Imaging Department at your exam site with any questions.            Apr 10, 2017  1:00 PM CDT   Heatwave Interactive Lab Draw with  [employer] LAB DRAW   [facility] Interactive Lab Draw ([facility])    [address] [id number] [phone]            Apr 10, 2017  1:30 PM CDT   (Arrive by 1:15 PM)   Return Visit with Marlen Harper MD   [facility] ([facility])    [address] [facility]  [address] [facility] [address] [phone]            Apr 10, 2017  2:00 PM CDT   Infusion 120 with UC ONCOLOGY INFUSION, UC 18 ATC   [facility] ([facility])    [address] [place] [address] [phone]              Who to contact     If you have questions or need follow up information about today's clinic visit or your schedule please contact [facility] directly at [phone].  Normal or non-critical lab and imaging results will be communicated to you by [email or web address], letter or phone within 4 business days after the clinic has received the results. If you do not hear from us within 7 days, please contact the clinic through Indigiot or phone. If you have a critical or abnormal lab result, we will notify you by phone as soon as possible.  Submit refill requests through CrowdChat or call your pharmacy and they will forward the refill request to us. Please allow 3 business days for your refill to be completed.          Additional Information About Your Visit        MyChart Information     CrowdChat gives you secure access to your electronic health record. If you see a primary care provider, you can also send messages to your care team and make appointments. If you have questions, please call your primary care clinic.  If you do not have a primary care provider, please call [phone] and they will assist you.        Care EveryWhere ID     This is your Care EveryWhere ID. This could be used by other organizations to access your [place] medical records  [id number]        Your Vitals Were     Pulse Temperature Respirations Pulse Oximetry BMI (Body Mass Index)       82 96.8  F (36  C) (Oral) 14 98% 20.03 kg/m2        Blood Pressure from Last 3 Encounters:   03/22/17 133/85   03/13/17 118/81   03/07/17 116/81    Weight from Last 3 Encounters:   03/22/17 51.3 kg (113 lb 1.6 oz)    03/13/17 51.3 kg (113 lb)   03/07/17 49.6 kg (109 lb 6.4 oz)              Today, you had the following     No orders found for display       Primary Care Provider Office Phone # Fax #    Kelly Hart [phone]       [facility] [address] [facility] [address]        Thank you!     Thank you for choosing [facility]  for your care. Our goal is always to provide you with excellent care. Hearing back from our patients is one way we can continue to improve our services. Please take a few minutes to complete the written survey that you may receive in the mail after your visit with us. Thank you!             Your Updated Medication List - Protect others around you: Learn how to safely use, store and throw away your medicines at www.disposemymeds.org.          This list is accurate as of: 3/22/17  1:33 PM.  Always use your most recent med list.                   Brand Name Dispense Instructions for use    ALEVE PO      Take 220 mg by mouth daily       desmopressin 0.1 MG tablet    DDAVP    30 tablet    Take 1 tablet (100 mcg) by mouth At Bedtime

## 2017-03-22 NOTE — PROGRESS NOTES
Infusion Nursing Note:  Keren Erickson presents today for Cycle 9 Day 1 Gemzar, Herceptin.    Patient seen by provider today: Yes: BRAULIO Beaulieu   present during visit today: Not Applicable.    Note: N/A.    Intravenous Access:  Implanted Port.    Treatment Conditions:  Lab Results   Component Value Date    HGB 11.1 03/22/2017     Lab Results   Component Value Date    WBC 7.9 03/22/2017      Lab Results   Component Value Date    ANEU 5.4 03/22/2017     Lab Results   Component Value Date     03/22/2017      Lab Results   Component Value Date     03/07/2017                   Lab Results   Component Value Date    POTASSIUM 3.9 03/07/2017           Lab Results   Component Value Date    MAG 2.4 02/28/2017            Lab Results   Component Value Date    CR 0.58 03/07/2017                   Lab Results   Component Value Date    OMA 9.0 03/07/2017                Lab Results   Component Value Date    BILITOTAL 0.4 03/07/2017           Lab Results   Component Value Date    ALBUMIN 3.6 03/07/2017                    Lab Results   Component Value Date    ALT 23 03/07/2017           Lab Results   Component Value Date    AST 25 03/07/2017     Results reviewed, labs MET treatment parameters, ok to proceed with treatment.  ECHO/MUGA completed 1/25  EF 60-65%.    Post Infusion Assessment:  Patient tolerated infusion without incident.  Blood return noted pre and post infusion.  Site patent and intact, free from redness, edema or discomfort.  No evidence of extravasations. Access discontinued per protocol.    Discharge Plan:   Copy of AVS reviewed with patient and/or family.  Patient will return 3/28 for next appointment.  Patient discharged in stable condition accompanied by: self.  Departure Mode: Ambulatory with walker.    Taty Pompa RN     THE [facility] Emergency Department in [address] [facility]    Phone:  [phone]    Fax:  [phone]           Magnus Smith   MRN: [id number]    Department:  THE [facility] Emergency Department in [place]   Date of Vis Nausea.             Where to Get Your Medications      You can get these medications from any pharmacy     Bring a paper prescription for each of these medications    - Butalbital-APAP-Caffeine -40 MG Tabs  - ondansetron 4 MG Tbdp              Dischar return to your personal doctor) about any new or lasting problems. The primary care or specialist physician will see patients referred from the [facility] Emergency Department. Follow-up care is at the discretion of that Physician.     IF THERE IS ANY - If you have concerns related to behavioral health issues or thoughts of harming yourself, contact [address] [place] [address] at [phone].     - If you don’t have insurance, [facility] has partnered with [employer]

## 2017-03-22 NOTE — PATIENT INSTRUCTIONS
Contact Numbers  [facility] Nurse Triage: [phone]  After Hours Nurse Line:  [phone]    Please call the [facility] Nurse Triage line or after hours number if you experience a temperature greater than or equal to 100.5, shaking chills, have uncontrolled nausea, vomiting and/or diarrhea, dizziness, shortness of breath, chest pain, bleeding, unexplained bruising, or if you have any other new/concerning symptoms, questions or concerns.     If you are having any concerning symptoms or wish to speak to a provider before your next infusion visit, please call your care coordinator or triage to notify them so we can adequately serve you.     If you need a refill on a narcotic prescription or other medication, please call triage before your infusion appointment.         March 2017 Sunday Monday Tuesday Wednesday Thursday Friday Saturday                  1     2     3     4       5     6     7     [facility] LAB DRAW    1:00 PM   (15 min.)    [facility]     [facility] ONC INFUSION 120    1:30 PM   (120 min.)    ONCOLOGY INFUSION   [facility] 8     9     10     11       12     13     [facility] RETURN ENDOCRINE   11:45 AM   (60 min.)   Rolando Mishra MD   [facility] Endocrinology 14     15     16     17     18       19     20     21     22     [facility] LAB DRAW   12:30 PM   (15 min.)    MASONIC LAB DRAW   [facility] Lab Draw     [facility] RETURN   12:45 PM   (50 min.)   Esmer Oconnor PA   [facility]     [facility] ONC INFUSION 120    1:30 PM   (120 min.)    ONCOLOGY INFUSION   [facility] 23     24     25       26     27     28     [facility] LAB DRAW    2:30 PM   (15 min.)    [facility]     [facility] ONC INFUSION 60    3:00 PM   (60 min.)    ONCOLOGY INFUSION   [facility] 29 30 31 April 2017 Sunday Monday Tuesday Wednesday  Thursday Friday Saturday                                 1       2     3     4     5     6     7     [facility] LAB DRAW   11:00 AM   (15 min.)    MASONIC LAB DRAW   [facility] Lab Draw     CT CHEST/ABDOMEN/PELVIS W   11:25 AM   (20 min.)   CT1   [facility]     MR BRAIN WWO   12:00 PM   (45 min.)   MR1   [facility] 8       9     10     [facility] LAB DRAW    1:00 PM   (15 min.)    [facility] LAB [facility]RAW   [facility]     [facility] RETURN    1:15 PM   (30 min.)   Marlen Harper MD   [facility]     [facility] ONC INFUSION 120    2:00 PM   (120 min.)    ONCOLOGY INFUSION   [facility] 11     12     13     14     15       16     17     18     [facility] LAB DRAW    2:00 PM   (15 min.)    MASONIC LAB DRAW   [facility] Lab Draw     [facility] ONC INFUSION 60    2:30 PM   (60 min.)    ONCOLOGY INFUSION   [facility] 19     20     21     22       23     24     25     26     27     28     29       30                                                Lab Results:  Recent Results (from the past 12 hour(s))   CBC with platelets differential    Collection Time: 03/22/17 12:54 PM   Result Value Ref Range    WBC 7.9 4.0 - 11.0 10e9/L    RBC Count 3.40 (L) 3.8 - 5.2 10e12/L    Hemoglobin 11.1 (L) 11.7 - 15.7 g/dL    Hematocrit 33.9 (L) 35.0 - 47.0 %     78 - 100 fl    MCH 32.6 26.5 - 33.0 pg    MCHC 32.7 31.5 - 36.5 g/dL    RDW 17.3 (H) 10.0 - 15.0 %    Platelet Count 409 150 - 450 10e9/L    Diff Method Automated Method     % Neutrophils 68.6 %    % Lymphocytes 17.2 %    % Monocytes 12.5 %    % Eosinophils 0.1 %    % Basophils 0.8 %    % Immature Granulocytes 0.8 %    Nucleated RBCs 0 0 /100    Absolute Neutrophil 5.4 1.6 - 8.3 10e9/L    Absolute Lymphocytes 1.4 0.8 - 5.3 10e9/L    Absolute Monocytes 1.0 0.0 - 1.3 10e9/L    Absolute Eosinophils 0.0 0.0 - 0.7 10e9/L    Absolute Basophils 0.1 0.0 - 0.2 10e9/L    Abs  Immature Granulocytes 0.1 0 - 0.4 10e9/L    Absolute Nucleated RBC 0.0    Comprehensive metabolic panel    Collection Time: 03/22/17 12:54 PM   Result Value Ref Range    Sodium 141 133 - 144 mmol/L    Potassium 3.9 3.4 - 5.3 mmol/L    Chloride 106 94 - 109 mmol/L    Carbon Dioxide 27 20 - 32 mmol/L    Anion Gap 7 3 - 14 mmol/L    Glucose 74 70 - 99 mg/dL    Urea Nitrogen 15 7 - 30 mg/dL    Creatinine 0.59 0.52 - 1.04 mg/dL    GFR Estimate >90  Non  GFR Calc   >60 mL/min/1.7m2    GFR Estimate If Black >90   GFR Calc   >60 mL/min/1.7m2    Calcium 8.9 8.5 - 10.1 mg/dL    Bilirubin Total 0.4 0.2 - 1.3 mg/dL    Albumin 3.7 3.4 - 5.0 g/dL    Protein Total 6.9 6.8 - 8.8 g/dL    Alkaline Phosphatase 95 40 - 150 U/L    ALT 20 0 - 50 U/L    AST 24 0 - 45 U/L   Magnesium    Collection Time: 03/22/17 12:54 PM   Result Value Ref Range    Magnesium 2.4 (H) 1.6 - 2.3 mg/dL   Phosphorus    Collection Time: 03/22/17 12:54 PM   Result Value Ref Range    Phosphorus 2.8 2.5 - 4.5 mg/dL   Lactate Dehydrogenase    Collection Time: 03/22/17 12:54 PM   Result Value Ref Range    Lactate Dehydrogenase 243 (H) 81 - 234 U/L   Uric acid    Collection Time: 03/22/17 12:54 PM   Result Value Ref Range    Uric Acid 3.0 2.6 - 6.0 mg/dL   TSH with free T4 reflex    Collection Time: 03/22/17 12:54 PM   Result Value Ref Range    TSH 4.99 (H) 0.40 - 4.00 mU/L

## 2017-03-22 NOTE — PROGRESS NOTES
HEMATOLOGY/ONCOLOGY PROGRESS NOTE  Mar 22, 2017    REASON FOR VISIT: follow-up of metastatic breast cancer, triple positive    DIAGNOSIS:   The patient is a 60-year-old female who presented to the hospital initially in 09/2013 with complaints of dyspnea. Workup revealed a large pericardial effusion and pleural effusion. Physical examination revealed a large untreated left breast mass encompassing the entire left breast. Biopsies revealed these were ER, MI and HER2-positive breast cancer. She had a thoracentesis and pericardial sclerosis performed. She was originally on Arimidex and Herceptin. In 01/2014, she was switched to tamoxifen and Herceptin due to arthralgias, and she ultimately developed progressive disease and was switched to Faslodex and Herceptin. In 01/2015, she was found to have progressive disease and was switched to T-DM1.     Initially when she was seen in late 02/2015, she complained of some V5 sensory deficit. This was subjective. I was not able to elicit this on examination. This persisted and further workup was performed with a brain MRI. This revealed what was initially thought to be 3 punctate brain metastases. She originally saw Radiation Oncology and Neurosurgery as well as underwent a lumbar puncture. Cytology from the lumbar puncture showed no evidence of leptomeningeal disease. She was treated with Gamma Knife radiation to 6 lesions, performed on 04/16/2015.  She initiated therapy with TDM1 in January 2015 and continued this through 6/26/2015 with overall stable disease. She has since been on a break from treatment. The patient presented earlier this month with recurrent shortness of breath.  Imaging revealed recurrent right-sided pleural effusion.  She has since undergone thoracentesis with cytology pending.  Clinically, however, there is evidence of disease progression. PET scan performed on 11/25/2015 demonstrated progression of disease at multiple sites. She restarted TDM1 on  11/27/2015, however experienced a mild transfusion reaction with shortness of breath and chest tightness, resolved upon stopping TDM1 and 125 mg of SoluMedrol. She had progression of disease on repeat imaging 2/17/2016, as well as new brain metastases. She started TDM1 with Perjeta on 3/4/2016. She underwent gamma knife to brain mets on 3/8/16.       In late May, she was found to have progressive brain metastases.  She received whole brain radiation.  She had a follow up brain MRI August 2016 that was stable.     In September 2016, she enrolled on [place]  trial and was randomized to the standard of care arm/Physician's Choice; started on gemzar and herceptin. She was recently hospitalized 1/27-2/3/17 for presumed HCAP. Trial was suspended. She continued on gemzar and heceptin with stable disease.     INTERVAL HISTORY:  Dominga presents for follow-up today prior to next cycle of treatment. She is doing well. No concerns today. She has continued to recover from PNA and feels to be at 80% of baseline. She is now out of the wheelchair and using a walker. Has been having some intermittent nausea and GERD from chemotherapy. Not worse right after infusions. No bowel changes. She has some mild fatigue from treatment as well but again is able to build stamina. Weight is up to 113. No new pains and pain is well-controlled on 1 aleve per day. Facial and tongue paraesthesias having improved. No new neuro sxs. No fevers/chills or infectious concerns.     ROS: 10 point ROS was conducted and was otherwise negative except for as above.     PHYSICAL EXAMINATION:     /85  Pulse 82  Temp 96.8  F (36  C) (Oral)  Resp 14  Wt 51.3 kg (113 lb 1.6 oz)  SpO2 98%  BMI 20.03 kg/m2    Wt Readings from Last 4 Encounters:   03/13/17 51.3 kg (113 lb)   03/07/17 49.6 kg (109 lb 6.4 oz)   02/28/17 49.4 kg (108 lb 14.4 oz)   02/14/17 49 kg (108 lb)     Constitutional: Alert, oriented, cachectic female in no visible distress.  Examined in chair   Eyes: PERRL. Anicteric sclerae.    ENT/Mouth: OM moist and pink without lesions or thrush.    CV: RRR, no murmurs appreciated.  Resp: CTAB slightly diminished R base, stable. Breathing comfortably.  Abdomen: Soft, non-tender, non-distended. Bowel sounds present. No masses appreciated. No organomegaly noted.  Extremities: No lower extremity edema appreciated.  Skin: Warm, dry. No bruising or petechiae noted. No rash  Lymph: No palpable LAD  Neuro: CN II-XII grossly intact. Baseline decreased sensation over mandibular branch of facial nerve    LABS:    3/22/2017 12:54   WBC 7.9   Hemoglobin 11.1 (L)   Hematocrit 33.9 (L)   Platelet Count 409   RBC Count 3.40 (L)      MCH 32.6   MCHC 32.7   RDW 17.3 (H)   Diff Method Automated Method   % Neutrophils 68.6   % Lymphocytes 17.2   % Monocytes 12.5   % Eosinophils 0.1   % Basophils 0.8   % Immature Granulocytes 0.8   Nucleated RBCs 0   Absolute Neutrophil 5.4   Absolute Lymphocytes 1.4   Absolute Monocytes 1.0   Absolute Eosinophils 0.0   Absolute Basophils 0.1   Abs Immature Granulocytes 0.1   Absolute Nucleated RBC 0.0     CMP pending      IMPRESSION/PLAN:  Dominga is a 61-year-old female with history of metastatic ER-positive, HER-2 positive breast cancer, with involvement of the bone, history of pleural effusions treated with pleurodesis and PleurX placement, and with treated brain metastases, previously with stable disease on TDM1, however patient opted for break from therapy from June 2015 through November 2015, and represented with worsening metastatic disease at that time. She restarted TDM1 on 11/27/2015. She had progressive disease 2/17/16 and Perjeta was added to TDM1. She had gamma knife to brain mets on 3/8.  She received WBRT.  She was started on [place]  clinical trial and randomized to physician's choice, and started on Gemzar/Herceptin.    1. Breast cancer:  Continues on Gemzar/Herceptin with stable disease on restaging  1/25. Cycle 7 was delayed secondary to HCAP. She resumed cycle 7 on 2/7 however received Herceptin alone given she was still recovering from PNA. Gemzar resumed day 8. She continues to recovered so will proceed with Gemcitabine and herceptin today. No follow-up needed with day 8. Will follow-up with restaging in 3 weeks. Tumor markers having been normal, pending today.     2. Pulm/HCAP: Completed course of Levaquin 2/8 with resolution of symptoms.   - History of pleural effusion requiring thoracentesis with progressive disease off treatment. This has not recurred since resuming treatment. Exam is stable.    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 She started desmopressin 50 mcg with good response clinically.   -Had endocrine follow-up on 3/20 with no changes    4. Brain mets: Numbness of tongue and V2 and V3 on right are improving. Most recent brain MRI stable.    5. FEN: CMP pending today. Weight continues to improve.     6. Pain: Chronic mild aches/pains secondary to disease and with myalgias on Gemzar. No new sites of pain. Not needing oxycodone but has some available if needed. Manages with one Aleve daily.    7. GERD/nausea: Continue prilosec. Antiemetics prn.     8. Bone mets: on Xgeva. Due in 3 weeks.     9. Mood: She is overall coping well.     10. She has an advanced directive.  She has a POLST.  DNR/DNI.  Her power of  is listed in the computer.     11. Deconditioning: She had been walking independently or with cane in clinic prior to pneumonia, working on getting her strength back.  - Recently completed PT/OT. Continue home exercises. She is now using knee brace.     Esmer Oconnor PA-C    [facility]  [address] [place] [id number] [phone]

## 2017-03-23 LAB — ACTH PLAS-MCNC: 11 PG/ML

## 2017-03-24 LAB — IGF-I BLD-MCNC: 53 NG/ML (ref 41–243)

## 2017-03-28 ENCOUNTER — APPOINTMENT (OUTPATIENT)
Dept: LAB | Facility: CLINIC | Age: 62
End: 2017-03-28
Attending: INTERNAL MEDICINE
Payer: COMMERCIAL

## 2017-03-28 ENCOUNTER — INFUSION THERAPY VISIT (OUTPATIENT)
Dept: ONCOLOGY | Facility: CLINIC | Age: 62
End: 2017-03-28
Attending: INTERNAL MEDICINE
Payer: COMMERCIAL

## 2017-03-28 ENCOUNTER — TELEPHONE (OUTPATIENT)
Dept: ENDOCRINOLOGY | Facility: CLINIC | Age: 62
End: 2017-03-28

## 2017-03-28 VITALS
HEART RATE: 85 BPM | RESPIRATION RATE: 14 BRPM | SYSTOLIC BLOOD PRESSURE: 137 MMHG | OXYGEN SATURATION: 96 % | DIASTOLIC BLOOD PRESSURE: 90 MMHG | BODY MASS INDEX: 20.11 KG/M2 | TEMPERATURE: 97 F | WEIGHT: 113.5 LBS

## 2017-03-28 DIAGNOSIS — C79.31 BRAIN METASTASIS: ICD-10-CM

## 2017-03-28 DIAGNOSIS — C50.919 METASTATIC BREAST CANCER: Primary | ICD-10-CM

## 2017-03-28 DIAGNOSIS — C50.919 CARCINOMA OF BREAST METASTATIC TO MULTIPLE SITES, UNSPECIFIED LATERALITY (H): ICD-10-CM

## 2017-03-28 DIAGNOSIS — E23.2 DIABETES INSIPIDUS (H): Primary | ICD-10-CM

## 2017-03-28 LAB
BASOPHILS # BLD AUTO: 0.1 10E9/L (ref 0–0.2)
BASOPHILS NFR BLD AUTO: 1.5 %
DIFFERENTIAL METHOD BLD: ABNORMAL
EOSINOPHIL # BLD AUTO: 0 10E9/L (ref 0–0.7)
EOSINOPHIL NFR BLD AUTO: 0 %
ERYTHROCYTE [DISTWIDTH] IN BLOOD BY AUTOMATED COUNT: 17 % (ref 10–15)
HCT VFR BLD AUTO: 32.2 % (ref 35–47)
HGB BLD-MCNC: 10.7 G/DL (ref 11.7–15.7)
IMM GRANULOCYTES # BLD: 0.1 10E9/L (ref 0–0.4)
IMM GRANULOCYTES NFR BLD: 3 %
LYMPHOCYTES # BLD AUTO: 1.1 10E9/L (ref 0.8–5.3)
LYMPHOCYTES NFR BLD AUTO: 24.2 %
MCH RBC QN AUTO: 33.1 PG (ref 26.5–33)
MCHC RBC AUTO-ENTMCNC: 33.2 G/DL (ref 31.5–36.5)
MCV RBC AUTO: 100 FL (ref 78–100)
MONOCYTES # BLD AUTO: 0.2 10E9/L (ref 0–1.3)
MONOCYTES NFR BLD AUTO: 4.1 %
NEUTROPHILS # BLD AUTO: 3.1 10E9/L (ref 1.6–8.3)
NEUTROPHILS NFR BLD AUTO: 67.2 %
NRBC # BLD AUTO: 0 10*3/UL
NRBC BLD AUTO-RTO: 0 /100
PLATELET # BLD AUTO: 362 10E9/L (ref 150–450)
RBC # BLD AUTO: 3.23 10E12/L (ref 3.8–5.2)
WBC # BLD AUTO: 4.7 10E9/L (ref 4–11)

## 2017-03-28 PROCEDURE — 96375 TX/PRO/DX INJ NEW DRUG ADDON: CPT

## 2017-03-28 PROCEDURE — 96413 CHEMO IV INFUSION 1 HR: CPT

## 2017-03-28 PROCEDURE — 25000128 H RX IP 250 OP 636: Mod: ZF | Performed by: INTERNAL MEDICINE

## 2017-03-28 PROCEDURE — 99212 OFFICE O/P EST SF 10 MIN: CPT | Mod: 25

## 2017-03-28 PROCEDURE — 25000125 ZZHC RX 250: Mod: ZF | Performed by: PHYSICIAN ASSISTANT

## 2017-03-28 PROCEDURE — 85025 COMPLETE CBC W/AUTO DIFF WBC: CPT | Performed by: PHYSICIAN ASSISTANT

## 2017-03-28 PROCEDURE — 25000128 H RX IP 250 OP 636: Mod: ZF | Performed by: PHYSICIAN ASSISTANT

## 2017-03-28 RX ORDER — HEPARIN SODIUM (PORCINE) LOCK FLUSH IV SOLN 100 UNIT/ML 100 UNIT/ML
5 SOLUTION INTRAVENOUS EVERY 8 HOURS
Status: DISCONTINUED | OUTPATIENT
Start: 2017-03-28 | End: 2017-03-28 | Stop reason: HOSPADM

## 2017-03-28 RX ORDER — HEPARIN SODIUM (PORCINE) LOCK FLUSH IV SOLN 100 UNIT/ML 100 UNIT/ML
500 SOLUTION INTRAVENOUS ONCE
Status: COMPLETED | OUTPATIENT
Start: 2017-03-28 | End: 2017-03-28

## 2017-03-28 RX ADMIN — SODIUM CHLORIDE, PRESERVATIVE FREE 5 ML: 5 INJECTION INTRAVENOUS at 17:04

## 2017-03-28 RX ADMIN — GEMCITABINE 1460 MG: 38 INJECTION, SOLUTION INTRAVENOUS at 16:31

## 2017-03-28 RX ADMIN — SODIUM CHLORIDE, PRESERVATIVE FREE 500 UNITS: 5 INJECTION INTRAVENOUS at 15:23

## 2017-03-28 RX ADMIN — DEXAMETHASONE SODIUM PHOSPHATE 12 MG: 10 INJECTION, SOLUTION INTRAMUSCULAR; INTRAVENOUS at 16:14

## 2017-03-28 RX ADMIN — SODIUM CHLORIDE 250 ML: 9 INJECTION, SOLUTION INTRAVENOUS at 16:14

## 2017-03-28 NOTE — PROGRESS NOTES
Infusion Nursing Note:  Keren Erickson presents today for Ccyle 9, day 8 Gemzar.    Patient seen by provider today: No    Note: Patient presents to clinic today feeling generally well with no questions; continues to have fatigue and weakness.  Continuing to do home PT/OT exercises; encouraged.  Patient requested refill DDAVP, per PA, endocrine should be managing; called refill request to endo, [phone] and asked patient to f/u on request tomorrow.  Patient verbalized understanding.       Intravenous Access:  Implanted Port.    Treatment Conditions:  Lab Results   Component Value Date    HGB 10.7 03/28/2017     Lab Results   Component Value Date    WBC 4.7 03/28/2017      Lab Results   Component Value Date    ANEU 3.1 03/28/2017     Lab Results   Component Value Date     03/28/2017      Results reviewed, labs MET treatment parameters, ok to proceed with treatment.    Post Infusion Assessment:  Patient tolerated infusion without incident.  Blood return noted pre and post infusion.  Site patent and intact, free from redness, edema or discomfort.  No evidence of extravasations.  Access discontinued per protocol.    Discharge Plan:   Discharge instructions reviewed with: Patient.  Patient and/or family verbalized understanding of discharge instructions and all questions answered.  Copy of AVS reviewed with patient and/or family.  Patient will return 4/10/2017 for next appointment.  Departure Mode: Wheelchair/walker.  Face to Face time: 5 minutes.    Britney Ball RN

## 2017-03-28 NOTE — TELEPHONE ENCOUNTER
Pt requesting DDAVP Rx. [facility] PA has been writing it, now asking endo to do it. Please advise, send to [facility]. Sent to refill pool.

## 2017-03-28 NOTE — PATIENT INSTRUCTIONS
Contact Numbers    [facility] Main Line: [phone]  [facility] Triage:  [phone]    Call triage with chills and/or temperature greater than or equal to 100.5, uncontrolled nausea/vomiting, diarrhea, constipation, dizziness, shortness of breath, chest pain, bleeding, unexplained bruising, or any new/concerning symptoms, questions/concerns.     If you are having any concerning symptoms or wish to speak to a provider before your next infusion visit, please call your care coordinator or triage to notify them so we can adequately serve you.       After Hours: [phone]    If after hours, weekends, or holidays, call main hospital  and ask for Oncology doctor on call.         March 2017 Sunday Monday Tuesday Wednesday Thursday Friday Saturday                  1     2     3     4       5     6     7     [facility] D[facility]    1:00 PM   (15 min.)    [facility]   [facility]     [facility] ONC INFUSION 120    1:30 PM   (120 min.)    ONCOLOGY INFUSION   [facility] 8     9     10     11       12     13     [facility] RETURN ENDOCRINE   11:45 AM   (60 min.)   Rolando Mishra MD   [facility] Endocrinology 14     15     16     17     18       19     20     21     22     [facility]   12:30 PM   (15 min.)   [facility]   [facility] Lab Draw     [facility] RETURN   12:45 PM   (50 min.)   Esmer Oconnor PA   [facility]     [facility] ONC INFUSION 120    1:30 PM   (120 min.)   [facility] ONCOLOGY INFUSION   [facility] 23     24     25       26     27     28     [facility] DRAW    2:30 PM   (15 min.)   [facility]   [facility] Lab Draw     [facility] ONC INFUSION 60    3:00 PM   (60 min.)   [facility] ONCOLOGY INFUSION   [facility] 29     30     31 April 2017 Sunday Monday Tuesday Wednesday Thursday Friday Saturday                                 1       2     3     4     5     6     7     [facility] DRAW   11:00 AM   (15 min.)    [facility] DRAW   [facility] Lab Draw     CT CHEST/ABDOMEN/PELVIS W   11:25 AM   (20 min.)   CT1   [facility] CT     MR BRAIN WWO   12:00 PM   (45 min.)   MR1   [facility] MRI 8       9     10     [facility] D[facility]    1:00 PM   (15 min.)    [facility]     [facility] RETURN    1:15 PM   (30 min.)   Marlen Harper MD   [facility]     [facility] ONC INFUSION 120    2:00 PM   (120 min.)    ONCOLOGY INFUSION   [facility] 11     12     13     14     15       16     17     18     [facility] D[facility]    2:00 PM   (15 min.)    [facility]     P ONC INFUSION 60    2:30 PM   (60 min.)    ONCOLOGY INFUSION   [facility] 19     20     21     22       23     24     25     26     27     28     29       30                                                Lab Results:  Recent Results (from the past 12 hour(s))   CBC with platelets differential    Collection Time: 03/28/17  3:31 PM   Result Value Ref Range    WBC 4.7 4.0 - 11.0 10e9/L    RBC Count 3.23 (L) 3.8 - 5.2 10e12/L    Hemoglobin 10.7 (L) 11.7 - 15.7 g/dL    Hematocrit 32.2 (L) 35.0 - 47.0 %     78 - 100 fl    MCH 33.1 (H) 26.5 - 33.0 pg    MCHC 33.2 31.5 - 36.5 g/dL    RDW 17.0 (H) 10.0 - 15.0 %    Platelet Count 362 150 - 450 10e9/L    Diff Method Automated Method     % Neutrophils 67.2 %    % Lymphocytes 24.2 %    % Monocytes 4.1 %    % Eosinophils 0.0 %    % Basophils 1.5 %    % Immature Granulocytes 3.0 %    Nucleated RBCs 0 0 /100    Absolute Neutrophil 3.1 1.6 - 8.3 10e9/L    Absolute Lymphocytes 1.1 0.8 - 5.3 10e9/L    Absolute Monocytes 0.2 0.0 - 1.3 10e9/L    Absolute Eosinophils 0.0 0.0 - 0.7 10e9/L    Absolute Basophils 0.1 0.0 - 0.2 10e9/L    Abs Immature Granulocytes 0.1 0 - 0.4 10e9/L    Absolute Nucleated RBC 0.0

## 2017-03-29 DIAGNOSIS — E23.2 DIABETES INSIPIDUS, NEUROHYPOPHYSEAL (H): ICD-10-CM

## 2017-03-29 RX ORDER — DESMOPRESSIN ACETATE 0.1 MG/1
0.1 TABLET ORAL AT BEDTIME
Qty: 90 TABLET | Refills: 3 | Status: SHIPPED | OUTPATIENT
Start: 2017-03-29 | End: 2017-08-25

## 2017-03-29 NOTE — TELEPHONE ENCOUNTER
DDAVP  Last Written Prescription Date:  2/28/17  Last Fill Quantity: 30,   # refills: 0  Last Office Visit : 3/13/17  Future Office visit:  9/11/17    Routing refill request to provider for review/approval because:  Changing to Dr. Mishra as provider

## 2017-04-04 ENCOUNTER — TELEPHONE (OUTPATIENT)
Dept: ONCOLOGY | Facility: CLINIC | Age: 62
End: 2017-04-04

## 2017-04-04 NOTE — TELEPHONE ENCOUNTER
Client Certification Form for free delivered meals from [employer] completed. Faxed to [phone]. Copy for scanning and mailed to patient.

## 2017-04-07 PROCEDURE — 96523 IRRIG DRUG DELIVERY DEVICE: CPT

## 2017-04-07 PROCEDURE — 25000128 H RX IP 250 OP 636: Performed by: INTERNAL MEDICINE

## 2017-04-07 RX ORDER — HEPARIN SODIUM (PORCINE) LOCK FLUSH IV SOLN 100 UNIT/ML 100 UNIT/ML
5 SOLUTION INTRAVENOUS EVERY 8 HOURS
Status: DISCONTINUED | OUTPATIENT
Start: 2017-04-07 | End: 2017-04-15 | Stop reason: HOSPADM

## 2017-04-07 RX ORDER — HEPARIN SODIUM (PORCINE) LOCK FLUSH IV SOLN 100 UNIT/ML 100 UNIT/ML
500 SOLUTION INTRAVENOUS ONCE
Status: COMPLETED | OUTPATIENT
Start: 2017-04-07 | End: 2017-04-07

## 2017-04-07 RX ADMIN — SODIUM CHLORIDE, PRESERVATIVE FREE 5 ML: 5 INJECTION INTRAVENOUS at 11:14

## 2017-04-07 RX ADMIN — SODIUM CHLORIDE, PRESERVATIVE FREE 500 UNITS: 5 INJECTION INTRAVENOUS at 12:51

## 2017-04-07 NOTE — PROGRESS NOTES
Dear Balbir,     The pituitary lab testing is unchanged compared to the last labs in the hospital. The TSH level is slightly high, but it is subtle and no need for any medications for this. The LH and FSH that controls the female hormones are normal - I did expect it to be high (after menopause these hormones are elevated). The cortisol levels, growth hormone levels are normal. Therefore although there is no need to have new medications, we need to be vigilant about the pituitary functions. If you develop new symptoms - low energy, constipation, fatigue, dizziness, etc, I recommend you contact us (we will need to do labs sooner). Please perform a repeat lab in about 1 week prior to your next visit with us in early September. .     Rolando Mishra MD  [id number]  Endocrinology Service

## 2017-04-07 NOTE — NURSING NOTE
Chief Complaint   Patient presents with     Port Flush     Patient here today to accessed via her Port by RN and receieve a port flush.

## 2017-04-10 ENCOUNTER — ONCOLOGY VISIT (OUTPATIENT)
Dept: ONCOLOGY | Facility: CLINIC | Age: 62
End: 2017-04-10
Attending: INTERNAL MEDICINE
Payer: COMMERCIAL

## 2017-04-10 VITALS
HEART RATE: 75 BPM | TEMPERATURE: 98 F | BODY MASS INDEX: 20.58 KG/M2 | SYSTOLIC BLOOD PRESSURE: 120 MMHG | OXYGEN SATURATION: 96 % | RESPIRATION RATE: 16 BRPM | WEIGHT: 116.2 LBS | DIASTOLIC BLOOD PRESSURE: 81 MMHG

## 2017-04-10 DIAGNOSIS — C79.31 BRAIN METASTASIS: ICD-10-CM

## 2017-04-10 DIAGNOSIS — C79.51 BONE METASTASIS: ICD-10-CM

## 2017-04-10 DIAGNOSIS — C50.919 CARCINOMA OF BREAST METASTATIC TO MULTIPLE SITES, UNSPECIFIED LATERALITY (H): ICD-10-CM

## 2017-04-10 DIAGNOSIS — C50.919 METASTATIC BREAST CANCER: Primary | ICD-10-CM

## 2017-04-10 DIAGNOSIS — C50.919 METASTATIC BREAST CANCER: ICD-10-CM

## 2017-04-10 LAB
ALBUMIN SERPL-MCNC: 3.6 G/DL (ref 3.4–5)
ALP SERPL-CCNC: 110 U/L (ref 40–150)
ALT SERPL W P-5'-P-CCNC: 36 U/L (ref 0–50)
ANION GAP SERPL CALCULATED.3IONS-SCNC: 7 MMOL/L (ref 3–14)
AST SERPL W P-5'-P-CCNC: 35 U/L (ref 0–45)
BASOPHILS # BLD AUTO: 0.1 10E9/L (ref 0–0.2)
BASOPHILS NFR BLD AUTO: 0.9 %
BILIRUB SERPL-MCNC: 0.3 MG/DL (ref 0.2–1.3)
BUN SERPL-MCNC: 13 MG/DL (ref 7–30)
CALCIUM SERPL-MCNC: 8.9 MG/DL (ref 8.5–10.1)
CANCER AG27-29 SERPL-ACNC: 24 U/ML (ref 0–39)
CEA SERPL-MCNC: 1.1 UG/L (ref 0–2.5)
CHLORIDE SERPL-SCNC: 104 MMOL/L (ref 94–109)
CO2 SERPL-SCNC: 27 MMOL/L (ref 20–32)
CREAT SERPL-MCNC: 0.58 MG/DL (ref 0.52–1.04)
DIFFERENTIAL METHOD BLD: ABNORMAL
EOSINOPHIL # BLD AUTO: 0 10E9/L (ref 0–0.7)
EOSINOPHIL NFR BLD AUTO: 0 %
ERYTHROCYTE [DISTWIDTH] IN BLOOD BY AUTOMATED COUNT: 17.3 % (ref 10–15)
GFR SERPL CREATININE-BSD FRML MDRD: NORMAL ML/MIN/1.7M2
GLUCOSE SERPL-MCNC: 81 MG/DL (ref 70–99)
HCT VFR BLD AUTO: 33.1 % (ref 35–47)
HGB BLD-MCNC: 10.9 G/DL (ref 11.7–15.7)
IMM GRANULOCYTES # BLD: 0.1 10E9/L (ref 0–0.4)
IMM GRANULOCYTES NFR BLD: 1.6 %
LDH SERPL L TO P-CCNC: 242 U/L (ref 81–234)
LYMPHOCYTES # BLD AUTO: 1.1 10E9/L (ref 0.8–5.3)
LYMPHOCYTES NFR BLD AUTO: 14.8 %
MAGNESIUM SERPL-MCNC: 2.4 MG/DL (ref 1.6–2.3)
MCH RBC QN AUTO: 32.6 PG (ref 26.5–33)
MCHC RBC AUTO-ENTMCNC: 32.9 G/DL (ref 31.5–36.5)
MCV RBC AUTO: 99 FL (ref 78–100)
MONOCYTES # BLD AUTO: 1 10E9/L (ref 0–1.3)
MONOCYTES NFR BLD AUTO: 12.8 %
NEUTROPHILS # BLD AUTO: 5.4 10E9/L (ref 1.6–8.3)
NEUTROPHILS NFR BLD AUTO: 69.9 %
NRBC # BLD AUTO: 0 10*3/UL
NRBC BLD AUTO-RTO: 0 /100
PHOSPHATE SERPL-MCNC: 2.9 MG/DL (ref 2.5–4.5)
PLATELET # BLD AUTO: 287 10E9/L (ref 150–450)
POTASSIUM SERPL-SCNC: 4 MMOL/L (ref 3.4–5.3)
PROT SERPL-MCNC: 6.8 G/DL (ref 6.8–8.8)
RBC # BLD AUTO: 3.34 10E12/L (ref 3.8–5.2)
SODIUM SERPL-SCNC: 138 MMOL/L (ref 133–144)
URATE SERPL-MCNC: 2.9 MG/DL (ref 2.6–6)
WBC # BLD AUTO: 7.7 10E9/L (ref 4–11)

## 2017-04-10 PROCEDURE — 99215 OFFICE O/P EST HI 40 MIN: CPT | Mod: GC | Performed by: INTERNAL MEDICINE

## 2017-04-10 PROCEDURE — 25000128 H RX IP 250 OP 636: Mod: ZF | Performed by: PHYSICIAN ASSISTANT

## 2017-04-10 PROCEDURE — 96372 THER/PROPH/DIAG INJ SC/IM: CPT | Mod: 59

## 2017-04-10 PROCEDURE — 83615 LACTATE (LD) (LDH) ENZYME: CPT | Performed by: INTERNAL MEDICINE

## 2017-04-10 PROCEDURE — 84100 ASSAY OF PHOSPHORUS: CPT | Performed by: INTERNAL MEDICINE

## 2017-04-10 PROCEDURE — 85025 COMPLETE CBC W/AUTO DIFF WBC: CPT | Performed by: INTERNAL MEDICINE

## 2017-04-10 PROCEDURE — 99212 OFFICE O/P EST SF 10 MIN: CPT | Mod: ZF

## 2017-04-10 PROCEDURE — 84550 ASSAY OF BLOOD/URIC ACID: CPT | Performed by: INTERNAL MEDICINE

## 2017-04-10 PROCEDURE — 86300 IMMUNOASSAY TUMOR CA 15-3: CPT | Performed by: INTERNAL MEDICINE

## 2017-04-10 PROCEDURE — 82378 CARCINOEMBRYONIC ANTIGEN: CPT | Performed by: INTERNAL MEDICINE

## 2017-04-10 PROCEDURE — 25000128 H RX IP 250 OP 636: Mod: ZF | Performed by: INTERNAL MEDICINE

## 2017-04-10 PROCEDURE — 96413 CHEMO IV INFUSION 1 HR: CPT

## 2017-04-10 PROCEDURE — 80053 COMPREHEN METABOLIC PANEL: CPT | Performed by: INTERNAL MEDICINE

## 2017-04-10 PROCEDURE — 83735 ASSAY OF MAGNESIUM: CPT | Performed by: INTERNAL MEDICINE

## 2017-04-10 RX ORDER — DIPHENHYDRAMINE HCL 25 MG
50 CAPSULE ORAL
Status: CANCELLED | OUTPATIENT
Start: 2017-04-10

## 2017-04-10 RX ORDER — METHYLPREDNISOLONE SODIUM SUCCINATE 125 MG/2ML
125 INJECTION, POWDER, LYOPHILIZED, FOR SOLUTION INTRAMUSCULAR; INTRAVENOUS
Status: CANCELLED
Start: 2017-04-10

## 2017-04-10 RX ORDER — HEPARIN SODIUM (PORCINE) LOCK FLUSH IV SOLN 100 UNIT/ML 100 UNIT/ML
5 SOLUTION INTRAVENOUS EVERY 8 HOURS
Status: DISCONTINUED | OUTPATIENT
Start: 2017-04-10 | End: 2017-04-10 | Stop reason: HOSPADM

## 2017-04-10 RX ORDER — HEPARIN SODIUM (PORCINE) LOCK FLUSH IV SOLN 100 UNIT/ML 100 UNIT/ML
5 SOLUTION INTRAVENOUS ONCE
Status: COMPLETED | OUTPATIENT
Start: 2017-04-10 | End: 2017-04-10

## 2017-04-10 RX ORDER — HEPARIN SODIUM (PORCINE) LOCK FLUSH IV SOLN 100 UNIT/ML 100 UNIT/ML
5 SOLUTION INTRAVENOUS EVERY 8 HOURS
Status: CANCELLED
Start: 2017-04-10

## 2017-04-10 RX ORDER — ACETAMINOPHEN 325 MG/1
650 TABLET ORAL
Status: CANCELLED | OUTPATIENT
Start: 2017-04-10

## 2017-04-10 RX ORDER — MEPERIDINE HYDROCHLORIDE 25 MG/ML
25 INJECTION INTRAMUSCULAR; INTRAVENOUS; SUBCUTANEOUS EVERY 30 MIN PRN
Status: CANCELLED | OUTPATIENT
Start: 2017-04-10

## 2017-04-10 RX ORDER — EPINEPHRINE 0.3 MG/.3ML
0.3 INJECTION SUBCUTANEOUS EVERY 5 MIN PRN
Status: CANCELLED | OUTPATIENT
Start: 2017-04-10

## 2017-04-10 RX ORDER — ALBUTEROL SULFATE 90 UG/1
1-2 AEROSOL, METERED RESPIRATORY (INHALATION)
Status: CANCELLED
Start: 2017-04-10

## 2017-04-10 RX ORDER — LORAZEPAM 2 MG/ML
0.5 INJECTION INTRAMUSCULAR EVERY 4 HOURS PRN
Status: CANCELLED
Start: 2017-04-10

## 2017-04-10 RX ORDER — ALBUTEROL SULFATE 0.83 MG/ML
2.5 SOLUTION RESPIRATORY (INHALATION)
Status: CANCELLED | OUTPATIENT
Start: 2017-04-10

## 2017-04-10 RX ORDER — DIPHENHYDRAMINE HYDROCHLORIDE 50 MG/ML
50 INJECTION INTRAMUSCULAR; INTRAVENOUS
Status: CANCELLED
Start: 2017-04-10

## 2017-04-10 RX ORDER — SODIUM CHLORIDE 9 MG/ML
1000 INJECTION, SOLUTION INTRAVENOUS CONTINUOUS PRN
Status: CANCELLED
Start: 2017-04-10

## 2017-04-10 RX ADMIN — SODIUM CHLORIDE, PRESERVATIVE FREE 5 ML: 5 INJECTION INTRAVENOUS at 15:32

## 2017-04-10 RX ADMIN — TRASTUZUMAB 300 MG: KIT at 15:01

## 2017-04-10 RX ADMIN — SODIUM CHLORIDE 250 ML: 9 INJECTION, SOLUTION INTRAVENOUS at 15:02

## 2017-04-10 RX ADMIN — SODIUM CHLORIDE, PRESERVATIVE FREE 5 ML: 5 INJECTION INTRAVENOUS at 13:33

## 2017-04-10 RX ADMIN — DENOSUMAB 120 MG: 120 INJECTION SUBCUTANEOUS at 15:34

## 2017-04-10 ASSESSMENT — PAIN SCALES - GENERAL: PAINLEVEL: MILD PAIN (2)

## 2017-04-10 NOTE — LETTER
4/10/2017       RE: Keren Erickson  [address] [facility] [address]     Dear Colleague,    Thank you for referring your patient, Keren Erickson, to the [facility]. Please see a copy of my visit note below.    [facility]  ONCOLOGY FOLLOW-UP VISIT NOTE    PATIENT NAME: Keren Erickson    YOB: 1955  MRN :[id number]  DATE OF VISIT: Apr 10, 2017    REASON FOR VISIT: Follow-up of metastatic breast cancer, triple positive .     HISTORY OF PRESENT ILLNESS :   The patient is a 60-year-old female who presented to the hospital initially in 09/2013 with complaints of dyspnea. Workup revealed a large pericardial effusion and pleural effusion. Physical examination revealed a large untreated left breast mass encompassing the entire left breast. Biopsies revealed these were ER, NV and HER2-positive breast cancer. She had a thoracentesis and pericardial sclerosis performed. She was originally on Arimidex and Herceptin. In 01/2014, she was switched to tamoxifen and Herceptin due to arthralgias, and she ultimately developed progressive disease and was switched to Faslodex and Herceptin. In 01/2015, she was found to have progressive disease and was switched to T-DM1.     Initially when she was seen in late 02/2015, she complained of some V5 sensory deficit. This was subjective. It was not elicited on examination. This persisted and further workup was performed with a brain MRI. This revealed what was initially thought to be 3 punctate brain metastases. She originally saw Radiation Oncology and Neurosurgery as well as underwent a lumbar puncture. Cytology from the lumbar puncture showed no evidence of leptomeningeal disease. She was treated with Gamma Knife radiation to 6 lesions, performed on 04/16/2015.  She initiated therapy with TDM1 in January 2015 and continued this through 6/26/2015 with overall stable disease. She has since  been on a break from treatment. The patient presented earlier this month with recurrent shortness of breath.  Imaging revealed recurrent right-sided pleural effusion.  She has since undergone thoracentesis with cytology pending.  Clinically, however, there is evidence of disease progression. PET scan performed on 11/25/2015 demonstrated progression of disease at multiple sites. She restarted TDM1 on 11/27/2015, however experienced a mild transfusion reaction with shortness of breath and chest tightness, resolved upon stopping TDM1 and 125 mg of SoluMedrol. She had progression of disease on repeat imaging 2/17/2016, as well as new brain metastases. She started TDM1 with Perjeta on 3/4/2016. She underwent gamma knife to brain mets on 3/8/16.       In late May, she was found to have progressive brain metastases.  She received whole brain radiation.  She had a follow up brain MRI August 2016 that was stable.     In September 2016, she enrolled on [place]  trial and was randomized to the standard of care arm/Physician's Choice; started on gemzar and herceptin. She was recently hospitalized 1/27-2/3/17 for presumed HCAP. Trial was suspended. She continued on gemzar and heceptin with stable disease.     INTERVAL HISTORY:  Keren returns to clinic for routine visit . She was recently hospitalized for pneumonia.  She continues to have significant weakness. She has been using wheelchair. She has not been back to her baseline yet.  She recently had  fall at home.She states that her right knee gave up and fell down . She feels dizzy if she stands up quickly.  She was recently diagnosed with diabetes insipidus(DI) while she was in hospital and has been taking DDAVP consistently.  She thinks this is helping her.  She denies diarrhea.  She has no fever or chills.  She denies cough or shortness of breath.  She reports improving appetite.  Her weight has been gradually improving.  She has no pain.  She reports improving  facial and tongue paresthesia.    She denies shortness of breath, cough, leg edema, palpitation, or orthopnea.  She denies syncope.  Denies headache.     Review of systems : 12 point ROS was conducted and was otherwise negative except for as above.     PHYSICAL EXAMINATION:     /81  Pulse 75  Temp 98  F (36.7  C)  Resp 16  Wt 52.7 kg (116 lb 3.2 oz)  SpO2 96%  BMI 20.58 kg/m2    Wt Readings from Last 4 Encounters:   04/10/17 52.7 kg (116 lb 3.2 oz)   03/28/17 51.5 kg (113 lb 8 oz)   03/22/17 51.3 kg (113 lb 1.6 oz)   03/13/17 51.3 kg (113 lb)     GENERAL : Alert and oriented ,cachectic female in not in distress. Examined in wheelchair   HEENT: Normocephalic head.  Anicteric sclerae. Moist buccal mucosa. No oral ulceration. Pupils are equal and reactive bilaterally. Normal extraocular eye movements. No conjunctival injection.    NECK: Supple, no thyromegaly.   LYMPH:  No cervical, supraclavicular, axillary, epitrochlear, or inguinal lymphadenopathy.  BREAST:  Breasts exam deferred today.  LUNGS: Clear breath sounds bilaterally, no wheezing, no crackles.    CARDIOVASCULAR: S1 and S2 well heard, regular rate and rhythm, no murmur or gallop. JVP absent.    ABDOMEN: Soft, nontender, positive bowel sounds. No organomegaly was appreciated.  EXTREMITIES: No cyanosis, no clubbing, no edema.    SKIN: No skin rash, no purpura or petechiae.    NEUROLOGIC: Normal mental status. Alert and oriented . Baseline decreased sensation over mandibular branch of facial nerve  No focal weakness. Sensory examination grossly intact.  Normal coordination.      LABS:   Recent Labs   Lab Test  04/10/17   1338  03/28/17   1531  03/22/17   1254   WBC  7.7  4.7  7.9   HGB  10.9*  10.7*  11.1*   PLT  287  362  409   ANEU  5.4  3.1  5.4   ALYM  1.1  1.1  1.4     Recent Labs   Lab Test  04/10/17   1338  03/22/17   1254  03/07/17   1349  02/28/17   1127   NA  138  141  141  140   POTASSIUM  4.0  3.9  3.9  3.9   CHLORIDE  104  106  106  106    CO2  27  27  26  25   BUN  13  15  15  12   CR  0.58  0.59  0.58  0.62   OMA  8.9  8.9  9.0  8.9   MAG  2.4*  2.4*   --   2.4*   PHOS  2.9  2.8   --   2.3*   URIC  2.9  3.0   --   2.9   LDH  242*  243*   --   213     Recent Labs   Lab Test  04/10/17   1338  03/22/17   1254  03/07/17   1349   02/27/17   1037   09/21/16   1305   12/09/15   0855   AST  35  24  25   < >   --    < >  56*   < >   --    ALT  36  20  23   < >   --    < >  34   < >   --    ALKPHOS  110  95  104   < >   --    < >  178*   < >   --    BILITOTAL  0.3  0.4  0.4   < >   --    < >  0.5   < >   --    INR   --    --    --    --   0.94   --   1.01   --   0.97    < > = values in this interval not displayed.     Results for HEIDI RUIZ (MRN [id number]) as of 4/11/2017 14:14   Ref. Range 2/7/2017 11:54 2/14/2017 16:44 2/28/2017 11:27 3/7/2017 13:49 3/22/2017 12:54 3/22/2017 14:13 4/10/2017 13:38   CA 27-29 Latest Ref Range: 0 - 39 U/mL 26  28  25  24     Results for HEIDI RUIZ (MRN [id number]) as of 4/11/2017 14:14   Ref. Range 11/29/2016 11:42 1/10/2017 09:20 2/2/2017 06:16 2/7/2017 11:54 2/28/2017 11:27 3/22/2017 12:54 4/10/2017 13:38   CEA Latest Ref Range: 0 - 2.5 ug/L 1.2 1.3  1.7 1.5 1.3 1.1       Brain MRI without and with contrast ( 4/7/17)     History: Secondary malignant neoplasm of brain, Malignant neoplasm of  unspecified site of unspecified female breast.     Comparison: 1/25/2017     Technique: Axial T1-weighted, axial FLAIR and susceptibility images  were obtained without intravenous contrast. Following intravenous  gadolinium-based contrast administration, axial T2-weighted,  diffusion, and T1-weighted images (in multiple planes) were obtained.  Contrast: 5.5ml Gadavist     Findings:  As in the prior examination, there are post therapy findings white  matter bilaterally, particularly in the high posterior frontal and  parietal regions immediately adjacent to the lateral ventricles in the  centrum semiovale,  and to a lesser degree in the forceps minor  bilaterally, not grossly changed. There is also a focus of  hyperintensity right parietal lobe just above the parietal occipital  fissure, which is paramedian, and without underlying microhemorrhage  or enhancement, but was present previously. Previously there was a 1.1  x 0.6 cm lesion in the right parietal skull which measures similar on  the current examination. Regarding the cerebral parenchyma, there are  2 adjacent lesions just under 5 mm size in the right hemicerebellum  posteriorly, which are not significantly changed, and a third  inferiorly. On sagittal image #54, series 12 there is a 1 mm or  submillimeter focus in the inferior right hemicerebellum which was  there previously and does not appear to be artifactual. No definite  supratentorial parenchymal enhancing lesions are identified, although  on sagittal image series 12 image 117 there is a 1 mm focus in the  inferior left temporal lobe which is difficult to exclude as a  metastatic lesion, but was present previously and is unchanged.  Pontine enhancing focus is also unchanged.     Diffusion-weighted images of the whole brain are unremarkable. The  ventricles are not enlarged out of proportion to the cerebral sulci.  Focus of abnormal signal in the left medullary olive on FLAIR without  enhancement is unchanged.         Impression:  1. Stable right hemicerebellar lesions (x3), punctate questionable  submillimeter right hemicerebellar lesion, and left pontine enhancing  lesions are unchanged. Also no significant change in a 7 mm focus that  is questionable in the left inferior temporal lobe. These are likely  consistent with metastases.  2. Stable T2 hyperintense left inferior olivary lesion of the medulla  without enhancement. Stable nonenhancing lesion in the paramedian  right parietal lobe posteriorly as well.  3. No significant change in enhancing lesions throughout the  calvarium, the most notable being  in the right parietal skull.     CT CHEST/ABDOMEN/PELVIS W CONTRAST, 4/7/2017 11:56 AM     TECHNIQUE: Helical CT images from the thoracic inlet through the  symphysis pubis were obtained with contrast in the arterial and  portal venous phases. Contrast dose: Isovue 370 69cc     COMPARISON: 1/25/2017.     HISTORY: metastatic breast cancer on chemotherapy/Herceptin, eval  response to treatment, Malignant neoplasm of unspecified site of  unspecified female breast     FINDINGS:     Chest:   Since the comparison chest CT, there has been significant interval  improvement in patchy predominantly groundglass nodularity in the  subpleural and peripheral predominant distribution. 2 ill-defined  areas of groundglass attenuation in the superior segment of the right  lower lobe are less conspicuous with probable decreasing density from  the comparison examination. There is a subtle subpleural groundglass  attenuation in the lateral right upper lobe which appears improved  from 1/27/2017, with stable from 1/25/2017. There is groundglass  nodularity in the superior segment of the left lower lobe has nearly  completely resolved. Stable areas of linear and subsegmental  atelectasis are present most notably in the lingula, right middle, and  right lower lobes. No new areas of nodularity or groundglass  attenuation. Mild bronchial wall thickening, particularly in the right  lower lobe, is stable.     Stable nodularity in the right breast and nodularity/postsurgical  change in the left breast. The thyroid gland is within normal limits.  There is a right internal jugular Port-A-Cath with the tip in the high  right atrium. There is no thoracic lymphadenopathy. No pleural  pericardial effusion. Heart size is within normal limits. There is a  moderate hiatal hernia.     Abdomen and pelvis:   Stable ill-defined hypoenhancing lesion in segment 8 (series 7 image  252). Stable regional hyperenhancement in the lateral right lobe of  liver on  the arterial phase images which is not perceptible on the  portal venous phase, most likely transient hepatic attenuation  difference. The gallbladder is within normal limits. No intra or  extrahepatic biliary ductal dilation. The pancreas, spleen, and  adrenal glands are within normal limits. The kidneys are normal. No  hydronephrosis. Bladder is moderately physiologic distended and  otherwise unremarkable. Uterus and adnexa are within normal limits.  Stomach, small, and large bowel are within normal limits. No abdominal  or pelvic lymphadenopathy.     Bones and soft tissues:   Diffuse sclerotic appearance of the bony skeleton with innumerable  bony metastatic lesions. There are chronic rib fracture deformities.  Additionally, there are moderate to severe degenerative changes in the  hips and spine with scoliotic curvature of the lumbar spine. No  obvious acute fracture. Decreased prominence of a low-density 2.3 cm  right gluteus intramuscular lesion (series 6, image 102).         IMPRESSION:   1. Significantly improved patchy groundglass opacities throughout the  lungs since the comparison chest CT on 1/27/2017. Several residual  areas of solid and groundglass nodularity are overall mildly improved  from the appearance on 1/25/2017. The differential for the persistent  nodules continues to include metastatic disease, inflammation,  persistent or resolving infection, and scarring.  2. No significant change in diffuse bony metastasis with moderate to  severe underlying degenerative changes.  3. Decreased size of a right gluteus soft tissue lesion.  4. Stable ill-defined hypoenhancing lesion in the liver.  5. No suspected new metastatic disease identified      ASSESSMENT AND PLAN :   Dominga is a 61-year-old female with history of metastatic ER-positive, HER-2 positive breast cancer, with involvement of the bone, history of pleural effusions treated with pleurodesis and PleurX placement, and with treated brain  metastases, previously with stable disease on TDM1, however patient opted for break from therapy from June 2015 through November 2015, and represented with worsening metastatic disease at that time. She restarted TDM1 on 11/27/2015. She had progressive disease 2/17/16 and Perjeta was added to TDM1. She had gamma knife to brain mets on 3/8.  She received WBRT.  She was started on [place]  clinical trial and randomized to physician's choice, and started on Gemzar/Herceptin.    1. Breast cancer :  Recent restaging imaging  CT scan of chest, abdomen and pelvis as well as MRI of the brain showed stable disease. Given that she has continued significant deconditioning from recent infectious insult , we will hold off Gemzar for this cycle. Since she has stable disease this would be fine. She will continue Herceptin. Tumor markers having been stable.     2. Central Diabetes Insipidus: Diagnosed recently as inpatient in setting of hypernatremia. She on desmopressin 50 mcg with good response clinically. Her sodium level is stable. She seeing Endocrine team .     3. Brain mets: Numbness of tongue and V2 and V3 on right are improving. Most recent brain MRI stable.    4. Chronic Pain: Stable and manages with one Aleve daily.    5. GERD/nausea: Continue prilosec. Antiemetics prn.     6. Bone mets: On Xgeva. Due on 5/2. Will change to q 3 months after the next dose .     7. Mood: She is overall coping well.     8. She has an advanced directive.  She has a POLST.  DNR/DNI.  Her power of  is listed in the computer.     9. Deconditioning: She continues to feel weak . She had been walking independently or with cane in clinic prior to pneumonia, now using wheelchair most of time. She recently completed PT/OT. She is now using knee brace. We encouraged her to resume PT/OT this would help her get her strength back sooner .       RTC to see GIL`s in 3 weeks and Dr Harper in 10 to 12 weeks with imaging studies .       Patient  seen and plan discussed with Dr Harper .     Amber Zelaya MD  Hematology and Oncology Fellow  [phone] (Pager)     Pt was seen and evaluated by me with Dr Zelaya.  She is responding well to gemzar and herceptin.  She however had a recent hospital stay for pneumonia.  I'd like her to take this cycle off of the gemzar.  Herceptin alone today.    Marlen Harper MD

## 2017-04-10 NOTE — PATIENT INSTRUCTIONS
Contact Numbers    [facility] [place] [phone]  [facility] [place]:  [phone]    Call triage with chills and/or temperature greater than or equal to 100.5, uncontrolled nausea/vomiting, diarrhea, constipation, dizziness, shortness of breath, chest pain, bleeding, unexplained bruising, or any new/concerning symptoms, questions/concerns.     If you are having any concerning symptoms or wish to speak to a provider before your next infusion visit, please call your care coordinator or triage to notify them so we can adequately serve you.       After Hours: [phone]    If after hours, weekends, or holidays, call main hospital  and ask for Oncology doctor on call.           April 2017 Sunday Monday Tuesday Wednesday Thursday Friday Saturday                                 1       2     3     4     5     6     7     [facility]   11:00 AM   (15 min.)    [facility]   [facility]     CT CHEST/ABDOMEN/PELVIS W   11:25 AM   (20 min.)   [address] [place] [address] CT     MR BRAIN WWO   12:00 PM   (45 min.)   MR1   [facility] MRI 8       9     10     [facility]    1:00 PM   (15 min.)    [facility] DRAW   [facility] Draw     [facility] RETURN    1:15 PM   (30 min.)   Marlen Harper MD   [facility]     [facility] ONC INFUSION 120    2:00 PM   (120 min.)    ONCOLOGY INFUSION   [facility] 11     12     13     14     15       16     17     18     19     20     21     22       23     24     25     26     27     28     29       30                                              May 2017   Uday Monday Tuesday Wednesday Thursday Friday Saturday        1     [facility]    1:30 PM   (15 min.)   [facility]   [facility] Draw     [facility] RETURN    1:45 PM   (50 min.)   Esmer Oconnor PA   [facility]     [facility] ONC INFUSION 120    3:00 PM   (120 min.)    ONCOLOGY INFUSION     [facility] 2     3     4     5     6       7     8     9     10     11     12     13       14     15     16     17     18     19     20       21     22     23     24     25     26     27       28     29     30     31                                Recent Results (from the past 24 hour(s))   CBC with platelets differential    Collection Time: 04/10/17  1:38 PM   Result Value Ref Range    WBC 7.7 4.0 - 11.0 10e9/L    RBC Count 3.34 (L) 3.8 - 5.2 10e12/L    Hemoglobin 10.9 (L) 11.7 - 15.7 g/dL    Hematocrit 33.1 (L) 35.0 - 47.0 %    MCV 99 78 - 100 fl    MCH 32.6 26.5 - 33.0 pg    MCHC 32.9 31.5 - 36.5 g/dL    RDW 17.3 (H) 10.0 - 15.0 %    Platelet Count 287 150 - 450 10e9/L    Diff Method Automated Method     % Neutrophils 69.9 %    % Lymphocytes 14.8 %    % Monocytes 12.8 %    % Eosinophils 0.0 %    % Basophils 0.9 %    % Immature Granulocytes 1.6 %    Nucleated RBCs 0 0 /100    Absolute Neutrophil 5.4 1.6 - 8.3 10e9/L    Absolute Lymphocytes 1.1 0.8 - 5.3 10e9/L    Absolute Monocytes 1.0 0.0 - 1.3 10e9/L    Absolute Eosinophils 0.0 0.0 - 0.7 10e9/L    Absolute Basophils 0.1 0.0 - 0.2 10e9/L    Abs Immature Granulocytes 0.1 0 - 0.4 10e9/L    Absolute Nucleated RBC 0.0    Comprehensive metabolic panel    Collection Time: 04/10/17  1:38 PM   Result Value Ref Range    Sodium 138 133 - 144 mmol/L    Potassium 4.0 3.4 - 5.3 mmol/L    Chloride 104 94 - 109 mmol/L    Carbon Dioxide 27 20 - 32 mmol/L    Anion Gap 7 3 - 14 mmol/L    Glucose 81 70 - 99 mg/dL    Urea Nitrogen 13 7 - 30 mg/dL    Creatinine 0.58 0.52 - 1.04 mg/dL    GFR Estimate >90  Non  GFR Calc   >60 mL/min/1.7m2    GFR Estimate If Black >90   GFR Calc   >60 mL/min/1.7m2    Calcium 8.9 8.5 - 10.1 mg/dL    Bilirubin Total 0.3 0.2 - 1.3 mg/dL    Albumin 3.6 3.4 - 5.0 g/dL    Protein Total 6.8 6.8 - 8.8 g/dL    Alkaline Phosphatase 110 40 - 150 U/L    ALT 36 0 - 50 U/L    AST 35 0 - 45 U/L   Magnesium     Collection Time: 04/10/17  1:38 PM   Result Value Ref Range    Magnesium 2.4 (H) 1.6 - 2.3 mg/dL   Phosphorus    Collection Time: 04/10/17  1:38 PM   Result Value Ref Range    Phosphorus 2.9 2.5 - 4.5 mg/dL   Lactate Dehydrogenase    Collection Time: 04/10/17  1:38 PM   Result Value Ref Range    Lactate Dehydrogenase 242 (H) 81 - 234 U/L   Uric acid    Collection Time: 04/10/17  1:38 PM   Result Value Ref Range    Uric Acid 2.9 2.6 - 6.0 mg/dL

## 2017-04-10 NOTE — PROGRESS NOTES
Infusion Nursing Note:  Keren Erickson presents today for Cycle 10 Day 1 Herceptin, Xgeva.    Patient seen by provider today: Yes: Dr. Harper    Note: Patient states she is not taking a Vit D/Calcium supplement. Patient stated she had jaw pain for three days but it went away today. Patient states she has no upcoming dental procedures.     TORB 4/10/17 1550 Dr. Harper/Courtney Riley RN: Ok to give Xgeva today despite jaw pain.     Intravenous Access:  Implanted Port.    Treatment Conditions:  Lab Results   Component Value Date    HGB 10.9 04/10/2017     Lab Results   Component Value Date    WBC 7.7 04/10/2017      Lab Results   Component Value Date    ANEU 5.4 04/10/2017     Lab Results   Component Value Date     04/10/2017      Lab Results   Component Value Date     04/10/2017                   Lab Results   Component Value Date    POTASSIUM 4.0 04/10/2017           Lab Results   Component Value Date    MAG 2.4 04/10/2017            Lab Results   Component Value Date    CR 0.58 04/10/2017                   Lab Results   Component Value Date    OMA 8.9 04/10/2017                Lab Results   Component Value Date    BILITOTAL 0.3 04/10/2017           Lab Results   Component Value Date    ALBUMIN 3.6 04/10/2017                    Lab Results   Component Value Date    ALT 36 04/10/2017           Lab Results   Component Value Date    AST 35 04/10/2017     Results reviewed, labs MET treatment parameters, ok to proceed with treatment.  ECHO/MUGA completed 1/25/17  EF 60-65%.      Post Infusion Assessment:  Patient tolerated infusion without incident.  Patient tolerated injection without incident.  Blood return noted pre and post infusion.  Site patent and intact, free from redness, edema or discomfort.  No evidence of extravasations.  Access discontinued per protocol.    Discharge Plan:   Patient declined prescription refills.  Copy of AVS reviewed with patient and/or family.  Patient will return  5/1/17 for next appointment.  Patient discharged in stable condition accompanied by: self, friend.  Departure Mode: wheelchair.    Courtney Riley RN

## 2017-04-10 NOTE — MR AVS SNAPSHOT
After Visit Summary   4/10/2017    Keren Erickson    MRN: [id number]           Patient Information     Date Of Birth          1955        Visit Information        Provider Department      4/10/2017 1:30 PM Marlen Harper MD [facility]        Today's Diagnoses     Brain metastasis (H)        Carcinoma of breast metastatic to multiple sites, unspecified laterality (H)        Metastatic breast cancer (H)           Follow-ups after your visit        Your next 10 appointments already scheduled     May 01, 2017  1:30 PM CDT   [facility] Draw with [facility] DRAW   [facility] Draw ([facility])    [address] [place] [address]  2nd [facility] [address]   [phone]            May 01, 2017  2:00 PM CDT   (Arrive by 1:45 PM)   Return Visit with BRAULIO Van   [facility] ([facility])    [address] [place] [address]  2nd [facility] [address]   [phone]            May 01, 2017  3:00 PM CDT   Infusion 120 with  ONCOLOGY INFUSION, UC 23 ATC   [facility] ([facility])    [address] [place] [address]  2nd [facility] [id number] [phone]            Jun 19, 2017  4:00 PM CDT   (Arrive by 3:45 PM)   Return Visit with Marlen Harper MD   [facility] ([facility])    [address] [place] [address]  2nd [facility] [address]   [phone]            Sep 11, 2017 12:00 PM CDT   (Arrive by 11:45 AM)   RETURN ENDOCRINE with Rolando Mishra MD   [facility] Endocrinology ([facility])    [address] [place] [address]  3rd Floor  [facility] [id number] [phone]              Who to contact     If you have questions or need follow up information about today's clinic visit or your schedule please contact [facility] directly at  [phone].  Normal or non-critical lab and imaging results will be communicated to you by MyChart, letter or phone within 4 business days after the clinic has received the results. If you do not hear from us within 7 days, please contact the clinic through [employer] or phone. If you have a critical or abnormal lab result, we will notify you by phone as soon as possible.  Submit refill requests through [employer] or call your pharmacy and they will forward the refill request to us. Please allow 3 business days for your refill to be completed.          Additional Information About Your Visit        [employer] Information     [employer] gives you secure access to your electronic health record. If you see a primary care provider, you can also send messages to your care team and make appointments. If you have questions, please call your primary care clinic.  If you do not have a primary care provider, please call [phone] and they will assist you.        Care EveryWhere ID     This is your Care EveryWhere ID. This could be used by other organizations to access your [facility] medical records  [id number]        Your Vitals Were     Pulse Temperature Respirations Pulse Oximetry BMI (Body Mass Index)       75 98  F (36.7  C) 16 96% 20.58 kg/m2        Blood Pressure from Last 3 Encounters:   04/10/17 120/81   03/28/17 137/90   03/22/17 133/85    Weight from Last 3 Encounters:   04/10/17 52.7 kg (116 lb 3.2 oz)   03/28/17 51.5 kg (113 lb 8 oz)   03/22/17 51.3 kg (113 lb 1.6 oz)              Today, you had the following     No orders found for display       Primary Care Provider Office Phone # Fax #    Kelly Hart [phone]       [facility] [address] [facility] [address]        Thank you!     Thank you for choosing [facility]  for your care. Our goal is always to provide you with excellent care. Hearing back from our patients is one way we can continue to improve our  services. Please take a few minutes to complete the written survey that you may receive in the mail after your visit with us. Thank you!             Your Updated Medication List - Protect others around you: Learn how to safely use, store and throw away your medicines at www.disposemymeds.org.          This list is accurate as of: 4/10/17 11:59 PM.  Always use your most recent med list.                   Brand Name Dispense Instructions for use    ALEVE PO      Take 220 mg by mouth daily       desmopressin 0.1 MG tablet    DDAVP    90 tablet    Take 1 tablet (100 mcg) by mouth At Bedtime

## 2017-04-10 NOTE — NURSING NOTE
Chief Complaint   Patient presents with     Blood Draw     Labs drawn by RN from port. VS taken.      AVNI MASSEY RN

## 2017-04-10 NOTE — NURSING NOTE
"Keren Erickson is a 61 year old female who presents for:  Chief Complaint   Patient presents with     Blood Draw     Labs drawn by RN from [facility]. VS taken.      Oncology Clinic Visit     Breast Cancer        Initial Vitals:  /81  Pulse 75  Temp 98  F (36.7  C)  Resp 16  Wt 52.7 kg (116 lb 3.2 oz)  SpO2 96%  BMI 20.58 kg/m2 Estimated body mass index is 20.58 kg/(m^2) as calculated from the following:    Height as of 3/13/17: 1.6 m (5' 3\").    Weight as of this encounter: 52.7 kg (116 lb 3.2 oz).. Body surface area is 1.53 meters squared. BP completed using cuff size: NA (Not Taken)  Mild Pain (2) No LMP recorded. Patient is postmenopausal. Allergies and medications reviewed.     Medications: Medication refills not needed today.  Pharmacy name entered into [employer]: [facility] [address] [place] [address] [address] AT [facility] [address]    Comments: Patient is at a pain level of 2 today, R hip/leg, back pain.     6 minutes for nursing intake (face to face time)   Tamika Tate CMA       "

## 2017-04-10 NOTE — MR AVS SNAPSHOT
After Visit Summary   4/10/2017    Keren Erickson    MRN: [id number]           Patient Information     Date Of Birth          1955        Visit Information        Provider Department      4/10/2017 2:00 PM  18 ATC;  ONCOLOGY INFUSION [facility]        Today's Diagnoses     Metastatic breast cancer (H)    -  1    Brain metastasis (H)        Carcinoma of breast metastatic to multiple sites, unspecified laterality (H)        Bone metastasis (H)          Care Instructions    Contact Numbers    [facility] Main Line: [phone]  [facility] Triage:  [phone]    Call triage with chills and/or temperature greater than or equal to 100.5, uncontrolled nausea/vomiting, diarrhea, constipation, dizziness, shortness of breath, chest pain, bleeding, unexplained bruising, or any new/concerning symptoms, questions/concerns.     If you are having any concerning symptoms or wish to speak to a provider before your next infusion visit, please call your care coordinator or triage to notify them so we can adequately serve you.       After Hours: [phone]    If after hours, weekends, or holidays, call main hospital  and ask for Oncology doctor on call.           April 2017 Sunday Monday Tuesday Wednesday Thursday Friday Saturday                                 1       2     3     4     5     6     7     [facility] LAB DRAW   11:00 AM   (15 min.)    [facility] LAB DRAW   [facility] Lab Draw     CT CHEST/ABDOMEN/PELVIS W   11:25 AM   (20 min.)   [address] [place] [address] CT     MR BRAIN WWO   12:00 PM   (45 min.)   MR1   [facility] MRI 8       9     10     [facility] LAB DRAW    1:00 PM   (15 min.)    [facility] LAB DRAW   [facility] Lab Draw     UMP RETURN    1:15 PM   (30 min.)   Marlen Harper MD   [facility] ONC INFUSION 120    2:00 PM   (120 min.)    ONCOLOGY INFUSION   [facility] 11      12     13     14     15       16     17     18     19     20     21     22       23     24     25     26     27     28     29       30                                              May 2017   Uday Monday Tuesday Wednesday Thursday Friday Saturday        1     [facility] LAB DRAW    1:30 PM   (15 min.)    [facility] LAB DRAW   [facility] Lab Draw     P RETURN    1:45 PM   (50 min.)   Esmer Oconnor PA   [facility] ONC INFUSION 120    3:00 PM   (120 min.)    ONCOLOGY INFUSION   [facility] 2     3     4     5     6       7     8     9     10     11     12     13       14     15     16     17     18     19     20       21     22     23     24     25     26     27       28     29     30     31                                Recent Results (from the past 24 hour(s))   CBC with platelets differential    Collection Time: 04/10/17  1:38 PM   Result Value Ref Range    WBC 7.7 4.0 - 11.0 10e9/L    RBC Count 3.34 (L) 3.8 - 5.2 10e12/L    Hemoglobin 10.9 (L) 11.7 - 15.7 g/dL    Hematocrit 33.1 (L) 35.0 - 47.0 %    MCV 99 78 - 100 fl    MCH 32.6 26.5 - 33.0 pg    MCHC 32.9 31.5 - 36.5 g/dL    RDW 17.3 (H) 10.0 - 15.0 %    Platelet Count 287 150 - 450 10e9/L    Diff Method Automated Method     % Neutrophils 69.9 %    % Lymphocytes 14.8 %    % Monocytes 12.8 %    % Eosinophils 0.0 %    % Basophils 0.9 %    % Immature Granulocytes 1.6 %    Nucleated RBCs 0 0 /100    Absolute Neutrophil 5.4 1.6 - 8.3 10e9/L    Absolute Lymphocytes 1.1 0.8 - 5.3 10e9/L    Absolute Monocytes 1.0 0.0 - 1.3 10e9/L    Absolute Eosinophils 0.0 0.0 - 0.7 10e9/L    Absolute Basophils 0.1 0.0 - 0.2 10e9/L    Abs Immature Granulocytes 0.1 0 - 0.4 10e9/L    Absolute Nucleated RBC 0.0    Comprehensive metabolic panel    Collection Time: 04/10/17  1:38 PM   Result Value Ref Range    Sodium 138 133 - 144 mmol/L    Potassium 4.0 3.4 - 5.3 mmol/L    Chloride 104 94 - 109 mmol/L    Carbon Dioxide 27  20 - 32 mmol/L    Anion Gap 7 3 - 14 mmol/L    Glucose 81 70 - 99 mg/dL    Urea Nitrogen 13 7 - 30 mg/dL    Creatinine 0.58 0.52 - 1.04 mg/dL    GFR Estimate >90  Non  GFR Calc   >60 mL/min/1.7m2    GFR Estimate If Black >90   GFR Calc   >60 mL/min/1.7m2    Calcium 8.9 8.5 - 10.1 mg/dL    Bilirubin Total 0.3 0.2 - 1.3 mg/dL    Albumin 3.6 3.4 - 5.0 g/dL    Protein Total 6.8 6.8 - 8.8 g/dL    Alkaline Phosphatase 110 40 - 150 U/L    ALT 36 0 - 50 U/L    AST 35 0 - 45 U/L   Magnesium    Collection Time: 04/10/17  1:38 PM   Result Value Ref Range    Magnesium 2.4 (H) 1.6 - 2.3 mg/dL   Phosphorus    Collection Time: 04/10/17  1:38 PM   Result Value Ref Range    Phosphorus 2.9 2.5 - 4.5 mg/dL   Lactate Dehydrogenase    Collection Time: 04/10/17  1:38 PM   Result Value Ref Range    Lactate Dehydrogenase 242 (H) 81 - 234 U/L   Uric acid    Collection Time: 04/10/17  1:38 PM   Result Value Ref Range    Uric Acid 2.9 2.6 - 6.0 mg/dL                 Follow-ups after your visit        Your next 10 appointments already scheduled     May 01, 2017  1:30 PM CDT   [facility]   [facility] Lab Draw ([facility])    [address] [place] [address]  2nd [facility] [id number] [phone]            May 01, 2017  2:00 PM CDT   (Arrive by 1:45 PM)   Return Visit with BRAULIO Van   [facility] ([facility])    [address] [place] [address]  2nd [facility] [id number] [phone]            May 01, 2017  3:00 PM CDT   Infusion 120 with  ONCOLOGY INFUSION, UC 23 ATC   [facility] ([facility])    [address] [place] [address]  2nd [facility] [id number] [phone]            Jun 19, 2017  4:00 PM CDT   (Arrive by 3:45 PM)   Return Visit with Marlen Harper MD   [facility] ([facility])    [address]  [facility]  [address]nd [facility] [id number] [phone]            Sep 11, 2017 12:00 PM CDT   (Arrive by 11:45 AM)   RETURN ENDOCRINE with Rolando Mishra MD   [facility] Endocrinology ([facility])    [address] [facility]  [address] [facility] [id number] [phone]              Who to contact     If you have questions or need follow up information about today's clinic visit or your schedule please contact [facility] directly at [phone].  Normal or non-critical lab and imaging results will be communicated to you by [employer], letter or phone within 4 business days after the clinic has received the results. If you do not hear from us within 7 days, please contact the clinic through [employer] or phone. If you have a critical or abnormal lab result, we will notify you by phone as soon as possible.  Submit refill requests through [employer] or call your pharmacy and they will forward the refill request to us. Please allow 3 business days for your refill to be completed.          Additional Information About Your Visit        [employer] Information     [employer] gives you secure access to your electronic health record. If you see a primary care provider, you can also send messages to your care team and make appointments. If you have questions, please call your primary care clinic.  If you do not have a primary care provider, please call [phone] and they will assist you.        Care EveryWhere ID     This is your Care EveryWhere ID. This could be used by other organizations to access your [facility] medical records  [id number]         Blood Pressure from Last 3 Encounters:   04/10/17 120/81   03/28/17 137/90   03/22/17 133/85    Weight from Last 3 Encounters:   04/10/17 52.7 kg (116 lb 3.2 oz)   03/28/17 51.5 kg (113 lb 8 oz)   03/22/17 51.3 kg (113 lb 1.6 oz)              We Performed the Following     Ca27.29  breast tumor marker     CBC with  platelets differential     CEA     Comprehensive metabolic panel     Lactate Dehydrogenase     Magnesium     Phosphorus     Research Lab     Treatment Conditions     Uric acid        Primary Care Provider Office Phone # Fax #    Kelly Bg Hart [phone]       [facility] [address] [facility] [address]        Thank you!     Thank you for choosing [facility]  for your care. Our goal is always to provide you with excellent care. Hearing back from our patients is one way we can continue to improve our services. Please take a few minutes to complete the written survey that you may receive in the mail after your visit with us. Thank you!             Your Updated Medication List - Protect others around you: Learn how to safely use, store and throw away your medicines at www.disposemymeds.org.          This list is accurate as of: 4/10/17  3:25 PM.  Always use your most recent med list.                   Brand Name Dispense Instructions for use    ALEVE PO      Take 220 mg by mouth daily       desmopressin 0.1 MG tablet    DDAVP    90 tablet    Take 1 tablet (100 mcg) by mouth At Bedtime

## 2017-04-11 NOTE — PROGRESS NOTES
[facility]INIC  ONCOLOGY FOLLOW-UP VISIT NOTE    PATIENT NAME: Keren Erickson    YOB: 1955  MRN :[id number]  DATE OF VISIT: Apr 10, 2017    REASON FOR VISIT: Follow-up of metastatic breast cancer, triple positive .     HISTORY OF PRESENT ILLNESS :   The patient is a 60-year-old female who presented to the hospital initially in 09/2013 with complaints of dyspnea. Workup revealed a large pericardial effusion and pleural effusion. Physical examination revealed a large untreated left breast mass encompassing the entire left breast. Biopsies revealed these were ER, [place] and HER2-positive breast cancer. She had a thoracentesis and pericardial sclerosis performed. She was originally on Arimidex and Herceptin. In 01/2014, she was switched to tamoxifen and Herceptin due to arthralgias, and she ultimately developed progressive disease and was switched to Faslodex and Herceptin. In 01/2015, she was found to have progressive disease and was switched to T-DM1.     Initially when she was seen in late 02/2015, she complained of some V5 sensory deficit. This was subjective. It was not elicited on examination. This persisted and further workup was performed with a brain MRI. This revealed what was initially thought to be 3 punctate brain metastases. She originally saw Radiation Oncology and Neurosurgery as well as underwent a lumbar puncture. Cytology from the lumbar puncture showed no evidence of leptomeningeal disease. She was treated with Gamma Knife radiation to 6 lesions, performed on 04/16/2015.  She initiated therapy with TDM1 in January 2015 and continued this through 6/26/2015 with overall stable disease. She has since been on a break from treatment. The patient presented earlier this month with recurrent shortness of breath.  Imaging revealed recurrent right-sided pleural effusion.  She has since undergone thoracentesis with cytology pending.  Clinically, however,  there is evidence of disease progression. PET scan performed on 11/25/2015 demonstrated progression of disease at multiple sites. She restarted TDM1 on 11/27/2015, however experienced a mild transfusion reaction with shortness of breath and chest tightness, resolved upon stopping TDM1 and 125 mg of SoluMedrol. She had progression of disease on repeat imaging 2/17/2016, as well as new brain metastases. She started TDM1 with Perjeta on 3/4/2016. She underwent gamma knife to brain mets on 3/8/16.       In late May, she was found to have progressive brain metastases.  She received whole brain radiation.  She had a follow up brain MRI August 2016 that was stable.     In September 2016, she enrolled on [place]  trial and was randomized to the standard of care arm/Physician's Choice; started on gemzar and herceptin. She was recently hospitalized 1/27-2/3/17 for presumed HCAP. Trial was suspended. She continued on gemzar and heceptin with stable disease.     INTERVAL HISTORY:  Keren returns to clinic for routine visit . She was recently hospitalized for pneumonia.  She continues to have significant weakness. She has been using wheelchair. She has not been back to her baseline yet.  She recently had  fall at home.She states that her right knee gave up and fell down . She feels dizzy if she stands up quickly.  She was recently diagnosed with diabetes insipidus(DI) while she was in hospital and has been taking DDAVP consistently.  She thinks this is helping her.  She denies diarrhea.  She has no fever or chills.  She denies cough or shortness of breath.  She reports improving appetite.  Her weight has been gradually improving.  She has no pain.  She reports improving facial and tongue paresthesia.    She denies shortness of breath, cough, leg edema, palpitation, or orthopnea.  She denies syncope.  Denies headache.     Review of systems : 12 point ROS was conducted and was otherwise negative except for as above.      PHYSICAL EXAMINATION:     /81  Pulse 75  Temp 98  F (36.7  C)  Resp 16  Wt 52.7 kg (116 lb 3.2 oz)  SpO2 96%  BMI 20.58 kg/m2    Wt Readings from Last 4 Encounters:   04/10/17 52.7 kg (116 lb 3.2 oz)   03/28/17 51.5 kg (113 lb 8 oz)   03/22/17 51.3 kg (113 lb 1.6 oz)   03/13/17 51.3 kg (113 lb)     GENERAL : Alert and oriented ,cachectic female in not in distress. Examined in wheelchair   HEENT: Normocephalic head.  Anicteric sclerae. Moist buccal mucosa. No oral ulceration. Pupils are equal and reactive bilaterally. Normal extraocular eye movements. No conjunctival injection.    NECK: Supple, no thyromegaly.   LYMPH:  No cervical, supraclavicular, axillary, epitrochlear, or inguinal lymphadenopathy.  BREAST:  Breasts exam deferred today.  LUNGS: Clear breath sounds bilaterally, no wheezing, no crackles.    CARDIOVASCULAR: S1 and S2 well heard, regular rate and rhythm, no murmur or gallop. JVP absent.    ABDOMEN: Soft, nontender, positive bowel sounds. No organomegaly was appreciated.  EXTREMITIES: No cyanosis, no clubbing, no edema.    SKIN: No skin rash, no purpura or petechiae.    NEUROLOGIC: Normal mental status. Alert and oriented . Baseline decreased sensation over mandibular branch of facial nerve  No focal weakness. Sensory examination grossly intact.  Normal coordination.      LABS:   Recent Labs   Lab Test  04/10/17   1338  03/28/17   1531  03/22/17   1254   WBC  7.7  4.7  7.9   HGB  10.9*  10.7*  11.1*   PLT  287  362  409   ANEU  5.4  3.1  5.4   ALYM  1.1  1.1  1.4     Recent Labs   Lab Test  04/10/17   1338  03/22/17   1254  03/07/17   1349  02/28/17   1127   NA  138  141  141  140   POTASSIUM  4.0  3.9  3.9  3.9   CHLORIDE  104  106  106  106   CO2  27  27  26  25   BUN  13  15  15  12   CR  0.58  0.59  0.58  0.62   OMA  8.9  8.9  9.0  8.9   MAG  2.4*  2.4*   --   2.4*   PHOS  2.9  2.8   --   2.3*   URIC  2.9  3.0   --   2.9   LDH  242*  243*   --   213     Recent Labs   Lab Test   04/10/17   1338  03/22/17   1254  03/07/17   1349   02/27/17   1037   09/21/16   1305   12/09/15   0855   AST  35  24  25   < >   --    < >  56*   < >   --    ALT  36  20  23   < >   --    < >  34   < >   --    ALKPHOS  110  95  104   < >   --    < >  178*   < >   --    BILITOTAL  0.3  0.4  0.4   < >   --    < >  0.5   < >   --    INR   --    --    --    --   0.94   --   1.01   --   0.97    < > = values in this interval not displayed.     Results for HEIDI RUIZ (MRN [id number]) as of 4/11/2017 14:14   Ref. Range 2/7/2017 11:54 2/14/2017 16:44 2/28/2017 11:27 3/7/2017 13:49 3/22/2017 12:54 3/22/2017 14:13 4/10/2017 13:38   CA 27-29 Latest Ref Range: 0 - 39 U/mL 26  28  25  24     Results for HEIDI RUIZ (MRN [id number]) as of 4/11/2017 14:14   Ref. Range 11/29/2016 11:42 1/10/2017 09:20 2/2/2017 06:16 2/7/2017 11:54 2/28/2017 11:27 3/22/2017 12:54 4/10/2017 13:38   CEA Latest Ref Range: 0 - 2.5 ug/L 1.2 1.3  1.7 1.5 1.3 1.1       Brain MRI without and with contrast ( 4/7/17)     History: Secondary malignant neoplasm of brain, Malignant neoplasm of  unspecified site of unspecified female breast.     Comparison: 1/25/2017     Technique: Axial T1-weighted, axial FLAIR and susceptibility images  were obtained without intravenous contrast. Following intravenous  gadolinium-based contrast administration, axial T2-weighted,  diffusion, and T1-weighted images (in multiple planes) were obtained.  Contrast: 5.5ml Gadavist     Findings:  As in the prior examination, there are post therapy findings white  matter bilaterally, particularly in the high posterior frontal and  parietal regions immediately adjacent to the lateral ventricles in the  centrum semiovale, and to a lesser degree in the forceps minor  bilaterally, not grossly changed. There is also a focus of  hyperintensity right parietal lobe just above the parietal occipital  fissure, which is paramedian, and without underlying  microhemorrhage  or enhancement, but was present previously. Previously there was a 1.1  x 0.6 cm lesion in the right parietal skull which measures similar on  the current examination. Regarding the cerebral parenchyma, there are  2 adjacent lesions just under 5 mm size in the right hemicerebellum  posteriorly, which are not significantly changed, and a third  inferiorly. On sagittal image #54, series 12 there is a 1 mm or  submillimeter focus in the inferior right hemicerebellum which was  there previously and does not appear to be artifactual. No definite  supratentorial parenchymal enhancing lesions are identified, although  on sagittal image series 12 image 117 there is a 1 mm focus in the  inferior left temporal lobe which is difficult to exclude as a  metastatic lesion, but was present previously and is unchanged.  Pontine enhancing focus is also unchanged.     Diffusion-weighted images of the whole brain are unremarkable. The  ventricles are not enlarged out of proportion to the cerebral sulci.  Focus of abnormal signal in the left medullary olive on FLAIR without  enhancement is unchanged.         Impression:  1. Stable right hemicerebellar lesions (x3), punctate questionable  submillimeter right hemicerebellar lesion, and left pontine enhancing  lesions are unchanged. Also no significant change in a 7 mm focus that  is questionable in the left inferior temporal lobe. These are likely  consistent with metastases.  2. Stable T2 hyperintense left inferior olivary lesion of the medulla  without enhancement. Stable nonenhancing lesion in the paramedian  right parietal lobe posteriorly as well.  3. No significant change in enhancing lesions throughout the  calvarium, the most notable being in the right parietal skull.     CT CHEST/ABDOMEN/PELVIS W CONTRAST, 4/7/2017 11:56 AM     TECHNIQUE: Helical CT images from the thoracic inlet through the  symphysis pubis were obtained with contrast in the arterial  and  portal venous phases. Contrast dose: Isovue 370 69cc     COMPARISON: 1/25/2017.     HISTORY: metastatic breast cancer on chemotherapy/Herceptin, eval  response to treatment, Malignant neoplasm of unspecified site of  unspecified female breast     FINDINGS:     Chest:   Since the comparison chest CT, there has been significant interval  improvement in patchy predominantly groundglass nodularity in the  subpleural and peripheral predominant distribution. 2 ill-defined  areas of groundglass attenuation in the superior segment of the right  lower lobe are less conspicuous with probable decreasing density from  the comparison examination. There is a subtle subpleural groundglass  attenuation in the lateral right upper lobe which appears improved  from 1/27/2017, with stable from 1/25/2017. There is groundglass  nodularity in the superior segment of the left lower lobe has nearly  completely resolved. Stable areas of linear and subsegmental  atelectasis are present most notably in the lingula, right middle, and  right lower lobes. No new areas of nodularity or groundglass  attenuation. Mild bronchial wall thickening, particularly in the right  lower lobe, is stable.     Stable nodularity in the right breast and nodularity/postsurgical  change in the left breast. The thyroid gland is within normal limits.  There is a right internal jugular Port-A-Cath with the tip in the high  right atrium. There is no thoracic lymphadenopathy. No pleural  pericardial effusion. Heart size is within normal limits. There is a  moderate hiatal hernia.     Abdomen and pelvis:   Stable ill-defined hypoenhancing lesion in segment 8 (series 7 image  252). Stable regional hyperenhancement in the lateral right lobe of  liver on the arterial phase images which is not perceptible on the  portal venous phase, most likely transient hepatic attenuation  difference. The gallbladder is within normal limits. No intra or  extrahepatic biliary ductal  dilation. The pancreas, spleen, and  adrenal glands are within normal limits. The kidneys are normal. No  hydronephrosis. Bladder is moderately physiologic distended and  otherwise unremarkable. Uterus and adnexa are within normal limits.  Stomach, small, and large bowel are within normal limits. No abdominal  or pelvic lymphadenopathy.     Bones and soft tissues:   Diffuse sclerotic appearance of the bony skeleton with innumerable  bony metastatic lesions. There are chronic rib fracture deformities.  Additionally, there are moderate to severe degenerative changes in the  hips and spine with scoliotic curvature of the lumbar spine. No  obvious acute fracture. Decreased prominence of a low-density 2.3 cm  right gluteus intramuscular lesion (series 6, image 102).         IMPRESSION:   1. Significantly improved patchy groundglass opacities throughout the  lungs since the comparison chest CT on 1/27/2017. Several residual  areas of solid and groundglass nodularity are overall mildly improved  from the appearance on 1/25/2017. The differential for the persistent  nodules continues to include metastatic disease, inflammation,  persistent or resolving infection, and scarring.  2. No significant change in diffuse bony metastasis with moderate to  severe underlying degenerative changes.  3. Decreased size of a right gluteus soft tissue lesion.  4. Stable ill-defined hypoenhancing lesion in the liver.  5. No suspected new metastatic disease identified      ASSESSMENT AND PLAN :   Dominga is a 61-year-old female with history of metastatic ER-positive, HER-2 positive breast cancer, with involvement of the bone, history of pleural effusions treated with pleurodesis and PleurX placement, and with treated brain metastases, previously with stable disease on TDM1, however patient opted for break from therapy from June 2015 through November 2015, and represented with worsening metastatic disease at that time. She restarted TDM1 on  11/27/2015. She had progressive disease 2/17/16 and Perjeta was added to TDM1. She had gamma knife to brain mets on 3/8.  She received WBRT.  She was started on [place]  clinical trial and randomized to physician's choice, and started on Gemzar/Herceptin.    1. Breast cancer :  Recent restaging imaging  CT scan of chest, abdomen and pelvis as well as MRI of the brain showed stable disease. Given that she has continued significant deconditioning from recent infectious insult , we will hold off Gemzar for this cycle. Since she has stable disease this would be fine. She will continue Herceptin. Tumor markers having been stable.     2. Central Diabetes Insipidus: Diagnosed recently as inpatient in setting of hypernatremia. She on desmopressin 50 mcg with good response clinically. Her sodium level is stable. She seeing Endocrine team .     3. Brain mets: Numbness of tongue and V2 and V3 on right are improving. Most recent brain MRI stable.    4. Chronic Pain: Stable and manages with one Aleve daily.    5. GERD/nausea: Continue prilosec. Antiemetics prn.     6. Bone mets: On Xgeva. Due on 5/2. Will change to q 3 months after the next dose .     7. Mood: She is overall coping well.     8. She has an advanced directive.  She has a POLST.  DNR/DNI.  Her power of  is listed in the computer.     9. Deconditioning: She continues to feel weak . She had been walking independently or with cane in clinic prior to pneumonia, now using wheelchair most of time. She recently completed PT/OT. She is now using knee brace. We encouraged her to resume PT/OT this would help her get her strength back sooner .       RTC to see GIL`s in 3 weeks and Dr Harper in 10 to 12 weeks with imaging studies .       Patient seen and plan discussed with Dr Harper .     Amber Zelaya MD  Hematology and Oncology Fellow  [phone] (Pager)     Pt was seen and evaluated by me with Dr Zelaya.  She is responding well to gemzar and herceptin.   She however had a recent hospital stay for pneumonia.  I'd like her to take this cycle off of the gemzar.  Herceptin alone today.    Marlen Harper MD

## 2017-04-13 DIAGNOSIS — C79.31 BRAIN METASTASIS: ICD-10-CM

## 2017-04-13 DIAGNOSIS — C79.51 BONE METASTASIS: ICD-10-CM

## 2017-04-13 DIAGNOSIS — I31.31 MALIGNANT PERICARDIAL EFFUSION (H): Primary | ICD-10-CM

## 2017-04-13 DIAGNOSIS — C50.919 METASTATIC BREAST CANCER: ICD-10-CM

## 2017-05-01 ENCOUNTER — INFUSION THERAPY VISIT (OUTPATIENT)
Dept: ONCOLOGY | Facility: CLINIC | Age: 62
End: 2017-05-01
Attending: INTERNAL MEDICINE
Payer: COMMERCIAL

## 2017-05-01 ENCOUNTER — APPOINTMENT (OUTPATIENT)
Dept: LAB | Facility: CLINIC | Age: 62
End: 2017-05-01
Attending: INTERNAL MEDICINE
Payer: COMMERCIAL

## 2017-05-01 VITALS
OXYGEN SATURATION: 96 % | HEIGHT: 63 IN | TEMPERATURE: 96.7 F | RESPIRATION RATE: 18 BRPM | DIASTOLIC BLOOD PRESSURE: 83 MMHG | BODY MASS INDEX: 20.45 KG/M2 | WEIGHT: 115.4 LBS | SYSTOLIC BLOOD PRESSURE: 120 MMHG | HEART RATE: 79 BPM

## 2017-05-01 DIAGNOSIS — C79.51 BONE METASTASIS: ICD-10-CM

## 2017-05-01 DIAGNOSIS — C79.31 BRAIN METASTASIS: ICD-10-CM

## 2017-05-01 DIAGNOSIS — C50.919 METASTATIC BREAST CANCER: Primary | ICD-10-CM

## 2017-05-01 DIAGNOSIS — C50.919 CARCINOMA OF BREAST METASTATIC TO MULTIPLE SITES, UNSPECIFIED LATERALITY (H): ICD-10-CM

## 2017-05-01 DIAGNOSIS — C50.919 METASTATIC BREAST CANCER: ICD-10-CM

## 2017-05-01 LAB
BASOPHILS # BLD AUTO: 0.1 10E9/L (ref 0–0.2)
BASOPHILS NFR BLD AUTO: 0.9 %
CANCER AG27-29 SERPL-ACNC: 20 U/ML (ref 0–39)
CEA SERPL-MCNC: 1.3 UG/L (ref 0–2.5)
DIFFERENTIAL METHOD BLD: ABNORMAL
EOSINOPHIL # BLD AUTO: 0 10E9/L (ref 0–0.7)
EOSINOPHIL NFR BLD AUTO: 0.1 %
ERYTHROCYTE [DISTWIDTH] IN BLOOD BY AUTOMATED COUNT: 16 % (ref 10–15)
HCT VFR BLD AUTO: 37.6 % (ref 35–47)
HGB BLD-MCNC: 12.2 G/DL (ref 11.7–15.7)
IMM GRANULOCYTES # BLD: 0.1 10E9/L (ref 0–0.4)
IMM GRANULOCYTES NFR BLD: 0.7 %
LYMPHOCYTES # BLD AUTO: 1.4 10E9/L (ref 0.8–5.3)
LYMPHOCYTES NFR BLD AUTO: 19 %
MCH RBC QN AUTO: 32.3 PG (ref 26.5–33)
MCHC RBC AUTO-ENTMCNC: 32.4 G/DL (ref 31.5–36.5)
MCV RBC AUTO: 100 FL (ref 78–100)
MONOCYTES # BLD AUTO: 0.9 10E9/L (ref 0–1.3)
MONOCYTES NFR BLD AUTO: 11.5 %
NEUTROPHILS # BLD AUTO: 5.1 10E9/L (ref 1.6–8.3)
NEUTROPHILS NFR BLD AUTO: 67.8 %
NRBC # BLD AUTO: 0 10*3/UL
NRBC BLD AUTO-RTO: 0 /100
PLATELET # BLD AUTO: 235 10E9/L (ref 150–450)
RBC # BLD AUTO: 3.78 10E12/L (ref 3.8–5.2)
WBC # BLD AUTO: 7.5 10E9/L (ref 4–11)

## 2017-05-01 PROCEDURE — 25000128 H RX IP 250 OP 636: Mod: ZF | Performed by: PHYSICIAN ASSISTANT

## 2017-05-01 PROCEDURE — 96413 CHEMO IV INFUSION 1 HR: CPT

## 2017-05-01 PROCEDURE — 86300 IMMUNOASSAY TUMOR CA 15-3: CPT | Performed by: PHYSICIAN ASSISTANT

## 2017-05-01 PROCEDURE — 85025 COMPLETE CBC W/AUTO DIFF WBC: CPT | Performed by: PHYSICIAN ASSISTANT

## 2017-05-01 PROCEDURE — 99214 OFFICE O/P EST MOD 30 MIN: CPT | Performed by: PHYSICIAN ASSISTANT

## 2017-05-01 PROCEDURE — 96372 THER/PROPH/DIAG INJ SC/IM: CPT | Mod: 59

## 2017-05-01 PROCEDURE — 82378 CARCINOEMBRYONIC ANTIGEN: CPT | Performed by: PHYSICIAN ASSISTANT

## 2017-05-01 PROCEDURE — 99212 OFFICE O/P EST SF 10 MIN: CPT | Mod: ZF

## 2017-05-01 RX ORDER — HEPARIN SODIUM (PORCINE) LOCK FLUSH IV SOLN 100 UNIT/ML 100 UNIT/ML
5 SOLUTION INTRAVENOUS EVERY 8 HOURS
Status: DISCONTINUED | OUTPATIENT
Start: 2017-05-01 | End: 2017-05-01 | Stop reason: HOSPADM

## 2017-05-01 RX ORDER — MEPERIDINE HYDROCHLORIDE 25 MG/ML
25 INJECTION INTRAMUSCULAR; INTRAVENOUS; SUBCUTANEOUS EVERY 30 MIN PRN
Status: CANCELLED | OUTPATIENT
Start: 2017-05-01

## 2017-05-01 RX ORDER — ALBUTEROL SULFATE 0.83 MG/ML
2.5 SOLUTION RESPIRATORY (INHALATION)
Status: CANCELLED | OUTPATIENT
Start: 2017-05-01

## 2017-05-01 RX ORDER — LORAZEPAM 2 MG/ML
0.5 INJECTION INTRAMUSCULAR EVERY 4 HOURS PRN
Status: CANCELLED
Start: 2017-05-01

## 2017-05-01 RX ORDER — MULTIPLE VITAMINS W/ MINERALS TAB 9MG-400MCG
1 TAB ORAL DAILY
COMMUNITY

## 2017-05-01 RX ORDER — DIPHENHYDRAMINE HCL 25 MG
50 CAPSULE ORAL
Status: CANCELLED | OUTPATIENT
Start: 2017-05-01

## 2017-05-01 RX ORDER — METHYLPREDNISOLONE SODIUM SUCCINATE 125 MG/2ML
125 INJECTION, POWDER, LYOPHILIZED, FOR SOLUTION INTRAMUSCULAR; INTRAVENOUS
Status: CANCELLED
Start: 2017-05-01

## 2017-05-01 RX ORDER — SODIUM CHLORIDE 9 MG/ML
1000 INJECTION, SOLUTION INTRAVENOUS CONTINUOUS PRN
Status: CANCELLED
Start: 2017-05-01

## 2017-05-01 RX ORDER — ALBUTEROL SULFATE 90 UG/1
1-2 AEROSOL, METERED RESPIRATORY (INHALATION)
Status: CANCELLED
Start: 2017-05-01

## 2017-05-01 RX ORDER — HEPARIN SODIUM (PORCINE) LOCK FLUSH IV SOLN 100 UNIT/ML 100 UNIT/ML
5 SOLUTION INTRAVENOUS EVERY 8 HOURS
Status: CANCELLED | OUTPATIENT
Start: 2017-05-01

## 2017-05-01 RX ORDER — DIPHENHYDRAMINE HYDROCHLORIDE 50 MG/ML
50 INJECTION INTRAMUSCULAR; INTRAVENOUS
Status: CANCELLED
Start: 2017-05-01

## 2017-05-01 RX ORDER — HEPARIN SODIUM (PORCINE) LOCK FLUSH IV SOLN 100 UNIT/ML 100 UNIT/ML
5 SOLUTION INTRAVENOUS DAILY PRN
Status: DISCONTINUED | OUTPATIENT
Start: 2017-05-01 | End: 2017-05-01 | Stop reason: HOSPADM

## 2017-05-01 RX ORDER — ACETAMINOPHEN 325 MG/1
650 TABLET ORAL
Status: CANCELLED | OUTPATIENT
Start: 2017-05-01

## 2017-05-01 RX ORDER — EPINEPHRINE 0.3 MG/.3ML
0.3 INJECTION SUBCUTANEOUS EVERY 5 MIN PRN
Status: CANCELLED | OUTPATIENT
Start: 2017-05-01

## 2017-05-01 RX ADMIN — SODIUM CHLORIDE, PRESERVATIVE FREE 5 ML: 5 INJECTION INTRAVENOUS at 13:09

## 2017-05-01 RX ADMIN — TRASTUZUMAB 300 MG: KIT at 15:46

## 2017-05-01 RX ADMIN — SODIUM CHLORIDE 250 ML: 9 INJECTION, SOLUTION INTRAVENOUS at 15:46

## 2017-05-01 RX ADMIN — DENOSUMAB 120 MG: 120 INJECTION SUBCUTANEOUS at 15:46

## 2017-05-01 RX ADMIN — SODIUM CHLORIDE, PRESERVATIVE FREE 5 ML: 5 INJECTION INTRAVENOUS at 16:18

## 2017-05-01 ASSESSMENT — PAIN SCALES - GENERAL: PAINLEVEL: MILD PAIN (2)

## 2017-05-01 NOTE — LETTER
5/1/2017      RE: Keren Erickson  [address] [facility] [address]       HEMATOLOGY/ONCOLOGY PROGRESS NOTE  May 1, 2017    REASON FOR VISIT: follow-up of metastatic breast cancer, triple positive    DIAGNOSIS:   The patient is a 60-year-old female who presented to the hospital initially in 09/2013 with complaints of dyspnea. Workup revealed a large pericardial effusion and pleural effusion. Physical examination revealed a large untreated left breast mass encompassing the entire left breast. Biopsies revealed these were ER, [place] and HER2-positive breast cancer. She had a thoracentesis and pericardial sclerosis performed. She was originally on Arimidex and Herceptin. In 01/2014, she was switched to tamoxifen and Herceptin due to arthralgias, and she ultimately developed progressive disease and was switched to Faslodex and Herceptin. In 01/2015, she was found to have progressive disease and was switched to T-DM1.     Initially when she was seen in late 02/2015, she complained of some V5 sensory deficit. This was subjective. I was not able to elicit this on examination. This persisted and further workup was performed with a brain MRI. This revealed what was initially thought to be 3 punctate brain metastases. She originally saw Radiation Oncology and Neurosurgery as well as underwent a lumbar puncture. Cytology from the lumbar puncture showed no evidence of leptomeningeal disease. She was treated with Gamma Knife radiation to 6 lesions, performed on 04/16/2015.  She initiated therapy with TDM1 in January 2015 and continued this through 6/26/2015 with overall stable disease. She has since been on a break from treatment. The patient presented earlier this month with recurrent shortness of breath.  Imaging revealed recurrent right-sided pleural effusion.  She has since undergone thoracentesis with cytology pending.  Clinically, however, there is evidence of disease progression. PET scan performed on 11/25/2015  "demonstrated progression of disease at multiple sites. She restarted TDM1 on 11/27/2015, however experienced a mild transfusion reaction with shortness of breath and chest tightness, resolved upon stopping TDM1 and 125 mg of SoluMedrol. She had progression of disease on repeat imaging 2/17/2016, as well as new brain metastases. She started TDM1 with Perjeta on 3/4/2016. She underwent gamma knife to brain mets on 3/8/16.       In late May, she was found to have progressive brain metastases.  She received whole brain radiation.  She had a follow up brain MRI August 2016 that was stable.     In September 2016, she enrolled on [place]  trial and was randomized to the standard of care arm/Physician's Choice; started on gemzar and herceptin. She was recently hospitalized 1/27-2/3/17 for presumed HCAP. Trial was suspended. She continued on gemzar and heceptin with stable disease. Last restaging was 4/7/17.     INTERVAL HISTORY:  Dominga presents for follow-up today prior to next cycle of treatment. Last cycle gemcitabine was held due to decline in performance status. She had several cycles of gem/herceptin following d/c from hospitalization for PNA and was seeming to recover and then declined. In the last 3 weeks, she has noted more recovery again. She is now walking without a walker. She notes her appetite has improved, and she has less taste changes. She is using spirometer and breathing well. Symptoms including neuro and pain are stable. No fevers/chills or infectious concerns. She has no concerns today. Still using desmopressin for DI.     ROS: 10 point ROS was conducted and was otherwise negative except for as above.     PHYSICAL EXAMINATION:     /83  Pulse 79  Temp 96.7  F (35.9  C) (Oral)  Resp 18  Ht 1.6 m (5' 2.99\")  Wt 52.3 kg (115 lb 6.4 oz)  SpO2 96%  BMI 20.45 kg/m2    Wt Readings from Last 4 Encounters:   05/01/17 52.3 kg (115 lb 6.4 oz)   04/10/17 52.7 kg (116 lb 3.2 oz)   03/28/17 51.5 " kg (113 lb 8 oz)   03/22/17 51.3 kg (113 lb 1.6 oz)     Constitutional: Alert, oriented, cachectic female in no visible distress. She is able to get up on exam table.   Eyes: PERRL. Anicteric sclerae.    ENT/Mouth: OM moist and pink without lesions or thrush.    CV: RRR, no murmurs appreciated.  Resp: CTAB slightly diminished R base, stable. Breathing comfortably.  Abdomen: Soft, non-tender, non-distended. Bowel sounds present. No masses appreciated. No organomegaly noted.  Extremities: No lower extremity edema appreciated.  Skin: Warm, dry. No bruising or petechiae noted. No rash  Lymph: No palpable LAD  Neuro: CN II-XII grossly intact. Baseline decreased sensation over mandibular branch of facial nerve    LABS:    5/1/2017 13:21   WBC 7.5   Hemoglobin 12.2   Hematocrit 37.6   Platelet Count 235   RBC Count 3.78 (L)      MCH 32.3   MCHC 32.4   RDW 16.0 (H)   Diff Method Automated Method   % Neutrophils 67.8   % Lymphocytes 19.0   % Monocytes 11.5   % Eosinophils 0.1   % Basophils 0.9   % Immature Granulocytes 0.7   Nucleated RBCs 0   Absolute Neutrophil 5.1   Absolute Lymphocytes 1.4   Absolute Monocytes 0.9   Absolute Eosinophils 0.0   Absolute Basophils 0.1   Abs Immature Granulocytes 0.1   Absolute Nucleated RBC 0.0         IMPRESSION/PLAN:  Dominga is a 61-year-old female with history of metastatic ER-positive, HER-2 positive breast cancer, with involvement of the bone, history of pleural effusions treated with pleurodesis and PleurX placement, and with treated brain metastases, previously with stable disease on TDM1, however patient opted for break from therapy from June 2015 through November 2015, and represented with worsening metastatic disease at that time. She restarted TDM1 on 11/27/2015. She had progressive disease 2/17/16 and Perjeta was added to TDM1. She had gamma knife to brain mets on 3/8.  She received WBRT.  She was started on [place]  clinical trial and randomized to physician's  choice, and started on Gemzar/Herceptin.    1. Breast cancer:  Has been on Gemzar/Herceptin with stable disease on restaging 4/7. Gemzar was held last cycle due to functional decline. She is improving today but would like another 3 weeks to further recover. Will give Herceptin alone. Her echocardiogram is overdue and will be scheduled prior to next infusion in 3 weeks.     2. Pulm/HCAP: Completed course of Levaquin 2/8 with resolution of symptoms.   - History of pleural effusion requiring thoracentesis with progressive disease off treatment. This has not recurred since resuming treatment. Exam is stable.    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 She started desmopressin 50 mcg with good response clinically.   -Had endocrine follow-up on 3/20 with no changes    4. Brain mets: Numbness of tongue and V2 and V3 on right are improving. Most recent brain MRI stable.    5. FEN: Weight has stabilized at 115. She is eating better.     6. Pain: Chronic mild aches/pains secondary to disease and with myalgias on Gemzar. No new sites of pain. Not needing oxycodone but has some available if needed. Manages with one Aleve daily.    7. GERD/nausea: Continue prilosec. Antiemetics prn.     8. Bone mets: on Xgeva. Due today.     9. Mood: She is overall coping well.     10. She has an advanced directive.  She has a POLST.  DNR/DNI.  Her power of  is listed in the computer.     11. Deconditioning: She had been walking independently or with cane in clinic prior to pneumonia, working on getting her strength back.  - Recently completed PT/OT. Continue home exercises. She is now using knee brace.     Esmer Oconnor PA-C    [facility]  [address] [place] [id number] [phone]

## 2017-05-01 NOTE — Clinical Note
5/1/2017       RE: Keren Erickson  [address] [place] [id number]     Dear Colleague,    Thank you for referring your patient, Keren Erickson, to [facility]. Please see a copy of my visit note below.    No notes on file    Again, thank you for allowing me to participate in the care of your patient.      Sincerely,    BRAULIO Van

## 2017-05-01 NOTE — NURSING NOTE
Chief Complaint   Patient presents with     Port Draw     port accessed by RN, labs collected and sent, line flushed with NS and heparin-pt tolerated well. Vitals taken and pt checked in for next appt.     Aurea Hair

## 2017-05-01 NOTE — MR AVS SNAPSHOT
After Visit Summary   5/1/2017    Keren Erickson    MRN: [id number]           Patient Information     Date Of Birth          1955        Visit Information        Provider Department      5/1/2017 3:00 PM CANELO 23 ATC;  ONCOLOGY INFUSION [facility]        Today's Diagnoses     Metastatic breast cancer (H)    -  1    Carcinoma of breast metastatic to multiple sites, unspecified laterality (H)        Brain metastasis (H)        Bone metastasis (H)          Care Instructions    Contact Numbers  [facility] Nurse Triage: [phone]  After Hours Nurse Line:  [phone]    Please call the [facility] Nurse Triage line or after hours number if you experience a temperature greater than or equal to 100.5, shaking chills, have uncontrolled nausea, vomiting and/or diarrhea, dizziness, shortness of breath, chest pain, bleeding, unexplained bruising, or if you have any other new/concerning symptoms, questions or concerns.     If you are having any concerning symptoms or wish to speak to a provider before your next infusion visit, please call your care coordinator or triage to notify them so we can adequately serve you.     If you need a refill on a narcotic prescription or other medication, please call triage before your infusion appointment.         May 2017   Uday Monday Tuesday Wednesday Thursday Friday Saturday        1     [facility] LAB DRAW    1:30 PM   (15 min.)    M[facility] LAB DRAW   [facility] Lab Draw     [facility] RETURN    1:45 PM   (50 min.)   Esmer Oconnor PA   [facility] ONC INFUSION 120    3:00 PM   (120 min.)    ONCOLOGY INFUSION   [facility] 2     3     4     5     6       7     8     9     10     11     12     13       14     15     16     17     18     19     20       21     22     [facility] LAB DRAW    1:45 PM   (15 min.)    [facility] LAB DRAW   [facility] Lab Draw     [facility] RETURN     2:05 PM   (50 min.)   Luisa Stapleton PA-C   [facility]     [facility] ONC INFUSION 120    3:00 PM   (120 min.)   [facility] ONCOLOGY INFUSION   [facility] 23     24     25     26     27       28     29     30     [facility] LAB DRAW    3:00 PM   (15 min.)    MASONIC LAB DRAW   [facility] Lab Draw     [facility] ONC INFUSION 60    3:30 PM   (60 min.)   [facility] ONCOLOGY INFUSION   [facility] 31 June 2017 Sunday Monday Tuesday Wednesday Thursday Friday Saturday                       1     2     3       4     5     6     7     8     9     10       11     12     13     14     15     16     [facility] LAB DRAW   12:30 PM   (15 min.)    MASONIC LAB DRAW   [facility] Lab Draw     MR BRAIN WWO   12:45 PM   (45 min.)   [id number]   [facility] MRI     CT CHEST/ABDOMEN/PELVIS W    1:45 PM   (20 min.)   UCCT2   [facility] CT 17       18     19     [facility] RETURN    3:45 PM   (30 min.)   Marlen Harper MD   [facility] 20     21     22     23     24       25     26     27     28     29     30                       Lab Results:  Recent Results (from the past 12 hour(s))   CBC with platelets differential    Collection Time: 05/01/17  1:21 PM   Result Value Ref Range    WBC 7.5 4.0 - 11.0 10e9/L    RBC Count 3.78 (L) 3.8 - 5.2 10e12/L    Hemoglobin 12.2 11.7 - 15.7 g/dL    Hematocrit 37.6 35.0 - 47.0 %     78 - 100 fl    MCH 32.3 26.5 - 33.0 pg    MCHC 32.4 31.5 - 36.5 g/dL    RDW 16.0 (H) 10.0 - 15.0 %    Platelet Count 235 150 - 450 10e9/L    Diff Method Automated Method     % Neutrophils 67.8 %    % Lymphocytes 19.0 %    % Monocytes 11.5 %    % Eosinophils 0.1 %    % Basophils 0.9 %    % Immature Granulocytes 0.7 %    Nucleated RBCs 0 0 /100    Absolute Neutrophil 5.1 1.6 - 8.3 10e9/L    Absolute Lymphocytes 1.4 0.8 - 5.3 10e9/L    Absolute Monocytes 0.9 0.0 - 1.3 10e9/L    Absolute  Eosinophils 0.0 0.0 - 0.7 10e9/L    Absolute Basophils 0.1 0.0 - 0.2 10e9/L    Abs Immature Granulocytes 0.1 0 - 0.4 10e9/L    Absolute Nucleated RBC 0.0              Follow-ups after your visit        Your next 10 appointments already scheduled     May 22, 2017  1:45 PM CDT   [facility] Lab Draw with [facility] LAB DRAW   [facility] Mason[facility] Lab Draw ([facility])    [address] [place] [id number] [phone]            May 22, 2017  2:20 PM CDT   (Arrive by 2:05 PM)   Return Visit with Luisa Stapleton PA-C   [facility] ([facility])    [address] [place] [id number] [phone]            May 22, 2017  3:00 PM CDT   Infusion 120 with UC ONCOLOGY INFUSION, UC 23 ATC   [facility] ([facility])    [address] [place] [id number] [phone]            May 30, 2017  3:00 PM CDT   [facility] Lab Draw with [facility] LAB DRAW   [facility] Lab Draw ([facility])    [address] [place] [address] [phone]            May 30, 2017  3:30 PM CDT   Infusion 60 with UC ONCOLOGY INFUSION, UC 16 ATC   [facility] ([facility])    [address] [place] [id number] [phone]            Jun 16, 2017 12:30 PM CDT   [facility] Lab Draw with [facility] LAB DRAW   [facility] Lab Draw ([facility])    [address] [place] [address] [phone]            Jun 16, 2017  1:00 PM CDT   (Arrive by 12:45 PM)   MR BRAIN W/O & W CONTRAST with [id number]   [facility] MRI ([facility])    [address] [place] [address]  1st [facility] [id number] [phone]           Take your medicines as usual, unless your doctor tells you  not to. Bring a list of your current medicines to your exam (including vitamins, minerals and over-the-counter drugs).  You will be given intravenous contrast for this exam. To prepare:   The day before your exam, drink extra fluids at least six 8-ounce glasses (unless your doctor tells you to restrict your fluids).   Have a blood test (creatinine test) within 30 days of your exam. Go to your clinic or Diagnostic Imaging Department for this test.  The MRI machine uses a strong magnet. Please wear clothes without metal (snaps, zippers). A sweatsuit works well, or we may give you a hospital gown.  Please remove any body piercings and hair extensions before you arrive. You will also remove watches, jewelry, hairpins, wallets, dentures, partial dental plates and hearing aids. You may wear contact lenses, and you may be able to wear your rings. We have a safe place to keep your personal items, but it is safer to leave them at home.   **IMPORTANT** THE INSTRUCTIONS BELOW ARE ONLY FOR THOSE PATIENTS WHO HAVE BEEN TOLD THEY WILL RECEIVE SEDATION OR GENERAL ANESTHESIA DURING THEIR MRI PROCEDURE:  IF YOU WILL RECEIVE SEDATION (take medicine to help you relax during your exam):   You must get the medicine from your doctor before you arrive. Bring the medicine to the exam. Do not take it at home.   Arrive one hour early. Bring someone who can take you home after the test. Your medicine will make you sleepy. After the exam, you may not drive, take a bus or take a taxi by yourself.   No eating 8 hours before your exam. You may have clear liquids up until 4 hours before your exam. (Clear liquids include water, clear tea, black coffee and fruit juice without pulp.)  IF YOU WILL RECEIVE ANESTHESIA (be asleep for your exam):   Arrive 1 1/2 hours early. Bring someone who can take you home after the test. You may not drive, take a bus or take a taxi by yourself.   No eating 8 hours before your exam. You may have clear liquids up until  4 hours before your exam. (Clear liquids include water, clear tea, black coffee and fruit juice without pulp.)  Please call the Imaging Department at your exam site with any questions.            Jun 16, 2017  2:00 PM CDT   (Arrive by 1:45 PM)   CT CHEST/ABDOMEN/PELVIS W CONTRAST with UCCT2   [facility] [place] [facility] ([facility] [place])    [address] [address] [phone]           Please bring any scans or X-rays taken at other hospitals, if similar tests were done. Also bring a list of your medicines, including vitamins, minerals and over-the-counter drugs. It is safest to leave personal items at home.  Be sure to tell your doctor:   If you have any allergies.   If there s any chance you are pregnant.   If you are breastfeeding.   If you have any special needs.  You may have contrast for this exam. To prepare:   Do not eat or drink for 2 hours before your exam. If you need to take medicine, you may take it with small sips of water. (We may ask you to take liquid medicine as well.)   The day before your exam, drink extra fluids at least six 8-ounce glasses (unless your doctor tells you to restrict your fluids).  Patients over 70 or patients with diabetes or kidney problems:   If you haven t had a blood test (creatinine test) within the last 30 days, go to your clinic or Diagnostic Imaging Department for this test.  If you have diabetes:   If your kidney function is normal, continue taking your metformin (Avandamet, Glucophage, Glucovance, Metaglip) on the day of your exam.   If your kidney function is abnormal, wait 48 hours before restarting this medicine.  You will have oral contrast for this exam:   You will drink the contrast at home. Get this from your clinic or Diagnostic Imaging Department. Please follow the directions given.  Please wear loose clothing, such as a sweat suit or jogging clothes. Avoid snaps, zippers and other metal. We may ask you  to undress and put on a hospital gown.  If you have any questions, please call the Imaging Department where you will have your exam.              Who to contact     If you have questions or need follow up information about today's clinic visit or your schedule please contact [facility] directly at [phone].  Normal or non-critical lab and imaging results will be communicated to you by MyChart, letter or phone within 4 business days after the clinic has received the results. If you do not hear from us within 7 days, please contact the clinic through [employer] or phone. If you have a critical or abnormal lab result, we will notify you by phone as soon as possible.  Submit refill requests through [employer] or call your pharmacy and they will forward the refill request to us. Please allow 3 business days for your refill to be completed.          Additional Information About Your Visit        [employer] Information     [employer] gives you secure access to your electronic health record. If you see a primary care provider, you can also send messages to your care team and make appointments. If you have questions, please call your primary care clinic.  If you do not have a primary care provider, please call [phone] and they will assist you.        Care EveryWhere ID     This is your Care EveryWhere ID. This could be used by other organizations to access your [place] medical records  [id number]         Blood Pressure from Last 3 Encounters:   05/01/17 120/83   04/10/17 120/81   03/28/17 137/90    Weight from Last 3 Encounters:   05/01/17 52.3 kg (115 lb 6.4 oz)   04/10/17 52.7 kg (116 lb 3.2 oz)   03/28/17 51.5 kg (113 lb 8 oz)              We Performed the Following     CA 27.29 Breast tumor marker     CBC with platelets differential     CEA     Treatment Conditions        Primary Care Provider Office Phone # Fax #    Kelly Hart [phone]       [facility] [address]  [place]  [facility] [address]        Thank you!     Thank you for choosing [facility]  for your care. Our goal is always to provide you with excellent care. Hearing back from our patients is one way we can continue to improve our services. Please take a few minutes to complete the written survey that you may receive in the mail after your visit with us. Thank you!             Your Updated Medication List - Protect others around you: Learn how to safely use, store and throw away your medicines at www.disposemymeds.org.          This list is accurate as of: 5/1/17  4:17 PM.  Always use your most recent med list.                   Brand Name Dispense Instructions for use    ALEVE PO      Take 220 mg by mouth daily       desmopressin 0.1 MG tablet    DDAVP    90 tablet    Take 1 tablet (100 mcg) by mouth At Bedtime       Multi-vitamin Tabs tablet      Take 1 tablet by mouth daily       VITAMIN E BLEND PO      Take by mouth daily

## 2017-05-01 NOTE — NURSING NOTE
"Oncology Rooming Note    May 1, 2017 2:01 PM   Keren Erickson is a 61 year old female who presents for: Port Draw and Oncology Clinic Visit    Initial Vitals: /83  Pulse 79  Temp 96.7  F (35.9  C) (Oral)  Resp 18  Ht 1.6 m (5' 2.99\")  Wt 52.3 kg (115 lb 6.4 oz)  SpO2 96%  BMI 20.45 kg/m2 Estimated body mass index is 20.45 kg/(m^2) as calculated from the following:    Height as of this encounter: 1.6 m (5' 2.99\").    Weight as of this encounter: 52.3 kg (115 lb 6.4 oz). Body surface area is 1.52 meters squared.  Mild Pain (2) Comment: right hip   No LMP recorded. Patient is postmenopausal.  Allergies reviewed: Yes  Medications reviewed: Yes    Medications: MEDICATION REFILLS NEEDED TODAY. Provider was notified.  Pharmacy name entered into [employer]: [id number] [address] AT [facility] [address]    Clinical concerns:Dessmopressin Esmer Appiah was NOT notified.    7 minutes for nursing intake (face to face time)     Garima Fink LPN                "

## 2017-05-01 NOTE — PROGRESS NOTES
Infusion Nursing Note:  Keren Erickson presents today for Herceptin, Xgeva.    Patient seen by provider today: Yes: BRAULIO Beaulieu    Note: Per BRAULIO Beaulieu request pt is to receive Xgeva today despite it being early. OK to use labs from 4/10. Also OK to give Herceptin with last echo being 1/25.  Pt aware she needs this scheduled before next infusion-scheduling working on this.    Intravenous Access:  Implanted Port.    Treatment Conditions:  Lab Results   Component Value Date    HGB 12.2 05/01/2017     Lab Results   Component Value Date    WBC 7.5 05/01/2017      Lab Results   Component Value Date    ANEU 5.1 05/01/2017     Lab Results   Component Value Date     05/01/2017      Lab Results   Component Value Date     04/10/2017                   Lab Results   Component Value Date    POTASSIUM 4.0 04/10/2017           Lab Results   Component Value Date    MAG 2.4 04/10/2017            Lab Results   Component Value Date    CR 0.58 04/10/2017                   Lab Results   Component Value Date    OMA 8.9 04/10/2017                Lab Results   Component Value Date    BILITOTAL 0.3 04/10/2017           Lab Results   Component Value Date    ALBUMIN 3.6 04/10/2017                    Lab Results   Component Value Date    ALT 36 04/10/2017           Lab Results   Component Value Date    AST 35 04/10/2017     Results reviewed, labs MET treatment parameters, ok to proceed with treatment.  ECHO/MUGA completed 1/25  EF 60-65%.    Post Infusion Assessment:  Patient tolerated infusion without incident.  Blood return noted pre and post infusion.  Site patent and intact, free from redness, edema or discomfort.  No evidence of extravasations. Access discontinued per protocol.    Discharge Plan:   Prescription refills given for Desmopressin-will be mailed to pt.  Copy of AVS reviewed with patient and/or family.  Patient will return 5/22 for next appointment.  Patient discharged in stable condition  accompanied by: friend.  Departure Mode: Ambulatory.    Taty Pompa RN

## 2017-05-01 NOTE — PROGRESS NOTES
HEMATOLOGY/ONCOLOGY PROGRESS NOTE  May 1, 2017    REASON FOR VISIT: follow-up of metastatic breast cancer, triple positive    DIAGNOSIS:   The patient is a 60-year-old female who presented to the hospital initially in 09/2013 with complaints of dyspnea. Workup revealed a large pericardial effusion and pleural effusion. Physical examination revealed a large untreated left breast mass encompassing the entire left breast. Biopsies revealed these were ER, WA and HER2-positive breast cancer. She had a thoracentesis and pericardial sclerosis performed. She was originally on Arimidex and Herceptin. In 01/2014, she was switched to tamoxifen and Herceptin due to arthralgias, and she ultimately developed progressive disease and was switched to Faslodex and Herceptin. In 01/2015, she was found to have progressive disease and was switched to T-DM1.     Initially when she was seen in late 02/2015, she complained of some V5 sensory deficit. This was subjective. I was not able to elicit this on examination. This persisted and further workup was performed with a brain MRI. This revealed what was initially thought to be 3 punctate brain metastases. She originally saw Radiation Oncology and Neurosurgery as well as underwent a lumbar puncture. Cytology from the lumbar puncture showed no evidence of leptomeningeal disease. She was treated with Gamma Knife radiation to 6 lesions, performed on 04/16/2015.  She initiated therapy with TDM1 in January 2015 and continued this through 6/26/2015 with overall stable disease. She has since been on a break from treatment. The patient presented earlier this month with recurrent shortness of breath.  Imaging revealed recurrent right-sided pleural effusion.  She has since undergone thoracentesis with cytology pending.  Clinically, however, there is evidence of disease progression. PET scan performed on 11/25/2015 demonstrated progression of disease at multiple sites. She restarted TDM1 on  "11/27/2015, however experienced a mild transfusion reaction with shortness of breath and chest tightness, resolved upon stopping TDM1 and 125 mg of SoluMedrol. She had progression of disease on repeat imaging 2/17/2016, as well as new brain metastases. She started TDM1 with Perjeta on 3/4/2016. She underwent gamma knife to brain mets on 3/8/16.       In late May, she was found to have progressive brain metastases.  She received whole brain radiation.  She had a follow up brain MRI August 2016 that was stable.     In September 2016, she enrolled on [place]  trial and was randomized to the standard of care arm/Physician's Choice; started on gemzar and herceptin. She was recently hospitalized 1/27-2/3/17 for presumed HCAP. Trial was suspended. She continued on gemzar and heceptin with stable disease. Last restaging was 4/7/17.     INTERVAL HISTORY:  Dominga presents for follow-up today prior to next cycle of treatment. Last cycle gemcitabine was held due to decline in performance status. She had several cycles of gem/herceptin following d/c from hospitalization for PNA and was seeming to recover and then declined. In the last 3 weeks, she has noted more recovery again. She is now walking without a walker. She notes her appetite has improved, and she has less taste changes. She is using spirometer and breathing well. Symptoms including neuro and pain are stable. No fevers/chills or infectious concerns. She has no concerns today. Still using desmopressin for DI.     ROS: 10 point ROS was conducted and was otherwise negative except for as above.     PHYSICAL EXAMINATION:     /83  Pulse 79  Temp 96.7  F (35.9  C) (Oral)  Resp 18  Ht 1.6 m (5' 2.99\")  Wt 52.3 kg (115 lb 6.4 oz)  SpO2 96%  BMI 20.45 kg/m2    Wt Readings from Last 4 Encounters:   05/01/17 52.3 kg (115 lb 6.4 oz)   04/10/17 52.7 kg (116 lb 3.2 oz)   03/28/17 51.5 kg (113 lb 8 oz)   03/22/17 51.3 kg (113 lb 1.6 oz)     Constitutional: " Alert, oriented, cachectic female in no visible distress. She is able to get up on exam table.   Eyes: PERRL. Anicteric sclerae.    ENT/Mouth: OM moist and pink without lesions or thrush.    CV: RRR, no murmurs appreciated.  Resp: CTAB slightly diminished R base, stable. Breathing comfortably.  Abdomen: Soft, non-tender, non-distended. Bowel sounds present. No masses appreciated. No organomegaly noted.  Extremities: No lower extremity edema appreciated.  Skin: Warm, dry. No bruising or petechiae noted. No rash  Lymph: No palpable LAD  Neuro: CN II-XII grossly intact. Baseline decreased sensation over mandibular branch of facial nerve    LABS:    5/1/2017 13:21   WBC 7.5   Hemoglobin 12.2   Hematocrit 37.6   Platelet Count 235   RBC Count 3.78 (L)      MCH 32.3   MCHC 32.4   RDW 16.0 (H)   Diff Method Automated Method   % Neutrophils 67.8   % Lymphocytes 19.0   % Monocytes 11.5   % Eosinophils 0.1   % Basophils 0.9   % Immature Granulocytes 0.7   Nucleated RBCs 0   Absolute Neutrophil 5.1   Absolute Lymphocytes 1.4   Absolute Monocytes 0.9   Absolute Eosinophils 0.0   Absolute Basophils 0.1   Abs Immature Granulocytes 0.1   Absolute Nucleated RBC 0.0         IMPRESSION/PLAN:  Dominga is a 61-year-old female with history of metastatic ER-positive, HER-2 positive breast cancer, with involvement of the bone, history of pleural effusions treated with pleurodesis and PleurX placement, and with treated brain metastases, previously with stable disease on TDM1, however patient opted for break from therapy from June 2015 through November 2015, and represented with worsening metastatic disease at that time. She restarted TDM1 on 11/27/2015. She had progressive disease 2/17/16 and Perjeta was added to TDM1. She had gamma knife to brain mets on 3/8.  She received WBRT.  She was started on [place]  clinical trial and randomized to physician's choice, and started on Gemzar/Herceptin.    1. Breast cancer:  Has been  on Gemzar/Herceptin with stable disease on restaging 4/7. Gemzar was held last cycle due to functional decline. She is improving today but would like another 3 weeks to further recover. Will give Herceptin alone. Her echocardiogram is overdue and will be scheduled prior to next infusion in 3 weeks.     2. Pulm/HCAP: Completed course of Levaquin 2/8 with resolution of symptoms.   - History of pleural effusion requiring thoracentesis with progressive disease off treatment. This has not recurred since resuming treatment. Exam is stable.    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 She started desmopressin 50 mcg with good response clinically.   -Had endocrine follow-up on 3/20 with no changes    4. Brain mets: Numbness of tongue and V2 and V3 on right are improving. Most recent brain MRI stable.    5. FEN: Weight has stabilized at 115. She is eating better.     6. Pain: Chronic mild aches/pains secondary to disease and with myalgias on Gemzar. No new sites of pain. Not needing oxycodone but has some available if needed. Manages with one Aleve daily.    7. GERD/nausea: Continue prilosec. Antiemetics prn.     8. Bone mets: on Xgeva. Due today.     9. Mood: She is overall coping well.     10. She has an advanced directive.  She has a POLST.  DNR/DNI.  Her power of  is listed in the computer.     11. Deconditioning: She had been walking independently or with cane in clinic prior to pneumonia, working on getting her strength back.  - Recently completed PT/OT. Continue home exercises. She is now using knee brace.     Esmer Oconnor PA-C    [facility]  [address] [place] [id number]  [phone]

## 2017-05-01 NOTE — PATIENT INSTRUCTIONS
Contact Numbers  [facility] Nurse Triage: [phone]  After Hours Nurse Line:  [phone]    Please call the [facility] Nurse Triage line or after hours number if you experience a temperature greater than or equal to 100.5, shaking chills, have uncontrolled nausea, vomiting and/or diarrhea, dizziness, shortness of breath, chest pain, bleeding, unexplained bruising, or if you have any other new/concerning symptoms, questions or concerns.     If you are having any concerning symptoms or wish to speak to a provider before your next infusion visit, please call your care coordinator or triage to notify them so we can adequately serve you.     If you need a refill on a narcotic prescription or other medication, please call triage before your infusion appointment.         May 2017   Uday Monday Tuesday Wednesday Thursday Friday Saturday        1     P [facility] LAB DRAW    1:30 PM   (15 min.)    [facility] LAB DRAW   [facility] Lab Draw     UMP RETURN    1:45 PM   (50 min.)   [facility]    3:00 PM   (120 min.)    ONCOLOGY INFUSION   [facility] 2     3     4     5     6       7     8     9     10     11     12     13       14     15     16     17     18     19     20       21     22     [facility] LAB DRAW    1:45 PM   (15 min.)    [facility]IC LAB DRAW   [facility] Lab Draw     UMP RETURN    2:05 PM   (50 min.)   Luisa Stapleton [facility]-C   [facility]    3:00 PM   (120 min.)    ONCOLOGY INFUSION   [facility] 23     24     25     26     27       28     29     30     [facility] LAB DRAW    3:00 PM   (15 min.)    [facility] LAB DRAW   [facility] Lab Draw     P ONC INFUSION 60    3:30 PM   (60 min.)    ONCOLOGY INFUSION   [facility] 31 June 2017 Sunday Monday Tuesday Wednesday Thursday  Friday Saturday                       1     2     3       4     5     6     7     8     9     10       11     12     13     14     15     16     [facility] LAB DRAW   12:30 PM   (15 min.)    [facility]   [facility] Lab Draw     MR BRAIN WWO   12:45 PM   (45 min.)   [id number]   [facility] MRI     CT CHEST/ABDOMEN/PELVIS W    1:45 PM   (20 min.)   UCCT2   [facility] CT 17       18     19     UMP RETURN    3:45 PM   (30 min.)   Marlen Harper MD   [facility] 20     21     22     23     24       25     26     27     28     29     30                       Lab Results:  Recent Results (from the past 12 hour(s))   CBC with platelets differential    Collection Time: 05/01/17  1:21 PM   Result Value Ref Range    WBC 7.5 4.0 - 11.0 10e9/L    RBC Count 3.78 (L) 3.8 - 5.2 10e12/L    Hemoglobin 12.2 11.7 - 15.7 g/dL    Hematocrit 37.6 35.0 - 47.0 %     78 - 100 fl    MCH 32.3 26.5 - 33.0 pg    MCHC 32.4 31.5 - 36.5 g/dL    RDW 16.0 (H) 10.0 - 15.0 %    Platelet Count 235 150 - 450 10e9/L    Diff Method Automated Method     % Neutrophils 67.8 %    % Lymphocytes 19.0 %    % Monocytes 11.5 %    % Eosinophils 0.1 %    % Basophils 0.9 %    % Immature Granulocytes 0.7 %    Nucleated RBCs 0 0 /100    Absolute Neutrophil 5.1 1.6 - 8.3 10e9/L    Absolute Lymphocytes 1.4 0.8 - 5.3 10e9/L    Absolute Monocytes 0.9 0.0 - 1.3 10e9/L    Absolute Eosinophils 0.0 0.0 - 0.7 10e9/L    Absolute Basophils 0.1 0.0 - 0.2 10e9/L    Abs Immature Granulocytes 0.1 0 - 0.4 10e9/L    Absolute Nucleated RBC 0.0

## 2017-05-01 NOTE — MR AVS SNAPSHOT
After Visit Summary   5/1/2017    Keren Erickson    MRN: [id number]           Patient Information     Date Of Birth          1955        Visit Information        Provider Department      5/1/2017 2:00 PM Esmer Oconnor PA [facility]        Today's Diagnoses     Bone metastasis (H)        Brain metastasis (H)        Carcinoma of breast metastatic to multiple sites, unspecified laterality (H)        Metastatic breast cancer (H)           Follow-ups after your visit        Your next 10 appointments already scheduled     May 22, 2017  1:45 PM CDT   [facility] Lab Draw with UC MAS[facility]IC LAB DRAW   [facility] Lab Draw ([facility])    [address]  [address] [facility] [address] [phone]            May 22, 2017  2:20 PM CDT   (Arrive by 2:05 PM)   Return Visit with Luisa Stapleton PA-C   [facility] ([facility])    [address] [address] [phone]            May 22, 2017  3:00 PM CDT   Infusion 120 with UC ONCOLOGY INFUSION, UC 23 ATC   [facility] ([facility])    [address]  [address] [facility] [address]   [phone]            May 30, 2017  3:00 PM CDT   [facility] Lab Draw with UC [facility] LAB DRAW   [facility] Lab Draw ([facility])    [address]  [address] [facility] [address]   [phone]            May 30, 2017  3:30 PM CDT   Infusion 60 with UC ONCOLOGY INFUSION, UC 16 ATC   [facility] ([facility])    [address]  [address] [facility] [address]   [phone]            Jun 16, 2017 12:30 PM CDT   [facility] Lab Draw with UC MASONIC LAB DRAW   [facility] Lab Draw ([facility])    [address] [address]    [phone]            Jun 16, 2017  1:00 PM CDT   (Arrive by 12:45 PM)   MR BRAIN W/O & W CONTRAST with [id number]   [facility] [place] [facility] ([facility] [place])    [address] [facility]  [address] [facility] [id number] [phone]           Take your medicines as usual, unless your doctor tells you not to. Bring a list of your current medicines to your exam (including vitamins, minerals and over-the-counter drugs).  You will be given intravenous contrast for this exam. To prepare:   The day before your exam, drink extra fluids at least six 8-ounce glasses (unless your doctor tells you to restrict your fluids).   Have a blood test (creatinine test) within 30 days of your exam. Go to your clinic or Diagnostic Imaging Department for this test.  The MRI machine uses a strong magnet. Please wear clothes without metal (snaps, zippers). A sweatsuit works well, or we may give you a hospital gown.  Please remove any body piercings and hair extensions before you arrive. You will also remove watches, jewelry, hairpins, wallets, dentures, partial dental plates and hearing aids. You may wear contact lenses, and you may be able to wear your rings. We have a safe place to keep your personal items, but it is safer to leave them at home.   **IMPORTANT** THE INSTRUCTIONS BELOW ARE ONLY FOR THOSE PATIENTS WHO HAVE BEEN TOLD THEY WILL RECEIVE SEDATION OR GENERAL ANESTHESIA DURING THEIR MRI PROCEDURE:  IF YOU WILL RECEIVE SEDATION (take medicine to help you relax during your exam):   You must get the medicine from your doctor before you arrive. Bring the medicine to the exam. Do not take it at home.   Arrive one hour early. Bring someone who can take you home after the test. Your medicine will make you sleepy. After the exam, you may not drive, take a bus or take a taxi by yourself.   No eating 8 hours before your exam. You may have clear liquids up until 4 hours before your exam. (Clear liquids include  water, clear tea, black coffee and fruit juice without pulp.)  IF YOU WILL RECEIVE ANESTHESIA (be asleep for your exam):   Arrive 1 1/2 hours early. Bring someone who can take you home after the test. You may not drive, take a bus or take a taxi by yourself.   No eating 8 hours before your exam. You may have clear liquids up until 4 hours before your exam. (Clear liquids include water, clear tea, black coffee and fruit juice without pulp.)  Please call the Imaging Department at your exam site with any questions.            Jun 16, 2017  2:00 PM CDT   (Arrive by 1:45 PM)   CT CHEST/ABDOMEN/PELVIS W CONTRAST with UCCT2   [facility] [place] [facility] ([facility] [place])    [address] [address] [phone]           Please bring any scans or X-rays taken at other hospitals, if similar tests were done. Also bring a list of your medicines, including vitamins, minerals and over-the-counter drugs. It is safest to leave personal items at home.  Be sure to tell your doctor:   If you have any allergies.   If there s any chance you are pregnant.   If you are breastfeeding.   If you have any special needs.  You may have contrast for this exam. To prepare:   Do not eat or drink for 2 hours before your exam. If you need to take medicine, you may take it with small sips of water. (We may ask you to take liquid medicine as well.)   The day before your exam, drink extra fluids at least six 8-ounce glasses (unless your doctor tells you to restrict your fluids).  Patients over 70 or patients with diabetes or kidney problems:   If you haven t had a blood test (creatinine test) within the last 30 days, go to your clinic or Diagnostic Imaging Department for this test.  If you have diabetes:   If your kidney function is normal, continue taking your metformin (Avandamet, Glucophage, Glucovance, Metaglip) on the day of your exam.   If your kidney function is abnormal, wait 48 hours  "before restarting this medicine.  You will have oral contrast for this exam:   You will drink the contrast at home. Get this from your clinic or Diagnostic Imaging Department. Please follow the directions given.  Please wear loose clothing, such as a sweat suit or jogging clothes. Avoid snaps, zippers and other metal. We may ask you to undress and put on a hospital gown.  If you have any questions, please call the Imaging Department where you will have your exam.              Who to contact     If you have questions or need follow up information about today's clinic visit or your schedule please contact [facility] directly at [phone].  Normal or non-critical lab and imaging results will be communicated to you by Tem[employer], letter or phone within 4 business days after the clinic has received the results. If you do not hear from us within 7 days, please contact the clinic through [employer] or phone. If you have a critical or abnormal lab result, we will notify you by phone as soon as possible.  Submit refill requests through [employer] or call your pharmacy and they will forward the refill request to us. Please allow 3 business days for your refill to be completed.          Additional Information About Your Visit        [employer] Information     [employer] gives you secure access to your electronic health record. If you see a primary care provider, you can also send messages to your care team and make appointments. If you have questions, please call your primary care clinic.  If you do not have a primary care provider, please call [phone] and they will assist you.        Care EveryWhere ID     This is your Care EveryWhere ID. This could be used by other organizations to access your [place] medical records  [id number]        Your Vitals Were     Pulse Temperature Respirations Height Pulse Oximetry BMI (Body Mass Index)    79 96.7  F (35.9  C) (Oral) 18 1.6 m (5' 2.99\") 96% 20.45 kg/m2       Blood " Pressure from Last 3 Encounters:   05/01/17 120/83   04/10/17 120/81   03/28/17 137/90    Weight from Last 3 Encounters:   05/01/17 52.3 kg (115 lb 6.4 oz)   04/10/17 52.7 kg (116 lb 3.2 oz)   03/28/17 51.5 kg (113 lb 8 oz)              Today, you had the following     No orders found for display       Primary Care Provider Office Phone # Fax #    Kelly Bg Hart [phone]       [facility] [address] [facility] [address]        Thank you!     Thank you for choosing [facility]  for your care. Our goal is always to provide you with excellent care. Hearing back from our patients is one way we can continue to improve our services. Please take a few minutes to complete the written survey that you may receive in the mail after your visit with us. Thank you!             Your Updated Medication List - Protect others around you: Learn how to safely use, store and throw away your medicines at www.disposemymeds.org.          This list is accurate as of: 5/1/17  5:01 PM.  Always use your most recent med list.                   Brand Name Dispense Instructions for use    ALEVE PO      Take 220 mg by mouth daily       desmopressin 0.1 MG tablet    DDAVP    90 tablet    Take 1 tablet (100 mcg) by mouth At Bedtime       Multi-vitamin Tabs tablet      Take 1 tablet by mouth daily       VITAMIN E BLEND PO      Take by mouth daily

## 2017-05-22 ENCOUNTER — RADIANT APPOINTMENT (OUTPATIENT)
Dept: CARDIOLOGY | Facility: CLINIC | Age: 62
End: 2017-05-22
Attending: PHYSICIAN ASSISTANT

## 2017-05-22 ENCOUNTER — ONCOLOGY VISIT (OUTPATIENT)
Dept: ONCOLOGY | Facility: CLINIC | Age: 62
End: 2017-05-22
Attending: INTERNAL MEDICINE
Payer: COMMERCIAL

## 2017-05-22 VITALS
SYSTOLIC BLOOD PRESSURE: 122 MMHG | BODY MASS INDEX: 21.29 KG/M2 | RESPIRATION RATE: 18 BRPM | HEIGHT: 62 IN | TEMPERATURE: 98.1 F | WEIGHT: 115.7 LBS | OXYGEN SATURATION: 95 % | HEART RATE: 81 BPM | DIASTOLIC BLOOD PRESSURE: 84 MMHG

## 2017-05-22 DIAGNOSIS — C50.919 METASTATIC BREAST CANCER: Primary | ICD-10-CM

## 2017-05-22 DIAGNOSIS — C79.31 BRAIN METASTASIS: ICD-10-CM

## 2017-05-22 DIAGNOSIS — C50.919 METASTATIC BREAST CANCER: ICD-10-CM

## 2017-05-22 DIAGNOSIS — C50.919 CARCINOMA OF BREAST METASTATIC TO MULTIPLE SITES, UNSPECIFIED LATERALITY (H): ICD-10-CM

## 2017-05-22 DIAGNOSIS — C50.919 CARCINOMA OF BREAST METASTATIC TO MULTIPLE SITES, UNSPECIFIED LATERALITY (H): Primary | ICD-10-CM

## 2017-05-22 LAB
BASOPHILS # BLD AUTO: 0.1 10E9/L (ref 0–0.2)
BASOPHILS NFR BLD AUTO: 0.8 %
CANCER AG27-29 SERPL-ACNC: 15 U/ML (ref 0–39)
CEA SERPL-MCNC: 1.2 UG/L (ref 0–2.5)
DIFFERENTIAL METHOD BLD: ABNORMAL
EOSINOPHIL # BLD AUTO: 0 10E9/L (ref 0–0.7)
EOSINOPHIL NFR BLD AUTO: 0.2 %
ERYTHROCYTE [DISTWIDTH] IN BLOOD BY AUTOMATED COUNT: 14.9 % (ref 10–15)
HCT VFR BLD AUTO: 36.7 % (ref 35–47)
HGB BLD-MCNC: 12 G/DL (ref 11.7–15.7)
IMM GRANULOCYTES # BLD: 0.1 10E9/L (ref 0–0.4)
IMM GRANULOCYTES NFR BLD: 0.8 %
LYMPHOCYTES # BLD AUTO: 1.4 10E9/L (ref 0.8–5.3)
LYMPHOCYTES NFR BLD AUTO: 21.4 %
MCH RBC QN AUTO: 32 PG (ref 26.5–33)
MCHC RBC AUTO-ENTMCNC: 32.7 G/DL (ref 31.5–36.5)
MCV RBC AUTO: 98 FL (ref 78–100)
MONOCYTES # BLD AUTO: 0.6 10E9/L (ref 0–1.3)
MONOCYTES NFR BLD AUTO: 9.5 %
NEUTROPHILS # BLD AUTO: 4.5 10E9/L (ref 1.6–8.3)
NEUTROPHILS NFR BLD AUTO: 67.3 %
NRBC # BLD AUTO: 0 10*3/UL
NRBC BLD AUTO-RTO: 0 /100
PLATELET # BLD AUTO: 300 10E9/L (ref 150–450)
RBC # BLD AUTO: 3.75 10E12/L (ref 3.8–5.2)
WBC # BLD AUTO: 6.6 10E9/L (ref 4–11)

## 2017-05-22 PROCEDURE — 85025 COMPLETE CBC W/AUTO DIFF WBC: CPT | Performed by: PHYSICIAN ASSISTANT

## 2017-05-22 PROCEDURE — 86300 IMMUNOASSAY TUMOR CA 15-3: CPT | Performed by: PHYSICIAN ASSISTANT

## 2017-05-22 PROCEDURE — 82378 CARCINOEMBRYONIC ANTIGEN: CPT | Performed by: PHYSICIAN ASSISTANT

## 2017-05-22 PROCEDURE — 99214 OFFICE O/P EST MOD 30 MIN: CPT | Mod: ZP | Performed by: PHYSICIAN ASSISTANT

## 2017-05-22 PROCEDURE — 96413 CHEMO IV INFUSION 1 HR: CPT

## 2017-05-22 PROCEDURE — 25000128 H RX IP 250 OP 636: Mod: ZF | Performed by: PHYSICIAN ASSISTANT

## 2017-05-22 PROCEDURE — 99212 OFFICE O/P EST SF 10 MIN: CPT | Mod: ZF

## 2017-05-22 PROCEDURE — 80053 COMPREHEN METABOLIC PANEL: CPT | Performed by: PHYSICIAN ASSISTANT

## 2017-05-22 RX ORDER — ALBUTEROL SULFATE 90 UG/1
1-2 AEROSOL, METERED RESPIRATORY (INHALATION)
Status: CANCELLED
Start: 2017-05-29

## 2017-05-22 RX ORDER — EPINEPHRINE 0.3 MG/.3ML
0.3 INJECTION SUBCUTANEOUS EVERY 5 MIN PRN
Status: CANCELLED | OUTPATIENT
Start: 2017-05-22

## 2017-05-22 RX ORDER — HEPARIN SODIUM (PORCINE) LOCK FLUSH IV SOLN 100 UNIT/ML 100 UNIT/ML
5 SOLUTION INTRAVENOUS DAILY PRN
Status: DISCONTINUED | OUTPATIENT
Start: 2017-05-22 | End: 2017-05-23 | Stop reason: HOSPADM

## 2017-05-22 RX ORDER — LORAZEPAM 2 MG/ML
0.5 INJECTION INTRAMUSCULAR EVERY 4 HOURS PRN
Status: CANCELLED
Start: 2017-05-22

## 2017-05-22 RX ORDER — DIPHENHYDRAMINE HYDROCHLORIDE 50 MG/ML
50 INJECTION INTRAMUSCULAR; INTRAVENOUS
Status: CANCELLED
Start: 2017-05-22

## 2017-05-22 RX ORDER — HEPARIN SODIUM (PORCINE) LOCK FLUSH IV SOLN 100 UNIT/ML 100 UNIT/ML
5 SOLUTION INTRAVENOUS EVERY 8 HOURS
Status: CANCELLED | OUTPATIENT
Start: 2017-05-22

## 2017-05-22 RX ORDER — MEPERIDINE HYDROCHLORIDE 25 MG/ML
25 INJECTION INTRAMUSCULAR; INTRAVENOUS; SUBCUTANEOUS EVERY 30 MIN PRN
Status: CANCELLED | OUTPATIENT
Start: 2017-05-29

## 2017-05-22 RX ORDER — EPINEPHRINE 0.3 MG/.3ML
0.3 INJECTION SUBCUTANEOUS EVERY 5 MIN PRN
Status: CANCELLED | OUTPATIENT
Start: 2017-05-29

## 2017-05-22 RX ORDER — HEPARIN SODIUM (PORCINE) LOCK FLUSH IV SOLN 100 UNIT/ML 100 UNIT/ML
5 SOLUTION INTRAVENOUS EVERY 8 HOURS
Status: DISCONTINUED | OUTPATIENT
Start: 2017-05-22 | End: 2017-05-22 | Stop reason: HOSPADM

## 2017-05-22 RX ORDER — ALBUTEROL SULFATE 90 UG/1
1-2 AEROSOL, METERED RESPIRATORY (INHALATION)
Status: CANCELLED
Start: 2017-05-22

## 2017-05-22 RX ORDER — METHYLPREDNISOLONE SODIUM SUCCINATE 125 MG/2ML
125 INJECTION, POWDER, LYOPHILIZED, FOR SOLUTION INTRAMUSCULAR; INTRAVENOUS
Status: CANCELLED
Start: 2017-05-22

## 2017-05-22 RX ORDER — LORAZEPAM 2 MG/ML
0.5 INJECTION INTRAMUSCULAR EVERY 4 HOURS PRN
Status: CANCELLED
Start: 2017-05-29

## 2017-05-22 RX ORDER — SODIUM CHLORIDE 9 MG/ML
1000 INJECTION, SOLUTION INTRAVENOUS CONTINUOUS PRN
Status: CANCELLED
Start: 2017-05-29

## 2017-05-22 RX ORDER — SODIUM CHLORIDE 9 MG/ML
1000 INJECTION, SOLUTION INTRAVENOUS CONTINUOUS PRN
Status: CANCELLED
Start: 2017-05-22

## 2017-05-22 RX ORDER — DIPHENHYDRAMINE HCL 25 MG
50 CAPSULE ORAL
Status: CANCELLED | OUTPATIENT
Start: 2017-05-22

## 2017-05-22 RX ORDER — METHYLPREDNISOLONE SODIUM SUCCINATE 125 MG/2ML
125 INJECTION, POWDER, LYOPHILIZED, FOR SOLUTION INTRAMUSCULAR; INTRAVENOUS
Status: CANCELLED
Start: 2017-05-29

## 2017-05-22 RX ORDER — ACETAMINOPHEN 325 MG/1
650 TABLET ORAL
Status: CANCELLED | OUTPATIENT
Start: 2017-05-22

## 2017-05-22 RX ORDER — ALBUTEROL SULFATE 0.83 MG/ML
2.5 SOLUTION RESPIRATORY (INHALATION)
Status: CANCELLED | OUTPATIENT
Start: 2017-05-22

## 2017-05-22 RX ORDER — HEPARIN SODIUM (PORCINE) LOCK FLUSH IV SOLN 100 UNIT/ML 100 UNIT/ML
5 SOLUTION INTRAVENOUS EVERY 8 HOURS
Status: CANCELLED | OUTPATIENT
Start: 2017-05-29

## 2017-05-22 RX ORDER — MEPERIDINE HYDROCHLORIDE 25 MG/ML
25 INJECTION INTRAMUSCULAR; INTRAVENOUS; SUBCUTANEOUS EVERY 30 MIN PRN
Status: CANCELLED | OUTPATIENT
Start: 2017-05-22

## 2017-05-22 RX ORDER — ALBUTEROL SULFATE 0.83 MG/ML
2.5 SOLUTION RESPIRATORY (INHALATION)
Status: CANCELLED | OUTPATIENT
Start: 2017-05-29

## 2017-05-22 RX ORDER — DIPHENHYDRAMINE HYDROCHLORIDE 50 MG/ML
50 INJECTION INTRAMUSCULAR; INTRAVENOUS
Status: CANCELLED
Start: 2017-05-29

## 2017-05-22 RX ADMIN — SODIUM CHLORIDE, PRESERVATIVE FREE 5 ML: 5 INJECTION INTRAVENOUS at 13:52

## 2017-05-22 RX ADMIN — SODIUM CHLORIDE 250 ML: 9 INJECTION, SOLUTION INTRAVENOUS at 15:35

## 2017-05-22 RX ADMIN — SODIUM CHLORIDE, PRESERVATIVE FREE 5 ML: 5 INJECTION INTRAVENOUS at 16:06

## 2017-05-22 RX ADMIN — TRASTUZUMAB 300 MG: KIT at 15:35

## 2017-05-22 ASSESSMENT — PAIN SCALES - GENERAL: PAINLEVEL: NO PAIN (1)

## 2017-05-22 NOTE — NURSING NOTE
Chief Complaint   Patient presents with     Port Draw     Port accessed by   RN, labs collected and sent, line flushed with NS and heparin. Vitals taken and pt checked in for next appt.Aurea Hair

## 2017-05-22 NOTE — MR AVS SNAPSHOT
After Visit Summary   5/22/2017    Keren Erickson    MRN: [id number]           Patient Information     Date Of Birth          1955        Visit Information        Provider Department      5/22/2017 2:20 PM Luisa Stapleton PA-C [facility]        Today's Diagnoses     Carcinoma of breast metastatic to multiple sites, unspecified laterality (H)    -  1    Brain metastasis (H)        Metastatic breast cancer (H)           Follow-ups after your visit        Your next 10 appointments already scheduled     May 30, 2017  3:00 PM CDT   SHIMAUMA [facility] Lab Draw with  RunAlong LAB DRAW   [facility] Lab Draw ([facility])    [address] [place] [address]   [phone]            Jun 16, 2017 12:30 PM CDT   [facility] Lab Draw with  MASONIC LAB DRAW   [facility] Lab Draw ([facility])    [address] [place] [address]   [phone]            Jun 16, 2017  1:00 PM CDT   (Arrive by 12:45 PM)   MR BRAIN W/O & W CONTRAST with [id number]   [facility] MRI ([facility])    [address] [place] [address]   [phone]           Take your medicines as usual, unless your doctor tells you not to. Bring a list of your current medicines to your exam (including vitamins, minerals and over-the-counter drugs).  You will be given intravenous contrast for this exam. To prepare:   The day before your exam, drink extra fluids at least six 8-ounce glasses (unless your doctor tells you to restrict your fluids).   Have a blood test (creatinine test) within 30 days of your exam. Go to your clinic or Diagnostic Imaging Department for this test.  The MRI machine uses a strong magnet. Please wear clothes without metal (snaps, zippers). A sweatsuit works well, or we may give you a hospital gown.  Please remove any body piercings and  hair extensions before you arrive. You will also remove watches, jewelry, hairpins, wallets, dentures, partial dental plates and hearing aids. You may wear contact lenses, and you may be able to wear your rings. We have a safe place to keep your personal items, but it is safer to leave them at home.   **IMPORTANT** THE INSTRUCTIONS BELOW ARE ONLY FOR THOSE PATIENTS WHO HAVE BEEN TOLD THEY WILL RECEIVE SEDATION OR GENERAL ANESTHESIA DURING THEIR MRI PROCEDURE:  IF YOU WILL RECEIVE SEDATION (take medicine to help you relax during your exam):   You must get the medicine from your doctor before you arrive. Bring the medicine to the exam. Do not take it at home.   Arrive one hour early. Bring someone who can take you home after the test. Your medicine will make you sleepy. After the exam, you may not drive, take a bus or take a taxi by yourself.   No eating 8 hours before your exam. You may have clear liquids up until 4 hours before your exam. (Clear liquids include water, clear tea, black coffee and fruit juice without pulp.)  IF YOU WILL RECEIVE ANESTHESIA (be asleep for your exam):   Arrive 1 1/2 hours early. Bring someone who can take you home after the test. You may not drive, take a bus or take a taxi by yourself.   No eating 8 hours before your exam. You may have clear liquids up until 4 hours before your exam. (Clear liquids include water, clear tea, black coffee and fruit juice without pulp.)  Please call the Imaging Department at your exam site with any questions.            Jun 16, 2017  2:00 PM CDT   (Arrive by 1:45 PM)   CT CHEST/ABDOMEN/PELVIS W CONTRAST with UCCT2   [facility] CT ([facility])    [address] [address] [phone]           Please bring any scans or X-rays taken at other hospitals, if similar tests were done. Also bring a list of your medicines, including vitamins, minerals and over-the-counter drugs. It is safest  to leave personal items at home.  Be sure to tell your doctor:   If you have any allergies.   If there s any chance you are pregnant.   If you are breastfeeding.   If you have any special needs.  You may have contrast for this exam. To prepare:   Do not eat or drink for 2 hours before your exam. If you need to take medicine, you may take it with small sips of water. (We may ask you to take liquid medicine as well.)   The day before your exam, drink extra fluids at least six 8-ounce glasses (unless your doctor tells you to restrict your fluids).  Patients over 70 or patients with diabetes or kidney problems:   If you haven t had a blood test (creatinine test) within the last 30 days, go to your clinic or Diagnostic Imaging Department for this test.  If you have diabetes:   If your kidney function is normal, continue taking your metformin (Avandamet, Glucophage, Glucovance, Metaglip) on the day of your exam.   If your kidney function is abnormal, wait 48 hours before restarting this medicine.  You will have oral contrast for this exam:   You will drink the contrast at home. Get this from your clinic or Diagnostic Imaging Department. Please follow the directions given.  Please wear loose clothing, such as a sweat suit or jogging clothes. Avoid snaps, zippers and other metal. We may ask you to undress and put on a hospital gown.  If you have any questions, please call the Imaging Department where you will have your exam.            Jun 19, 2017  4:00 PM CDT   (Arrive by 3:45 PM)   Return Visit with Marlen Harper MD   [facility] ([facility] [place])    [address] [place] [address]  2nd Floor  [facility] [address] [phone]            Sep 18, 2017  2:30 PM CDT   (Arrive by 2:15 PM)   RETURN ENDOCRINE with Rolando Mishra MD   [facility] ([facility] [place])    [address] [place] [address]  3rd Floor  [facility] [id number] [phone]     "          Who to contact     If you have questions or need follow up information about today's clinic visit or your schedule please contact [facility] directly at [phone].  Normal or non-critical lab and imaging results will be communicated to you by MyChart, letter or phone within 4 business days after the clinic has received the results. If you do not hear from us within 7 days, please contact the clinic through MyChart or phone. If you have a critical or abnormal lab result, we will notify you by phone as soon as possible.  Submit refill requests through [employer] or call your pharmacy and they will forward the refill request to us. Please allow 3 business days for your refill to be completed.          Additional Information About Your Visit        [employer] Information     [employer] gives you secure access to your electronic health record. If you see a primary care provider, you can also send messages to your care team and make appointments. If you have questions, please call your primary care clinic.  If you do not have a primary care provider, please call [phone] and they will assist you.        Care EveryWhere ID     This is your Care EveryWhere ID. This could be used by other organizations to access your [place] medical records  [id number]        Your Vitals Were     Pulse Temperature Respirations Height Pulse Oximetry BMI (Body Mass Index)    81 98.1  F (36.7  C) (Oral) 18 1.575 m (5' 2\") 95% 21.16 kg/m2       Blood Pressure from Last 3 Encounters:   05/22/17 122/84   05/01/17 120/83   04/10/17 120/81    Weight from Last 3 Encounters:   05/22/17 52.5 kg (115 lb 11.2 oz)   05/01/17 52.3 kg (115 lb 6.4 oz)   04/10/17 52.7 kg (116 lb 3.2 oz)              We Performed the Following     Comprehensive metabolic panel        Primary Care Provider Office Phone # Fax #    Kelly Hart [phone]       [facility] [address] [facility] [address]        Thank " you!     Thank you for choosing [facility] CL[facility]IC  for your care. Our goal is always to provide you with excellent care. Hearing back from our patients is one way we can continue to improve our services. Please take a few minutes to complete the written survey that you may receive in the mail after your visit with us. Thank you!             Your Updated Medication List - Protect others around you: Learn how to safely use, store and throw away your medicines at www.disposemymeds.org.          This list is accurate as of: 5/22/17 11:59 PM.  Always use your most recent med list.                   Brand Name Dispense Instructions for use    ALEVE PO      Take 220 mg by mouth daily       desmopressin 0.1 MG tablet    DDAVP    90 tablet    Take 1 tablet (100 mcg) by mouth At Bedtime       Multi-vitamin Tabs tablet      Take 1 tablet by mouth daily       VITAMIN E BLEND PO      Take by mouth daily

## 2017-05-22 NOTE — PROGRESS NOTES
Infusion Nursing Note:  Keren Erickson presents for D1C12 Herceptin  Met with BRAULIO Denson before infusion.    Note: N/A.    Treatment Conditions:  Lab Results   Component Value Date    HGB 12.0 05/22/2017     Lab Results   Component Value Date    WBC 6.6 05/22/2017      Lab Results   Component Value Date    ANEU 4.5 05/22/2017     Lab Results   Component Value Date     05/22/2017      Lab Results   Component Value Date     04/10/2017                   Lab Results   Component Value Date    POTASSIUM 4.0 04/10/2017           Lab Results   Component Value Date    MAG 2.4 04/10/2017            Lab Results   Component Value Date    CR 0.58 04/10/2017                   Lab Results   Component Value Date    OMA 8.9 04/10/2017                Lab Results   Component Value Date    BILITOTAL 0.3 04/10/2017           Lab Results   Component Value Date    ALBUMIN 3.6 04/10/2017                    Lab Results   Component Value Date    ALT 36 04/10/2017           Lab Results   Component Value Date    AST 35 04/10/2017       Intravenous Access:  Implanted Port.    Post Infusion Assessment:  Patient tolerated infusion without incident.  Blood return noted pre and post infusion.  No evidence of extravasations.  Access discontinued per protocol.    Discharge Plan:   Patient declined prescription refills.  Discharge instructions reviewed with: Patient.  Patient and/or family verbalized understanding of discharge instructions and all questions answered.  Copy of AVS reviewed with patient and/or family.  Patient will return 6/16 for next appointment.  Patient discharged in stable condition accompanied by: self and friend.    Peggy Gomez RN

## 2017-05-22 NOTE — NURSING NOTE
"Oncology Rooming Note    May 22, 2017 2:12 PM   Keren Erickson is a 52 year old female who presents for: Port Draw and Oncology Clinic Visit    Initial Vitals: /84  Pulse 81  Temp 98.1  F (36.7  C) (Oral)  Resp 18  Ht 1.575 m (5' 2\")  Wt 52.5 kg (115 lb 11.2 oz)  SpO2 95%  BMI 21.16 kg/m2 Estimated body mass index is 21.16 kg/(m^2) as calculated from the following:    Height as of this encounter: 1.575 m (5' 2\").    Weight as of this encounter: 52.5 kg (115 lb 11.2 oz). Body surface area is 1.52 meters squared.  No Pain (1) Comment: hip   No LMP recorded. Patient is postmenopausal.  Allergies reviewed: Yes  Medications reviewed: Yes    Medications: Medication refills not needed today.  Pharmacy name entered into [facility]-RID: [facility] [id number] [place] [address] [id number] AT [facility] [address]    Clinical concerns:    6 minutes for nursing intake (face to face time)     Liudmila Esteves CMA                              "

## 2017-05-22 NOTE — PATIENT INSTRUCTIONS
Contact numbers:  Triage Main Line: [phone]  After hours: [phone]    Call with chills and/or temperature greater than or equal to 100.5 and questions or concerns.    If after hours, weekends, or holidays, call main hospital  at  [phone] and ask for Oncology doctor on call.           May 2017   Uday Monday Tuesday Wednesday Thursday Friday Saturday        1     [facility]    1:30 PM   (15 min.)   [facility]     [facility] RETURN    1:45 PM   (50 min.)   Esmer Oconnor PA   [facility]     [facility] ONC INFUSION 120    3:00 PM   (120 min.)   [facility]   [facility] 2     3     4     5     6       7     8     9     10     11     12     13       14     15     16     17     18     19     20       21     22     [facility] DRAW    1:45 PM   (15 min.)   [facility]   [facility] Lab Draw     [facility] RETURN    2:05 PM   (50 min.)   Luisa Stapleton PA-C   [facility]     [facility] ONC INFUSION 120    3:00 PM   (120 min.)   [facility]FUSION   [facility]     ECH COMPLETE    5:00 PM   (60 min.)   [id number]   [facility] Echo 23     24     25     26     27       28     29     30     [facility] DRAW    3:00 PM   (15 min.)   [facility]   [facility] 31 June 2017 Sunday Monday Tuesday Wednesday Thursday Friday Saturday                       1     2     3       4     5     6     7     8     9     10       11     12     13     14     15     16     [facility] DRAW   12:30 PM   (15 min.)   [facility] D[facility]   [facility] Lab Draw     MR BRAIN WWO   12:45 PM   (45 min.)   [id number]   [facility] MRI     CT CHEST/ABDOMEN/PELVIS W    1:45 PM   (20 min.)   [id number]   [facility] CT 17       18     19     [facility] RETURN    3:45 PM   (30 min.)   Marlen Harper MD   [facility]  Clinic 20     21     22     23     24       25     26     27     28     29     30                       Lab Results:  Recent Results (from the past 12 hour(s))   CBC with platelets differential    Collection Time: 05/22/17  1:57 PM   Result Value Ref Range    WBC 6.6 4.0 - 11.0 10e9/L    RBC Count 3.75 (L) 3.8 - 5.2 10e12/L    Hemoglobin 12.0 11.7 - 15.7 g/dL    Hematocrit 36.7 35.0 - 47.0 %    MCV 98 78 - 100 fl    MCH 32.0 26.5 - 33.0 pg    MCHC 32.7 31.5 - 36.5 g/dL    RDW 14.9 10.0 - 15.0 %    Platelet Count 300 150 - 450 10e9/L    Diff Method Automated Method     % Neutrophils 67.3 %    % Lymphocytes 21.4 %    % Monocytes 9.5 %    % Eosinophils 0.2 %    % Basophils 0.8 %    % Immature Granulocytes 0.8 %    Nucleated RBCs 0 0 /100    Absolute Neutrophil 4.5 1.6 - 8.3 10e9/L    Absolute Lymphocytes 1.4 0.8 - 5.3 10e9/L    Absolute Monocytes 0.6 0.0 - 1.3 10e9/L    Absolute Eosinophils 0.0 0.0 - 0.7 10e9/L    Absolute Basophils 0.1 0.0 - 0.2 10e9/L    Abs Immature Granulocytes 0.1 0 - 0.4 10e9/L    Absolute Nucleated RBC 0.0

## 2017-05-22 NOTE — PROGRESS NOTES
HEMATOLOGY/ONCOLOGY PROGRESS NOTE  May 22, 2017    REASON FOR VISIT: follow-up of metastatic breast cancer, triple positive    DIAGNOSIS:   The patient is a 60-year-old female who presented to the hospital initially in 09/2013 with complaints of dyspnea. Workup revealed a large pericardial effusion and pleural effusion. Physical examination revealed a large untreated left breast mass encompassing the entire left breast. Biopsies revealed these were ER, [place] and HER2-positive breast cancer. She had a thoracentesis and pericardial sclerosis performed. She was originally on Arimidex and Herceptin. In 01/2014, she was switched to tamoxifen and Herceptin due to arthralgias, and she ultimately developed progressive disease and was switched to Faslodex and Herceptin. In 01/2015, she was found to have progressive disease and was switched to T-DM1.     Initially when she was seen in late 02/2015, she complained of some V5 sensory deficit. This was subjective. I was not able to elicit this on examination. This persisted and further workup was performed with a brain MRI. This revealed what was initially thought to be 3 punctate brain metastases. She originally saw Radiation Oncology and Neurosurgery as well as underwent a lumbar puncture. Cytology from the lumbar puncture showed no evidence of leptomeningeal disease. She was treated with Gamma Knife radiation to 6 lesions, performed on 04/16/2015.  She initiated therapy with TDM1 in January 2015 and continued this through 6/26/2015 with overall stable disease. She has since been on a break from treatment. The patient presented earlier this month with recurrent shortness of breath.  Imaging revealed recurrent right-sided pleural effusion.  She has since undergone thoracentesis with cytology pending.  Clinically, however, there is evidence of disease progression. PET scan performed on 11/25/2015 demonstrated progression of disease at multiple sites. She restarted TDM1 on  "11/27/2015, however experienced a mild transfusion reaction with shortness of breath and chest tightness, resolved upon stopping TDM1 and 125 mg of SoluMedrol. She had progression of disease on repeat imaging 2/17/2016, as well as new brain metastases. She started TDM1 with Perjeta on 3/4/2016. She underwent gamma knife to brain mets on 3/8/16.       In late May, she was found to have progressive brain metastases.  She received whole brain radiation.  She had a follow up brain MRI August 2016 that was stable.     In September 2016, she enrolled on [place]  trial and was randomized to the standard of care arm/Physician's Choice; started on gemzar and herceptin. She was recently hospitalized 1/27-2/3/17 for presumed HCAP. Trial was suspended. She continued on gemzar and heceptin with stable disease. Last restaging was 4/7/17 and stable.     INTERVAL HISTORY:  Dominga presents for follow-up today prior to next cycle of treatment. She is accompanied by her best friend today.  Overall she continues to make positive improvements regarding her recovery. She receives meals through [employer] and has 6 hours of PCA work daily. They help with cleaning her house and doing laundry etc.  She is doing daily PT herself at home now and feels each week she is getting stronger.  She has yet to go up and down the steps during the day though and feels most days she still spends upstairs and admits that she is most comfortable in her bed. States she does feel dizzy, wobbly and has nystagmus- but overall, these are better in the last few weeks as well.     ROS: 10 point ROS was conducted and was otherwise negative except for as above.  No f/s/c.  No chest pain/cough/dyspnea.  Daily BM, no  issues. Skin is dry, but no rash.     PHYSICAL EXAMINATION:     /84  Pulse 81  Temp 98.1  F (36.7  C) (Oral)  Resp 18  Ht 1.575 m (5' 2\")  Wt 52.5 kg (115 lb 11.2 oz)  SpO2 95%  BMI 21.16 kg/m2    Wt Readings from Last 4 " Encounters:   05/22/17 52.5 kg (115 lb 11.2 oz)   05/01/17 52.3 kg (115 lb 6.4 oz)   04/10/17 52.7 kg (116 lb 3.2 oz)   03/28/17 51.5 kg (113 lb 8 oz)     Constitutional: Alert, oriented, cachectic female in no visible distress. She is able to get up on exam table with light assistance.   Eyes: PERRL. Anicteric sclerae.    ENT/Mouth: OM moist and pink without lesions or thrush.    CV: RRR, no murmurs appreciated.  Resp: CTAB slightly diminished R base, stable. Breathing comfortably.  Abdomen: Soft, non-tender, non-distended. Bowel sounds present. No masses appreciated. No organomegaly noted.  Extremities: No lower extremity edema appreciated.  Skin: Warm, dry. No bruising or petechiae noted. No rash  Lymph: No palpable LAD  Neuro: CN II-XII grossly intact.     LABS:      4/10/2017 13:38 5/1/2017 13:21 5/22/2017 13:57   WBC 7.7 7.5 6.6   Hemoglobin 10.9 (L) 12.2 12.0   Hematocrit 33.1 (L) 37.6 36.7   Platelet Count 287 235 300   RBC Count 3.34 (L) 3.78 (L) 3.75 (L)   MCV 99 100 98        4/10/2017 13:38 5/1/2017 13:21 5/22/2017 13:57   CA 27-29 24 20 15     CMP pending    IMPRESSION/PLAN:  Dominga is a 61-year-old female with history of metastatic ER-positive, HER-2 positive breast cancer, with involvement of the bone, history of pleural effusions treated with pleurodesis and PleurX placement, and with treated brain metastases, previously with stable disease on TDM1, however patient opted for break from therapy from June 2015 through November 2015, and represented with worsening metastatic disease at that time. She restarted TDM1 on 11/27/2015. She had progressive disease 2/17/16 and Perjeta was added to TDM1. She had gamma knife to brain mets on 3/8.  She received WBRT.  She was started on [place]  clinical trial and randomized to physician's choice, and started on Gemzar/Herceptin on 9/29/17.    1. Breast cancer:  Has been on Gemzar/Herceptin with stable disease on restaging 4/7. Her tumor marker has  remained low and stable.  Dominga states when she met with Esmer 3 weeks ago, she was at 50% recovered and today she feels 80%.  She states she really has not felt this steady improvement in a long time.  She REALLY wants to have one more chemo session of NO gemzar for continued improvement and then re-assess with her scans/labs mid-June.  Given recent stability, I agreed.    -OK for Herceptin only today  -ECHO today, pending  -Scans, labs and Dr Harper in June 2. Pulm/HCAP: Completed course of Levaquin 2/8 with resolution of symptoms.   - History of pleural effusion requiring thoracentesis with progressive disease off treatment. This has not recurred since resuming treatment. Exam is stable.    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 She started desmopressin 50 mcg with good response clinically.   -Had endocrine follow-up on 3/20 with no changes  -add on CMP today    4. Brain mets: Numbness of tongue and V2 and V3 on right are improving. Most recent brain MRI stable.    5. FEN: Weight has stabilized at 116. She is eating better.     6. Pain: Chronic mild aches/pains secondary to disease and with myalgias on Gemzar. No current pain today, not taking narcotics.     7. GERD/nausea: Continue prilosec.     8. Bone mets: on Xgeva. Last 5/1/17.    9. Mood: She is overall coping well.     10. She has an advanced directive.  She has a POLST.  DNR/DNI.  Her power of  is listed in the computer.     11. Deconditioning: She had been walking independently or with cane in clinic prior to pneumonia, working on getting her strength back.  Encouraged her next goal of PT should be to change levels of her house - to pursue steps up/down 1-2 times a day.  - Recently completed PT/OT. Continue home exercises. She is now using knee brace.     Lori Stapleton PA-C    [facility]  [address] [place] [id number]  [phone]    AMEND- CMP normal

## 2017-05-22 NOTE — MR AVS SNAPSHOT
After Visit Summary   5/22/2017    Keren Erickson    MRN: [id number]           Patient Information     Date Of Birth          1955        Visit Information        Provider Department      5/22/2017 3:00 PM UC 23 ATC;  ONCOLOGY INFUSION [facility]        Today's Diagnoses     Metastatic breast cancer (H)    -  1    Brain metastasis (H)        Carcinoma of breast metastatic to multiple sites, unspecified laterality (H)          Care Instructions    Contact numbers:  Triage Main Line: [phone]  After hours: [phone]    Call with chills and/or temperature greater than or equal to 100.5 and questions or concerns.    If after hours, weekends, or holidays, call main hospital  at  [phone] and ask for Oncology doctor on call.           May 2017   Uday Monday Tuesday Wednesday Thursday Friday Saturday        1     [facility] DRAW    1:30 PM   (15 min.)    [facility]     [facility] RETURN    1:45 PM   (50 min.)   Esmer Oconnor PA   [facility]     [facility] ONC INFUSION 120    3:00 PM   (120 min.)    ONCOLOGY INFUSION   [facility] 2     3     4     5     6       7     8     9     10     11     12     13       14     15     16     17     18     19     20       21     22     [facility] DRAW    1:45 PM   (15 min.)    MASONIC LAB DRAW   [facility] Lab Draw     [facility] RETURN    2:05 PM   (50 min.)   Lu[facility] Stapleton [facility]    3:00 PM   (120 min.)    ONCOLOGY INFUSION   [facility]     ECH COMPLETE    5:00 PM   (60 min.)   [id number]   [facility] Echo 23     24     25     26     27       28     29     30     [facility] DRAW    3:00 PM   (15 min.)    MASONIC LAB DRAW   [facility] Lab Draw 31 June 2017 Sunday Monday Tuesday Wednesday Thursday Friday  Saturday                       1     2     3       4     5     6     7     8     9     10       11     12     13     14     15     16     [facility] LAB DRAW   12:30 PM   (15 min.)    MASONIC LAB DRAW   [facility] Lab Draw     MR BRAIN WWO   12:45 PM   (45 min.)   [id number]   [facility] MRI     CT CHEST/ABDOMEN/PELVIS W    1:45 PM   (20 min.)   UCCT2   [facility] CT 17       18     19     [facility] RETURN    3:45 PM   (30 min.)   Marlen Harper MD   [facility] 20     21     22     23     24       25     26     27     28     29     30                       Lab Results:  Recent Results (from the past 12 hour(s))   CBC with platelets differential    Collection Time: 05/22/17  1:57 PM   Result Value Ref Range    WBC 6.6 4.0 - 11.0 10e9/L    RBC Count 3.75 (L) 3.8 - 5.2 10e12/L    Hemoglobin 12.0 11.7 - 15.7 g/dL    Hematocrit 36.7 35.0 - 47.0 %    MCV 98 78 - 100 fl    MCH 32.0 26.5 - 33.0 pg    MCHC 32.7 31.5 - 36.5 g/dL    RDW 14.9 10.0 - 15.0 %    Platelet Count 300 150 - 450 10e9/L    Diff Method Automated Method     % Neutrophils 67.3 %    % Lymphocytes 21.4 %    % Monocytes 9.5 %    % Eosinophils 0.2 %    % Basophils 0.8 %    % Immature Granulocytes 0.8 %    Nucleated RBCs 0 0 /100    Absolute Neutrophil 4.5 1.6 - 8.3 10e9/L    Absolute Lymphocytes 1.4 0.8 - 5.3 10e9/L    Absolute Monocytes 0.6 0.0 - 1.3 10e9/L    Absolute Eosinophils 0.0 0.0 - 0.7 10e9/L    Absolute Basophils 0.1 0.0 - 0.2 10e9/L    Abs Immature Granulocytes 0.1 0 - 0.4 10e9/L    Absolute Nucleated RBC 0.0              Follow-ups after your visit        Your next 10 appointments already scheduled     May 30, 2017  3:00 PM [facility]   Masonic Lab Draw with  MASONIC LAB DRAW   [facility] Mason[facility] Lab Draw ([facility])    [address] [address] [phone]            Jun 16, 2017 12:30 PM CDT   Masonic Lab Draw with  MASONIC LAB DRAW   [facility] Lab Draw ([facility])    [address]  2nd Floor  [facility] [address] [phone]            Jun 16, 2017  1:00 PM CDT   (Arrive by 12:45 PM)   MR BRAIN W/O & W CONTRAST with [id number]   [facility] MRI ([facility])    [address]  [address] [facility] [address] [phone]           Take your medicines as usual, unless your doctor tells you not to. Bring a list of your current medicines to your exam (including vitamins, minerals and over-the-counter drugs).  You will be given intravenous contrast for this exam. To prepare:   The day before your exam, drink extra fluids at least six 8-ounce glasses (unless your doctor tells you to restrict your fluids).   Have a blood test (creatinine test) within 30 days of your exam. Go to your clinic or Diagnostic Imaging Department for this test.  The MRI machine uses a strong magnet. Please wear clothes without metal (snaps, zippers). A sweatsuit works well, or we may give you a hospital gown.  Please remove any body piercings and hair extensions before you arrive. You will also remove watches, jewelry, hairpins, wallets, dentures, partial dental plates and hearing aids. You may wear contact lenses, and you may be able to wear your rings. We have a safe place to keep your personal items, but it is safer to leave them at home.   **IMPORTANT** THE INSTRUCTIONS BELOW ARE ONLY FOR THOSE PATIENTS WHO HAVE BEEN TOLD THEY WILL RECEIVE SEDATION OR GENERAL ANESTHESIA DURING THEIR MRI PROCEDURE:  IF YOU WILL RECEIVE SEDATION (take medicine to help you relax during your exam):   You must get the medicine from your doctor before you arrive. Bring the medicine to the exam. Do not take it at home.   Arrive one hour early. Bring someone who can take you home after the test. Your medicine will make you sleepy. After the exam, you may not drive, take a bus or take a taxi by yourself.    No eating 8 hours before your exam. You may have clear liquids up until 4 hours before your exam. (Clear liquids include water, clear tea, black coffee and fruit juice without pulp.)  IF YOU WILL RECEIVE ANESTHESIA (be asleep for your exam):   Arrive 1 1/2 hours early. Bring someone who can take you home after the test. You may not drive, take a bus or take a taxi by yourself.   No eating 8 hours before your exam. You may have clear liquids up until 4 hours before your exam. (Clear liquids include water, clear tea, black coffee and fruit juice without pulp.)  Please call the Imaging Department at your exam site with any questions.            Jun 16, 2017  2:00 PM CDT   (Arrive by 1:45 PM)   CT CHEST/ABDOMEN/PELVIS W CONTRAST with UCCT2   [facility] ([facility])    [address] [address] [phone]           Please bring any scans or X-rays taken at other hospitals, if similar tests were done. Also bring a list of your medicines, including vitamins, minerals and over-the-counter drugs. It is safest to leave personal items at home.  Be sure to tell your doctor:   If you have any allergies.   If there s any chance you are pregnant.   If you are breastfeeding.   If you have any special needs.  You may have contrast for this exam. To prepare:   Do not eat or drink for 2 hours before your exam. If you need to take medicine, you may take it with small sips of water. (We may ask you to take liquid medicine as well.)   The day before your exam, drink extra fluids at least six 8-ounce glasses (unless your doctor tells you to restrict your fluids).  Patients over 70 or patients with diabetes or kidney problems:   If you haven t had a blood test (creatinine test) within the last 30 days, go to your clinic or Diagnostic Imaging Department for this test.  If you have diabetes:   If your kidney function is normal, continue taking your metformin  (Avandamet, Glucophage, Glucovance, Metaglip) on the day of your exam.   If your kidney function is abnormal, wait 48 hours before restarting this medicine.  You will have oral contrast for this exam:   You will drink the contrast at home. Get this from your clinic or Diagnostic Imaging Department. Please follow the directions given.  Please wear loose clothing, such as a sweat suit or jogging clothes. Avoid snaps, zippers and other metal. We may ask you to undress and put on a hospital gown.  If you have any questions, please call the Imaging Department where you will have your exam.            Jun 19, 2017  4:00 PM CDT   (Arrive by 3:45 PM)   Return Visit with Marlen Harper MD   [facility] ([facility])    [address] [place] [address]  2nd [facility] [address] [phone]            Sep 18, 2017  2:30 PM CDT   (Arrive by 2:15 PM)   RETURN ENDOCRINE with Rolando Mishra MD   [facility] ([facility])    [address] [place] [address] [phone]              Future tests that were ordered for you today     Open Future Orders        Priority Expected Expires Ordered    Comprehensive metabolic panel Routine  5/22/2018 5/22/2017            Who to contact     If you have questions or need follow up information about today's clinic visit or your schedule please contact [facility] directly at [phone].  Normal or non-critical lab and imaging results will be communicated to you by MyChart, letter or phone within 4 business days after the clinic has received the results. If you do not hear from us within 7 days, please contact the clinic through MyChart or phone. If you have a critical or abnormal lab result, we will notify you by phone as soon as possible.  Submit refill requests through Lockr or call your pharmacy and they will forward the refill request to us.  Please allow 3 business days for your refill to be completed.          Additional Information About Your Visit        MyChart Information     TORIAhart gives you secure access to your electronic health record. If you see a primary care provider, you can also send messages to your care team and make appointments. If you have questions, please call your primary care clinic.  If you do not have a primary care provider, please call [phone] and they will assist you.        Care EveryWhere ID     This is your Care EveryWhere ID. This could be used by other organizations to access your [facility] medical records  [id number]         Blood Pressure from Last 3 Encounters:   05/22/17 122/84   05/01/17 120/83   04/10/17 120/81    Weight from Last 3 Encounters:   05/22/17 52.5 kg (115 lb 11.2 oz)   05/01/17 52.3 kg (115 lb 6.4 oz)   04/10/17 52.7 kg (116 lb 3.2 oz)              We Performed the Following     Ca27.29  breast tumor marker     CBC with platelets differential     CEA        Primary Care Provider Office Phone # Fax #    Kelly Hart [phone]       [facility] [address] [facility] [address]        Thank you!     Thank you for choosing [facility]  for your care. Our goal is always to provide you with excellent care. Hearing back from our patients is one way we can continue to improve our services. Please take a few minutes to complete the written survey that you may receive in the mail after your visit with us. Thank you!             Your Updated Medication List - Protect others around you: Learn how to safely use, store and throw away your medicines at www.disposemymeds.org.          This list is accurate as of: 5/22/17  5:27 PM.  Always use your most recent med list.                   Brand Name Dispense Instructions for use    ALEVE PO      Take 220 mg by mouth daily       desmopressin 0.1 MG tablet    DDAVP    90 tablet    Take 1 tablet (100 mcg) by mouth  At Bedtime       Multi-vitamin Tabs tablet      Take 1 tablet by mouth daily       VITAMIN E BLEND PO      Take by mouth daily

## 2017-05-23 LAB
ALBUMIN SERPL-MCNC: 3.7 G/DL (ref 3.4–5)
ALP SERPL-CCNC: 104 U/L (ref 40–150)
ALT SERPL W P-5'-P-CCNC: 26 U/L (ref 0–50)
ANION GAP SERPL CALCULATED.3IONS-SCNC: 9 MMOL/L (ref 3–14)
AST SERPL W P-5'-P-CCNC: 31 U/L (ref 0–45)
BILIRUB SERPL-MCNC: 0.3 MG/DL (ref 0.2–1.3)
BUN SERPL-MCNC: 13 MG/DL (ref 7–30)
CALCIUM SERPL-MCNC: 8.9 MG/DL (ref 8.5–10.1)
CHLORIDE SERPL-SCNC: 106 MMOL/L (ref 94–109)
CO2 SERPL-SCNC: 28 MMOL/L (ref 20–32)
CREAT SERPL-MCNC: 0.58 MG/DL (ref 0.52–1.04)
GFR SERPL CREATININE-BSD FRML MDRD: ABNORMAL ML/MIN/1.7M2
GLUCOSE SERPL-MCNC: 76 MG/DL (ref 70–99)
POTASSIUM SERPL-SCNC: 4 MMOL/L (ref 3.4–5.3)
PROT SERPL-MCNC: 6.7 G/DL (ref 6.8–8.8)
SODIUM SERPL-SCNC: 142 MMOL/L (ref 133–144)

## 2017-06-11 RX ORDER — MEPERIDINE HYDROCHLORIDE 25 MG/ML
25 INJECTION INTRAMUSCULAR; INTRAVENOUS; SUBCUTANEOUS EVERY 30 MIN PRN
Status: CANCELLED | OUTPATIENT
Start: 2017-06-26

## 2017-06-11 RX ORDER — EPINEPHRINE 0.3 MG/.3ML
0.3 INJECTION SUBCUTANEOUS EVERY 5 MIN PRN
Status: CANCELLED | OUTPATIENT
Start: 2017-06-26

## 2017-06-11 RX ORDER — ALBUTEROL SULFATE 90 UG/1
1-2 AEROSOL, METERED RESPIRATORY (INHALATION)
Status: CANCELLED
Start: 2017-06-26

## 2017-06-11 RX ORDER — EPINEPHRINE 0.3 MG/.3ML
0.3 INJECTION SUBCUTANEOUS EVERY 5 MIN PRN
Status: CANCELLED | OUTPATIENT
Start: 2017-06-19

## 2017-06-11 RX ORDER — METHYLPREDNISOLONE SODIUM SUCCINATE 125 MG/2ML
125 INJECTION, POWDER, LYOPHILIZED, FOR SOLUTION INTRAMUSCULAR; INTRAVENOUS
Status: CANCELLED
Start: 2017-06-26

## 2017-06-11 RX ORDER — LORAZEPAM 2 MG/ML
0.5 INJECTION INTRAMUSCULAR EVERY 4 HOURS PRN
Status: CANCELLED
Start: 2017-06-26

## 2017-06-11 RX ORDER — ACETAMINOPHEN 325 MG/1
650 TABLET ORAL
Status: CANCELLED | OUTPATIENT
Start: 2017-06-19

## 2017-06-11 RX ORDER — DIPHENHYDRAMINE HYDROCHLORIDE 50 MG/ML
50 INJECTION INTRAMUSCULAR; INTRAVENOUS
Status: CANCELLED
Start: 2017-06-19

## 2017-06-11 RX ORDER — MEPERIDINE HYDROCHLORIDE 25 MG/ML
25 INJECTION INTRAMUSCULAR; INTRAVENOUS; SUBCUTANEOUS EVERY 30 MIN PRN
Status: CANCELLED | OUTPATIENT
Start: 2017-06-19

## 2017-06-11 RX ORDER — SODIUM CHLORIDE 9 MG/ML
1000 INJECTION, SOLUTION INTRAVENOUS CONTINUOUS PRN
Status: CANCELLED
Start: 2017-06-19

## 2017-06-11 RX ORDER — DIPHENHYDRAMINE HYDROCHLORIDE 50 MG/ML
50 INJECTION INTRAMUSCULAR; INTRAVENOUS
Status: CANCELLED
Start: 2017-06-26

## 2017-06-11 RX ORDER — SODIUM CHLORIDE 9 MG/ML
1000 INJECTION, SOLUTION INTRAVENOUS CONTINUOUS PRN
Status: CANCELLED
Start: 2017-06-26

## 2017-06-11 RX ORDER — LORAZEPAM 2 MG/ML
0.5 INJECTION INTRAMUSCULAR EVERY 4 HOURS PRN
Status: CANCELLED
Start: 2017-06-19

## 2017-06-11 RX ORDER — ALBUTEROL SULFATE 90 UG/1
1-2 AEROSOL, METERED RESPIRATORY (INHALATION)
Status: CANCELLED
Start: 2017-06-19

## 2017-06-11 RX ORDER — ALBUTEROL SULFATE 0.83 MG/ML
2.5 SOLUTION RESPIRATORY (INHALATION)
Status: CANCELLED | OUTPATIENT
Start: 2017-06-19

## 2017-06-11 RX ORDER — METHYLPREDNISOLONE SODIUM SUCCINATE 125 MG/2ML
125 INJECTION, POWDER, LYOPHILIZED, FOR SOLUTION INTRAMUSCULAR; INTRAVENOUS
Status: CANCELLED
Start: 2017-06-19

## 2017-06-11 RX ORDER — ALBUTEROL SULFATE 0.83 MG/ML
2.5 SOLUTION RESPIRATORY (INHALATION)
Status: CANCELLED | OUTPATIENT
Start: 2017-06-26

## 2017-06-11 RX ORDER — HEPARIN SODIUM (PORCINE) LOCK FLUSH IV SOLN 100 UNIT/ML 100 UNIT/ML
5 SOLUTION INTRAVENOUS EVERY 8 HOURS
Status: CANCELLED | OUTPATIENT
Start: 2017-06-19

## 2017-06-11 RX ORDER — DIPHENHYDRAMINE HCL 25 MG
50 CAPSULE ORAL
Status: CANCELLED | OUTPATIENT
Start: 2017-06-19

## 2017-06-11 RX ORDER — HEPARIN SODIUM (PORCINE) LOCK FLUSH IV SOLN 100 UNIT/ML 100 UNIT/ML
5 SOLUTION INTRAVENOUS EVERY 8 HOURS
Status: CANCELLED | OUTPATIENT
Start: 2017-06-26

## 2017-06-16 PROCEDURE — 25000128 H RX IP 250 OP 636: Performed by: INTERNAL MEDICINE

## 2017-06-16 PROCEDURE — 96523 IRRIG DRUG DELIVERY DEVICE: CPT

## 2017-06-16 RX ORDER — HEPARIN SODIUM (PORCINE) LOCK FLUSH IV SOLN 100 UNIT/ML 100 UNIT/ML
5 SOLUTION INTRAVENOUS EVERY 8 HOURS
Status: DISCONTINUED | OUTPATIENT
Start: 2017-06-16 | End: 2017-06-23 | Stop reason: HOSPADM

## 2017-06-16 RX ADMIN — SODIUM CHLORIDE, PRESERVATIVE FREE 5 ML: 5 INJECTION INTRAVENOUS at 13:12

## 2017-06-19 ENCOUNTER — INFUSION THERAPY VISIT (OUTPATIENT)
Dept: ONCOLOGY | Facility: CLINIC | Age: 62
End: 2017-06-19
Attending: INTERNAL MEDICINE
Payer: COMMERCIAL

## 2017-06-19 ENCOUNTER — APPOINTMENT (OUTPATIENT)
Dept: LAB | Facility: CLINIC | Age: 62
End: 2017-06-19
Attending: INTERNAL MEDICINE
Payer: COMMERCIAL

## 2017-06-19 VITALS
OXYGEN SATURATION: 97 % | BODY MASS INDEX: 20.89 KG/M2 | WEIGHT: 114.2 LBS | TEMPERATURE: 96.9 F | HEART RATE: 85 BPM | DIASTOLIC BLOOD PRESSURE: 84 MMHG | SYSTOLIC BLOOD PRESSURE: 124 MMHG

## 2017-06-19 DIAGNOSIS — C79.31 BRAIN METASTASIS: ICD-10-CM

## 2017-06-19 DIAGNOSIS — C50.919 METASTATIC BREAST CANCER: ICD-10-CM

## 2017-06-19 DIAGNOSIS — C50.919 CARCINOMA OF BREAST METASTATIC TO MULTIPLE SITES, UNSPECIFIED LATERALITY (H): ICD-10-CM

## 2017-06-19 DIAGNOSIS — J91.0 MALIGNANT PLEURAL EFFUSION (H): Primary | ICD-10-CM

## 2017-06-19 DIAGNOSIS — C50.919 CARCINOMA OF BREAST METASTATIC TO MULTIPLE SITES, UNSPECIFIED LATERALITY (H): Primary | ICD-10-CM

## 2017-06-19 LAB
ALBUMIN SERPL-MCNC: 3.8 G/DL (ref 3.4–5)
ALP SERPL-CCNC: 94 U/L (ref 40–150)
ALT SERPL W P-5'-P-CCNC: 27 U/L (ref 0–50)
ANION GAP SERPL CALCULATED.3IONS-SCNC: 7 MMOL/L (ref 3–14)
AST SERPL W P-5'-P-CCNC: 28 U/L (ref 0–45)
BASOPHILS # BLD AUTO: 0.1 10E9/L (ref 0–0.2)
BASOPHILS NFR BLD AUTO: 0.7 %
BILIRUB SERPL-MCNC: 0.3 MG/DL (ref 0.2–1.3)
BUN SERPL-MCNC: 15 MG/DL (ref 7–30)
CALCIUM SERPL-MCNC: 9 MG/DL (ref 8.5–10.1)
CANCER AG27-29 SERPL-ACNC: 17 U/ML (ref 0–39)
CEA SERPL-MCNC: 1.2 UG/L (ref 0–2.5)
CHLORIDE SERPL-SCNC: 105 MMOL/L (ref 94–109)
CO2 SERPL-SCNC: 28 MMOL/L (ref 20–32)
CREAT SERPL-MCNC: 0.67 MG/DL (ref 0.52–1.04)
DIFFERENTIAL METHOD BLD: NORMAL
EOSINOPHIL # BLD AUTO: 0 10E9/L (ref 0–0.7)
EOSINOPHIL NFR BLD AUTO: 0 %
ERYTHROCYTE [DISTWIDTH] IN BLOOD BY AUTOMATED COUNT: 14.7 % (ref 10–15)
GFR SERPL CREATININE-BSD FRML MDRD: 89 ML/MIN/1.7M2
GLUCOSE SERPL-MCNC: 67 MG/DL (ref 70–99)
HCT VFR BLD AUTO: 37.7 % (ref 35–47)
HGB BLD-MCNC: 12.4 G/DL (ref 11.7–15.7)
IMM GRANULOCYTES # BLD: 0 10E9/L (ref 0–0.4)
IMM GRANULOCYTES NFR BLD: 0.4 %
LYMPHOCYTES # BLD AUTO: 1.6 10E9/L (ref 0.8–5.3)
LYMPHOCYTES NFR BLD AUTO: 19.3 %
MCH RBC QN AUTO: 31.2 PG (ref 26.5–33)
MCHC RBC AUTO-ENTMCNC: 32.9 G/DL (ref 31.5–36.5)
MCV RBC AUTO: 95 FL (ref 78–100)
MONOCYTES # BLD AUTO: 0.8 10E9/L (ref 0–1.3)
MONOCYTES NFR BLD AUTO: 9.5 %
NEUTROPHILS # BLD AUTO: 5.7 10E9/L (ref 1.6–8.3)
NEUTROPHILS NFR BLD AUTO: 70.1 %
NRBC # BLD AUTO: 0 10*3/UL
NRBC BLD AUTO-RTO: 0 /100
PLATELET # BLD AUTO: 277 10E9/L (ref 150–450)
POTASSIUM SERPL-SCNC: 3.7 MMOL/L (ref 3.4–5.3)
PROT SERPL-MCNC: 6.7 G/DL (ref 6.8–8.8)
RBC # BLD AUTO: 3.97 10E12/L (ref 3.8–5.2)
SODIUM SERPL-SCNC: 140 MMOL/L (ref 133–144)
WBC # BLD AUTO: 8.1 10E9/L (ref 4–11)

## 2017-06-19 PROCEDURE — 80053 COMPREHEN METABOLIC PANEL: CPT | Performed by: INTERNAL MEDICINE

## 2017-06-19 PROCEDURE — 86300 IMMUNOASSAY TUMOR CA 15-3: CPT | Performed by: INTERNAL MEDICINE

## 2017-06-19 PROCEDURE — 85025 COMPLETE CBC W/AUTO DIFF WBC: CPT | Performed by: INTERNAL MEDICINE

## 2017-06-19 PROCEDURE — 36415 COLL VENOUS BLD VENIPUNCTURE: CPT | Performed by: INTERNAL MEDICINE

## 2017-06-19 PROCEDURE — 99215 OFFICE O/P EST HI 40 MIN: CPT | Mod: GC | Performed by: INTERNAL MEDICINE

## 2017-06-19 PROCEDURE — 25000128 H RX IP 250 OP 636: Mod: ZF | Performed by: INTERNAL MEDICINE

## 2017-06-19 PROCEDURE — 96375 TX/PRO/DX INJ NEW DRUG ADDON: CPT

## 2017-06-19 PROCEDURE — 96413 CHEMO IV INFUSION 1 HR: CPT

## 2017-06-19 PROCEDURE — 96417 CHEMO IV INFUS EACH ADDL SEQ: CPT

## 2017-06-19 PROCEDURE — 82378 CARCINOEMBRYONIC ANTIGEN: CPT | Performed by: INTERNAL MEDICINE

## 2017-06-19 PROCEDURE — 25000125 ZZHC RX 250: Mod: ZF | Performed by: INTERNAL MEDICINE

## 2017-06-19 RX ORDER — HEPARIN SODIUM (PORCINE) LOCK FLUSH IV SOLN 100 UNIT/ML 100 UNIT/ML
5 SOLUTION INTRAVENOUS EVERY 8 HOURS
Status: DISCONTINUED | OUTPATIENT
Start: 2017-06-19 | End: 2017-06-19 | Stop reason: HOSPADM

## 2017-06-19 RX ORDER — HEPARIN SODIUM (PORCINE) LOCK FLUSH IV SOLN 100 UNIT/ML 100 UNIT/ML
5 SOLUTION INTRAVENOUS ONCE
Status: COMPLETED | OUTPATIENT
Start: 2017-06-19 | End: 2017-06-19

## 2017-06-19 RX ADMIN — TRASTUZUMAB 300 MG: KIT at 16:11

## 2017-06-19 RX ADMIN — DEXAMETHASONE SODIUM PHOSPHATE 12 MG: 10 INJECTION, SOLUTION INTRAMUSCULAR; INTRAVENOUS at 14:49

## 2017-06-19 RX ADMIN — GEMCITABINE 1460 MG: 38 INJECTION, SOLUTION INTRAVENOUS at 15:09

## 2017-06-19 RX ADMIN — SODIUM CHLORIDE, PRESERVATIVE FREE 5 ML: 5 INJECTION INTRAVENOUS at 13:20

## 2017-06-19 RX ADMIN — SODIUM CHLORIDE, PRESERVATIVE FREE 5 ML: 5 INJECTION INTRAVENOUS at 17:00

## 2017-06-19 RX ADMIN — SODIUM CHLORIDE 250 ML: 9 INJECTION, SOLUTION INTRAVENOUS at 14:49

## 2017-06-19 ASSESSMENT — PAIN SCALES - GENERAL: PAINLEVEL: MILD PAIN (2)

## 2017-06-19 NOTE — PROGRESS NOTES
Infusion Nursing Note:  Keren Erickson presents today for C13D1 - Gemzar/Herceptin.    Patient seen by provider today: Yes: Dr. Harper - post infusion   present during visit today: Not Applicable.    Note: Patient walked into infusion today and states she tries not to use a w/c very often. Does have occasional dizziness and can be unsteady so she needs assistance, but feels somewhat stronger. Did have one fall about 2 weeks ago without injury. Has occasional nausea which is controlled with compazine. Denies fevers or any other problems at this time..    Intravenous Access:  Implanted Port.    Treatment Conditions:  Lab Results   Component Value Date    HGB 12.4 06/19/2017     Lab Results   Component Value Date    WBC 8.1 06/19/2017      Lab Results   Component Value Date    ANEU 5.7 06/19/2017     Lab Results   Component Value Date     06/19/2017      Lab Results   Component Value Date     06/19/2017                   Lab Results   Component Value Date    POTASSIUM 3.7 06/19/2017           Lab Results   Component Value Date    MAG 2.4 04/10/2017            Lab Results   Component Value Date    CR 0.67 06/19/2017                   Lab Results   Component Value Date    OMA 9.0 06/19/2017                Lab Results   Component Value Date    BILITOTAL 0.3 06/19/2017           Lab Results   Component Value Date    ALBUMIN 3.8 06/19/2017                    Lab Results   Component Value Date    ALT 27 06/19/2017           Lab Results   Component Value Date    AST 28 06/19/2017     Results reviewed, labs MET treatment parameters, ok to proceed with treatment. Echo results from 1/25/17 - 60-65% EF. Had ECHO done 5/22/17 with no results posted yet.      Post Infusion Assessment:  Patient tolerated infusion without incident.  Blood return noted pre and post infusion.  Site patent and intact, free from redness, edema or discomfort.  No evidence of extravasations.  Access discontinued per  protocol.    Discharge Plan:   Prescription refills given for compazine.  Discharge instructions reviewed with: Patient.  Patient and/or family verbalized understanding of discharge instructions and all questions answered.  Copy of AVS reviewed with patient and/or family.  Patient will return 6/27/17 for next appointment.  Patient discharged in stable condition accompanied by: self.  Departure Mode: Ambulatory with assistance.    Valerie Cosby RN

## 2017-06-19 NOTE — MR AVS SNAPSHOT
After Visit Summary   6/19/2017    Keren Erickson    MRN: [id number]           Patient Information     Date Of Birth          1955        Visit Information        Provider Department      6/19/2017 4:00 PM Marlen Harper MD [facility]        Today's Diagnoses     Malignant pleural effusion    -  1    Carcinoma of breast metastatic to multiple sites, unspecified laterality (H)        Brain metastasis (H)           Follow-ups after your visit        Your next 10 appointments already scheduled     Jun 27, 2017  2:30 PM CDT   Infusion 120 with UC ONCOLOGY INFUSION, UC 19 ATC   [facility] ([facility])    [address] [place] [address]  2nd Floor  [facility] [address] [phone]            Sep 18, 2017  2:30 PM CDT   (Arrive by 2:15 PM)   RETURN ENDOCRINE with Rolando Mishra MD   [facility] End[facility]in[facility] ([facility])    [address] [place] [address]  3rd Floor  [facility] [address] [phone]              Who to contact     If you have questions or need follow up information about today's clinic visit or your schedule please contact [facility] directly at [phone].  Normal or non-critical lab and imaging results will be communicated to you by MyChart, letter or phone within 4 business days after the clinic has received the results. If you do not hear from us within 7 days, please contact the clinic through MyChart or phone. If you have a critical or abnormal lab result, we will notify you by phone as soon as possible.  Submit refill requests through YouAppi or call your pharmacy and they will forward the refill request to us. Please allow 3 business days for your refill to be completed.          Additional Information About Your Visit        [employer] Information     You[employer]i gives you secure access to your electronic health record. If you see a primary care provider,  you can also send messages to your care team and make appointments. If you have questions, please call your primary care clinic.  If you do not have a primary care provider, please call [phone] and they will assist you.        Care EveryWhere ID     This is your Care EveryWhere ID. This could be used by other organizations to access your [facility] medical records  [id number]        Your Vitals Were     Pulse Temperature Pulse Oximetry BMI (Body Mass Index)          85 96.9  F (36.1  C) (Oral) 97% 20.89 kg/m2         Blood Pressure from Last 3 Encounters:   06/19/17 124/84   05/22/17 122/84   05/01/17 120/83    Weight from Last 3 Encounters:   06/19/17 51.8 kg (114 lb 3.2 oz)   05/22/17 52.5 kg (115 lb 11.2 oz)   05/01/17 52.3 kg (115 lb 6.4 oz)               Primary Care Provider Office Phone # Fax #    Kellyhoward Hart [phone]       [facility] [address] [facility] [address]        Equal Access to Services     [facility] : Hadii keturah ku hadasho Sophoebe, waaxda luqadaha, qaybta kaalmada annabel, freddie keys. So [facility] [phone].    ATENCIÓN: Si habla español, tiene a ramires disposición servicios gratuitos de asistencia lingüística. Kyung al [phone].    We comply with applicable federal civil rights laws and [place] laws. We do not discriminate on the basis of race, color, national origin, age, disability sex, sexual orientation or gender identity.            Thank you!     Thank you for choosing [facility]  for your care. Our goal is always to provide you with excellent care. Hearing back from our patients is one way we can continue to improve our services. Please take a few minutes to complete the written survey that you may receive in the mail after your visit with us. Thank you!             Your Updated Medication List - Protect others around you: Learn how to safely use, store and throw away your medicines at  www.disposemymeds.org.          This list is accurate as of: 6/19/17 11:59 PM.  Always use your most recent med list.                   Brand Name Dispense Instructions for use Diagnosis    ALEVE PO      Take 220 mg by mouth daily        COMPAZINE PO      Take 10 mg by mouth every 6 hours as needed for nausea        desmopressin 0.1 MG tablet    DDAVP    90 tablet    Take 1 tablet (100 mcg) by mouth At Bedtime    Diabetes insipidus, neurohypophyseal (H)       Multi-vitamin Tabs tablet      Take 1 tablet by mouth daily        OMEPRAZOLE PO      Take 10 mg by mouth as needed        VITAMIN E BLEND PO      Take by mouth daily

## 2017-06-19 NOTE — PROGRESS NOTES
[facility]  ONCOLOGY FOLLOW-UP VISIT NOTE    PATIENT NAME: Keren Erickson    YOB: 1955  MRN :[id number]  DATE OF VISIT: Jun 19, 2017    REASON FOR VISIT: Follow-up of metastatic breast cancer, triple positive .     HISTORY OF PRESENT ILLNESS :   The patient is a 60-year-old female who presented to the hospital initially in 09/2013 with complaints of dyspnea. Workup revealed a large pericardial effusion and pleural effusion. Physical examination revealed a large untreated left breast mass encompassing the entire left breast. Biopsies revealed these were ER, CT and HER2-positive breast cancer. She had a thoracentesis and pericardial sclerosis performed. She was originally on Arimidex and Herceptin. In 01/2014, she was switched to tamoxifen and Herceptin due to arthralgias, and she ultimately developed progressive disease and was switched to Faslodex and Herceptin. In 01/2015, she was found to have progressive disease and was switched to T-DM1.     Initially when she was seen in late 02/2015, she complained of some V5 sensory deficit. This was subjective. It was not elicited on examination. This persisted and further workup was performed with a brain MRI. This revealed what was initially thought to be 3 punctate brain metastases. She originally saw Radiation Oncology and Neurosurgery as well as underwent a lumbar puncture. Cytology from the lumbar puncture showed no evidence of leptomeningeal disease. She was treated with Gamma Knife radiation to 6 lesions, performed on 04/16/2015.  She initiated therapy with TDM1 in January 2015 and continued this through 6/26/2015 with overall stable disease. She has since been on a break from treatment. The patient presented earlier this month with recurrent shortness of breath.  Imaging revealed recurrent right-sided pleural effusion.  She has since undergone thoracentesis with cytology pending.  Clinically, however,  there is evidence of disease progression. PET scan performed on 11/25/2015 demonstrated progression of disease at multiple sites. She restarted TDM1 on 11/27/2015, however experienced a mild transfusion reaction with shortness of breath and chest tightness, resolved upon stopping TDM1 and 125 mg of SoluMedrol. She had progression of disease on repeat imaging 2/17/2016, as well as new brain metastases. She started TDM1 with Perjeta on 3/4/2016. She underwent gamma knife to brain mets on 3/8/16.      In late May 2016 , she was found to have progressive brain metastases.  She received whole brain radiation.  She had a follow up brain MRI August 2016 that was stable.     In September 2016, she enrolled on [place]  trial and was randomized to the standard of care arm/Physician's Choice; started on gemzar and herceptin. She was recently hospitalized 1/27-2/3/17 for presumed HCAP. Trial was suspended. She continued on gemzar and heceptin with stable disease.     INTERVAL HISTORY: Keren returns to clinic for a routine visit and to receive chemotherapy. She has been receiving Herceptin every 3 weeks. She has been off of Gemzar since 03/2017 after was hospitalized for pneumonia and developed significant weakness at the time. Her overall performance status has been improving gradually. She states that she is able to walk using a walker recently. She has not been using a wheelchair regularly. She denies falls recently. She is gaining back her strength gradually. She has been taking DDAVP consistently for the diabetes insipidus.  She denies having polyuria or polydipsia.  She has no new lumps or bumps.  She has no fevers or chills.  She denies having headache, change in vision or focal weakness.  She denies pain.  She reports improving facial and tongue paraesthesia.  She has no shortness of breath, cough, leg edema, palpitation or orthopnea.  She has no nausea or vomiting.  She has no bleeding.  She has no skin rash.        PHYSICAL EXAMINATION:     /84 (BP Location: Right arm, Patient Position: Chair, Cuff Size: Adult Regular)  Pulse 85  Temp 96.9  F (36.1  C) (Oral)  Wt 51.8 kg (114 lb 3.2 oz)  SpO2 97%  BMI 20.89 kg/m2    Wt Readings from Last 4 Encounters:   06/19/17 51.8 kg (114 lb 3.2 oz)   05/22/17 52.5 kg (115 lb 11.2 oz)   05/01/17 52.3 kg (115 lb 6.4 oz)   04/10/17 52.7 kg (116 lb 3.2 oz)     GENERAL : Alert and oriented ,cachectic female in not in distress.  Seen while receiving chemo infusion .   HEENT: Normocephalic head.  Anicteric sclerae. Moist buccal mucosa. No oral ulceration. Pupils are equal and reactive bilaterally. Normal extraocular eye movements. No conjunctival injection.    NECK: Supple, no thyromegaly.   LYMPH:  No cervical, supraclavicular, axillary, epitrochlear, or inguinal lymphadenopathy.  BREAST:  Breasts exam deferred today.  LUNGS: Clear breath sounds bilaterally, no wheezing, no crackles.    CARDIOVASCULAR: S1 and S2 well heard, regular rate and rhythm, no murmur or gallop.    ABDOMEN: Soft, nontender, positive bowel sounds. No organomegaly was appreciated.  EXTREMITIES: No cyanosis, no clubbing, no edema.    SKIN: No skin rash, no purpura or petechiae.    NEUROLOGIC: Normal mental status. Alert and oriented . Baseline decreased sensation over mandibular branch of facial nerve  No focal weakness. Sensory examination grossly intact.  Normal coordination.      LABS:   Recent Labs   Lab Test  06/19/17   1326  05/22/17   1357  05/01/17   1321   WBC  8.1  6.6  7.5   HGB  12.4  12.0  12.2   PLT  277  300  235   ANEU  5.7  4.5  5.1   ALYM  1.6  1.4  1.4     Recent Labs   Lab Test  06/19/17   1326  05/22/17   1357  04/10/17   1338  03/22/17   1254   02/28/17   1127   NA  140  142  138  141   < >  140   POTASSIUM  3.7  4.0  4.0  3.9   < >  3.9   CHLORIDE  105  106  104  106   < >  106   CO2  28  28  27  27   < >  25   BUN  15  13  13  15   < >  12   CR  0.67  0.58  0.58  0.59   < >  0.62   OMA   9.0  8.9  8.9  8.9   < >  8.9   MAG   --    --   2.4*  2.4*   --   2.4*   PHOS   --    --   2.9  2.8   --   2.3*   URIC   --    --   2.9  3.0   --   2.9   LDH   --    --   242*  243*   --   213    < > = values in this interval not displayed.     Recent Labs   Lab Test  06/19/17   1326  05/22/17   1357  04/10/17   1338   02/27/17   1037   09/21/16   1305   12/09/15   0855   AST  28  31  35   < >   --    < >  56*   < >   --    ALT  27  26  36   < >   --    < >  34   < >   --    ALKPHOS  94  104  110   < >   --    < >  178*   < >   --    BILITOTAL  0.3  0.3  0.3   < >   --    < >  0.5   < >   --    INR   --    --    --    --   0.94   --   1.01   --   0.97    < > = values in this interval not displayed.       TUMOR MARKERS   Results for HEIDI RUIZ (MRN [id number]) as of 6/19/2017 18:59   Ref. Range 3/22/2017 12:54 4/10/2017 13:38 5/1/2017 13:21 5/22/2017 13:57 6/19/2017 13:26   CEA Latest Ref Range: 0 - 2.5 ug/L 1.3 1.1 1.3 1.2 1.2       Results for HEIDI RUIZ (MRN [id number]) as of 6/19/2017 18:59   Ref. Range 3/22/2017 14:13 4/10/2017 13:38 5/1/2017 13:21 5/22/2017 13:57 6/19/2017 13:26   CA 27-29 Latest Ref Range: 0 - 39 U/mL  24 20 15 17     IMAGING :   Brain MRI without and with contrast ( 6/16/17)  MR BRAIN W/O & W CONTRAST 6/16/2017 2:00 PM     History: Metastatic breast cancer status post chemotherapy, gamma  knife radiosurgery x2 (most recently in March 2016), and whole brain  radiation (completed July 2016), currently on Gemzar/Herceptin.     Comparison: Brain MRI, 4/7/2017, 1/25/2017, 12/6/2016, 11/4/2016,  6/8/2016, and 3/6/2015.     Technique: Multiplanar T1-weighted, axial FLAIR, and susceptibility  images were obtained without intravenous contrast. Following  intravenous gadolinium-based contrast administration, axial  T2-weighted, diffusion, and T1-weighted images (in multiple planes)  were obtained.     Contrast: 5.5 mL of intravenous Gadavist     Findings:  A 5 mm enhancing  lesion in the left yvette is stable since 11/4/2016. A  4 mm T2 hyperintense enhancing lesion in the anterior right temporal  lobe and multifocal T2 hyperintense enhancing lesions in the right  cerebellar hemisphere are stable from 12/6/2016. Enhancing calvarial  lesions, for example a 1.1 cm lesion in the right parietal bone  (series 12, image 7) are also stable since 12/6/2016. A T2  hyperintense lesion with a punctate focus of enhancement in the high  left frontal lobe is unchanged from 1/25/2017 and decreased from  earlier comparison studies. There is a new small focus of hemosiderin  deposition in the belly of the yvette (series 7, image 46) with  associated contrast enhancement.      Additional areas of hemosiderin deposition associated with presumed  metastatic lesions are unchanged. There is no mass effect or midline  shift. No abnormal extra axial fluid collection. No abnormal  restricted diffusion. There is unchanged T2 hyperintense signal in the  left medulla in the expected location of the inferior olivary nucleus.  Confluent abnormal T2 hyperintense signal in the periventricular and  subcortical white matter is similar to the most recent comparison but  increased from previous, consistent with changes of radiation therapy.  The major intracranial flow voids are intact. The orbits are  unremarkable. The visualized portions of the paranasal sinuses and  mastoid air cells are clear.     Impression: A new punctate focus of hemosiderin deposition and  contrast enhancement in the midbrain likely represents a tiny  metastatic focus and continued attention on follow-up is recommended.  Additional intracranial and calvarial metastatic foci are stable.    CT CHEST/ABDOMEN/PELVIS W CONTRAST, 6/16/2017 2:32 PM     TECHNIQUE:  Helical CT images from the lung apices through the  symphysis pubis were obtained with contrast.  Coronal and sagittal  reformatted images were generated at a workstation for  further  assessment.     CONTRAST:  70 cc Isovue 370 IV.     COMPARISON: CT 4/7/2017, 1/25/2017. PET 9/7/2016.     HISTORY: History of metastatic breast cancer diagnosed in 9/2013 with  metastatic involvement of the bone and brain. Currently on  chemotherapy, prior radiation to the left breast. No surgery. Last  exam demonstrated stable disease.     FINDINGS:     Lines and tubes: Right IJ Port-A-Cath tip terminates in the right  atrium.     Chest:   Mediastinum: No thyroid nodules. Central tracheobronchial tree is  patent. Heart size is normal. No pericardial effusion.  Normal  thoracic vasculature. No thoracic lymphadenopathy. The right atrium is  mildly enlarged, but stable since 2013.  Lungs: No consolidation. No pleural effusion or pneumothorax. Stable  groundglass nodularity in the superior segment right upper lobe,  favoring a fibrotic process. Stable linear and nodular  fibroatelectasis in the lung bases. No suspicious pulmonary nodules.     Abdomen and pelvis:   Liver: Stable ill-defined, hypodense lesion in segment 8 of the liver  (series 7, image 254). No new liver lesions. Portal veins appear  patent.  Gallbladder: Slightly contracted.  Spleen: Normal size.  Pancreas: No suspicious pancreatic lesions. The pancreatic duct is not  dilated.  Adrenal glands: No adrenal nodules. Nodular thickening of the adrenal  glands without a focal nodule.  Kidneys: No hydronephrosis or obstructing renal stones. A few  subcentimeter cysts are present, too small to characterize by CT but  stable from prior.  Bladder / Pelvic organs: Unremarkable.  Bowel: No bowel wall thickening. Moderate hiatal hernia.  Lymph nodes: No retroperitoneal, mesenteric, or pelvic  lymphadenopathy.  Peritoneum / Retroperitoneum: No free fluid or air within the abdomen.  Vessels: No infrarenal aortic aneurysm.      Bones and soft tissues: Diffuse sclerotic lesions throughout the  skeleton, unchanged from prior. There is convex right rotoscoliosis  of  the lumbar spine, centered at L2-L3, with rightward slippage of L3 on  L4. Additionally, there is mild anterior slippage of L2 on L3 and L4  on L5. Stable nodularity in the right breast (series 7, image 136).  Stable appearance of the left breast tissue.          IMPRESSION: In this patient with a history of diffuse metastatic  breast cancer (brain and bones), status post chemotherapy/radiation:  1. No evidence of new metastatic disease in the chest, abdomen or  pelvis.  2. Stable ill-defined lesion in segment 8 of the liver.  3. Stable diffuse metastatic bony lesions.    ASSESSMENT AND PLAN :   Dominga is a 61-year-old female with history of metastatic ER-positive, HER-2 positive breast cancer, with involvement of the bone, history of pleural effusions treated with pleurodesis and PleurX placement, and with treated brain metastases, previously with stable disease on TDM1, however patient opted for break from therapy from June 2015 through November 2015, and represented with worsening metastatic disease at that time. She restarted TDM1 on 11/27/2015. She had progressive disease 2/17/16 and Perjeta was added to TDM1. She had gamma knife to brain mets on 3/8.  She received WBRT.  She was started on [place]  clinical trial and randomized to physician's choice, and started on Gemzar/Herceptin. She is now off the trial , but continued Gemzar and Herceptin .     1.  Metastatic breast cancer, triple-positive.  Recent restaging CT scan of the chest, abdomen and pelvis showed stable disease.  MRI of the brain showed slight changes involving the midbrain.  It is not clear whether this is really progression or not.  We will do a followup MRI of the brain in 2 months.  She has been off Gemzar for the past few months due to significant deconditioning related to pneumonia.  Now she has recovered fairly well.  She is getting Gemzar and Herceptin today.  She will continue both going forward.  Her tumor markers have been  stable.  She will return to clinic to see a provider with MRI of the brain in 2 months.      2.  Central diabetes insipidus.  She has been on desmopressin 50 mcg daily .  Her sodium has been stable.  She has been following with the Endocrine team.      3.  Brain metastasis.  She has had numbness of the tongue.  This has been improving.     4.  Chronic pain.  She has been taking Aleve which controls her pain.     5.  Gastroesophageal reflux disease.  She is on Prilosec.  She gets antiemetics p.r.n.      6.  Bone metastases.  She is on Xgeva.  She is getting this every 3 months.     7.  Mood is stable.     8.  Advanced directives.  She has POLST.  She is DNR and DNI.  Her power of  is listed in the computer.      9.  Deconditioning.  Her overall performance status has been improving gradually.  She stated that she has been walking independently with a walker and cane.  She is not using a wheelchair regularly these days.  She has been trying to exercise per OT/PT recommendations.         Patient seen and plan discussed with Dr Harper .       Amber Zelaya MD  Hematology and Oncology Fellow  [phone] (Pager)     Pt was seen and evaluated by me with Dr Zelaya.  I reviewed labs and imaging, and examined the patient.  She has mild horizontal nystagmus; this is unchanged from prior examinations.  We discussed close f/u of brain imaging with repeat brain MRI in 2 months.    Marlen Harper MD

## 2017-06-19 NOTE — LETTER
6/19/2017       RE: Keren Erickson  [address]  [facility] [address]     Dear Colleague,    Thank you for referring your patient, Keren Erickson, to the [facility]. Please see a copy of my visit note below.    [facility]ER [facility]INIC  ONCOLOGY FOLLOW-UP VISIT NOTE    PATIENT NAME: Keren Erickson    YOB: 1955  MRN :[id number]  DATE OF VISIT: Jun 19, 2017    REASON FOR VISIT: Follow-up of metastatic breast cancer, triple positive .     HISTORY OF PRESENT ILLNESS :   The patient is a 60-year-old female who presented to the hospital initially in 09/2013 with complaints of dyspnea. Workup revealed a large pericardial effusion and pleural effusion. Physical examination revealed a large untreated left breast mass encompassing the entire left breast. Biopsies revealed these were ER, NV and HER2-positive breast cancer. She had a thoracentesis and pericardial sclerosis performed. She was originally on Arimidex and Herceptin. In 01/2014, she was switched to tamoxifen and Herceptin due to arthralgias, and she ultimately developed progressive disease and was switched to Faslodex and Herceptin. In 01/2015, she was found to have progressive disease and was switched to T-DM1.     Initially when she was seen in late 02/2015, she complained of some V5 sensory deficit. This was subjective. It was not elicited on examination. This persisted and further workup was performed with a brain MRI. This revealed what was initially thought to be 3 punctate brain metastases. She originally saw Radiation Oncology and Neurosurgery as well as underwent a lumbar puncture. Cytology from the lumbar puncture showed no evidence of leptomeningeal disease. She was treated with Gamma Knife radiation to 6 lesions, performed on 04/16/2015.  She initiated therapy with TDM1 in January 2015 and continued this through 6/26/2015 with overall stable disease. She has since  been on a break from treatment. The patient presented earlier this month with recurrent shortness of breath.  Imaging revealed recurrent right-sided pleural effusion.  She has since undergone thoracentesis with cytology pending.  Clinically, however, there is evidence of disease progression. PET scan performed on 11/25/2015 demonstrated progression of disease at multiple sites. She restarted TDM1 on 11/27/2015, however experienced a mild transfusion reaction with shortness of breath and chest tightness, resolved upon stopping TDM1 and 125 mg of SoluMedrol. She had progression of disease on repeat imaging 2/17/2016, as well as new brain metastases. She started TDM1 with Perjeta on 3/4/2016. She underwent gamma knife to brain mets on 3/8/16.      In late May 2016 , she was found to have progressive brain metastases.  She received whole brain radiation.  She had a follow up brain MRI August 2016 that was stable.     In September 2016, she enrolled on [place]  trial and was randomized to the standard of care arm/Physician's Choice; started on gemzar and herceptin. She was recently hospitalized 1/27-2/3/17 for presumed HCAP. Trial was suspended. She continued on gemzar and heceptin with stable disease.     INTERVAL HISTORY: Keren returns to clinic for a routine visit and to receive chemotherapy. She has been receiving Herceptin every 3 weeks. She has been off of Gemzar since 03/2017 after was hospitalized for pneumonia and developed significant weakness at the time. Her overall performance status has been improving gradually. She states that she is able to walk using a walker recently. She has not been using a wheelchair regularly. She denies falls recently. She is gaining back her strength gradually. She has been taking DDAVP consistently for the diabetes insipidus.  She denies having polyuria or polydipsia.  She has no new lumps or bumps.  She has no fevers or chills.  She denies having headache, change in  vision or focal weakness.  She denies pain.  She reports improving facial and tongue paraesthesia.  She has no shortness of breath, cough, leg edema, palpitation or orthopnea.  She has no nausea or vomiting.  She has no bleeding.  She has no skin rash.       PHYSICAL EXAMINATION:     /84 (BP Location: Right arm, Patient Position: Chair, Cuff Size: Adult Regular)  Pulse 85  Temp 96.9  F (36.1  C) (Oral)  Wt 51.8 kg (114 lb 3.2 oz)  SpO2 97%  BMI 20.89 kg/m2    Wt Readings from Last 4 Encounters:   06/19/17 51.8 kg (114 lb 3.2 oz)   05/22/17 52.5 kg (115 lb 11.2 oz)   05/01/17 52.3 kg (115 lb 6.4 oz)   04/10/17 52.7 kg (116 lb 3.2 oz)     GENERAL : Alert and oriented ,cachectic female in not in distress.  Seen while receiving chemo infusion .   HEENT: Normocephalic head.  Anicteric sclerae. Moist buccal mucosa. No oral ulceration. Pupils are equal and reactive bilaterally. Normal extraocular eye movements. No conjunctival injection.    NECK: Supple, no thyromegaly.   LYMPH:  No cervical, supraclavicular, axillary, epitrochlear, or inguinal lymphadenopathy.  BREAST:  Breasts exam deferred today.  LUNGS: Clear breath sounds bilaterally, no wheezing, no crackles.    CARDIOVASCULAR: S1 and S2 well heard, regular rate and rhythm, no murmur or gallop.    ABDOMEN: Soft, nontender, positive bowel sounds. No organomegaly was appreciated.  EXTREMITIES: No cyanosis, no clubbing, no edema.    SKIN: No skin rash, no purpura or petechiae.    NEUROLOGIC: Normal mental status. Alert and oriented . Baseline decreased sensation over mandibular branch of facial nerve  No focal weakness. Sensory examination grossly intact.  Normal coordination.      LABS:   Recent Labs   Lab Test  06/19/17   1326  05/22/17   1357  05/01/17   1321   WBC  8.1  6.6  7.5   HGB  12.4  12.0  12.2   PLT  277  300  235   ANEU  5.7  4.5  5.1   ALYM  1.6  1.4  1.4     Recent Labs   Lab Test  06/19/17   1326  05/22/17   1357  04/10/17   1338  03/22/17    1254   02/28/17   1127   NA  140  142  138  141   < >  140   POTASSIUM  3.7  4.0  4.0  3.9   < >  3.9   CHLORIDE  105  106  104  106   < >  106   CO2  28  28  27  27   < >  25   BUN  15  13  13  15   < >  12   CR  0.67  0.58  0.58  0.59   < >  0.62   OMA  9.0  8.9  8.9  8.9   < >  8.9   MAG   --    --   2.4*  2.4*   --   2.4*   PHOS   --    --   2.9  2.8   --   2.3*   URIC   --    --   2.9  3.0   --   2.9   LDH   --    --   242*  243*   --   213    < > = values in this interval not displayed.     Recent Labs   Lab Test  06/19/17   1326  05/22/17   1357  04/10/17   1338   02/27/17   1037   09/21/16   1305   12/09/15   0855   AST  28  31  35   < >   --    < >  56*   < >   --    ALT  27  26  36   < >   --    < >  34   < >   --    ALKPHOS  94  104  110   < >   --    < >  178*   < >   --    BILITOTAL  0.3  0.3  0.3   < >   --    < >  0.5   < >   --    INR   --    --    --    --   0.94   --   1.01   --   0.97    < > = values in this interval not displayed.       TUMOR MARKERS   Results for HEIDI RUIZ (MRN [id number]) as of 6/19/2017 18:59   Ref. Range 3/22/2017 12:54 4/10/2017 13:38 5/1/2017 13:21 5/22/2017 13:57 6/19/2017 13:26   CEA Latest Ref Range: 0 - 2.5 ug/L 1.3 1.1 1.3 1.2 1.2       Results for HEIDI RUIZ (MRN [id number]) as of 6/19/2017 18:59   Ref. Range 3/22/2017 14:13 4/10/2017 13:38 5/1/2017 13:21 5/22/2017 13:57 6/19/2017 13:26   CA 27-29 Latest Ref Range: 0 - 39 U/mL  24 20 15 17     IMAGING :   Brain MRI without and with contrast ( 6/16/17)  MR BRAIN W/O & W CONTRAST 6/16/2017 2:00 PM     History: Metastatic breast cancer status post chemotherapy, gamma  knife radiosurgery x2 (most recently in March 2016), and whole brain  radiation (completed July 2016), currently on Gemzar/Herceptin.     Comparison: Brain MRI, 4/7/2017, 1/25/2017, 12/6/2016, 11/4/2016,  6/8/2016, and 3/6/2015.     Technique: Multiplanar T1-weighted, axial FLAIR, and susceptibility  images were obtained without  intravenous contrast. Following  intravenous gadolinium-based contrast administration, axial  T2-weighted, diffusion, and T1-weighted images (in multiple planes)  were obtained.     Contrast: 5.5 mL of intravenous Gadavist     Findings:  A 5 mm enhancing lesion in the left yvette is stable since 11/4/2016. A  4 mm T2 hyperintense enhancing lesion in the anterior right temporal  lobe and multifocal T2 hyperintense enhancing lesions in the right  cerebellar hemisphere are stable from 12/6/2016. Enhancing calvarial  lesions, for example a 1.1 cm lesion in the right parietal bone  (series 12, image 7) are also stable since 12/6/2016. A T2  hyperintense lesion with a punctate focus of enhancement in the high  left frontal lobe is unchanged from 1/25/2017 and decreased from  earlier comparison studies. There is a new small focus of hemosiderin  deposition in the belly of the yvette (series 7, image 46) with  associated contrast enhancement.      Additional areas of hemosiderin deposition associated with presumed  metastatic lesions are unchanged. There is no mass effect or midline  shift. No abnormal extra axial fluid collection. No abnormal  restricted diffusion. There is unchanged T2 hyperintense signal in the  left medulla in the expected location of the inferior olivary nucleus.  Confluent abnormal T2 hyperintense signal in the periventricular and  subcortical white matter is similar to the most recent comparison but  increased from previous, consistent with changes of radiation therapy.  The major intracranial flow voids are intact. The orbits are  unremarkable. The visualized portions of the paranasal sinuses and  mastoid air cells are clear.     Impression: A new punctate focus of hemosiderin deposition and  contrast enhancement in the midbrain likely represents a tiny  metastatic focus and continued attention on follow-up is recommended.  Additional intracranial and calvarial metastatic foci are stable.    CT  CHEST/ABDOMEN/PELVIS W CONTRAST, 6/16/2017 2:32 PM     TECHNIQUE:  Helical CT images from the lung apices through the  symphysis pubis were obtained with contrast.  Coronal and sagittal  reformatted images were generated at a workstation for further  assessment.     CONTRAST:  70 cc Isovue 370 IV.     COMPARISON: CT 4/7/2017, 1/25/2017. PET 9/7/2016.     HISTORY: History of metastatic breast cancer diagnosed in 9/2013 with  metastatic involvement of the bone and brain. Currently on  chemotherapy, prior radiation to the left breast. No surgery. Last  exam demonstrated stable disease.     FINDINGS:     Lines and tubes: Right IJ Port-A-Cath tip terminates in the right  atrium.     Chest:   Mediastinum: No thyroid nodules. Central tracheobronchial tree is  patent. Heart size is normal. No pericardial effusion.  Normal  thoracic vasculature. No thoracic lymphadenopathy. The right atrium is  mildly enlarged, but stable since 2013.  Lungs: No consolidation. No pleural effusion or pneumothorax. Stable  groundglass nodularity in the superior segment right upper lobe,  favoring a fibrotic process. Stable linear and nodular  fibroatelectasis in the lung bases. No suspicious pulmonary nodules.     Abdomen and pelvis:   Liver: Stable ill-defined, hypodense lesion in segment 8 of the liver  (series 7, image 254). No new liver lesions. Portal veins appear  patent.  Gallbladder: Slightly contracted.  Spleen: Normal size.  Pancreas: No suspicious pancreatic lesions. The pancreatic duct is not  dilated.  Adrenal glands: No adrenal nodules. Nodular thickening of the adrenal  glands without a focal nodule.  Kidneys: No hydronephrosis or obstructing renal stones. A few  subcentimeter cysts are present, too small to characterize by CT but  stable from prior.  Bladder / Pelvic organs: Unremarkable.  Bowel: No bowel wall thickening. Moderate hiatal hernia.  Lymph nodes: No retroperitoneal, mesenteric, or  pelvic  lymphadenopathy.  Peritoneum / Retroperitoneum: No free fluid or air within the abdomen.  Vessels: No infrarenal aortic aneurysm.      Bones and soft tissues: Diffuse sclerotic lesions throughout the  skeleton, unchanged from prior. There is convex right rotoscoliosis of  the lumbar spine, centered at L2-L3, with rightward slippage of L3 on  L4. Additionally, there is mild anterior slippage of L2 on L3 and L4  on L5. Stable nodularity in the right breast (series 7, image 136).  Stable appearance of the left breast tissue.          IMPRESSION: In this patient with a history of diffuse metastatic  breast cancer (brain and bones), status post chemotherapy/radiation:  1. No evidence of new metastatic disease in the chest, abdomen or  pelvis.  2. Stable ill-defined lesion in segment 8 of the liver.  3. Stable diffuse metastatic bony lesions.    ASSESSMENT AND PLAN :   Dominga is a 61-year-old female with history of metastatic ER-positive, HER-2 positive breast cancer, with involvement of the bone, history of pleural effusions treated with pleurodesis and PleurX placement, and with treated brain metastases, previously with stable disease on TDM1, however patient opted for break from therapy from June 2015 through November 2015, and represented with worsening metastatic disease at that time. She restarted TDM1 on 11/27/2015. She had progressive disease 2/17/16 and Perjeta was added to TDM1. She had gamma knife to brain mets on 3/8.  She received WBRT.  She was started on [place]  clinical trial and randomized to physician's choice, and started on Gemzar/Herceptin. She is now off the trial , but continued Gemzar and Herceptin .     1.  Metastatic breast cancer, triple-positive.  Recent restaging CT scan of the chest, abdomen and pelvis showed stable disease.  MRI of the brain showed slight changes involving the midbrain.  It is not clear whether this is really progression or not.  We will do a followup MRI  of the brain in 2 months.  She has been off Gemzar for the past few months due to significant deconditioning related to pneumonia.  Now she has recovered fairly well.  She is getting Gemzar and Herceptin today.  She will continue both going forward.  Her tumor markers have been stable.  She will return to clinic to see a provider with MRI of the brain in 2 months.      2.  Central diabetes insipidus.  She has been on desmopressin 50 mcg daily .  Her sodium has been stable.  She has been following with the Endocrine team.      3.  Brain metastasis.  She has had numbness of the tongue.  This has been improving.     4.  Chronic pain.  She has been taking Aleve which controls her pain.     5.  Gastroesophageal reflux disease.  She is on Prilosec.  She gets antiemetics p.r.n.      6.  Bone metastases.  She is on Xgeva.  She is getting this every 3 months.     7.  Mood is stable.     8.  Advanced directives.  She has POLST.  She is DNR and DNI.  Her power of  is listed in the computer.      9.  Deconditioning.  Her overall performance status has been improving gradually.  She stated that she has been walking independently with a walker and cane.  She is not using a wheelchair regularly these days.  She has been trying to exercise per OT/PT recommendations.         Patient seen and plan discussed with Dr Harper .       Amber Zelaya MD  Hematology and Oncology Fellow  [phone] (Pager)     Pt was seen and evaluated by me with Dr Zelaya.  I reviewed labs and imaging, and examined the patient.  She has mild horizontal nystagmus; this is unchanged from prior examinations.  We discussed close f/u of brain imaging with repeat brain MRI in 2 months.    Marlen Harper MD

## 2017-06-19 NOTE — MR AVS SNAPSHOT
After Visit Summary   6/19/2017    Keren Erickson    MRN: [id number]           Patient Information     Date Of Birth          1955        Visit Information        Provider Department      6/19/2017 1:30 PM  22 ATC;  ONCOLOGY INFUSION [facility]        Today's Diagnoses     Carcinoma of breast metastatic to multiple sites, unspecified laterality (H)    -  1    Metastatic breast cancer (H)        Brain metastasis (H)          Care Instructions    Contact Numbers    [facility] Main Line: [phone]  [facility] Triage:  [phone]    Call triage with chills and/or temperature greater than or equal to 100.5, uncontrolled nausea/vomiting, diarrhea, constipation, dizziness, shortness of breath, chest pain, bleeding, unexplained bruising, or any new/concerning symptoms, questions/concerns.     If you are having any concerning symptoms or wish to speak to a provider before your next infusion visit, please call your care coordinator or triage to notify them so we can adequately serve you.       After Hours: [phone]    If after hours, weekends, or holidays, call main hospital  and ask for Oncology doctor on call.         June 2017 Sunday Monday Tuesday Wednesday Thursday Friday Saturday                       1     2     3       4     5     6     7     8     9     10       11     12     13     14     15     16     [facility] DRAW   12:30 PM   (15 min.)    [facility] DRAW   [facility] Lab Draw     MR BRAIN WWO   12:45 PM   (45 min.)   [id number]   [facility] MRI     CT CHEST/ABDOMEN/PELVIS W    1:45 PM   (20 min.)   UCCT2   [facility] CT 17       18     19     [facility] DRAW    1:00 PM   (15 min.)    [facility] DRAW   [facility] Lab Draw     P ONC INFUSION 120    1:30 PM   (120 min.)    ONCOLOGY INFUSION   [facility]     UMP RETURN    3:45 PM   (30 min.)   Marlen Harper MD   [facility]  Cancer Clinic 20     21     22     23     24       25     26     27     [facility] ONC INFUSION 120    2:30 PM   (120 min.)    ONCOLOGY INFUSION   [facility] 28 29 30 July 2017 Sunday Monday Tuesday Wednesday Thursday Friday Saturday                                 1       2     3     4     5     6     7     8       9     10     11     12     13     14     15       16     17     18     19     20     21     22       23     24     25     26     27     28     29       30     31                                           Lab Results:  Recent Results (from the past 12 hour(s))   CBC with platelets differential    Collection Time: 06/19/17  1:26 PM   Result Value Ref Range    WBC 8.1 4.0 - 11.0 10e9/L    RBC Count 3.97 3.8 - 5.2 10e12/L    Hemoglobin 12.4 11.7 - 15.7 g/dL    Hematocrit 37.7 35.0 - 47.0 %    MCV 95 78 - 100 fl    MCH 31.2 26.5 - 33.0 pg    MCHC 32.9 31.5 - 36.5 g/dL    RDW 14.7 10.0 - 15.0 %    Platelet Count 277 150 - 450 10e9/L    Diff Method Automated Method     % Neutrophils 70.1 %    % Lymphocytes 19.3 %    % Monocytes 9.5 %    % Eosinophils 0.0 %    % Basophils 0.7 %    % Immature Granulocytes 0.4 %    Nucleated RBCs 0 0 /100    Absolute Neutrophil 5.7 1.6 - 8.3 10e9/L    Absolute Lymphocytes 1.6 0.8 - 5.3 10e9/L    Absolute Monocytes 0.8 0.0 - 1.3 10e9/L    Absolute Eosinophils 0.0 0.0 - 0.7 10e9/L    Absolute Basophils 0.1 0.0 - 0.2 10e9/L    Abs Immature Granulocytes 0.0 0 - 0.4 10e9/L    Absolute Nucleated RBC 0.0    Comprehensive metabolic panel    Collection Time: 06/19/17  1:26 PM   Result Value Ref Range    Sodium 140 133 - 144 mmol/L    Potassium 3.7 3.4 - 5.3 mmol/L    Chloride 105 94 - 109 mmol/L    Carbon Dioxide 28 20 - 32 mmol/L    Anion Gap 7 3 - 14 mmol/L    Glucose 67 (L) 70 - 99 mg/dL    Urea Nitrogen 15 7 - 30 mg/dL    Creatinine 0.67 0.52 - 1.04 mg/dL    GFR Estimate 89 >60 mL/min/1.7m2    GFR Estimate If Black >90   GFR  Calc   >60 mL/min/1.7m2    Calcium 9.0 8.5 - 10.1 mg/dL    Bilirubin Total 0.3 0.2 - 1.3 mg/dL    Albumin 3.8 3.4 - 5.0 g/dL    Protein Total 6.7 (L) 6.8 - 8.8 g/dL    Alkaline Phosphatase 94 40 - 150 U/L    ALT 27 0 - 50 U/L    AST 28 0 - 45 U/L               Follow-ups after your visit        Your next 10 appointments already scheduled     Jun 19, 2017  4:00 PM CDT   (Arrive by 3:45 PM)   Return Visit with Marlen Harper MD   [facility] ([facility])    [address]  2nd Floor  [facility] [address] [phone]            Jun 27, 2017  2:30 PM CDT   Infusion 120 with UC ONCOLOGY INFUSION, UC 19 ATC   [facility] ([facility])    [address] [place] [address]  [address] [facility] [address] [phone]            Sep 18, 2017  2:30 PM CDT   (Arrive by 2:15 PM)   RETURN ENDOCRINE with Rolando Mishra MD   [facility] Endocrinology ([facility])    [address] [place] [address]  3rd Floor  [facility] [address] [phone]              Who to contact     If you have questions or need follow up information about today's clinic visit or your schedule please contact [facility] directly at [phone].  Normal or non-critical lab and imaging results will be communicated to you by [employer], letter or phone within 4 business days after the clinic has received the results. If you do not hear from us within 7 days, please contact the clinic through [employer] or phone. If you have a critical or abnormal lab result, we will notify you by phone as soon as possible.  Submit refill requests through [employer] or call your pharmacy and they will forward the refill request to us. Please allow 3 business days for your refill to be completed.          Additional Information About Your Visit        [employer] Information     [employer] gives you secure access to your electronic health  record. If you see a primary care provider, you can also send messages to your care team and make appointments. If you have questions, please call your primary care clinic.  If you do not have a primary care provider, please call [phone] and they will assist you.        Care EveryWhere ID     This is your Care EveryWhere ID. This could be used by other organizations to access your [facility] medical records  [id number]         Blood Pressure from Last 3 Encounters:   05/22/17 122/84   05/01/17 120/83   04/10/17 120/81    Weight from Last 3 Encounters:   05/22/17 52.5 kg (115 lb 11.2 oz)   05/01/17 52.3 kg (115 lb 6.4 oz)   04/10/17 52.7 kg (116 lb 3.2 oz)              We Performed the Following     CA 27.29 Breast tumor marker     CBC with platelets differential     CEA     Comprehensive metabolic panel        Primary Care Provider Office Phone # Fax #    Kelly Hart [phone]       [facility] [address] [facility] [address]        Thank you!     Thank you for choosing [facility]  for your care. Our goal is always to provide you with excellent care. Hearing back from our patients is one way we can continue to improve our services. Please take a few minutes to complete the written survey that you may receive in the mail after your visit with us. Thank you!             Your Updated Medication List - Protect others around you: Learn how to safely use, store and throw away your medicines at www.disposemymeds.org.          This list is accurate as of: 6/19/17  3:49 PM.  Always use your most recent med list.                   Brand Name Dispense Instructions for use    ALEVE PO      Take 220 mg by mouth daily       COMPAZINE PO      Take 10 mg by mouth every 6 hours as needed for nausea       desmopressin 0.1 MG tablet    DDAVP    90 tablet    Take 1 tablet (100 mcg) by mouth At Bedtime       Multi-vitamin Tabs tablet      Take 1 tablet by mouth daily        OMEPRAZOLE PO      Take 10 mg by mouth as needed       VITAMIN E BLEND PO      Take by mouth daily

## 2017-06-19 NOTE — NURSING NOTE
/84 (BP Location: Right arm, Patient Position: Chair, Cuff Size: Adult Regular)  Pulse 85  Temp 96.9  F (36.1  C) (Oral)  Wt 51.8 kg (114 lb 3.2 oz)  SpO2 97%  BMI 20.89 kg/m2    Vitals taken. Port previously accessed by RN. Labs collected and sent. Pt tolerated well. Line flushed with NS & Heparin. Pt checked in for next appointment.    Abida Sam

## 2017-06-19 NOTE — PATIENT INSTRUCTIONS
Contact Numbers    [facility] [place]: [phone]  [facility] Triage:  [phone]    Call triage with chills and/or temperature greater than or equal to 100.5, uncontrolled nausea/vomiting, diarrhea, constipation, dizziness, shortness of breath, chest pain, bleeding, unexplained bruising, or any new/concerning symptoms, questions/concerns.     If you are having any concerning symptoms or wish to speak to a provider before your next infusion visit, please call your care coordinator or triage to notify them so we can adequately serve you.       After Hours: [phone]    If after hours, weekends, or holidays, call main hospital  and ask for Oncology doctor on call.         June 2017 Sunday Monday Tuesday Wednesday Thursday Friday Saturday                       1     2     3       4     5     6     7     8     9     10       11     12     13     14     15     16     [facility]   12:30 PM   (15 min.)    [facility]   [facility] Lab Draw     MR BRAIN WWO   12:45 PM   (45 min.)   [id number]   [facility] MRI     CT CHEST/ABDOMEN/PELVIS W    1:45 PM   (20 min.)   UCCT2   [facility] CT 17       18     19     [facility]    1:00 PM   (15 min.)    [facility]   [facility] Lab Draw     [facility] ONC INFUSION 120    1:30 PM   (120 min.)    ONCOLOGY INFUSION   [facility]     [facility] RETURN    3:45 PM   (30 min.)   Marlen Harper MD   [facility] 20     21     22     23     24       25     26     27     [facility] ONC INFUSION 120    2:30 PM   (120 min.)    ONCOLOGY INFUSION   [facility] 28 29 30 July 2017 Sunday Monday Tuesday Wednesday Thursday Friday Saturday                                 1       2     3     4     5     6     7     8       9     10     11     12     13     14     15       16     17     18     19     20     21     22       23     24     25     26     27      28     29       30     31                                           Lab Results:  Recent Results (from the past 12 hour(s))   CBC with platelets differential    Collection Time: 06/19/17  1:26 PM   Result Value Ref Range    WBC 8.1 4.0 - 11.0 10e9/L    RBC Count 3.97 3.8 - 5.2 10e12/L    Hemoglobin 12.4 11.7 - 15.7 g/dL    Hematocrit 37.7 35.0 - 47.0 %    MCV 95 78 - 100 fl    MCH 31.2 26.5 - 33.0 pg    MCHC 32.9 31.5 - 36.5 g/dL    RDW 14.7 10.0 - 15.0 %    Platelet Count 277 150 - 450 10e9/L    Diff Method Automated Method     % Neutrophils 70.1 %    % Lymphocytes 19.3 %    % Monocytes 9.5 %    % Eosinophils 0.0 %    % Basophils 0.7 %    % Immature Granulocytes 0.4 %    Nucleated RBCs 0 0 /100    Absolute Neutrophil 5.7 1.6 - 8.3 10e9/L    Absolute Lymphocytes 1.6 0.8 - 5.3 10e9/L    Absolute Monocytes 0.8 0.0 - 1.3 10e9/L    Absolute Eosinophils 0.0 0.0 - 0.7 10e9/L    Absolute Basophils 0.1 0.0 - 0.2 10e9/L    Abs Immature Granulocytes 0.0 0 - 0.4 10e9/L    Absolute Nucleated RBC 0.0    Comprehensive metabolic panel    Collection Time: 06/19/17  1:26 PM   Result Value Ref Range    Sodium 140 133 - 144 mmol/L    Potassium 3.7 3.4 - 5.3 mmol/L    Chloride 105 94 - 109 mmol/L    Carbon Dioxide 28 20 - 32 mmol/L    Anion Gap 7 3 - 14 mmol/L    Glucose 67 (L) 70 - 99 mg/dL    Urea Nitrogen 15 7 - 30 mg/dL    Creatinine 0.67 0.52 - 1.04 mg/dL    GFR Estimate 89 >60 mL/min/1.7m2    GFR Estimate If Black >90   GFR Calc   >60 mL/min/1.7m2    Calcium 9.0 8.5 - 10.1 mg/dL    Bilirubin Total 0.3 0.2 - 1.3 mg/dL    Albumin 3.8 3.4 - 5.0 g/dL    Protein Total 6.7 (L) 6.8 - 8.8 g/dL    Alkaline Phosphatase 94 40 - 150 U/L    ALT 27 0 - 50 U/L    AST 28 0 - 45 U/L

## 2017-06-27 ENCOUNTER — INFUSION THERAPY VISIT (OUTPATIENT)
Dept: ONCOLOGY | Facility: CLINIC | Age: 62
End: 2017-06-27
Attending: INTERNAL MEDICINE
Payer: COMMERCIAL

## 2017-06-27 ENCOUNTER — ONCOLOGY VISIT (OUTPATIENT)
Dept: ONCOLOGY | Facility: CLINIC | Age: 62
End: 2017-06-27
Attending: PHYSICIAN ASSISTANT
Payer: COMMERCIAL

## 2017-06-27 VITALS
DIASTOLIC BLOOD PRESSURE: 73 MMHG | TEMPERATURE: 97.5 F | RESPIRATION RATE: 15 BRPM | OXYGEN SATURATION: 98 % | WEIGHT: 116.9 LBS | HEIGHT: 62 IN | SYSTOLIC BLOOD PRESSURE: 129 MMHG | BODY MASS INDEX: 21.51 KG/M2 | HEART RATE: 90 BPM

## 2017-06-27 DIAGNOSIS — C79.51 BONE METASTASIS: ICD-10-CM

## 2017-06-27 DIAGNOSIS — C50.919 METASTATIC BREAST CANCER: Primary | ICD-10-CM

## 2017-06-27 DIAGNOSIS — C79.31 BRAIN METASTASIS: ICD-10-CM

## 2017-06-27 DIAGNOSIS — C50.919 CARCINOMA OF BREAST METASTATIC TO MULTIPLE SITES, UNSPECIFIED LATERALITY (H): ICD-10-CM

## 2017-06-27 LAB
ANION GAP SERPL CALCULATED.3IONS-SCNC: 9 MMOL/L (ref 3–14)
BASOPHILS # BLD AUTO: 0 10E9/L (ref 0–0.2)
BASOPHILS NFR BLD AUTO: 0.8 %
BUN SERPL-MCNC: 18 MG/DL (ref 7–30)
CALCIUM SERPL-MCNC: 8.9 MG/DL (ref 8.5–10.1)
CHLORIDE SERPL-SCNC: 101 MMOL/L (ref 94–109)
CO2 SERPL-SCNC: 25 MMOL/L (ref 20–32)
CREAT SERPL-MCNC: 0.57 MG/DL (ref 0.52–1.04)
DIFFERENTIAL METHOD BLD: ABNORMAL
EOSINOPHIL # BLD AUTO: 0 10E9/L (ref 0–0.7)
EOSINOPHIL NFR BLD AUTO: 0 %
ERYTHROCYTE [DISTWIDTH] IN BLOOD BY AUTOMATED COUNT: 14.6 % (ref 10–15)
GFR SERPL CREATININE-BSD FRML MDRD: NORMAL ML/MIN/1.7M2
GLUCOSE SERPL-MCNC: 82 MG/DL (ref 70–99)
HCT VFR BLD AUTO: 35.3 % (ref 35–47)
HGB BLD-MCNC: 12.2 G/DL (ref 11.7–15.7)
IMM GRANULOCYTES # BLD: 0.1 10E9/L (ref 0–0.4)
IMM GRANULOCYTES NFR BLD: 1.6 %
LYMPHOCYTES # BLD AUTO: 1.4 10E9/L (ref 0.8–5.3)
LYMPHOCYTES NFR BLD AUTO: 27.1 %
MCH RBC QN AUTO: 32.4 PG (ref 26.5–33)
MCHC RBC AUTO-ENTMCNC: 34.6 G/DL (ref 31.5–36.5)
MCV RBC AUTO: 94 FL (ref 78–100)
MONOCYTES # BLD AUTO: 0.5 10E9/L (ref 0–1.3)
MONOCYTES NFR BLD AUTO: 10.6 %
NEUTROPHILS # BLD AUTO: 3 10E9/L (ref 1.6–8.3)
NEUTROPHILS NFR BLD AUTO: 59.9 %
NRBC # BLD AUTO: 0 10*3/UL
NRBC BLD AUTO-RTO: 0 /100
PLATELET # BLD AUTO: 167 10E9/L (ref 150–450)
POTASSIUM SERPL-SCNC: 3.9 MMOL/L (ref 3.4–5.3)
RBC # BLD AUTO: 3.76 10E12/L (ref 3.8–5.2)
SODIUM SERPL-SCNC: 134 MMOL/L (ref 133–144)
WBC # BLD AUTO: 5 10E9/L (ref 4–11)

## 2017-06-27 PROCEDURE — 96413 CHEMO IV INFUSION 1 HR: CPT

## 2017-06-27 PROCEDURE — 80048 BASIC METABOLIC PNL TOTAL CA: CPT | Performed by: INTERNAL MEDICINE

## 2017-06-27 PROCEDURE — 25000128 H RX IP 250 OP 636: Mod: ZF | Performed by: INTERNAL MEDICINE

## 2017-06-27 PROCEDURE — 99213 OFFICE O/P EST LOW 20 MIN: CPT | Mod: 25

## 2017-06-27 PROCEDURE — 99214 OFFICE O/P EST MOD 30 MIN: CPT | Mod: ZP | Performed by: PHYSICIAN ASSISTANT

## 2017-06-27 PROCEDURE — 25000128 H RX IP 250 OP 636: Mod: ZF | Performed by: PHYSICIAN ASSISTANT

## 2017-06-27 PROCEDURE — 36415 COLL VENOUS BLD VENIPUNCTURE: CPT | Performed by: INTERNAL MEDICINE

## 2017-06-27 PROCEDURE — 25000125 ZZHC RX 250: Mod: ZF

## 2017-06-27 PROCEDURE — 25000125 ZZHC RX 250: Mod: ZF | Performed by: INTERNAL MEDICINE

## 2017-06-27 PROCEDURE — 25000128 H RX IP 250 OP 636: Mod: ZF

## 2017-06-27 PROCEDURE — 96375 TX/PRO/DX INJ NEW DRUG ADDON: CPT

## 2017-06-27 PROCEDURE — 85025 COMPLETE CBC W/AUTO DIFF WBC: CPT | Performed by: INTERNAL MEDICINE

## 2017-06-27 RX ORDER — HEPARIN SODIUM (PORCINE) LOCK FLUSH IV SOLN 100 UNIT/ML 100 UNIT/ML
500 SOLUTION INTRAVENOUS ONCE
Status: COMPLETED | OUTPATIENT
Start: 2017-06-27 | End: 2017-06-27

## 2017-06-27 RX ORDER — HEPARIN SODIUM (PORCINE) LOCK FLUSH IV SOLN 100 UNIT/ML 100 UNIT/ML
5 SOLUTION INTRAVENOUS EVERY 8 HOURS
Status: DISCONTINUED | OUTPATIENT
Start: 2017-06-27 | End: 2017-06-27 | Stop reason: HOSPADM

## 2017-06-27 RX ADMIN — SODIUM CHLORIDE, PRESERVATIVE FREE 5 ML: 5 INJECTION INTRAVENOUS at 15:44

## 2017-06-27 RX ADMIN — SODIUM CHLORIDE, PRESERVATIVE FREE 500 UNITS: 5 INJECTION INTRAVENOUS at 13:15

## 2017-06-27 RX ADMIN — SODIUM CHLORIDE 250 ML: 9 INJECTION, SOLUTION INTRAVENOUS at 14:52

## 2017-06-27 RX ADMIN — DEXAMETHASONE SODIUM PHOSPHATE 12 MG: 10 INJECTION, SOLUTION INTRAMUSCULAR; INTRAVENOUS at 14:52

## 2017-06-27 RX ADMIN — GEMCITABINE 1460 MG: 38 INJECTION, SOLUTION INTRAVENOUS at 15:09

## 2017-06-27 ASSESSMENT — PAIN SCALES - GENERAL: PAINLEVEL: NO PAIN (0)

## 2017-06-27 NOTE — Clinical Note
"6/27/2017       RE: Keren Erickson  [address]  [place] [address]     Dear Colleague,    Thank you for referring your patient, Keren Erickson, to the [facility]. Please see a copy of my visit note below.    DIAGNOSIS:    INTERVAL HISTORY:      MEDICATIONS:   Current Outpatient Prescriptions   Medication Sig Dispense Refill     Prochlorperazine Maleate (COMPAZINE PO) Take 10 mg by mouth every 6 hours as needed for nausea       multivitamin, therapeutic with minerals (MULTI-VITAMIN) TABS tablet Take 1 tablet by mouth daily       VITAMIN E BLEND PO Take by mouth daily       desmopressin (DDAVP) 0.1 MG tablet Take 1 tablet (100 mcg) by mouth At Bedtime 90 tablet 3     Naproxen Sodium (ALEVE PO) Take 220 mg by mouth daily       OMEPRAZOLE PO Take 10 mg by mouth as needed           ALLERGIES:    Allergies   Allergen Reactions     Nkda [No Known Drug Allergies]        PHYSICAL EXAMINATION:  Vitals: /73  Pulse 90  Temp 97.5  F (36.4  C) (Oral)  Resp 15  Ht 1.575 m (5' 2.01\")  Wt 53 kg (116 lb 14.4 oz)  SpO2 98%  BMI 21.38 kg/m2  GENERAL:  A pleasant person in no acute distress.   HEENT:  Sclerae are nonicteric.  Mouth is without mucositis or thrush.   NECK:  Supple.   LYMPH NODES:  No peripheral lymphadenopathy noted in the axillary, supraclavicular, or cervical regions.   LUNGS:  Clear to auscultation bilaterally.   HEART:  Regular rate and rhythm, with no murmur appreciated.   ABDOMEN:  Bowel sounds are active.  Soft and nontender.  No hepatosplenomegaly or other masses appreciated.  LOWER EXTREMITIES:  Without pitting edema to the knees bilaterally.   NEUROLOGICAL:  Alert/orientated/able to answer all questions.  CN grossly intact.     LAB:      6/27/2017 13:22   Sodium 134   Potassium 3.9   Chloride 101   Carbon Dioxide 25   Urea Nitrogen 18   Creatinine 0.57   GFR Estimate >90...   GFR Estimate If Black >90...   Calcium 8.9   Anion Gap 9   Glucose 82   WBC 5.0 "   Hemoglobin 12.2   Hematocrit 35.3   Platelet Count 167   RBC Count 3.76 (L)   MCV 94   MCH 32.4   MCHC 34.6   RDW 14.6   Diff Method Automated Method   % Neutrophils 59.9   % Lymphocytes 27.1   % Monocytes 10.6   % Eosinophils 0.0   % Basophils 0.8   % Immature Granulocytes 1.6   Nucleated RBCs 0   Absolute Neutrophil 3.0   Absolute Lymphocytes 1.4   Absolute Monocytes 0.5   Absolute Eosinophils 0.0   Absolute Basophils 0.0   Abs Immature Granulocytes 0.1   Absolute Nucleated RBC 0.0       RADIOLOGY:    IMPRESSION/PLAN:     Again, thank you for allowing me to participate in the care of your patient.      Sincerely,    Kelly Carrillo PA-C

## 2017-06-27 NOTE — PROGRESS NOTES
"DIAGNOSIS:  Metastatic breast carcinoma, triple positive.  For full history of present illness and prior treatments, please see dictations by Marlen Harper MD.  Patient has been on gemcitabine, herceptin since 09/2016.  Gemzar was held 04-06/2017, secondary to pneumonia and weakness.  She was restarted on gemcitabine on 06/19/2017 and continues on the hercetptin.  She is due for the next cycle day 8 today.  INTERVAL HISTORY:  Ms. Erickson comes into the clinic today for follow-up for metastatic breast carcinoma and continuation of treatment.  Overall, she did well after restarting the gemcitabine last week.  She did notice some increase in her fatigue about 3 days following the chemotherapy but then that slowly improved.  She is feeling a little bit better today.  She does continue to have shortness of breath with any humidity, but this is stable.  She has a nonproductive cough and feels that this is a tickle in her throat causing the cough.  She does have nausea but takes antiemetics with good results.  No vomiting.  She denies any fevers, chills, bleeding from the site, chest pain, abdominal pain, new bone pains, difficulty with urination, change in bowel habits.    MEDICATIONS:   Current Outpatient Prescriptions   Medication Sig Dispense Refill     Prochlorperazine Maleate (COMPAZINE PO) Take 10 mg by mouth every 6 hours as needed for nausea       multivitamin, therapeutic with minerals (MULTI-VITAMIN) TABS tablet Take 1 tablet by mouth daily       VITAMIN E BLEND PO Take by mouth daily       desmopressin (DDAVP) 0.1 MG tablet Take 1 tablet (100 mcg) by mouth At Bedtime 90 tablet 3     Naproxen Sodium (ALEVE PO) Take 220 mg by mouth daily       OMEPRAZOLE PO Take 10 mg by mouth as needed           ALLERGIES:    Allergies   Allergen Reactions     Nkda [No Known Drug Allergies]        PHYSICAL EXAMINATION:  Vitals: /73  Pulse 90  Temp 97.5  F (36.4  C) (Oral)  Resp 15  Ht 1.575 m (5' 2.01\")  Wt 53 kg (116 lb " 14.4 oz)  SpO2 98%  BMI 21.38 kg/m2  GENERAL:  A pleasant person in no acute distress.   HEENT:  Sclerae are nonicteric.  Mouth is without mucositis or thrush.   NECK:  Supple.   LYMPH NODES:  No peripheral lymphadenopathy noted in the axillary, supraclavicular, or cervical regions.   LUNGS:  Clear to auscultation bilaterally.   HEART:  Regular rate and rhythm, with no murmur appreciated.   ABDOMEN:  Bowel sounds are active.  Soft and nontender.  No hepatosplenomegaly or other masses appreciated.  LOWER EXTREMITIES:  Without pitting edema to the knees bilaterally.   NEUROLOGICAL:  Alert/orientated/able to answer all questions.  CN grossly intact.     LAB:      6/27/2017 13:22   Sodium 134   Potassium 3.9   Chloride 101   Carbon Dioxide 25   Urea Nitrogen 18   Creatinine 0.57   GFR Estimate >90...   GFR Estimate If Black >90...   Calcium 8.9   Anion Gap 9   Glucose 82   WBC 5.0   Hemoglobin 12.2   Hematocrit 35.3   Platelet Count 167   RBC Count 3.76 (L)   MCV 94   MCH 32.4   MCHC 34.6   RDW 14.6   Diff Method Automated Method   % Neutrophils 59.9   % Lymphocytes 27.1   % Monocytes 10.6   % Eosinophils 0.0   % Basophils 0.8   % Immature Granulocytes 1.6   Nucleated RBCs 0   Absolute Neutrophil 3.0   Absolute Lymphocytes 1.4   Absolute Monocytes 0.5   Absolute Eosinophils 0.0   Absolute Basophils 0.0   Abs Immature Granulocytes 0.1   Absolute Nucleated RBC 0.0     IMPRESSION/PLAN:   1.  Metastatic breast carcinoma, triple positive.  Ms. Erickson continues to do fairly well at this time.  She continues to tolerate the gemcitabine and herceptin well with no significant side effects.  Counts are adequate and she will continue with day 8 today.  Plan for her to follow up with a provider on day 1 of every cycle.  Dr. Harper will plan to follow up with her in approximately 2 months with a brain MRI.  Last echocardiogram was 05/22 with a stable EF of 55%-60%.  We will continue to obtain echocardiograms every 3 months so she  will be due in August.  I will plan for her to follow up in 2 weeks with her next cycle.   2.  Brain metastases.  Status post Gamma Knife in 2015 and 2016.  Brain MRI in 06/2017 had shown a new area that could have represented a tiny metastatic focus but continued follow-up was recommended.  Dr. Harper suggested a follow-up brain MRI in approximately 2 months, and she will see Dr. Harper at that visit.  3.  Bone metastases.  Calcium, creatinine, albumin were within normal limits today.  My plan was to give Xgeva and do this every 6 weeks, as this would coincide with her treatment schedule.  Ms. Erickson told the infusion nurse that she was potentially going to have dental work, which may be invasive.  Given the potentiality of dental work, will hold Xgeva today.  Once her dental work is completed, we will have to hold the Xgeva if this is truly an invasive procedure so that the area can heal.  We will follow up subsequent visits.  If there are no interval concerns.  The patient will follow up around 07/11.

## 2017-06-27 NOTE — NURSING NOTE
"Oncology Rooming Note    June 27, 2017 1:38 PM   Keren Erickson is a 61 year old female who presents for:    Chief Complaint   Patient presents with     Port Draw     Labs Drawn      Oncology Clinic Visit     Breast Cancer follow up- labs      Initial Vitals: /73  Pulse 90  Temp 97.5  F (36.4  C) (Oral)  Resp 15  Ht 1.575 m (5' 2.01\")  Wt 53 kg (116 lb 14.4 oz)  SpO2 98%  BMI 21.38 kg/m2 Estimated body mass index is 21.38 kg/(m^2) as calculated from the following:    Height as of this encounter: 1.575 m (5' 2.01\").    Weight as of this encounter: 53 kg (116 lb 14.4 oz). Body surface area is 1.52 meters squared.  No Pain (0) Comment: Data Unavailable   No LMP recorded. Patient is postmenopausal.  Allergies reviewed: Yes  Medications reviewed: Yes    Medications: MEDICATION REFILLS NEEDED TODAY. Provider was notified.  Pharmacy name entered into [facility]: [id number] - [facility] [address] AT [facility] [address]    Clinical concerns: would like refill for desmopressin.       15 minutes for nursing intake (face to face time)     Hemanth Daniel LPN            "

## 2017-06-27 NOTE — PROGRESS NOTES
Infusion Nursing Note:  Keren Erickson presents today for Cycle 13 Day 8 Gemzar.    Patient seen by provider today: Yes: BRAULIO Meyer.   present during visit today: Not Applicable.    Note:   Patient states she saw her dentist yesterday and she may need invasive dental work done within the next few weeks. Patient's dentist plans to notify Dr. Harper.     TORB 6/27/17 1530 BRAULIO Meyer/Courtney Riley RN: Hold Xgeva today until patient knows the exact dental work she will be getting.    Patient verbalized understanding of plan.    Intravenous Access:  Implanted Port.    Treatment Conditions:  Lab Results   Component Value Date    HGB 12.2 06/27/2017     Lab Results   Component Value Date    WBC 5.0 06/27/2017      Lab Results   Component Value Date    ANEU 3.0 06/27/2017     Lab Results   Component Value Date     06/27/2017      Lab Results   Component Value Date     06/27/2017                   Lab Results   Component Value Date    POTASSIUM 3.9 06/27/2017           Lab Results   Component Value Date    MAG 2.4 04/10/2017            Lab Results   Component Value Date    CR 0.57 06/27/2017                   Lab Results   Component Value Date    OMA 8.9 06/27/2017                Lab Results   Component Value Date    BILITOTAL 0.3 06/19/2017           Lab Results   Component Value Date    ALBUMIN 3.8 06/19/2017                    Lab Results   Component Value Date    ALT 27 06/19/2017           Lab Results   Component Value Date    AST 28 06/19/2017     Results reviewed, labs MET treatment parameters, ok to proceed with treatment.    Post Infusion Assessment:  Patient tolerated infusion without incident.  Blood return noted pre and post infusion.  Site patent and intact, free from redness, edema or discomfort.  No evidence of extravasations.  Access discontinued per protocol.    Discharge Plan:   Prescription refills given for DDAVP.  Patient discharged in care of her  friend, via wheelchair.   Patient received AVS and is aware to return on 7/10/17 for next appointment.  Courtney Riley RN

## 2017-06-27 NOTE — PATIENT INSTRUCTIONS
Contact Numbers    [facility] Main Line: [phone]  [facility] Triage:  [phone]    Call triage with chills and/or temperature greater than or equal to 100.5, uncontrolled nausea/vomiting, diarrhea, constipation, dizziness, shortness of breath, chest pain, bleeding, unexplained bruising, or any new/concerning symptoms, questions/concerns.     If you are having any concerning symptoms or wish to speak to a provider before your next infusion visit, please call your care coordinator or triage to notify them so we can adequately serve you.       After Hours: [phone]    If after hours, weekends, or holidays, call main hospital  and ask for Oncology doctor on call.         June 2017 Sunday Monday Tuesday Wednesday Thursday Friday Saturday                       1     2     3       4     5     6     7     8     9     10       11     12     13     14     15     16     [facility]   12:30 PM   (15 min.)    [facility]RAW   [facility] Draw     MR BRAIN WWO   12:45 PM   (45 min.)   [id number]   [facility] MRI     CT CHEST/ABDOMEN/PELVIS W    1:45 PM   (20 min.)   UCCT2   [facility] CT 17       18     19     [facility] DRAW    1:00 PM   (15 min.)   [facility]   [facility] Draw     [facility] ONC INFUSION 120    1:30 PM   (120 min.)   [facility] ONCOLOGY INFUSION   [facility]     [facility] RETURN    3:45 PM   (30 min.)   Marlen Harper MD   [facility] 20     21     22     23     24       25     26     27     [facility] D[facility]   12:45 PM   (15 min.)   [facility]   [facility] Draw     [facility] RETURN    1:00 PM   (45 min.)   Kelly Carrillo PA-C   [facility]     [facility] ONC INFUSION 120    2:30 PM   (120 min.)   [facility] ONCOLOGY INFUSION   [facility] 28 29 30 July 2017 Sunday Monday Tuesday Wednesday Thursday Friday Saturday                                  1       2     3     4     5     6     7     8       9     10     [facility] RETURN    1:25 PM   (50 min.)   Deyanira Costello PA-C   [facility] ONC INFUSION 120    2:30 PM   (120 min.)   [facility] ONCOLOGY INFUSION   [facility] 11     12     13     14     15       16     17     18     19     20     21     22       23     24     25     26     27     28     29       30     31                                          Recent Results (from the past 24 hour(s))   CBC with platelets differential    Collection Time: 06/27/17  1:22 PM   Result Value Ref Range    WBC 5.0 4.0 - 11.0 10e9/L    RBC Count 3.76 (L) 3.8 - 5.2 10e12/L    Hemoglobin 12.2 11.7 - 15.7 g/dL    Hematocrit 35.3 35.0 - 47.0 %    MCV 94 78 - 100 fl    MCH 32.4 26.5 - 33.0 pg    MCHC 34.6 31.5 - 36.5 g/dL    RDW 14.6 10.0 - 15.0 %    Platelet Count 167 150 - 450 10e9/L    Diff Method Automated Method     % Neutrophils 59.9 %    % Lymphocytes 27.1 %    % Monocytes 10.6 %    % Eosinophils 0.0 %    % Basophils 0.8 %    % Immature Granulocytes 1.6 %    Nucleated RBCs 0 0 /100    Absolute Neutrophil 3.0 1.6 - 8.3 10e9/L    Absolute Lymphocytes 1.4 0.8 - 5.3 10e9/L    Absolute Monocytes 0.5 0.0 - 1.3 10e9/L    Absolute Eosinophils 0.0 0.0 - 0.7 10e9/L    Absolute Basophils 0.0 0.0 - 0.2 10e9/L    Abs Immature Granulocytes 0.1 0 - 0.4 10e9/L    Absolute Nucleated RBC 0.0    Basic metabolic panel    Collection Time: 06/27/17  1:22 PM   Result Value Ref Range    Sodium 134 133 - 144 mmol/L    Potassium 3.9 3.4 - 5.3 mmol/L    Chloride 101 94 - 109 mmol/L    Carbon Dioxide 25 20 - 32 mmol/L    Anion Gap 9 3 - 14 mmol/L    Glucose 82 70 - 99 mg/dL    Urea Nitrogen 18 7 - 30 mg/dL    Creatinine 0.57 0.52 - 1.04 mg/dL    GFR Estimate >90  Non  GFR Calc   >60 mL/min/1.7m2    GFR Estimate If Black >90   GFR Calc   >60 mL/min/1.7m2    Calcium 8.9 8.5 - 10.1 mg/dL

## 2017-06-27 NOTE — MR AVS SNAPSHOT
After Visit Summary   6/27/2017    Keren Erickson    MRN: [id number]           Patient Information     Date Of Birth          1955        Visit Information        Provider Department      6/27/2017 2:30 PM  19 ATC;  ONCOLOGY INFUSION [facility]        Today's Diagnoses     Metastatic breast cancer (H)    -  1    Carcinoma of breast metastatic to multiple sites, unspecified laterality (H)        Brain metastasis (H)          Care Instructions    Contact Numbers    [facility] [place] Main Line: [phone]  [facility] [place] Triage:  [phone]    Call triage with chills and/or temperature greater than or equal to 100.5, uncontrolled nausea/vomiting, diarrhea, constipation, dizziness, shortness of breath, chest pain, bleeding, unexplained bruising, or any new/concerning symptoms, questions/concerns.     If you are having any concerning symptoms or wish to speak to a provider before your next infusion visit, please call your care coordinator or triage to notify them so we can adequately serve you.       After Hours: [phone]    If after hours, weekends, or holidays, call main hospital  and ask for Oncology doctor on call.         June 2017 Sunday Monday Tuesday Wednesday Thursday Friday Saturday                       1     2     3       4     5     6     7     8     9     10       11     12     13     14     15     16     [facility] LAB DRAW   12:30 PM   (15 min.)    [facility] LAB DRAW   [facility] Lab Draw     MR BRAIN WWO   12:45 PM   (45 min.)   [id number]   [facility] MRI     CT CHEST/ABDOMEN/PELVIS W    1:45 PM   (20 min.)   UCCT2   [facility] CT 17       18     19     [facility] LAB DRAW    1:00 PM   (15 min.)    [facility] LAB DRAW   [facility] Lab Draw     P ONC INFUSION 120    1:30 PM   (120 min.)    ONCOLOGY INFUSION   [facility]     UMP RETURN    3:45 PM   (30 min.)   Marlen Harper MD   [facility]  Cancer Clinic 20     21     22     23     24       25     26     27     [facility] LAB DRAW   12:45 PM   (15 min.)   [facility] LAB DRAW   [facility] Lab Draw     UMP RETURN    1:00 PM   (45 min.)   Kelly Carrillo PA-C   [facility]     UMP ONC INFUSION 120    2:30 PM   (120 min.)   UC ONCOLOGY INFUSION   [facility] 28 29 30 July 2017 Sunday Monday Tuesday Wednesday Thursday Friday Saturday                                 1       2     3     4     5     6     7     8       9     10     UMP RETURN    1:25 PM   (50 min.)   Deyanira Costello PA-C   [facility]     UMP ONC INFUSION 120    2:30 PM   (120 min.)   UC ONCOLOGY INFUSION   [facility] 11     12     13     14     15       16     17     18     19     20     21     22       23     24     25     26     27     28     29       30     31                                          Recent Results (from the past 24 hour(s))   CBC with platelets differential    Collection Time: 06/27/17  1:22 PM   Result Value Ref Range    WBC 5.0 4.0 - 11.0 10e9/L    RBC Count 3.76 (L) 3.8 - 5.2 10e12/L    Hemoglobin 12.2 11.7 - 15.7 g/dL    Hematocrit 35.3 35.0 - 47.0 %    MCV 94 78 - 100 fl    MCH 32.4 26.5 - 33.0 pg    MCHC 34.6 31.5 - 36.5 g/dL    RDW 14.6 10.0 - 15.0 %    Platelet Count 167 150 - 450 10e9/L    Diff Method Automated Method     % Neutrophils 59.9 %    % Lymphocytes 27.1 %    % Monocytes 10.6 %    % Eosinophils 0.0 %    % Basophils 0.8 %    % Immature Granulocytes 1.6 %    Nucleated RBCs 0 0 /100    Absolute Neutrophil 3.0 1.6 - 8.3 10e9/L    Absolute Lymphocytes 1.4 0.8 - 5.3 10e9/L    Absolute Monocytes 0.5 0.0 - 1.3 10e9/L    Absolute Eosinophils 0.0 0.0 - 0.7 10e9/L    Absolute Basophils 0.0 0.0 - 0.2 10e9/L    Abs Immature Granulocytes 0.1 0 - 0.4 10e9/L    Absolute Nucleated RBC 0.0    Basic metabolic panel    Collection Time: 06/27/17   1:22 PM   Result Value Ref Range    Sodium 134 133 - 144 mmol/L    Potassium 3.9 3.4 - 5.3 mmol/L    Chloride 101 94 - 109 mmol/L    Carbon Dioxide 25 20 - 32 mmol/L    Anion Gap 9 3 - 14 mmol/L    Glucose 82 70 - 99 mg/dL    Urea Nitrogen 18 7 - 30 mg/dL    Creatinine 0.57 0.52 - 1.04 mg/dL    GFR Estimate >90  Non  GFR Calc   >60 mL/min/1.7m2    GFR Estimate If Black >90   GFR Calc   >60 mL/min/1.7m2    Calcium 8.9 8.5 - 10.1 mg/dL                 Follow-ups after your visit        Your next 10 appointments already scheduled     Jul 10, 2017  1:40 PM CDT   (Arrive by 1:25 PM)   Return Visit with Deyanira Costello PA-C   [facility]    [address] [place] [address]  2nd [facility] [id number] [phone]            Jul 10, 2017  2:30 PM CDT   Infusion 120 with UC ONCOLOGY INFUSION, UC 20 ATC   [facility] ([facility]    [address]  2nd [facility] [address]   [phone]            Aug 08, 2017  8:30 AM CDT   (Arrive by 8:15 AM)   Return Visit with Deyanira Costello PA-C   [facility] ([facility]    [address]  2[address] [facility] [address]   [phone]            Aug 08, 2017  9:30 AM CDT   Infusion 120 with UC ONCOLOGY INFUSION, UC 16 ATC   [facility]    [address]  2nd Floor  [facility] [address]   [phone]            Sep 18, 2017  2:30 PM CDT   (Arrive by 2:15 PM)   RETURN ENDOCRINE with Rolando Mishra MD   [facility] ([facility])    [address] [place] [address]  3rd Floor  [facility] [id number] [phone]              Who to contact     If you have questions or need follow up information about today's clinic visit or your schedule please contact [facility] directly at [phone].  Normal or non-critical lab and imaging results will be communicated to you by MyChart, letter or phone within 4 business days after the clinic has received the results. If you do not hear from us within 7 days, please contact the clinic through FiftyFiverhart or phone. If you have a critical or abnormal lab result, we will notify you by phone as soon as possible.  Submit refill requests through Last Second Tickets or call your pharmacy and they will forward the refill request to us. Please allow 3 business days for your refill to be completed.          Additional Information About Your Visit        FiftyFiverharFast Orientation Information     [employer] gives you secure access to your electronic health record. If you see a primary care provider, you can also send messages to your care team and make appointments. If you have questions, please call your primary care clinic.  If you do not have a primary care provider, please call [phone] and they will assist you.        Care EveryWhere ID     This is your Care EveryWhere ID. This could be used by other organizations to access your [place] medical records  [id number]         Blood Pressure from Last 3 Encounters:   06/27/17 129/73   06/19/17 124/84   05/22/17 122/84    Weight from Last 3 Encounters:   06/27/17 53 kg (116 lb 14.4 oz)   06/19/17 51.8 kg (114 lb 3.2 oz)   05/22/17 52.5 kg (115 lb 11.2 oz)              We Performed the Following     Basic metabolic panel     CBC with platelets differential        Primary Care Provider Office Phone # Fax #    Kelly Bg Hart [phone]       [facility] [address] [facility] [address]        Equal Access to Services     [facility]: Hadii keturah Joshi, darrel schmidt, qavick pascualalfreddie rolon. So [facility] [phone].    ATENCIÓN: Si habla español, tiene a ramires disposición servicios gratuitos de asistencia lingüística.  Kyung palacios [phone].    We comply with applicable federal civil rights laws and [place] laws. We do not discriminate on the basis of race, color, national origin, age, disability sex, sexual orientation or gender identity.            Thank you!     Thank you for choosing [facility] CANCER CLINIC  for your care. Our goal is always to provide you with excellent care. Hearing back from our patients is one way we can continue to improve our services. Please take a few minutes to complete the written survey that you may receive in the mail after your visit with us. Thank you!             Your Updated Medication List - Protect others around you: Learn how to safely use, store and throw away your medicines at www.disposemymeds.org.          This list is accurate as of: 6/27/17  4:01 PM.  Always use your most recent med list.                   Brand Name Dispense Instructions for use Diagnosis    ALEVE PO      Take 220 mg by mouth daily        COMPAZINE PO      Take 10 mg by mouth every 6 hours as needed for nausea        desmopressin 0.1 MG tablet    DDAVP    90 tablet    Take 1 tablet (100 mcg) by mouth At Bedtime    Diabetes insipidus, neurohypophyseal (H)       Multi-vitamin Tabs tablet      Take 1 tablet by mouth daily        OMEPRAZOLE PO      Take 10 mg by mouth as needed        VITAMIN E BLEND PO      Take by mouth daily

## 2017-06-27 NOTE — LETTER
6/27/2017      RE: Keren Erickson  [address]  [facility] [address]       DIAGNOSIS:  Metastatic breast carcinoma, triple positive.  For full history of present illness and prior treatments, please see dictations by Marlen Harper MD.  Patient has been on gemcitabine, herceptin since 09/2016.  Gemzar was held 04-06/2017, secondary to pneumonia and weakness.  She was restarted on gemcitabine on 06/19/2017 and continues on the hercetptin.  She is due for the next cycle day 8 today.  INTERVAL HISTORY:  Ms. Erickson comes into the clinic today for follow-up for metastatic breast carcinoma and continuation of treatment.  Overall, she did well after restarting the gemcitabine last week.  She did notice some increase in her fatigue about 3 days following the chemotherapy but then that slowly improved.  She is feeling a little bit better today.  She does continue to have shortness of breath with any humidity, but this is stable.  She has a nonproductive cough and feels that this is a tickle in her throat causing the cough.  She does have nausea but takes antiemetics with good results.  No vomiting.  She denies any fevers, chills, bleeding from the site, chest pain, abdominal pain, new bone pains, difficulty with urination, change in bowel habits.    MEDICATIONS:   Current Outpatient Prescriptions   Medication Sig Dispense Refill     Prochlorperazine Maleate (COMPAZINE PO) Take 10 mg by mouth every 6 hours as needed for nausea       multivitamin, therapeutic with minerals (MULTI-VITAMIN) TABS tablet Take 1 tablet by mouth daily       VITAMIN E BLEND PO Take by mouth daily       desmopressin (DDAVP) 0.1 MG tablet Take 1 tablet (100 mcg) by mouth At Bedtime 90 tablet 3     Naproxen Sodium (ALEVE PO) Take 220 mg by mouth daily       OMEPRAZOLE PO Take 10 mg by mouth as needed           ALLERGIES:    Allergies   Allergen Reactions     Nkda [No Known Drug Allergies]        PHYSICAL EXAMINATION:  Vitals: /73  Pulse  "90  Temp 97.5  F (36.4  C) (Oral)  Resp 15  Ht 1.575 m (5' 2.01\")  Wt 53 kg (116 lb 14.4 oz)  SpO2 98%  BMI 21.38 kg/m2  GENERAL:  A pleasant person in no acute distress.   HEENT:  Sclerae are nonicteric.  Mouth is without mucositis or thrush.   NECK:  Supple.   LYMPH NODES:  No peripheral lymphadenopathy noted in the axillary, supraclavicular, or cervical regions.   LUNGS:  Clear to auscultation bilaterally.   HEART:  Regular rate and rhythm, with no murmur appreciated.   ABDOMEN:  Bowel sounds are active.  Soft and nontender.  No hepatosplenomegaly or other masses appreciated.  LOWER EXTREMITIES:  Without pitting edema to the knees bilaterally.   NEUROLOGICAL:  Alert/orientated/able to answer all questions.  CN grossly intact.     LAB:      6/27/2017 13:22   Sodium 134   Potassium 3.9   Chloride 101   Carbon Dioxide 25   Urea Nitrogen 18   Creatinine 0.57   GFR Estimate >90...   GFR Estimate If Black >90...   Calcium 8.9   Anion Gap 9   Glucose 82   WBC 5.0   Hemoglobin 12.2   Hematocrit 35.3   Platelet Count 167   RBC Count 3.76 (L)   MCV 94   MCH 32.4   MCHC 34.6   RDW 14.6   Diff Method Automated Method   % Neutrophils 59.9   % Lymphocytes 27.1   % Monocytes 10.6   % Eosinophils 0.0   % Basophils 0.8   % Immature Granulocytes 1.6   Nucleated RBCs 0   Absolute Neutrophil 3.0   Absolute Lymphocytes 1.4   Absolute Monocytes 0.5   Absolute Eosinophils 0.0   Absolute Basophils 0.0   Abs Immature Granulocytes 0.1   Absolute Nucleated RBC 0.0     IMPRESSION/PLAN:   1.  Metastatic breast carcinoma, triple positive.  Ms. Erickson continues to do fairly well at this time.  She continues to tolerate the gemcitabine and herceptin well with no significant side effects.  Counts are adequate and she will continue with day 8 today.  Plan for her to follow up with a provider on day 1 of every cycle.  Dr. Harper will plan to follow up with her in approximately 2 months with a brain MRI.  Last echocardiogram was 05/22 with a " stable EF of 55%-60%.  We will continue to obtain echocardiograms every 3 months so she will be due in August.  I will plan for her to follow up in 2 weeks with her next cycle.   2.  Brain metastases.  Status post Gamma Knife in 2015 and 2016.  Brain MRI in 06/2017 had shown a new area that could have represented a tiny metastatic focus but continued follow-up was recommended.  Dr. Harper suggested a follow-up brain MRI in approximately 2 months, and she will see Dr. Harper at that visit.  3.  Bone metastases.  Calcium, creatinine, albumin were within normal limits today.  My plan was to give Xgeva and do this every 6 weeks, as this would coincide with her treatment schedule.  Ms. Erickson told the infusion nurse that she was potentially going to have dental work, which may be invasive.  Given the potentiality of dental work, will hold Xgeva today.  Once her dental work is completed, we will have to hold the Xgeva if this is truly an invasive procedure so that the area can heal.  We will follow up subsequent visits.  If there are no interval concerns.  The patient will follow up around 07/11.      Kelly Carrillo PA-C

## 2017-06-27 NOTE — MR AVS SNAPSHOT
After Visit Summary   6/27/2017    Keren Erickson    MRN: [id number]           Patient Information     Date Of Birth          1955        Visit Information        Provider Department      6/27/2017 1:15 PM Kelly Carrillo PA-C [facility]        Today's Diagnoses     Metastatic breast cancer (H)    -  1    Brain metastasis (H)        Bone metastasis (H)           Follow-ups after your visit        Your next 10 appointments already scheduled     Jul 17, 2017  1:00 PM CDT   [facility] Draw with UC [facility] LAB DRAW   [facility]    [address] [place] [address]   [phone]            Jul 17, 2017  1:30 PM CDT   Infusion 60 with UC ONCOLOGY INFUSION, UC 28 ATC   [facility] ([facility])    [address] [place] [address]   [phone]            Aug 04, 2017 11:30 AM CDT   [facility] Lab Draw with UC [facility]ON[facility] LAB DRAW   [facility] Mason[facility] Lab Draw ([facility])    [address] [place] [address]   [phone]            Aug 04, 2017 12:00 PM CDT   (Arrive by 11:45 AM)   Return Visit with Deyanira Costello PA-C   [facility] ([facility])    [address] [place] [address]   [phone]            Aug 04, 2017  1:00 PM CDT   Infusion 120 with UC ONCOLOGY INFUSION, UC 18 ATC   [facility] ([facility])    [address] [place] [address]   [phone]            Aug 11, 2017 11:30 AM CDT   [facility] Lab Draw with UC [facility] LAB DRAW   [facility] Lab Draw ([facility])    [address] [place] [address]   [phone]            Aug 11, 2017 12:30 PM CDT   (Arrive by 12:15  "PM)   Return Visit with Marlen Harper MD   [facility]    [address] [facility]  2nd [facility] [address] [phone]            Aug 11, 2017  1:00 PM CDT   Infusion 60 with UC ONCOLOGY INFUSION   [facility]    [address]  [address] [facility] [address] [phone]              Who to contact     If you have questions or need follow up information about today's clinic visit or your schedule please contact [facility] directly at [phone].  Normal or non-critical lab and imaging results will be communicated to you by Feuerlabshart, letter or phone within 4 business days after the clinic has received the results. If you do not hear from us within 7 days, please contact the clinic through Feuerlabshart or phone. If you have a critical or abnormal lab result, we will notify you by phone as soon as possible.  Submit refill requests through [employer] or call your pharmacy and they will forward the refill request to us. Please allow 3 business days for your refill to be completed.          Additional Information About Your Visit        MyChart Information     [employer] gives you secure access to your electronic health record. If you see a primary care provider, you can also send messages to your care team and make appointments. If you have questions, please call your primary care clinic.  If you do not have a primary care provider, please call [phone] and they will assist you.        Care EveryWhere ID     This is your Care EveryWhere ID. This could be used by other organizations to access your [place] medical records  [id number]        Your Vitals Were     Pulse Temperature Respirations Height Pulse Oximetry BMI (Body Mass Index)    90 97.5  F (36.4  C) (Oral) 15 1.575 m (5' 2.01\") 98% 21.38 kg/m2       Blood Pressure from Last 3 Encounters:   07/10/17 " 122/85   06/27/17 129/73   06/19/17 124/84    Weight from Last 3 Encounters:   07/10/17 52.1 kg (114 lb 12.8 oz)   06/27/17 53 kg (116 lb 14.4 oz)   06/19/17 51.8 kg (114 lb 3.2 oz)              Today, you had the following     No orders found for display       Primary Care Provider Office Phone # Fax #    Kelly Hart [phone]       [facility] [address] [facility] [address]        Equal Access to Services     [facility]: Hadii keturah ibarra hadasho [place], waaxda luqadaha, qaybta kaalmada annabel, freddie escobedo . So [facility] [phone].    ATENCIÓN: Si habla español, tiene a ramires disposición servicios gratuitos de asistencia lingüística. [facility] [phone].    We comply with applicable federal civil rights laws and [place] laws. We do not discriminate on the basis of race, color, national origin, age, disability sex, sexual orientation or gender identity.            Thank you!     Thank you for choosing [facility] CLINIC  for your care. Our goal is always to provide you with excellent care. Hearing back from our patients is one way we can continue to improve our services. Please take a few minutes to complete the written survey that you may receive in the mail after your visit with us. Thank you!             Your Updated Medication List - Protect others around you: Learn how to safely use, store and throw away your medicines at www.disposemymeds.org.          This list is accurate as of: 6/27/17 11:59 PM.  Always use your most recent med list.                   Brand Name Dispense Instructions for use Diagnosis    ALEVE PO      Take 220 mg by mouth daily        COMPAZINE PO      Take 10 mg by mouth every 6 hours as needed for nausea        desmopressin 0.1 MG tablet    DDAVP    90 tablet    Take 1 tablet (100 mcg) by mouth At Bedtime    Diabetes insipidus, neurohypophyseal (H)       Multi-vitamin Tabs tablet      Take 1 tablet by mouth  daily        VITAMIN E BLEND PO      Take by mouth daily

## 2017-07-10 ENCOUNTER — APPOINTMENT (OUTPATIENT)
Dept: LAB | Facility: CLINIC | Age: 62
End: 2017-07-10
Attending: PHYSICIAN ASSISTANT
Payer: COMMERCIAL

## 2017-07-10 ENCOUNTER — ONCOLOGY VISIT (OUTPATIENT)
Dept: ONCOLOGY | Facility: CLINIC | Age: 62
End: 2017-07-10
Attending: PHYSICIAN ASSISTANT
Payer: COMMERCIAL

## 2017-07-10 VITALS
HEART RATE: 75 BPM | RESPIRATION RATE: 18 BRPM | OXYGEN SATURATION: 97 % | SYSTOLIC BLOOD PRESSURE: 122 MMHG | TEMPERATURE: 98.2 F | HEIGHT: 62 IN | WEIGHT: 114.8 LBS | BODY MASS INDEX: 21.12 KG/M2 | DIASTOLIC BLOOD PRESSURE: 85 MMHG

## 2017-07-10 DIAGNOSIS — R11.0 NAUSEA: Primary | ICD-10-CM

## 2017-07-10 DIAGNOSIS — C50.919 CARCINOMA OF BREAST METASTATIC TO MULTIPLE SITES, UNSPECIFIED LATERALITY (H): ICD-10-CM

## 2017-07-10 DIAGNOSIS — K21.9 GASTROESOPHAGEAL REFLUX DISEASE WITHOUT ESOPHAGITIS: ICD-10-CM

## 2017-07-10 DIAGNOSIS — C50.919 METASTATIC BREAST CANCER: ICD-10-CM

## 2017-07-10 DIAGNOSIS — C50.919 METASTATIC BREAST CANCER: Primary | ICD-10-CM

## 2017-07-10 DIAGNOSIS — C79.31 BRAIN METASTASIS: ICD-10-CM

## 2017-07-10 LAB
ALBUMIN SERPL-MCNC: 3.6 G/DL (ref 3.4–5)
ALP SERPL-CCNC: 104 U/L (ref 40–150)
ALT SERPL W P-5'-P-CCNC: 39 U/L (ref 0–50)
ANION GAP SERPL CALCULATED.3IONS-SCNC: 6 MMOL/L (ref 3–14)
AST SERPL W P-5'-P-CCNC: 30 U/L (ref 0–45)
BASOPHILS # BLD AUTO: 0.1 10E9/L (ref 0–0.2)
BASOPHILS NFR BLD AUTO: 0.8 %
BILIRUB SERPL-MCNC: 0.3 MG/DL (ref 0.2–1.3)
BUN SERPL-MCNC: 15 MG/DL (ref 7–30)
CALCIUM SERPL-MCNC: 8.8 MG/DL (ref 8.5–10.1)
CANCER AG27-29 SERPL-ACNC: 19 U/ML (ref 0–39)
CEA SERPL-MCNC: 1.3 UG/L (ref 0–2.5)
CHLORIDE SERPL-SCNC: 106 MMOL/L (ref 94–109)
CO2 SERPL-SCNC: 28 MMOL/L (ref 20–32)
CREAT SERPL-MCNC: 0.67 MG/DL (ref 0.52–1.04)
DIFFERENTIAL METHOD BLD: ABNORMAL
EOSINOPHIL # BLD AUTO: 0 10E9/L (ref 0–0.7)
EOSINOPHIL NFR BLD AUTO: 0 %
ERYTHROCYTE [DISTWIDTH] IN BLOOD BY AUTOMATED COUNT: 16.1 % (ref 10–15)
GFR SERPL CREATININE-BSD FRML MDRD: 90 ML/MIN/1.7M2
GLUCOSE SERPL-MCNC: 90 MG/DL (ref 70–99)
HCT VFR BLD AUTO: 35 % (ref 35–47)
HGB BLD-MCNC: 11.7 G/DL (ref 11.7–15.7)
IMM GRANULOCYTES # BLD: 0 10E9/L (ref 0–0.4)
IMM GRANULOCYTES NFR BLD: 0.6 %
LYMPHOCYTES # BLD AUTO: 1.2 10E9/L (ref 0.8–5.3)
LYMPHOCYTES NFR BLD AUTO: 18.8 %
MCH RBC QN AUTO: 31.8 PG (ref 26.5–33)
MCHC RBC AUTO-ENTMCNC: 33.4 G/DL (ref 31.5–36.5)
MCV RBC AUTO: 95 FL (ref 78–100)
MONOCYTES # BLD AUTO: 0.9 10E9/L (ref 0–1.3)
MONOCYTES NFR BLD AUTO: 13.6 %
NEUTROPHILS # BLD AUTO: 4.2 10E9/L (ref 1.6–8.3)
NEUTROPHILS NFR BLD AUTO: 66.2 %
NRBC # BLD AUTO: 0 10*3/UL
NRBC BLD AUTO-RTO: 0 /100
PLATELET # BLD AUTO: 360 10E9/L (ref 150–450)
POTASSIUM SERPL-SCNC: 3.8 MMOL/L (ref 3.4–5.3)
PROT SERPL-MCNC: 6.6 G/DL (ref 6.8–8.8)
RBC # BLD AUTO: 3.68 10E12/L (ref 3.8–5.2)
SODIUM SERPL-SCNC: 139 MMOL/L (ref 133–144)
WBC # BLD AUTO: 6.3 10E9/L (ref 4–11)

## 2017-07-10 PROCEDURE — 80053 COMPREHEN METABOLIC PANEL: CPT | Performed by: PHYSICIAN ASSISTANT

## 2017-07-10 PROCEDURE — 99214 OFFICE O/P EST MOD 30 MIN: CPT | Mod: ZP | Performed by: PHYSICIAN ASSISTANT

## 2017-07-10 PROCEDURE — 25000125 ZZHC RX 250: Mod: ZF | Performed by: PHYSICIAN ASSISTANT

## 2017-07-10 PROCEDURE — 25000128 H RX IP 250 OP 636: Mod: ZF | Performed by: PHYSICIAN ASSISTANT

## 2017-07-10 PROCEDURE — 36415 COLL VENOUS BLD VENIPUNCTURE: CPT | Performed by: PHYSICIAN ASSISTANT

## 2017-07-10 PROCEDURE — 25000128 H RX IP 250 OP 636: Mod: ZF | Performed by: INTERNAL MEDICINE

## 2017-07-10 PROCEDURE — 82378 CARCINOEMBRYONIC ANTIGEN: CPT | Performed by: PHYSICIAN ASSISTANT

## 2017-07-10 PROCEDURE — 99212 OFFICE O/P EST SF 10 MIN: CPT | Mod: ZF

## 2017-07-10 PROCEDURE — 96413 CHEMO IV INFUSION 1 HR: CPT

## 2017-07-10 PROCEDURE — 96417 CHEMO IV INFUS EACH ADDL SEQ: CPT

## 2017-07-10 PROCEDURE — 96375 TX/PRO/DX INJ NEW DRUG ADDON: CPT

## 2017-07-10 PROCEDURE — 86300 IMMUNOASSAY TUMOR CA 15-3: CPT | Performed by: PHYSICIAN ASSISTANT

## 2017-07-10 PROCEDURE — 85025 COMPLETE CBC W/AUTO DIFF WBC: CPT | Performed by: PHYSICIAN ASSISTANT

## 2017-07-10 RX ORDER — MEPERIDINE HYDROCHLORIDE 25 MG/ML
25 INJECTION INTRAMUSCULAR; INTRAVENOUS; SUBCUTANEOUS EVERY 30 MIN PRN
Status: CANCELLED | OUTPATIENT
Start: 2017-07-17

## 2017-07-10 RX ORDER — METHYLPREDNISOLONE SODIUM SUCCINATE 125 MG/2ML
125 INJECTION, POWDER, LYOPHILIZED, FOR SOLUTION INTRAMUSCULAR; INTRAVENOUS
Status: CANCELLED
Start: 2017-07-17

## 2017-07-10 RX ORDER — DIPHENHYDRAMINE HYDROCHLORIDE 50 MG/ML
50 INJECTION INTRAMUSCULAR; INTRAVENOUS
Status: CANCELLED
Start: 2017-07-17

## 2017-07-10 RX ORDER — METHYLPREDNISOLONE SODIUM SUCCINATE 125 MG/2ML
125 INJECTION, POWDER, LYOPHILIZED, FOR SOLUTION INTRAMUSCULAR; INTRAVENOUS
Status: CANCELLED
Start: 2017-07-10

## 2017-07-10 RX ORDER — SODIUM CHLORIDE 9 MG/ML
1000 INJECTION, SOLUTION INTRAVENOUS CONTINUOUS PRN
Status: CANCELLED
Start: 2017-07-10

## 2017-07-10 RX ORDER — DIPHENHYDRAMINE HYDROCHLORIDE 50 MG/ML
50 INJECTION INTRAMUSCULAR; INTRAVENOUS
Status: CANCELLED
Start: 2017-07-10

## 2017-07-10 RX ORDER — MEPERIDINE HYDROCHLORIDE 25 MG/ML
25 INJECTION INTRAMUSCULAR; INTRAVENOUS; SUBCUTANEOUS EVERY 30 MIN PRN
Status: CANCELLED | OUTPATIENT
Start: 2017-07-10

## 2017-07-10 RX ORDER — HEPARIN SODIUM (PORCINE) LOCK FLUSH IV SOLN 100 UNIT/ML 100 UNIT/ML
5 SOLUTION INTRAVENOUS EVERY 8 HOURS
Status: CANCELLED | OUTPATIENT
Start: 2017-07-10

## 2017-07-10 RX ORDER — ALBUTEROL SULFATE 90 UG/1
1-2 AEROSOL, METERED RESPIRATORY (INHALATION)
Status: CANCELLED
Start: 2017-07-10

## 2017-07-10 RX ORDER — HEPARIN SODIUM (PORCINE) LOCK FLUSH IV SOLN 100 UNIT/ML 100 UNIT/ML
5 SOLUTION INTRAVENOUS EVERY 8 HOURS
Status: CANCELLED | OUTPATIENT
Start: 2017-07-17

## 2017-07-10 RX ORDER — EPINEPHRINE 0.3 MG/.3ML
0.3 INJECTION SUBCUTANEOUS EVERY 5 MIN PRN
Status: CANCELLED | OUTPATIENT
Start: 2017-07-10

## 2017-07-10 RX ORDER — ALBUTEROL SULFATE 0.83 MG/ML
2.5 SOLUTION RESPIRATORY (INHALATION)
Status: CANCELLED | OUTPATIENT
Start: 2017-07-17

## 2017-07-10 RX ORDER — ACETAMINOPHEN 325 MG/1
650 TABLET ORAL
Status: CANCELLED | OUTPATIENT
Start: 2017-07-10

## 2017-07-10 RX ORDER — EPINEPHRINE 0.3 MG/.3ML
0.3 INJECTION SUBCUTANEOUS EVERY 5 MIN PRN
Status: CANCELLED | OUTPATIENT
Start: 2017-07-17

## 2017-07-10 RX ORDER — LORAZEPAM 2 MG/ML
0.5 INJECTION INTRAMUSCULAR EVERY 4 HOURS PRN
Status: CANCELLED
Start: 2017-07-17

## 2017-07-10 RX ORDER — ALBUTEROL SULFATE 0.83 MG/ML
2.5 SOLUTION RESPIRATORY (INHALATION)
Status: CANCELLED | OUTPATIENT
Start: 2017-07-10

## 2017-07-10 RX ORDER — HEPARIN SODIUM (PORCINE) LOCK FLUSH IV SOLN 100 UNIT/ML 100 UNIT/ML
5 SOLUTION INTRAVENOUS DAILY PRN
Status: DISCONTINUED | OUTPATIENT
Start: 2017-07-10 | End: 2017-07-10 | Stop reason: HOSPADM

## 2017-07-10 RX ORDER — ALBUTEROL SULFATE 90 UG/1
1-2 AEROSOL, METERED RESPIRATORY (INHALATION)
Status: CANCELLED
Start: 2017-07-17

## 2017-07-10 RX ORDER — DIPHENHYDRAMINE HCL 25 MG
50 CAPSULE ORAL
Status: CANCELLED | OUTPATIENT
Start: 2017-07-10

## 2017-07-10 RX ORDER — PROCHLORPERAZINE MALEATE 10 MG
10 TABLET ORAL EVERY 6 HOURS PRN
Qty: 90 TABLET | Refills: 3 | Status: SHIPPED | OUTPATIENT
Start: 2017-07-10

## 2017-07-10 RX ORDER — SODIUM CHLORIDE 9 MG/ML
1000 INJECTION, SOLUTION INTRAVENOUS CONTINUOUS PRN
Status: CANCELLED
Start: 2017-07-17

## 2017-07-10 RX ORDER — HEPARIN SODIUM (PORCINE) LOCK FLUSH IV SOLN 100 UNIT/ML 100 UNIT/ML
5 SOLUTION INTRAVENOUS EVERY 8 HOURS
Status: DISCONTINUED | OUTPATIENT
Start: 2017-07-10 | End: 2017-07-10 | Stop reason: HOSPADM

## 2017-07-10 RX ORDER — LORAZEPAM 2 MG/ML
0.5 INJECTION INTRAMUSCULAR EVERY 4 HOURS PRN
Status: CANCELLED
Start: 2017-07-10

## 2017-07-10 RX ORDER — NICOTINE POLACRILEX 4 MG/1
20 GUM, CHEWING ORAL DAILY
Qty: 30 TABLET | Refills: 3 | Status: SHIPPED | OUTPATIENT
Start: 2017-07-10

## 2017-07-10 RX ADMIN — SODIUM CHLORIDE 500 ML: 9 INJECTION, SOLUTION INTRAVENOUS at 15:32

## 2017-07-10 RX ADMIN — SODIUM CHLORIDE, PRESERVATIVE FREE 5 ML: 5 INJECTION INTRAVENOUS at 13:44

## 2017-07-10 RX ADMIN — SODIUM CHLORIDE, PRESERVATIVE FREE 5 ML: 5 INJECTION INTRAVENOUS at 16:39

## 2017-07-10 RX ADMIN — GEMCITABINE 1460 MG: 38 INJECTION, SOLUTION INTRAVENOUS at 15:55

## 2017-07-10 RX ADMIN — TRASTUZUMAB 300 MG: 150 INJECTION, POWDER, LYOPHILIZED, FOR SOLUTION INTRAVENOUS at 16:30

## 2017-07-10 RX ADMIN — DEXAMETHASONE SODIUM PHOSPHATE 12 MG: 10 INJECTION, SOLUTION INTRAMUSCULAR; INTRAVENOUS at 15:32

## 2017-07-10 ASSESSMENT — PAIN SCALES - GENERAL: PAINLEVEL: MILD PAIN (2)

## 2017-07-10 NOTE — PATIENT INSTRUCTIONS
July 2017 Sunday Monday Tuesday Wednesday Thursday Friday Saturday                                 1       2     3     4     5     6     7     8       9     10     [facility] DRAW    1:15 PM   (15 min.)    MAS[facility] LAB DRAW   [facility] Draw     [facility] RETURN    1:25 PM   (50 min.)   Deyanira Costello PA-C   [facility]     [facility] ONC INFUSION 120    2:30 PM   (120 min.)   [facility] ONCOLOGY INFUSION   [facility] 11     12     13     14     15       16     17     [facility] DRAW    1:00 PM   (15 min.)    [facility] LAB DRAW   [facility] Draw     [facility] ONC INFUSION 60    1:30 PM   (60 min.)    ONCOLOGY INFUSION   [facility] 18     19     20     21     22       23     24     25     26     27     28     29       30     31                                         August 2017 Sunday Monday Tuesday Wednesday Thursday Friday Saturday             1     2     3     4     [facility] DRAW   11:30 AM   (15 min.)    [facility] LAB DRAW   [facility] Draw     [facility] RETURN   11:45 AM   (50 min.)   Deyanira Costello PA-C   [facility]     [facility] ONC INFUSION 120    1:00 PM   (120 min.)    ONCOLOGY INFUSION   [facility] 5       6     7     8     9     10     11     [facility] DRAW   12:30 PM   (15 min.)    [facility] LAB DRAW   [facility] Draw     [facility] ONC INFUSION 60    1:00 PM   (60 min.)    ONCOLOGY INFUSION   [facility] 12       13     14     15     16     17     18     19       20     21     22     23     24     25     26       27     28     29     30     31                            Lab Results:  Recent Results (from the past 12 hour(s))   CBC with platelets differential    Collection Time: 07/10/17  1:56 PM   Result Value Ref Range    WBC 6.3 4.0 - 11.0 10e9/L    RBC Count 3.68 (L) 3.8 - 5.2 10e12/L    Hemoglobin 11.7 11.7 - 15.7 g/dL     Hematocrit 35.0 35.0 - 47.0 %    MCV 95 78 - 100 fl    MCH 31.8 26.5 - 33.0 pg    MCHC 33.4 31.5 - 36.5 g/dL    RDW 16.1 (H) 10.0 - 15.0 %    Platelet Count 360 150 - 450 10e9/L    Diff Method Automated Method     % Neutrophils 66.2 %    % Lymphocytes 18.8 %    % Monocytes 13.6 %    % Eosinophils 0.0 %    % Basophils 0.8 %    % Immature Granulocytes 0.6 %    Nucleated RBCs 0 0 /100    Absolute Neutrophil 4.2 1.6 - 8.3 10e9/L    Absolute Lymphocytes 1.2 0.8 - 5.3 10e9/L    Absolute Monocytes 0.9 0.0 - 1.3 10e9/L    Absolute Eosinophils 0.0 0.0 - 0.7 10e9/L    Absolute Basophils 0.1 0.0 - 0.2 10e9/L    Abs Immature Granulocytes 0.0 0 - 0.4 10e9/L    Absolute Nucleated RBC 0.0    Comprehensive metabolic panel    Collection Time: 07/10/17  1:56 PM   Result Value Ref Range    Sodium 139 133 - 144 mmol/L    Potassium 3.8 3.4 - 5.3 mmol/L    Chloride 106 94 - 109 mmol/L    Carbon Dioxide 28 20 - 32 mmol/L    Anion Gap 6 3 - 14 mmol/L    Glucose 90 70 - 99 mg/dL    Urea Nitrogen 15 7 - 30 mg/dL    Creatinine 0.67 0.52 - 1.04 mg/dL    GFR Estimate 90 >60 mL/min/1.7m2    GFR Estimate If Black >90   GFR Calc   >60 mL/min/1.7m2    Calcium 8.8 8.5 - 10.1 mg/dL    Bilirubin Total 0.3 0.2 - 1.3 mg/dL    Albumin 3.6 3.4 - 5.0 g/dL    Protein Total 6.6 (L) 6.8 - 8.8 g/dL    Alkaline Phosphatase 104 40 - 150 U/L    ALT 39 0 - 50 U/L    AST 30 0 - 45 U/L   Contact Numbers    [facility] [place]: [phone]  [facility] [place]:  [phone]    Call triage with chills and/or temperature greater than or equal to 100.5, uncontrolled nausea/vomiting, diarrhea, constipation, dizziness, shortness of breath, chest pain, bleeding, unexplained bruising, or any new/concerning symptoms, questions/concerns.     If you are having any concerning symptoms or wish to speak to a provider before your next infusion visit, please call your care coordinator or triage to notify them so we can adequately serve you.       After Hours: [phone]    If  after hours, weekends, or holidays, call main hospital  and ask for Oncology doctor on call.

## 2017-07-10 NOTE — NURSING NOTE
"Oncology Rooming Note    July 10, 2017 2:00 PM   Keren Erickson is a 61 year old female who presents for:    Chief Complaint   Patient presents with     Port Draw     Oncology Clinic Visit     Breast Ca-f/u     Initial Vitals: /85  Pulse 75  Temp 98.2  F (36.8  C) (Oral)  Resp 18  Ht 1.575 m (5' 2.01\")  Wt 52.1 kg (114 lb 12.8 oz)  SpO2 97%  BMI 20.99 kg/m2 Estimated body mass index is 20.99 kg/(m^2) as calculated from the following:    Height as of this encounter: 1.575 m (5' 2.01\").    Weight as of this encounter: 52.1 kg (114 lb 12.8 oz). Body surface area is 1.51 meters squared.  Mild Pain (2) Comment: Data Unavailable   No LMP recorded. Patient is postmenopausal.  Allergies reviewed: Yes  Medications reviewed: Yes    Medications: MEDICATION REFILLS NEEDED TODAY. Provider was notified.  Pharmacy name entered into [facility]: [id number] - [place], [place] - [address] AT [facility] [place] [place] [address]    Clinical concerns:  Refills needed for compazine & prilosec.  7 minutes for nursing intake (face to face time)     Day Anton LPN            "

## 2017-07-10 NOTE — PROGRESS NOTES
HEMATOLOGY/ONCOLOGY PROGRESS NOTE  Jul 10, 2017    REASON FOR VISIT: follow-up of metastatic breast cancer, triple positive    DIAGNOSIS:   The patient is a 60-year-old female who presented to the hospital initially in 09/2013 with complaints of dyspnea. Workup revealed a large pericardial effusion and pleural effusion. Physical examination revealed a large untreated left breast mass encompassing the entire left breast. Biopsies revealed these were ER, FL and HER2-positive breast cancer. She had a thoracentesis and pericardial sclerosis performed. She was originally on Arimidex and Herceptin. In 01/2014, she was switched to tamoxifen and Herceptin due to arthralgias, and she ultimately developed progressive disease and was switched to Faslodex and Herceptin. In 01/2015, she was found to have progressive disease and was switched to T-DM1.     Initially when she was seen in late 02/2015, she complained of some V5 sensory deficit. This was subjective. I was not able to elicit this on examination. This persisted and further workup was performed with a brain MRI. This revealed what was initially thought to be 3 punctate brain metastases. She originally saw Radiation Oncology and Neurosurgery as well as underwent a lumbar puncture. Cytology from the lumbar puncture showed no evidence of leptomeningeal disease. She was treated with Gamma Knife radiation to 6 lesions, performed on 04/16/2015.  She initiated therapy with TDM1 in January 2015 and continued this through 6/26/2015 with overall stable disease. She has since been on a break from treatment. The patient presented earlier this month with recurrent shortness of breath.  Imaging revealed recurrent right-sided pleural effusion.  She has since undergone thoracentesis with cytology pending.  Clinically, however, there is evidence of disease progression. PET scan performed on 11/25/2015 demonstrated progression of disease at multiple sites. She restarted TDM1 on  11/27/2015, however experienced a mild transfusion reaction with shortness of breath and chest tightness, resolved upon stopping TDM1 and 125 mg of SoluMedrol. She had progression of disease on repeat imaging 2/17/2016, as well as new brain metastases. She started TDM1 with Perjeta on 3/4/2016. She underwent gamma knife to brain mets on 3/8/16.       In late May, she was found to have progressive brain metastases.  She received whole brain radiation.  She had a follow up brain MRI August 2016 that was stable.     In September 2016, she enrolled on [place]  trial and was randomized to the standard of care arm/Physician's Choice; started on gemzar and herceptin. She was recently hospitalized 1/27-2/3/17 for presumed HCAP. Trial was suspended. She continued on gemzar and heceptin with stable disease. Last restaging was 4/7/17 and stable. Gemzar was placed on hold from 4/10/2017 through 6/22/17 so that she could recover from pneumonia.    INTERVAL HISTORY:  Dominga presents for follow-up today prior to next cycle of treatment, accompanied by her friend.    She resumed the Gemzar with last cycle. This went overall well. She can feel the difference when she recieves the Gemzar in addition to Herceptin, feels more tired for several days. She felt she managed through it and is able to continue. She has a lot going on at home right now; she is moving to a one-level duplex. This has been stressful but she has a lot of support. She is also switching insurance. Working on walking every day. Eating well and drinking well. Bowels moving great. She reports no new neurologic symptoms and actually thinks the facial numbness continues to improve. She continues to have predominately right extremity paresthesias, unchanged.    ROS: 10 point ROS was conducted and was otherwise negative except for as above.  No f/s/c.  No chest pain/cough/dyspnea.  Daily BM, no  issues. Skin is dry, but no rash.     PHYSICAL EXAMINATION:     BP  122/85  Pulse 75  Temp 98.2  F (36.8  C) (Oral)  Resp 18  Wt 52.1 kg (114 lb 12.8 oz)  SpO2 97%  BMI 20.99 kg/m2    Wt Readings from Last 4 Encounters:   07/10/17 52.1 kg (114 lb 12.8 oz)   06/27/17 53 kg (116 lb 14.4 oz)   06/19/17 51.8 kg (114 lb 3.2 oz)   05/22/17 52.5 kg (115 lb 11.2 oz)     Constitutional: Alert, oriented, cachectic female in no visible distress. She is able to get up on exam table with light assistance.   Eyes: PERRL. Anicteric sclerae.    ENT/Mouth: OM moist and pink without lesions or thrush.    CV: RRR, no murmurs appreciated.  Resp: CTAB slightly diminished R base, stable. Breathing comfortably.  Abdomen: Soft, non-tender, non-distended. Bowel sounds present. No masses appreciated. No organomegaly noted.  Extremities: No lower extremity edema appreciated.  Skin: Warm, dry. No bruising or petechiae noted. No rash  Lymph: No palpable LAD  Neuro: CN II-XII grossly intact.     LABS:   Results for HEIDI RUIZ (MRN [id number]) as of 7/10/2017 15:19   Ref. Range 7/10/2017 13:56   Sodium Latest Ref Range: 133 - 144 mmol/L 139   Potassium Latest Ref Range: 3.4 - 5.3 mmol/L 3.8   Chloride Latest Ref Range: 94 - 109 mmol/L 106   Carbon Dioxide Latest Ref Range: 20 - 32 mmol/L 28   Urea Nitrogen Latest Ref Range: 7 - 30 mg/dL 15   Creatinine Latest Ref Range: 0.52 - 1.04 mg/dL 0.67   GFR Estimate Latest Ref Range: >60 mL/min/1.7m2 90   GFR Estimate If Black Latest Ref Range: >60 mL/min/1.7m2 >90...   Calcium Latest Ref Range: 8.5 - 10.1 mg/dL 8.8   Anion Gap Latest Ref Range: 3 - 14 mmol/L 6   Albumin Latest Ref Range: 3.4 - 5.0 g/dL 3.6   Protein Total Latest Ref Range: 6.8 - 8.8 g/dL 6.6 (L)   Bilirubin Total Latest Ref Range: 0.2 - 1.3 mg/dL 0.3   Alkaline Phosphatase Latest Ref Range: 40 - 150 U/L 104   ALT Latest Ref Range: 0 - 50 U/L 39   AST Latest Ref Range: 0 - 45 U/L 30   Glucose Latest Ref Range: 70 - 99 mg/dL 90   WBC Latest Ref Range: 4.0 - 11.0 10e9/L 6.3   Hemoglobin  Latest Ref Range: 11.7 - 15.7 g/dL 11.7   Hematocrit Latest Ref Range: 35.0 - 47.0 % 35.0   Platelet Count Latest Ref Range: 150 - 450 10e9/L 360   IMPRESSION/PLAN:  Dominga is a 61-year-old female with history of metastatic ER-positive, HER-2 positive breast cancer, with involvement of the bone, history of pleural effusions treated with pleurodesis and PleurX placement, and with treated brain metastases, previously with stable disease on TDM1, however patient opted for break from therapy from June 2015 through November 2015, and represented with worsening metastatic disease at that time. She restarted TDM1 on 11/27/2015. She had progressive disease 2/17/16 and Perjeta was added to TDM1. She had gamma knife to brain mets on 3/8.  She received WBRT.  She was started on [place]  clinical trial and randomized to physician's choice, and started on Gemzar/Herceptin on 9/29/17.    1. Breast cancer:  Has been on Gemzar/Herceptin with stable disease on restaging 4/7. Her tumor marker has remained low and stable.  She had been on Hercepin alone since 4/10/17 due to recovery from pneumonia, and was able to resume Herceptin with Gemzar on 6/19. She tolerated this well but did not slight increase in fatigue. She feels well to continue with treatment today. Repeat restaging in June with stable systemic disease, however slight changes in the midbrain on brain MRI.  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 She started desmopressin 50 mcg with good response clinically.     3. Brain mets: Numbness of tongue and V2 and V3 on right are improving.    4. FEN: Weight overall stable.  She is eating better.     5. Pain: Chronic mild aches/pains secondary to disease and with myalgias on Gemzar. No current pain today, not taking narcotics.     6. GERD/nausea: Continue prilosec.     7. Bone mets: on Xgeva every 3 months. Last 5/1/17.  On hold for dental work.    8. Mood: She is overall coping well.     9. She has an advanced directive.  She has a POLST.  DNR/DNI.  Her power of  is listed in the computer.     10. Deconditioning: She had been walking independently or with cane in clinic prior to pneumonia, working on getting her strength back. Continue home PT exercises.     Deyanira Costello PA-C

## 2017-07-10 NOTE — MR AVS SNAPSHOT
After Visit Summary   7/10/2017    Keren Erickson    MRN: [id number]           Patient Information     Date Of Birth          1955        Visit Information        Provider Department      7/10/2017 1:40 PM Deyanira Costello PA-C [facility]        Today's Diagnoses     Nausea    -  1    Gastroesophageal reflux disease without esophagitis        Brain metastasis (H)        Carcinoma of breast metastatic to multiple sites, unspecified laterality (H)        Metastatic breast cancer (H)           Follow-ups after your visit        Your next 10 appointments already scheduled     Jul 17, 2017  1:00 PM CDT   [facility] Draw with [facility] DRAW   [facility] ([facility])    [address] [place] [address]   [phone]            Jul 17, 2017  1:30 PM CDT   Infusion 60 with UC ONCOLOGY INFUSION, UC 28 ATC   [facility] ([facility])    [address] [place] [address]   [phone]            Aug 04, 2017 11:30 AM CDT   Masonic Lab Draw with [facility] LAB DRAW   [facility] Draw ([facility])    [address] [place] [address]   [phone]            Aug 04, 2017 12:00 PM CDT   (Arrive by 11:45 AM)   Return Visit with Deyanira Costello PA-C   [facility] ([facility])    [address] [place] [address]   [phone]            Aug 04, 2017  1:00 PM CDT   Infusion 120 with UC ONCOLOGY INFUSION, UC 18 ATC   [facility] ([facility])    [address] [place] [address]   [phone]            Aug 11, 2017 12:30 PM CDT   Mason[facility] Lab Draw with [facility] LAB DRAW   [facility] Lab Draw ([facility]  "Center)    [address]  [address] [facility] [address] [phone]            Aug 11, 2017  1:00 PM CDT   Infusion 60 with UC ONCOLOGY INFUSION, UC 11 ATC   [facility] ([facility])    [address]  [address] [facility] [address] [phone]              Who to contact     If you have questions or need follow up information about today's clinic visit or your schedule please contact [facility] directly at [phone].  Normal or non-critical lab and imaging results will be communicated to you by [email or web address], letter or phone within 4 business days after the clinic has received the results. If you do not hear from us within 7 days, please contact the clinic through Recommendt or phone. If you have a critical or abnormal lab result, we will notify you by phone as soon as possible.  Submit refill requests through [employer] or call your pharmacy and they will forward the refill request to us. Please allow 3 business days for your refill to be completed.          Additional Information About Your Visit        MyChart Information     [employer] gives you secure access to your electronic health record. If you see a primary care provider, you can also send messages to your care team and make appointments. If you have questions, please call your primary care clinic.  If you do not have a primary care provider, please call [phone] and they will assist you.        Care EveryWhere ID     This is your Care EveryWhere ID. This could be used by other organizations to access your [place] medical records  [id number]        Your Vitals Were     Pulse Temperature Respirations Height Pulse Oximetry BMI (Body Mass Index)    75 98.2  F (36.8  C) (Oral) 18 1.575 m (5' 2.01\") 97% 20.99 kg/m2       Blood Pressure from Last 3 Encounters:   07/10/17 122/85   06/27/17 129/73   06/19/17 124/84    Weight from Last 3 Encounters:   07/10/17 52.1 kg (114 " lb 12.8 oz)   06/27/17 53 kg (116 lb 14.4 oz)   06/19/17 51.8 kg (114 lb 3.2 oz)              Today, you had the following     No orders found for display         Today's Medication Changes          These changes are accurate as of: 7/10/17  4:24 PM.  If you have any questions, ask your nurse or doctor.               These medicines have changed or have updated prescriptions.        Dose/Directions    omeprazole 20 MG tablet   This may have changed:    - medication strength  - how much to take  - when to take this  - reasons to take this   Used for:  Gastroesophageal reflux disease without esophagitis   Changed by:  Deyanira Costello PA-C        Dose:  20 mg   Take 1 tablet (20 mg) by mouth daily   Quantity:  30 tablet   Refills:  3       prochlorperazine 10 MG tablet   Commonly known as:  COMPAZINE   This may have changed:    - medication strength  - reasons to take this   Used for:  Nausea   Changed by:  Deyanira Costello PA-C        Dose:  10 mg   Take 1 tablet (10 mg) by mouth every 6 hours as needed for nausea or vomiting   Quantity:  90 tablet   Refills:  3            Where to get your medicines      These medications were sent to [facility] [address] [facility] [address]  [address] [place] [address] [address] [place] [address]    Hours:  TRANSPLANT PHONE NUMBER [phone] Phone:  [phone]     omeprazole 20 MG tablet    prochlorperazine 10 MG tablet                Primary Care Provider Office Phone # Fax #    Kelly Bg Hart [phone]       [facility] [address] [facility] [address]        Equal Access to Services     ODILON BOND AH: Hadii keturah handleyo Soomaali, waaxda luqadaha, qaybta kaalmada adeegyajb, freddie keys. So [facility] [phone].    ATENCIÓN: Si habla español, tiene a ramires disposición servicios gratuitos de asistencia lingüística. Llame al [phone].    We comply with applicable  federal civil rights laws and [place] laws. We do not discriminate on the basis of race, color, national origin, age, disability sex, sexual orientation or gender identity.            Thank you!     Thank you for choosing [facility]  for your care. Our goal is always to provide you with excellent care. Hearing back from our patients is one way we can continue to improve our services. Please take a few minutes to complete the written survey that you may receive in the mail after your visit with us. Thank you!             Your Updated Medication List - Protect others around you: Learn how to safely use, store and throw away your medicines at www.disposemymeds.org.          This list is accurate as of: 7/10/17  4:24 PM.  Always use your most recent med list.                   Brand Name Dispense Instructions for use Diagnosis    ALEVE PO      Take 220 mg by mouth daily        desmopressin 0.1 MG tablet    DDAVP    90 tablet    Take 1 tablet (100 mcg) by mouth At Bedtime    Diabetes insipidus, neurohypophyseal (H)       Multi-vitamin Tabs tablet      Take 1 tablet by mouth daily        omeprazole 20 MG tablet     30 tablet    Take 1 tablet (20 mg) by mouth daily    Gastroesophageal reflux disease without esophagitis       prochlorperazine 10 MG tablet    COMPAZINE    90 tablet    Take 1 tablet (10 mg) by mouth every 6 hours as needed for nausea or vomiting    Nausea       VITAMIN E BLEND PO      Take by mouth daily

## 2017-07-10 NOTE — PROGRESS NOTES
Infusion Nursing Note:  Keren Erickson presents today for Herceptin/Gemzar Cycle 14 Day 1.    Patient seen by provider today: Yes: Deyanira Costello PA-C prior to infusion.    Treatment Conditions:  Lab Results   Component Value Date    HGB 11.7 07/10/2017     Lab Results   Component Value Date    WBC 6.3 07/10/2017      Lab Results   Component Value Date    ANEU 4.2 07/10/2017     Lab Results   Component Value Date     07/10/2017      Lab Results   Component Value Date     07/10/2017                   Lab Results   Component Value Date    POTASSIUM 3.8 07/10/2017           Lab Results   Component Value Date    MAG 2.4 04/10/2017            Lab Results   Component Value Date    CR 0.67 07/10/2017                   Lab Results   Component Value Date    OMA 8.8 07/10/2017                Lab Results   Component Value Date    BILITOTAL 0.3 07/10/2017           Lab Results   Component Value Date    ALBUMIN 3.6 07/10/2017                    Lab Results   Component Value Date    ALT 39 07/10/2017           Lab Results   Component Value Date    AST 30 07/10/2017     Results reviewed, labs MET treatment parameters, ok to proceed with treatment.  ECHO/MUGA completed 5/22/17  EF 59%.    Intravenous Access:  Implanted Port.    Note: Patient arrived to infusion today by wheelchair with daughter Kadi.  Pt had no complaints post clinic visit and tolerated infusions without complications.  Confirmed with E.T by phone prior to infusion that Xgeva is beng held at this time.       Post Infusion Assessment:  Patient tolerated infusion without incident.  Blood return noted pre and post infusion.  Site patent and intact, free from redness, edema or discomfort.  No evidence of extravasations.  Access discontinued per protocol.    Discharge Plan:   Prescription refills given for Compazine, Omeprazole.  Discharge instructions reviewed with: Patient and Family.  Patient and/or family verbalized understanding of discharge  instructions and all questions answered.  Copy of AVS reviewed with patient and/or family.  Patient will return 7/17/17 for next appointment.  Patient discharged in stable condition accompanied by: daughter.  Departure Mode: Wheelchair.    Cherise Schreiber RN

## 2017-07-10 NOTE — LETTER
7/10/2017      RE: Keren Erickson  [address] [facility] [address]       HEMATOLOGY/ONCOLOGY PROGRESS NOTE  Jul 10, 2017    REASON FOR VISIT: follow-up of metastatic breast cancer, triple positive    DIAGNOSIS:   The patient is a 60-year-old female who presented to the hospital initially in 09/2013 with complaints of dyspnea. Workup revealed a large pericardial effusion and pleural effusion. Physical examination revealed a large untreated left breast mass encompassing the entire left breast. Biopsies revealed these were ER, AR and HER2-positive breast cancer. She had a thoracentesis and pericardial sclerosis performed. She was originally on Arimidex and Herceptin. In 01/2014, she was switched to tamoxifen and Herceptin due to arthralgias, and she ultimately developed progressive disease and was switched to Faslodex and Herceptin. In 01/2015, she was found to have progressive disease and was switched to T-DM1.     Initially when she was seen in late 02/2015, she complained of some V5 sensory deficit. This was subjective. I was not able to elicit this on examination. This persisted and further workup was performed with a brain MRI. This revealed what was initially thought to be 3 punctate brain metastases. She originally saw Radiation Oncology and Neurosurgery as well as underwent a lumbar puncture. Cytology from the lumbar puncture showed no evidence of leptomeningeal disease. She was treated with Gamma Knife radiation to 6 lesions, performed on 04/16/2015.  She initiated therapy with TDM1 in January 2015 and continued this through 6/26/2015 with overall stable disease. She has since been on a break from treatment. The patient presented earlier this month with recurrent shortness of breath.  Imaging revealed recurrent right-sided pleural effusion.  She has since undergone thoracentesis with cytology pending.  Clinically, however, there is evidence of disease progression. PET scan performed on 11/25/2015  demonstrated progression of disease at multiple sites. She restarted TDM1 on 11/27/2015, however experienced a mild transfusion reaction with shortness of breath and chest tightness, resolved upon stopping TDM1 and 125 mg of SoluMedrol. She had progression of disease on repeat imaging 2/17/2016, as well as new brain metastases. She started TDM1 with Perjeta on 3/4/2016. She underwent gamma knife to brain mets on 3/8/16.       In late May, she was found to have progressive brain metastases.  She received whole brain radiation.  She had a follow up brain MRI August 2016 that was stable.     In September 2016, she enrolled on [place]  trial and was randomized to the standard of care arm/Physician's Choice; started on gemzar and herceptin. She was recently hospitalized 1/27-2/3/17 for presumed HCAP. Trial was suspended. She continued on gemzar and heceptin with stable disease. Last restaging was 4/7/17 and stable. Gemzar was placed on hold from 4/10/2017 through 6/22/17 so that she could recover from pneumonia.    INTERVAL HISTORY:  Dominga presents for follow-up today prior to next cycle of treatment, accompanied by her friend.    She resumed the Gemzar with last cycle. This went overall well. She can feel the difference when she recieves the Gemzar in addition to Herceptin, feels more tired for several days. She felt she managed through it and is able to continue. She has a lot going on at home right now; she is moving to a one-level duplex. This has been stressful but she has a lot of support. She is also switching insurance. Working on walking every day. Eating well and drinking well. Bowels moving great. She reports no new neurologic symptoms and actually thinks the facial numbness continues to improve. She continues to have predominately right extremity paresthesias, unchanged.    ROS: 10 point ROS was conducted and was otherwise negative except for as above.  No f/s/c.  No chest pain/cough/dyspnea.  Daily  BM, no  issues. Skin is dry, but no rash.     PHYSICAL EXAMINATION:     /85  Pulse 75  Temp 98.2  F (36.8  C) (Oral)  Resp 18  Wt 52.1 kg (114 lb 12.8 oz)  SpO2 97%  BMI 20.99 kg/m2    Wt Readings from Last 4 Encounters:   07/10/17 52.1 kg (114 lb 12.8 oz)   06/27/17 53 kg (116 lb 14.4 oz)   06/19/17 51.8 kg (114 lb 3.2 oz)   05/22/17 52.5 kg (115 lb 11.2 oz)     Constitutional: Alert, oriented, cachectic female in no visible distress. She is able to get up on exam table with light assistance.   Eyes: PERRL. Anicteric sclerae.    ENT/Mouth: OM moist and pink without lesions or thrush.    CV: RRR, no murmurs appreciated.  Resp: CTAB slightly diminished R base, stable. Breathing comfortably.  Abdomen: Soft, non-tender, non-distended. Bowel sounds present. No masses appreciated. No organomegaly noted.  Extremities: No lower extremity edema appreciated.  Skin: Warm, dry. No bruising or petechiae noted. No rash  Lymph: No palpable LAD  Neuro: CN II-XII grossly intact.     LABS:   Results for HEIDI RUIZ (MRN [id number]) as of 7/10/2017 15:19   Ref. Range 7/10/2017 13:56   Sodium Latest Ref Range: 133 - 144 mmol/L 139   Potassium Latest Ref Range: 3.4 - 5.3 mmol/L 3.8   Chloride Latest Ref Range: 94 - 109 mmol/L 106   Carbon Dioxide Latest Ref Range: 20 - 32 mmol/L 28   Urea Nitrogen Latest Ref Range: 7 - 30 mg/dL 15   Creatinine Latest Ref Range: 0.52 - 1.04 mg/dL 0.67   GFR Estimate Latest Ref Range: >60 mL/min/1.7m2 90   GFR Estimate If Black Latest Ref Range: >60 mL/min/1.7m2 >90...   Calcium Latest Ref Range: 8.5 - 10.1 mg/dL 8.8   Anion Gap Latest Ref Range: 3 - 14 mmol/L 6   Albumin Latest Ref Range: 3.4 - 5.0 g/dL 3.6   Protein Total Latest Ref Range: 6.8 - 8.8 g/dL 6.6 (L)   Bilirubin Total Latest Ref Range: 0.2 - 1.3 mg/dL 0.3   Alkaline Phosphatase Latest Ref Range: 40 - 150 U/L 104   ALT Latest Ref Range: 0 - 50 U/L 39   AST Latest Ref Range: 0 - 45 U/L 30   Glucose Latest Ref Range:  70 - 99 mg/dL 90   WBC Latest Ref Range: 4.0 - 11.0 10e9/L 6.3   Hemoglobin Latest Ref Range: 11.7 - 15.7 g/dL 11.7   Hematocrit Latest Ref Range: 35.0 - 47.0 % 35.0   Platelet Count Latest Ref Range: 150 - 450 10e9/L 360   IMPRESSION/PLAN:  Dominga is a 61-year-old female with history of metastatic ER-positive, HER-2 positive breast cancer, with involvement of the bone, history of pleural effusions treated with pleurodesis and PleurX placement, and with treated brain metastases, previously with stable disease on TDM1, however patient opted for break from therapy from June 2015 through November 2015, and represented with worsening metastatic disease at that time. She restarted TDM1 on 11/27/2015. She had progressive disease 2/17/16 and Perjeta was added to TDM1. She had gamma knife to brain mets on 3/8.  She received WBRT.  She was started on [place]  clinical trial and randomized to physician's choice, and started on Gemzar/Herceptin on 9/29/17.    1. Breast cancer:  Has been on Gemzar/Herceptin with stable disease on restaging 4/7. Her tumor marker has remained low and stable.  She had been on Hercepin alone since 4/10/17 due to recovery from pneumonia, and was able to resume Herceptin with Gemzar on 6/19. She tolerated this well but did not slight increase in fatigue. She feels well to continue with treatment today. Repeat restaging in June with stable systemic disease, however slight changes in the midbrain on brain MRI.  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 She started desmopressin 50 mcg with good response clinically.     3. Brain mets: Numbness of tongue and V2 and V3 on right are improving.    4. FEN: Weight overall stable.  She is eating better.     5. Pain: Chronic mild aches/pains secondary to disease and with myalgias on Gemzar. No current pain today, not taking narcotics.     6.  GERD/nausea: Continue prilosec.     7. Bone mets: on Xgeva every 3 months. Last 5/1/17. On hold for dental work.    8. Mood: She is overall coping well.     9. She has an advanced directive.  She has a POLST.  DNR/DNI.  Her power of  is listed in the computer.     10. Deconditioning: She had been walking independently or with cane in clinic prior to pneumonia, working on getting her strength back. Continue home PT exercises.     Deyanira Costello PA-C

## 2017-07-10 NOTE — NURSING NOTE
Chief Complaint   Patient presents with     Port Draw     Port accessed and labs collected and sent, line flushed with NS and heparin, pt tolerated well. Vitals taken and pt checked in for next appt. Aurea Hair

## 2017-07-10 NOTE — MR AVS SNAPSHOT
After Visit Summary   7/10/2017    Keren Erickson    MRN: [id number]           Patient Information     Date Of Birth          1955        Visit Information        Provider Department      7/10/2017 2:30 PM UC 20 ATC; UC ONCOLOGY INFUSION [facility]        Today's Diagnoses     Metastatic breast cancer (H)    -  1    Brain metastasis (H)        Carcinoma of breast metastatic to multiple sites, unspecified laterality (H)          Care Instructions          July 2017 Sunday Monday Tuesday Wednesday Thursday Friday Saturday                                 1       2     3     4     5     6     7     8       9     10     [facility] DRAW    1:15 PM   (15 min.)    [facility] LAB DRAW   [facility] Lab Draw     [facility] RETURN    1:25 PM   (50 min.)   Deyanira Costello PA-C   [facility]     [facility] ONC INFUSION 120    2:30 PM   (120 min.)    ONCOLOGY INFUSION   [facility] 11     12     13     14     15       16     17     [facility] DRAW    1:00 PM   (15 min.)    [facility]RAW   [facility]     [facility] ONC INFUSION 60    1:30 PM   (60 min.)   UC ONCOLOGY INFUSION   [facility] 18     19     20     21     22       23     24     25     26     27     28     29       30     31 August 2017 Sunday Monday Tuesday Wednesday Thursday Friday Saturday             1     2     3     4     [facility] LAB DRAW   11:30 AM   (15 min.)    [facility] LAB DRAW   [facility] Lab Draw     UMP RETURN   11:45 AM   (50 min.)   Deyanira Costello PA-C   [facility]     [facility] ONC INFUSION 120    1:00 PM   (120 min.)   UC ONCOLOGY INFUSION   [facility] 5       6     7     8     9     10     11     [facility] LAB DRAW   12:30 PM   (15 min.)    [facility] LAB DRAW   [facility]     [facility] ONC INFUSION 60    1:00 PM    (60 min.)    ONCOLOGY [facility] 12       13     14     15     16     17     18     19       20     21     22     23     24     25     26       27     28     29     30     31                            Lab Results:  Recent Results (from the past 12 hour(s))   CBC with platelets differential    Collection Time: 07/10/17  1:56 PM   Result Value Ref Range    WBC 6.3 4.0 - 11.0 10e9/L    RBC Count 3.68 (L) 3.8 - 5.2 10e12/L    Hemoglobin 11.7 11.7 - 15.7 g/dL    Hematocrit 35.0 35.0 - 47.0 %    MCV 95 78 - 100 fl    MCH 31.8 26.5 - 33.0 pg    MCHC 33.4 31.5 - 36.5 g/dL    RDW 16.1 (H) 10.0 - 15.0 %    Platelet Count 360 150 - 450 10e9/L    Diff Method Automated Method     % Neutrophils 66.2 %    % Lymphocytes 18.8 %    % Monocytes 13.6 %    % Eosinophils 0.0 %    % Basophils 0.8 %    % Immature Granulocytes 0.6 %    Nucleated RBCs 0 0 /100    Absolute Neutrophil 4.2 1.6 - 8.3 10e9/L    Absolute Lymphocytes 1.2 0.8 - 5.3 10e9/L    Absolute Monocytes 0.9 0.0 - 1.3 10e9/L    Absolute Eosinophils 0.0 0.0 - 0.7 10e9/L    Absolute Basophils 0.1 0.0 - 0.2 10e9/L    Abs Immature Granulocytes 0.0 0 - 0.4 10e9/L    Absolute Nucleated RBC 0.0    Comprehensive metabolic panel    Collection Time: 07/10/17  1:56 PM   Result Value Ref Range    Sodium 139 133 - 144 mmol/L    Potassium 3.8 3.4 - 5.3 mmol/L    Chloride 106 94 - 109 mmol/L    Carbon Dioxide 28 20 - 32 mmol/L    Anion Gap 6 3 - 14 mmol/L    Glucose 90 70 - 99 mg/dL    Urea Nitrogen 15 7 - 30 mg/dL    Creatinine 0.67 0.52 - 1.04 mg/dL    GFR Estimate 90 >60 mL/min/1.7m2    GFR Estimate If Black >90   GFR Calc   >60 mL/min/1.7m2    Calcium 8.8 8.5 - 10.1 mg/dL    Bilirubin Total 0.3 0.2 - 1.3 mg/dL    Albumin 3.6 3.4 - 5.0 g/dL    Protein Total 6.6 (L) 6.8 - 8.8 g/dL    Alkaline Phosphatase 104 40 - 150 U/L    ALT 39 0 - 50 U/L    AST 30 0 - 45 U/L   Contact Numbers    [facility] [place] Line: [phone]  [facility] Triage:   [phone]    Call triage with chills and/or temperature greater than or equal to 100.5, uncontrolled nausea/vomiting, diarrhea, constipation, dizziness, shortness of breath, chest pain, bleeding, unexplained bruising, or any new/concerning symptoms, questions/concerns.     If you are having any concerning symptoms or wish to speak to a provider before your next infusion visit, please call your care coordinator or triage to notify them so we can adequately serve you.       After Hours: [phone]    If after hours, weekends, or holidays, call main hospital  and ask for Oncology doctor on call.             Follow-ups after your visit        Your next 10 appointments already scheduled     Jul 17, 2017  1:00 PM CDT   [facility] Draw with [facility] DRAW   [facility] [facility]    [address] [place] [address]   [phone]            Jul 17, 2017  1:30 PM CDT   Infusion 60 with UC ONCOLOGY INFUSION, UC 28 ATC   [facility] ([place] [facility])    [address] [place] [address]   [phone]            Aug 04, 2017 11:30 AM CDT   [facility] Lab Draw with UC [facility] LAB DRAW   [facility] Lab Draw ([place] [facility])    [address] [place] [address]   [phone]            Aug 04, 2017 12:00 PM CDT   (Arrive by 11:45 AM)   Return Visit with Deyanira Costello PA-C   [facility] [facility])    [address] [place] [address]   [phone]            Aug 04, 2017  1:00 PM CDT   Infusion 120 with UC ONCOLOGY INFUSION, UC 18 ATC   [facility] [facility])    [address] [place] [address]   [phone]            Aug 11, 2017 12:30 PM CDT   [facility] Lab Draw with UC MASONIC  LAB DRAW   [facility] Lab Draw ([facility])    [address] [facility] [address] [phone]            Aug 11, 2017  1:00 PM CDT   Infusion 60 with UC ONCOLOGY INFUSION, UC 11 ATC   [facility] ([facility])    [address]  [address] [facility] [id number] [phone]              Who to contact     If you have questions or need follow up information about today's clinic visit or your schedule please contact [facility] directly at [phone].  Normal or non-critical lab and imaging results will be communicated to you by [employer], letter or phone within 4 business days after the clinic has received the results. If you do not hear from us within 7 days, please contact the clinic through [employer] or phone. If you have a critical or abnormal lab result, we will notify you by phone as soon as possible.  Submit refill requests through [employer] or call your pharmacy and they will forward the refill request to us. Please allow 3 business days for your refill to be completed.          Additional Information About Your Visit        [employer] Information     [employer] gives you secure access to your electronic health record. If you see a primary care provider, you can also send messages to your care team and make appointments. If you have questions, please call your primary care clinic.  If you do not have a primary care provider, please call [phone] and they will assist you.        Care EveryWhere ID     This is your Care EveryWhere ID. This could be used by other organizations to access your [facility] medical records  [id number]         Blood Pressure from Last 3 Encounters:   07/10/17 122/85   06/27/17 129/73   06/19/17 124/84    Weight from Last 3 Encounters:   07/10/17 52.1 kg (114 lb 12.8 oz)   06/27/17 53 kg (116 lb 14.4 oz)   06/19/17 51.8 kg (114 lb 3.2 oz)              We  Performed the Following     Ca27.29  breast tumor marker     CBC with platelets differential     CEA     Comprehensive metabolic panel          Today's Medication Changes          These changes are accurate as of: 7/10/17  3:29 PM.  If you have any questions, ask your nurse or doctor.               These medicines have changed or have updated prescriptions.        Dose/Directions    omeprazole 20 MG tablet   This may have changed:    - medication strength  - how much to take  - when to take this  - reasons to take this   Used for:  Gastroesophageal reflux disease without esophagitis   Changed by:  Deyanira Costello PA-C        Dose:  20 mg   Take 1 tablet (20 mg) by mouth daily   Quantity:  30 tablet   Refills:  3       prochlorperazine 10 MG tablet   Commonly known as:  COMPAZINE   This may have changed:    - medication strength  - reasons to take this   Used for:  Nausea   Changed by:  Deyanira Costello PA-C        Dose:  10 mg   Take 1 tablet (10 mg) by mouth every 6 hours as needed for nausea or vomiting   Quantity:  90 tablet   Refills:  3            Where to get your medicines      These medications were sent to [address] [place] [address] [address] [address]    Hours:  TRANSPLANT PHONE NUMBER [phone] Phone:  [phone]     omeprazole 20 MG tablet    prochlorperazine 10 MG tablet                Primary Care Provider Office Phone # Fax #    Kelly Hart [phone]       [facility] [address] [facility] [address]        Equal Access to Services     CLAUDIA BOND AH: Jerry Joshi, waseverino schmidt, qaybta kaalmada annabel, freddie keys. [facility] [phone].    ATENCIÓN: Si habla español, tiene a ramires disposición servicios gratuitos de asistencia lingüística. Llame al [phone].    We comply with applicable federal civil rights  laws and [place] laws. We do not discriminate on the basis of race, color, national origin, age, disability sex, sexual orientation or gender identity.            Thank you!     Thank you for choosing [facility]  for your care. Our goal is always to provide you with excellent care. Hearing back from our patients is one way we can continue to improve our services. Please take a few minutes to complete the written survey that you may receive in the mail after your visit with us. Thank you!             Your Updated Medication List - Protect others around you: Learn how to safely use, store and throw away your medicines at www.disposemymeds.org.          This list is accurate as of: 7/10/17  3:29 PM.  Always use your most recent med list.                   Brand Name Dispense Instructions for use Diagnosis    ALEVE PO      Take 220 mg by mouth daily        desmopressin 0.1 MG tablet    DDAVP    90 tablet    Take 1 tablet (100 mcg) by mouth At Bedtime    Diabetes insipidus, neurohypophyseal (H)       Multi-vitamin Tabs tablet      Take 1 tablet by mouth daily        omeprazole 20 MG tablet     30 tablet    Take 1 tablet (20 mg) by mouth daily    Gastroesophageal reflux disease without esophagitis       prochlorperazine 10 MG tablet    COMPAZINE    90 tablet    Take 1 tablet (10 mg) by mouth every 6 hours as needed for nausea or vomiting    Nausea       VITAMIN E BLEND PO      Take by mouth daily

## 2017-07-17 ENCOUNTER — INFUSION THERAPY VISIT (OUTPATIENT)
Dept: ONCOLOGY | Facility: CLINIC | Age: 62
End: 2017-07-17
Attending: INTERNAL MEDICINE
Payer: COMMERCIAL

## 2017-07-17 ENCOUNTER — APPOINTMENT (OUTPATIENT)
Dept: LAB | Facility: CLINIC | Age: 62
End: 2017-07-17
Attending: INTERNAL MEDICINE
Payer: COMMERCIAL

## 2017-07-17 VITALS
WEIGHT: 115.8 LBS | SYSTOLIC BLOOD PRESSURE: 110 MMHG | DIASTOLIC BLOOD PRESSURE: 76 MMHG | HEART RATE: 83 BPM | TEMPERATURE: 97.1 F | RESPIRATION RATE: 20 BRPM | OXYGEN SATURATION: 97 % | BODY MASS INDEX: 21.17 KG/M2

## 2017-07-17 DIAGNOSIS — C50.919 METASTATIC BREAST CANCER: Primary | ICD-10-CM

## 2017-07-17 DIAGNOSIS — C50.919 CARCINOMA OF BREAST METASTATIC TO MULTIPLE SITES, UNSPECIFIED LATERALITY (H): ICD-10-CM

## 2017-07-17 DIAGNOSIS — C79.31 BRAIN METASTASIS: ICD-10-CM

## 2017-07-17 LAB
BASOPHILS # BLD AUTO: 0 10E9/L (ref 0–0.2)
BASOPHILS NFR BLD AUTO: 0.6 %
DIFFERENTIAL METHOD BLD: ABNORMAL
EOSINOPHIL # BLD AUTO: 0 10E9/L (ref 0–0.7)
EOSINOPHIL NFR BLD AUTO: 0 %
ERYTHROCYTE [DISTWIDTH] IN BLOOD BY AUTOMATED COUNT: 15.9 % (ref 10–15)
HCT VFR BLD AUTO: 34 % (ref 35–47)
HGB BLD-MCNC: 11.1 G/DL (ref 11.7–15.7)
IMM GRANULOCYTES # BLD: 0.1 10E9/L (ref 0–0.4)
IMM GRANULOCYTES NFR BLD: 2.2 %
LYMPHOCYTES # BLD AUTO: 1.1 10E9/L (ref 0.8–5.3)
LYMPHOCYTES NFR BLD AUTO: 23 %
MCH RBC QN AUTO: 31.3 PG (ref 26.5–33)
MCHC RBC AUTO-ENTMCNC: 32.6 G/DL (ref 31.5–36.5)
MCV RBC AUTO: 96 FL (ref 78–100)
MONOCYTES # BLD AUTO: 0.5 10E9/L (ref 0–1.3)
MONOCYTES NFR BLD AUTO: 11 %
NEUTROPHILS # BLD AUTO: 2.9 10E9/L (ref 1.6–8.3)
NEUTROPHILS NFR BLD AUTO: 63.2 %
NRBC # BLD AUTO: 0 10*3/UL
NRBC BLD AUTO-RTO: 0 /100
PLATELET # BLD AUTO: 339 10E9/L (ref 150–450)
RBC # BLD AUTO: 3.55 10E12/L (ref 3.8–5.2)
WBC # BLD AUTO: 4.7 10E9/L (ref 4–11)

## 2017-07-17 PROCEDURE — 25000128 H RX IP 250 OP 636: Mod: ZF | Performed by: INTERNAL MEDICINE

## 2017-07-17 PROCEDURE — 96375 TX/PRO/DX INJ NEW DRUG ADDON: CPT

## 2017-07-17 PROCEDURE — 96413 CHEMO IV INFUSION 1 HR: CPT

## 2017-07-17 PROCEDURE — 25000125 ZZHC RX 250: Mod: ZF | Performed by: PHYSICIAN ASSISTANT

## 2017-07-17 PROCEDURE — 85025 COMPLETE CBC W/AUTO DIFF WBC: CPT | Performed by: PHYSICIAN ASSISTANT

## 2017-07-17 PROCEDURE — 36415 COLL VENOUS BLD VENIPUNCTURE: CPT | Performed by: PHYSICIAN ASSISTANT

## 2017-07-17 PROCEDURE — 25000128 H RX IP 250 OP 636: Mod: ZF | Performed by: PHYSICIAN ASSISTANT

## 2017-07-17 RX ORDER — HEPARIN SODIUM (PORCINE) LOCK FLUSH IV SOLN 100 UNIT/ML 100 UNIT/ML
5 SOLUTION INTRAVENOUS EVERY 8 HOURS
Status: DISCONTINUED | OUTPATIENT
Start: 2017-07-17 | End: 2017-07-17 | Stop reason: HOSPADM

## 2017-07-17 RX ADMIN — SODIUM CHLORIDE, PRESERVATIVE FREE 5 ML: 5 INJECTION INTRAVENOUS at 15:18

## 2017-07-17 RX ADMIN — DEXAMETHASONE SODIUM PHOSPHATE 12 MG: 10 INJECTION, SOLUTION INTRAMUSCULAR; INTRAVENOUS at 14:27

## 2017-07-17 RX ADMIN — SODIUM CHLORIDE, PRESERVATIVE FREE 5 ML: 5 INJECTION INTRAVENOUS at 13:27

## 2017-07-17 RX ADMIN — SODIUM CHLORIDE 1460 MG: 900 INJECTION, SOLUTION INTRAVENOUS at 14:43

## 2017-07-17 RX ADMIN — SODIUM CHLORIDE 250 ML: 9 INJECTION, SOLUTION INTRAVENOUS at 14:27

## 2017-07-17 ASSESSMENT — PAIN SCALES - GENERAL: PAINLEVEL: MILD PAIN (2)

## 2017-07-17 NOTE — PATIENT INSTRUCTIONS
Contact Numbers  [facility] Nurse Triage: [phone]  After Hours Nurse Line:  [phone]    Please call the [facility] Nurse Triage line or after hours number if you experience a temperature greater than or equal to 100.5, shaking chills, have uncontrolled nausea, vomiting and/or diarrhea, dizziness, shortness of breath, chest pain, bleeding, unexplained bruising, or if you have any other new/concerning symptoms, questions or concerns.     If you are having any concerning symptoms or wish to speak to a provider before your next infusion visit, please call your care coordinator or triage to notify them so we can adequately serve you.     If you need a refill on a narcotic prescription or other medication, please call triage before your infusion appointment.           July 2017 Sunday Monday Tuesday Wednesday Thursday Friday Saturday                                 1       2     3     4     5     6     7     8       9     10     [facility] LAB DRAW    1:15 PM   (15 min.)    [facility] LAB DRAW   [facility] Lab Draw     UMP RETURN    1:25 PM   (50 min.)   Deyanira Costello PA-C [facility]     UMP ONC INFUSION 120    2:30 PM   (120 min.)    ONCOLOGY INFUSION   [facility] 11     12     13     14     15       16     17     [facility] LAB DRAW    1:00 PM   (15 min.)    [facility]   [facility]     UMP ONC INFUSION 60    1:30 PM   (60 min.)    ONCOLOGY INFUSION   [facility] 18     19     20     21     22       23     24     25     26     27     28     29       30     31 August 2017 Sunday Monday Tuesday Wednesday Thursday Friday Saturday             1     2     3     4     [facility] LAB DRAW   11:30 AM   (15 min.)    [facility]   [facility]     [facility] RETURN   11:45 AM   (50 min.)   Deyanira Costello PA-C [facility] ONC INFUSION 120    1:00 PM   (120 min.)    ONCOLOGY INFUSION   [facility] 5       6     7     8     9     10     11     [facility] MASONIC LAB DRAW   11:30 AM   (15 min.)   [facility] LAB DRAW   [facility] Lab Draw     [facility] RETURN   12:15 PM   (30 min.)   Marlen Harper MD   [facility] ONC INFUSION 60    1:00 PM   (60 min.)    ONCOLOGY INFUSION   [facility] 12       13     14     15     16     17     18     19       20     21     22     23     24     25     26       27     28     29     30     31                           Recent Results (from the past 24 hour(s))   CBC with platelets differential    Collection Time: 07/17/17  1:32 PM   Result Value Ref Range    WBC 4.7 4.0 - 11.0 10e9/L    RBC Count 3.55 (L) 3.8 - 5.2 10e12/L    Hemoglobin 11.1 (L) 11.7 - 15.7 g/dL    Hematocrit 34.0 (L) 35.0 - 47.0 %    MCV 96 78 - 100 fl    MCH 31.3 26.5 - 33.0 pg    MCHC 32.6 31.5 - 36.5 g/dL    RDW 15.9 (H) 10.0 - 15.0 %    Platelet Count 339 150 - 450 10e9/L    Diff Method Automated Method     % Neutrophils 63.2 %    % Lymphocytes 23.0 %    % Monocytes 11.0 %    % Eosinophils 0.0 %    % Basophils 0.6 %    % Immature Granulocytes 2.2 %    Nucleated RBCs 0 0 /100    Absolute Neutrophil 2.9 1.6 - 8.3 10e9/L    Absolute Lymphocytes 1.1 0.8 - 5.3 10e9/L    Absolute Monocytes 0.5 0.0 - 1.3 10e9/L    Absolute Eosinophils 0.0 0.0 - 0.7 10e9/L    Absolute Basophils 0.0 0.0 - 0.2 10e9/L    Abs Immature Granulocytes 0.1 0 - 0.4 10e9/L    Absolute Nucleated RBC 0.0

## 2017-07-17 NOTE — PROGRESS NOTES
Infusion Nursing Note:  Keren Erickson presents today for Cycle 14 Day 8 Gemzar.    Patient seen by provider today: No    Intravenous Access:  Implanted Port.    Treatment Conditions:  Lab Results   Component Value Date    HGB 11.1 07/17/2017     Lab Results   Component Value Date    WBC 4.7 07/17/2017      Lab Results   Component Value Date    ANEU 2.9 07/17/2017     Lab Results   Component Value Date     07/17/2017      Lab Results   Component Value Date     07/10/2017                   Lab Results   Component Value Date    POTASSIUM 3.8 07/10/2017           Lab Results   Component Value Date    MAG 2.4 04/10/2017            Lab Results   Component Value Date    CR 0.67 07/10/2017                   Lab Results   Component Value Date    OMA 8.8 07/10/2017                Lab Results   Component Value Date    BILITOTAL 0.3 07/10/2017           Lab Results   Component Value Date    ALBUMIN 3.6 07/10/2017                    Lab Results   Component Value Date    ALT 39 07/10/2017           Lab Results   Component Value Date    AST 30 07/10/2017     Results reviewed, labs MET treatment parameters, ok to proceed with treatment.    Post Infusion Assessment:  Patient tolerated infusion without incident.  Blood return noted pre and post infusion.  Access discontinued per protocol.    Discharge Plan:   Patient declined prescription refills.  Discharge instructions reviewed with: Patient and Family.  Patient and/or family verbalized understanding of discharge instructions and all questions answered.  AVS to patient via [employer].  Patient will return 8/4/17 for next appointment.   Patient discharged in stable condition accompanied by: friend.  Departure Mode: Ambulatory.    LAURE SPANN RN

## 2017-07-17 NOTE — MR AVS SNAPSHOT
After Visit Summary   7/17/2017    Keren Erickson    MRN: [id number]           Patient Information     Date Of Birth          1955        Visit Information        Provider Department      7/17/2017 1:30 PM  28 ATC;  ONCOLOGY INFUSION [facility]        Today's Diagnoses     Metastatic breast cancer (H)    -  1    Carcinoma of breast metastatic to multiple sites, unspecified laterality (H)        Brain metastasis (H)          Care Instructions    Contact Numbers  [facility] Nurse Triage: [phone]  After Hours Nurse Line:  [phone]    Please call the [facility] Nurse Triage line or after hours number if you experience a temperature greater than or equal to 100.5, shaking chills, have uncontrolled nausea, vomiting and/or diarrhea, dizziness, shortness of breath, chest pain, bleeding, unexplained bruising, or if you have any other new/concerning symptoms, questions or concerns.     If you are having any concerning symptoms or wish to speak to a provider before your next infusion visit, please call your care coordinator or triage to notify them so we can adequately serve you.     If you need a refill on a narcotic prescription or other medication, please call triage before your infusion appointment.           July 2017 Sunday Monday Tuesday Wednesday Thursday Friday Saturday                                 1       2     3     4     5     6     7     8       9     10     [facility] MASONIC LAB DRAW    1:15 PM   (15 min.)    MASONIC LAB DRAW   [facility] Lab Draw     [facility] RETURN    1:25 PM   (50 min.)   Deyanira Costello PA-C   [facility] ONC INFUSION 120    2:30 PM   (120 min.)    ONCOLOGY INFUSION   [facility] 11     12     13     14     15       16     17     [facility] LAB DRAW    1:00 PM   (15 min.)    [facility]IC LAB DRAW   [facility] Lab Draw     [facility] ONC INFUSION 60    1:30 PM   (60  min.)    ONCOLOGY INFUSION   [facility] 18     19     20     21     22       23     24     25     26     27     28     29       30     31                                         August 2017 Sunday Monday Tuesday Wednesday Thursday Friday Saturday             1     2     3     4     [facility] LAB DRAW   11:30 AM   (15 min.)    M[facility]ONIC LAB DRAW   [facility] Masonic Lab Draw     [facility] RETURN   11:45 AM   (50 min.)   Deyanira Costello PA-C   [facility]     [facility] ONC INFUSION 120    1:00 PM   (120 min.)    ONCOLOGY INFUSION   [facility] 5       6     7     8     9     10     11     [facility] DRAW   11:30 AM   (15 min.)    [facility]     [facility] RETURN   12:15 PM   (30 min.)   Marlen Harper MD   [facility]     [facility] ONC INFUSION 60    1:00 PM   (60 min.)    ONCOLOGY INFUSION   [facility] 12       13     14     15     16     17     18     19       20     21     22     23     24     25     26       27     28     29     30     31                           Recent Results (from the past 24 hour(s))   CBC with platelets differential    Collection Time: 07/17/17  1:32 PM   Result Value Ref Range    WBC 4.7 4.0 - 11.0 10e9/L    RBC Count 3.55 (L) 3.8 - 5.2 10e12/L    Hemoglobin 11.1 (L) 11.7 - 15.7 g/dL    Hematocrit 34.0 (L) 35.0 - 47.0 %    MCV 96 78 - 100 fl    MCH 31.3 26.5 - 33.0 pg    MCHC 32.6 31.5 - 36.5 g/dL    RDW 15.9 (H) 10.0 - 15.0 %    Platelet Count 339 150 - 450 10e9/L    Diff Method Automated Method     % Neutrophils 63.2 %    % Lymphocytes 23.0 %    % Monocytes 11.0 %    % Eosinophils 0.0 %    % Basophils 0.6 %    % Immature Granulocytes 2.2 %    Nucleated RBCs 0 0 /100    Absolute Neutrophil 2.9 1.6 - 8.3 10e9/L    Absolute Lymphocytes 1.1 0.8 - 5.3 10e9/L    Absolute Monocytes 0.5 0.0 - 1.3 10e9/L    Absolute Eosinophils 0.0 0.0 - 0.7 10e9/L    Absolute  Basophils 0.0 0.0 - 0.2 10e9/L    Abs Immature Granulocytes 0.1 0 - 0.4 10e9/L    Absolute Nucleated RBC 0.0                  Follow-ups after your visit        Your next 10 appointments already scheduled     Aug 04, 2017 11:30 AM CDT   [facility] Lab Draw with  MASONIC LAB DRAW   [facility] Lab Draw ([facility])    [address] [address]   [phone]            Aug 04, 2017 12:00 PM CDT   (Arrive by 11:45 AM)   Return Visit with Deyanira Costello PA-C   [facility]    [address] [id number] [phone]            Aug 04, 2017  1:00 PM CDT   Infusion 120 with UC ONCOLOGY INFUSION, UC 18 ATC   [facility]    [address] [place] [id number] [phone]            Aug 11, 2017 11:30 AM CDT   Masonic Lab Draw with  MASONIC LAB DRAW   [facility] Lab Draw ([facility])    [address] [id number] [phone]            Aug 11, 2017 12:30 PM CDT   (Arrive by 12:15 PM)   Return Visit with Marlen Harper MD   [facility]    [address] [place] [address]  2nd [facility] [address]   [phone]            Aug 11, 2017  1:00 PM CDT   Infusion 60 with UC ONCOLOGY INFUSION, UC 11 ATC   [facility]    [address] [place] [address]  2nd [facility] [address]   [phone]            Sep 18, 2017  2:30 PM CDT   (Arrive by 2:15 PM)   RETURN ENDOCRINE with Rolando Mishra MD   [facility] ([facility])    [address] [place] [address]  3rd [facility] [id number] [phone]              Who to contact     If you have questions or need  follow up information about today's clinic visit or your schedule please contact [facility] CANCER [facility] directly at [phone].  Normal or non-critical lab and imaging results will be communicated to you by MyChart, letter or phone within 4 business days after the clinic has received the results. If you do not hear from us within 7 days, please contact the clinic through [email or web address] or phone. If you have a critical or abnormal lab result, we will notify you by phone as soon as possible.  Submit refill requests through [employer] or call your pharmacy and they will forward the refill request to us. Please allow 3 business days for your refill to be completed.          Additional Information About Your Visit        [employer]ar[employer]arder Information     [employer] gives you secure access to your electronic health record. If you see a primary care provider, you can also send messages to your care team and make appointments. If you have questions, please call your primary care clinic.  If you do not have a primary care provider, please call [phone] and they will assist you.        Care EveryWhere ID     This is your Care EveryWhere ID. This could be used by other organizations to access your [facility] medical records  [id number]        Your Vitals Were     Pulse Temperature Respirations Pulse Oximetry BMI (Body Mass Index)       83 97.1  F (36.2  C) (Oral) 20 97% 21.17 kg/m2        Blood Pressure from Last 3 Encounters:   07/17/17 110/76   07/10/17 122/85   06/27/17 129/73    Weight from Last 3 Encounters:   07/17/17 52.5 kg (115 lb 12.8 oz)   07/10/17 52.1 kg (114 lb 12.8 oz)   06/27/17 53 kg (116 lb 14.4 oz)              We Performed the Following     CBC with platelets differential        Primary Care Provider Office Phone # Fax #    Kelly Hart [phone]       [facility] [address] [facility] [address]        Equal Access to Services     CLAUDIA BOND AH: darrel Weaver  becky schmidtkyjb youngfreddie pritchard. So [facility] [phone].    ATENCIÓN: Si mino kumari, tiene a ramires disposición servicios gratuitos de asistencia lingüística. Kyung al [phone].    We comply with applicable federal civil rights laws and [place] laws. We do not discriminate on the basis of race, color, national origin, age, disability sex, sexual orientation or gender identity.            Thank you!     Thank you for choosing [facility]CER [facility]  for your care. Our goal is always to provide you with excellent care. Hearing back from our patients is one way we can continue to improve our services. Please take a few minutes to complete the written survey that you may receive in the mail after your visit with us. Thank you!             Your Updated Medication List - Protect others around you: Learn how to safely use, store and throw away your medicines at www.disposemymeds.org.          This list is accurate as of: 7/17/17  3:40 PM.  Always use your most recent med list.                   Brand Name Dispense Instructions for use Diagnosis    ALEVE PO      Take 220 mg by mouth daily        desmopressin 0.1 MG tablet    DDAVP    90 tablet    Take 1 tablet (100 mcg) by mouth At Bedtime    Diabetes insipidus, neurohypophyseal (H)       Multi-vitamin Tabs tablet      Take 1 tablet by mouth daily        omeprazole 20 MG tablet     30 tablet    Take 1 tablet (20 mg) by mouth daily    Gastroesophageal reflux disease without esophagitis       prochlorperazine 10 MG tablet    COMPAZINE    90 tablet    Take 1 tablet (10 mg) by mouth every 6 hours as needed for nausea or vomiting    Nausea       VITAMIN E BLEND PO      Take by mouth daily

## 2017-07-17 NOTE — NURSING NOTE
Chief Complaint   Patient presents with     Port Draw     Right port accessed with a gripper needle, labs drawn and sent, flushed with saline and heparin, vitals completed, checked into next appointment.   Mayte Borjas,RN

## 2017-08-04 ENCOUNTER — ONCOLOGY VISIT (OUTPATIENT)
Dept: ONCOLOGY | Facility: CLINIC | Age: 62
End: 2017-08-04
Attending: PHYSICIAN ASSISTANT
Payer: COMMERCIAL

## 2017-08-04 ENCOUNTER — INFUSION THERAPY VISIT (OUTPATIENT)
Dept: ONCOLOGY | Facility: CLINIC | Age: 62
End: 2017-08-04
Attending: INTERNAL MEDICINE
Payer: COMMERCIAL

## 2017-08-04 ENCOUNTER — APPOINTMENT (OUTPATIENT)
Dept: LAB | Facility: CLINIC | Age: 62
End: 2017-08-04
Attending: INTERNAL MEDICINE
Payer: COMMERCIAL

## 2017-08-04 VITALS
DIASTOLIC BLOOD PRESSURE: 77 MMHG | RESPIRATION RATE: 16 BRPM | OXYGEN SATURATION: 95 % | WEIGHT: 114.2 LBS | TEMPERATURE: 98.1 F | BODY MASS INDEX: 20.88 KG/M2 | SYSTOLIC BLOOD PRESSURE: 116 MMHG | HEART RATE: 86 BPM

## 2017-08-04 DIAGNOSIS — C79.31 BRAIN METASTASIS: ICD-10-CM

## 2017-08-04 DIAGNOSIS — C50.919 METASTATIC BREAST CANCER: Primary | ICD-10-CM

## 2017-08-04 DIAGNOSIS — C50.919 METASTATIC BREAST CANCER: ICD-10-CM

## 2017-08-04 DIAGNOSIS — C50.919 CARCINOMA OF BREAST METASTATIC TO MULTIPLE SITES, UNSPECIFIED LATERALITY (H): Primary | ICD-10-CM

## 2017-08-04 DIAGNOSIS — C50.919 CARCINOMA OF BREAST METASTATIC TO MULTIPLE SITES, UNSPECIFIED LATERALITY (H): ICD-10-CM

## 2017-08-04 LAB
ALBUMIN SERPL-MCNC: 3.4 G/DL (ref 3.4–5)
ALP SERPL-CCNC: 114 U/L (ref 40–150)
ALT SERPL W P-5'-P-CCNC: 29 U/L (ref 0–50)
ANION GAP SERPL CALCULATED.3IONS-SCNC: 8 MMOL/L (ref 3–14)
AST SERPL W P-5'-P-CCNC: 29 U/L (ref 0–45)
BASOPHILS # BLD AUTO: 0.1 10E9/L (ref 0–0.2)
BASOPHILS NFR BLD AUTO: 0.8 %
BILIRUB SERPL-MCNC: 0.3 MG/DL (ref 0.2–1.3)
BUN SERPL-MCNC: 16 MG/DL (ref 7–30)
CALCIUM SERPL-MCNC: 8.7 MG/DL (ref 8.5–10.1)
CHLORIDE SERPL-SCNC: 106 MMOL/L (ref 94–109)
CO2 SERPL-SCNC: 25 MMOL/L (ref 20–32)
CREAT SERPL-MCNC: 0.66 MG/DL (ref 0.52–1.04)
DIFFERENTIAL METHOD BLD: ABNORMAL
EOSINOPHIL # BLD AUTO: 0 10E9/L (ref 0–0.7)
EOSINOPHIL NFR BLD AUTO: 0.1 %
ERYTHROCYTE [DISTWIDTH] IN BLOOD BY AUTOMATED COUNT: 17.2 % (ref 10–15)
GFR SERPL CREATININE-BSD FRML MDRD: ABNORMAL ML/MIN/1.7M2
GLUCOSE SERPL-MCNC: 104 MG/DL (ref 70–99)
HCT VFR BLD AUTO: 35.5 % (ref 35–47)
HGB BLD-MCNC: 11.7 G/DL (ref 11.7–15.7)
IMM GRANULOCYTES # BLD: 0.1 10E9/L (ref 0–0.4)
IMM GRANULOCYTES NFR BLD: 0.8 %
LYMPHOCYTES # BLD AUTO: 0.9 10E9/L (ref 0.8–5.3)
LYMPHOCYTES NFR BLD AUTO: 13.2 %
MCH RBC QN AUTO: 32 PG (ref 26.5–33)
MCHC RBC AUTO-ENTMCNC: 33 G/DL (ref 31.5–36.5)
MCV RBC AUTO: 97 FL (ref 78–100)
MONOCYTES # BLD AUTO: 0.9 10E9/L (ref 0–1.3)
MONOCYTES NFR BLD AUTO: 12.5 %
NEUTROPHILS # BLD AUTO: 5.1 10E9/L (ref 1.6–8.3)
NEUTROPHILS NFR BLD AUTO: 72.6 %
NRBC # BLD AUTO: 0 10*3/UL
NRBC BLD AUTO-RTO: 0 /100
PLATELET # BLD AUTO: 485 10E9/L (ref 150–450)
POTASSIUM SERPL-SCNC: 3.6 MMOL/L (ref 3.4–5.3)
PROT SERPL-MCNC: 6.6 G/DL (ref 6.8–8.8)
RBC # BLD AUTO: 3.66 10E12/L (ref 3.8–5.2)
SODIUM SERPL-SCNC: 139 MMOL/L (ref 133–144)
WBC # BLD AUTO: 7.1 10E9/L (ref 4–11)

## 2017-08-04 PROCEDURE — 99212 OFFICE O/P EST SF 10 MIN: CPT | Mod: ZF

## 2017-08-04 PROCEDURE — 96417 CHEMO IV INFUS EACH ADDL SEQ: CPT

## 2017-08-04 PROCEDURE — 96367 TX/PROPH/DG ADDL SEQ IV INF: CPT

## 2017-08-04 PROCEDURE — 99214 OFFICE O/P EST MOD 30 MIN: CPT | Mod: ZP | Performed by: PHYSICIAN ASSISTANT

## 2017-08-04 PROCEDURE — 85025 COMPLETE CBC W/AUTO DIFF WBC: CPT | Performed by: PHYSICIAN ASSISTANT

## 2017-08-04 PROCEDURE — 25000128 H RX IP 250 OP 636: Mod: ZF | Performed by: PHYSICIAN ASSISTANT

## 2017-08-04 PROCEDURE — 96413 CHEMO IV INFUSION 1 HR: CPT

## 2017-08-04 PROCEDURE — 80053 COMPREHEN METABOLIC PANEL: CPT | Performed by: PHYSICIAN ASSISTANT

## 2017-08-04 RX ORDER — HEPARIN SODIUM (PORCINE) LOCK FLUSH IV SOLN 100 UNIT/ML 100 UNIT/ML
5 SOLUTION INTRAVENOUS ONCE
Status: DISCONTINUED | OUTPATIENT
Start: 2017-08-04 | End: 2017-08-04 | Stop reason: HOSPADM

## 2017-08-04 RX ORDER — METHYLPREDNISOLONE SODIUM SUCCINATE 125 MG/2ML
125 INJECTION, POWDER, LYOPHILIZED, FOR SOLUTION INTRAMUSCULAR; INTRAVENOUS
Status: CANCELLED
Start: 2017-08-04

## 2017-08-04 RX ORDER — HEPARIN SODIUM (PORCINE) LOCK FLUSH IV SOLN 100 UNIT/ML 100 UNIT/ML
500 SOLUTION INTRAVENOUS EVERY 8 HOURS
Status: DISCONTINUED | OUTPATIENT
Start: 2017-08-04 | End: 2017-08-04 | Stop reason: HOSPADM

## 2017-08-04 RX ORDER — EPINEPHRINE 1 MG/ML
0.3 INJECTION INTRAMUSCULAR; INTRAVENOUS; SUBCUTANEOUS EVERY 5 MIN PRN
Status: CANCELLED | OUTPATIENT
Start: 2017-08-10

## 2017-08-04 RX ORDER — DIPHENHYDRAMINE HYDROCHLORIDE 50 MG/ML
50 INJECTION INTRAMUSCULAR; INTRAVENOUS
Status: CANCELLED
Start: 2017-08-04

## 2017-08-04 RX ORDER — ALBUTEROL SULFATE 90 UG/1
1-2 AEROSOL, METERED RESPIRATORY (INHALATION)
Status: CANCELLED
Start: 2017-08-10

## 2017-08-04 RX ORDER — SODIUM CHLORIDE 9 MG/ML
1000 INJECTION, SOLUTION INTRAVENOUS CONTINUOUS PRN
Status: CANCELLED
Start: 2017-08-10

## 2017-08-04 RX ORDER — METHYLPREDNISOLONE SODIUM SUCCINATE 125 MG/2ML
125 INJECTION, POWDER, LYOPHILIZED, FOR SOLUTION INTRAMUSCULAR; INTRAVENOUS
Status: CANCELLED
Start: 2017-08-10

## 2017-08-04 RX ORDER — MEPERIDINE HYDROCHLORIDE 25 MG/ML
25 INJECTION INTRAMUSCULAR; INTRAVENOUS; SUBCUTANEOUS EVERY 30 MIN PRN
Status: CANCELLED | OUTPATIENT
Start: 2017-08-10

## 2017-08-04 RX ORDER — MEPERIDINE HYDROCHLORIDE 25 MG/ML
25 INJECTION INTRAMUSCULAR; INTRAVENOUS; SUBCUTANEOUS EVERY 30 MIN PRN
Status: CANCELLED | OUTPATIENT
Start: 2017-08-04

## 2017-08-04 RX ORDER — LORAZEPAM 2 MG/ML
0.5 INJECTION INTRAMUSCULAR EVERY 4 HOURS PRN
Status: CANCELLED
Start: 2017-08-10

## 2017-08-04 RX ORDER — DIPHENHYDRAMINE HYDROCHLORIDE 50 MG/ML
50 INJECTION INTRAMUSCULAR; INTRAVENOUS
Status: CANCELLED
Start: 2017-08-10

## 2017-08-04 RX ORDER — EPINEPHRINE 0.3 MG/.3ML
0.3 INJECTION SUBCUTANEOUS EVERY 5 MIN PRN
Status: CANCELLED | OUTPATIENT
Start: 2017-08-04

## 2017-08-04 RX ORDER — HEPARIN SODIUM (PORCINE) LOCK FLUSH IV SOLN 100 UNIT/ML 100 UNIT/ML
5 SOLUTION INTRAVENOUS EVERY 8 HOURS
Status: CANCELLED | OUTPATIENT
Start: 2017-08-04

## 2017-08-04 RX ORDER — EPINEPHRINE 1 MG/ML
0.3 INJECTION INTRAMUSCULAR; INTRAVENOUS; SUBCUTANEOUS EVERY 5 MIN PRN
Status: CANCELLED | OUTPATIENT
Start: 2017-08-04

## 2017-08-04 RX ORDER — HEPARIN SODIUM (PORCINE) LOCK FLUSH IV SOLN 100 UNIT/ML 100 UNIT/ML
5 SOLUTION INTRAVENOUS EVERY 8 HOURS
Status: CANCELLED | OUTPATIENT
Start: 2017-08-10

## 2017-08-04 RX ORDER — ALBUTEROL SULFATE 90 UG/1
1-2 AEROSOL, METERED RESPIRATORY (INHALATION)
Status: CANCELLED
Start: 2017-08-04

## 2017-08-04 RX ORDER — EPINEPHRINE 0.3 MG/.3ML
0.3 INJECTION SUBCUTANEOUS EVERY 5 MIN PRN
Status: CANCELLED | OUTPATIENT
Start: 2017-08-10

## 2017-08-04 RX ORDER — DIPHENHYDRAMINE HCL 25 MG
50 CAPSULE ORAL
Status: CANCELLED | OUTPATIENT
Start: 2017-08-04

## 2017-08-04 RX ORDER — ALBUTEROL SULFATE 0.83 MG/ML
2.5 SOLUTION RESPIRATORY (INHALATION)
Status: CANCELLED | OUTPATIENT
Start: 2017-08-04

## 2017-08-04 RX ORDER — ACETAMINOPHEN 325 MG/1
650 TABLET ORAL
Status: CANCELLED | OUTPATIENT
Start: 2017-08-04

## 2017-08-04 RX ORDER — ALBUTEROL SULFATE 0.83 MG/ML
2.5 SOLUTION RESPIRATORY (INHALATION)
Status: CANCELLED | OUTPATIENT
Start: 2017-08-10

## 2017-08-04 RX ORDER — SODIUM CHLORIDE 9 MG/ML
1000 INJECTION, SOLUTION INTRAVENOUS CONTINUOUS PRN
Status: CANCELLED
Start: 2017-08-04

## 2017-08-04 RX ORDER — LORAZEPAM 2 MG/ML
0.5 INJECTION INTRAMUSCULAR EVERY 4 HOURS PRN
Status: CANCELLED
Start: 2017-08-04

## 2017-08-04 RX ADMIN — TRASTUZUMAB 300 MG: 150 INJECTION, POWDER, LYOPHILIZED, FOR SOLUTION INTRAVENOUS at 14:14

## 2017-08-04 RX ADMIN — SODIUM CHLORIDE, PRESERVATIVE FREE 500 UNITS: 5 INJECTION INTRAVENOUS at 14:50

## 2017-08-04 RX ADMIN — SODIUM CHLORIDE 250 ML: 9 INJECTION, SOLUTION INTRAVENOUS at 13:08

## 2017-08-04 RX ADMIN — DEXAMETHASONE SODIUM PHOSPHATE 12 MG: 10 INJECTION, SOLUTION INTRAMUSCULAR; INTRAVENOUS at 13:08

## 2017-08-04 RX ADMIN — GEMCITABINE 1460 MG: 38 INJECTION, SOLUTION INTRAVENOUS at 13:39

## 2017-08-04 RX ADMIN — SODIUM CHLORIDE, PRESERVATIVE FREE 500 UNITS: 5 INJECTION INTRAVENOUS at 11:58

## 2017-08-04 ASSESSMENT — PAIN SCALES - GENERAL: PAINLEVEL: MILD PAIN (2)

## 2017-08-04 NOTE — MR AVS SNAPSHOT
After Visit Summary   8/4/2017    Keren Erickson    MRN: [id number]           Patient Information     Date Of Birth          1955        Visit Information        Provider Department      8/4/2017 12:00 PM Deyanira Costello PA-C [facility]        Today's Diagnoses     Carcinoma of breast metastatic to multiple sites, unspecified laterality (H)    -  1    Brain metastasis (H)        Metastatic breast cancer (H)           Follow-ups after your visit        Your next 10 appointments already scheduled     Aug 11, 2017 11:30 AM CDT   Masonic Lab Draw with UC MASONIC LAB DRAW   [facility] Masonic Lab Draw ([facility])    [address] [facility]  2nd [facility] [address]   [phone]            Aug 11, 2017 12:30 PM CDT   (Arrive by 12:15 PM)   Return Visit with Marlen Harper MD   [facility] ([facility])    [address] [place] [address]  2nd [facility] [address]   [phone]            Aug 11, 2017  1:00 PM CDT   Infusion 60 with UC ONCOLOGY INFUSION, UC 11 ATC   [facility] ([facility])    [address] [place] [address]  [address]nd [facility] [address]   [phone]            Aug 25, 2017 11:30 AM CDT   Masonic Lab Draw with UC MASONIC LAB DRAW   [facility] Masonic Lab Draw ([facility])    [address] [facility] [address]  2nd [facility] [address]   [phone]            Aug 25, 2017 12:00 PM CDT   (Arrive by 11:45 AM)   Return Visit with Deyanira Costello PA-C   [facility] ([facility])    [address] [place] [address]  [address]nd [facility] [address]   [phone]            Aug 25, 2017  2:00 PM CDT   Infusion 120 with UC ONCOLOGY INFUSION, UC 11 ATC   [facility] ([facility])    [address] [place] [address]  Floor  [facility] [address]   [phone]            Sep 01, 2017 12:30 PM CDT   Masonic Lab Draw with  MASONIC LAB DRAW   [facility] Lab Draw ([facility])    [address] [facility]  2nd Floor  [facility] [address]   [phone]            Sep 01, 2017  1:00 PM CDT   Infusion 60 with UC ONCOLOGY INFUSION   [facility] ([facility])    [address] [place] [address]  2nd [facility] [address] [phone]              Future tests that were ordered for you today     Open Future Orders        Priority Expected Expires Ordered    Echocardiogram Complete Routine  8/4/2018 8/4/2017    CEA Routine  8/3/2018 8/3/2017    Ca27.29  breast tumor marker Routine  8/3/2018 8/3/2017            Who to contact     If you have questions or need follow up information about today's clinic visit or your schedule please contact [facility]CER [facility] directly at [phone].  Normal or non-critical lab and imaging results will be communicated to you by MyChart, letter or phone within 4 business days after the clinic has received the results. If you do not hear from us within 7 days, please contact the clinic through [employer] or phone. If you have a critical or abnormal lab result, we will notify you by phone as soon as possible.  Submit refill requests through [employer] or call your pharmacy and they will forward the refill request to us. Please allow 3 business days for your refill to be completed.          Additional Information About Your Visit        [employer] Information     [employer] gives you secure access to your electronic health record. If you see a primary care provider, you can also send messages to your care team and make appointments. If you have questions, please call your primary care clinic.  If you do not have a primary care provider, please call [phone] and they will assist you.        Care EveryWhere ID     This is your  Care EveryWhere ID. This could be used by other organizations to access your [facility] medical records  [id number]        Your Vitals Were     Pulse Temperature Respirations Pulse Oximetry BMI (Body Mass Index)       86 98.1  F (36.7  C) (Oral) 16 95% 20.88 kg/m2        Blood Pressure from Last 3 Encounters:   08/04/17 116/77   07/17/17 110/76   07/10/17 122/85    Weight from Last 3 Encounters:   08/04/17 51.8 kg (114 lb 3.2 oz)   07/17/17 52.5 kg (115 lb 12.8 oz)   07/10/17 52.1 kg (114 lb 12.8 oz)               Primary Care Provider Office Phone # Fax #    Kelly Bg Hart [phone]       [facility] [address] [facility] [address]        Equal Access to Services     CLAUDIA [facility] GulfportALEA : Hadii aad ku hadasho [place], waaxda luqadaha, qaybta kaalmada adeirineoyajb, freddie escobedo . So [facility] [phone].    ATENCIÓN: Si habla español, tiene a ramires disposición servicios gratuitos de asistencia lingüística. Kyung al [phone].    We comply with applicable federal civil rights laws and [place] laws. We do not discriminate on the basis of race, color, national origin, age, disability sex, sexual orientation or gender identity.            Thank you!     Thank you for choosing [facility] CANCER [facility]  for your care. Our goal is always to provide you with excellent care. Hearing back from our patients is one way we can continue to improve our services. Please take a few minutes to complete the written survey that you may receive in the mail after your visit with us. Thank you!             Your Updated Medication List - Protect others around you: Learn how to safely use, store and throw away your medicines at www.disposemymeds.org.          This list is accurate as of: 8/4/17  1:17 PM.  Always use your most recent med list.                   Brand Name Dispense Instructions for use Diagnosis    ALEVE PO      Take 220 mg by mouth daily        desmopressin 0.1 MG  tablet    DDAVP    90 tablet    Take 1 tablet (100 mcg) by mouth At Bedtime    Diabetes insipidus, neurohypophyseal (H)       Multi-vitamin Tabs tablet      Take 1 tablet by mouth daily        omeprazole 20 MG tablet     30 tablet    Take 1 tablet (20 mg) by mouth daily    Gastroesophageal reflux disease without esophagitis       prochlorperazine 10 MG tablet    COMPAZINE    90 tablet    Take 1 tablet (10 mg) by mouth every 6 hours as needed for nausea or vomiting    Nausea       VITAMIN E BLEND PO      Take by mouth daily

## 2017-08-04 NOTE — MR AVS SNAPSHOT
After Visit Summary   8/4/2017    Keren Erickson    MRN: [id number]           Patient Information     Date Of Birth          1955        Visit Information        Provider Department      8/4/2017 1:00 PM  18 ATC;  ONCOLOGY INFUSION [facility]        Today's Diagnoses     Metastatic breast cancer (H)    -  1    Brain metastasis (H)        Carcinoma of breast metastatic to multiple sites, unspecified laterality (H)          Care Instructions    Contact Numbers  [facility] Nurse Triage: [phone]  After Hours Nurse Line:  [phone]    Please call the [facility] Triage line if you experience a temperature greater than or equal to 100.5, shaking chills, have uncontrolled nausea, vomiting and/or diarrhea, dizziness, shortness of breath, chest pain, bleeding, unexplained bruising, or if you have any other new/concerning symptoms, questions or concerns.     If it is after hours, weekends, or holidays, please call either the after hours nurse line listed above.     If you are having any concerning symptoms or wish to speak to a provider before your next infusion visit, please call your care coordinator or triage to notify them so we can adequately serve you.     If you need a refill on a narcotic prescription or other medication, please call triage before your infusion appointment.         August 2017 Sunday Monday Tuesday Wednesday Thursday Friday Saturday             1     2     3     4     [facility] LAB DRAW   11:30 AM   (15 min.)   [facility]   [facility] Lab Draw     [facility] RETURN   11:45 AM   (50 min.)   Deyanira Costello PA-C   [facility] ONC INFUSION 120    1:00 PM   (120 min.)    ONCOLOGY INFUSION   [facility] 5       6     7     8     9     10     11     [facility] LAB DRAW   11:30 AM   (15 min.)   [facility] D[facility]   [facility] Lab Draw     [facility] RETURN   12:15 PM    (30 min.)   Marlen Harper MD   [facility]     [facility] ONC INFUSION 60    1:00 PM   (60 min.)    ONCOLOGY INFUSION   [facility] 12       13     14     15     16     17     18     19       20     21     22     23     24     25     [facility] DRAW   11:30 AM   (15 min.)    [facility] DRAW   [facility] Lab Draw     [facility] RETURN   11:45 AM   (50 min.)   Deyanira Costello PA-C   [facility]     [facility] ONC INFUSION 120    2:00 PM   (120 min.)    ONCOLOGY INFUSION   [facility] 26       27     28     29     30     31 September 2017 Sunday Monday Tuesday Wednesday Thursday Friday Saturday                            1     [facility] DRAW   12:30 PM   (15 min.)    [facility] DRAW   [facility] Lab Draw     [facility] ONC INFUSION 60    1:00 PM   (60 min.)    ONCOLOGY INFUSION   [facility] 2       3     4     5     6     7     8     9       10     11     12     13     14     15     16       17     18     [facility] RETURN ENDOCRINE    2:15 PM   (30 min.)   Rolando Mishra MD   [facility] Endocrinology 19     20     21     22     23       24     25     26     27     28     29     30                  Lab Results:  Recent Results (from the past 12 hour(s))   CBC with platelets differential    Collection Time: 08/04/17 12:06 PM   Result Value Ref Range    WBC 7.1 4.0 - 11.0 10e9/L    RBC Count 3.66 (L) 3.8 - 5.2 10e12/L    Hemoglobin 11.7 11.7 - 15.7 g/dL    Hematocrit 35.5 35.0 - 47.0 %    MCV 97 78 - 100 fl    MCH 32.0 26.5 - 33.0 pg    MCHC 33.0 31.5 - 36.5 g/dL    RDW 17.2 (H) 10.0 - 15.0 %    Platelet Count 485 (H) 150 - 450 10e9/L    Diff Method Automated Method     % Neutrophils 72.6 %    % Lymphocytes 13.2 %    % Monocytes 12.5 %    % Eosinophils 0.1 %    % Basophils 0.8 %    % Immature Granulocytes 0.8 %    Nucleated RBCs 0 0 /100    Absolute Neutrophil 5.1 1.6 -  8.3 10e9/L    Absolute Lymphocytes 0.9 0.8 - 5.3 10e9/L    Absolute Monocytes 0.9 0.0 - 1.3 10e9/L    Absolute Eosinophils 0.0 0.0 - 0.7 10e9/L    Absolute Basophils 0.1 0.0 - 0.2 10e9/L    Abs Immature Granulocytes 0.1 0 - 0.4 10e9/L    Absolute Nucleated RBC 0.0    Comprehensive metabolic panel    Collection Time: 08/04/17 12:06 PM   Result Value Ref Range    Sodium 139 133 - 144 mmol/L    Potassium 3.6 3.4 - 5.3 mmol/L    Chloride 106 94 - 109 mmol/L    Carbon Dioxide 25 20 - 32 mmol/L    Anion Gap 8 3 - 14 mmol/L    Glucose 104 (H) 70 - 99 mg/dL    Urea Nitrogen 16 7 - 30 mg/dL    Creatinine 0.66 0.52 - 1.04 mg/dL    GFR Estimate >90  Non  GFR Calc   >60 mL/min/1.7m2    GFR Estimate If Black >90   GFR Calc   >60 mL/min/1.7m2    Calcium 8.7 8.5 - 10.1 mg/dL    Bilirubin Total 0.3 0.2 - 1.3 mg/dL    Albumin 3.4 3.4 - 5.0 g/dL    Protein Total 6.6 (L) 6.8 - 8.8 g/dL    Alkaline Phosphatase 114 40 - 150 U/L    ALT 29 0 - 50 U/L    AST 29 0 - 45 U/L               Follow-ups after your visit        Your next 10 appointments already scheduled     Aug 11, 2017 11:30 AM CDT   [facility] Draw with [facility] LAB DRAW   [facility] Lab Draw ([facility])    [address] [place] [address] [phone]            Aug 11, 2017 12:30 PM CDT   (Arrive by 12:15 PM)   Return Visit with Marlen Harper MD   [facility])    [address] [place] [address] [phone]            Aug 11, 2017  1:00 PM CDT   Infusion 60 with  ONCOLOGY INFUSION,  11 ATC   [facility] Cancer [facility])    [address] [place] [id number]   [phone]            Aug 24, 2017  1:00 PM CDT   (Arrive by 12:45 PM)   MR BRAIN W/O & W CONTRAST with [address] [facility] MRI ([facility]  [place])    [address] [facility]  [address] [facility] [address] [phone]           Take your medicines as usual, unless your doctor tells you not to. Bring a list of your current medicines to your exam (including vitamins, minerals and over-the-counter drugs).  You will be given intravenous contrast for this exam. To prepare:   The day before your exam, drink extra fluids at least six 8-ounce glasses (unless your doctor tells you to restrict your fluids).   Have a blood test (creatinine test) within 30 days of your exam. Go to your clinic or Diagnostic Imaging Department for this test.  The MRI machine uses a strong magnet. Please wear clothes without metal (snaps, zippers). A sweatsuit works well, or we may give you a hospital gown.  Please remove any body piercings and hair extensions before you arrive. You will also remove watches, jewelry, hairpins, wallets, dentures, partial dental plates and hearing aids. You may wear contact lenses, and you may be able to wear your rings. We have a safe place to keep your personal items, but it is safer to leave them at home.   **IMPORTANT** THE INSTRUCTIONS BELOW ARE ONLY FOR THOSE PATIENTS WHO HAVE BEEN TOLD THEY WILL RECEIVE SEDATION OR GENERAL ANESTHESIA DURING THEIR MRI PROCEDURE:  IF YOU WILL RECEIVE SEDATION (take medicine to help you relax during your exam):   You must get the medicine from your doctor before you arrive. Bring the medicine to the exam. Do not take it at home.   Arrive one hour early. Bring someone who can take you home after the test. Your medicine will make you sleepy. After the exam, you may not drive, take a bus or take a taxi by yourself.   No eating 8 hours before your exam. You may have clear liquids up until 4 hours before your exam. (Clear liquids include water, clear tea, black coffee and fruit juice without pulp.)  IF YOU WILL RECEIVE ANESTHESIA (be asleep for your exam):   Arrive 1 1/2 hours early. Bring someone who can take you  home after the test. You may not drive, take a bus or take a taxi by yourself.   No eating 8 hours before your exam. You may have clear liquids up until 4 hours before your exam. (Clear liquids include water, clear tea, black coffee and fruit juice without pulp.)  Please call the Imaging Department at your exam site with any questions.            Aug 24, 2017  2:00 PM CDT   Ech Complete with [id number]   [facility] Echo ([facility])    [address]  3rd Floor  [facility] [id number] [phone]           1. Please bring or wear a comfortable two-piece outfit. 2. You may eat, drink and take your normal medicines. 3. For any questions that cannot be answered, please contact the ordering physician            Aug 25, 2017 11:30 AM CDT   [facility] Lab Draw with [facility] LAB DRAW   [facility] Lab Draw ([facility])    [address] [place] [id number] [phone]            Aug 25, 2017 12:00 PM CDT   (Arrive by 11:45 AM)   Return Visit with Deyanira Costello PA-C   [facility] ([facility])    [address] [place] [address] [phone]            Aug 25, 2017  2:00 PM CDT   Infusion 120 with  ONCOLOGY INFUSION, UC 11 ATC   [facility] ([facility])    [address] [place] [id number] [phone]              Future tests that were ordered for you today     Open Future Orders        Priority Expected Expires Ordered    Echocardiogram Complete Routine  8/4/2018 8/4/2017    CEA Routine  8/3/2018 8/3/2017    Ca27.29  breast tumor marker Routine  8/3/2018 8/3/2017            Who to contact     If you have questions or need follow up information about today's clinic visit or your schedule please contact [facility] directly at [phone].  Normal or non-critical  lab and imaging results will be communicated to you by MyChart, letter or phone within 4 business days after the clinic has received the results. If you do not hear from us within 7 days, please contact the clinic through [employer]ive[employer] or phone. If you have a critical or abnormal lab result, we will notify you by phone as soon as possible.  Submit refill requests through [employer] or call your pharmacy and they will forward the refill request to us. Please allow 3 business days for your refill to be completed.          Additional Information About Your Visit        Relive[employer] Information     [employer] gives you secure access to your electronic health record. If you see a primary care provider, you can also send messages to your care team and make appointments. If you have questions, please call your primary care clinic.  If you do not have a primary care provider, please call [phone] and they will assist you.        Care EveryWhere ID     This is your Care EveryWhere ID. This could be used by other organizations to access your [place] medical records  [id number]         Blood Pressure from Last 3 Encounters:   08/04/17 116/77   07/17/17 110/76   07/10/17 122/85    Weight from Last 3 Encounters:   08/04/17 51.8 kg (114 lb 3.2 oz)   07/17/17 52.5 kg (115 lb 12.8 oz)   07/10/17 52.1 kg (114 lb 12.8 oz)              We Performed the Following     CBC with platelets differential     Comprehensive metabolic panel        Primary Care Provider Office Phone # Fax #    Kelly Bg Hart [phone]       [facility] [address] [facility] [address]        Equal Access to Services     CLAUDIA BOND : Hadii aad stacey hadasho Soomaali, waaxda luqadaha, qaybta kaalmada annabel, freddie keys. So [facility] [phone].    ATENCIÓN: Si habla español, tiene a ramires disposición servicios gratuitos de asistencia lingüística. Llame al [phone].    We comply with applicable federal civil  rights laws and [place] laws. We do not discriminate on the basis of race, color, national origin, age, disability sex, sexual orientation or gender identity.            Thank you!     Thank you for choosing [facility]  for your care. Our goal is always to provide you with excellent care. Hearing back from our patients is one way we can continue to improve our services. Please take a few minutes to complete the written survey that you may receive in the mail after your visit with us. Thank you!             Your Updated Medication List - Protect others around you: Learn how to safely use, store and throw away your medicines at www.disposemymeds.org.          This list is accurate as of: 8/4/17  3:08 PM.  Always use your most recent med list.                   Brand Name Dispense Instructions for use Diagnosis    ALEVE PO      Take 220 mg by mouth daily        desmopressin 0.1 MG tablet    DDAVP    90 tablet    Take 1 tablet (100 mcg) by mouth At Bedtime    Diabetes insipidus, neurohypophyseal (H)       Multi-vitamin Tabs tablet      Take 1 tablet by mouth daily        omeprazole 20 MG tablet     30 tablet    Take 1 tablet (20 mg) by mouth daily    Gastroesophageal reflux disease without esophagitis       prochlorperazine 10 MG tablet    COMPAZINE    90 tablet    Take 1 tablet (10 mg) by mouth every 6 hours as needed for nausea or vomiting    Nausea       VITAMIN E BLEND PO      Take by mouth daily

## 2017-08-04 NOTE — LETTER
8/4/2017      RE: Keren Erickson  [address] [facility] [address]       HEMATOLOGY/ONCOLOGY PROGRESS NOTE  Aug 4, 2017    REASON FOR VISIT: follow-up of metastatic breast cancer, triple positive    DIAGNOSIS:   The patient is a 60-year-old female who presented to the hospital initially in 09/2013 with complaints of dyspnea. Workup revealed a large pericardial effusion and pleural effusion. Physical examination revealed a large untreated left breast mass encompassing the entire left breast. Biopsies revealed these were ER, VT and HER2-positive breast cancer. She had a thoracentesis and pericardial sclerosis performed. She was originally on Arimidex and Herceptin. In 01/2014, she was switched to tamoxifen and Herceptin due to arthralgias, and she ultimately developed progressive disease and was switched to Faslodex and Herceptin. In 01/2015, she was found to have progressive disease and was switched to T-DM1.     Initially when she was seen in late 02/2015, she complained of some V5 sensory deficit. This was subjective. I was not able to elicit this on examination. This persisted and further workup was performed with a brain MRI. This revealed what was initially thought to be 3 punctate brain metastases. She originally saw Radiation Oncology and Neurosurgery as well as underwent a lumbar puncture. Cytology from the lumbar puncture showed no evidence of leptomeningeal disease. She was treated with Gamma Knife radiation to 6 lesions, performed on 04/16/2015.  She initiated therapy with TDM1 in January 2015 and continued this through 6/26/2015 with overall stable disease. She has since been on a break from treatment. The patient presented earlier this month with recurrent shortness of breath.  Imaging revealed recurrent right-sided pleural effusion.  She has since undergone thoracentesis with cytology pending.  Clinically, however, there is evidence of disease progression. PET scan performed on 11/25/2015  demonstrated progression of disease at multiple sites. She restarted TDM1 on 11/27/2015, however experienced a mild transfusion reaction with shortness of breath and chest tightness, resolved upon stopping TDM1 and 125 mg of SoluMedrol. She had progression of disease on repeat imaging 2/17/2016, as well as new brain metastases. She started TDM1 with Perjeta on 3/4/2016. She underwent gamma knife to brain mets on 3/8/16.       In late May, she was found to have progressive brain metastases.  She received whole brain radiation.  She had a follow up brain MRI August 2016 that was stable.     In September 2016, she enrolled on [place]  trial and was randomized to the standard of care arm/Physician's Choice; started on gemzar and herceptin. She was recently hospitalized 1/27-2/3/17 for presumed HCAP. Trial was suspended. She continued on gemzar and heceptin with stable disease. Last restaging was 4/7/17 and stable. Gemzar was placed on hold from 4/10/2017 through 6/22/17 so that she could recover from pneumonia.    INTERVAL HISTORY:  Dominga presents for follow-up today prior to next cycle of treatment, accompanied by her friend.    Doing well. Really busy getting ready to move into duplex next Friday and to get her home ready for sale. This is all very stressful for her and she is very much looking forward to next Friday, so that she can focus on getting stronger. She does not feel that the Gemzar is making her any more weak. She continues to do PT at home but is limited in her current home given all of the stairs. She stopped Prilosec and Compazine with only mild occasional nausea, trying to eliminate meds and she thinks both of these made her dizzy. She has had no new headaches or neurologic issues. She has no new pains. She has not yet seen dentist.   ROS: 10 point ROS was conducted and was otherwise negative except for as above.  No f/s/c.  No chest pain/cough/dyspnea.  Daily BM, no  issues. Skin is dry,  but no rash.     PHYSICAL EXAMINATION:     /77  Pulse 86  Temp 98.1  F (36.7  C) (Oral)  Resp 16  Wt 51.8 kg (114 lb 3.2 oz)  SpO2 95%  BMI 20.88 kg/m2    Wt Readings from Last 4 Encounters:   08/04/17 51.8 kg (114 lb 3.2 oz)   07/17/17 52.5 kg (115 lb 12.8 oz)   07/10/17 52.1 kg (114 lb 12.8 oz)   06/27/17 53 kg (116 lb 14.4 oz)     Constitutional: Alert, oriented, cachectic female in no visible distress. Able to ambulate independently  but chooses wheelchair for longer distances in clinic  Eyes: PERRL. Anicteric sclerae.    ENT/Mouth: OM moist and pink without lesions or thrush.    CV: RRR, no murmurs appreciated.  Resp: CTAB slightly diminished R base, stable. Breathing comfortably.  Abdomen: Soft, non-tender, non-distended. Bowel sounds present. No masses appreciated. No organomegaly noted.  Extremities: No lower extremity edema appreciated.  Skin: Warm, dry. No bruising or petechiae noted. No rash  Lymph: No palpable LAD  Neuro: CN II-XII grossly intact.     LABS:   Results for HEIDI RUIZ (MRN [id number]) as of 8/4/2017 12:49   Ref. Range 8/4/2017 12:06   Sodium Latest Ref Range: 133 - 144 mmol/L 139   Potassium Latest Ref Range: 3.4 - 5.3 mmol/L 3.6   Chloride Latest Ref Range: 94 - 109 mmol/L 106   Carbon Dioxide Latest Ref Range: 20 - 32 mmol/L 25   Urea Nitrogen Latest Ref Range: 7 - 30 mg/dL 16   Creatinine Latest Ref Range: 0.52 - 1.04 mg/dL 0.66   GFR Estimate Latest Ref Range: >60 mL/min/1.7m2 >90...   GFR Estimate If Black Latest Ref Range: >60 mL/min/1.7m2 >90...   Calcium Latest Ref Range: 8.5 - 10.1 mg/dL 8.7   Anion Gap Latest Ref Range: 3 - 14 mmol/L 8   Albumin Latest Ref Range: 3.4 - 5.0 g/dL 3.4   Protein Total Latest Ref Range: 6.8 - 8.8 g/dL 6.6 (L)   Bilirubin Total Latest Ref Range: 0.2 - 1.3 mg/dL 0.3   Alkaline Phosphatase Latest Ref Range: 40 - 150 U/L 114   ALT Latest Ref Range: 0 - 50 U/L 29   AST Latest Ref Range: 0 - 45 U/L 29   Glucose Latest Ref Range: 70  - 99 mg/dL 104 (H)   WBC Latest Ref Range: 4.0 - 11.0 10e9/L 7.1   Hemoglobin Latest Ref Range: 11.7 - 15.7 g/dL 11.7   Hematocrit Latest Ref Range: 35.0 - 47.0 % 35.5   Platelet Count Latest Ref Range: 150 - 450 10e9/L 485 (H)       IMPRESSION/PLAN:  Dominga is a 61-year-old female with history of metastatic ER-positive, HER-2 positive breast cancer, with involvement of the bone, history of pleural effusions treated with pleurodesis and PleurX placement, and with treated brain metastases, previously with stable disease on TDM1, however patient opted for break from therapy from June 2015 through November 2015, and represented with worsening metastatic disease at that time. She restarted TDM1 on 11/27/2015. She had progressive disease 2/17/16 and Perjeta was added to TDM1. She had gamma knife to brain mets on 3/8.  She received WBRT.  She was started on [employer]  clinical trial and randomized to physician's choice, and started on Gemzar/Herceptin on 9/29/17.    1. Breast cancer:  Continues on Herceptin/Gemzar. Her tumor marker has remained low and stable.  She had been on Hercepin alone since 4/10/17 due to recovery from pneumonia, and was able to resume Herceptin with Gemzar on 6/19. Repeat restaging in June with stable systemic disease, however slight changes in the midbrain on brain MRI. She feels strong enough to continue with Gemzar/Herceptin. Will continue today. She will see Dr. Harper as scheduled next week.  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 She started desmopressin 50 mcg with good response clinically.     3. Brain mets: Numbness of tongue and V2 and V3 on right are improving.    4. FEN: Weight overall stable.  She is eating better.     5. Pain: Chronic mild aches/pains secondary to disease and with myalgias on Gemzar. No current pain today, not taking narcotics.     6. Bone mets: on Xgeva every 3  months. Last 5/1/17. On hold for dental work.    7. Mood: She is overall coping well.     8. She has an advanced directive.  She has a POLST.  DNR/DNI.  Her power of  is listed in the computer.     9. Deconditioning: She had been walking independently or with cane in clinic prior to pneumonia, working on getting her strength back. Continue home PT exercises.     Deyanira Costello PA-C    I spent >25 minutes with the patient, with over 50% of the time spent counseling or coordinating their care as described above.

## 2017-08-04 NOTE — NURSING NOTE
"Oncology Rooming Note    August 4, 2017 12:12 PM   Keren Erickson is a 61 year old female who presents for:    Chief Complaint   Patient presents with     Port Draw     port accessed with 20 gauge 3/4 inch gripper needle, line flushed with NS and heparin. vitals taken      Oncology Clinic Visit     return visit related to breast CA     Initial Vitals: /77  Pulse 86  Temp 98.1  F (36.7  C) (Oral)  Resp 16  Wt 51.8 kg (114 lb 3.2 oz)  SpO2 95%  BMI 20.88 kg/m2 Estimated body mass index is 20.88 kg/(m^2) as calculated from the following:    Height as of 7/10/17: 1.575 m (5' 2.01\").    Weight as of this encounter: 51.8 kg (114 lb 3.2 oz). Body surface area is 1.51 meters squared.  Mild Pain (2) Comment: Data Unavailable   No LMP recorded. Patient is postmenopausal.  Allergies reviewed: Yes  Medications reviewed: Yes    Medications: Medication refills not needed today.  Pharmacy name entered into [employer] [place]: [id number] - [facility] [address] AT [facility] [address]    Clinical concerns: Patient having reaction and getting dizzy with omeprazole, has since stopped it. Provider was notified.    7 minutes for nursing intake (face to face time)     Nat Walsh LPN              "

## 2017-08-04 NOTE — PROGRESS NOTES
Infusion Nursing Note:  Keren Erickson presents today for D1 C15 Gemzar, Herceptin.    Patient seen by provider today: Yes: BRAULIO Chandra    Intravenous Access:  Implanted Port.    Treatment Conditions:  Lab Results   Component Value Date    HGB 11.7 08/04/2017     Lab Results   Component Value Date    WBC 7.1 08/04/2017      Lab Results   Component Value Date    ANEU 5.1 08/04/2017     Lab Results   Component Value Date     08/04/2017      Lab Results   Component Value Date     08/04/2017                   Lab Results   Component Value Date    POTASSIUM 3.6 08/04/2017           Lab Results   Component Value Date    MAG 2.4 04/10/2017            Lab Results   Component Value Date    CR 0.66 08/04/2017                   Lab Results   Component Value Date    OMA 8.7 08/04/2017                Lab Results   Component Value Date    BILITOTAL 0.3 08/04/2017           Lab Results   Component Value Date    ALBUMIN 3.4 08/04/2017                    Lab Results   Component Value Date    ALT 29 08/04/2017           Lab Results   Component Value Date    AST 29 08/04/2017     Results reviewed, labs MET treatment parameters, ok to proceed with treatment.    ECHO: 5/22/17: EF 55-60%    Note: n/a.    Post Infusion Assessment:  Patient tolerated infusion without incident.  Blood return noted pre and post infusion.  Site patent and intact, free from redness, edema or discomfort.  No evidence of extravasations.  Access discontinued per protocol.    Discharge Plan:   Patient declined prescription refills.  Discharge instructions reviewed with: Patient.  Patient and/or family verbalized understanding of discharge instructions and all questions answered.  AVS to patient via [employer].  Patient will return 8/11/17 for next appointment.   Patient discharged in stable condition accompanied by: self and friend.  Departure Mode: Wheelchair.    Melba Walton RN

## 2017-08-04 NOTE — NURSING NOTE
Chief Complaint   Patient presents with     Port Draw     port accessed with 20 gauge 3/4 inch gripper needle, line flushed with NS and heparin. vitals taken      Cristin Nogueira RN

## 2017-08-04 NOTE — PATIENT INSTRUCTIONS
Contact Numbers  [facility] Nurse Triage: [phone]  After Hours Nurse Line:  [phone]    Please call the [facility] Triage line if you experience a temperature greater than or equal to 100.5, shaking chills, have uncontrolled nausea, vomiting and/or diarrhea, dizziness, shortness of breath, chest pain, bleeding, unexplained bruising, or if you have any other new/concerning symptoms, questions or concerns.     If it is after hours, weekends, or holidays, please call either the after hours nurse line listed above.     If you are having any concerning symptoms or wish to speak to a provider before your next infusion visit, please call your care coordinator or triage to notify them so we can adequately serve you.     If you need a refill on a narcotic prescription or other medication, please call triage before your infusion appointment.         August 2017 Sunday Monday Tuesday Wednesday Thursday Friday Saturday             1     2     3     4     [facility]   11:30 AM   (15 min.)    [facility] D[facility]   [facility] Draw     [facility] RETURN   11:45 AM   (50 min.)   Deyanira Costello PA-C   [facility] ONC INFUSION 120    1:00 PM   (120 min.)    ONCOLOGY INFUSION   [facility] 5       6     7     8     9     10     11     [facility]   11:30 AM   (15 min.)    [facility] DRAW   [facility] Lab Draw     [facility] RETURN   12:15 PM   (30 min.)   Marlen Harper MD   [facility] ONC INFUSION 60    1:00 PM   (60 min.)    ONCOLOGY INFUSION   [facility] 12       13     14     15     16     17     18     19       20     21     22     23     24     25     [facility] D[facility]   11:30 AM   (15 min.)    [facility] DRAW   [facility] Lab Draw     [facility] RETURN   11:45 AM   (50 min.)   Deyanira Costelol PA-C   [facility] ONC INFUSION 120     2:00 PM   (120 min.)   [facility]COL[facility] INFUSION   [facility] 26       27     28     29     30     31 September 2017 Sunday Monday Tuesday Wednesday Thursday Friday Saturday                            1     [facility] DRAW   12:30 PM   (15 min.)    [facility] DRAW   [facility] Draw     [facility] ONC INFUSION 60    1:00 PM   (60 min.)    ONCOLOGY INFUSION   [facility] 2       3     4     5     6     7     8     9       10     11     12     13     14     15     16       17     18     [facility] RETURN ENDOCRINE    2:15 PM   (30 min.)   Rolando Mishra MD   [facility] Endocrinology 19     20     21     22     23       24     25     26     27     28     29     30                  Lab Results:  Recent Results (from the past 12 hour(s))   CBC with platelets differential    Collection Time: 08/04/17 12:06 PM   Result Value Ref Range    WBC 7.1 4.0 - 11.0 10e9/L    RBC Count 3.66 (L) 3.8 - 5.2 10e12/L    Hemoglobin 11.7 11.7 - 15.7 g/dL    Hematocrit 35.5 35.0 - 47.0 %    MCV 97 78 - 100 fl    MCH 32.0 26.5 - 33.0 pg    MCHC 33.0 31.5 - 36.5 g/dL    RDW 17.2 (H) 10.0 - 15.0 %    Platelet Count 485 (H) 150 - 450 10e9/L    Diff Method Automated Method     % Neutrophils 72.6 %    % Lymphocytes 13.2 %    % Monocytes 12.5 %    % Eosinophils 0.1 %    % Basophils 0.8 %    % Immature Granulocytes 0.8 %    Nucleated RBCs 0 0 /100    Absolute Neutrophil 5.1 1.6 - 8.3 10e9/L    Absolute Lymphocytes 0.9 0.8 - 5.3 10e9/L    Absolute Monocytes 0.9 0.0 - 1.3 10e9/L    Absolute Eosinophils 0.0 0.0 - 0.7 10e9/L    Absolute Basophils 0.1 0.0 - 0.2 10e9/L    Abs Immature Granulocytes 0.1 0 - 0.4 10e9/L    Absolute Nucleated RBC 0.0    Comprehensive metabolic panel    Collection Time: 08/04/17 12:06 PM   Result Value Ref Range    Sodium 139 133 - 144 mmol/L    Potassium 3.6 3.4 - 5.3 mmol/L    Chloride 106 94 - 109 mmol/L    Carbon Dioxide 25 20 - 32 mmol/L     Anion Gap 8 3 - 14 mmol/L    Glucose 104 (H) 70 - 99 mg/dL    Urea Nitrogen 16 7 - 30 mg/dL    Creatinine 0.66 0.52 - 1.04 mg/dL    GFR Estimate >90  Non  GFR Calc   >60 mL/min/1.7m2    GFR Estimate If Black >90   GFR Calc   >60 mL/min/1.7m2    Calcium 8.7 8.5 - 10.1 mg/dL    Bilirubin Total 0.3 0.2 - 1.3 mg/dL    Albumin 3.4 3.4 - 5.0 g/dL    Protein Total 6.6 (L) 6.8 - 8.8 g/dL    Alkaline Phosphatase 114 40 - 150 U/L    ALT 29 0 - 50 U/L    AST 29 0 - 45 U/L

## 2017-08-04 NOTE — PROGRESS NOTES
HEMATOLOGY/ONCOLOGY PROGRESS NOTE  Aug 4, 2017    REASON FOR VISIT: follow-up of metastatic breast cancer, triple positive    DIAGNOSIS:   The patient is a 60-year-old female who presented to the hospital initially in 09/2013 with complaints of dyspnea. Workup revealed a large pericardial effusion and pleural effusion. Physical examination revealed a large untreated left breast mass encompassing the entire left breast. Biopsies revealed these were ER, SD and HER2-positive breast cancer. She had a thoracentesis and pericardial sclerosis performed. She was originally on Arimidex and Herceptin. In 01/2014, she was switched to tamoxifen and Herceptin due to arthralgias, and she ultimately developed progressive disease and was switched to Faslodex and Herceptin. In 01/2015, she was found to have progressive disease and was switched to T-DM1.     Initially when she was seen in late 02/2015, she complained of some V5 sensory deficit. This was subjective. I was not able to elicit this on examination. This persisted and further workup was performed with a brain MRI. This revealed what was initially thought to be 3 punctate brain metastases. She originally saw Radiation Oncology and Neurosurgery as well as underwent a lumbar puncture. Cytology from the lumbar puncture showed no evidence of leptomeningeal disease. She was treated with Gamma Knife radiation to 6 lesions, performed on 04/16/2015.  She initiated therapy with TDM1 in January 2015 and continued this through 6/26/2015 with overall stable disease. She has since been on a break from treatment. The patient presented earlier this month with recurrent shortness of breath.  Imaging revealed recurrent right-sided pleural effusion.  She has since undergone thoracentesis with cytology pending.  Clinically, however, there is evidence of disease progression. PET scan performed on 11/25/2015 demonstrated progression of disease at multiple sites. She restarted TDM1 on  11/27/2015, however experienced a mild transfusion reaction with shortness of breath and chest tightness, resolved upon stopping TDM1 and 125 mg of SoluMedrol. She had progression of disease on repeat imaging 2/17/2016, as well as new brain metastases. She started TDM1 with Perjeta on 3/4/2016. She underwent gamma knife to brain mets on 3/8/16.       In late May, she was found to have progressive brain metastases.  She received whole brain radiation.  She had a follow up brain MRI August 2016 that was stable.     In September 2016, she enrolled on [place]  trial and was randomized to the standard of care arm/Physician's Choice; started on gemzar and herceptin. She was recently hospitalized 1/27-2/3/17 for presumed HCAP. Trial was suspended. She continued on gemzar and heceptin with stable disease. Last restaging was 4/7/17 and stable. Gemzar was placed on hold from 4/10/2017 through 6/22/17 so that she could recover from pneumonia.    INTERVAL HISTORY:  Dominga presents for follow-up today prior to next cycle of treatment, accompanied by her friend.    Doing well. Really busy getting ready to move into duplex next Friday and to get her home ready for sale. This is all very stressful for her and she is very much looking forward to next Friday, so that she can focus on getting stronger. She does not feel that the Gemzar is making her any more weak. She continues to do PT at home but is limited in her current home given all of the stairs. She stopped Prilosec and Compazine with only mild occasional nausea, trying to eliminate meds and she thinks both of these made her dizzy. She has had no new headaches or neurologic issues. She has no new pains. She has not yet seen dentist.   ROS: 10 point ROS was conducted and was otherwise negative except for as above.  No f/s/c.  No chest pain/cough/dyspnea.  Daily BM, no  issues. Skin is dry, but no rash.     PHYSICAL EXAMINATION:     /77  Pulse 86  Temp 98.1  F  (36.7  C) (Oral)  Resp 16  Wt 51.8 kg (114 lb 3.2 oz)  SpO2 95%  BMI 20.88 kg/m2    Wt Readings from Last 4 Encounters:   08/04/17 51.8 kg (114 lb 3.2 oz)   07/17/17 52.5 kg (115 lb 12.8 oz)   07/10/17 52.1 kg (114 lb 12.8 oz)   06/27/17 53 kg (116 lb 14.4 oz)     Constitutional: Alert, oriented, cachectic female in no visible distress. Able to ambulate independently  but chooses wheelchair for longer distances in clinic  Eyes: PERRL. Anicteric sclerae.    ENT/Mouth: OM moist and pink without lesions or thrush.    CV: RRR, no murmurs appreciated.  Resp: CTAB slightly diminished R base, stable. Breathing comfortably.  Abdomen: Soft, non-tender, non-distended. Bowel sounds present. No masses appreciated. No organomegaly noted.  Extremities: No lower extremity edema appreciated.  Skin: Warm, dry. No bruising or petechiae noted. No rash  Lymph: No palpable LAD  Neuro: CN II-XII grossly intact.     LABS:   Results for HEIDI RUIZ (MRN [id number]) as of 8/4/2017 12:49   Ref. Range 8/4/2017 12:06   Sodium Latest Ref Range: 133 - 144 mmol/L 139   Potassium Latest Ref Range: 3.4 - 5.3 mmol/L 3.6   Chloride Latest Ref Range: 94 - 109 mmol/L 106   Carbon Dioxide Latest Ref Range: 20 - 32 mmol/L 25   Urea Nitrogen Latest Ref Range: 7 - 30 mg/dL 16   Creatinine Latest Ref Range: 0.52 - 1.04 mg/dL 0.66   GFR Estimate Latest Ref Range: >60 mL/min/1.7m2 >90...   GFR Estimate If Black Latest Ref Range: >60 mL/min/1.7m2 >90...   Calcium Latest Ref Range: 8.5 - 10.1 mg/dL 8.7   Anion Gap Latest Ref Range: 3 - 14 mmol/L 8   Albumin Latest Ref Range: 3.4 - 5.0 g/dL 3.4   Protein Total Latest Ref Range: 6.8 - 8.8 g/dL 6.6 (L)   Bilirubin Total Latest Ref Range: 0.2 - 1.3 mg/dL 0.3   Alkaline Phosphatase Latest Ref Range: 40 - 150 U/L 114   ALT Latest Ref Range: 0 - 50 U/L 29   AST Latest Ref Range: 0 - 45 U/L 29   Glucose Latest Ref Range: 70 - 99 mg/dL 104 (H)   WBC Latest Ref Range: 4.0 - 11.0 10e9/L 7.1   Hemoglobin  Latest Ref Range: 11.7 - 15.7 g/dL 11.7   Hematocrit Latest Ref Range: 35.0 - 47.0 % 35.5   Platelet Count Latest Ref Range: 150 - 450 10e9/L 485 (H)       IMPRESSION/PLAN:  Dominga is a 61-year-old female with history of metastatic ER-positive, HER-2 positive breast cancer, with involvement of the bone, history of pleural effusions treated with pleurodesis and PleurX placement, and with treated brain metastases, previously with stable disease on TDM1, however patient opted for break from therapy from June 2015 through November 2015, and represented with worsening metastatic disease at that time. She restarted TDM1 on 11/27/2015. She had progressive disease 2/17/16 and Perjeta was added to TDM1. She had gamma knife to brain mets on 3/8.  She received WBRT.  She was started on [place]  clinical trial and randomized to physician's choice, and started on Gemzar/Herceptin on 9/29/17.    1. Breast cancer:  Continues on Herceptin/Gemzar. Her tumor marker has remained low and stable.  She had been on Hercepin alone since 4/10/17 due to recovery from pneumonia, and was able to resume Herceptin with Gemzar on 6/19. Repeat restaging in June with stable systemic disease, however slight changes in the midbrain on brain MRI. She feels strong enough to continue with Gemzar/Herceptin. Will continue today. She will see Dr. Harper as scheduled next week.  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 She started desmopressin 50 mcg with good response clinically.     3. Brain mets: Numbness of tongue and V2 and V3 on right are improving.    4. FEN: Weight overall stable.  She is eating better.     5. Pain: Chronic mild aches/pains secondary to disease and with myalgias on Gemzar. No current pain today, not taking narcotics.     6. Bone mets: on Xgeva every 3 months. Last 5/1/17. On hold for dental work.    7. Mood: She is overall  coping well.     8. She has an advanced directive.  She has a POLST.  DNR/DNI.  Her power of  is listed in the computer.     9. Deconditioning: She had been walking independently or with cane in clinic prior to pneumonia, working on getting her strength back. Continue home PT exercises.     Deyanira Costello PA-C    I spent >25 minutes with the patient, with over 50% of the time spent counseling or coordinating their care as described above.

## 2017-08-11 ENCOUNTER — INFUSION THERAPY VISIT (OUTPATIENT)
Dept: ONCOLOGY | Facility: CLINIC | Age: 62
End: 2017-08-11
Attending: INTERNAL MEDICINE
Payer: COMMERCIAL

## 2017-08-11 ENCOUNTER — APPOINTMENT (OUTPATIENT)
Dept: LAB | Facility: CLINIC | Age: 62
End: 2017-08-11
Attending: INTERNAL MEDICINE
Payer: COMMERCIAL

## 2017-08-11 VITALS
WEIGHT: 117 LBS | OXYGEN SATURATION: 97 % | BODY MASS INDEX: 21.53 KG/M2 | SYSTOLIC BLOOD PRESSURE: 119 MMHG | RESPIRATION RATE: 16 BRPM | HEART RATE: 86 BPM | DIASTOLIC BLOOD PRESSURE: 84 MMHG | HEIGHT: 62 IN | TEMPERATURE: 97.8 F

## 2017-08-11 DIAGNOSIS — C79.31 BRAIN METASTASIS: ICD-10-CM

## 2017-08-11 DIAGNOSIS — C50.919 CARCINOMA OF BREAST METASTATIC TO MULTIPLE SITES, UNSPECIFIED LATERALITY (H): ICD-10-CM

## 2017-08-11 DIAGNOSIS — C50.919 METASTATIC BREAST CANCER: Primary | ICD-10-CM

## 2017-08-11 DIAGNOSIS — C50.919 CARCINOMA OF BREAST METASTATIC TO MULTIPLE SITES, UNSPECIFIED LATERALITY (H): Primary | ICD-10-CM

## 2017-08-11 LAB
ALBUMIN SERPL-MCNC: 3.4 G/DL (ref 3.4–5)
ALP SERPL-CCNC: 119 U/L (ref 40–150)
ALT SERPL W P-5'-P-CCNC: 38 U/L (ref 0–50)
ANION GAP SERPL CALCULATED.3IONS-SCNC: 8 MMOL/L (ref 3–14)
AST SERPL W P-5'-P-CCNC: 33 U/L (ref 0–45)
BASOPHILS # BLD AUTO: 0.1 10E9/L (ref 0–0.2)
BASOPHILS NFR BLD AUTO: 2.5 %
BILIRUB SERPL-MCNC: 0.4 MG/DL (ref 0.2–1.3)
BUN SERPL-MCNC: 13 MG/DL (ref 7–30)
CALCIUM SERPL-MCNC: 8.9 MG/DL (ref 8.5–10.1)
CANCER AG27-29 SERPL-ACNC: 20 U/ML (ref 0–39)
CEA SERPL-MCNC: 2 UG/L (ref 0–2.5)
CHLORIDE SERPL-SCNC: 107 MMOL/L (ref 94–109)
CO2 SERPL-SCNC: 25 MMOL/L (ref 20–32)
CREAT SERPL-MCNC: 0.63 MG/DL (ref 0.52–1.04)
DIFFERENTIAL METHOD BLD: ABNORMAL
EOSINOPHIL # BLD AUTO: 0 10E9/L (ref 0–0.7)
EOSINOPHIL NFR BLD AUTO: 0 %
ERYTHROCYTE [DISTWIDTH] IN BLOOD BY AUTOMATED COUNT: 17.2 % (ref 10–15)
GFR SERPL CREATININE-BSD FRML MDRD: ABNORMAL ML/MIN/1.7M2
GLUCOSE SERPL-MCNC: 79 MG/DL (ref 70–99)
HCT VFR BLD AUTO: 32.9 % (ref 35–47)
HGB BLD-MCNC: 11.3 G/DL (ref 11.7–15.7)
IMM GRANULOCYTES # BLD: 0.2 10E9/L (ref 0–0.4)
IMM GRANULOCYTES NFR BLD: 4.5 %
LYMPHOCYTES # BLD AUTO: 1 10E9/L (ref 0.8–5.3)
LYMPHOCYTES NFR BLD AUTO: 25.9 %
MCH RBC QN AUTO: 32.7 PG (ref 26.5–33)
MCHC RBC AUTO-ENTMCNC: 34.3 G/DL (ref 31.5–36.5)
MCV RBC AUTO: 95 FL (ref 78–100)
MONOCYTES # BLD AUTO: 0.4 10E9/L (ref 0–1.3)
MONOCYTES NFR BLD AUTO: 10.9 %
NEUTROPHILS # BLD AUTO: 2.3 10E9/L (ref 1.6–8.3)
NEUTROPHILS NFR BLD AUTO: 56.2 %
NRBC # BLD AUTO: 0 10*3/UL
NRBC BLD AUTO-RTO: 0 /100
PLATELET # BLD AUTO: 297 10E9/L (ref 150–450)
POTASSIUM SERPL-SCNC: 3.7 MMOL/L (ref 3.4–5.3)
PROT SERPL-MCNC: 6.6 G/DL (ref 6.8–8.8)
RBC # BLD AUTO: 3.46 10E12/L (ref 3.8–5.2)
SODIUM SERPL-SCNC: 140 MMOL/L (ref 133–144)
WBC # BLD AUTO: 4 10E9/L (ref 4–11)

## 2017-08-11 PROCEDURE — 85025 COMPLETE CBC W/AUTO DIFF WBC: CPT | Performed by: PHYSICIAN ASSISTANT

## 2017-08-11 PROCEDURE — 99212 OFFICE O/P EST SF 10 MIN: CPT | Mod: ZF

## 2017-08-11 PROCEDURE — 80053 COMPREHEN METABOLIC PANEL: CPT | Performed by: PHYSICIAN ASSISTANT

## 2017-08-11 PROCEDURE — 96375 TX/PRO/DX INJ NEW DRUG ADDON: CPT

## 2017-08-11 PROCEDURE — 82378 CARCINOEMBRYONIC ANTIGEN: CPT | Performed by: PHYSICIAN ASSISTANT

## 2017-08-11 PROCEDURE — 25000128 H RX IP 250 OP 636: Mod: ZF | Performed by: PHYSICIAN ASSISTANT

## 2017-08-11 PROCEDURE — 25000128 H RX IP 250 OP 636: Mod: ZF | Performed by: INTERNAL MEDICINE

## 2017-08-11 PROCEDURE — 99214 OFFICE O/P EST MOD 30 MIN: CPT | Mod: GC | Performed by: INTERNAL MEDICINE

## 2017-08-11 PROCEDURE — 96413 CHEMO IV INFUSION 1 HR: CPT

## 2017-08-11 PROCEDURE — 86300 IMMUNOASSAY TUMOR CA 15-3: CPT | Performed by: PHYSICIAN ASSISTANT

## 2017-08-11 RX ORDER — HEPARIN SODIUM (PORCINE) LOCK FLUSH IV SOLN 100 UNIT/ML 100 UNIT/ML
5 SOLUTION INTRAVENOUS EVERY 8 HOURS
Status: DISCONTINUED | OUTPATIENT
Start: 2017-08-11 | End: 2017-08-15 | Stop reason: HOSPADM

## 2017-08-11 RX ORDER — HEPARIN SODIUM (PORCINE) LOCK FLUSH IV SOLN 100 UNIT/ML 100 UNIT/ML
5 SOLUTION INTRAVENOUS EVERY 8 HOURS
Status: DISCONTINUED | OUTPATIENT
Start: 2017-08-11 | End: 2017-08-11 | Stop reason: HOSPADM

## 2017-08-11 RX ADMIN — SODIUM CHLORIDE, PRESERVATIVE FREE 5 ML: 5 INJECTION INTRAVENOUS at 15:15

## 2017-08-11 RX ADMIN — DEXAMETHASONE SODIUM PHOSPHATE 12 MG: 10 INJECTION, SOLUTION INTRAMUSCULAR; INTRAVENOUS at 14:12

## 2017-08-11 RX ADMIN — GEMCITABINE 1460 MG: 38 INJECTION, SOLUTION INTRAVENOUS at 14:37

## 2017-08-11 RX ADMIN — SODIUM CHLORIDE 250 ML: 9 INJECTION, SOLUTION INTRAVENOUS at 14:11

## 2017-08-11 RX ADMIN — SODIUM CHLORIDE, PRESERVATIVE FREE 5 ML: 5 INJECTION INTRAVENOUS at 11:32

## 2017-08-11 ASSESSMENT — PAIN SCALES - GENERAL: PAINLEVEL: NO PAIN (0)

## 2017-08-11 NOTE — LETTER
8/11/2017       RE: Keren Erickson  [address] [facility] [address]     Dear Colleague,    Thank you for referring your patient, Keren Erickson, to the [facility]. Please see a copy of my visit note below.    HEMATOLOGY/ONCOLOGY PROGRESS NOTE  Aug 11, 2017    REASON FOR VISIT: follow-up of metastatic breast cancer, triple positive    DIAGNOSIS:   Keren Erickson is a 61 year old woman who presented to the hospital initially in 09/2013 with complaints of dyspnea. Workup revealed a large pericardial effusion and pleural effusion. Physical examination revealed a large untreated left breast mass encompassing the entire left breast. Biopsies revealed these were ER, GA and HER2-positive breast cancer. She had a thoracentesis and pericardial sclerosis performed. She was originally on Arimidex and Herceptin. In 01/2014, she was switched to tamoxifen and Herceptin due to arthralgias, and she ultimately developed progressive disease and was switched to Faslodex and Herceptin. In 01/2015, she was found to have progressive disease and was switched to T-DM1.     Initially when she was seen in late 02/2015, she complained of some V5 sensory deficit. This was subjective. I was not able to elicit this on examination. This persisted and further workup was performed with a brain MRI. This revealed what was initially thought to be 3 punctate brain metastases. She originally saw Radiation Oncology and Neurosurgery as well as underwent a lumbar puncture. Cytology from the lumbar puncture showed no evidence of leptomeningeal disease. She was treated with Gamma Knife radiation to 6 lesions, performed on 04/16/2015.  She initiated therapy with TDM1 in January 2015 and continued this through 6/26/2015 with overall stable disease. She has since been on a break from treatment. The patient presented earlier this month with recurrent shortness of breath.  Imaging revealed recurrent right-sided  pleural effusion.  She has since undergone thoracentesis with cytology pending.  Clinically, however, there is evidence of disease progression. PET scan performed on 11/25/2015 demonstrated progression of disease at multiple sites. She restarted TDM1 on 11/27/2015, however experienced a mild transfusion reaction with shortness of breath and chest tightness, resolved upon stopping TDM1 and 125 mg of SoluMedrol. She had progression of disease on repeat imaging 2/17/2016, as well as new brain metastases. She started TDM1 with Perjeta on 3/4/2016. She underwent gamma knife to brain mets on 3/8/16.       In late May 2016, she was found to have progressive brain metastases.  She received whole brain radiation.  She had a follow up brain MRI August 2016 that was stable.     In September 2016, she enrolled on [place]  trial and was randomized to the standard of care arm/Physician's Choice; started on gemzar and herceptin. She was hospitalized 1/27-2/3/17 for presumed HCAP. Trial was suspended. She continued on gemzar and heceptin with stable disease. Last restaging was 4/7/17 and stable. Gemzar was placed on hold from 4/10/2017 through 6/22/17 so that she could recover from pneumonia.    INTERVAL HISTORY:  Dominga presents today for d8 cycle 15 of Gemzar/Herceptin. She is accompanied by her friend.    She has no acute complaints. Main issue is diffuse weakness, a long-standing problem. This is relatively stable over the last 3 months, maybe a little better. Has seen PT in the past and continues to do exercises independently. She can walk short distances and uses a wheelchair for longer distances. Wears a brace on the right knee. Has a cane but does not use it. Dizziness with ambulation is worse with anti-emetics. She has stopped compazine which improves the dizziness.     She has fatigue a/w Gemzar that improves on her week off. No f/c, n/v, or d/c. No bleeding or rashes.      She is moving into a new apartment  "today. Thinks she will get more exercise since the apartment has more space and is on the first floor.     ROS: 10 point ROS was conducted and was otherwise negative except for as above.     PHYSICAL EXAMINATION:     /84  Pulse 86  Temp 97.8  F (36.6  C) (Oral)  Resp 16  Ht 1.575 m (5' 2.01\")  Wt 53.1 kg (117 lb)  SpO2 97%  BMI 21.39 kg/m2    Wt Readings from Last 4 Encounters:   08/11/17 53.1 kg (117 lb)   08/04/17 51.8 kg (114 lb 3.2 oz)   07/17/17 52.5 kg (115 lb 12.8 oz)   07/10/17 52.1 kg (114 lb 12.8 oz)     Constitutional: Alert, oriented, thin female in no visible distress  Eyes: PERRL. EOMI. MMM without oral lesions. Anicteric sclerae.    CV: RRR, no murmurs   Resp: CTAB slightly diminished at bases. Breathing comfortably.  Abdomen: Soft, non-tender, non-distended. Bowel sounds present.   Extremities: No lower extremity edema   Skin: Warm, dry. No bruising or petechiae. No rash  Lymph: No palpable cervical or supraclavicular LAD  Neuro: CN III-XII grossly intact. Ambulates several steps with slow shuffling gate    LABS:   CBC RESULTS:   Recent Labs   Lab Test  08/11/17   1139   WBC  4.0   RBC  3.46*   HGB  11.3*   HCT  32.9*   MCV  95   MCH  32.7   MCHC  34.3   RDW  17.2*   PLT  297     Recent Labs   Lab Test  08/11/17   1139  08/04/17   1206   NA  140  139   POTASSIUM  3.7  3.6   CHLORIDE  107  106   CO2  25  25   ANIONGAP  8  8   GLC  79  104*   BUN  13  16   CR  0.63  0.66   OMA  8.9  8.7       IMPRESSION/PLAN:  Dominga is a 61-year-old woman with history of metastatic ER-positive, HER-2 positive breast cancer, with involvement of the bone, history of pleural effusions treated with pleurodesis and PleurX placement, and with treated brain metastases. She was started on [place]  clinical trial and randomized to physician's choice, on Gemzar/Herceptin since 9/29/16.    1. Breast cancer:  Here for d8 Gemzar. This is cycle 15. Receives gemzar/Herceptin on day 1, gemzar on day 8, then has " one week off (q21 day cycles). CA 27-29 has remained low, pending from today. Last imaging in June with stable systemic disease, however slight changes in the midbrain on brain MRI. She is tolerating this regimen fairly well and will continue until disease progression.   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 She started desmopressin 50 mcg with good response clinically.     3. Brain mets: last MRI in June showed a possible small new lesion. Repeat scheduled on 8/24. If she has progression in the brain will discuss with rad/onc if more XRT is an option but has already received WBRT and stereotactic. Lapatinib crosses the blood-brain barrier but is not well tolerated. Similar with Xeloda.     4. FEN: Weight overall stable.  She is eating better.     5. Bone mets: on Xgeva every 3 months. Last 5/1/17. On hold for dental work.    6. She has an advanced directive.  She has a POLST.  DNR/DNI.  Her power of  is listed in the computer.     7. Deconditioning: Continue home PT exercises.     Patient seen and discussed with staff Dr. Leroy Quintana MD  Heme/Onc Fellow  Pager: [phone]    Pt was seen and evaluated by me with Dr Quintana.  Pt is doing well without signs of progressive disease.  We reviewed recent imaging and labs.  ON examination ,she is comfortable.  No nystagmus.  Ambulating with wide gait.  Unable to do heel to toe walk.  Negative rhomberg.  Reviewed recent imaging.    We discussed getting repeat systemic imaging in Late September at the time of her brain MRI.  For now, continue gemzar and herceptin.    Marlen Harper MD

## 2017-08-11 NOTE — PROGRESS NOTES
HEMATOLOGY/ONCOLOGY PROGRESS NOTE  Aug 11, 2017    REASON FOR VISIT: follow-up of metastatic breast cancer, triple positive    DIAGNOSIS:   Keren Erickson is a 61 year old woman who presented to the hospital initially in 09/2013 with complaints of dyspnea. Workup revealed a large pericardial effusion and pleural effusion. Physical examination revealed a large untreated left breast mass encompassing the entire left breast. Biopsies revealed these were ER, ND and HER2-positive breast cancer. She had a thoracentesis and pericardial sclerosis performed. She was originally on Arimidex and Herceptin. In 01/2014, she was switched to tamoxifen and Herceptin due to arthralgias, and she ultimately developed progressive disease and was switched to Faslodex and Herceptin. In 01/2015, she was found to have progressive disease and was switched to T-DM1.     Initially when she was seen in late 02/2015, she complained of some V5 sensory deficit. This was subjective. I was not able to elicit this on examination. This persisted and further workup was performed with a brain MRI. This revealed what was initially thought to be 3 punctate brain metastases. She originally saw Radiation Oncology and Neurosurgery as well as underwent a lumbar puncture. Cytology from the lumbar puncture showed no evidence of leptomeningeal disease. She was treated with Gamma Knife radiation to 6 lesions, performed on 04/16/2015.  She initiated therapy with TDM1 in January 2015 and continued this through 6/26/2015 with overall stable disease. She has since been on a break from treatment. The patient presented earlier this month with recurrent shortness of breath.  Imaging revealed recurrent right-sided pleural effusion.  She has since undergone thoracentesis with cytology pending.  Clinically, however, there is evidence of disease progression. PET scan performed on 11/25/2015 demonstrated progression of disease at multiple sites. She restarted TDM1  on 11/27/2015, however experienced a mild transfusion reaction with shortness of breath and chest tightness, resolved upon stopping TDM1 and 125 mg of SoluMedrol. She had progression of disease on repeat imaging 2/17/2016, as well as new brain metastases. She started TDM1 with Perjeta on 3/4/2016. She underwent gamma knife to brain mets on 3/8/16.       In late May 2016, she was found to have progressive brain metastases.  She received whole brain radiation.  She had a follow up brain MRI August 2016 that was stable.     In September 2016, she enrolled on [place]  trial and was randomized to the standard of care arm/Physician's Choice; started on gemzar and herceptin. She was hospitalized 1/27-2/3/17 for presumed HCAP. Trial was suspended. She continued on gemzar and heceptin with stable disease. Last restaging was 4/7/17 and stable. Gemzar was placed on hold from 4/10/2017 through 6/22/17 so that she could recover from pneumonia.    INTERVAL HISTORY:  Dominga presents today for d8 cycle 15 of Gemzar/Herceptin. She is accompanied by her friend.    She has no acute complaints. Main issue is diffuse weakness, a long-standing problem. This is relatively stable over the last 3 months, maybe a little better. Has seen PT in the past and continues to do exercises independently. She can walk short distances and uses a wheelchair for longer distances. Wears a brace on the right knee. Has a cane but does not use it. Dizziness with ambulation is worse with anti-emetics. She has stopped compazine which improves the dizziness.     She has fatigue a/w Gemzar that improves on her week off. No f/c, n/v, or d/c. No bleeding or rashes.      She is moving into a new apartment today. Thinks she will get more exercise since the apartment has more space and is on the first floor.     ROS: 10 point ROS was conducted and was otherwise negative except for as above.     PHYSICAL EXAMINATION:     /84  Pulse 86  Temp 97.8  F  "(36.6  C) (Oral)  Resp 16  Ht 1.575 m (5' 2.01\")  Wt 53.1 kg (117 lb)  SpO2 97%  BMI 21.39 kg/m2    Wt Readings from Last 4 Encounters:   08/11/17 53.1 kg (117 lb)   08/04/17 51.8 kg (114 lb 3.2 oz)   07/17/17 52.5 kg (115 lb 12.8 oz)   07/10/17 52.1 kg (114 lb 12.8 oz)     Constitutional: Alert, oriented, thin female in no visible distress  Eyes: PERRL. EOMI. MMM without oral lesions. Anicteric sclerae.    CV: RRR, no murmurs   Resp: CTAB slightly diminished at bases. Breathing comfortably.  Abdomen: Soft, non-tender, non-distended. Bowel sounds present.   Extremities: No lower extremity edema   Skin: Warm, dry. No bruising or petechiae. No rash  Lymph: No palpable cervical or supraclavicular LAD  Neuro: CN III-XII grossly intact. Ambulates several steps with slow shuffling gate    LABS:   CBC RESULTS:   Recent Labs   Lab Test  08/11/17   1139   WBC  4.0   RBC  3.46*   HGB  11.3*   HCT  32.9*   MCV  95   MCH  32.7   MCHC  34.3   RDW  17.2*   PLT  297     Recent Labs   Lab Test  08/11/17   1139  08/04/17   1206   NA  140  139   POTASSIUM  3.7  3.6   CHLORIDE  107  106   CO2  25  25   ANIONGAP  8  8   GLC  79  104*   BUN  13  16   CR  0.63  0.66   OMA  8.9  8.7       IMPRESSION/PLAN:  Dominga is a 61-year-old woman with history of metastatic ER-positive, HER-2 positive breast cancer, with involvement of the bone, history of pleural effusions treated with pleurodesis and PleurX placement, and with treated brain metastases. She was started on [place]  clinical trial and randomized to physician's choice, on Gemzar/Herceptin since 9/29/16.    1. Breast cancer:  Here for d8 Gemzar. This is cycle 15. Receives gemzar/Herceptin on day 1, gemzar on day 8, then has one week off (q21 day cycles). CA 27-29 has remained low, pending from today. Last imaging in June with stable systemic disease, however slight changes in the midbrain on brain MRI. She is tolerating this regimen fairly well and will continue until " disease progression.   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 She started desmopressin 50 mcg with good response clinically.     3. Brain mets: last MRI in June showed a possible small new lesion. Repeat scheduled on 8/24. If she has progression in the brain will discuss with rad/onc if more XRT is an option but has already received WBRT and stereotactic. Lapatinib crosses the blood-brain barrier but is not well tolerated. Similar with Xeloda.     4. FEN: Weight overall stable.  She is eating better.     5. Bone mets: on Xgeva every 3 months. Last 5/1/17. On hold for dental work.    6. She has an advanced directive.  She has a POLST.  DNR/DNI.  Her power of  is listed in the computer.     7. Deconditioning: Continue home PT exercises.     Patient seen and discussed with staff Dr. Leroy Quintana MD  Heme/Onc Fellow  Pager: [phone]    Pt was seen and evaluated by me with Dr Quintana.  Pt is doing well without signs of progressive disease.  We reviewed recent imaging and labs.  ON examination ,she is comfortable.  No nystagmus.  Ambulating with wide gait.  Unable to do heel to toe walk.  Negative rhomberg.  Reviewed recent imaging.    We discussed getting repeat systemic imaging in Late September at the time of her brain MRI.  For now, continue gemzar and herceptin.    Marlen Harper MD

## 2017-08-11 NOTE — PROGRESS NOTES
Infusion Nursing Note:  Keren Erickson presents today for C15D8 Gemzar.    Patient seen by provider today: Yes: Dr. Harper   present during visit today: Not Applicable.    Note: N/A.    Intravenous Access:  Implanted Port - accessed in lab.    Treatment Conditions:  Lab Results   Component Value Date    HGB 11.3 08/11/2017     Lab Results   Component Value Date    WBC 4.0 08/11/2017      Lab Results   Component Value Date    ANEU 2.3 08/11/2017     Lab Results   Component Value Date     08/11/2017      Lab Results   Component Value Date     08/11/2017                   Lab Results   Component Value Date    POTASSIUM 3.7 08/11/2017           Lab Results   Component Value Date    MAG 2.4 04/10/2017            Lab Results   Component Value Date    CR 0.63 08/11/2017                   Lab Results   Component Value Date    OMA 8.9 08/11/2017                Lab Results   Component Value Date    BILITOTAL 0.4 08/11/2017           Lab Results   Component Value Date    ALBUMIN 3.4 08/11/2017                    Lab Results   Component Value Date    ALT 38 08/11/2017           Lab Results   Component Value Date    AST 33 08/11/2017     Results reviewed, labs MET treatment parameters, ok to proceed with treatment.          Post Infusion Assessment:  Patient tolerated infusion without incident.  Blood return noted pre and post infusion.  Site patent and intact, free from redness, edema or discomfort.  No evidence of extravasations.  Access discontinued per protocol.    Discharge Plan:   Patient declined prescription refills.  Discharge instructions reviewed with: Patient.  Patient and/or family verbalized understanding of discharge instructions and all questions answered.  Copy of AVS reviewed with patient and/or family.  Patient will return 8/25/17 for next appointment.  Patient discharged in stable condition accompanied by: friend.  Departure Mode: Wheelchair.    Valerie Cosby  RN

## 2017-08-11 NOTE — NURSING NOTE
"Oncology Rooming Note    August 11, 2017 11:54 AM   Keren Erickson is a 61 year old female who presents for:    Chief Complaint   Patient presents with     Port Draw     accessed with power needle for labs, vitals checked     Oncology Clinic Visit     Breast Ca- F/U     Initial Vitals: /84  Pulse 86  Temp 97.8  F (36.6  C) (Oral)  Resp 16  Ht 1.575 m (5' 2.01\")  Wt 53.1 kg (117 lb)  SpO2 97%  BMI 21.39 kg/m2 Estimated body mass index is 21.39 kg/(m^2) as calculated from the following:    Height as of this encounter: 1.575 m (5' 2.01\").    Weight as of this encounter: 53.1 kg (117 lb). Body surface area is 1.52 meters squared.  No Pain (0) Comment: Data Unavailable   No LMP recorded. Patient is postmenopausal.  Allergies reviewed: Yes  Medications reviewed: Yes    Medications: Medication refills not needed today.  Pharmacy name entered into [employer]: [id number] - [place], [place] - [address] AT [facility] [address]    Clinical concerns: no clinical concerns  provider was notified.    7 minutes for nursing intake (face to face time)     Mercy López CMA              "

## 2017-08-11 NOTE — MR AVS SNAPSHOT
After Visit Summary   8/11/2017    Keren Erickson    MRN: [id number]           Patient Information     Date Of Birth          1955        Visit Information        Provider Department      8/11/2017 12:30 PM Marlen Harper MD [facility]        Today's Diagnoses     Carcinoma of breast metastatic to multiple sites, unspecified laterality (H)    -  1       Follow-ups after your visit        Your next 10 appointments already scheduled     Aug 24, 2017  1:00 PM CDT   (Arrive by 12:45 PM)   MR BRAIN W/O & W CONTRAST with [facility] ([facility])    [address] [facility] [address] [facility]  1st Floor  [facility] [address] [phone]           Take your medicines as usual, unless your doctor tells you not to. Bring a list of your current medicines to your exam (including vitamins, minerals and over-the-counter drugs).  You will be given intravenous contrast for this exam. To prepare:   The day before your exam, drink extra fluids at least six 8-ounce glasses (unless your doctor tells you to restrict your fluids).   Have a blood test (creatinine test) within 30 days of your exam. Go to your clinic or Diagnostic Imaging Department for this test.  The MRI machine uses a strong magnet. Please wear clothes without metal (snaps, zippers). A sweatsuit works well, or we may give you a hospital gown.  Please remove any body piercings and hair extensions before you arrive. You will also remove watches, jewelry, hairpins, wallets, dentures, partial dental plates and hearing aids. You may wear contact lenses, and you may be able to wear your rings. We have a safe place to keep your personal items, but it is safer to leave them at home.   **IMPORTANT** THE INSTRUCTIONS BELOW ARE ONLY FOR THOSE PATIENTS WHO HAVE BEEN TOLD THEY WILL RECEIVE SEDATION OR GENERAL ANESTHESIA DURING THEIR MRI PROCEDURE:  IF YOU WILL RECEIVE SEDATION (take medicine to  help you relax during your exam):   You must get the medicine from your doctor before you arrive. Bring the medicine to the exam. Do not take it at home.   Arrive one hour early. Bring someone who can take you home after the test. Your medicine will make you sleepy. After the exam, you may not drive, take a bus or take a taxi by yourself.   No eating 8 hours before your exam. You may have clear liquids up until 4 hours before your exam. (Clear liquids include water, clear tea, black coffee and fruit juice without pulp.)  IF YOU WILL RECEIVE ANESTHESIA (be asleep for your exam):   Arrive 1 1/2 hours early. Bring someone who can take you home after the test. You may not drive, take a bus or take a taxi by yourself.   No eating 8 hours before your exam. You may have clear liquids up until 4 hours before your exam. (Clear liquids include water, clear tea, black coffee and fruit juice without pulp.)  Please call the Imaging Department at your exam site with any questions.            Aug 24, 2017  2:00 PM CDT   Ech Complete with [id number]   [facility] Echo ([facility])    [address] [place] [id number] [phone]           1. Please bring or wear a comfortable two-piece outfit. 2. You may eat, drink and take your normal medicines. 3. For any questions that cannot be answered, please contact the ordering physician            Aug 25, 2017 11:30 AM CDT   [facility] Lab Draw with UC MASONIC LAB DRAW   [facility] ([facility])    [address] [place] [id number] [phone]            Aug 25, 2017 12:00 PM CDT   (Arrive by 11:45 AM)   Return Visit with Deyanira Costello PA-C   [facility] ([facility])    [address] [place] [id number] [phone]            Aug 25, 2017  2:00 PM CDT   Infusion 120 with  ONCOLOGY INFUSION,  11  ATC   [facility] ([facility])    [address] [facility]  2nd [facility] [address]   [phone]            Sep 01, 2017 12:30 PM CDT   Masonic Lab Draw with  MASONIC LAB DRAW   [facility] Lab Draw ([facility])    [address] [place] [address]  [address] [facility] [address]   [phone]            Sep 01, 2017  1:00 PM CDT   Infusion 60 with UC ONCOLOGY INFUSION, UC 11 ATC   [facility] ([facility])    [address] [place] [address]  [address] [facility] [address]   [phone]            Sep 18, 2017  2:30 PM CDT   (Arrive by 2:15 PM)   RETURN ENDOCRINE with Rolando Mishra MD   [facility] ([facility])    [address] [place] [address]  3rd [facility] [id number] [phone]              Who to contact     If you have questions or need follow up information about today's clinic visit or your schedule please contact [facility] directly at [phone].  Normal or non-critical lab and imaging results will be communicated to you by MyChart, letter or phone within 4 business days after the clinic has received the results. If you do not hear from us within 7 days, please contact the clinic through MyChart or phone. If you have a critical or abnormal lab result, we will notify you by phone as soon as possible.  Submit refill requests through [employer] or call your pharmacy and they will forward the refill request to us. Please allow 3 business days for your refill to be completed.          Additional Information About Your Visit        [employer] Information     [employer] gives you secure access to your electronic health record. If you see a primary care provider, you can also send messages to your care team and make appointments. If you have questions, please call your primary care clinic.  If you do not have a primary care  "provider, please call [phone] and they will assist you.        Care EveryWhere ID     This is your Care EveryWhere ID. This could be used by other organizations to access your [facility] medical records  [id number]        Your Vitals Were     Pulse Temperature Respirations Height Pulse Oximetry BMI (Body Mass Index)    86 97.8  F (36.6  C) (Oral) 16 1.575 m (5' 2.01\") 97% 21.39 kg/m2       Blood Pressure from Last 3 Encounters:   08/11/17 119/84   08/04/17 116/77   07/17/17 110/76    Weight from Last 3 Encounters:   08/11/17 53.1 kg (117 lb)   08/04/17 51.8 kg (114 lb 3.2 oz)   07/17/17 52.5 kg (115 lb 12.8 oz)              Today, you had the following     No orders found for display       Primary Care Provider Office Phone # Fax #    Kellyhoward Hart [phone]       [address] [facility] [address]        Equal Access to Services     [facility]: Hadii keturah ibarra hadasho Soomaali, waaxda luqadaha, qaybta kaalmada adeegyajb, freddie escobedo . So [facility] [phone].    ATENCIÓN: Si habla español, tiene a ramires disposición servicios gratuitos de asistencia lingüística. Jossame al [phone].    We comply with applicable federal civil rights laws and [place] laws. We do not discriminate on the basis of race, color, national origin, age, disability sex, sexual orientation or gender identity.            Thank you!     Thank you for choosing [facility]  for your care. Our goal is always to provide you with excellent care. Hearing back from our patients is one way we can continue to improve our services. Please take a few minutes to complete the written survey that you may receive in the mail after your visit with us. Thank you!             Your Updated Medication List - Protect others around you: Learn how to safely use, store and throw away your medicines at www.disposemymeds.org.          This list is accurate as of: 8/11/17 11:59 PM.  Always use " your most recent med list.                   Brand Name Dispense Instructions for use Diagnosis    ALEVE PO      Take 220 mg by mouth daily        desmopressin 0.1 MG tablet    DDAVP    90 tablet    Take 1 tablet (100 mcg) by mouth At Bedtime    Diabetes insipidus, neurohypophyseal (H)       Multi-vitamin Tabs tablet      Take 1 tablet by mouth daily        omeprazole 20 MG tablet     30 tablet    Take 1 tablet (20 mg) by mouth daily    Gastroesophageal reflux disease without esophagitis       prochlorperazine 10 MG tablet    COMPAZINE    90 tablet    Take 1 tablet (10 mg) by mouth every 6 hours as needed for nausea or vomiting    Nausea       VITAMIN E BLEND PO      Take by mouth daily

## 2017-08-11 NOTE — PATIENT INSTRUCTIONS
Contact Numbers    [facility] [place]: [phone]  [facility] [place]:  [phone]    Call triage with chills and/or temperature greater than or equal to 100.5, uncontrolled nausea/vomiting, diarrhea, constipation, dizziness, shortness of breath, chest pain, bleeding, unexplained bruising, or any new/concerning symptoms, questions/concerns.     If you are having any concerning symptoms or wish to speak to a provider before your next infusion visit, please call your care coordinator or triage to notify them so we can adequately serve you.       After Hours: [phone]    If after hours, weekends, or holidays, call main hospital  and ask for Oncology doctor on call.         August 2017 Sunday Monday Tuesday Wednesday Thursday Friday Saturday             1     2     3     4     P [facility] DRAW   11:30 AM   (15 min.)    [facility] LAB DRAW   [facility] Lab Draw     UMP RETURN   11:45 AM   (50 min.)   Deyanira Costello PA-C   [facility] ONC INFUSION 120    1:00 PM   (120 min.)    ONCOLOGY INFUSION   [facility] 5       6     7     8     9     10     11     [facility] DRAW   11:30 AM   (15 min.)    [facility]     [facility] RETURN   12:15 PM   (30 min.)   Marlen Harper MD   [facility]     [facility] ONC INFUSION 60    1:00 PM   (60 min.)    ONCOLOGY INFUSION   [facility] 12       13     14     15     16     17     18     19       20     21     22     23     24     MR BRAIN WWO   12:45 PM   (45 min.)   MR1   [facility] MRI     ECH COMPLETE    2:00 PM   (60 min.)   UCECHCR4   [facility] Echo 25     [facility] DRAW   11:30 AM   (15 min.)    [facility] DRAW   [facility] Lab Draw     [facility] RETURN   11:45 AM   (50 min.)   Deyanira Costello PA-C   [facility]     [facility] ONC INFUSION 120    2:00 PM   (120 min.)    ONCOLOGY INFUSION     [facility] 26       27     28     29     30 31 September 2017 Sunday Monday Tuesday Wednesday Thursday Friday Saturday                            1     [facility] DRAW   12:30 PM   (15 min.)    [facility] LAB DRAW   [facility] Lab Draw     [facility] ONC INFUSION 60    1:00 PM   (60 min.)    ONCOLOGY INFUSION   [facility] 2       3     4     5     6     7     8     9       10     11     12     13     14     15     16       17     18     P RETURN ENDOCRINE    2:15 PM   (30 min.)   Rolando Mishra MD   [facility] 19     20     21     22     23       24     25     26     27     28     29     30                  Lab Results:  Recent Results (from the past 12 hour(s))   CBC with platelets differential    Collection Time: 08/11/17 11:39 AM   Result Value Ref Range    WBC 4.0 4.0 - 11.0 10e9/L    RBC Count 3.46 (L) 3.8 - 5.2 10e12/L    Hemoglobin 11.3 (L) 11.7 - 15.7 g/dL    Hematocrit 32.9 (L) 35.0 - 47.0 %    MCV 95 78 - 100 fl    MCH 32.7 26.5 - 33.0 pg    MCHC 34.3 31.5 - 36.5 g/dL    RDW 17.2 (H) 10.0 - 15.0 %    Platelet Count 297 150 - 450 10e9/L    Diff Method Automated Method     % Neutrophils 56.2 %    % Lymphocytes 25.9 %    % Monocytes 10.9 %    % Eosinophils 0.0 %    % Basophils 2.5 %    % Immature Granulocytes 4.5 %    Nucleated RBCs 0 0 /100    Absolute Neutrophil 2.3 1.6 - 8.3 10e9/L    Absolute Lymphocytes 1.0 0.8 - 5.3 10e9/L    Absolute Monocytes 0.4 0.0 - 1.3 10e9/L    Absolute Eosinophils 0.0 0.0 - 0.7 10e9/L    Absolute Basophils 0.1 0.0 - 0.2 10e9/L    Abs Immature Granulocytes 0.2 0 - 0.4 10e9/L    Absolute Nucleated RBC 0.0    CEA    Collection Time: 08/11/17 11:39 AM   Result Value Ref Range    CEA 2.0 0 - 2.5 ug/L   Ca27.29  breast tumor marker    Collection Time: 08/11/17 11:39 AM   Result Value Ref Range    CA 27-29 20 0 - 39 U/mL   Comprehensive metabolic panel    Collection Time: 08/11/17  11:39 AM   Result Value Ref Range    Sodium 140 133 - 144 mmol/L    Potassium 3.7 3.4 - 5.3 mmol/L    Chloride 107 94 - 109 mmol/L    Carbon Dioxide 25 20 - 32 mmol/L    Anion Gap 8 3 - 14 mmol/L    Glucose 79 70 - 99 mg/dL    Urea Nitrogen 13 7 - 30 mg/dL    Creatinine 0.63 0.52 - 1.04 mg/dL    GFR Estimate >90  Non  GFR Calc   >60 mL/min/1.7m2    GFR Estimate If Black >90   GFR Calc   >60 mL/min/1.7m2    Calcium 8.9 8.5 - 10.1 mg/dL    Bilirubin Total 0.4 0.2 - 1.3 mg/dL    Albumin 3.4 3.4 - 5.0 g/dL    Protein Total 6.6 (L) 6.8 - 8.8 g/dL    Alkaline Phosphatase 119 40 - 150 U/L    ALT 38 0 - 50 U/L    AST 33 0 - 45 U/L

## 2017-08-11 NOTE — MR AVS SNAPSHOT
After Visit Summary   8/11/2017    Keren Erickson    MRN: [id number]           Patient Information     Date Of Birth          1955        Visit Information        Provider Department      8/11/2017 1:00 PM  11 ATC;  ONCOLOGY INFUSION [facility]        Today's Diagnoses     Metastatic breast cancer (H)    -  1    Brain metastasis (H)        Carcinoma of breast metastatic to multiple sites, unspecified laterality (H)          Care Instructions    Contact Numbers    [facility] [place] Main Line: [phone]  [facility] [place] Triage:  [phone]    Call triage with chills and/or temperature greater than or equal to 100.5, uncontrolled nausea/vomiting, diarrhea, constipation, dizziness, shortness of breath, chest pain, bleeding, unexplained bruising, or any new/concerning symptoms, questions/concerns.     If you are having any concerning symptoms or wish to speak to a provider before your next infusion visit, please call your care coordinator or triage to notify them so we can adequately serve you.       After Hours: [phone]    If after hours, weekends, or holidays, call main hospital  and ask for Oncology doctor on call.         August 2017 Sunday Monday Tuesday Wednesday Thursday Friday Saturday             1     2     3     4     [facility] DRAW   11:30 AM   (15 min.)    [facility] LAB DRAW   [facility] Lab Draw     [facility] RETURN   11:45 AM   (50 min.)   Deyanira Costello PA-C   [facility]    1:00 PM   (120 min.)    ONCOLOGY INFUSION   [facility] 5       6     7     8     9     10     11     P [facility] DRAW   11:30 AM   (15 min.)    [facility] DRAW   [facility] Lab Draw     [facility] RETURN   12:15 PM   (30 min.)   Marlen Harper MD   [facility] ONC INFUSION 60    1:00 PM   (60 min.)    ONCOLOGY INFUSION   [facility] 12       13      14     15     16     17     18     19       20     21     22     23     24     MR BRAIN WWO   12:45 PM   (45 min.)   UCMR1   [facility] MRI     ECH COMPLETE    2:00 PM   (60 min.)   UCECHCR4   [facility] Echo 25     [facility] DRAW   11:30 AM   (15 min.)    [facility] DRAW   [facility] Lab Draw     UMP RETURN   11:45 AM   (50 min.)   Deyanira Costello PA-C   [facility]     UMP ONC INFUSION 120    2:00 PM   (120 min.)   [facility] ONCOLOGY INFUSION   [facility] 26       27     28     29     30     31 September 2017 Sunday Monday Tuesday Wednesday Thursday Friday Saturday                            1     [facility] DRAW   12:30 PM   (15 min.)    [facility] LAB DRAW   [facility] Lab Draw     [facility] ONC INFUSION 60    1:00 PM   (60 min.)    ONCOLOGY INFUSION   [facility] 2       3     4     5     6     7     8     9       10     11     12     13     14     15     16       17     18     [facility] RETURN ENDOCRINE    2:15 PM   (30 min.)   Rolando Mishra MD   [facility] Endocrinology 19     20     21     22     23       24     25     26     27     28     29     30                  Lab Results:  Recent Results (from the past 12 hour(s))   CBC with platelets differential    Collection Time: 08/11/17 11:39 AM   Result Value Ref Range    WBC 4.0 4.0 - 11.0 10e9/L    RBC Count 3.46 (L) 3.8 - 5.2 10e12/L    Hemoglobin 11.3 (L) 11.7 - 15.7 g/dL    Hematocrit 32.9 (L) 35.0 - 47.0 %    MCV 95 78 - 100 fl    MCH 32.7 26.5 - 33.0 pg    MCHC 34.3 31.5 - 36.5 g/dL    RDW 17.2 (H) 10.0 - 15.0 %    Platelet Count 297 150 - 450 10e9/L    Diff Method Automated Method     % Neutrophils 56.2 %    % Lymphocytes 25.9 %    % Monocytes 10.9 %    % Eosinophils 0.0 %    % Basophils 2.5 %    % Immature Granulocytes 4.5 %    Nucleated RBCs 0 0 /100    Absolute Neutrophil 2.3 1.6 - 8.3 10e9/L    Absolute Lymphocytes 1.0 0.8  - 5.3 10e9/L    Absolute Monocytes 0.4 0.0 - 1.3 10e9/L    Absolute Eosinophils 0.0 0.0 - 0.7 10e9/L    Absolute Basophils 0.1 0.0 - 0.2 10e9/L    Abs Immature Granulocytes 0.2 0 - 0.4 10e9/L    Absolute Nucleated RBC 0.0    CEA    Collection Time: 08/11/17 11:39 AM   Result Value Ref Range    CEA 2.0 0 - 2.5 ug/L   Ca27.29  breast tumor marker    Collection Time: 08/11/17 11:39 AM   Result Value Ref Range    CA 27-29 20 0 - 39 U/mL   Comprehensive metabolic panel    Collection Time: 08/11/17 11:39 AM   Result Value Ref Range    Sodium 140 133 - 144 mmol/L    Potassium 3.7 3.4 - 5.3 mmol/L    Chloride 107 94 - 109 mmol/L    Carbon Dioxide 25 20 - 32 mmol/L    Anion Gap 8 3 - 14 mmol/L    Glucose 79 70 - 99 mg/dL    Urea Nitrogen 13 7 - 30 mg/dL    Creatinine 0.63 0.52 - 1.04 mg/dL    GFR Estimate >90  Non  GFR Calc   >60 mL/min/1.7m2    GFR Estimate If Black >90   GFR Calc   >60 mL/min/1.7m2    Calcium 8.9 8.5 - 10.1 mg/dL    Bilirubin Total 0.4 0.2 - 1.3 mg/dL    Albumin 3.4 3.4 - 5.0 g/dL    Protein Total 6.6 (L) 6.8 - 8.8 g/dL    Alkaline Phosphatase 119 40 - 150 U/L    ALT 38 0 - 50 U/L    AST 33 0 - 45 U/L               Follow-ups after your visit        Your next 10 appointments already scheduled     Aug 24, 2017  1:00 PM CDT   (Arrive by 12:45 PM)   MR BRAIN W/O & W CONTRAST with [address] [facility] MRI ([facility])    [address] [address] [phone]           Take your medicines as usual, unless your doctor tells you not to. Bring a list of your current medicines to your exam (including vitamins, minerals and over-the-counter drugs).  You will be given intravenous contrast for this exam. To prepare:   The day before your exam, drink extra fluids at least six 8-ounce glasses (unless your doctor tells you to restrict your fluids).   Have a blood test (creatinine test) within 30 days of your exam. Go  to your clinic or Diagnostic Imaging Department for this test.  The MRI machine uses a strong magnet. Please wear clothes without metal (snaps, zippers). A sweatsuit works well, or we may give you a hospital gown.  Please remove any body piercings and hair extensions before you arrive. You will also remove watches, jewelry, hairpins, wallets, dentures, partial dental plates and hearing aids. You may wear contact lenses, and you may be able to wear your rings. We have a safe place to keep your personal items, but it is safer to leave them at home.   **IMPORTANT** THE INSTRUCTIONS BELOW ARE ONLY FOR THOSE PATIENTS WHO HAVE BEEN TOLD THEY WILL RECEIVE SEDATION OR GENERAL ANESTHESIA DURING THEIR MRI PROCEDURE:  IF YOU WILL RECEIVE SEDATION (take medicine to help you relax during your exam):   You must get the medicine from your doctor before you arrive. Bring the medicine to the exam. Do not take it at home.   Arrive one hour early. Bring someone who can take you home after the test. Your medicine will make you sleepy. After the exam, you may not drive, take a bus or take a taxi by yourself.   No eating 8 hours before your exam. You may have clear liquids up until 4 hours before your exam. (Clear liquids include water, clear tea, black coffee and fruit juice without pulp.)  IF YOU WILL RECEIVE ANESTHESIA (be asleep for your exam):   Arrive 1 1/2 hours early. Bring someone who can take you home after the test. You may not drive, take a bus or take a taxi by yourself.   No eating 8 hours before your exam. You may have clear liquids up until 4 hours before your exam. (Clear liquids include water, clear tea, black coffee and fruit juice without pulp.)  Please call the Imaging Department at your exam site with any questions.            Aug 24, 2017  2:00 PM CDT   Ech Complete with UCECHCR4   [facility] Echo ([facility])    [address] [facility]  3rd Floor  [facility] [address] [phone]            1. Please bring or wear a comfortable two-piece outfit. 2. You may eat, drink and take your normal medicines. 3. For any questions that cannot be answered, please contact the ordering physician            Aug 25, 2017 11:30 AM CDT   [facility] Draw with [facility] LAB DRAW   [facility] Draw ([facility])    [address] [id number] [phone]            Aug 25, 2017 12:00 PM CDT   (Arrive by 11:45 AM)   Return Visit with Deyanira Costello PA-C   [facility] ([facility])    [address] [id number] [phone]            Aug 25, 2017  2:00 PM CDT   Infusion 120 with UC ONCOLOGY INFUSION, UC 11 ATC   [facility] ([facility])    [address] [place] [id number] [phone]            Sep 01, 2017 12:30 PM CDT   [facility] Lab Draw with UC [facility] LAB DRAW   [facility] Draw ([facility])    [address] [id number] [phone]            Sep 01, 2017  1:00 PM CDT   Infusion 60 with UC ONCOLOGY INFUSION, UC 11 ATC   [facility] ([facility])    [address] [place] [id number] [phone]            Sep 18, 2017  2:30 PM CDT   (Arrive by 2:15 PM)   RETURN ENDOCRINE with Rolando Mishra MD   [facility] Endocrinology ([facility])    [address] [place] [address]  3rd Floor  [facility] [id number] [phone]              Who to contact     If you have questions or need follow up information about today's clinic visit or your schedule please contact [facility] directly at [phone].  Normal or non-critical lab and imaging results will be communicated to you by MyChart, letter or phone within 4  business days after the clinic has received the results. If you do not hear from us within 7 days, please contact the clinic through [employer] or phone. If you have a critical or abnormal lab result, we will notify you by phone as soon as possible.  Submit refill requests through [employer] or call your pharmacy and they will forward the refill request to us. Please allow 3 business days for your refill to be completed.          Additional Information About Your Visit        [employer] Information     [employer] gives you secure access to your electronic health record. If you see a primary care provider, you can also send messages to your care team and make appointments. If you have questions, please call your primary care clinic.  If you do not have a primary care provider, please call [phone] and they will assist you.        Care EveryWhere ID     This is your Care EveryWhere ID. This could be used by other organizations to access your [place] medical records  [id number]         Blood Pressure from Last 3 Encounters:   08/11/17 119/84   08/04/17 116/77   07/17/17 110/76    Weight from Last 3 Encounters:   08/11/17 53.1 kg (117 lb)   08/04/17 51.8 kg (114 lb 3.2 oz)   07/17/17 52.5 kg (115 lb 12.8 oz)              We Performed the Following     Ca27.29  breast tumor marker     CBC with platelets differential     CEA     Comprehensive metabolic panel        Primary Care Provider Office Phone # Fax #    Kelly Bg Hart [phone]       [address] [facility] [address]        Equal Access to Services     CLAUDIA BOND : Hadii keturah sanchez Sophoebe, waaxda luqadaha, qaybta kaalmada annabel, freddie escobedo . So [facility] [phone].    ATENCIÓN: Si habla español, tiene a ramires disposición servicios gratuitos de asistencia lingüística. Llame al [phone].    We comply with applicable federal civil rights laws and [place] laws. We do not discriminate on the basis of race,  color, national origin, age, disability sex, sexual orientation or gender identity.            Thank you!     Thank you for choosing [facility]  for your care. Our goal is always to provide you with excellent care. Hearing back from our patients is one way we can continue to improve our services. Please take a few minutes to complete the written survey that you may receive in the mail after your visit with us. Thank you!             Your Updated Medication List - Protect others around you: Learn how to safely use, store and throw away your medicines at www.disposemymeds.org.          This list is accurate as of: 8/11/17  2:45 PM.  Always use your most recent med list.                   Brand Name Dispense Instructions for use Diagnosis    ALEVE PO      Take 220 mg by mouth daily        desmopressin 0.1 MG tablet    DDAVP    90 tablet    Take 1 tablet (100 mcg) by mouth At Bedtime    Diabetes insipidus, neurohypophyseal (H)       Multi-vitamin Tabs tablet      Take 1 tablet by mouth daily        omeprazole 20 MG tablet     30 tablet    Take 1 tablet (20 mg) by mouth daily    Gastroesophageal reflux disease without esophagitis       prochlorperazine 10 MG tablet    COMPAZINE    90 tablet    Take 1 tablet (10 mg) by mouth every 6 hours as needed for nausea or vomiting    Nausea       VITAMIN E BLEND PO      Take by mouth daily

## 2017-08-11 NOTE — NURSING NOTE
Chief Complaint   Patient presents with     Port Draw     accessed with power needle for labs, vitals checked

## 2017-08-24 ENCOUNTER — RADIANT APPOINTMENT (OUTPATIENT)
Dept: CARDIOLOGY | Facility: CLINIC | Age: 62
End: 2017-08-24
Attending: PHYSICIAN ASSISTANT

## 2017-08-24 DIAGNOSIS — C50.919 CARCINOMA OF BREAST METASTATIC TO MULTIPLE SITES, UNSPECIFIED LATERALITY (H): ICD-10-CM

## 2017-08-25 ENCOUNTER — APPOINTMENT (OUTPATIENT)
Dept: LAB | Facility: CLINIC | Age: 62
End: 2017-08-25
Attending: INTERNAL MEDICINE
Payer: COMMERCIAL

## 2017-08-25 ENCOUNTER — INFUSION THERAPY VISIT (OUTPATIENT)
Dept: ONCOLOGY | Facility: CLINIC | Age: 62
End: 2017-08-25
Attending: INTERNAL MEDICINE
Payer: COMMERCIAL

## 2017-08-25 VITALS
DIASTOLIC BLOOD PRESSURE: 84 MMHG | TEMPERATURE: 97.9 F | RESPIRATION RATE: 16 BRPM | SYSTOLIC BLOOD PRESSURE: 119 MMHG | OXYGEN SATURATION: 97 % | HEIGHT: 62 IN | WEIGHT: 118.2 LBS | BODY MASS INDEX: 21.75 KG/M2 | HEART RATE: 79 BPM

## 2017-08-25 DIAGNOSIS — C79.31 BRAIN METASTASIS: ICD-10-CM

## 2017-08-25 DIAGNOSIS — C50.919 CARCINOMA OF BREAST METASTATIC TO MULTIPLE SITES, UNSPECIFIED LATERALITY (H): Primary | ICD-10-CM

## 2017-08-25 DIAGNOSIS — C50.919 METASTATIC BREAST CANCER: Primary | ICD-10-CM

## 2017-08-25 DIAGNOSIS — E23.2 DIABETES INSIPIDUS, NEUROHYPOPHYSEAL (H): ICD-10-CM

## 2017-08-25 DIAGNOSIS — C50.919 CARCINOMA OF BREAST METASTATIC TO MULTIPLE SITES, UNSPECIFIED LATERALITY (H): ICD-10-CM

## 2017-08-25 LAB
ALBUMIN SERPL-MCNC: 3.6 G/DL (ref 3.4–5)
ALP SERPL-CCNC: 124 U/L (ref 40–150)
ALT SERPL W P-5'-P-CCNC: 41 U/L (ref 0–50)
ANION GAP SERPL CALCULATED.3IONS-SCNC: 8 MMOL/L (ref 3–14)
AST SERPL W P-5'-P-CCNC: 36 U/L (ref 0–45)
BASOPHILS # BLD AUTO: 0.1 10E9/L (ref 0–0.2)
BASOPHILS NFR BLD AUTO: 1 %
BILIRUB SERPL-MCNC: 0.3 MG/DL (ref 0.2–1.3)
BUN SERPL-MCNC: 17 MG/DL (ref 7–30)
CALCIUM SERPL-MCNC: 9.2 MG/DL (ref 8.5–10.1)
CANCER AG27-29 SERPL-ACNC: 23 U/ML (ref 0–39)
CEA SERPL-MCNC: 1.8 UG/L (ref 0–2.5)
CHLORIDE SERPL-SCNC: 105 MMOL/L (ref 94–109)
CO2 SERPL-SCNC: 27 MMOL/L (ref 20–32)
CREAT SERPL-MCNC: 0.7 MG/DL (ref 0.52–1.04)
DIFFERENTIAL METHOD BLD: ABNORMAL
EOSINOPHIL # BLD AUTO: 0 10E9/L (ref 0–0.7)
EOSINOPHIL NFR BLD AUTO: 0.1 %
ERYTHROCYTE [DISTWIDTH] IN BLOOD BY AUTOMATED COUNT: 17.7 % (ref 10–15)
GFR SERPL CREATININE-BSD FRML MDRD: 85 ML/MIN/1.7M2
GLUCOSE SERPL-MCNC: 94 MG/DL (ref 70–99)
HCT VFR BLD AUTO: 33.6 % (ref 35–47)
HGB BLD-MCNC: 11.2 G/DL (ref 11.7–15.7)
IMM GRANULOCYTES # BLD: 0.1 10E9/L (ref 0–0.4)
IMM GRANULOCYTES NFR BLD: 1.3 %
LYMPHOCYTES # BLD AUTO: 1 10E9/L (ref 0.8–5.3)
LYMPHOCYTES NFR BLD AUTO: 12.5 %
MCH RBC QN AUTO: 32.2 PG (ref 26.5–33)
MCHC RBC AUTO-ENTMCNC: 33.3 G/DL (ref 31.5–36.5)
MCV RBC AUTO: 97 FL (ref 78–100)
MONOCYTES # BLD AUTO: 0.9 10E9/L (ref 0–1.3)
MONOCYTES NFR BLD AUTO: 12 %
NEUTROPHILS # BLD AUTO: 5.7 10E9/L (ref 1.6–8.3)
NEUTROPHILS NFR BLD AUTO: 73.1 %
NRBC # BLD AUTO: 0 10*3/UL
NRBC BLD AUTO-RTO: 0 /100
PLATELET # BLD AUTO: 343 10E9/L (ref 150–450)
POTASSIUM SERPL-SCNC: 3.9 MMOL/L (ref 3.4–5.3)
PROT SERPL-MCNC: 6.6 G/DL (ref 6.8–8.8)
RBC # BLD AUTO: 3.48 10E12/L (ref 3.8–5.2)
SODIUM SERPL-SCNC: 140 MMOL/L (ref 133–144)
WBC # BLD AUTO: 7.8 10E9/L (ref 4–11)

## 2017-08-25 PROCEDURE — 99213 OFFICE O/P EST LOW 20 MIN: CPT | Mod: ZP | Performed by: PHYSICIAN ASSISTANT

## 2017-08-25 PROCEDURE — 96367 TX/PROPH/DG ADDL SEQ IV INF: CPT

## 2017-08-25 PROCEDURE — 86300 IMMUNOASSAY TUMOR CA 15-3: CPT | Performed by: INTERNAL MEDICINE

## 2017-08-25 PROCEDURE — 96413 CHEMO IV INFUSION 1 HR: CPT

## 2017-08-25 PROCEDURE — 99213 OFFICE O/P EST LOW 20 MIN: CPT | Mod: ZF

## 2017-08-25 PROCEDURE — 96417 CHEMO IV INFUS EACH ADDL SEQ: CPT

## 2017-08-25 PROCEDURE — 82378 CARCINOEMBRYONIC ANTIGEN: CPT | Performed by: INTERNAL MEDICINE

## 2017-08-25 PROCEDURE — 25000128 H RX IP 250 OP 636: Mod: ZF | Performed by: PHYSICIAN ASSISTANT

## 2017-08-25 PROCEDURE — 85025 COMPLETE CBC W/AUTO DIFF WBC: CPT | Performed by: INTERNAL MEDICINE

## 2017-08-25 PROCEDURE — 25000128 H RX IP 250 OP 636: Mod: ZF | Performed by: INTERNAL MEDICINE

## 2017-08-25 PROCEDURE — 80053 COMPREHEN METABOLIC PANEL: CPT | Performed by: INTERNAL MEDICINE

## 2017-08-25 RX ORDER — EPINEPHRINE 0.3 MG/.3ML
0.3 INJECTION SUBCUTANEOUS EVERY 5 MIN PRN
Status: CANCELLED | OUTPATIENT
Start: 2017-08-25

## 2017-08-25 RX ORDER — ALBUTEROL SULFATE 90 UG/1
1-2 AEROSOL, METERED RESPIRATORY (INHALATION)
Status: CANCELLED
Start: 2017-08-31

## 2017-08-25 RX ORDER — ALBUTEROL SULFATE 0.83 MG/ML
2.5 SOLUTION RESPIRATORY (INHALATION)
Status: CANCELLED | OUTPATIENT
Start: 2017-08-31

## 2017-08-25 RX ORDER — EPINEPHRINE 1 MG/ML
0.3 INJECTION INTRAMUSCULAR; INTRAVENOUS; SUBCUTANEOUS EVERY 5 MIN PRN
Status: CANCELLED | OUTPATIENT
Start: 2017-08-25

## 2017-08-25 RX ORDER — SODIUM CHLORIDE 9 MG/ML
1000 INJECTION, SOLUTION INTRAVENOUS CONTINUOUS PRN
Status: CANCELLED
Start: 2017-08-31

## 2017-08-25 RX ORDER — HEPARIN SODIUM (PORCINE) LOCK FLUSH IV SOLN 100 UNIT/ML 100 UNIT/ML
5 SOLUTION INTRAVENOUS EVERY 8 HOURS
Status: CANCELLED | OUTPATIENT
Start: 2017-08-31

## 2017-08-25 RX ORDER — HEPARIN SODIUM (PORCINE) LOCK FLUSH IV SOLN 100 UNIT/ML 100 UNIT/ML
5 SOLUTION INTRAVENOUS EVERY 8 HOURS
Status: CANCELLED | OUTPATIENT
Start: 2017-08-25

## 2017-08-25 RX ORDER — DIPHENHYDRAMINE HYDROCHLORIDE 50 MG/ML
50 INJECTION INTRAMUSCULAR; INTRAVENOUS
Status: CANCELLED
Start: 2017-08-25

## 2017-08-25 RX ORDER — HEPARIN SODIUM (PORCINE) LOCK FLUSH IV SOLN 100 UNIT/ML 100 UNIT/ML
5 SOLUTION INTRAVENOUS ONCE
Status: COMPLETED | OUTPATIENT
Start: 2017-08-25 | End: 2017-08-25

## 2017-08-25 RX ORDER — METHYLPREDNISOLONE SODIUM SUCCINATE 125 MG/2ML
125 INJECTION, POWDER, LYOPHILIZED, FOR SOLUTION INTRAMUSCULAR; INTRAVENOUS
Status: CANCELLED
Start: 2017-08-31

## 2017-08-25 RX ORDER — ACETAMINOPHEN 325 MG/1
650 TABLET ORAL
Status: CANCELLED | OUTPATIENT
Start: 2017-08-25

## 2017-08-25 RX ORDER — EPINEPHRINE 0.3 MG/.3ML
0.3 INJECTION SUBCUTANEOUS EVERY 5 MIN PRN
Status: CANCELLED | OUTPATIENT
Start: 2017-08-31

## 2017-08-25 RX ORDER — ALBUTEROL SULFATE 0.83 MG/ML
2.5 SOLUTION RESPIRATORY (INHALATION)
Status: CANCELLED | OUTPATIENT
Start: 2017-08-25

## 2017-08-25 RX ORDER — EPINEPHRINE 1 MG/ML
0.3 INJECTION INTRAMUSCULAR; INTRAVENOUS; SUBCUTANEOUS EVERY 5 MIN PRN
Status: CANCELLED | OUTPATIENT
Start: 2017-08-31

## 2017-08-25 RX ORDER — ALBUTEROL SULFATE 90 UG/1
1-2 AEROSOL, METERED RESPIRATORY (INHALATION)
Status: CANCELLED
Start: 2017-08-25

## 2017-08-25 RX ORDER — METHYLPREDNISOLONE SODIUM SUCCINATE 125 MG/2ML
125 INJECTION, POWDER, LYOPHILIZED, FOR SOLUTION INTRAMUSCULAR; INTRAVENOUS
Status: CANCELLED
Start: 2017-08-25

## 2017-08-25 RX ORDER — DIPHENHYDRAMINE HYDROCHLORIDE 50 MG/ML
50 INJECTION INTRAMUSCULAR; INTRAVENOUS
Status: CANCELLED
Start: 2017-08-31

## 2017-08-25 RX ORDER — LORAZEPAM 2 MG/ML
0.5 INJECTION INTRAMUSCULAR EVERY 4 HOURS PRN
Status: CANCELLED
Start: 2017-08-25

## 2017-08-25 RX ORDER — MEPERIDINE HYDROCHLORIDE 25 MG/ML
25 INJECTION INTRAMUSCULAR; INTRAVENOUS; SUBCUTANEOUS EVERY 30 MIN PRN
Status: CANCELLED | OUTPATIENT
Start: 2017-08-31

## 2017-08-25 RX ORDER — DIPHENHYDRAMINE HCL 25 MG
50 CAPSULE ORAL
Status: CANCELLED | OUTPATIENT
Start: 2017-08-25

## 2017-08-25 RX ORDER — MEPERIDINE HYDROCHLORIDE 25 MG/ML
25 INJECTION INTRAMUSCULAR; INTRAVENOUS; SUBCUTANEOUS EVERY 30 MIN PRN
Status: CANCELLED | OUTPATIENT
Start: 2017-08-25

## 2017-08-25 RX ORDER — LORAZEPAM 2 MG/ML
0.5 INJECTION INTRAMUSCULAR EVERY 4 HOURS PRN
Status: CANCELLED
Start: 2017-08-31

## 2017-08-25 RX ORDER — SODIUM CHLORIDE 9 MG/ML
1000 INJECTION, SOLUTION INTRAVENOUS CONTINUOUS PRN
Status: CANCELLED
Start: 2017-08-25

## 2017-08-25 RX ORDER — DESMOPRESSIN ACETATE 0.1 MG/1
0.1 TABLET ORAL AT BEDTIME
Qty: 90 TABLET | Refills: 3 | Status: SHIPPED | OUTPATIENT
Start: 2017-08-25 | End: 2017-08-31

## 2017-08-25 RX ADMIN — SODIUM CHLORIDE, PRESERVATIVE FREE 5 ML: 5 INJECTION INTRAVENOUS at 11:47

## 2017-08-25 RX ADMIN — SODIUM CHLORIDE 250 ML: 9 INJECTION, SOLUTION INTRAVENOUS at 14:09

## 2017-08-25 RX ADMIN — DEXAMETHASONE SODIUM PHOSPHATE 12 MG: 10 INJECTION, SOLUTION INTRAMUSCULAR; INTRAVENOUS at 14:09

## 2017-08-25 RX ADMIN — TRASTUZUMAB 300 MG: 150 INJECTION, POWDER, LYOPHILIZED, FOR SOLUTION INTRAVENOUS at 15:06

## 2017-08-25 RX ADMIN — GEMCITABINE 1460 MG: 38 INJECTION, SOLUTION INTRAVENOUS at 14:28

## 2017-08-25 RX ADMIN — SODIUM CHLORIDE, PRESERVATIVE FREE 5 ML: 5 INJECTION INTRAVENOUS at 15:40

## 2017-08-25 ASSESSMENT — PAIN SCALES - GENERAL: PAINLEVEL: NO PAIN (0)

## 2017-08-25 NOTE — MR AVS SNAPSHOT
After Visit Summary   8/25/2017    Keren Erickson    MRN: [id number]           Patient Information     Date Of Birth          1955        Visit Information        Provider Department      8/25/2017 2:00 PM  11 ATC;  ONCOLOGY INFUSION [facility]        Today's Diagnoses     Metastatic breast cancer (H)    -  1    Brain metastasis (H)        Carcinoma of breast metastatic to multiple sites, unspecified laterality (H)          Care Instructions    Contact Numbers    [facility] Main Line: [phone]    Triage Nurse Line: [phone]      Please call the [facility] Nurse Triage line if you experience a temperature greater than or equal to 100.5, shaking chills, have uncontrolled nausea, vomiting and/or diarrhea, dizziness, shortness of breath, bleeding not relieved with pressure, or if you have any other questions or concerns.     If it is after hours, weekends, or holidays, please call the main hospital  at  [phone] and ask to speak to the adult Oncology doctor on call.     If you are having any concerning symptoms or wish to speak to a provider before your next infusion visit, please call your care coordinator or triage them so we can adequately serve you.      If you need to refill your narcotic prescription or other medication, please call triage before your infusion appointment.        August 2017 Sunday Monday Tuesday Wednesday Thursday Friday Saturday             1     2     3     4     [facility] LAB DRAW   11:30 AM   (15 min.)   [facility]   [facility] Lab Draw     [facility] RETURN   11:45 AM   (50 min.)   Deyanira Costello PA-C   [facility] ONC INFUSION 120    1:00 PM   (120 min.)    ONCOLOGY INFUSION   [facility] 5       6     7     8     9     10     11     [facility] LAB DRAW   11:30 AM   (15 min.)   [facility] DRAW   [facility] Lab Draw     [facility]  RETURN   12:15 PM   (30 min.)   Marlen Harper MD   [facility]     [facility] ONC INFUSION 60    1:00 PM   (60 min.)    ONCOLOGY INFUSION   [facility] 12       13     14     15     16     17     18     19       20     21     22     23     24     MR BRAIN WWO   12:45 PM   (45 min.)   UCMR1   [facility] MRI     ECH COMPLETE    2:00 PM   (60 min.)   UCECHCR4   [facility] Echo 25     [facility] DRAW   11:30 AM   (15 min.)    [facility]     [facility] RETURN   11:45 AM   (50 min.)   Deyanira Costello PA-C   [facility]     [facility] ONC INFUSION 120    2:00 PM   (120 min.)    ONCOLOGY INFUSION   [facility] 26       27     28     29     30     31 September 2017 Sunday Monday Tuesday Wednesday Thursday Friday Saturday                            1     [facility] DRAW   12:30 PM   (15 min.)    [facility] LAB DRAW   [facility] Lab Draw     [facility] ONC INFUSION 60    1:00 PM   (60 min.)    ONCOLOGY INFUSION   [facility] 2       3     4     5     6     7     8     9       10     11     12     13     14     15     16       17     18     [facility] RETURN ENDOCRINE    2:15 PM   (30 min.)   Rolando Mishra MD   [facility] Endocrinology 19     20     21     22     [facility] DRAW   12:30 PM   (15 min.)    [facility]   [facility]     [facility] ONC INFUSION 60    1:00 PM   (60 min.)    ONCOLOGY INFUSION   [facility] 23       24     25     26     27     28     29     30                 Recent Results (from the past 24 hour(s))   CBC with platelets differential    Collection Time: 08/25/17 11:56 AM   Result Value Ref Range    WBC 7.8 4.0 - 11.0 10e9/L    RBC Count 3.48 (L) 3.8 - 5.2 10e12/L    Hemoglobin 11.2 (L) 11.7 - 15.7 g/dL    Hematocrit 33.6 (L) 35.0 - 47.0 %    MCV 97 78 - 100 fl    MCH 32.2 26.5 -  33.0 pg    MCHC 33.3 31.5 - 36.5 g/dL    RDW 17.7 (H) 10.0 - 15.0 %    Platelet Count 343 150 - 450 10e9/L    Diff Method Automated Method     % Neutrophils 73.1 %    % Lymphocytes 12.5 %    % Monocytes 12.0 %    % Eosinophils 0.1 %    % Basophils 1.0 %    % Immature Granulocytes 1.3 %    Nucleated RBCs 0 0 /100    Absolute Neutrophil 5.7 1.6 - 8.3 10e9/L    Absolute Lymphocytes 1.0 0.8 - 5.3 10e9/L    Absolute Monocytes 0.9 0.0 - 1.3 10e9/L    Absolute Eosinophils 0.0 0.0 - 0.7 10e9/L    Absolute Basophils 0.1 0.0 - 0.2 10e9/L    Abs Immature Granulocytes 0.1 0 - 0.4 10e9/L    Absolute Nucleated RBC 0.0    Comprehensive metabolic panel    Collection Time: 08/25/17 11:56 AM   Result Value Ref Range    Sodium 140 133 - 144 mmol/L    Potassium 3.9 3.4 - 5.3 mmol/L    Chloride 105 94 - 109 mmol/L    Carbon Dioxide 27 20 - 32 mmol/L    Anion Gap 8 3 - 14 mmol/L    Glucose 94 70 - 99 mg/dL    Urea Nitrogen 17 7 - 30 mg/dL    Creatinine 0.70 0.52 - 1.04 mg/dL    GFR Estimate 85 >60 mL/min/1.7m2    GFR Estimate If Black >90 >60 mL/min/1.7m2    Calcium 9.2 8.5 - 10.1 mg/dL    Bilirubin Total 0.3 0.2 - 1.3 mg/dL    Albumin 3.6 3.4 - 5.0 g/dL    Protein Total 6.6 (L) 6.8 - 8.8 g/dL    Alkaline Phosphatase 124 40 - 150 U/L    ALT 41 0 - 50 U/L    AST 36 0 - 45 U/L                   Follow-ups after your visit        Your next 10 appointments already scheduled     Aug 25, 2017  2:00 PM CDT   Infusion 120 with  ONCOLOGY INFUSION, UC 11 ATC   [facility] ([facility])    [address] [id number] [phone]            Sep 01, 2017 12:30 PM CDT   [facility]   [facility] Lab Draw ([facility])    [address] [facility]  2nd Floor  [facility] [address] [phone]            Sep 01, 2017  1:00 PM CDT   Infusion 60 with UC ONCOLOGY INFUSION, UC 11 ATC   [facility] (  [facility])    [address] [place] [address]  2nd [facility] [address]   [phone]            Sep 18, 2017  2:30 PM CDT   (Arrive by 2:15 PM)   RETURN ENDOCRINE with Rolando Mishra MD   [facility] Endocrinology ([facility])    [address] [place] [address]  3rd Floor  [facility] [address]   [phone]            Sep 22, 2017 12:30 PM CDT   [facility] Lab Draw with [facility] DRAW   [facility])    [address] [place] [address]   [phone]            Sep 22, 2017  1:00 PM CDT   Infusion 60 with UC ONCOLOGY INFUSION, UC 28 ATC   [facility] ([facility])    [address] [place] [address]   [phone]            Oct 06, 2017 11:30 AM CDT   [facility] Lab Draw with [facility] LAB DRAW   [facility] Lab Draw ([facility])    [address] [place] [address]   [phone]              Future tests that were ordered for you today     Open Future Orders        Priority Expected Expires Ordered    CT Chest/Abdomen/Pelvis w Contrast Routine  8/26/2018 8/25/2017            Who to contact     If you have questions or need follow up information about today's clinic visit or your schedule please contact [facility] directly at [phone].  Normal or non-critical lab and imaging results will be communicated to you by MyChart, letter or phone within 4 business days after the clinic has received the results. If you do not hear from us within 7 days, please contact the clinic through [email or web address] or phone. If you have a critical or abnormal lab result, we will notify you by phone as soon as possible.  Submit refill requests through [employer] or call your pharmacy and they will forward the refill request to us. Please allow 3 business days for your  refill to be completed.          Additional Information About Your Visit        MyChart Information     [employer] gives you secure access to your electronic health record. If you see a primary care provider, you can also send messages to your care team and make appointments. If you have questions, please call your primary care clinic.  If you do not have a primary care provider, please call [phone] and they will assist you.        Care EveryWhere ID     This is your Care EveryWhere ID. This could be used by other organizations to access your [facility] medical records  [id number]         Blood Pressure from Last 3 Encounters:   08/25/17 119/84   08/11/17 119/84   08/04/17 116/77    Weight from Last 3 Encounters:   08/25/17 53.6 kg (118 lb 3.2 oz)   08/11/17 53.1 kg (117 lb)   08/04/17 51.8 kg (114 lb 3.2 oz)              We Performed the Following     Ca27.29  breast tumor marker     CBC with platelets differential     CEA     Comprehensive metabolic panel          Where to get your medicines      These medications were sent to [facility] [address] [facility]  [address] [facility] [address] [address]    Hours:  TRANSPLANT PHONE NUMBER [phone] Phone:  [phone]     desmopressin 0.1 MG tablet          Primary Care Provider Office Phone # Fax #    Kelly Bg Hart [phone]       [address] [facility] [address]        Equal Access to Services     CLAUDIA BOND AH: Hadii keturah Joshi, waaxda luevelio, qaybta kaalmada annabel, freddie keys. So [facility] [phone].    ATENCIÓN: Si habla español, tiene a ramires disposición servicios gratuitos de asistencia lingüística. Llame al [phone].    We comply with applicable federal civil rights laws and [place] laws. We do not discriminate on the basis of race, color, national origin, age, disability sex, sexual orientation or gender  identity.            Thank you!     Thank you for choosing [facility]  for your care. Our goal is always to provide you with excellent care. Hearing back from our patients is one way we can continue to improve our services. Please take a few minutes to complete the written survey that you may receive in the mail after your visit with us. Thank you!             Your Updated Medication List - Protect others around you: Learn how to safely use, store and throw away your medicines at www.disposemymeds.org.          This list is accurate as of: 8/25/17  1:58 PM.  Always use your most recent med list.                   Brand Name Dispense Instructions for use Diagnosis    ALEVE PO      Take 220 mg by mouth daily        desmopressin 0.1 MG tablet    DDAVP    90 tablet    Take 1 tablet (100 mcg) by mouth At Bedtime    Diabetes insipidus, neurohypophyseal (H)       Multi-vitamin Tabs tablet      Take 1 tablet by mouth daily        omeprazole 20 MG tablet     30 tablet    Take 1 tablet (20 mg) by mouth daily    Gastroesophageal reflux disease without esophagitis       prochlorperazine 10 MG tablet    COMPAZINE    90 tablet    Take 1 tablet (10 mg) by mouth every 6 hours as needed for nausea or vomiting    Nausea       VITAMIN E BLEND PO      Take by mouth daily

## 2017-08-25 NOTE — LETTER
8/25/2017      RE: Keren Erickson  [address] [facility] [address]       HEMATOLOGY/ONCOLOGY PROGRESS NOTE  Aug 25, 2017    REASON FOR VISIT: follow-up of metastatic breast cancer, triple positive    DIAGNOSIS:   The patient is a 60-year-old female who presented to the hospital initially in 09/2013 with complaints of dyspnea. Workup revealed a large pericardial effusion and pleural effusion. Physical examination revealed a large untreated left breast mass encompassing the entire left breast. Biopsies revealed these were ER, NE and HER2-positive breast cancer. She had a thoracentesis and pericardial sclerosis performed. She was originally on Arimidex and Herceptin. In 01/2014, she was switched to tamoxifen and Herceptin due to arthralgias, and she ultimately developed progressive disease and was switched to Faslodex and Herceptin. In 01/2015, she was found to have progressive disease and was switched to T-DM1.     Initially when she was seen in late 02/2015, she complained of some V5 sensory deficit. This was subjective. I was not able to elicit this on examination. This persisted and further workup was performed with a brain MRI. This revealed what was initially thought to be 3 punctate brain metastases. She originally saw Radiation Oncology and Neurosurgery as well as underwent a lumbar puncture. Cytology from the lumbar puncture showed no evidence of leptomeningeal disease. She was treated with Gamma Knife radiation to 6 lesions, performed on 04/16/2015.  She initiated therapy with TDM1 in January 2015 and continued this through 6/26/2015 with overall stable disease. She has since been on a break from treatment. The patient presented earlier this month with recurrent shortness of breath.  Imaging revealed recurrent right-sided pleural effusion.  She has since undergone thoracentesis with cytology pending.  Clinically, however, there is evidence of disease progression. PET scan performed on  11/25/2015 demonstrated progression of disease at multiple sites. She restarted TDM1 on 11/27/2015, however experienced a mild transfusion reaction with shortness of breath and chest tightness, resolved upon stopping TDM1 and 125 mg of SoluMedrol. She had progression of disease on repeat imaging 2/17/2016, as well as new brain metastases. She started TDM1 with Perjeta on 3/4/2016. She underwent gamma knife to brain mets on 3/8/16.       In late May, she was found to have progressive brain metastases.  She received whole brain radiation.  She had a follow up brain MRI August 2016 that was stable.     In September 2016, she enrolled on [place]  trial and was randomized to the standard of care arm/Physician's Choice; started on gemzar and herceptin. She was recently hospitalized 1/27-2/3/17 for presumed HCAP. Trial was suspended. She continued on gemzar and heceptin with stable disease. Last restaging was 4/7/17 and stable. Gemzar was placed on hold from 4/10/2017 through 6/22/17 so that she could recover from pneumonia.    INTERVAL HISTORY:  Dominga presents for follow-up today prior to next cycle of treatment, accompanied by her friend.    Doing well. She has felt more energized and that the fatigue is getting better since moving to her new duplex, which has allowed to her increase her mobility. She walks a lap around the house throughout the day. She is not even using a wheelchair today, using a walker and walking briskly with it. She conitnues to tolerate chemo overall well, has some mild nausea controlled with anti-emetics and some heartburn. Pain still mananged with one aleve daily PRN. She has had no new headaches or neurologic issues. She has no new pains. She has not yet seen dentist.   ROS: 10 point ROS was conducted and was otherwise negative except for as above.  No f/s/c.  No chest pain/cough/dyspnea.  Daily BM, no  issues. Skin is dry, but no rash.     PHYSICAL EXAMINATION:     /84 (BP  "Location: Right arm, Patient Position: Chair, Cuff Size: Adult Regular)  Pulse 79  Temp 97.9  F (36.6  C) (Oral)  Resp 16  Ht 1.575 m (5' 2.01\")  Wt 53.6 kg (118 lb 3.2 oz)  SpO2 97%  BMI 21.61 kg/m2    Wt Readings from Last 4 Encounters:   08/25/17 53.6 kg (118 lb 3.2 oz)   08/11/17 53.1 kg (117 lb)   08/04/17 51.8 kg (114 lb 3.2 oz)   07/17/17 52.5 kg (115 lb 12.8 oz)     Constitutional: Alert, oriented, cachectic female in no visible distress. Able to ambulate independently  but chooses wheelchair for longer distances in clinic  Eyes: PERRL. Anicteric sclerae.    ENT/Mouth: OM moist and pink without lesions or thrush.    CV: RRR, no murmurs appreciated.  Resp: CTAB slightly diminished R base, stable. Breathing comfortably.  Extremities: No lower extremity edema appreciated.  Skin: Warm, dry. No bruising or petechiae noted. No rash  Lymph: No palpable LAD  Neuro: CN II-XII grossly intact.     LABS:   Results for HEIDI RUIZ (MRN [id number]) as of 8/4/2017 12:49   Ref. Range 8/4/2017 12:06   Sodium Latest Ref Range: 133 - 144 mmol/L 139   Potassium Latest Ref Range: 3.4 - 5.3 mmol/L 3.6   Chloride Latest Ref Range: 94 - 109 mmol/L 106   Carbon Dioxide Latest Ref Range: 20 - 32 mmol/L 25   Urea Nitrogen Latest Ref Range: 7 - 30 mg/dL 16   Creatinine Latest Ref Range: 0.52 - 1.04 mg/dL 0.66   GFR Estimate Latest Ref Range: >60 mL/min/1.7m2 >90...   GFR Estimate If Black Latest Ref Range: >60 mL/min/1.7m2 >90...   Calcium Latest Ref Range: 8.5 - 10.1 mg/dL 8.7   Anion Gap Latest Ref Range: 3 - 14 mmol/L 8   Albumin Latest Ref Range: 3.4 - 5.0 g/dL 3.4   Protein Total Latest Ref Range: 6.8 - 8.8 g/dL 6.6 (L)   Bilirubin Total Latest Ref Range: 0.2 - 1.3 mg/dL 0.3   Alkaline Phosphatase Latest Ref Range: 40 - 150 U/L 114   ALT Latest Ref Range: 0 - 50 U/L 29   AST Latest Ref Range: 0 - 45 U/L 29   Glucose Latest Ref Range: 70 - 99 mg/dL 104 (H)   WBC Latest Ref Range: 4.0 - 11.0 10e9/L 7.1 "   Hemoglobin Latest Ref Range: 11.7 - 15.7 g/dL 11.7   Hematocrit Latest Ref Range: 35.0 - 47.0 % 35.5   Platelet Count Latest Ref Range: 150 - 450 10e9/L 485 (H)       IMPRESSION/PLAN:  Dominga is a 61-year-old female with history of metastatic ER-positive, HER-2 positive breast cancer, with involvement of the bone, history of pleural effusions treated with pleurodesis and PleurX placement, and with treated brain metastases, previously with stable disease on TDM1, with progressive disease after treatment break.  She was started on [place]  clinical trial and randomized to physician's choice, and started on Gemzar/Herceptin on 9/29/17.    1. Breast cancer:  Continues on Herceptin/Gemzar. Her tumor marker has remained low and stable. Today is cycle 16 day 1. She continues to tolerate this well. SHe will have repeat imaging after this cycle.  - repeat echo today stable, next due November  - imaging q3-4 months    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 She started desmopressin 50 mcg with good response clinically.     3. Brain mets: Numbness of tongue and V2 and V3 on right are improving. Stable brain MRI yesterday.    4. FEN: Weight overall stable.  She is eating better.     5. Pain: Chronic mild aches/pains secondary to disease and with myalgias on Gemzar. No current pain today, not taking narcotics.     6. Bone mets: on Xgeva every 3 months. Last 5/1/17. On hold for dental work.    7. Mood: She is overall coping well.     8. She has an advanced directive.  She has a POLST.  DNR/DNI.  Her power of  is listed in the computer.     9. Deconditioning: She had been walking independently or with cane in clinic prior to pneumonia, working on getting her strength back. Continue home PT exercises.     Deyanira Costello PA-C    I spent >15 minutes with the patient, with over 50% of the time spent counseling or coordinating their care as described above.          Deyanira  Heather Costello PA-C

## 2017-08-25 NOTE — NURSING NOTE
"Oncology Rooming Note    August 25, 2017 12:01 PM   Keren Erickson is a 61 year old female who presents for:    Chief Complaint   Patient presents with     Port Draw     labs drawn via port     Oncology Clinic Visit     Return visit related to Breast Cancer     Initial Vitals: /84 (BP Location: Right arm, Patient Position: Chair, Cuff Size: Adult Regular)  Pulse 79  Temp 97.9  F (36.6  C) (Oral)  Resp 16  Ht 1.575 m (5' 2.01\")  Wt 53.6 kg (118 lb 3.2 oz)  SpO2 97%  BMI 21.61 kg/m2 Estimated body mass index is 21.61 kg/(m^2) as calculated from the following:    Height as of this encounter: 1.575 m (5' 2.01\").    Weight as of this encounter: 53.6 kg (118 lb 3.2 oz). Body surface area is 1.53 meters squared.  No Pain (0) Comment: Data Unavailable   No LMP recorded. Patient is postmenopausal.  Allergies reviewed: Yes  Medications reviewed: Yes    Medications: MEDICATION REFILLS NEEDED TODAY. Provider was notified.  Pharmacy name entered into [facility]: [id number] [address] [facility] [address]    Clinical concerns: Refill needed for desmopressin (DDAVP) 0.1 MG TABLET. Provider was notified.    10 minutes for nursing intake (face to face time)     Marlen Camacho LPN            "

## 2017-08-25 NOTE — PROGRESS NOTES
HEMATOLOGY/ONCOLOGY PROGRESS NOTE  Aug 25, 2017    REASON FOR VISIT: follow-up of metastatic breast cancer, triple positive    DIAGNOSIS:   The patient is a 60-year-old female who presented to the hospital initially in 09/2013 with complaints of dyspnea. Workup revealed a large pericardial effusion and pleural effusion. Physical examination revealed a large untreated left breast mass encompassing the entire left breast. Biopsies revealed these were ER, MO and HER2-positive breast cancer. She had a thoracentesis and pericardial sclerosis performed. She was originally on Arimidex and Herceptin. In 01/2014, she was switched to tamoxifen and Herceptin due to arthralgias, and she ultimately developed progressive disease and was switched to Faslodex and Herceptin. In 01/2015, she was found to have progressive disease and was switched to T-DM1.     Initially when she was seen in late 02/2015, she complained of some V5 sensory deficit. This was subjective. I was not able to elicit this on examination. This persisted and further workup was performed with a brain MRI. This revealed what was initially thought to be 3 punctate brain metastases. She originally saw Radiation Oncology and Neurosurgery as well as underwent a lumbar puncture. Cytology from the lumbar puncture showed no evidence of leptomeningeal disease. She was treated with Gamma Knife radiation to 6 lesions, performed on 04/16/2015.  She initiated therapy with TDM1 in January 2015 and continued this through 6/26/2015 with overall stable disease. She has since been on a break from treatment. The patient presented earlier this month with recurrent shortness of breath.  Imaging revealed recurrent right-sided pleural effusion.  She has since undergone thoracentesis with cytology pending.  Clinically, however, there is evidence of disease progression. PET scan performed on 11/25/2015 demonstrated progression of disease at multiple sites. She restarted TDM1 on  11/27/2015, however experienced a mild transfusion reaction with shortness of breath and chest tightness, resolved upon stopping TDM1 and 125 mg of SoluMedrol. She had progression of disease on repeat imaging 2/17/2016, as well as new brain metastases. She started TDM1 with Perjeta on 3/4/2016. She underwent gamma knife to brain mets on 3/8/16.       In late May, she was found to have progressive brain metastases.  She received whole brain radiation.  She had a follow up brain MRI August 2016 that was stable.     In September 2016, she enrolled on [place]  trial and was randomized to the standard of care arm/Physician's Choice; started on gemzar and herceptin. She was recently hospitalized 1/27-2/3/17 for presumed HCAP. Trial was suspended. She continued on gemzar and heceptin with stable disease. Last restaging was 4/7/17 and stable. Gemzar was placed on hold from 4/10/2017 through 6/22/17 so that she could recover from pneumonia.    INTERVAL HISTORY:  Dominga presents for follow-up today prior to next cycle of treatment, accompanied by her friend.    Doing well. She has felt more energized and that the fatigue is getting better since moving to her new duplex, which has allowed to her increase her mobility. She walks a lap around the house throughout the day. She is not even using a wheelchair today, using a walker and walking briskly with it. She conitnues to tolerate chemo overall well, has some mild nausea controlled with anti-emetics and some heartburn. Pain still mananged with one aleve daily PRN. She has had no new headaches or neurologic issues. She has no new pains. She has not yet seen dentist.   ROS: 10 point ROS was conducted and was otherwise negative except for as above.  No f/s/c.  No chest pain/cough/dyspnea.  Daily BM, no  issues. Skin is dry, but no rash.     PHYSICAL EXAMINATION:     /84 (BP Location: Right arm, Patient Position: Chair, Cuff Size: Adult Regular)  Pulse 79  Temp  "97.9  F (36.6  C) (Oral)  Resp 16  Ht 1.575 m (5' 2.01\")  Wt 53.6 kg (118 lb 3.2 oz)  SpO2 97%  BMI 21.61 kg/m2    Wt Readings from Last 4 Encounters:   08/25/17 53.6 kg (118 lb 3.2 oz)   08/11/17 53.1 kg (117 lb)   08/04/17 51.8 kg (114 lb 3.2 oz)   07/17/17 52.5 kg (115 lb 12.8 oz)     Constitutional: Alert, oriented, cachectic female in no visible distress. Able to ambulate independently  but chooses wheelchair for longer distances in clinic  Eyes: PERRL. Anicteric sclerae.    ENT/Mouth: OM moist and pink without lesions or thrush.    CV: RRR, no murmurs appreciated.  Resp: CTAB slightly diminished R base, stable. Breathing comfortably.  Extremities: No lower extremity edema appreciated.  Skin: Warm, dry. No bruising or petechiae noted. No rash  Lymph: No palpable LAD  Neuro: CN II-XII grossly intact.     LABS:   Results for HEIDI RUIZ (MRN [id number]) as of 8/4/2017 12:49   Ref. Range 8/4/2017 12:06   Sodium Latest Ref Range: 133 - 144 mmol/L 139   Potassium Latest Ref Range: 3.4 - 5.3 mmol/L 3.6   Chloride Latest Ref Range: 94 - 109 mmol/L 106   Carbon Dioxide Latest Ref Range: 20 - 32 mmol/L 25   Urea Nitrogen Latest Ref Range: 7 - 30 mg/dL 16   Creatinine Latest Ref Range: 0.52 - 1.04 mg/dL 0.66   GFR Estimate Latest Ref Range: >60 mL/min/1.7m2 >90...   GFR Estimate If Black Latest Ref Range: >60 mL/min/1.7m2 >90...   Calcium Latest Ref Range: 8.5 - 10.1 mg/dL 8.7   Anion Gap Latest Ref Range: 3 - 14 mmol/L 8   Albumin Latest Ref Range: 3.4 - 5.0 g/dL 3.4   Protein Total Latest Ref Range: 6.8 - 8.8 g/dL 6.6 (L)   Bilirubin Total Latest Ref Range: 0.2 - 1.3 mg/dL 0.3   Alkaline Phosphatase Latest Ref Range: 40 - 150 U/L 114   ALT Latest Ref Range: 0 - 50 U/L 29   AST Latest Ref Range: 0 - 45 U/L 29   Glucose Latest Ref Range: 70 - 99 mg/dL 104 (H)   WBC Latest Ref Range: 4.0 - 11.0 10e9/L 7.1   Hemoglobin Latest Ref Range: 11.7 - 15.7 g/dL 11.7   Hematocrit Latest Ref Range: 35.0 - 47.0 % " 35.5   Platelet Count Latest Ref Range: 150 - 450 10e9/L 485 (H)       IMPRESSION/PLAN:  Dominga is a 61-year-old female with history of metastatic ER-positive, HER-2 positive breast cancer, with involvement of the bone, history of pleural effusions treated with pleurodesis and PleurX placement, and with treated brain metastases, previously with stable disease on TDM1, with progressive disease after treatment break.  She was started on [place]  clinical trial and randomized to physician's choice, and started on Gemzar/Herceptin on 9/29/17.    1. Breast cancer:  Continues on Herceptin/Gemzar. Her tumor marker has remained low and stable. Today is cycle 16 day 1. She continues to tolerate this well. SHe will have repeat imaging after this cycle.  - repeat echo today stable, next due November  - imaging q3-4 months    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 She started desmopressin 50 mcg with good response clinically.     3. Brain mets: Numbness of tongue and V2 and V3 on right are improving. Stable brain MRI yesterday.    4. FEN: Weight overall stable.  She is eating better.     5. Pain: Chronic mild aches/pains secondary to disease and with myalgias on Gemzar. No current pain today, not taking narcotics.     6. Bone mets: on Xgeva every 3 months. Last 5/1/17. On hold for dental work.    7. Mood: She is overall coping well.     8. She has an advanced directive.  She has a POLST.  DNR/DNI.  Her power of  is listed in the computer.     9. Deconditioning: She had been walking independently or with cane in clinic prior to pneumonia, working on getting her strength back. Continue home PT exercises.     Deyanira Costello PA-C    I spent >15 minutes with the patient, with over 50% of the time spent counseling or coordinating their care as described above.

## 2017-08-25 NOTE — PATIENT INSTRUCTIONS
Contact Numbers    [facility] Main Line: [phone]    Triage Nurse Line: [phone]      Please call the [facility] Nurse Triage line if you experience a temperature greater than or equal to 100.5, shaking chills, have uncontrolled nausea, vomiting and/or diarrhea, dizziness, shortness of breath, bleeding not relieved with pressure, or if you have any other questions or concerns.     If it is after hours, weekends, or holidays, please call the main hospital  at  [phone] and ask to speak to the adult Oncology doctor on call.     If you are having any concerning symptoms or wish to speak to a provider before your next infusion visit, please call your care coordinator or triage them so we can adequately serve you.      If you need to refill your narcotic prescription or other medication, please call triage before your infusion appointment.        August 2017 Sunday Monday Tuesday Wednesday Thursday Friday Saturday             1     2     3     4     [facility] LAB DRAW   11:30 AM   (15 min.)   [facility]   [facility] Lab Draw     [facility] RETURN   11:45 AM   (50 min.)   Deyanira Costello PA-C   [facility] ONC INFUSION 120    1:00 PM   (120 min.)    ONCOLOGY INFUSION   [facility] 5       6     7     8     9     10     11     [facility] DRAW   11:30 AM   (15 min.)   [facility]   [facility] Lab Draw     [facility] RETURN   12:15 PM   (30 min.)   Marlen Harper MD   [facility] ONC INFUSION 60    1:00 PM   (60 min.)    ONCOLOGY INFUSION   [facility] 12       13     14     15     16     17     18     19       20     21     22     23     24     MR BRAIN WWO   12:45 PM   (45 min.)   MR1   [facility] MRI     ECH COMPLETE    2:00 PM   (60 min.)   ECHCR4   [facility] Echo 25     [facility] DRAW   11:30 AM   (15 min.)   [facility] D[facility]     [facility] Lab Draw     [facility] RETURN   11:45 AM   (50 min.)   Deyanira Costello PA-C   [facility]     [facility] ONC INFUSION 120    2:00 PM   (120 min.)    ONCOLOGY INFUSION   [facility] 26       27     28     29 30 31 September 2017 Sunday Monday Tuesday Wednesday Thursday Friday Saturday                            1     [facility] DRAW   12:30 PM   (15 min.)    [facility]   [facility]     [facility] ONC INFUSION 60    1:00 PM   (60 min.)    ONCOLOGY INFUSION   [facility] 2       3     4     5     6     7     8     9       10     11     12     13     14     15     16       17     18     [facility] RETURN ENDOCRINE    2:15 PM   (30 min.)   Rolando Mishra MD   [facility] Endocrinology 19     20     21     22     [facility] DRAW   12:30 PM   (15 min.)    [facility] LAB DRAW   [facility] Lab Draw     [facility] ONC INFUSION 60    1:00 PM   (60 min.)    ONCOLOGY INFUSION   [facility] 23       24     25     26     27     28     29     30                 Recent Results (from the past 24 hour(s))   CBC with platelets differential    Collection Time: 08/25/17 11:56 AM   Result Value Ref Range    WBC 7.8 4.0 - 11.0 10e9/L    RBC Count 3.48 (L) 3.8 - 5.2 10e12/L    Hemoglobin 11.2 (L) 11.7 - 15.7 g/dL    Hematocrit 33.6 (L) 35.0 - 47.0 %    MCV 97 78 - 100 fl    MCH 32.2 26.5 - 33.0 pg    MCHC 33.3 31.5 - 36.5 g/dL    RDW 17.7 (H) 10.0 - 15.0 %    Platelet Count 343 150 - 450 10e9/L    Diff Method Automated Method     % Neutrophils 73.1 %    % Lymphocytes 12.5 %    % Monocytes 12.0 %    % Eosinophils 0.1 %    % Basophils 1.0 %    % Immature Granulocytes 1.3 %    Nucleated RBCs 0 0 /100    Absolute Neutrophil 5.7 1.6 - 8.3 10e9/L    Absolute Lymphocytes 1.0 0.8 - 5.3 10e9/L    Absolute Monocytes 0.9 0.0 - 1.3 10e9/L    Absolute Eosinophils 0.0 0.0 - 0.7 10e9/L    Absolute  Basophils 0.1 0.0 - 0.2 10e9/L    Abs Immature Granulocytes 0.1 0 - 0.4 10e9/L    Absolute Nucleated RBC 0.0    Comprehensive metabolic panel    Collection Time: 08/25/17 11:56 AM   Result Value Ref Range    Sodium 140 133 - 144 mmol/L    Potassium 3.9 3.4 - 5.3 mmol/L    Chloride 105 94 - 109 mmol/L    Carbon Dioxide 27 20 - 32 mmol/L    Anion Gap 8 3 - 14 mmol/L    Glucose 94 70 - 99 mg/dL    Urea Nitrogen 17 7 - 30 mg/dL    Creatinine 0.70 0.52 - 1.04 mg/dL    GFR Estimate 85 >60 mL/min/1.7m2    GFR Estimate If Black >90 >60 mL/min/1.7m2    Calcium 9.2 8.5 - 10.1 mg/dL    Bilirubin Total 0.3 0.2 - 1.3 mg/dL    Albumin 3.6 3.4 - 5.0 g/dL    Protein Total 6.6 (L) 6.8 - 8.8 g/dL    Alkaline Phosphatase 124 40 - 150 U/L    ALT 41 0 - 50 U/L    AST 36 0 - 45 U/L

## 2017-08-25 NOTE — MR AVS SNAPSHOT
After Visit Summary   8/25/2017    Keren Erickson    MRN: [id number]           Patient Information     Date Of Birth          1955        Visit Information        Provider Department      8/25/2017 12:00 PM Deyanira Costello PA-C [facility]        Today's Diagnoses     Carcinoma of breast metastatic to multiple sites, unspecified laterality (H)    -  1    Diabetes insipidus, neurohypophyseal (H)           Follow-ups after your visit        Your next 10 appointments already scheduled     Aug 25, 2017  2:00 PM CDT   Infusion 120 with [facility] ONCOLOGY INFUSION, UC 11 ATC   [facility] ([facility])    [address]  [address] [facility] [id number] [phone]            Sep 01, 2017 12:30 PM CDT   [facility] Draw with [facility] DRAW   [facility] Lab Draw ([facility])    [address] [place] [id number] [phone]            Sep 01, 2017  1:00 PM CDT   Infusion 60 with [facility] ONCOLOGY INFUSION, UC 11 ATC   [facility] ([facility])    [address] [place] [address]  [address] [facility] [id number] [phone]            Sep 18, 2017  2:30 PM CDT   (Arrive by 2:15 PM)   RETURN ENDOCRINE with Rolando Mishra MD   [facility] ([facility])    [address] [place] [address]  [address] [facility] [id number] [phone]              Future tests that were ordered for you today     Open Future Orders        Priority Expected Expires Ordered    CT Chest/Abdomen/Pelvis w Contrast Routine  8/26/2018 8/25/2017            Who to contact     If you have questions or need follow up information about today's clinic visit or your schedule please contact [facility] directly at [phone].  Normal or non-critical lab and imaging results will be communicated to  "you by MyChart, letter or phone within 4 business days after the clinic has received the results. If you do not hear from us within 7 days, please contact the clinic through [employer] or phone. If you have a critical or abnormal lab result, we will notify you by phone as soon as possible.  Submit refill requests through [employer] or call your pharmacy and they will forward the refill request to us. Please allow 3 business days for your refill to be completed.          Additional Information About Your Visit        [employer] Information     [employer] gives you secure access to your electronic health record. If you see a primary care provider, you can also send messages to your care team and make appointments. If you have questions, please call your primary care clinic.  If you do not have a primary care provider, please call [phone] and they will assist you.        Care EveryWhere ID     This is your Care EveryWhere ID. This could be used by other organizations to access your [facility] medical records  [id number]        Your Vitals Were     Pulse Temperature Respirations Height Pulse Oximetry BMI (Body Mass Index)    79 97.9  F (36.6  C) (Oral) 16 1.575 m (5' 2.01\") 97% 21.61 kg/m2       Blood Pressure from Last 3 Encounters:   08/25/17 119/84   08/11/17 119/84   08/04/17 116/77    Weight from Last 3 Encounters:   08/25/17 53.6 kg (118 lb 3.2 oz)   08/11/17 53.1 kg (117 lb)   08/04/17 51.8 kg (114 lb 3.2 oz)                 Where to get your medicines      These medications were sent to [facility] [place], [place] - [address] [facility] [address] [address]    Hours:  TRANSPLANT PHONE NUMBER [phone] Phone:  [phone]     desmopressin 0.1 MG tablet          Primary Care Provider Office Phone # Fax #    Kelly Hart [phone]       [address] [facility] [address]        Equal Access to Services     CLAUDIA BOND AH: Hadii aad " stacey Joshi, waadriannada lindseyadaha, qaselwynta kajazmyn jin, waxdipti leenain hayaastephen youngirineo ronaldolorrie laWendyamanda massimo. [facility] [phone].    ATENCIÓN: Si habla español, tiene a ramires disposición servicios gratuitos de asistencia lingüística. Kyung al [phone].    We comply with applicable federal civil rights laws and [place] laws. We do not discriminate on the basis of race, color, national origin, age, disability sex, sexual orientation or gender identity.            Thank you!     Thank you for choosing [facility]  for your care. Our goal is always to provide you with excellent care. Hearing back from our patients is one way we can continue to improve our services. Please take a few minutes to complete the written survey that you may receive in the mail after your visit with us. Thank you!             Your Updated Medication List - Protect others around you: Learn how to safely use, store and throw away your medicines at www.disposemymeds.org.          This list is accurate as of: 8/25/17 12:34 PM.  Always use your most recent med list.                   Brand Name Dispense Instructions for use Diagnosis    ALEVE PO      Take 220 mg by mouth daily        desmopressin 0.1 MG tablet    DDAVP    90 tablet    Take 1 tablet (100 mcg) by mouth At Bedtime    Diabetes insipidus, neurohypophyseal (H)       Multi-vitamin Tabs tablet      Take 1 tablet by mouth daily        omeprazole 20 MG tablet     30 tablet    Take 1 tablet (20 mg) by mouth daily    Gastroesophageal reflux disease without esophagitis       prochlorperazine 10 MG tablet    COMPAZINE    90 tablet    Take 1 tablet (10 mg) by mouth every 6 hours as needed for nausea or vomiting    Nausea       VITAMIN E BLEND PO      Take by mouth daily

## 2017-08-25 NOTE — PROGRESS NOTES
Infusion Nursing Note:  Keren Erickson presents today for C16D1 Gemzar/Herceptin.    Patient seen by provider today: Yes: Deyanira RAMSEY    Note: N/A.    Intravenous Access:  Implanted Port.      Treatment Conditions:  Lab Results   Component Value Date    HGB 11.2 08/25/2017     Lab Results   Component Value Date    WBC 7.8 08/25/2017      Lab Results   Component Value Date    ANEU 5.7 08/25/2017     Lab Results   Component Value Date     08/25/2017      Lab Results   Component Value Date     08/25/2017                   Lab Results   Component Value Date    POTASSIUM 3.9 08/25/2017           Lab Results   Component Value Date    MAG 2.4 04/10/2017            Lab Results   Component Value Date    CR 0.70 08/25/2017                   Lab Results   Component Value Date    OMA 9.2 08/25/2017                Lab Results   Component Value Date    BILITOTAL 0.3 08/25/2017           Lab Results   Component Value Date    ALBUMIN 3.6 08/25/2017                    Lab Results   Component Value Date    ALT 41 08/25/2017           Lab Results   Component Value Date    AST 36 08/25/2017     Results reviewed, labs MET treatment parameters, ok to proceed with treatment.  ECHO/MUGA completed 5/22/17  EF 55-60%.          Post Infusion Assessment:  Patient tolerated infusion without incident.  Blood return noted pre and post infusion.  Site patent and intact, free from redness, edema or discomfort.  No evidence of extravasations.  Access discontinued per protocol.    Discharge Plan:   Prescription refills given for Desmopressin.  Discharge instructions reviewed with: Patient.  Patient and/or family verbalized understanding of discharge instructions and all questions answered.  Copy of AVS reviewed with patient and/or family.  Patient will return 9/1/17 for next appointment.  Patient discharged in stable condition accompanied by: friend.  Departure Mode: Ambulatory.    Cherise Patel RN

## 2017-08-25 NOTE — NURSING NOTE
Chief Complaint   Patient presents with     Port Draw     labs drawn via port     /84 (BP Location: Right arm, Patient Position: Chair, Cuff Size: Adult Regular)  Pulse 79  Temp 97.9  F (36.6  C) (Oral)  Wt 53.6 kg (118 lb 3.2 oz)  SpO2 97%  BMI 21.61 kg/m2    Vitals taken. Port accessed by RN. Labs collected and sent. Line flushed with NS & Heparin. Pt tolerated well. Pt checked in for next appointment.    Abida Sam RN

## 2017-08-31 DIAGNOSIS — E23.2 DIABETES INSIPIDUS, NEUROHYPOPHYSEAL (H): ICD-10-CM

## 2017-08-31 DIAGNOSIS — E23.2 DIABETES INSIPIDUS (H): Primary | ICD-10-CM

## 2017-08-31 RX ORDER — DESMOPRESSIN ACETATE 0.1 MG/1
0.1 TABLET ORAL AT BEDTIME
Qty: 90 TABLET | Refills: 3 | Status: SHIPPED | OUTPATIENT
Start: 2017-08-31 | End: 2017-09-18

## 2017-08-31 RX ORDER — DESMOPRESSIN ACETATE 0.2 MG/1
TABLET ORAL
Qty: 45 TABLET | Refills: 3 | Status: SHIPPED | OUTPATIENT
Start: 2017-08-31 | End: 2017-11-24

## 2017-09-01 ENCOUNTER — APPOINTMENT (OUTPATIENT)
Dept: LAB | Facility: CLINIC | Age: 62
End: 2017-09-01
Attending: INTERNAL MEDICINE
Payer: COMMERCIAL

## 2017-09-01 ENCOUNTER — INFUSION THERAPY VISIT (OUTPATIENT)
Dept: ONCOLOGY | Facility: CLINIC | Age: 62
End: 2017-09-01
Attending: INTERNAL MEDICINE
Payer: COMMERCIAL

## 2017-09-01 VITALS
TEMPERATURE: 98.1 F | DIASTOLIC BLOOD PRESSURE: 83 MMHG | HEART RATE: 80 BPM | WEIGHT: 118.3 LBS | SYSTOLIC BLOOD PRESSURE: 130 MMHG | OXYGEN SATURATION: 96 % | BODY MASS INDEX: 21.63 KG/M2

## 2017-09-01 DIAGNOSIS — C50.919 METASTATIC BREAST CANCER: Primary | ICD-10-CM

## 2017-09-01 DIAGNOSIS — C50.919 CARCINOMA OF BREAST METASTATIC TO MULTIPLE SITES, UNSPECIFIED LATERALITY (H): ICD-10-CM

## 2017-09-01 DIAGNOSIS — E23.2 DIABETES INSIPIDUS (H): ICD-10-CM

## 2017-09-01 DIAGNOSIS — C79.31 BRAIN METASTASIS: ICD-10-CM

## 2017-09-01 LAB
ANION GAP SERPL CALCULATED.3IONS-SCNC: 6 MMOL/L (ref 3–14)
ANISOCYTOSIS BLD QL SMEAR: SLIGHT
BASOPHILS # BLD AUTO: 0 10E9/L (ref 0–0.2)
BASOPHILS NFR BLD AUTO: 0 %
BUN SERPL-MCNC: 9 MG/DL (ref 7–30)
CALCIUM SERPL-MCNC: 8.9 MG/DL (ref 8.5–10.1)
CHLORIDE SERPL-SCNC: 109 MMOL/L (ref 94–109)
CO2 SERPL-SCNC: 28 MMOL/L (ref 20–32)
CORTIS SERPL-MCNC: 20.4 UG/DL (ref 4–22)
CREAT SERPL-MCNC: 0.57 MG/DL (ref 0.52–1.04)
DIFFERENTIAL METHOD BLD: ABNORMAL
EOSINOPHIL # BLD AUTO: 0 10E9/L (ref 0–0.7)
EOSINOPHIL NFR BLD AUTO: 0 %
ERYTHROCYTE [DISTWIDTH] IN BLOOD BY AUTOMATED COUNT: 17.5 % (ref 10–15)
FSH SERPL-ACNC: 7.4 IU/L
GFR SERPL CREATININE-BSD FRML MDRD: >90 ML/MIN/1.7M2
GLUCOSE SERPL-MCNC: 89 MG/DL (ref 70–99)
HCT VFR BLD AUTO: 33.4 % (ref 35–47)
HGB BLD-MCNC: 11.1 G/DL (ref 11.7–15.7)
LH SERPL-ACNC: 2.6 IU/L
LYMPHOCYTES # BLD AUTO: 0.7 10E9/L (ref 0.8–5.3)
LYMPHOCYTES NFR BLD AUTO: 19.3 %
MCH RBC QN AUTO: 32.6 PG (ref 26.5–33)
MCHC RBC AUTO-ENTMCNC: 33.2 G/DL (ref 31.5–36.5)
MCV RBC AUTO: 98 FL (ref 78–100)
MONOCYTES # BLD AUTO: 0.3 10E9/L (ref 0–1.3)
MONOCYTES NFR BLD AUTO: 8.8 %
MYELOCYTES # BLD: 0.1 10E9/L
MYELOCYTES NFR BLD MANUAL: 1.7 %
NEUTROPHILS # BLD AUTO: 2.6 10E9/L (ref 1.6–8.3)
NEUTROPHILS NFR BLD AUTO: 70.2 %
NRBC # BLD AUTO: 0 10*3/UL
NRBC BLD AUTO-RTO: 1 /100
OSMOLALITY UR: 124 MMOL/KG (ref 100–1200)
PLATELET # BLD AUTO: 356 10E9/L (ref 150–450)
POTASSIUM SERPL-SCNC: 3.8 MMOL/L (ref 3.4–5.3)
PROLACTIN SERPL-MCNC: 7 UG/L (ref 3–27)
RBC # BLD AUTO: 3.41 10E12/L (ref 3.8–5.2)
SODIUM SERPL-SCNC: 143 MMOL/L (ref 133–144)
T4 FREE SERPL-MCNC: 0.93 NG/DL (ref 0.76–1.46)
TSH SERPL DL<=0.005 MIU/L-ACNC: 3.88 MU/L (ref 0.4–4)
WBC # BLD AUTO: 3.7 10E9/L (ref 4–11)

## 2017-09-01 PROCEDURE — 25000128 H RX IP 250 OP 636: Mod: ZF | Performed by: PHYSICIAN ASSISTANT

## 2017-09-01 PROCEDURE — 83001 ASSAY OF GONADOTROPIN (FSH): CPT | Performed by: PHYSICIAN ASSISTANT

## 2017-09-01 PROCEDURE — 80048 BASIC METABOLIC PNL TOTAL CA: CPT | Performed by: PHYSICIAN ASSISTANT

## 2017-09-01 PROCEDURE — 84146 ASSAY OF PROLACTIN: CPT | Performed by: PHYSICIAN ASSISTANT

## 2017-09-01 PROCEDURE — 96413 CHEMO IV INFUSION 1 HR: CPT

## 2017-09-01 PROCEDURE — 84439 ASSAY OF FREE THYROXINE: CPT | Performed by: PHYSICIAN ASSISTANT

## 2017-09-01 PROCEDURE — 82024 ASSAY OF ACTH: CPT | Performed by: INTERNAL MEDICINE

## 2017-09-01 PROCEDURE — 84305 ASSAY OF SOMATOMEDIN: CPT | Performed by: PHYSICIAN ASSISTANT

## 2017-09-01 PROCEDURE — 83002 ASSAY OF GONADOTROPIN (LH): CPT | Performed by: PHYSICIAN ASSISTANT

## 2017-09-01 PROCEDURE — 25000128 H RX IP 250 OP 636: Mod: ZF | Performed by: INTERNAL MEDICINE

## 2017-09-01 PROCEDURE — 84443 ASSAY THYROID STIM HORMONE: CPT | Performed by: PHYSICIAN ASSISTANT

## 2017-09-01 PROCEDURE — 85025 COMPLETE CBC W/AUTO DIFF WBC: CPT | Performed by: PHYSICIAN ASSISTANT

## 2017-09-01 PROCEDURE — 83935 ASSAY OF URINE OSMOLALITY: CPT | Performed by: INTERNAL MEDICINE

## 2017-09-01 PROCEDURE — 82533 TOTAL CORTISOL: CPT | Performed by: INTERNAL MEDICINE

## 2017-09-01 PROCEDURE — 96367 TX/PROPH/DG ADDL SEQ IV INF: CPT

## 2017-09-01 RX ORDER — HEPARIN SODIUM (PORCINE) LOCK FLUSH IV SOLN 100 UNIT/ML 100 UNIT/ML
5 SOLUTION INTRAVENOUS EVERY 8 HOURS
Status: DISCONTINUED | OUTPATIENT
Start: 2017-09-01 | End: 2017-09-01 | Stop reason: HOSPADM

## 2017-09-01 RX ADMIN — SODIUM CHLORIDE, PRESERVATIVE FREE 5 ML: 5 INJECTION INTRAVENOUS at 15:26

## 2017-09-01 RX ADMIN — DEXAMETHASONE SODIUM PHOSPHATE 12 MG: 10 INJECTION, SOLUTION INTRAMUSCULAR; INTRAVENOUS at 14:21

## 2017-09-01 RX ADMIN — SODIUM CHLORIDE 250 ML: 9 INJECTION, SOLUTION INTRAVENOUS at 14:20

## 2017-09-01 RX ADMIN — SODIUM CHLORIDE, PRESERVATIVE FREE 5 ML: 5 INJECTION INTRAVENOUS at 13:16

## 2017-09-01 RX ADMIN — GEMCITABINE 1460 MG: 38 INJECTION, SOLUTION INTRAVENOUS at 14:52

## 2017-09-01 RX ADMIN — HEPARIN SODIUM (PORCINE) LOCK FLUSH IV SOLN 100 UNIT/ML 5 ML: 100 SOLUTION at 15:27

## 2017-09-01 NOTE — MR AVS SNAPSHOT
After Visit Summary   9/1/2017    Keren Erickson    MRN: [id number]           Patient Information     Date Of Birth          1955        Visit Information        Provider Department      9/1/2017 1:00 PM  11 ATC;  ONCOLOGY INFUSION [facility]        Today's Diagnoses     Metastatic breast cancer (H)    -  1    Brain metastasis (H)        Carcinoma of breast metastatic to multiple sites, unspecified laterality (H)        Diabetes insipidus (H)          Care Instructions    Contact Numbers    [facility] Main Line: [phone]  [facility] Triage:  [phone]    Call triage with chills and/or temperature greater than or equal to 100.5, uncontrolled nausea/vomiting, diarrhea, constipation, dizziness, shortness of breath, chest pain, bleeding, unexplained bruising, or any new/concerning symptoms, questions/concerns.     If you are having any concerning symptoms or wish to speak to a provider before your next infusion visit, please call your care coordinator or triage to notify them so we can adequately serve you.       After Hours: [phone]    If after hours, weekends, or holidays, call main hospital  and ask for Oncology doctor on call.           September 2017 Sunday Monday Tuesday Wednesday Thursday Friday Saturday                            1     [facility] LAB DRAW   12:30 PM   (15 min.)   [facility] LAB DRAW   [facility] Lab Draw     UMP ONC INFUSION 60    1:00 PM   (60 min.)    ONCOLOGY INFUSION   [facility] 2       3     4     5     6     7     8     9       10     11     12     13     14     P [facility] LAB DRAW   12:45 PM   (15 min.)    MASONIC LAB DRAW   [facility] Lab Draw     CT CHEST/ABDOMEN/PELVIS W    1:45 PM   (20 min.)   UCCT2   [facility] CT 15     UMP RETURN   12:45 PM   (30 min.)   Marlen Harper MD   [facility]     UMP ONC INFUSION 120    2:00 PM   (120 min.)     ONCOLOGY INFUSION   [facility] 16       17     18     UMP RETURN ENDOCRINE    2:15 PM   (30 min.)   Rolando Mishra MD   [facility] Endocrinology 19     20     21     22     [facility] LAB DRAW   12:30 PM   (15 min.)    [facility] LAB DRAW   [facility] Lab Draw     UMP ONC INFUSION 60    1:00 PM   (60 min.)    ONCOLOGY INFUSION   [facility] 23       24     25     26     27     28     29     30 October 2017 Sunday Monday Tuesday Wednesday Thursday Friday Saturday   1     2     3     4     5     6     [facility] LAB DRAW   11:30 AM   (15 min.)    [facility] LAB DRAW   [facility] Lab Draw     UMP RETURN   11:45 AM   (50 min.)   Deyanira Costello PA-C   [facility]     UMP ONC INFUSION 120    1:00 PM   (120 min.)    ONCOLOGY INFUSION   [facility] 7       8     9     10     11     12     13     [facility] LAB DRAW   12:30 PM   (15 min.)    MASONIC LAB DRAW   [facility] Lab Draw     UMP ONC INFUSION 60    1:00 PM   (60 min.)    ONCOLOGY INFUSION   [facility] 14       15     16     17     18     19     20     21       22     23     24     25     26     27     28       29     30     31                                      Lab Results:  Recent Results (from the past 12 hour(s))   CBC with platelets differential    Collection Time: 09/01/17  1:19 PM   Result Value Ref Range    WBC 3.7 (L) 4.0 - 11.0 10e9/L    RBC Count 3.41 (L) 3.8 - 5.2 10e12/L    Hemoglobin 11.1 (L) 11.7 - 15.7 g/dL    Hematocrit 33.4 (L) 35.0 - 47.0 %    MCV 98 78 - 100 fl    MCH 32.6 26.5 - 33.0 pg    MCHC 33.2 31.5 - 36.5 g/dL    RDW 17.5 (H) 10.0 - 15.0 %    Platelet Count 356 150 - 450 10e9/L    Diff Method Manual Differential     % Neutrophils 70.2 %    % Lymphocytes 19.3 %    % Monocytes 8.8 %    % Eosinophils 0.0 %    % Basophils 0.0 %    % Myelocytes 1.7 %    Nucleated RBCs 1 (H) 0 /100     Absolute Neutrophil 2.6 1.6 - 8.3 10e9/L    Absolute Lymphocytes 0.7 (L) 0.8 - 5.3 10e9/L    Absolute Monocytes 0.3 0.0 - 1.3 10e9/L    Absolute Eosinophils 0.0 0.0 - 0.7 10e9/L    Absolute Basophils 0.0 0.0 - 0.2 10e9/L    Absolute Myelocytes 0.1 (H) 0 10e9/L    Absolute Nucleated RBC 0.0     Anisocytosis Slight    T4 free    Collection Time: 09/01/17  1:19 PM   Result Value Ref Range    T4 Free 0.93 0.76 - 1.46 ng/dL   TSH    Collection Time: 09/01/17  1:19 PM   Result Value Ref Range    TSH 3.88 0.40 - 4.00 mU/L   Basic metabolic panel    Collection Time: 09/01/17  1:19 PM   Result Value Ref Range    Sodium 143 133 - 144 mmol/L    Potassium 3.8 3.4 - 5.3 mmol/L    Chloride 109 94 - 109 mmol/L    Carbon Dioxide 28 20 - 32 mmol/L    Anion Gap 6 3 - 14 mmol/L    Glucose 89 70 - 99 mg/dL    Urea Nitrogen 9 7 - 30 mg/dL    Creatinine 0.57 0.52 - 1.04 mg/dL    GFR Estimate >90 >60 mL/min/1.7m2    GFR Estimate If Black >90 >60 mL/min/1.7m2    Calcium 8.9 8.5 - 10.1 mg/dL   Osmolality urine    Collection Time: 09/01/17  1:19 PM   Result Value Ref Range    Urine Osmolality 124 100 - 1200 mmol/kg                 Follow-ups after your visit        Your next 10 appointments already scheduled     Sep 14, 2017 12:45 PM CDT   [facility] Lab Draw with  MASONIC LAB DRAW   [facility] Lab Draw ([facility])    [address]  2nd Floor  [facility] [address] [phone]            Sep 14, 2017  2:00 PM CDT   (Arrive by 1:45 PM)   CT CHEST/ABDOMEN/PELVIS W CONTRAST with UCCT2   [facility] CT ([facility])    [address]  1st Floor  [facility] [address] [phone]           Please bring any scans or X-rays taken at other hospitals, if similar tests were done. Also bring a list of your medicines, including vitamins, minerals and over-the-counter drugs. It is safest to leave personal items at home.  Be sure to tell your doctor:   If you  have any allergies.   If there s any chance you are pregnant.   If you are breastfeeding.   If you have any special needs.  You may have contrast for this exam. To prepare:   Do not eat or drink for 2 hours before your exam. If you need to take medicine, you may take it with small sips of water. (We may ask you to take liquid medicine as well.)   The day before your exam, drink extra fluids at least six 8-ounce glasses (unless your doctor tells you to restrict your fluids).  Patients over 70 or patients with diabetes or kidney problems:   If you haven t had a blood test (creatinine test) within the last 30 days, go to your clinic or Diagnostic Imaging Department for this test.  If you have diabetes:   If your kidney function is normal, continue taking your metformin (Avandamet, Glucophage, Glucovance, Metaglip) on the day of your exam.   If your kidney function is abnormal, wait 48 hours before restarting this medicine.  You will have oral contrast for this exam:   You will drink the contrast at home. Get this from your clinic or Diagnostic Imaging Department. Please follow the directions given.  Please wear loose clothing, such as a sweat suit or jogging clothes. Avoid snaps, zippers and other metal. We may ask you to undress and put on a hospital gown.  If you have any questions, please call the Imaging Department where you will have your exam.            Sep 15, 2017  1:00 PM CDT   (Arrive by 12:45 PM)   Return Visit with Marlen Harper MD   [facility])    [address] [place] [address]  [address]nd [facility] [id number] [phone]            Sep 15, 2017  2:00 PM CDT   Infusion 120 with UC ONCOLOGY INFUSION, UC 11 ATC   [facility] ([facility])    [address]  2nd [facility] [address]   [phone]            Sep 18, 2017  2:30 PM CDT   (Arrive by 2:15 PM)   RETURN ENDOCRINE with Rolando  Jaron Mishra MD   [facility] Endocr[facility] ([place] [facility])    [address]  3rd Floor  [facility] [address]   [phone]            Sep 22, 2017 12:30 PM CDT   [facility] Lab Draw with UC [facility] LAB DRAW   [facility] [facility]    [address] [place] [address]  2nd [facility] [address]   [phone]            Sep 22, 2017  1:00 PM CDT   Infusion 60 with UC ONCOLOGY INFUSION, UC 28 ATC   [facility] ([place] [facility])    [address] [place] [address]   [phone]            Oct 06, 2017 11:30 AM CDT   [facility] Lab Draw with UC MASONIC LAB DRAW   [facility] Masonic Lab Draw ([place])    [address] [place] [address]   [phone]              Who to contact     If you have questions or need follow up information about today's clinic visit or your schedule please contact [facility] directly at [phone].  Normal or non-critical lab and imaging results will be communicated to you by MyChart, letter or phone within 4 business days after the clinic has received the results. If you do not hear from us within 7 days, please contact the clinic through [employer] or phone. If you have a critical or abnormal lab result, we will notify you by phone as soon as possible.  Submit refill requests through [employer] or call your pharmacy and they will forward the refill request to us. Please allow 3 business days for your refill to be completed.          Additional Information About Your Visit        [employer] Information     [employer] gives you secure access to your electronic health record. If you see a primary care provider, you can also send messages to your care team and make appointments. If you have questions, please call your primary care clinic.  If you do not have a primary care provider, please call  [phone] and they will assist you.        Care EveryWhere ID     This is your Care EveryWhere ID. This could be used by other organizations to access your [place] medical records  [id number]        Your Vitals Were     Pulse Temperature Pulse Oximetry BMI (Body Mass Index)          80 98.1  F (36.7  C) (Oral) 96% 21.63 kg/m2         Blood Pressure from Last 3 Encounters:   09/01/17 130/83   08/25/17 119/84   08/11/17 119/84    Weight from Last 3 Encounters:   09/01/17 53.7 kg (118 lb 4.8 oz)   08/25/17 53.6 kg (118 lb 3.2 oz)   08/11/17 53.1 kg (117 lb)              We Performed the Following     Adrenal corticotropin     Basic metabolic panel     CBC with platelets differential     Cortisol     Follicle stimulating hormone     Insulin growth factor 1     Lutropin     Osmolality urine     Prolactin     T4 free     TSH        Primary Care Provider Office Phone # Fax #    Kelly Bg Hart [phone]       [address] [facility] [address]        Equal Access to Services     ODILON BOND : Hadii keturah ku hadasho Soomaali, waaxda luqadaha, qaybta kaalmada adeegyajb, freddie escobedo . So [facility] [phone].    ATENCIÓN: Si habla español, tiene a ramires disposición servicios gratuitos de asistencia lingüística. Ll[facility] [place] [phone].    We comply with applicable federal civil rights laws and [place] laws. We do not discriminate on the basis of race, color, national origin, age, disability sex, sexual orientation or gender identity.            Thank you!     Thank you for choosing [facility]  for your care. Our goal is always to provide you with excellent care. Hearing back from our patients is one way we can continue to improve our services. Please take a few minutes to complete the written survey that you may receive in the mail after your visit with us. Thank you!             Your Updated Medication List - Protect others around you: Learn how to  safely use, store and throw away your medicines at www.disposemymeds.org.          This list is accurate as of: 9/1/17  3:11 PM.  Always use your most recent med list.                   Brand Name Dispense Instructions for use Diagnosis    ALEVE PO      Take 220 mg by mouth daily        * desmopressin 0.1 MG tablet    DDAVP    90 tablet    Take 1 tablet (100 mcg) by mouth At Bedtime    Diabetes insipidus, neurohypophyseal (H)       * desmopressin 0.2 MG tablet    DDAVP    45 tablet    Take  1/2 tablet once daily by mouth    Diabetes insipidus (H)       Multi-vitamin Tabs tablet      Take 1 tablet by mouth daily        omeprazole 20 MG tablet     30 tablet    Take 1 tablet (20 mg) by mouth daily    Gastroesophageal reflux disease without esophagitis       prochlorperazine 10 MG tablet    COMPAZINE    90 tablet    Take 1 tablet (10 mg) by mouth every 6 hours as needed for nausea or vomiting    Nausea       VITAMIN E BLEND PO      Take by mouth daily        * Notice:  This list has 2 medication(s) that are the same as other medications prescribed for you. Read the directions carefully, and ask your doctor or other care provider to review them with you.

## 2017-09-01 NOTE — PATIENT INSTRUCTIONS
Contact Numbers    [facility] Main Line: [phone]  [facility] Triage:  [phone]    Call triage with chills and/or temperature greater than or equal to 100.5, uncontrolled nausea/vomiting, diarrhea, constipation, dizziness, shortness of breath, chest pain, bleeding, unexplained bruising, or any new/concerning symptoms, questions/concerns.     If you are having any concerning symptoms or wish to speak to a provider before your next infusion visit, please call your care coordinator or triage to notify them so we can adequately serve you.       After Hours: [phone]    If after hours, weekends, or holidays, call main hospital  and ask for Oncology doctor on call.           September 2017 Sunday Monday Tuesday Wednesday Thursday Friday Saturday                            1     [facility] DRAW   12:30 PM   (15 min.)   [facility] LAB DRAW   [facility] Masonic Lab Draw     UMP ONC INFUSION 60    1:00 PM   (60 min.)    ONCOLOGY INFUSION   [facility] 2       3     4     5     6     7     8     9       10     11     12     13     14     [facility] LAB DRAW   12:45 PM   (15 min.)   UC [facility] LAB DRAW   [facility] Lab Draw     CT CHEST/ABDOMEN/PELVIS W    1:45 PM   (20 min.)   UCCT2   [facility] CT 15     UMP RETURN   12:45 PM   (30 min.)   Marlen Harper MD   [facility]     UMP ONC INFUSION 120    2:00 PM   (120 min.)    ONCOLOGY INFUSION   [facility] 16       17     18     UMP RETURN ENDOCRINE    2:15 PM   (30 min.)   Rolando Mishra MD   [facility] Endocrinology 19     20     21     22     [facility] LAB DRAW   12:30 PM   (15 min.)   [facility]   [facility]     UMP ONC INFUSION 60    1:00 PM   (60 min.)    ONCOLOGY INFUSION   [facility] 23       24     25     26     27     28     29     30 October 2017 Sunday Monday Tuesday Wednesday Thursday Friday  Saturday   1     2     3     4     5     6     [facility] MASONIC LAB DRAW   11:30 AM   (15 min.)   UC [facility] LAB DRAW   [facility] Lab Draw     UMP RETURN   11:45 AM   (50 min.)   Deyanira Costello PA-C   [facility]     UMP ONC INFUSION 120    1:00 PM   (120 min.)    ONCOLOGY INFUSION   [facility] 7       8     9     10     11     12     13     [facility] MASONIC LAB DRAW   12:30 PM   (15 min.)   [facility] LAB DRAW   [facility] Lab Draw     P ONC INFUSION 60    1:00 PM   (60 min.)    ONCOLOGY INFUSION   [facility] 14       15     16     17     18     19     20     21       22     23     24     25     26     27     28       29     30     31                                      Lab Results:  Recent Results (from the past 12 hour(s))   CBC with platelets differential    Collection Time: 09/01/17  1:19 PM   Result Value Ref Range    WBC 3.7 (L) 4.0 - 11.0 10e9/L    RBC Count 3.41 (L) 3.8 - 5.2 10e12/L    Hemoglobin 11.1 (L) 11.7 - 15.7 g/dL    Hematocrit 33.4 (L) 35.0 - 47.0 %    MCV 98 78 - 100 fl    MCH 32.6 26.5 - 33.0 pg    MCHC 33.2 31.5 - 36.5 g/dL    RDW 17.5 (H) 10.0 - 15.0 %    Platelet Count 356 150 - 450 10e9/L    Diff Method Manual Differential     % Neutrophils 70.2 %    % Lymphocytes 19.3 %    % Monocytes 8.8 %    % Eosinophils 0.0 %    % Basophils 0.0 %    % Myelocytes 1.7 %    Nucleated RBCs 1 (H) 0 /100    Absolute Neutrophil 2.6 1.6 - 8.3 10e9/L    Absolute Lymphocytes 0.7 (L) 0.8 - 5.3 10e9/L    Absolute Monocytes 0.3 0.0 - 1.3 10e9/L    Absolute Eosinophils 0.0 0.0 - 0.7 10e9/L    Absolute Basophils 0.0 0.0 - 0.2 10e9/L    Absolute Myelocytes 0.1 (H) 0 10e9/L    Absolute Nucleated RBC 0.0     Anisocytosis Slight    T4 free    Collection Time: 09/01/17  1:19 PM   Result Value Ref Range    T4 Free 0.93 0.76 - 1.46 ng/dL   TSH    Collection Time: 09/01/17  1:19 PM   Result Value Ref Range    TSH 3.88 0.40 - 4.00 mU/L   Basic  metabolic panel    Collection Time: 09/01/17  1:19 PM   Result Value Ref Range    Sodium 143 133 - 144 mmol/L    Potassium 3.8 3.4 - 5.3 mmol/L    Chloride 109 94 - 109 mmol/L    Carbon Dioxide 28 20 - 32 mmol/L    Anion Gap 6 3 - 14 mmol/L    Glucose 89 70 - 99 mg/dL    Urea Nitrogen 9 7 - 30 mg/dL    Creatinine 0.57 0.52 - 1.04 mg/dL    GFR Estimate >90 >60 mL/min/1.7m2    GFR Estimate If Black >90 >60 mL/min/1.7m2    Calcium 8.9 8.5 - 10.1 mg/dL   Osmolality urine    Collection Time: 09/01/17  1:19 PM   Result Value Ref Range    Urine Osmolality 124 100 - 1200 mmol/kg

## 2017-09-01 NOTE — PROGRESS NOTES
Infusion Nursing Note:  Keren Erickson presents today for Cycle 16 Day 8.    Patient seen by provider today: No   present during visit today: Not Applicable.    Note: Pt denies new complaints today.    Intravenous Access:  Implanted Port.    Treatment Conditions:  Lab Results   Component Value Date    HGB 11.1 09/01/2017     Lab Results   Component Value Date    WBC 3.7 09/01/2017      Lab Results   Component Value Date    ANEU 2.6 09/01/2017     Lab Results   Component Value Date     09/01/2017      Lab Results   Component Value Date     09/01/2017                   Lab Results   Component Value Date    POTASSIUM 3.8 09/01/2017           Lab Results   Component Value Date    MAG 2.4 04/10/2017            Lab Results   Component Value Date    CR 0.57 09/01/2017                   Lab Results   Component Value Date    OMA 8.9 09/01/2017                Lab Results   Component Value Date    BILITOTAL 0.3 08/25/2017           Lab Results   Component Value Date    ALBUMIN 3.6 08/25/2017                    Lab Results   Component Value Date    ALT 41 08/25/2017           Lab Results   Component Value Date    AST 36 08/25/2017     Results reviewed, labs MET treatment parameters, ok to proceed with treatment.          Post Infusion Assessment:  Patient tolerated infusion without incident.  Blood return noted pre and post infusion.  Site patent and intact, free from redness, edema or discomfort.  No evidence of extravasations.  Access discontinued per protocol.    Discharge Plan:   Prescription refills given for Desmopressim.  Patient declined prescription refills.  Discharge instructions reviewed with: Patient and Family.  Patient and/or family verbalized understanding of discharge instructions and all questions answered.  Copy of AVS reviewed with patient and/or family.  Patient will return 9/14/17 for CT scan and 9/15/17 for provider and chemo.  Patient discharged in stable condition  accompanied by: self and daughter.  Departure Mode: Ambulatory with walker    Face to Face time: 0 min.    Lela Mcleod RN

## 2017-09-04 NOTE — PROGRESS NOTES
Dear oDminga,     The cortisol and thyroid hormones - two essential pituitary hormones are normal. LH and FSH is lower than anticipated for you but this was noted in the past as well. No new changes in the other labs - normal electrolytes, and renal function.     Recent MRI did not show new changes in pituitary gland. Therefore, we will continue the Desmopressin at this time, without any new changes.     I will release the remaining labs once available.     Best Regards.   Rolando Mishra MD  [id number]  Endocrinology Service

## 2017-09-05 LAB — ACTH PLAS-MCNC: 14 PG/ML

## 2017-09-07 LAB — IGF-I BLD-MCNC: 59 NG/ML (ref 41–243)

## 2017-09-14 PROCEDURE — 85025 COMPLETE CBC W/AUTO DIFF WBC: CPT | Performed by: INTERNAL MEDICINE

## 2017-09-14 PROCEDURE — 86300 IMMUNOASSAY TUMOR CA 15-3: CPT | Performed by: INTERNAL MEDICINE

## 2017-09-14 PROCEDURE — 80053 COMPREHEN METABOLIC PANEL: CPT | Performed by: INTERNAL MEDICINE

## 2017-09-14 PROCEDURE — 25000128 H RX IP 250 OP 636: Performed by: INTERNAL MEDICINE

## 2017-09-14 RX ORDER — HEPARIN SODIUM (PORCINE) LOCK FLUSH IV SOLN 100 UNIT/ML 100 UNIT/ML
5 SOLUTION INTRAVENOUS ONCE
Status: COMPLETED | OUTPATIENT
Start: 2017-09-14 | End: 2017-09-14

## 2017-09-14 RX ADMIN — SODIUM CHLORIDE, PRESERVATIVE FREE 5 ML: 5 INJECTION INTRAVENOUS at 13:22

## 2017-09-14 NOTE — NURSING NOTE
Chief Complaint   Patient presents with     Port Draw     labs drawn via port     There were no vitals taken for this visit.    Port accessed by RN for CT and labs. JIC RedG, GreenG & Purple tubes collected and sent. Line flushed with NS & Heparin. Pt tolerated well.    Abida Sam, RN

## 2017-09-15 ENCOUNTER — ONCOLOGY VISIT (OUTPATIENT)
Dept: ONCOLOGY | Facility: CLINIC | Age: 62
End: 2017-09-15
Attending: INTERNAL MEDICINE
Payer: COMMERCIAL

## 2017-09-15 ENCOUNTER — TELEPHONE (OUTPATIENT)
Dept: GASTROENTEROLOGY | Facility: CLINIC | Age: 62
End: 2017-09-15

## 2017-09-15 VITALS
OXYGEN SATURATION: 95 % | DIASTOLIC BLOOD PRESSURE: 82 MMHG | WEIGHT: 118.2 LBS | HEIGHT: 62 IN | TEMPERATURE: 97.1 F | RESPIRATION RATE: 16 BRPM | BODY MASS INDEX: 21.75 KG/M2 | SYSTOLIC BLOOD PRESSURE: 127 MMHG | HEART RATE: 87 BPM

## 2017-09-15 DIAGNOSIS — K62.5 RECTAL BLEEDING: ICD-10-CM

## 2017-09-15 DIAGNOSIS — C79.31 BRAIN METASTASIS: ICD-10-CM

## 2017-09-15 DIAGNOSIS — C50.919 METASTATIC BREAST CANCER: ICD-10-CM

## 2017-09-15 DIAGNOSIS — C50.919 CARCINOMA OF BREAST METASTATIC TO MULTIPLE SITES, UNSPECIFIED LATERALITY (H): Primary | ICD-10-CM

## 2017-09-15 LAB
ALBUMIN SERPL-MCNC: 3.4 G/DL (ref 3.4–5)
ALP SERPL-CCNC: 116 U/L (ref 40–150)
ALT SERPL W P-5'-P-CCNC: 22 U/L (ref 0–50)
ANION GAP SERPL CALCULATED.3IONS-SCNC: 4 MMOL/L (ref 3–14)
AST SERPL W P-5'-P-CCNC: 27 U/L (ref 0–45)
BASOPHILS # BLD AUTO: 0.1 10E9/L (ref 0–0.2)
BASOPHILS NFR BLD AUTO: 1 %
BILIRUB SERPL-MCNC: 0.3 MG/DL (ref 0.2–1.3)
BUN SERPL-MCNC: 8 MG/DL (ref 7–30)
CALCIUM SERPL-MCNC: 8.9 MG/DL (ref 8.5–10.1)
CANCER AG27-29 SERPL-ACNC: 19 U/ML (ref 0–39)
CHLORIDE SERPL-SCNC: 104 MMOL/L (ref 94–109)
CO2 SERPL-SCNC: 31 MMOL/L (ref 20–32)
CREAT SERPL-MCNC: 0.7 MG/DL (ref 0.52–1.04)
DIFFERENTIAL METHOD BLD: ABNORMAL
EOSINOPHIL # BLD AUTO: 0 10E9/L (ref 0–0.7)
EOSINOPHIL NFR BLD AUTO: 0.1 %
ERYTHROCYTE [DISTWIDTH] IN BLOOD BY AUTOMATED COUNT: 18 % (ref 10–15)
GFR SERPL CREATININE-BSD FRML MDRD: 85 ML/MIN/1.7M2
GLUCOSE SERPL-MCNC: 64 MG/DL (ref 70–99)
HCT VFR BLD AUTO: 33.5 % (ref 35–47)
HGB BLD-MCNC: 10.8 G/DL (ref 11.7–15.7)
IMM GRANULOCYTES # BLD: 0.4 10E9/L (ref 0–0.4)
IMM GRANULOCYTES NFR BLD: 4.3 %
LYMPHOCYTES # BLD AUTO: 1.1 10E9/L (ref 0.8–5.3)
LYMPHOCYTES NFR BLD AUTO: 10.4 %
MCH RBC QN AUTO: 32.8 PG (ref 26.5–33)
MCHC RBC AUTO-ENTMCNC: 32.2 G/DL (ref 31.5–36.5)
MCV RBC AUTO: 102 FL (ref 78–100)
MONOCYTES # BLD AUTO: 1.5 10E9/L (ref 0–1.3)
MONOCYTES NFR BLD AUTO: 14.6 %
NEUTROPHILS # BLD AUTO: 7.1 10E9/L (ref 1.6–8.3)
NEUTROPHILS NFR BLD AUTO: 69.6 %
NRBC # BLD AUTO: 0.1 10*3/UL
NRBC BLD AUTO-RTO: 1 /100
PLATELET # BLD AUTO: 369 10E9/L (ref 150–450)
POTASSIUM SERPL-SCNC: 4.2 MMOL/L (ref 3.4–5.3)
PROT SERPL-MCNC: 6.6 G/DL (ref 6.8–8.8)
RBC # BLD AUTO: 3.29 10E12/L (ref 3.8–5.2)
SODIUM SERPL-SCNC: 139 MMOL/L (ref 133–144)
WBC # BLD AUTO: 10.2 10E9/L (ref 4–11)

## 2017-09-15 PROCEDURE — 25000128 H RX IP 250 OP 636: Mod: ZF | Performed by: INTERNAL MEDICINE

## 2017-09-15 PROCEDURE — 96367 TX/PROPH/DG ADDL SEQ IV INF: CPT

## 2017-09-15 PROCEDURE — 96417 CHEMO IV INFUS EACH ADDL SEQ: CPT

## 2017-09-15 PROCEDURE — 96413 CHEMO IV INFUSION 1 HR: CPT

## 2017-09-15 PROCEDURE — 99214 OFFICE O/P EST MOD 30 MIN: CPT | Mod: ZP | Performed by: INTERNAL MEDICINE

## 2017-09-15 RX ORDER — DIPHENHYDRAMINE HYDROCHLORIDE 50 MG/ML
50 INJECTION INTRAMUSCULAR; INTRAVENOUS
Status: CANCELLED
Start: 2017-09-15

## 2017-09-15 RX ORDER — EPINEPHRINE 1 MG/ML
0.3 INJECTION INTRAMUSCULAR; INTRAVENOUS; SUBCUTANEOUS EVERY 5 MIN PRN
Status: CANCELLED | OUTPATIENT
Start: 2017-09-22

## 2017-09-15 RX ORDER — SODIUM CHLORIDE 9 MG/ML
1000 INJECTION, SOLUTION INTRAVENOUS CONTINUOUS PRN
Status: CANCELLED
Start: 2017-09-22

## 2017-09-15 RX ORDER — MEPERIDINE HYDROCHLORIDE 25 MG/ML
25 INJECTION INTRAMUSCULAR; INTRAVENOUS; SUBCUTANEOUS EVERY 30 MIN PRN
Status: CANCELLED | OUTPATIENT
Start: 2017-09-15

## 2017-09-15 RX ORDER — DIPHENHYDRAMINE HYDROCHLORIDE 50 MG/ML
50 INJECTION INTRAMUSCULAR; INTRAVENOUS
Status: CANCELLED
Start: 2017-09-22

## 2017-09-15 RX ORDER — METHYLPREDNISOLONE SODIUM SUCCINATE 125 MG/2ML
125 INJECTION, POWDER, LYOPHILIZED, FOR SOLUTION INTRAMUSCULAR; INTRAVENOUS
Status: CANCELLED
Start: 2017-09-15

## 2017-09-15 RX ORDER — ALBUTEROL SULFATE 0.83 MG/ML
2.5 SOLUTION RESPIRATORY (INHALATION)
Status: CANCELLED | OUTPATIENT
Start: 2017-09-22

## 2017-09-15 RX ORDER — DIPHENHYDRAMINE HCL 25 MG
50 CAPSULE ORAL
Status: CANCELLED | OUTPATIENT
Start: 2017-09-15

## 2017-09-15 RX ORDER — EPINEPHRINE 1 MG/ML
0.3 INJECTION INTRAMUSCULAR; INTRAVENOUS; SUBCUTANEOUS EVERY 5 MIN PRN
Status: CANCELLED | OUTPATIENT
Start: 2017-09-15

## 2017-09-15 RX ORDER — ACETAMINOPHEN 325 MG/1
650 TABLET ORAL
Status: CANCELLED | OUTPATIENT
Start: 2017-09-15

## 2017-09-15 RX ORDER — MEPERIDINE HYDROCHLORIDE 25 MG/ML
25 INJECTION INTRAMUSCULAR; INTRAVENOUS; SUBCUTANEOUS EVERY 30 MIN PRN
Status: CANCELLED | OUTPATIENT
Start: 2017-09-22

## 2017-09-15 RX ORDER — ALBUTEROL SULFATE 0.83 MG/ML
2.5 SOLUTION RESPIRATORY (INHALATION)
Status: CANCELLED | OUTPATIENT
Start: 2017-09-15

## 2017-09-15 RX ORDER — EPINEPHRINE 0.3 MG/.3ML
0.3 INJECTION SUBCUTANEOUS EVERY 5 MIN PRN
Status: CANCELLED | OUTPATIENT
Start: 2017-09-15

## 2017-09-15 RX ORDER — ALBUTEROL SULFATE 90 UG/1
1-2 AEROSOL, METERED RESPIRATORY (INHALATION)
Status: CANCELLED
Start: 2017-09-15

## 2017-09-15 RX ORDER — EPINEPHRINE 0.3 MG/.3ML
0.3 INJECTION SUBCUTANEOUS EVERY 5 MIN PRN
Status: CANCELLED | OUTPATIENT
Start: 2017-09-22

## 2017-09-15 RX ORDER — HEPARIN SODIUM (PORCINE) LOCK FLUSH IV SOLN 100 UNIT/ML 100 UNIT/ML
5 SOLUTION INTRAVENOUS EVERY 8 HOURS
Status: DISCONTINUED | OUTPATIENT
Start: 2017-09-15 | End: 2017-09-15 | Stop reason: HOSPADM

## 2017-09-15 RX ORDER — ALBUTEROL SULFATE 90 UG/1
1-2 AEROSOL, METERED RESPIRATORY (INHALATION)
Status: CANCELLED
Start: 2017-09-22

## 2017-09-15 RX ORDER — METHYLPREDNISOLONE SODIUM SUCCINATE 125 MG/2ML
125 INJECTION, POWDER, LYOPHILIZED, FOR SOLUTION INTRAMUSCULAR; INTRAVENOUS
Status: CANCELLED
Start: 2017-09-22

## 2017-09-15 RX ORDER — LORAZEPAM 2 MG/ML
0.5 INJECTION INTRAMUSCULAR EVERY 4 HOURS PRN
Status: CANCELLED
Start: 2017-09-22

## 2017-09-15 RX ORDER — HEPARIN SODIUM (PORCINE) LOCK FLUSH IV SOLN 100 UNIT/ML 100 UNIT/ML
5 SOLUTION INTRAVENOUS EVERY 8 HOURS
Status: CANCELLED | OUTPATIENT
Start: 2017-09-15

## 2017-09-15 RX ORDER — HEPARIN SODIUM (PORCINE) LOCK FLUSH IV SOLN 100 UNIT/ML 100 UNIT/ML
5 SOLUTION INTRAVENOUS EVERY 8 HOURS
Status: CANCELLED | OUTPATIENT
Start: 2017-09-22

## 2017-09-15 RX ORDER — SODIUM CHLORIDE 9 MG/ML
1000 INJECTION, SOLUTION INTRAVENOUS CONTINUOUS PRN
Status: CANCELLED
Start: 2017-09-15

## 2017-09-15 RX ORDER — LORAZEPAM 2 MG/ML
0.5 INJECTION INTRAMUSCULAR EVERY 4 HOURS PRN
Status: CANCELLED
Start: 2017-09-15

## 2017-09-15 RX ADMIN — DEXAMETHASONE SODIUM PHOSPHATE 12 MG: 10 INJECTION, SOLUTION INTRAMUSCULAR; INTRAVENOUS at 15:13

## 2017-09-15 RX ADMIN — SODIUM CHLORIDE 250 ML: 9 INJECTION, SOLUTION INTRAVENOUS at 15:13

## 2017-09-15 RX ADMIN — SODIUM CHLORIDE, PRESERVATIVE FREE 5 ML: 5 INJECTION INTRAVENOUS at 16:33

## 2017-09-15 RX ADMIN — GEMCITABINE 1460 MG: 38 INJECTION, SOLUTION INTRAVENOUS at 15:30

## 2017-09-15 RX ADMIN — TRASTUZUMAB 300 MG: 150 INJECTION, POWDER, LYOPHILIZED, FOR SOLUTION INTRAVENOUS at 16:04

## 2017-09-15 ASSESSMENT — PAIN SCALES - GENERAL: PAINLEVEL: MILD PAIN (2)

## 2017-09-15 NOTE — PATIENT INSTRUCTIONS
September 2017 Sunday Monday Tuesday Wednesday Thursday Friday Saturday                            1     [facility]   12:30 PM   (15 min.)   [facility]   [facility]     [facility] ONC INFUSION 60    1:00 PM   (60 min.)   [facility] ONCOLOGY INFUSION   [facility] 2       3     4     5     6     7     8     9       10     11     12     13     14     [facility] D[facility]   12:45 PM   (15 min.)   [facility]   [facility]     CT CHEST/ABDOMEN/PELVIS W    1:45 PM   (20 min.)   UCCT2   [facility] CT 15     [facility] RETURN   12:45 PM   (30 min.)   Marlen Harper MD   [facility]     UMP ONC INFUSION 120    2:00 PM   (120 min.)   [facility] ONCOLOGY INFUSION   [facility] 16       17     18     UMP RETURN ENDOCRINE    2:15 PM   (30 min.)   Rolando Mishra MD   [facility] 19     20     21     22     [facility]   12:30 PM   (15 min.)   [facility]   [facility]     [facility] ONC INFUSION 60    1:00 PM   (60 min.)   [facility] ONCOLOGY INFUSION   [facility] 23       24     25     26     27     28     29     30                October 2017 Sunday Monday Tuesday Wednesday Thursday Friday Saturday   1     2     3     4     5     6     [facility]   11:30 AM   (15 min.)   [facility]   [facility]     [facility] RETURN   11:45 AM   (50 min.)   Deyanira Costello PA-C   [facility]     [facility] ONC INFUSION 120    1:00 PM   (120 min.)   [facility] ONCOLOGY INFUSION   [facility] 7       8     9     10     11     12     13     [facility] D[facility]   12:30 PM   (15 min.)   [facility]   [facility]     [facility] ONC INFUSION 60    1:00 PM   (60 min.)   UC ONCOLOGY INFUSION   [facility] 14       15     16     17     18     19     20     21       22     23     24      25     26     27     [facility]   11:30 AM   (15 min.)   [facility] LAB DRAW   [facility] Lab Draw     [facility] RETURN   11:45 AM   (50 min.)   Deyanira Costello PA-C   [facility] ONC INFUSION 120    2:00 PM   (120 min.)    ONCOLOGY INFUSION   [facility] 28       29     30     31                                      Lab Results:  No results found for this or any previous visit (from the past 12 hour(s)).  Contact Numbers    [facility] Main Line: [phone]  [facility] Triage:  [phone]    Call triage with chills and/or temperature greater than or equal to 100.5, uncontrolled nausea/vomiting, diarrhea, constipation, dizziness, shortness of breath, chest pain, bleeding, unexplained bruising, or any new/concerning symptoms, questions/concerns.     If you are having any concerning symptoms or wish to speak to a provider before your next infusion visit, please call your care coordinator or triage to notify them so we can adequately serve you.       After Hours: [phone]    If after hours, weekends, or holidays, call main hospital  and ask for Oncology doctor on call.
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
## 2017-09-15 NOTE — PROGRESS NOTES
Infusion Nursing Note:  Keren Erickson presents today for Cycle 17 Day 1 Gemzar, Herceptin.    Patient seen by provider today: Yes: Dr Harper prior to infusion center.    Treatment Conditions:  Lab Results   Component Value Date    HGB 10.8 09/14/2017     Lab Results   Component Value Date    WBC 10.2 09/14/2017      Lab Results   Component Value Date    ANEU 7.1 09/14/2017     Lab Results   Component Value Date     09/14/2017      Lab Results   Component Value Date     09/14/2017                   Lab Results   Component Value Date    POTASSIUM 4.2 09/14/2017           Lab Results   Component Value Date    MAG 2.4 04/10/2017            Lab Results   Component Value Date    CR 0.70 09/14/2017                   Lab Results   Component Value Date    OMA 8.9 09/14/2017                Lab Results   Component Value Date    BILITOTAL 0.3 09/14/2017           Lab Results   Component Value Date    ALBUMIN 3.4 09/14/2017                    Lab Results   Component Value Date    ALT 22 09/14/2017           Lab Results   Component Value Date    AST 27 09/14/2017     Results reviewed, labs MET treatment parameters, ok to proceed with treatment.        Intravenous Access:  Implanted Port.    Note: Patient arrived to infusion after being seen in clinic with no complaints and no concerns proceeding with treatment today.      Post Infusion Assessment:  Patient tolerated infusion without incident.  Blood return noted pre and post infusion.  Site patent and intact, free from redness, edema or discomfort.  No evidence of extravasations.  Access discontinued per protocol.    Discharge Plan:   Patient declined prescription refills.  Discharge instructions reviewed with: Patient.  Patient and/or family verbalized understanding of discharge instructions and all questions answered.  Copy of AVS reviewed with patient and/or family.  Patient will return 9/22/17 for next appointment.  Patient discharged in stable condition  accompanied by: self.  Departure Mode: Wheelchair.    Cherise Schreiber RN

## 2017-09-15 NOTE — MR AVS SNAPSHOT
After Visit Summary   9/15/2017    Keren Erickson    MRN: [id number]           Patient Information     Date Of Birth          1955        Visit Information        Provider Department      9/15/2017 2:00 PM UC 11 ATC; UC ONCOLOGY INFUSION [facility]        Today's Diagnoses     Carcinoma of breast metastatic to multiple sites, unspecified laterality (H)    -  1    Metastatic breast cancer (H)        Brain metastasis (H)          Care Instructions          September 2017 Sunday Monday Tuesday Wednesday Thursday Friday Saturday                            1     [facility]   12:30 PM   (15 min.)   [facility] DRAW   [facility] Draw     UMP ONC INFUSION 60    1:00 PM   (60 min.)    ONCOLOGY INFUSION   [facility] 2       3     4     5     6     7     8     9       10     11     12     13     14     [facility]   12:45 PM   (15 min.)    [facility]     CT CHEST/ABDOMEN/PELVIS W    1:45 PM   (20 min.)   UCCT2   [facility] CT 15     UMP RETURN   12:45 PM   (30 min.)   Marlen Harper MD   [facility]     UMP ONC INFUSION 120    2:00 PM   (120 min.)    ONCOLOGY INFUSION   [facility] 16       17     18     UMP RETURN ENDOCRINE    2:15 PM   (30 min.)   Rolando Mishra MD   [facility]ology 19     20     21     22     [facility] DRAW   12:30 PM   (15 min.)   [facility] DRAW   [facility] Draw     UMP ONC INFUSION 60    1:00 PM   (60 min.)   UC ONCOLOGY INFUSION   [facility] 23       24     25     26     27     28     29     30                October 2017 Sunday Monday Tuesday Wednesday Thursday Friday Saturday   1     2     3     4     5     6     [facility]   11:30 AM   (15 min.)    [facility]     [facility] RETURN   11:45 AM   (50  min.)   Deyanira Costello PA-C   [facility]     UMP ONC INFUSION 120    1:00 PM   (120 min.)    ONCOLOGY INFUSION   [facility] 7       8     9     10     11     12     13     [facility] DRAW   12:30 PM   (15 min.)    M[facility]ONIC LAB DRAW   [facility] Lab Draw     P ONC INFUSION 60    1:00 PM   (60 min.)    ONCOLOGY INFUSION   [facility] 14       15     16     17     18     19     20     21       22     23     24     25     26     27     [facility] DRAW   11:30 AM   (15 min.)    M[facility]ONIC LAB DRAW   [facility] Lab Draw     UMP RETURN   11:45 AM   (50 min.)   Deyanira Costello PA-C   [facility] ONC INFUSION 120    2:00 PM   (120 min.)    ONCOLOGY INFUSION   [facility] 28       29     30     31                                      Lab Results:  No results found for this or any previous visit (from the past 12 hour(s)).  Contact Numbers    [facility] Main Line: [phone]  [facility] Triage:  [phone]    Call triage with chills and/or temperature greater than or equal to 100.5, uncontrolled nausea/vomiting, diarrhea, constipation, dizziness, shortness of breath, chest pain, bleeding, unexplained bruising, or any new/concerning symptoms, questions/concerns.     If you are having any concerning symptoms or wish to speak to a provider before your next infusion visit, please call your care coordinator or triage to notify them so we can adequately serve you.       After Hours: [phone]    If after hours, weekends, or holidays, call main hospital  and ask for Oncology doctor on call.             Follow-ups after your visit        Your next 10 appointments already scheduled     Sep 18, 2017  2:30 PM CDT   (Arrive by 2:15 PM)   RETURN ENDOCRINE with Rolando Mishra MD   [facility] Endocrinology ([facility])    [address] [place] [address]  [facility] [address]   [phone]            Sep 22, 2017 12:30 PM CDT   [facility] Lab Draw with [facility] DRAW   [facility] Lab Draw ([facility])    [address] [place] [id number]   [phone]            Sep 22, 2017  1:00 PM CDT   Infusion 60 with UC ONCOLOGY INFUSION, UC 28 ATC   [facility] ([facility])    [address] [place] [address]   [phone]            Oct 06, 2017 11:30 AM CDT   [facility] Lab Draw with [facility] LAB DRAW   [facility] Lab Draw ([facility])    [address] [place] [address]   [phone]            Oct 06, 2017 12:00 PM CDT   (Arrive by 11:45 AM)   Return Visit with Deyanira Costello PA-C   [facility] ([facility])    [address] [place] [address]   [phone]            Oct 06, 2017  1:00 PM CDT   Infusion 120 with UC ONCOLOGY INFUSION, UC 18 ATC   [facility] ([facility])    [address] [place] [address]   [phone]            Oct 13, 2017 12:30 PM CDT   [facility] Lab Draw with [facility] LAB DRAW   [facility] Lab Draw ([facility])    [address] [place] [address]   [phone]            Oct 13, 2017  1:00 PM CDT   Infusion 60 with UC ONCOLOGY INFUSION   [facility] ([facility])    [address] [place] [id number] [phone]              Who to contact     If you have questions or need follow up information about today's clinic visit or your schedule please contact [facility] directly at [phone].  Normal or non-critical lab and imaging results will be communicated to  you by MyChart, letter or phone within 4 business days after the clinic has received the results. If you do not hear from us within 7 days, please contact the clinic through Simple[employer] or phone. If you have a critical or abnormal lab result, we will notify you by phone as soon as possible.  Submit refill requests through [employer] or call your pharmacy and they will forward the refill request to us. Please allow 3 business days for your refill to be completed.          Additional Information About Your Visit        [email or web address] Information     [employer] gives you secure access to your electronic health record. If you see a primary care provider, you can also send messages to your care team and make appointments. If you have questions, please call your primary care clinic.  If you do not have a primary care provider, please call [phone] and they will assist you.        Care EveryWhere ID     This is your Care EveryWhere ID. This could be used by other organizations to access your [facility] medical records  [id number]         Blood Pressure from Last 3 Encounters:   09/15/17 127/82   09/01/17 130/83   08/25/17 119/84    Weight from Last 3 Encounters:   09/15/17 53.6 kg (118 lb 3.2 oz)   09/01/17 53.7 kg (118 lb 4.8 oz)   08/25/17 53.6 kg (118 lb 3.2 oz)              We Performed the Following     *CBC with platelets differential     Ca27.29  breast tumor marker     Comprehensive metabolic panel        Primary Care Provider Office Phone # Fax #    Kelly Bg Hart [phone]       [address] [facility] [address]        Equal Access to Services     CLAUDIA BOND : Hadii aad ku hadasho Soomaali, waaxda luqadaha, qaybta kaalmada adeegyajb, freddie escobedo . So [facility] [phone].    ATENCIÓN: Si habla español, tiene a ramires disposición servicios gratuitos de asistencia lingüística. Llame al [phone].    We comply with applicable federal civil rights laws and [place] laws. We  do not discriminate on the basis of race, color, national origin, age, disability sex, sexual orientation or gender identity.            Thank you!     Thank you for choosing [facility]CER CLINIC  for your care. Our goal is always to provide you with excellent care. Hearing back from our patients is one way we can continue to improve our services. Please take a few minutes to complete the written survey that you may receive in the mail after your visit with us. Thank you!             Your Updated Medication List - Protect others around you: Learn how to safely use, store and throw away your medicines at www.disposemymeds.org.          This list is accurate as of: 9/15/17  3:29 PM.  Always use your most recent med list.                   Brand Name Dispense Instructions for use Diagnosis    ALEVE PO      Take 220 mg by mouth daily        * desmopressin 0.1 MG tablet    DDAVP    90 tablet    Take 1 tablet (100 mcg) by mouth At Bedtime    Diabetes insipidus, neurohypophyseal (H)       * desmopressin 0.2 MG tablet    DDAVP    45 tablet    Take  1/2 tablet once daily by mouth    Diabetes insipidus (H)       Multi-vitamin Tabs tablet      Take 1 tablet by mouth daily        omeprazole 20 MG tablet     30 tablet    Take 1 tablet (20 mg) by mouth daily    Gastroesophageal reflux disease without esophagitis       prochlorperazine 10 MG tablet    COMPAZINE    90 tablet    Take 1 tablet (10 mg) by mouth every 6 hours as needed for nausea or vomiting    Nausea       VITAMIN E BLEND PO      Take by mouth daily        * Notice:  This list has 2 medication(s) that are the same as other medications prescribed for you. Read the directions carefully, and ask your doctor or other care provider to review them with you.

## 2017-09-15 NOTE — NURSING NOTE
"Oncology Rooming Note    September 15, 2017 1:16 PM   Keren Erickson is a 61 year old female who presents for:    Chief Complaint   Patient presents with     Oncology Clinic Visit     Return-Breast Ca     Initial Vitals: /82 (BP Location: Right arm, Patient Position: Sitting, Cuff Size: Adult Regular)  Pulse 87  Temp 97.1  F (36.2  C) (Tympanic)  Resp 16  Ht 1.575 m (5' 2.01\")  Wt 53.6 kg (118 lb 3.2 oz)  SpO2 95%  BMI 21.61 kg/m2 Estimated body mass index is 21.61 kg/(m^2) as calculated from the following:    Height as of this encounter: 1.575 m (5' 2.01\").    Weight as of this encounter: 53.6 kg (118 lb 3.2 oz). Body surface area is 1.53 meters squared.  Mild Pain (2) Comment: Data Unavailable   No LMP recorded. Patient is postmenopausal.  Allergies reviewed: Yes  Medications reviewed: Yes    Medications: Medication refills not needed today.  Pharmacy name entered into [employer]: [id number] - [place], [place] - [address] [place] [facility] [address]    Clinical concerns: No new concerns    6 minutes for nursing intake (face to face time)     Day Anton LPN            "

## 2017-09-15 NOTE — MR AVS SNAPSHOT
After Visit Summary   9/15/2017    Keren Ercikson    MRN: [id number]           Patient Information     Date Of Birth          1955        Visit Information        Provider Department      9/15/2017 1:00 PM Marlen Harper MD [facility]        Today's Diagnoses     Carcinoma of breast metastatic to multiple sites, unspecified laterality (H)    -  1    Rectal bleeding        Brain metastasis (H)        Metastatic breast cancer (H)           Follow-ups after your visit        Additional Services     GASTROENTEROLOGY ADULT REF PROCEDURE ONLY       Last Lab Result: Creatinine (mg/dL)       Date                     Value                 09/01/2017               0.57             ----------  Body mass index is 21.61 kg/(m^2).     Needed:  No  Language:  English    Patient will be contacted to schedule procedure.     Please be aware that coverage of these services is subject to the terms and limitations of your health insurance plan.  Call member services at your health plan with any benefit or coverage questions.  Any procedures must be performed at a [facility] facility OR coordinated by your clinic's referral office.    Please bring the following with you to your appointment:    (1) Any X-Rays, CTs or MRIs which have been performed.  Contact the facility where they were done to arrange for  prior to your scheduled appointment.    (2) List of current medications   (3) This referral request   (4) Any documents/labs given to you for this referral                  Your next 10 appointments already scheduled     Oct 06, 2017 11:30 AM CDT   [facility] Draw with  [facility] LAB DRAW   [facility] ([facility])    [address] [place] [address]  2nd [facility] [id number] [phone]            Oct 06, 2017 12:00 PM CDT   (Arrive by 11:45 AM)   Return Visit with Deyanira Costello PA-C   [facility] ([facility])    [address] [place] [address]   [phone]            Oct 06, 2017  1:00 PM CDT   Infusion 120 with UC ONCOLOGY INFUSION, UC 18 ATC   [facility] ([facility])    [address] [place] [phone]   [phone]            Oct 13, 2017 12:30 PM CDT   [facility] Lab Draw with UC [facility] LAB DRAW   [facility] ([facility])    [address] [place] [phone]   [phone]            Oct 13, 2017  1:00 PM CDT   Infusion 60 with UC ONCOLOGY INFUSION, UC 28 ATC   [facility] ([facility])    [address] [place] [phone]   [phone]            Oct 27, 2017 11:30 AM CDT   [facility] Lab Draw with UC [facility] LAB DRAW   [facility])    [address] [place] [phone]   [phone]            Oct 27, 2017 12:00 PM CDT   (Arrive by 11:45 AM)   Return Visit with Deyanira Costello PA-C   [facility] ([facility])    [address] [place] [address]   [phone]            Oct 27, 2017  2:00 PM CDT   Infusion 120 with UC ONCOLOGY INFUSION   [facility] ([facility])    [address] [place] [address]   [phone]              Who to contact     If you have questions or need follow up information about today's clinic visit or your schedule please contact [facility] directly at [phone].  Normal or non-critical lab and imaging results will be communicated to you by MyChart, letter or phone within 4 business days after the clinic has received the results. If you do not hear from us within 7 days, please  "contact the clinic through [employer] or phone. If you have a critical or abnormal lab result, we will notify you by phone as soon as possible.  Submit refill requests through [employer] or call your pharmacy and they will forward the refill request to us. Please allow 3 business days for your refill to be completed.          Additional Information About Your Visit        Email[employer] Information     [employer] gives you secure access to your electronic health record. If you see a primary care provider, you can also send messages to your care team and make appointments. If you have questions, please call your primary care clinic.  If you do not have a primary care provider, please call [phone] and they will assist you.        Care EveryWhere ID     This is your Care EveryWhere ID. This could be used by other organizations to access your [facility] medical records  [id number]        Your Vitals Were     Pulse Temperature Respirations Height Pulse Oximetry BMI (Body Mass Index)    87 97.1  F (36.2  C) (Tympanic) 16 1.575 m (5' 2.01\") 95% 21.61 kg/m2       Blood Pressure from Last 3 Encounters:   No data found for BP    Weight from Last 3 Encounters:   No data found for Wt              Today, you had the following     No orders found for display       Primary Care Provider Office Phone # Fax #    Kelly Hart [phone]       [address] [facility] [address]        Equal Access to Services     CLAUDIA BOND : Hadii aad ku hadasho Soomaali, waaxda luqadaha, qaybta kaalmada adeegyada, freddie keys. So [facility] [phone].    ATENCIÓN: Si habla español, tiene a ramires disposición servicios gratuitos de asistencia lingüística. Kyung al [phone].    We comply with applicable federal civil rights laws and [place] laws. We do not discriminate on the basis of race, color, national origin, age, disability sex, sexual orientation or gender identity.            Thank you!     Thank " you for choosing [facility]  for your care. Our goal is always to provide you with excellent care. Hearing back from our patients is one way we can continue to improve our services. Please take a few minutes to complete the written survey that you may receive in the mail after your visit with us. Thank you!             Your Updated Medication List - Protect others around you: Learn how to safely use, store and throw away your medicines at www.disposemymeds.org.          This list is accurate as of: 9/15/17 11:59 PM.  Always use your most recent med list.                   Brand Name Dispense Instructions for use Diagnosis    ALEVE PO      Take 220 mg by mouth daily        * desmopressin 0.1 MG tablet    DDAVP    90 tablet    Take 1 tablet (100 mcg) by mouth At Bedtime    Diabetes insipidus, neurohypophyseal (H)       * desmopressin 0.2 MG tablet    DDAVP    45 tablet    Take  1/2 tablet once daily by mouth    Diabetes insipidus (H)       Multi-vitamin Tabs tablet      Take 1 tablet by mouth daily        omeprazole 20 MG tablet     30 tablet    Take 1 tablet (20 mg) by mouth daily    Gastroesophageal reflux disease without esophagitis       prochlorperazine 10 MG tablet    COMPAZINE    90 tablet    Take 1 tablet (10 mg) by mouth every 6 hours as needed for nausea or vomiting    Nausea       VITAMIN E BLEND PO      Take by mouth daily        * Notice:  This list has 2 medication(s) that are the same as other medications prescribed for you. Read the directions carefully, and ask your doctor or other care provider to review them with you.
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
## 2017-09-18 ENCOUNTER — OFFICE VISIT (OUTPATIENT)
Dept: ENDOCRINOLOGY | Facility: CLINIC | Age: 62
End: 2017-09-18

## 2017-09-18 VITALS
HEIGHT: 62 IN | BODY MASS INDEX: 21.71 KG/M2 | DIASTOLIC BLOOD PRESSURE: 76 MMHG | WEIGHT: 118 LBS | HEART RATE: 87 BPM | SYSTOLIC BLOOD PRESSURE: 110 MMHG

## 2017-09-18 DIAGNOSIS — H81.11 BPPV (BENIGN PAROXYSMAL POSITIONAL VERTIGO), RIGHT: Primary | ICD-10-CM

## 2017-09-18 DIAGNOSIS — E06.3 HYPOTHYROIDISM DUE TO HASHIMOTO'S THYROIDITIS: ICD-10-CM

## 2017-09-18 RX ORDER — MECLIZINE HYDROCHLORIDE 25 MG/1
25 TABLET ORAL 3 TIMES DAILY PRN
Qty: 30 TABLET | Refills: 0 | Status: SHIPPED | OUTPATIENT
Start: 2017-09-18

## 2017-09-18 ASSESSMENT — PAIN SCALES - GENERAL: PAINLEVEL: NO PAIN (0)

## 2017-09-18 NOTE — MR AVS SNAPSHOT
After Visit Summary   9/18/2017    Keren Erickson    MRN: [id number]           Patient Information     Date Of Birth          1955        Visit Information        Provider Department      9/18/2017 2:30 PM Rolando Mishra MD [facility]        Today's Diagnoses     BPPV (benign paroxysmal positional vertigo), right    -  1    Hypothyroidism due to Hashimoto's thyroiditis          Care Instructions    Please do the lab test today, schedule for a thyroid ultrasound.     Take Meclizine as needed for vertigo.     We will report back the result              Follow-ups after your visit        Follow-up notes from your care team     Return in about 6 months (around 3/18/2018).      Your next 10 appointments already scheduled     Sep 22, 2017 12:30 PM CDT   [facility] with  [facility]   [facility] ([facility])    [address] [place] [address]   [phone]            Sep 22, 2017  1:00 PM CDT   Infusion 60 with UC ONCOLOGY INFUSION, UC 28 ATC   [facility] ([facility])    [address] [place] [address]   [phone]            Oct 06, 2017 11:30 AM CDT   [facility] Draw with [facility] DRAW   [facility] ([facility])    [address] [place] [address]   [phone]            Oct 06, 2017 12:00 PM CDT   (Arrive by 11:45 AM)   Return Visit with Deyanira Costello PA-C   [facility] ([facility])    [address] [place] [address]   [phone]            Oct 06, 2017  1:00 PM CDT   Infusion 120 with UC ONCOLOGY INFUSION, UC 18 ATC   [facility] ([facility])    [address] [place] [address]    [phone]            Oct 13, 2017 12:30 PM CDT   [facility] Draw with [facility] DRAW   [facility] ([facility])    [address] [address] [phone]            Oct 13, 2017  1:00 PM CDT   Infusion 60 with UC ONCOLOGY INFUSION, UC 28 ATC   [facility] ([facility])    [address] [address] [phone]              Future tests that were ordered for you today     Open Future Orders        Priority Expected Expires Ordered    US Thyroid Routine  4/16/2018 9/18/2017    US Head Neck Soft Tissue Routine  9/18/2018 9/18/2017    T3 total Routine  9/13/2018 9/18/2017    T4 free Routine  9/13/2018 9/18/2017            Who to contact     Please call your clinic at [phone] to:    Ask questions about your health    Make or cancel appointments    Discuss your medicines    Learn about your test results    Speak to your doctor   If you have compliments or concerns about an experience at your clinic, or if you wish to file a complaint, please contact [facility] Patient Relations at [phone] or email us at [email or web address]         Additional Information About Your Visit        [employer] Information     [employer] gives you secure access to your electronic health record. If you see a primary care provider, you can also send messages to your care team and make appointments. If you have questions, please call your primary care clinic.  If you do not have a primary care provider, please call [phone] and they will assist you.      [employer] is an electronic gateway that provides easy, online access to your medical records. With [employer], you can request a clinic appointment, read your test results, renew a prescription or communicate with your care team.     To access your existing account, please contact your [place]  [facility] [place] [facility] or call [phone] for assistance.        Care EveryWhere ID     This is your Care EveryWhere ID. This could be used by other organizations to access your [place] medical records  [id number]        Your Vitals Were     Pulse Height BMI (Body Mass Index)             87 1.575 m (5' 2\") 21.58 kg/m2          Blood Pressure from Last 3 Encounters:   09/18/17 110/76   09/15/17 127/82   09/01/17 130/83    Weight from Last 3 Encounters:   09/18/17 53.5 kg (118 lb)   09/15/17 53.6 kg (118 lb 3.2 oz)   09/01/17 53.7 kg (118 lb 4.8 oz)                 Today's Medication Changes          These changes are accurate as of: 9/18/17  3:12 PM.  If you have any questions, ask your nurse or doctor.               Start taking these medicines.        Dose/Directions    meclizine 25 MG tablet   Commonly known as:  ANTIVERT   Used for:  BPPV (benign paroxysmal positional vertigo), right   Started by:  Rolando Mishra MD        Dose:  25 mg   Take 1 tablet (25 mg) by mouth 3 times daily as needed for dizziness   Quantity:  30 tablet   Refills:  0         These medicines have changed or have updated prescriptions.        Dose/Directions    desmopressin 0.2 MG tablet   Commonly known as:  DDAVP   This may have changed:  Another medication with the same name was removed. Continue taking this medication, and follow the directions you see here.   Used for:  Diabetes insipidus (H)   Changed by:  Rolando Mishra MD        Take  1/2 tablet once daily by mouth   Quantity:  45 tablet   Refills:  3            Where to get your medicines      These medications were sent to [place], [place] - [address]  [address] [address]    Hours:  TRANSPLANT PHONE NUMBER [phone] Phone:  [phone]     meclizine 25 MG tablet                Primary Care Provider Office Phone # Fax #    Kelly Hart MD " [phone] [address] [facility] [address]        Equal Access to Services     CLAUDIA BOND : Hadii keturah ibarra laura Joshi, waadriannada luqbaljit, galita kakyda annabel, freddie leenain hayaastephen youngirineo quintero laWendyamanda keys. So [facility] [place] [phone].    ATENCIÓN: Si habla español, tiene a ramires disposición servicios gratuitos de asistencia lingüística. Llame al [phone].    We comply with applicable federal civil rights laws and [place] laws. We do not discriminate on the basis of race, color, national origin, age, disability sex, sexual orientation or gender identity.            Thank you!     Thank you for choosing [facility]  for your care. Our goal is always to provide you with excellent care. Hearing back from our patients is one way we can continue to improve our services. Please take a few minutes to complete the written survey that you may receive in the mail after your visit with us. Thank you!             Your Updated Medication List - Protect others around you: Learn how to safely use, store and throw away your medicines at www.disposemymeds.org.          This list is accurate as of: 9/18/17  3:12 PM.  Always use your most recent med list.                   Brand Name Dispense Instructions for use Diagnosis    ALEVE PO      Take 220 mg by mouth daily        desmopressin 0.2 MG tablet    DDAVP    45 tablet    Take  1/2 tablet once daily by mouth    Diabetes insipidus (H)       meclizine 25 MG tablet    ANTIVERT    30 tablet    Take 1 tablet (25 mg) by mouth 3 times daily as needed for dizziness    BPPV (benign paroxysmal positional vertigo), right       Multi-vitamin Tabs tablet      Take 1 tablet by mouth daily        omeprazole 20 MG tablet     30 tablet    Take 1 tablet (20 mg) by mouth daily    Gastroesophageal reflux disease without esophagitis       prochlorperazine 10 MG tablet    COMPAZINE    90 tablet    Take 1 tablet (10 mg) by mouth every 6 hours as needed for nausea or  vomiting    Nausea       VITAMIN E BLEND PO      Take by mouth daily

## 2017-09-18 NOTE — LETTER
9/18/2017       RE: Keren Erickson  [address] [facility] [address]     Dear Colleague,    Thank you for referring your patient, Keren Erickson, to the [facility] END[facility]CRINOLOGY at [facility]. Please see a copy of my visit note below.    Endocrinology follow up note  Patient is seen in the clinic for follow up. Had seen Dr Mendieta as inpatient in Jan 2017    Hypernatremia.   60 yo female with history of ER MT and HER-2 positive metastatic breast cancer;   -- Treated initially with Arimidex and Herceptin  -- Switched to Tamoxifen and Herceptin in 1/14  -- Sensory deficit in 2/2015 --> MRI showed 3 punctate brain mets  -- Gamma Knife radiation to 6 lesions, performed on 04/16/2015  -- TDM1 in January 2015 and continued this through 6/26/2015   --  PET scan performed on 11/25/2015 demonstrated progression of disease at multiple sites  -- She restarted TDM1 on 11/27/2015, however experienced a mild transfusion reaction with shortness of breath and chest tightness, resolved upon stopping TDM1 and 125 mg of SoluMedrol  -- In late May, she was found to have progressive brain metastases. She received whole brain radiation.  -- currently on [place]  trial randomized to standard care arm. Trial suspended after [facility] 1/17  -- Now resuming Gemzar and Herceptin.     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   -- Labs showed high Na and low urine osm   -- improved with Desmopressin.   -- Na normalized since starting Desmopressin at night.   Currently on 50 mcg (0.5 tab at night)     Review of systems    She feels well today, and is here with friend Iker ([place])   She does not wake up at night and her thinking has become clearer after being on Desmopressin    HPA [place]:   Medications Not on  steroids  Feeling well, has fatigue but is chronic and unchanged.   Dizziness -- abdi on turning right to left in the past.   No dizziness or syncope.   Energy level is better since being on Desmopressin.     Thyroid Axis  Energy level at baseline - fatigue  Appetite is improved.   Some weight gain  BM normal  Is not feeling excessively hot or cold  No skin or hair changes    Sex hormones.   History of normal FSH for post-menopausal female.   Labs tested during acute illness.       Posterior pituitary   No polyuria or polydipsia after being on desmopressin.   No nocturia  Urination slightly increased during day but states that it is <2 L a day     Local compression  No headaches   No loss of vision  No diplopia  Has nystagmus, fast component to left.     Past Medical History:   Diagnosis Date     Arthritis      Breast cancer (H) 9/26/13    Lt breast     Breast mass, left     bx and told it was benign yrs ago     Family History   Problem Relation Age of Onset     Hypertension Father      Aneurysm Father 91     AAA     Hypertension Mother      Arthritis Mother      CANCER No family hx of      Social History     Social History     Marital status: Single     Spouse name: N/A     Number of children: 0     Years of education: N/A     Occupational History     chiropractor Self     Social History Main Topics     Smoking status: Never Smoker     Smokeless tobacco: Never Used     Alcohol use 0.6 oz/week     1 Standard drinks or equivalent per week      Comment: glass of wine wvery once in a while but nothing recently     Drug use: No     Sexual activity: Not Currently     Other Topics Concern     Not on file     Social History Narrative    Lives alone, has pet chickens       Current Outpatient Prescriptions   Medication     meclizine (ANTIVERT) 25 MG tablet     desmopressin (DDAVP) 0.2 MG tablet     prochlorperazine (COMPAZINE) 10 MG tablet     omeprazole 20 MG tablet     multivitamin, therapeutic with minerals (MULTI-VITAMIN)  "TABS tablet     VITAMIN E BLEND PO     Naproxen Sodium (ALEVE PO)     [DISCONTINUED] desmopressin (DDAVP) 0.1 MG tablet     No current facility-administered medications for this visit.      Menstrual history  Had been pregnant but does not have children  Cycles were regular in past. During college years had 10 month of amenorrhea.   Menopause 55 year age.    Exam    /76  Pulse 87  Ht 1.575 m (5' 2\")  Wt 53.5 kg (118 lb)  BMI 21.58 kg/m2  General no distress, appears older than stated age.   Eyes Normal visual field, nystagmus (fast component to left, chronic)   Diplopia +  ENT No adenopathy  Thyroid no goiter  Chest Clear to auscultation.   CVS S1 S2   Abdomen soft BS+  nontender  Reflexes normal,   Skin no rash  No peripheral edema.   Gait is unsteady. Uses a cane.     Assessment  61 year old female with metastatic breast cancer with whole brain irradiation who was found to have central DI.   Central diabetes insipidus:  She has known metastasis to the brain from the breast cancer and her latest MRI from 1/25/17 showed no gross abnormality in the hypothalamus, pituitary or infundibulum area. Therefore the central DI could be from very small metastasis we are not able to see on the MRI from 1/25/17 or previous radiation treatment.     ACTH, TSH and FSH tested, FSH is normal - but expected to be high in postmenopausal female. We will repeat this test along with other pituitary hormones. She was pregnant in past, but does not have children, post menopausal since age 55 with brief menopausal symptoms.     Thyroid nodule, Right sided:   Slight irregularity on the right side on exam  PET scan: slightly hypermetabolic     Plan: Recent pituitary labs and electrolytes were normal.   Obtain T3 and FT4 today as TSH is not a good test for suspected central causes.   Continue Desmopressin. If increased thirst and urination during the day time, plan to start 25 mcg daily   Thyroid US and neck US given history of " hypermetabolic area on right thyroid.     Orders Placed This Encounter   Procedures     US Thyroid     US Head Neck Soft Tissue     T3 total     T4 free     Rolando Mishra MD  [id number]  Endocrinology Service

## 2017-09-18 NOTE — PATIENT INSTRUCTIONS
Please do the lab test today, schedule for a thyroid ultrasound.     Take Meclizine as needed for vertigo.     We will report back the result

## 2017-09-18 NOTE — PROGRESS NOTES
Endocrinology follow up note  Patient is seen in the clinic for follow up. Had seen Dr Mendieta as inpatient in Jan 2017    Hypernatremia.   62 yo female with history of ER RI and HER-2 positive metastatic breast cancer;   -- Treated initially with Arimidex and Herceptin  -- Switched to Tamoxifen and Herceptin in 1/14  -- Sensory deficit in 2/2015 --> MRI showed 3 punctate brain mets  -- Gamma Knife radiation to 6 lesions, performed on 04/16/2015  -- TDM1 in January 2015 and continued this through 6/26/2015   --  PET scan performed on 11/25/2015 demonstrated progression of disease at multiple sites  -- She restarted TDM1 on 11/27/2015, however experienced a mild transfusion reaction with shortness of breath and chest tightness, resolved upon stopping TDM1 and 125 mg of SoluMedrol  -- In late May, she was found to have progressive brain metastases. She received whole brain radiation.  -- currently on [place]  trial randomized to standard care arm. Trial suspended after [facility] 1/17  -- Now resuming Gemzar and Herceptin.     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   -- Labs showed high Na and low urine osm   -- improved with Desmopressin.   -- Na normalized since starting Desmopressin at night.   Currently on 50 mcg (0.5 tab at night)     Review of systems    She feels well today, and is here with friend Iker (-[facility])   She does not wake up at night and her thinking has become clearer after being on Desmopressin    HPA [place]:   Medications Not on steroids  Feeling well, has fatigue but is chronic and unchanged.   Dizziness -- abdi on turning right to left in the past.   No dizziness or syncope.   Energy level is better since being on Desmopressin.     Thyroid Axis  Energy level at baseline - fatigue  Appetite is improved.   Some weight gain  BM  normal  Is not feeling excessively hot or cold  No skin or hair changes    Sex hormones.   History of normal FSH for post-menopausal female.   Labs tested during acute illness.       Posterior pituitary   No polyuria or polydipsia after being on desmopressin.   No nocturia  Urination slightly increased during day but states that it is <2 L a day     Local compression  No headaches   No loss of vision  No diplopia  Has nystagmus, fast component to left.     Past Medical History:   Diagnosis Date     Arthritis      Breast cancer (H) 9/26/13    Lt breast     Breast mass, left     bx and told it was benign yrs ago     Family History   Problem Relation Age of Onset     Hypertension Father      Aneurysm Father 91     AAA     Hypertension Mother      Arthritis Mother      CANCER No family hx of      Social History     Social History     Marital status: Single     Spouse name: N/A     Number of children: 0     Years of education: N/A     Occupational History     chiropractor Self     Social History Main Topics     Smoking status: Never Smoker     Smokeless tobacco: Never Used     Alcohol use 0.6 oz/week     1 Standard drinks or equivalent per week      Comment: glass of wine wvery once in a while but nothing recently     Drug use: No     Sexual activity: Not Currently     Other Topics Concern     Not on file     Social History Narrative    Lives alone, has pet chickens       Current Outpatient Prescriptions   Medication     meclizine (ANTIVERT) 25 MG tablet     desmopressin (DDAVP) 0.2 MG tablet     prochlorperazine (COMPAZINE) 10 MG tablet     omeprazole 20 MG tablet     multivitamin, therapeutic with minerals (MULTI-VITAMIN) TABS tablet     VITAMIN E BLEND PO     Naproxen Sodium (ALEVE PO)     [DISCONTINUED] desmopressin (DDAVP) 0.1 MG tablet     No current facility-administered medications for this visit.      Menstrual history  Had been pregnant but does not have children  Cycles were regular in past. During college  "years had 10 month of amenorrhea.   Menopause 55 year age.    Exam    /76  Pulse 87  Ht 1.575 m (5' 2\")  Wt 53.5 kg (118 lb)  BMI 21.58 kg/m2  General no distress, appears older than stated age.   Eyes Normal visual field, nystagmus (fast component to left, chronic)   Diplopia +  ENT No adenopathy  Thyroid no goiter  Chest Clear to auscultation.   CVS S1 S2   Abdomen soft BS+  nontender  Reflexes normal,   Skin no rash  No peripheral edema.   Gait is unsteady. Uses a cane.     Assessment  61 year old female with metastatic breast cancer with whole brain irradiation who was found to have central DI.   Central diabetes insipidus:  She has known metastasis to the brain from the breast cancer and her latest MRI from 1/25/17 showed no gross abnormality in the hypothalamus, pituitary or infundibulum area. Therefore the central DI could be from very small metastasis we are not able to see on the MRI from 1/25/17 or previous radiation treatment.     ACTH, TSH and FSH tested, FSH is normal - but expected to be high in postmenopausal female. We will repeat this test along with other pituitary hormones. She was pregnant in past, but does not have children, post menopausal since age 55 with brief menopausal symptoms.     Thyroid nodule, Right sided:   Slight irregularity on the right side on exam  PET scan: slightly hypermetabolic     Plan: Recent pituitary labs and electrolytes were normal.   Obtain T3 and FT4 today as TSH is not a good test for suspected central causes.   Continue Desmopressin. If increased thirst and urination during the day time, plan to start 25 mcg daily   Thyroid US and neck US given history of hypermetabolic area on right thyroid.     Orders Placed This Encounter   Procedures     US Thyroid     US Head Neck Soft Tissue     T3 total     T4 free     Rolando Mishra MD  [id number]  Endocrinology Service    "

## 2017-09-21 ENCOUNTER — TELEPHONE (OUTPATIENT)
Dept: GASTROENTEROLOGY | Facility: CLINIC | Age: 62
End: 2017-09-21

## 2017-09-21 NOTE — TELEPHONE ENCOUNTER
Contacted patient to schedule colonoscopy as ordered by Dr. Harper. Patient does not wish to schedule at this time and she will call back to schedule. She has the call center number.    Viviana Oswald RN

## 2017-09-22 ENCOUNTER — APPOINTMENT (OUTPATIENT)
Dept: LAB | Facility: CLINIC | Age: 62
End: 2017-09-22
Attending: INTERNAL MEDICINE
Payer: COMMERCIAL

## 2017-09-22 ENCOUNTER — INFUSION THERAPY VISIT (OUTPATIENT)
Dept: ONCOLOGY | Facility: CLINIC | Age: 62
End: 2017-09-22
Attending: INTERNAL MEDICINE
Payer: COMMERCIAL

## 2017-09-22 VITALS
BODY MASS INDEX: 21.86 KG/M2 | HEART RATE: 85 BPM | TEMPERATURE: 97.7 F | WEIGHT: 119.5 LBS | DIASTOLIC BLOOD PRESSURE: 74 MMHG | OXYGEN SATURATION: 98 % | SYSTOLIC BLOOD PRESSURE: 115 MMHG | RESPIRATION RATE: 18 BRPM

## 2017-09-22 DIAGNOSIS — C79.31 BRAIN METASTASIS: ICD-10-CM

## 2017-09-22 DIAGNOSIS — C50.919 METASTATIC BREAST CANCER: Primary | ICD-10-CM

## 2017-09-22 DIAGNOSIS — C50.919 CARCINOMA OF BREAST METASTATIC TO MULTIPLE SITES, UNSPECIFIED LATERALITY (H): ICD-10-CM

## 2017-09-22 LAB
ANISOCYTOSIS BLD QL SMEAR: SLIGHT
BASOPHILS # BLD AUTO: 0.1 10E9/L (ref 0–0.2)
BASOPHILS NFR BLD AUTO: 1 %
DACRYOCYTES BLD QL SMEAR: SLIGHT
DIFFERENTIAL METHOD BLD: ABNORMAL
EOSINOPHIL # BLD AUTO: 0 10E9/L (ref 0–0.7)
EOSINOPHIL NFR BLD AUTO: 0 %
ERYTHROCYTE [DISTWIDTH] IN BLOOD BY AUTOMATED COUNT: 17.6 % (ref 10–15)
HCT VFR BLD AUTO: 31.5 % (ref 35–47)
HGB BLD-MCNC: 10.4 G/DL (ref 11.7–15.7)
LYMPHOCYTES # BLD AUTO: 0.7 10E9/L (ref 0.8–5.3)
LYMPHOCYTES NFR BLD AUTO: 12 %
MCH RBC QN AUTO: 33.2 PG (ref 26.5–33)
MCHC RBC AUTO-ENTMCNC: 33 G/DL (ref 31.5–36.5)
MCV RBC AUTO: 101 FL (ref 78–100)
METAMYELOCYTES # BLD: 0.2 10E9/L
METAMYELOCYTES NFR BLD MANUAL: 3 %
MONOCYTES # BLD AUTO: 0.3 10E9/L (ref 0–1.3)
MONOCYTES NFR BLD AUTO: 6 %
MYELOCYTES # BLD: 0.1 10E9/L
MYELOCYTES NFR BLD MANUAL: 1 %
NEUTROPHILS # BLD AUTO: 4.2 10E9/L (ref 1.6–8.3)
NEUTROPHILS NFR BLD AUTO: 75 %
NRBC # BLD AUTO: 0.2 10*3/UL
NRBC BLD AUTO-RTO: 3 /100
OVALOCYTES BLD QL SMEAR: SLIGHT
PLATELET # BLD AUTO: 373 10E9/L (ref 150–450)
POIKILOCYTOSIS BLD QL SMEAR: SLIGHT
POLYCHROMASIA BLD QL SMEAR: SLIGHT
PROMYELOCYTES # BLD MANUAL: 0.1 10E9/L
PROMYELOCYTES NFR BLD MANUAL: 2 %
RBC # BLD AUTO: 3.13 10E12/L (ref 3.8–5.2)
WBC # BLD AUTO: 5.6 10E9/L (ref 4–11)

## 2017-09-22 PROCEDURE — 25000128 H RX IP 250 OP 636: Mod: ZF | Performed by: INTERNAL MEDICINE

## 2017-09-22 PROCEDURE — 96375 TX/PRO/DX INJ NEW DRUG ADDON: CPT

## 2017-09-22 PROCEDURE — 96413 CHEMO IV INFUSION 1 HR: CPT

## 2017-09-22 PROCEDURE — 85025 COMPLETE CBC W/AUTO DIFF WBC: CPT | Performed by: INTERNAL MEDICINE

## 2017-09-22 RX ORDER — HEPARIN SODIUM (PORCINE) LOCK FLUSH IV SOLN 100 UNIT/ML 100 UNIT/ML
5 SOLUTION INTRAVENOUS EVERY 8 HOURS
Status: DISCONTINUED | OUTPATIENT
Start: 2017-09-22 | End: 2017-09-22 | Stop reason: HOSPADM

## 2017-09-22 RX ADMIN — GEMCITABINE 1460 MG: 38 INJECTION, SOLUTION INTRAVENOUS at 14:04

## 2017-09-22 RX ADMIN — SODIUM CHLORIDE, PRESERVATIVE FREE 5 ML: 5 INJECTION INTRAVENOUS at 14:38

## 2017-09-22 RX ADMIN — SODIUM CHLORIDE 250 ML: 9 INJECTION, SOLUTION INTRAVENOUS at 13:41

## 2017-09-22 RX ADMIN — DEXAMETHASONE SODIUM PHOSPHATE 12 MG: 10 INJECTION, SOLUTION INTRAMUSCULAR; INTRAVENOUS at 13:41

## 2017-09-22 RX ADMIN — SODIUM CHLORIDE, PRESERVATIVE FREE 5 ML: 5 INJECTION INTRAVENOUS at 12:51

## 2017-09-22 ASSESSMENT — PAIN SCALES - GENERAL: PAINLEVEL: MILD PAIN (2)

## 2017-09-22 NOTE — MR AVS SNAPSHOT
After Visit Summary   9/22/2017    Keren Erickson    MRN: [id number]           Patient Information     Date Of Birth          1955        Visit Information        Provider Department      9/22/2017 1:00 PM UC 28 ATC; UC ONCOLOGY INFUSION [facility]        Today's Diagnoses     Metastatic breast cancer (H)    -  1    Brain metastasis (H)        Carcinoma of breast metastatic to multiple sites, unspecified laterality (H)          Care Instructions          September 2017 Sunday Monday Tuesday Wednesday Thursday Friday Saturday                            1     [facility] MASONIC LAB DRAW   12:30 PM   (15 min.)    MASONIC LAB DRAW   [facility] Masonic Lab Draw     UMP ONC INFUSION 60    1:00 PM   (60 min.)    ONCOLOGY INFUSION   [facility] 2       3     4     5     6     7     8     9       10     11     12     13     14     UMP MASONIC LAB DRAW   12:45 PM   (15 min.)    [facility]   [facility]     CT CHEST/ABDOMEN/PELVIS W    1:45 PM   (20 min.)   UCCT2   [facility] CT 15     UMP RETURN   12:45 PM   (30 min.)   Marlen Harper MD   [facility]     UMP ONC INFUSION 120    2:00 PM   (120 min.)    ONCOLOGY INFUSION   [facility] 16       17     18     UMP RETURN ENDOCRINE    2:15 PM   (30 min.)   Rolanod Mishra MD   [facility] Endocrinology 19     20     21     22     UMP MASONIC LAB DRAW   12:30 PM   (15 min.)   UC MASONIC LAB DRAW   [facility] Masonic Lab Draw     UMP ONC INFUSION 60    1:00 PM   (60 min.)   UC ONCOLOGY INFUSION   [facility] 23       24     25     26     27     28     29     30                October 2017 Sunday Monday Tuesday Wednesday Thursday Friday Saturday   1     2     3     4     5     6     UMP MASONIC LAB DRAW   11:30 AM   (15 min.)    [facility]RAW   [facility]     UMP RETURN   11:45 AM   (50  min.)   Deyanira Costello PA-C   [facility]     UMP ONC INFUSION 120    1:00 PM   (120 min.)    ONCOLOGY INFUSION   [facility] 7       8     9     10     11     12     13     UMP MASONIC LAB DRAW   12:30 PM   (15 min.)    [facility]     P ONC INFUSION 60    1:00 PM   (60 min.)    ONCOLOGY INFUSION   [facility] 14       15     16     17     18     19     20     21       22     23     24     25     26     27     [facility] MASONIC LAB DRAW   11:30 AM   (15 min.)    MASONIC LAB DRAW   [facility] Masonic Lab Draw     UMP RETURN   11:45 AM   (50 min.)   Deyanira Costello PA-C   [facility]     UMP ONC INFUSION 120    2:00 PM   (120 min.)    ONCOLOGY INFUSION   [facility] 28       29     30     31                                      Lab Results:  Recent Results (from the past 12 hour(s))   CBC with platelets differential    Collection Time: 09/22/17 12:57 PM   Result Value Ref Range    WBC 5.6 4.0 - 11.0 10e9/L    RBC Count 3.13 (L) 3.8 - 5.2 10e12/L    Hemoglobin 10.4 (L) 11.7 - 15.7 g/dL    Hematocrit 31.5 (L) 35.0 - 47.0 %     (H) 78 - 100 fl    MCH 33.2 (H) 26.5 - 33.0 pg    MCHC 33.0 31.5 - 36.5 g/dL    RDW 17.6 (H) 10.0 - 15.0 %    Platelet Count 373 150 - 450 10e9/L    Diff Method PENDING      Contact Numbers    [facility] Main Line: [phone]  [facility] Triage:  [phone]    Call triage with chills and/or temperature greater than or equal to 100.5, uncontrolled nausea/vomiting, diarrhea, constipation, dizziness, shortness of breath, chest pain, bleeding, unexplained bruising, or any new/concerning symptoms, questions/concerns.     If you are having any concerning symptoms or wish to speak to a provider before your next infusion visit, please call your care coordinator or triage to notify them so we can adequately serve you.       After Hours: [phone]    If  after hours, weekends, or holidays, call main hospital  and ask for Oncology doctor on call.             Follow-ups after your visit        Your next 10 appointments already scheduled     Oct 06, 2017 11:30 AM CDT   Masonic Lab Draw with UC MASONIC LAB DRAW   [facility] Masonic Lab Draw ([facility])    [address] [address]   [phone]            Oct 06, 2017 12:00 PM CDT   (Arrive by 11:45 AM)   Return Visit with Deyanira Costello PA-C   [facility] ([facility])    [address] [place] [address]   [phone]            Oct 06, 2017  1:00 PM CDT   Infusion 120 with UC ONCOLOGY INFUSION, UC 18 ATC   [facility] ([facility])    [address] [place] [address]   [phone]            Oct 13, 2017 12:30 PM CDT   Masonic Lab Draw with UC MASONIC LAB DRAW   [facility] Masonic Lab Draw ([facility])    [address] [address]   [phone]            Oct 13, 2017  1:00 PM CDT   Infusion 60 with UC ONCOLOGY INFUSION, UC 28 ATC   [facility] ([facility])    [address] [place] [address]   [phone]            Oct 27, 2017 11:30 AM CDT   Masonic Lab Draw with UC [facility]ONIC LAB DRAW   [facility]    [address] [place] [address]   [phone]            Oct 27, 2017 12:00 PM CDT   (Arrive by 11:45 AM)   Return Visit with Deyanira Costello PA-C   [facility] ([facility])    [address] [place] [address]   [phone]            Oct 27, 2017  2:00 PM CDT   Infusion 120 with UC ONCOLOGY INFUSION   [facility] ([facility])    [address] [facility]  2nd Floor  [facility] [address] [phone]              Who to contact     If you have questions or need follow up information about today's clinic visit or your schedule please contact [facility] directly at [phone].  Normal or non-critical lab and imaging results will be communicated to you by MyChart, letter or phone within 4 business days after the clinic has received the results. If you do not hear from us within 7 days, please contact the clinic through Lecerehart or phone. If you have a critical or abnormal lab result, we will notify you by phone as soon as possible.  Submit refill requests through iSnap or call your pharmacy and they will forward the refill request to us. Please allow 3 business days for your refill to be completed.          Additional Information About Your Visit        Lecerehart Information     iSnap gives you secure access to your electronic health record. If you see a primary care provider, you can also send messages to your care team and make appointments. If you have questions, please call your primary care clinic.  If you do not have a primary care provider, please call [phone] and they will assist you.        Care EveryWhere ID     This is your Care EveryWhere ID. This could be used by other organizations to access your [facility] medical records  [id number]        Your Vitals Were     Pulse Temperature Respirations Pulse Oximetry BMI (Body Mass Index)       85 97.7  F (36.5  C) (Oral) 18 98% 21.86 kg/m2        Blood Pressure from Last 3 Encounters:   09/22/17 115/74   09/18/17 110/76   09/15/17 127/82    Weight from Last 3 Encounters:   09/22/17 54.2 kg (119 lb 8 oz)   09/18/17 53.5 kg (118 lb)   09/15/17 53.6 kg (118 lb 3.2 oz)              We Performed the Following     CBC with platelets differential        Primary Care Provider Office Phone # Fax #    Kelly Hart,  [phone]       [address] [facility] [address]        Equal Access to Services     CLAUDIA BOND : Hadii aad ku haddewaynealda Sebasali, waadriannada lindseykarliha, galita ravi hectordavidjb, freddie marks doriestephen youngirineo ronaldoarianaradha keys. So [facility] [phone].    ATENCIÓN: Si habla español, tiene a ramires disposición servicios gratuitos de asistencia lingüística. Llame al [phone].    We comply with applicable federal civil rights laws and [place] laws. We do not discriminate on the basis of race, color, national origin, age, disability sex, sexual orientation or gender identity.            Thank you!     Thank you for choosing [facility]CER CLINIC  for your care. Our goal is always to provide you with excellent care. Hearing back from our patients is one way we can continue to improve our services. Please take a few minutes to complete the written survey that you may receive in the mail after your visit with us. Thank you!             Your Updated Medication List - Protect others around you: Learn how to safely use, store and throw away your medicines at www.disposemymeds.org.          This list is accurate as of: 9/22/17  1:45 PM.  Always use your most recent med list.                   Brand Name Dispense Instructions for use Diagnosis    ALEVE PO      Take 220 mg by mouth daily        desmopressin 0.2 MG tablet    DDAVP    45 tablet    Take  1/2 tablet once daily by mouth    Diabetes insipidus (H)       meclizine 25 MG tablet    ANTIVERT    30 tablet    Take 1 tablet (25 mg) by mouth 3 times daily as needed for dizziness    BPPV (benign paroxysmal positional vertigo), right       Multi-vitamin Tabs tablet      Take 1 tablet by mouth daily        omeprazole 20 MG tablet     30 tablet    Take 1 tablet (20 mg) by mouth daily    Gastroesophageal reflux disease without esophagitis       prochlorperazine 10 MG tablet    COMPAZINE    90 tablet    Take 1 tablet (10 mg) by mouth every 6 hours as needed for  nausea or vomiting    Nausea       VITAMIN E BLEND PO      Take by mouth daily

## 2017-09-22 NOTE — PATIENT INSTRUCTIONS
September 2017 Sunday Monday Tuesday Wednesday Thursday Friday Saturday                            1     [facility] MASONIC LAB DRAW   12:30 PM   (15 min.)   UC MASONIC LAB DRAW   [facility] Masonic Lab Draw     UMP ONC INFUSION 60    1:00 PM   (60 min.)   UC ONCOLOGY INFUSION   [facility] 2       3     4     5     6     7     8     9       10     11     12     13     14     UMP MASONIC LAB DRAW   12:45 PM   (15 min.)   UC MASONIC LAB DRAW   [facility] Masonic Lab Draw     CT CHEST/ABDOMEN/PELVIS W    1:45 PM   (20 min.)   UCCT2   [facility] CT 15     UMP RETURN   12:45 PM   (30 min.)   Marlen Harper MD   [facility]     UMP ONC INFUSION 120    2:00 PM   (120 min.)   UC ONCOLOGY INFUSION   [facility] 16       17     18     UMP RETURN ENDOCRINE    2:15 PM   (30 min.)   Rolando Mishra MD   [facility] 19     20     21     22     UMP MASONIC LAB DRAW   12:30 PM   (15 min.)   UC MASONIC LAB DRAW   [facility] Masonic Lab Draw     UMP ONC INFUSION 60    1:00 PM   (60 min.)   UC ONCOLOGY INFUSION   [facility] 23       24     25     26     27     28     29     30                October 2017 Sunday Monday Tuesday Wednesday Thursday Friday Saturday   1     2     3     4     5     6     UMP MASONIC LAB DRAW   11:30 AM   (15 min.)   UC MASONIC LAB DRAW   [facility] Masonic Lab Draw     UMP RETURN   11:45 AM   (50 min.)   Deyanira Costello PA-C   [facility]     UMP ONC INFUSION 120    1:00 PM   (120 min.)   UC ONCOLOGY INFUSION   [facility] 7       8     9     10     11     12     13     UMP MASONIC LAB DRAW   12:30 PM   (15 min.)   UC MASONIC LAB DRAW   [facility] Masonic Lab Draw     UMP ONC INFUSION 60    1:00 PM   (60 min.)   UC ONCOLOGY INFUSION   [facility] 14       15     16     17     18     19     20     21       22     23     24      25     26     27     [facility]   11:30 AM   (15 min.)   [facility] LAB DRAW   [facility] Lab Draw     [facility] RETURN   11:45 AM   (50 min.)   Deyanira Costello PA-C   [facility] ONC INFUSION 120    2:00 PM   (120 min.)    ONCOLOGY INFUSION   [facility] 28       29     30     31                                      Lab Results:  Recent Results (from the past 12 hour(s))   CBC with platelets differential    Collection Time: 09/22/17 12:57 PM   Result Value Ref Range    WBC 5.6 4.0 - 11.0 10e9/L    RBC Count 3.13 (L) 3.8 - 5.2 10e12/L    Hemoglobin 10.4 (L) 11.7 - 15.7 g/dL    Hematocrit 31.5 (L) 35.0 - 47.0 %     (H) 78 - 100 fl    MCH 33.2 (H) 26.5 - 33.0 pg    MCHC 33.0 31.5 - 36.5 g/dL    RDW 17.6 (H) 10.0 - 15.0 %    Platelet Count 373 150 - 450 10e9/L    Diff Method PENDING      Contact Numbers    [facility] Main Line: [phone]  [facility] Triage:  [phone]    Call triage with chills and/or temperature greater than or equal to 100.5, uncontrolled nausea/vomiting, diarrhea, constipation, dizziness, shortness of breath, chest pain, bleeding, unexplained bruising, or any new/concerning symptoms, questions/concerns.     If you are having any concerning symptoms or wish to speak to a provider before your next infusion visit, please call your care coordinator or triage to notify them so we can adequately serve you.       After Hours: [phone]    If after hours, weekends, or holidays, call main hospital  and ask for Oncology doctor on call.

## 2017-09-22 NOTE — PROGRESS NOTES
Infusion Nursing Note:  Keren Erickson presents today for Cycle 17 Day 8 Gemzar.    Patient seen by provider today: No    Treatment Conditions:  Lab Results   Component Value Date    HGB 10.4 09/22/2017     Lab Results   Component Value Date    WBC 5.6 09/22/2017      Lab Results   Component Value Date    ANEU 7.1 09/14/2017     Lab Results   Component Value Date     09/22/2017      Lab Results   Component Value Date     09/14/2017                   Lab Results   Component Value Date    POTASSIUM 4.2 09/14/2017           Lab Results   Component Value Date    MAG 2.4 04/10/2017            Lab Results   Component Value Date    CR 0.70 09/14/2017                   Lab Results   Component Value Date    OMA 8.9 09/14/2017                Lab Results   Component Value Date    BILITOTAL 0.3 09/14/2017           Lab Results   Component Value Date    ALBUMIN 3.4 09/14/2017                    Lab Results   Component Value Date    ALT 22 09/14/2017           Lab Results   Component Value Date    AST 27 09/14/2017     Results reviewed, labs MET treatment parameters, ok to proceed with treatment.        Intravenous Access:  Implanted Port.    Note: Patient arrived to infusion with no complaints today and no concerns.      Post Infusion Assessment:  Patient tolerated infusion without incident.  Blood return noted pre and post infusion.  Site patent and intact, free from redness, edema or discomfort.  No evidence of extravasations.  Access discontinued per protocol.    Discharge Plan:   Prescription refills given for Meclizine (Antivert) PO.  Discharge instructions reviewed with: Patient and Family.  Patient and/or family verbalized understanding of discharge instructions and all questions answered.  Copy of AVS reviewed with patient and/or family.  Patient will return 10/6/17 for next appointment.  Patient discharged in stable condition accompanied by: son.  Departure Mode: Ambulatory.    Cherise Schreiber RN

## 2017-09-28 DIAGNOSIS — C50.919 CARCINOMA OF BREAST METASTATIC TO MULTIPLE SITES, UNSPECIFIED LATERALITY (H): Primary | ICD-10-CM

## 2017-10-06 ENCOUNTER — INFUSION THERAPY VISIT (OUTPATIENT)
Dept: ONCOLOGY | Facility: CLINIC | Age: 62
End: 2017-10-06
Attending: INTERNAL MEDICINE
Payer: MEDICARE

## 2017-10-06 VITALS
DIASTOLIC BLOOD PRESSURE: 76 MMHG | SYSTOLIC BLOOD PRESSURE: 113 MMHG | WEIGHT: 120.1 LBS | OXYGEN SATURATION: 97 % | RESPIRATION RATE: 16 BRPM | HEIGHT: 62 IN | HEART RATE: 86 BPM | TEMPERATURE: 96.8 F | BODY MASS INDEX: 22.1 KG/M2

## 2017-10-06 DIAGNOSIS — C79.31 BRAIN METASTASIS: ICD-10-CM

## 2017-10-06 DIAGNOSIS — C50.919 METASTATIC BREAST CANCER: ICD-10-CM

## 2017-10-06 DIAGNOSIS — C50.919 CARCINOMA OF BREAST METASTATIC TO MULTIPLE SITES, UNSPECIFIED LATERALITY (H): Primary | ICD-10-CM

## 2017-10-06 DIAGNOSIS — Z51.81 ENCOUNTER FOR THERAPEUTIC DRUG MONITORING: ICD-10-CM

## 2017-10-06 DIAGNOSIS — C50.919 METASTATIC BREAST CANCER: Primary | ICD-10-CM

## 2017-10-06 DIAGNOSIS — C50.919 CARCINOMA OF BREAST METASTATIC TO MULTIPLE SITES, UNSPECIFIED LATERALITY (H): ICD-10-CM

## 2017-10-06 LAB
ALBUMIN SERPL-MCNC: 3.3 G/DL (ref 3.4–5)
ALP SERPL-CCNC: 108 U/L (ref 40–150)
ALT SERPL W P-5'-P-CCNC: 23 U/L (ref 0–50)
ANION GAP SERPL CALCULATED.3IONS-SCNC: 5 MMOL/L (ref 3–14)
AST SERPL W P-5'-P-CCNC: 25 U/L (ref 0–45)
BASOPHILS # BLD AUTO: 0.1 10E9/L (ref 0–0.2)
BASOPHILS NFR BLD AUTO: 0.9 %
BILIRUB SERPL-MCNC: 0.4 MG/DL (ref 0.2–1.3)
BUN SERPL-MCNC: 12 MG/DL (ref 7–30)
CALCIUM SERPL-MCNC: 8.8 MG/DL (ref 8.5–10.1)
CANCER AG27-29 SERPL-ACNC: 21 U/ML (ref 0–39)
CEA SERPL-MCNC: 1.4 UG/L (ref 0–2.5)
CHLORIDE SERPL-SCNC: 106 MMOL/L (ref 94–109)
CO2 SERPL-SCNC: 28 MMOL/L (ref 20–32)
CREAT SERPL-MCNC: 0.65 MG/DL (ref 0.52–1.04)
DIFFERENTIAL METHOD BLD: ABNORMAL
EOSINOPHIL # BLD AUTO: 0 10E9/L (ref 0–0.7)
EOSINOPHIL NFR BLD AUTO: 0.2 %
ERYTHROCYTE [DISTWIDTH] IN BLOOD BY AUTOMATED COUNT: 17.6 % (ref 10–15)
GFR SERPL CREATININE-BSD FRML MDRD: >90 ML/MIN/1.7M2
GLUCOSE SERPL-MCNC: 76 MG/DL (ref 70–99)
HCT VFR BLD AUTO: 33.4 % (ref 35–47)
HGB BLD-MCNC: 10.7 G/DL (ref 11.7–15.7)
IMM GRANULOCYTES # BLD: 0.1 10E9/L (ref 0–0.4)
IMM GRANULOCYTES NFR BLD: 1.3 %
LYMPHOCYTES # BLD AUTO: 1.1 10E9/L (ref 0.8–5.3)
LYMPHOCYTES NFR BLD AUTO: 12.5 %
MCH RBC QN AUTO: 32.5 PG (ref 26.5–33)
MCHC RBC AUTO-ENTMCNC: 32 G/DL (ref 31.5–36.5)
MCV RBC AUTO: 102 FL (ref 78–100)
MONOCYTES # BLD AUTO: 1.2 10E9/L (ref 0–1.3)
MONOCYTES NFR BLD AUTO: 14.3 %
NEUTROPHILS # BLD AUTO: 6.1 10E9/L (ref 1.6–8.3)
NEUTROPHILS NFR BLD AUTO: 70.8 %
NRBC # BLD AUTO: 0 10*3/UL
NRBC BLD AUTO-RTO: 1 /100
PLATELET # BLD AUTO: 363 10E9/L (ref 150–450)
POTASSIUM SERPL-SCNC: 3.9 MMOL/L (ref 3.4–5.3)
PROT SERPL-MCNC: 6.3 G/DL (ref 6.8–8.8)
RBC # BLD AUTO: 3.29 10E12/L (ref 3.8–5.2)
SODIUM SERPL-SCNC: 139 MMOL/L (ref 133–144)
WBC # BLD AUTO: 8.6 10E9/L (ref 4–11)

## 2017-10-06 PROCEDURE — 25000128 H RX IP 250 OP 636: Mod: ZF | Performed by: PHYSICIAN ASSISTANT

## 2017-10-06 PROCEDURE — 85025 COMPLETE CBC W/AUTO DIFF WBC: CPT | Performed by: INTERNAL MEDICINE

## 2017-10-06 PROCEDURE — 86300 IMMUNOASSAY TUMOR CA 15-3: CPT | Performed by: INTERNAL MEDICINE

## 2017-10-06 PROCEDURE — 96417 CHEMO IV INFUS EACH ADDL SEQ: CPT

## 2017-10-06 PROCEDURE — 82378 CARCINOEMBRYONIC ANTIGEN: CPT | Performed by: INTERNAL MEDICINE

## 2017-10-06 PROCEDURE — 80053 COMPREHEN METABOLIC PANEL: CPT | Performed by: INTERNAL MEDICINE

## 2017-10-06 PROCEDURE — 99214 OFFICE O/P EST MOD 30 MIN: CPT | Mod: ZP | Performed by: PHYSICIAN ASSISTANT

## 2017-10-06 PROCEDURE — 99213 OFFICE O/P EST LOW 20 MIN: CPT | Mod: ZF

## 2017-10-06 PROCEDURE — 96367 TX/PROPH/DG ADDL SEQ IV INF: CPT

## 2017-10-06 PROCEDURE — 96413 CHEMO IV INFUSION 1 HR: CPT

## 2017-10-06 RX ORDER — ALBUTEROL SULFATE 0.83 MG/ML
2.5 SOLUTION RESPIRATORY (INHALATION)
Status: CANCELLED | OUTPATIENT
Start: 2017-10-13

## 2017-10-06 RX ORDER — ALBUTEROL SULFATE 90 UG/1
1-2 AEROSOL, METERED RESPIRATORY (INHALATION)
Status: CANCELLED
Start: 2017-10-06

## 2017-10-06 RX ORDER — HEPARIN SODIUM (PORCINE) LOCK FLUSH IV SOLN 100 UNIT/ML 100 UNIT/ML
5 SOLUTION INTRAVENOUS EVERY 8 HOURS
Status: CANCELLED | OUTPATIENT
Start: 2017-10-06

## 2017-10-06 RX ORDER — EPINEPHRINE 0.3 MG/.3ML
0.3 INJECTION SUBCUTANEOUS EVERY 5 MIN PRN
Status: CANCELLED | OUTPATIENT
Start: 2017-10-13

## 2017-10-06 RX ORDER — MEPERIDINE HYDROCHLORIDE 25 MG/ML
25 INJECTION INTRAMUSCULAR; INTRAVENOUS; SUBCUTANEOUS EVERY 30 MIN PRN
Status: CANCELLED | OUTPATIENT
Start: 2017-10-13

## 2017-10-06 RX ORDER — HEPARIN SODIUM (PORCINE) LOCK FLUSH IV SOLN 100 UNIT/ML 100 UNIT/ML
5 SOLUTION INTRAVENOUS EVERY 8 HOURS
Status: CANCELLED | OUTPATIENT
Start: 2017-10-13

## 2017-10-06 RX ORDER — LORAZEPAM 2 MG/ML
0.5 INJECTION INTRAMUSCULAR EVERY 4 HOURS PRN
Status: CANCELLED
Start: 2017-10-13

## 2017-10-06 RX ORDER — ALBUTEROL SULFATE 0.83 MG/ML
2.5 SOLUTION RESPIRATORY (INHALATION)
Status: CANCELLED | OUTPATIENT
Start: 2017-10-06

## 2017-10-06 RX ORDER — LORAZEPAM 2 MG/ML
0.5 INJECTION INTRAMUSCULAR EVERY 4 HOURS PRN
Status: CANCELLED
Start: 2017-10-06

## 2017-10-06 RX ORDER — DIPHENHYDRAMINE HYDROCHLORIDE 50 MG/ML
50 INJECTION INTRAMUSCULAR; INTRAVENOUS
Status: CANCELLED
Start: 2017-10-13

## 2017-10-06 RX ORDER — MEPERIDINE HYDROCHLORIDE 25 MG/ML
25 INJECTION INTRAMUSCULAR; INTRAVENOUS; SUBCUTANEOUS EVERY 30 MIN PRN
Status: CANCELLED | OUTPATIENT
Start: 2017-10-06

## 2017-10-06 RX ORDER — EPINEPHRINE 1 MG/ML
0.3 INJECTION INTRAMUSCULAR; INTRAVENOUS; SUBCUTANEOUS EVERY 5 MIN PRN
Status: CANCELLED | OUTPATIENT
Start: 2017-10-13

## 2017-10-06 RX ORDER — HEPARIN SODIUM (PORCINE) LOCK FLUSH IV SOLN 100 UNIT/ML 100 UNIT/ML
5 SOLUTION INTRAVENOUS
Status: COMPLETED | OUTPATIENT
Start: 2017-10-06 | End: 2017-10-06

## 2017-10-06 RX ORDER — HEPARIN SODIUM (PORCINE) LOCK FLUSH IV SOLN 100 UNIT/ML 100 UNIT/ML
5 SOLUTION INTRAVENOUS EVERY 8 HOURS
Status: DISCONTINUED | OUTPATIENT
Start: 2017-10-06 | End: 2017-10-06 | Stop reason: HOSPADM

## 2017-10-06 RX ORDER — DIPHENHYDRAMINE HCL 25 MG
50 CAPSULE ORAL
Status: CANCELLED | OUTPATIENT
Start: 2017-10-06

## 2017-10-06 RX ORDER — EPINEPHRINE 0.3 MG/.3ML
0.3 INJECTION SUBCUTANEOUS EVERY 5 MIN PRN
Status: CANCELLED | OUTPATIENT
Start: 2017-10-06

## 2017-10-06 RX ORDER — SODIUM CHLORIDE 9 MG/ML
1000 INJECTION, SOLUTION INTRAVENOUS CONTINUOUS PRN
Status: CANCELLED
Start: 2017-10-06

## 2017-10-06 RX ORDER — EPINEPHRINE 1 MG/ML
0.3 INJECTION INTRAMUSCULAR; INTRAVENOUS; SUBCUTANEOUS EVERY 5 MIN PRN
Status: CANCELLED | OUTPATIENT
Start: 2017-10-06

## 2017-10-06 RX ORDER — METHYLPREDNISOLONE SODIUM SUCCINATE 125 MG/2ML
125 INJECTION, POWDER, LYOPHILIZED, FOR SOLUTION INTRAMUSCULAR; INTRAVENOUS
Status: CANCELLED
Start: 2017-10-06

## 2017-10-06 RX ORDER — DIPHENHYDRAMINE HYDROCHLORIDE 50 MG/ML
50 INJECTION INTRAMUSCULAR; INTRAVENOUS
Status: CANCELLED
Start: 2017-10-06

## 2017-10-06 RX ORDER — SODIUM CHLORIDE 9 MG/ML
1000 INJECTION, SOLUTION INTRAVENOUS CONTINUOUS PRN
Status: CANCELLED
Start: 2017-10-13

## 2017-10-06 RX ORDER — ALBUTEROL SULFATE 90 UG/1
1-2 AEROSOL, METERED RESPIRATORY (INHALATION)
Status: CANCELLED
Start: 2017-10-13

## 2017-10-06 RX ORDER — METHYLPREDNISOLONE SODIUM SUCCINATE 125 MG/2ML
125 INJECTION, POWDER, LYOPHILIZED, FOR SOLUTION INTRAMUSCULAR; INTRAVENOUS
Status: CANCELLED
Start: 2017-10-13

## 2017-10-06 RX ORDER — ACETAMINOPHEN 325 MG/1
650 TABLET ORAL
Status: CANCELLED | OUTPATIENT
Start: 2017-10-06

## 2017-10-06 RX ADMIN — TRASTUZUMAB 300 MG: 150 INJECTION, POWDER, LYOPHILIZED, FOR SOLUTION INTRAVENOUS at 15:00

## 2017-10-06 RX ADMIN — SODIUM CHLORIDE, PRESERVATIVE FREE 5 ML: 5 INJECTION INTRAVENOUS at 11:57

## 2017-10-06 RX ADMIN — GEMCITABINE 1460 MG: 38 INJECTION, SOLUTION INTRAVENOUS at 14:25

## 2017-10-06 RX ADMIN — DEXAMETHASONE SODIUM PHOSPHATE 12 MG: 10 INJECTION, SOLUTION INTRAMUSCULAR; INTRAVENOUS at 14:01

## 2017-10-06 RX ADMIN — SODIUM CHLORIDE 250 ML: 9 INJECTION, SOLUTION INTRAVENOUS at 14:01

## 2017-10-06 RX ADMIN — SODIUM CHLORIDE, PRESERVATIVE FREE 5 ML: 5 INJECTION INTRAVENOUS at 15:35

## 2017-10-06 ASSESSMENT — PAIN SCALES - GENERAL: PAINLEVEL: MILD PAIN (2)

## 2017-10-06 NOTE — MR AVS SNAPSHOT
After Visit Summary   10/6/2017    Keren Erickson    MRN: [id number]           Patient Information     Date Of Birth          1955        Visit Information        Provider Department      10/6/2017 12:00 PM Deyanira Costello PA-C [facility]        Today's Diagnoses     Carcinoma of breast metastatic to multiple sites, unspecified laterality (H)    -  1    Brain metastasis (H)        Metastatic breast cancer (H)        Encounter for therapeutic drug monitoring           Follow-ups after your visit        Your next 10 appointments already scheduled     Oct 13, 2017 12:30 PM CDT   Masonic Lab Draw with  MASONIC LAB DRAW   [facility] Masonic Lab Draw ([facility])    [address] [place] [id number] [phone]            Oct 13, 2017  1:00 PM CDT   Infusion 60 with  ONCOLOGY INFUSION, UC 28 ATC   [facility] ([facility])    [address] [place] [id number] [phone]            Oct 13, 2017  1:30 PM CDT   (Arrive by 1:15 PM)   Office Visit with Barbara Lakhani [facility] ([facility])    [address] [place] [phone] [phone]           Bring a current list of meds and any records pertaining to this visit. For Physicals, please bring immunization records and any forms needing to be filled out. Please arrive 10 minutes early to complete paperwork.            Oct 27, 2017 11:30 AM CDT   Masonic Lab Draw with  MASONIC LAB DRAW   [facility] Masonic Lab Draw ([facility])    [address] [place] [address]   [phone]            Oct 27, 2017 12:00 PM CDT   (Arrive by 11:45 AM)   Return Visit with Deyanira Costello PA-C   [facility] ([facility])     [address] [address]   [phone]            Oct 27, 2017  2:00 PM CDT   Infusion 120 with UC ONCOLOGY INFUSION, UC 11 ATC   [facility] ([facility])    [address] [place] [address]   [phone]            Nov 03, 2017  1:00 PM CDT   Masonic Lab Draw with UC MASONIC LAB DRAW   [facility] Lab Draw ([facility])    [address] [place] [address]   [phone]            Nov 03, 2017  1:30 PM CDT   Infusion 60 with UC ONCOLOGY INFUSION   [facility] ([facility])    [address] [place] [address]   [phone]              Future tests that were ordered for you today     Open Future Orders        Priority Expected Expires Ordered    Echocardiogram Complete Routine  10/6/2018 10/6/2017    Echocardiogram Complete Routine 11/24/2017 10/6/2018 10/6/2017            Who to contact     If you have questions or need follow up information about today's clinic visit or your schedule please contact [facility] CANCER [facility] directly at [phone].  Normal or non-critical lab and imaging results will be communicated to you by Schedule Savvyhart, letter or phone within 4 business days after the clinic has received the results. If you do not hear from us within 7 days, please contact the clinic through Schedule Savvyhart or phone. If you have a critical or abnormal lab result, we will notify you by phone as soon as possible.  Submit refill requests through [employer] or call your pharmacy and they will forward the refill request to us. Please allow 3 business days for your refill to be completed.          Additional Information About Your Visit        Nanotech Security Information     [employer] Security gives you secure access to your electronic health record. If you see a primary care provider, you can also send messages to  "your care team and make appointments. If you have questions, please call your primary care clinic.  If you do not have a primary care provider, please call [phone] and they will assist you.        Care EveryWhere ID     This is your Care EveryWhere ID. This could be used by other organizations to access your [place] medical records  [id number]        Your Vitals Were     Pulse Temperature Respirations Height Pulse Oximetry BMI (Body Mass Index)    86 96.8  F (36  C) (Oral) 16 1.575 m (5' 2.01\") 97% 21.96 kg/m2       Blood Pressure from Last 3 Encounters:   10/06/17 113/76   09/22/17 115/74   09/18/17 110/76    Weight from Last 3 Encounters:   10/06/17 54.5 kg (120 lb 1.6 oz)   09/22/17 54.2 kg (119 lb 8 oz)   09/18/17 53.5 kg (118 lb)               Primary Care Provider Office Phone # Fax #    Kelly Bg Hart [phone]       [address] [facility] [address]        Equal Access to Services     [facility]: Hadii keturah ibarra hadashalda Sop[place]be, waaxda luqadaha, qaybta kaalarianna jin, freddie keys. So [facility] [phone].    ATENCIÓN: Si habla español, tiene a ramires disposición servicios gratuitos de asistencia lingüística. [facility] [phone].    We comply with applicable federal civil rights laws and [place] laws. We do not discriminate on the basis of race, color, national origin, age, disability, sex, sexual orientation, or gender identity.            Thank you!     Thank you for choosing [facility] CLINIC  for your care. Our goal is always to provide you with excellent care. Hearing back from our patients is one way we can continue to improve our services. Please take a few minutes to complete the written survey that you may receive in the mail after your visit with us. Thank you!             Your Updated Medication List - Protect others around you: Learn how to safely use, store and throw away your medicines at www.disposemymeds.org.       "    This list is accurate as of: 10/6/17  4:40 PM.  Always use your most recent med list.                   Brand Name Dispense Instructions for use Diagnosis    ALEVE PO      Take 220 mg by mouth daily        desmopressin 0.2 MG tablet    DDAVP    45 tablet    Take  1/2 tablet once daily by mouth    Diabetes insipidus (H)       meclizine 25 MG tablet    ANTIVERT    30 tablet    Take 1 tablet (25 mg) by mouth 3 times daily as needed for dizziness    BPPV (benign paroxysmal positional vertigo), right       Multi-vitamin Tabs tablet      Take 1 tablet by mouth daily        omeprazole 20 MG tablet     30 tablet    Take 1 tablet (20 mg) by mouth daily    Gastroesophageal reflux disease without esophagitis       prochlorperazine 10 MG tablet    COMPAZINE    90 tablet    Take 1 tablet (10 mg) by mouth every 6 hours as needed for nausea or vomiting    Nausea       VITAMIN E BLEND PO      Take by mouth daily

## 2017-10-06 NOTE — PATIENT INSTRUCTIONS
Contact numbers:  Triage Main Line: [phone]  After hours: [phone]    Call with chills and/or temperature greater than or equal to 100.5 and questions or concerns.    If after hours, weekends, or holidays, call main hospital  at  [phone] and ask for Oncology doctor on call.           October 2017 Sunday Monday Tuesday Wednesday Thursday Friday Saturday   1     2     3     4     5     6     [facility]   11:30 AM   (15 min.)   [facility]   [facility]     [facility] RETURN   11:45 AM   (50 min.)   Deyanira Costello PA-C   [facility]     [facility] ONC INFUSION 120    1:00 PM   (120 min.)    ONCOLOGY INFUSION   [facility] 7       8     9     10     11     12     13     [facility]   12:30 PM   (15 min.)   [facility]   [facility]     [facility] ONC INFUSION 60    1:00 PM   (60 min.)   [facility] ONCOLOGY INFUSION   [facility]     LONG    1:15 PM   (60 min.)   Barbara Lakhani, Count includes the [facility] Medication Therapy Management 14       15     16     17     18     19     20     21       22     23     24     25     26     27     [facility]   11:30 AM   (15 min.)   [facility]   [facility] Draw     [facility] RETURN   11:45 AM   (50 min.)   Deyanira Costello PA-C   [facility]     [facility] ONC INFUSION 120    2:00 PM   (120 min.)   [facility] ONCOLOGY INFUSION   [facility] 28 29     30     31 November 2017 Sunday Monday Tuesday Wednesday Thursday Friday Saturday                  1     2     3     [facility]    1:00 PM   (15 min.)   [facility]   [facility] Draw     [facility] ONC INFUSION 60    1:30 PM   (60 min.)    ONCOLOGY INFUSION   [facility] 4       5     6     7     8     9     10     11       12     13     14     15     16     17     18       19     20      21     22     23     24  Happy Birthday!     [facility]   12[facility]30 [facility]   (15 min.)   [facility] LAB DRAW   [facility] Lab Draw     [facility] RETURN   12:35 PM   (50 min.)   Deyanira Costello PA-C   [facility] ONC INFUSION 120    2:00 PM   (120 min.)    ONCOLOGY INFUSION   [facility] 25       26     27     28     29     30                            Lab Results:  Recent Results (from the past 12 hour(s))   CBC with platelets differential    Collection Time: 10/06/17 12:09 PM   Result Value Ref Range    WBC 8.6 4.0 - 11.0 10e9/L    RBC Count 3.29 (L) 3.8 - 5.2 10e12/L    Hemoglobin 10.7 (L) 11.7 - 15.7 g/dL    Hematocrit 33.4 (L) 35.0 - 47.0 %     (H) 78 - 100 fl    MCH 32.5 26.5 - 33.0 pg    MCHC 32.0 31.5 - 36.5 g/dL    RDW 17.6 (H) 10.0 - 15.0 %    Platelet Count 363 150 - 450 10e9/L    Diff Method Automated Method     % Neutrophils 70.8 %    % Lymphocytes 12.5 %    % Monocytes 14.3 %    % Eosinophils 0.2 %    % Basophils 0.9 %    % Immature Granulocytes 1.3 %    Nucleated RBCs 1 (H) 0 /100    Absolute Neutrophil 6.1 1.6 - 8.3 10e9/L    Absolute Lymphocytes 1.1 0.8 - 5.3 10e9/L    Absolute Monocytes 1.2 0.0 - 1.3 10e9/L    Absolute Eosinophils 0.0 0.0 - 0.7 10e9/L    Absolute Basophils 0.1 0.0 - 0.2 10e9/L    Abs Immature Granulocytes 0.1 0 - 0.4 10e9/L    Absolute Nucleated RBC 0.0    Comprehensive metabolic panel    Collection Time: 10/06/17 12:09 PM   Result Value Ref Range    Sodium 139 133 - 144 mmol/L    Potassium 3.9 3.4 - 5.3 mmol/L    Chloride 106 94 - 109 mmol/L    Carbon Dioxide 28 20 - 32 mmol/L    Anion Gap 5 3 - 14 mmol/L    Glucose 76 70 - 99 mg/dL    Urea Nitrogen 12 7 - 30 mg/dL    Creatinine 0.65 0.52 - 1.04 mg/dL    GFR Estimate >90 >60 mL/min/1.7m2    GFR Estimate If Black >90 >60 mL/min/1.7m2    Calcium 8.8 8.5 - 10.1 mg/dL    Bilirubin Total 0.4 0.2 - 1.3 mg/dL    Albumin 3.3 (L) 3.4 - 5.0 g/dL    Protein Total 6.3 (L) 6.8  - 8.8 g/dL    Alkaline Phosphatase 108 40 - 150 U/L    ALT 23 0 - 50 U/L    AST 25 0 - 45 U/L

## 2017-10-06 NOTE — NURSING NOTE
"Oncology Rooming Note    October 6, 2017 12:27 PM   Keren Erickson is a 61 year old female who presents for:    Chief Complaint   Patient presents with     Port Draw     labs drawn from port by rn.  vs taken     Oncology Clinic Visit     Return visit related ot Breast Cancer     Initial Vitals: /76 (BP Location: Right arm, Patient Position: Sitting, Cuff Size: Adult Regular)  Pulse 86  Temp 96.8  F (36  C) (Oral)  Resp 16  Ht 1.575 m (5' 2.01\")  Wt 54.5 kg (120 lb 1.6 oz)  SpO2 97%  BMI 21.96 kg/m2 Estimated body mass index is 21.96 kg/(m^2) as calculated from the following:    Height as of this encounter: 1.575 m (5' 2.01\").    Weight as of this encounter: 54.5 kg (120 lb 1.6 oz). Body surface area is 1.54 meters squared.  Mild Pain (2) Comment: Data Unavailable   No LMP recorded. Patient is postmenopausal.  Allergies reviewed: Yes  Medications reviewed: Yes    Medications: Medication refills not needed today.  Pharmacy name entered into [facility]: [facility] [place], [place] - [address] [place] [address] [phone]    Clinical concerns: No new concerns. Provider was notified.    10 minutes for nursing intake (face to face time)     Marlen Camacho LPN            "

## 2017-10-06 NOTE — PROGRESS NOTES
Infusion Nursing Note:  Keren Erickson presents for d1c18 gemzar/herceptin  Met with Deyanira RAMSEY before infusion.    Note: N/A.    Treatment Conditions:  Lab Results   Component Value Date    HGB 10.7 10/06/2017     Lab Results   Component Value Date    WBC 8.6 10/06/2017      Lab Results   Component Value Date    ANEU 6.1 10/06/2017     Lab Results   Component Value Date     10/06/2017      Lab Results   Component Value Date     10/06/2017                   Lab Results   Component Value Date    POTASSIUM 3.9 10/06/2017           Lab Results   Component Value Date    MAG 2.4 04/10/2017            Lab Results   Component Value Date    CR 0.65 10/06/2017                   Lab Results   Component Value Date    OMA 8.8 10/06/2017                Lab Results   Component Value Date    BILITOTAL 0.4 10/06/2017           Lab Results   Component Value Date    ALBUMIN 3.3 10/06/2017                    Lab Results   Component Value Date    ALT 23 10/06/2017           Lab Results   Component Value Date    AST 25 10/06/2017     Results reviewed, labs MET treatment parameters, ok to proceed with treatment.  ECHO/MUGA completed  EF 55-60%.        Intravenous Access:  Implanted Port.    Post Infusion Assessment:  Patient tolerated infusion without incident.  Site patent and intact, free from redness, edema or discomfort.  No evidence of extravasations.    Discharge Plan:   Patient declined prescription refills.  Discharge instructions reviewed with: Patient.  Patient and/or family verbalized understanding of discharge instructions and all questions answered.  AVS to patient via [employer].  Patient will return 10/13 for next appointment.   Patient discharged in stable condition accompanied by: friend.    Cristin Nogueira RN

## 2017-10-06 NOTE — PROGRESS NOTES
Oncology/Hematology Visit Note  Oct 6, 2017    Reason for Visit: Follow up of metastatic breast cancer, triple positive    History of Present Illness: Keren Erickson is a 61 year old female with triple positive metastatic breast cancer. She is currently undergoing treatment with Herceptin and Gemcitabine. Briefly, her oncologic history is as follows:     She presented to the hospital initially in 09/2013 with complaints of dyspnea. Workup revealed a large pericardial effusion and pleural effusion. Physical examination revealed a large untreated left breast mass encompassing the entire left breast. Biopsies revealed these were ER, [place] and HER2-positive breast cancer. She had a thoracentesis and pericardial sclerosis performed. She was originally on Arimidex and Herceptin. In 01/2014, she was switched to tamoxifen and Herceptin due to arthralgias, and she ultimately developed progressive disease and was switched to Faslodex and Herceptin. In 01/2015, she was found to have progressive disease and was switched to T-DM1.      Initially when she was seen in late 02/2015, she complained of some V5 sensory deficit. This was subjective. I was not able to elicit this on examination. This persisted and further workup was performed with a brain MRI. This revealed what was initially thought to be 3 punctate brain metastases. She originally saw Radiation Oncology and Neurosurgery as well as underwent a lumbar puncture. Cytology from the lumbar puncture showed no evidence of leptomeningeal disease. She was treated with Gamma Knife radiation to 6 lesions, performed on 04/16/2015.  She initiated therapy with TDM1 in January 2015 and continued this through 6/26/2015 with overall stable disease. She has since been on a break from treatment. The patient presented earlier this month with recurrent shortness of breath.  Imaging revealed recurrent right-sided pleural effusion.  She has since undergone thoracentesis with cytology  pending.  Clinically, however, there is evidence of disease progression. PET scan performed on 11/25/2015 demonstrated progression of disease at multiple sites. She restarted TDM1 on 11/27/2015, however experienced a mild transfusion reaction with shortness of breath and chest tightness, resolved upon stopping TDM1 and 125 mg of SoluMedrol. She had progression of disease on repeat imaging 2/17/2016, as well as new brain metastases. She started TDM1 with Perjeta on 3/4/2016. She underwent gamma knife to brain mets on 3/8/16.        In late May 2016, she was found to have progressive brain metastases.  She received whole brain radiation.  She had a follow up brain MRI August 2016 that was stable.      In September 2016, she enrolled on [place]  trial and was randomized to the standard of care arm/Physician's Choice; started on gemzar and herceptin. She was hospitalized 1/27-2/3/17 for presumed HCAP. Trial was suspended. She continued on gemzar and heceptin with stable disease. Last restaging was 4/7/17 and stable. Gemzar was placed on hold from 4/10/2017 through 6/22/17 so that she could recover from pneumonia.    Please see previous notes for further details on the patient's history. She comes in today for routine follow up prior to cycle.    Interval History:  Mrs. Erickson is doing well today. She states that this past week she noticed a clear improvement in all of her symptoms. Specifically, she feels less fatigued, stronger, and less dyspneic with exertion. She states she is in a better place emotionally and mentally and is feeling like she can get better. She just put her house on the market and this was a big relief. She is doing exercises at home and working on her walking. She states her longstanding dizziness is also slowly improving and meclizine is helping with this. She continues to have trouble with her left eye and subsequent double vision, but this has not changed. She denies any new neurologic  "symptoms. Her only complaint is one mouth sore on the tip of her tongue that appeared in the last few weeks. She is eating and drinking well and notes that her weight is increasing. Of note, she is clear she does not want a flu shot.    Review of Systems:  Patient denies fevers, chills, night sweats, unexplained weight changes, headaches, vision or hearing changes, new lumps or bumps, chest pain, shortness of breath, cough, abdominal pain, nausea, vomiting, changes to bowel or bladder, swelling of extremities, bleeding issues, or rash.      Current Outpatient Prescriptions   Medication Sig Dispense Refill     meclizine (ANTIVERT) 25 MG tablet Take 1 tablet (25 mg) by mouth 3 times daily as needed for dizziness 30 tablet 0     desmopressin (DDAVP) 0.2 MG tablet Take  1/2 tablet once daily by mouth 45 tablet 3     prochlorperazine (COMPAZINE) 10 MG tablet Take 1 tablet (10 mg) by mouth every 6 hours as needed for nausea or vomiting 90 tablet 3     omeprazole 20 MG tablet Take 1 tablet (20 mg) by mouth daily 30 tablet 3     multivitamin, therapeutic with minerals (MULTI-VITAMIN) TABS tablet Take 1 tablet by mouth daily       VITAMIN E BLEND PO Take by mouth daily       Naproxen Sodium (ALEVE PO) Take 220 mg by mouth daily       Physical Examination:  /76 (BP Location: Right arm, Patient Position: Sitting, Cuff Size: Adult Regular)  Pulse 86  Temp 96.8  F (36  C) (Oral)  Resp 16  Ht 1.575 m (5' 2.01\")  Wt 54.5 kg (120 lb 1.6 oz)  SpO2 97%  BMI 21.96 kg/m2  Wt Readings from Last 10 Encounters:   09/22/17 54.2 kg (119 lb 8 oz)   09/18/17 53.5 kg (118 lb)   09/15/17 53.6 kg (118 lb 3.2 oz)   09/01/17 53.7 kg (118 lb 4.8 oz)   08/25/17 53.6 kg (118 lb 3.2 oz)   08/11/17 53.1 kg (117 lb)   08/04/17 51.8 kg (114 lb 3.2 oz)   07/17/17 52.5 kg (115 lb 12.8 oz)   07/10/17 52.1 kg (114 lb 12.8 oz)   06/27/17 53 kg (116 lb 14.4 oz)     Constitutional: Well-appearing female in no acute distress.  Eyes: EOMI, PERRL. No " scleral icterus.  ENT: Oral mucosa is moist without thrush. One small lesion noted on the tip of her tongue.   Lymphatic: Neck is supple without cervical or supraclavicular lymphadenopathy.   Cardiovascular: Regular rate and rhythm. Very faint systolic murmur noted, no gallops or rubs. No peripheral edema.  Respiratory: Clear to auscultation bilaterally. No wheezes or crackles.  Gastrointestinal: Bowel sounds present. Abdomen soft, non-tender. No palpable hepatosplenomegaly or masses.   Neurologic: Grossly intact. Antalgic gait and requires assistance.   Skin: No rashes, petechiae, or bruising noted on exposed skin.    Laboratory Data:    Results for HEIDI RUIZ (MRN [id number]) as of 10/6/2017 15:13   10/6/2017 12:09   Sodium 139   Potassium 3.9   Chloride 106   Carbon Dioxide 28   Urea Nitrogen 12   Creatinine 0.65   GFR Estimate >90   GFR Estimate If Black >90   Calcium 8.8   Anion Gap 5   Albumin 3.3 (L)   Protein Total 6.3 (L)   Bilirubin Total 0.4   Alkaline Phosphatase 108   ALT 23   AST 25   CA 27-29 21   Glucose 76   WBC 8.6   Hemoglobin 10.7 (L)   Hematocrit 33.4 (L)   Platelet Count 363   RBC Count 3.29 (L)    (H)   MCH 32.5   MCHC 32.0   RDW 17.6 (H)   Diff Method Automated Method   % Neutrophils 70.8   % Lymphocytes 12.5   % Monocytes 14.3   % Eosinophils 0.2   % Basophils 0.9   % Immature Granulocytes 1.3   Nucleated RBCs 1 (H)   Absolute Neutrophil 6.1   Absolute Lymphocytes 1.1   Absolute Monocytes 1.2   Absolute Eosinophils 0.0   Absolute Basophils 0.1   Abs Immature Granulocytes 0.1   Absolute Nucleated RBC 0.0   CEA 1.4     Assessment and Plan:  1. Metastatic triple positive breast cancer, currently on treatment with Gemcitabine/Herceptin  Today is Cycle 18 Day 1 for Herceptin/Gemcitabine. She currently is getting Herceptin day 1 and Gemcitabine day 1 and 8 on a 21 day cycle. She is tolerating this very well. Labs today are within treatment parameters to move forward. Per   Leroy, she will continue on this for another 2 cycles, and the restaging imaging with CT CAP and brain MRI in December. This is already scheduled. Dr. Harper did want an echo and we will schedule this as well. Patient preferred this be done with the rest of her imaging in December. Given her echo the end of August was normal, and that she has no s/s of HF, we feel comfortable doing the scan at 3mo and 2 weeks.  CA27-29 is stable at 21, CEA decreased at 1.4.    2. Central diabetes insipidus  Followed-by endocrinology. Continues on desmopressin. No current symptoms. She is following with them every 6 months.    3. FEN  States she is doing well and her weight has slightly improved today to 120lbs. She continues to feel stronger. Creat and electrolytes all within normal limits.    4. Bone Metastases  She was previously on Xgeva. She states she needs to have dental work done and will work on doing that this next month. Aim to restart Xgeva as soon as this dental work is completed. Ca levels normal today.     5. Rectal bleeding, resolved  Patient does not want to do a colonoscopy and is clear she had one isolated episode of rectal bleeding that was now months ago. She denies melena, hematacheiza, or any changes to her bowel movements. Given no further episodes, we can continue to monitor but we were clear that should she have any further bleeding we would need to do further work-up.    Arcenio Chávez PA-C  [facility] [address] [address] [phone]    I saw the patient and performed the ROS and exam myself. The assessment and plan were mutually discussed and this note was edited to reflect my findings.    Deyanira Costello PA-C  Hematology, Oncology, and Transplant  [facility] [address] [address] [phone]

## 2017-10-06 NOTE — Clinical Note
10/6/2017       RE: Keren Erickson  [address] [facility] [address]     Dear Colleague,    Thank you for referring your patient, Keren Erickson, to the [facility]. Please see a copy of my visit note below.    Oncology/Hematology Visit Note  Oct 6, 2017    Reason for Visit: Follow up of metastatic breast cancer, triple positive    History of Present Illness: Keren Erickson is a 61 year old female with triple positive metastatic breast cancer. She is currently undergoing treatment with Herceptin and Gemcitabine. Briefly, her oncologic history is as follows:     She presented to the hospital initially in 09/2013 with complaints of dyspnea. Workup revealed a large pericardial effusion and pleural effusion. Physical examination revealed a large untreated left breast mass encompassing the entire left breast. Biopsies revealed these were ER, [place] and HER2-positive breast cancer. She had a thoracentesis and pericardial sclerosis performed. She was originally on Arimidex and Herceptin. In 01/2014, she was switched to tamoxifen and Herceptin due to arthralgias, and she ultimately developed progressive disease and was switched to Faslodex and Herceptin. In 01/2015, she was found to have progressive disease and was switched to T-DM1.      Initially when she was seen in late 02/2015, she complained of some V5 sensory deficit. This was subjective. I was not able to elicit this on examination. This persisted and further workup was performed with a brain MRI. This revealed what was initially thought to be 3 punctate brain metastases. She originally saw Radiation Oncology and Neurosurgery as well as underwent a lumbar puncture. Cytology from the lumbar puncture showed no evidence of leptomeningeal disease. She was treated with Gamma Knife radiation to 6 lesions, performed on 04/16/2015.  She initiated therapy with TDM1 in January 2015 and continued this through 6/26/2015 with  overall stable disease. She has since been on a break from treatment. The patient presented earlier this month with recurrent shortness of breath.  Imaging revealed recurrent right-sided pleural effusion.  She has since undergone thoracentesis with cytology pending.  Clinically, however, there is evidence of disease progression. PET scan performed on 11/25/2015 demonstrated progression of disease at multiple sites. She restarted TDM1 on 11/27/2015, however experienced a mild transfusion reaction with shortness of breath and chest tightness, resolved upon stopping TDM1 and 125 mg of SoluMedrol. She had progression of disease on repeat imaging 2/17/2016, as well as new brain metastases. She started TDM1 with Perjeta on 3/4/2016. She underwent gamma knife to brain mets on 3/8/16.        In late May 2016, she was found to have progressive brain metastases.  She received whole brain radiation.  She had a follow up brain MRI August 2016 that was stable.      In September 2016, she enrolled on [place]  trial and was randomized to the standard of care arm/Physician's Choice; started on gemzar and herceptin. She was hospitalized 1/27-2/3/17 for presumed HCAP. Trial was suspended. She continued on gemzar and heceptin with stable disease. Last restaging was 4/7/17 and stable. Gemzar was placed on hold from 4/10/2017 through 6/22/17 so that she could recover from pneumonia.    Please see previous notes for further details on the patient's history. She comes in today for routine follow up prior to cycle.    Interval History:      Review of Systems:  Patient denies fevers, chills, night sweats, unexplained weight changes, headaches, dizziness, vision or hearing changes, new lumps or bumps, chest pain, shortness of breath, cough, abdominal pain, nausea, vomiting, changes to bowel or bladder, swelling of extremities, bleeding issues, or rash.    Current Outpatient Prescriptions   Medication Sig Dispense Refill     meclizine  (ANTIVERT) 25 MG tablet Take 1 tablet (25 mg) by mouth 3 times daily as needed for dizziness 30 tablet 0     desmopressin (DDAVP) 0.2 MG tablet Take  1/2 tablet once daily by mouth 45 tablet 3     prochlorperazine (COMPAZINE) 10 MG tablet Take 1 tablet (10 mg) by mouth every 6 hours as needed for nausea or vomiting 90 tablet 3     omeprazole 20 MG tablet Take 1 tablet (20 mg) by mouth daily 30 tablet 3     multivitamin, therapeutic with minerals (MULTI-VITAMIN) TABS tablet Take 1 tablet by mouth daily       VITAMIN E BLEND PO Take by mouth daily       Naproxen Sodium (ALEVE PO) Take 220 mg by mouth daily         Physical Examination:  There were no vitals taken for this visit.  Wt Readings from Last 10 Encounters:   09/22/17 54.2 kg (119 lb 8 oz)   09/18/17 53.5 kg (118 lb)   09/15/17 53.6 kg (118 lb 3.2 oz)   09/01/17 53.7 kg (118 lb 4.8 oz)   08/25/17 53.6 kg (118 lb 3.2 oz)   08/11/17 53.1 kg (117 lb)   08/04/17 51.8 kg (114 lb 3.2 oz)   07/17/17 52.5 kg (115 lb 12.8 oz)   07/10/17 52.1 kg (114 lb 12.8 oz)   06/27/17 53 kg (116 lb 14.4 oz)     Constitutional: Well-appearing female in no acute distress.  Eyes: EOMI, PERRL. No scleral icterus.  ENT: Oral mucosa is moist without lesions or thrush.   Lymphatic: Neck is supple without cervical or supraclavicular lymphadenopathy. No axillary lymphadenopathy.  Cardiovascular: Regular rate and rhythm. No murmurs, gallops, or rubs. No peripheral edema.  Respiratory: Clear to auscultation bilaterally. No wheezes or crackles.  Gastrointestinal: Bowel sounds present. Abdomen soft, non-tender. No palpable hepatosplenomegaly or masses.   Neurologic: Cranial nerves II through XII are grossly intact.  Skin: No rashes, petechiae, or bruising noted on exposed skin.    Laboratory Data:  No results found for this or any previous visit (from the past 24 hour(s)).      Assessment and Plan:  1. Metastatic triple positive breast cancer, currently on treatment with  Gemcitabine/Herceptin  -Today is Cycle 18 Day 1 for Herceptin/Gemcitabine. She currently is getting Herceptin day 1 and Gemcitabine day 1 and 8 on a 21 day cycle. She is tolerating this ****. Per Dr. Harper, she will continue on this for another 2 cycles, and the restaging imaging with CT CAP and brain MRI in December. This is already scheduled. Dr. Harper did want an echo in November and we will schedule this.    2. Central diabetes insipidus  Followed-by endocrinology. Continues on desmopressin.    3. FEN    4. Bone Metastases  She is on Xgeva. Will consider doing today.    5. Rectal bleeding, resolved/progressive***  Patient does not want to do a colonoscopy at this time despite the recommendation.      Arcenio Chávez PA-C  [facility]  [address] [place] [address] [id number] [phone]      Again, thank you for allowing me to participate in the care of your patient.      Sincerely,    Deyanira Costello PA-C

## 2017-10-06 NOTE — MR AVS SNAPSHOT
After Visit Summary   10/6/2017    Keren Erickson    MRN: [id number]           Patient Information     Date Of Birth          1955        Visit Information        Provider Department      10/6/2017 1:00 PM UC 18 ATC;  ONCOLOGY INFUSION [facility]        Today's Diagnoses     Metastatic breast cancer (H)    -  1    Brain metastasis (H)        Carcinoma of breast metastatic to multiple sites, unspecified laterality (H)          Care Instructions    Contact numbers:  Triage Main Line: [phone]  After hours: [phone]    Call with chills and/or temperature greater than or equal to 100.5 and questions or concerns.    If after hours, weekends, or holidays, call main hospital  at  [phone] and ask for Oncology doctor on call.           October 2017 Sunday Monday Tuesday Wednesday Thursday Friday Saturday   1     2     3     4     5     6     [facility] DRAW   11:30 AM   (15 min.)    [facility] DRAW   [facility] Lab Draw     [facility] RETURN   11:45 AM   (50 min.)   Deyanira Costello PA-C   [facility]     [facility] ONC INFUSION 120    1:00 PM   (120 min.)    ONCOLOGY INFUSION   [facility] 7       8     9     10     11     12     13     [facility]RAW   12:30 PM   (15 min.)    [facility]   [facility]     [facility] ONC INFUSION 60    1:00 PM   (60 min.)    ONCOLOGY INFUSION   [facility]     LONG    1:15 PM   (60 min.)   Barbara Lakhani, [facility] 14       15     16     17     18     19     20     21       22     23     24     25     26     27     [facility] DRAW   11:30 AM   (15 min.)    [facility] DRAW   [facility] Lab Draw     [facility] RETURN   11:45 AM   (50 min.)   Deyanira Costello PA-C   [facility]     [facility] ONC INFUSION 120    2:00 PM   (120 min.)    ONCOLOGY INFUSION   [facility]  [facility] 28       29 30 31 November 2017 Sunday Monday Tuesday Wednesday Thursday Friday Saturday                  1     2     3     [facility] LAB DRAW    1:00 PM   (15 min.)    [facility] LAB DRAW   [facility] Lab Draw     [facility] ONC INFUSION 60    1:30 PM   (60 min.)    ONCOLOGY INFUSION   [facility] 4       5     6     7     8     9     10     11       12     13     14     15     16     17     18       19     20     21     22     23     24  Happy Birthday!     [facility] LAB DRAW   12:30 PM   (15 min.)    [facility] LAB DRAW   [facility] Lab Draw     P RETURN   12:35 PM   (50 min.)   Deyanira Costello PA-C   [facility] ONC INFUSION 120    2:00 PM   (120 min.)    ONCOLOGY INFUSION   [facility] 25       26     27     28     29     30                            Lab Results:  Recent Results (from the past 12 hour(s))   CBC with platelets differential    Collection Time: 10/06/17 12:09 PM   Result Value Ref Range    WBC 8.6 4.0 - 11.0 10e9/L    RBC Count 3.29 (L) 3.8 - 5.2 10e12/L    Hemoglobin 10.7 (L) 11.7 - 15.7 g/dL    Hematocrit 33.4 (L) 35.0 - 47.0 %     (H) 78 - 100 fl    MCH 32.5 26.5 - 33.0 pg    MCHC 32.0 31.5 - 36.5 g/dL    RDW 17.6 (H) 10.0 - 15.0 %    Platelet Count 363 150 - 450 10e9/L    Diff Method Automated Method     % Neutrophils 70.8 %    % Lymphocytes 12.5 %    % Monocytes 14.3 %    % Eosinophils 0.2 %    % Basophils 0.9 %    % Immature Granulocytes 1.3 %    Nucleated RBCs 1 (H) 0 /100    Absolute Neutrophil 6.1 1.6 - 8.3 10e9/L    Absolute Lymphocytes 1.1 0.8 - 5.3 10e9/L    Absolute Monocytes 1.2 0.0 - 1.3 10e9/L    Absolute Eosinophils 0.0 0.0 - 0.7 10e9/L    Absolute Basophils 0.1 0.0 - 0.2 10e9/L    Abs Immature Granulocytes 0.1 0 - 0.4 10e9/L    Absolute Nucleated RBC 0.0    Comprehensive metabolic panel    Collection Time: 10/06/17  12:09 PM   Result Value Ref Range    Sodium 139 133 - 144 mmol/L    Potassium 3.9 3.4 - 5.3 mmol/L    Chloride 106 94 - 109 mmol/L    Carbon Dioxide 28 20 - 32 mmol/L    Anion Gap 5 3 - 14 mmol/L    Glucose 76 70 - 99 mg/dL    Urea Nitrogen 12 7 - 30 mg/dL    Creatinine 0.65 0.52 - 1.04 mg/dL    GFR Estimate >90 >60 mL/min/1.7m2    GFR Estimate If Black >90 >60 mL/min/1.7m2    Calcium 8.8 8.5 - 10.1 mg/dL    Bilirubin Total 0.4 0.2 - 1.3 mg/dL    Albumin 3.3 (L) 3.4 - 5.0 g/dL    Protein Total 6.3 (L) 6.8 - 8.8 g/dL    Alkaline Phosphatase 108 40 - 150 U/L    ALT 23 0 - 50 U/L    AST 25 0 - 45 U/L               Follow-ups after your visit        Your next 10 appointments already scheduled     Oct 13, 2017 12:30 PM CDT   [facility] Draw with  [employer] LAB DRAW   [facility] Draw ([facility])    [address] [place] [address] [id number] [phone]            Oct 13, 2017  1:00 PM CDT   Infusion 60 with  ONCOLOGY INFUSION, UC 28 ATC   [facility] ([facility])    [address] [place] [id number] [phone]            Oct 13, 2017  1:30 PM CDT   (Arrive by 1:15 PM)   Office Visit with Barbara Lakhani [facility] ([facility])    [address] [place] [address] [id number] [phone]           Bring a current list of meds and any records pertaining to this visit. For Physicals, please bring immunization records and any forms needing to be filled out. Please arrive 10 minutes early to complete paperwork.            Oct 27, 2017 11:30 AM CDT   [facility] Lab Draw with  [facility] LAB DRAW   [facility] Draw ([facility])    [address] [place] [id number] [phone]            Oct 27, 2017 12:00 PM CDT   (Arrive by 11:45 AM)   Return Visit with Deyanira Romero  JORGE Costello   [facility] ([facility])    [address] [place] [id number]   [phone]            Oct 27, 2017  2:00 PM CDT   Infusion 120 with UC ONCOLOGY INFUSION, UC 11 ATC   [facility] ([facility])    [address] [place] [id number]   [phone]            Nov 03, 2017  1:00 PM CDT   [facility] Draw with [facility] DRAW   [facility] Lab Draw ([facility])    [address] [place] [id number]   [phone]            Nov 03, 2017  1:30 PM CDT   Infusion 60 with UC ONCOLOGY INFUSION   [facility] ([facility])    [address] [place] [phone]   [phone]              Future tests that were ordered for you today     Open Future Orders        Priority Expected Expires Ordered    Echocardiogram Complete Routine  10/6/2018 10/6/2017    Echocardiogram Complete Routine 11/24/2017 10/6/2018 10/6/2017            Who to contact     If you have questions or need follow up information about today's clinic visit or your schedule please contact [facility] directly at [phone].  Normal or non-critical lab and imaging results will be communicated to you by CareParenthart, letter or phone within 4 business days after the clinic has received the results. If you do not hear from us within 7 days, please contact the clinic through CareParenthart or phone. If you have a critical or abnormal lab result, we will notify you by phone as soon as possible.  Submit refill requests through Tenfoot or call your pharmacy and they will forward the refill request to us. Please allow 3 business days for your refill to be completed.          Additional Information About Your Visit        Tenfoot Information     Tenfoot gives you secure access to your  electronic health record. If you see a primary care provider, you can also send messages to your care team and make appointments. If you have questions, please call your primary care clinic.  If you do not have a primary care provider, please call [phone] and they will assist you.        Care EveryWhere ID     This is your Care EveryWhere ID. This could be used by other organizations to access your [facility] medical records  [id number]         Blood Pressure from Last 3 Encounters:   10/06/17 113/76   09/22/17 115/74   09/18/17 110/76    Weight from Last 3 Encounters:   10/06/17 54.5 kg (120 lb 1.6 oz)   09/22/17 54.2 kg (119 lb 8 oz)   09/18/17 53.5 kg (118 lb)              We Performed the Following     Ca27.29  breast tumor marker     CBC with platelets differential     CEA     Comprehensive metabolic panel        Primary Care Provider Office Phone # Fax #    Kellyhoward Hart [phone]       [address] [facility] [address]        Equal Access to Services     CLAUDIA BOND : Hadii aad ku hadasho Soomaali, waaxda luqadaha, qaybta kaalmada adeegyajb, freddie escobedo . So [facility] [phone].    ATENCIÓN: Si habla español, tiene a ramires disposición servicios gratuitos de asistencia lingüística. Ll[facility] [phone].    We comply with applicable federal civil rights laws and [place] laws. We do not discriminate on the basis of race, color, national origin, age, disability, sex, sexual orientation, or gender identity.            Thank you!     Thank you for choosing [facility]  for your care. Our goal is always to provide you with excellent care. Hearing back from our patients is one way we can continue to improve our services. Please take a few minutes to complete the written survey that you may receive in the mail after your visit with us. Thank you!             Your Updated Medication List - Protect others around you: Learn how to safely use,  store and throw away your medicines at www.disposemymeds.org.          This list is accurate as of: 10/6/17  3:50 PM.  Always use your most recent med list.                   Brand Name Dispense Instructions for use Diagnosis    ALEVE PO      Take 220 mg by mouth daily        desmopressin 0.2 MG tablet    DDAVP    45 tablet    Take  1/2 tablet once daily by mouth    Diabetes insipidus (H)       meclizine 25 MG tablet    ANTIVERT    30 tablet    Take 1 tablet (25 mg) by mouth 3 times daily as needed for dizziness    BPPV (benign paroxysmal positional vertigo), right       Multi-vitamin Tabs tablet      Take 1 tablet by mouth daily        omeprazole 20 MG tablet     30 tablet    Take 1 tablet (20 mg) by mouth daily    Gastroesophageal reflux disease without esophagitis       prochlorperazine 10 MG tablet    COMPAZINE    90 tablet    Take 1 tablet (10 mg) by mouth every 6 hours as needed for nausea or vomiting    Nausea       VITAMIN E BLEND PO      Take by mouth daily

## 2017-10-06 NOTE — LETTER
10/6/2017      RE: Keren Erickson  [address] [facility] [address]       Oncology/Hematology Visit Note  Oct 6, 2017    Reason for Visit: Follow up of metastatic breast cancer, triple positive    History of Present Illness: Keren Erickson is a 61 year old female with triple positive metastatic breast cancer. She is currently undergoing treatment with Herceptin and Gemcitabine. Briefly, her oncologic history is as follows:     She presented to the hospital initially in 09/2013 with complaints of dyspnea. Workup revealed a large pericardial effusion and pleural effusion. Physical examination revealed a large untreated left breast mass encompassing the entire left breast. Biopsies revealed these were ER, MS and HER2-positive breast cancer. She had a thoracentesis and pericardial sclerosis performed. She was originally on Arimidex and Herceptin. In 01/2014, she was switched to tamoxifen and Herceptin due to arthralgias, and she ultimately developed progressive disease and was switched to Faslodex and Herceptin. In 01/2015, she was found to have progressive disease and was switched to T-DM1.      Initially when she was seen in late 02/2015, she complained of some V5 sensory deficit. This was subjective. I was not able to elicit this on examination. This persisted and further workup was performed with a brain MRI. This revealed what was initially thought to be 3 punctate brain metastases. She originally saw Radiation Oncology and Neurosurgery as well as underwent a lumbar puncture. Cytology from the lumbar puncture showed no evidence of leptomeningeal disease. She was treated with Gamma Knife radiation to 6 lesions, performed on 04/16/2015.  She initiated therapy with TDM1 in January 2015 and continued this through 6/26/2015 with overall stable disease. She has since been on a break from treatment. The patient presented earlier this month with recurrent shortness of breath.  Imaging revealed  recurrent right-sided pleural effusion.  She has since undergone thoracentesis with cytology pending.  Clinically, however, there is evidence of disease progression. PET scan performed on 11/25/2015 demonstrated progression of disease at multiple sites. She restarted TDM1 on 11/27/2015, however experienced a mild transfusion reaction with shortness of breath and chest tightness, resolved upon stopping TDM1 and 125 mg of SoluMedrol. She had progression of disease on repeat imaging 2/17/2016, as well as new brain metastases. She started TDM1 with Perjeta on 3/4/2016. She underwent gamma knife to brain mets on 3/8/16.        In late May 2016, she was found to have progressive brain metastases.  She received whole brain radiation.  She had a follow up brain MRI August 2016 that was stable.      In September 2016, she enrolled on [facility]  trial and was randomized to the standard of care arm/Physician's Choice; started on gemzar and herceptin. She was hospitalized 1/27-2/3/17 for presumed HCAP. Trial was suspended. She continued on gemzar and heceptin with stable disease. Last restaging was 4/7/17 and stable. Gemzar was placed on hold from 4/10/2017 through 6/22/17 so that she could recover from pneumonia.    Please see previous notes for further details on the patient's history. She comes in today for routine follow up prior to cycle.    Interval History:  Mrs. Erickson is doing well today. She states that this past week she noticed a clear improvement in all of her symptoms. Specifically, she feels less fatigued, stronger, and less dyspneic with exertion. She states she is in a better place emotionally and mentally and is feeling like she can get better. She just put her house on the market and this was a big relief. She is doing exercises at home and working on her walking. She states her longstanding dizziness is also slowly improving and meclizine is helping with this. She continues to have trouble with her left  "eye and subsequent double vision, but this has not changed. She denies any new neurologic symptoms. Her only complaint is one mouth sore on the tip of her tongue that appeared in the last few weeks. She is eating and drinking well and notes that her weight is increasing. Of note, she is clear she does not want a flu shot.    Review of Systems:  Patient denies fevers, chills, night sweats, unexplained weight changes, headaches, vision or hearing changes, new lumps or bumps, chest pain, shortness of breath, cough, abdominal pain, nausea, vomiting, changes to bowel or bladder, swelling of extremities, bleeding issues, or rash.      Current Outpatient Prescriptions   Medication Sig Dispense Refill     meclizine (ANTIVERT) 25 MG tablet Take 1 tablet (25 mg) by mouth 3 times daily as needed for dizziness 30 tablet 0     desmopressin (DDAVP) 0.2 MG tablet Take  1/2 tablet once daily by mouth 45 tablet 3     prochlorperazine (COMPAZINE) 10 MG tablet Take 1 tablet (10 mg) by mouth every 6 hours as needed for nausea or vomiting 90 tablet 3     omeprazole 20 MG tablet Take 1 tablet (20 mg) by mouth daily 30 tablet 3     multivitamin, therapeutic with minerals (MULTI-VITAMIN) TABS tablet Take 1 tablet by mouth daily       VITAMIN E BLEND PO Take by mouth daily       Naproxen Sodium (ALEVE PO) Take 220 mg by mouth daily       Physical Examination:  /76 (BP Location: Right arm, Patient Position: Sitting, Cuff Size: Adult Regular)  Pulse 86  Temp 96.8  F (36  C) (Oral)  Resp 16  Ht 1.575 m (5' 2.01\")  Wt 54.5 kg (120 lb 1.6 oz)  SpO2 97%  BMI 21.96 kg/m2  Wt Readings from Last 10 Encounters:   09/22/17 54.2 kg (119 lb 8 oz)   09/18/17 53.5 kg (118 lb)   09/15/17 53.6 kg (118 lb 3.2 oz)   09/01/17 53.7 kg (118 lb 4.8 oz)   08/25/17 53.6 kg (118 lb 3.2 oz)   08/11/17 53.1 kg (117 lb)   08/04/17 51.8 kg (114 lb 3.2 oz)   07/17/17 52.5 kg (115 lb 12.8 oz)   07/10/17 52.1 kg (114 lb 12.8 oz)   06/27/17 53 kg (116 lb 14.4 " oz)     Constitutional: Well-appearing female in no acute distress.  Eyes: EOMI, PERRL. No scleral icterus.  ENT: Oral mucosa is moist without thrush. One small lesion noted on the tip of her tongue.   Lymphatic: Neck is supple without cervical or supraclavicular lymphadenopathy.   Cardiovascular: Regular rate and rhythm. Very faint systolic murmur noted, no gallops or rubs. No peripheral edema.  Respiratory: Clear to auscultation bilaterally. No wheezes or crackles.  Gastrointestinal: Bowel sounds present. Abdomen soft, non-tender. No palpable hepatosplenomegaly or masses.   Neurologic: Grossly intact. Antalgic gait and requires assistance.   Skin: No rashes, petechiae, or bruising noted on exposed skin.    Laboratory Data:    Results for HEIDI RUIZ (MRN [id number]) as of 10/6/2017 15:13   10/6/2017 12:09   Sodium 139   Potassium 3.9   Chloride 106   Carbon Dioxide 28   Urea Nitrogen 12   Creatinine 0.65   GFR Estimate >90   GFR Estimate If Black >90   Calcium 8.8   Anion Gap 5   Albumin 3.3 (L)   Protein Total 6.3 (L)   Bilirubin Total 0.4   Alkaline Phosphatase 108   ALT 23   AST 25   CA 27-29 21   Glucose 76   WBC 8.6   Hemoglobin 10.7 (L)   Hematocrit 33.4 (L)   Platelet Count 363   RBC Count 3.29 (L)    (H)   MCH 32.5   MCHC 32.0   RDW 17.6 (H)   Diff Method Automated Method   % Neutrophils 70.8   % Lymphocytes 12.5   % Monocytes 14.3   % Eosinophils 0.2   % Basophils 0.9   % Immature Granulocytes 1.3   Nucleated RBCs 1 (H)   Absolute Neutrophil 6.1   Absolute Lymphocytes 1.1   Absolute Monocytes 1.2   Absolute Eosinophils 0.0   Absolute Basophils 0.1   Abs Immature Granulocytes 0.1   Absolute Nucleated RBC 0.0   CEA 1.4     Assessment and Plan:  1. Metastatic triple positive breast cancer, currently on treatment with Gemcitabine/Herceptin  Today is Cycle 18 Day 1 for Herceptin/Gemcitabine. She currently is getting Herceptin day 1 and Gemcitabine day 1 and 8 on a 21 day cycle. She is  tolerating this very well. Labs today are within treatment parameters to move forward. Per Dr. Harper, she will continue on this for another 2 cycles, and the restaging imaging with CT CAP and brain MRI in December. This is already scheduled. Dr. Harper did want an echo and we will schedule this as well. Patient preferred this be done with the rest of her imaging in December. Given her echo the end of August was normal, and that she has no s/s of HF, we feel comfortable doing the scan at 3mo and 2 weeks.  CA27-29 is stable at 21, CEA decreased at 1.4.    2. Central diabetes insipidus  Followed-by endocrinology. Continues on desmopressin. No current symptoms. She is following with them every 6 months.    3. FEN  States she is doing well and her weight has slightly improved today to 120lbs. She continues to feel stronger. Creat and electrolytes all within normal limits.    4. Bone Metastases  She was previously on Xgeva. She states she needs to have dental work done and will work on doing that this next month. Aim to restart Xgeva as soon as this dental work is completed. Ca levels normal today.     5. Rectal bleeding, resolved  Patient does not want to do a colonoscopy and is clear she had one isolated episode of rectal bleeding that was now months ago. She denies melena, hematacheiza, or any changes to her bowel movements. Given no further episodes, we can continue to monitor but we were clear that should she have any further bleeding we would need to do further work-up.    Arcenio [place] [address]  [phone]    I saw the patient and performed the ROS and exam myself. The assessment and plan were mutually discussed and this note was edited to reflect my findings.    Deyanira Costello PA-C  Hematology, Oncology, [place] [address]  [phone]

## 2017-10-13 ENCOUNTER — INFUSION THERAPY VISIT (OUTPATIENT)
Dept: ONCOLOGY | Facility: CLINIC | Age: 62
End: 2017-10-13
Attending: INTERNAL MEDICINE
Payer: MEDICARE

## 2017-10-13 ENCOUNTER — APPOINTMENT (OUTPATIENT)
Dept: LAB | Facility: CLINIC | Age: 62
End: 2017-10-13
Attending: INTERNAL MEDICINE
Payer: MEDICARE

## 2017-10-13 VITALS
DIASTOLIC BLOOD PRESSURE: 73 MMHG | SYSTOLIC BLOOD PRESSURE: 106 MMHG | HEART RATE: 79 BPM | BODY MASS INDEX: 22.09 KG/M2 | RESPIRATION RATE: 18 BRPM | OXYGEN SATURATION: 97 % | WEIGHT: 120.8 LBS | TEMPERATURE: 98.4 F

## 2017-10-13 DIAGNOSIS — C79.31 BRAIN METASTASIS: ICD-10-CM

## 2017-10-13 DIAGNOSIS — C50.919 METASTATIC BREAST CANCER: Primary | ICD-10-CM

## 2017-10-13 DIAGNOSIS — C50.919 CARCINOMA OF BREAST METASTATIC TO MULTIPLE SITES, UNSPECIFIED LATERALITY (H): ICD-10-CM

## 2017-10-13 LAB
ANISOCYTOSIS BLD QL SMEAR: SLIGHT
BASOPHILS # BLD AUTO: 0 10E9/L (ref 0–0.2)
BASOPHILS NFR BLD AUTO: 0.9 %
DIFFERENTIAL METHOD BLD: ABNORMAL
EOSINOPHIL # BLD AUTO: 0 10E9/L (ref 0–0.7)
EOSINOPHIL NFR BLD AUTO: 0 %
ERYTHROCYTE [DISTWIDTH] IN BLOOD BY AUTOMATED COUNT: 16.7 % (ref 10–15)
HCT VFR BLD AUTO: 30.4 % (ref 35–47)
HGB BLD-MCNC: 9.8 G/DL (ref 11.7–15.7)
LYMPHOCYTES # BLD AUTO: 0.6 10E9/L (ref 0.8–5.3)
LYMPHOCYTES NFR BLD AUTO: 15 %
MCH RBC QN AUTO: 33.1 PG (ref 26.5–33)
MCHC RBC AUTO-ENTMCNC: 32.2 G/DL (ref 31.5–36.5)
MCV RBC AUTO: 103 FL (ref 78–100)
METAMYELOCYTES # BLD: 0 10E9/L
METAMYELOCYTES NFR BLD MANUAL: 0.9 %
MONOCYTES # BLD AUTO: 0.5 10E9/L (ref 0–1.3)
MONOCYTES NFR BLD AUTO: 12.4 %
MYELOCYTES # BLD: 0.1 10E9/L
MYELOCYTES NFR BLD MANUAL: 2.7 %
NEUTROPHILS # BLD AUTO: 2.6 10E9/L (ref 1.6–8.3)
NEUTROPHILS NFR BLD AUTO: 68.1 %
NRBC # BLD AUTO: 0 10*3/UL
NRBC BLD AUTO-RTO: 1 /100
PLATELET # BLD AUTO: 324 10E9/L (ref 150–450)
POIKILOCYTOSIS BLD QL SMEAR: SLIGHT
RBC # BLD AUTO: 2.96 10E12/L (ref 3.8–5.2)
WBC # BLD AUTO: 3.8 10E9/L (ref 4–11)

## 2017-10-13 PROCEDURE — 25000128 H RX IP 250 OP 636: Mod: ZF | Performed by: PHYSICIAN ASSISTANT

## 2017-10-13 PROCEDURE — 96413 CHEMO IV INFUSION 1 HR: CPT

## 2017-10-13 PROCEDURE — 25000128 H RX IP 250 OP 636: Mod: ZF | Performed by: INTERNAL MEDICINE

## 2017-10-13 PROCEDURE — 96375 TX/PRO/DX INJ NEW DRUG ADDON: CPT

## 2017-10-13 PROCEDURE — 85025 COMPLETE CBC W/AUTO DIFF WBC: CPT | Performed by: PHYSICIAN ASSISTANT

## 2017-10-13 RX ORDER — HEPARIN SODIUM (PORCINE) LOCK FLUSH IV SOLN 100 UNIT/ML 100 UNIT/ML
5 SOLUTION INTRAVENOUS DAILY PRN
Status: DISCONTINUED | OUTPATIENT
Start: 2017-10-13 | End: 2017-10-13 | Stop reason: HOSPADM

## 2017-10-13 RX ORDER — HEPARIN SODIUM (PORCINE) LOCK FLUSH IV SOLN 100 UNIT/ML 100 UNIT/ML
5 SOLUTION INTRAVENOUS EVERY 8 HOURS
Status: DISCONTINUED | OUTPATIENT
Start: 2017-10-13 | End: 2017-10-13 | Stop reason: HOSPADM

## 2017-10-13 RX ADMIN — DEXAMETHASONE SODIUM PHOSPHATE 12 MG: 10 INJECTION, SOLUTION INTRAMUSCULAR; INTRAVENOUS at 13:59

## 2017-10-13 RX ADMIN — SODIUM CHLORIDE 250 ML: 9 INJECTION, SOLUTION INTRAVENOUS at 13:57

## 2017-10-13 RX ADMIN — GEMCITABINE 1460 MG: 38 INJECTION, SOLUTION INTRAVENOUS at 14:34

## 2017-10-13 RX ADMIN — SODIUM CHLORIDE, PRESERVATIVE FREE 5 ML: 5 INJECTION INTRAVENOUS at 15:09

## 2017-10-13 RX ADMIN — SODIUM CHLORIDE, PRESERVATIVE FREE 5 ML: 5 INJECTION INTRAVENOUS at 13:04

## 2017-10-13 ASSESSMENT — PAIN SCALES - GENERAL: PAINLEVEL: MILD PAIN (2)

## 2017-10-13 NOTE — PATIENT INSTRUCTIONS
Contact Numbers  [facility] Nurse Triage: [phone]  After Hours Nurse Line:  [phone]    Please call the [facility] Nurse Triage line or after hours number if you experience a temperature greater than or equal to 100.5, shaking chills, have uncontrolled nausea, vomiting and/or diarrhea, dizziness, shortness of breath, chest pain, bleeding, unexplained bruising, or if you have any other new/concerning symptoms, questions or concerns.     If you are having any concerning symptoms or wish to speak to a provider before your next infusion visit, please call your care coordinator or triage to notify them so we can adequately serve you.     If you need a refill on a narcotic prescription or other medication, please call triage before your infusion appointment.           October 2017 Sunday Monday Tuesday Wednesday Thursday Friday Saturday   1     2     3     4     5     6     P [facility] DRAW   11:30 AM   (15 min.)    [facility] DRAW   [facility] Lab Draw     P RETURN   11:45 AM   (50 min.)   [facility]eyanira Costello [facility]-C   [facility]    1:00 PM   (120 min.)    ONCOLOGY INFUSION   [facility] 7       8     9     10     11     12     13     [facility]   12:30 PM   (15 min.)    [facility] DRAW   [facility] Lab Draw     P ONC INFUSION 60    1:00 PM   (60 min.)    ONCOLOGY INFUSION   [facility] 14       15     16     17     18     19     20     21       22     23     24     25     26     27     [facility] D[facility]   11:30 AM   (15 min.)    [facility] DRAW   [facility] Draw     [facility] RETURN   11:45 AM   (50 min.)   Deyanira Costello PA-C   [facility]     [facility] ONC INFUSION 120    2:00 PM   (120 min.)    ONCOLOGY INFUSION   [facility] 28 29 30 31 November 2017 Sunday Monday  Tuesday Wednesday Thursday Friday Saturday                  1     2     3     [facility] DRAW    1:00 PM   (15 min.)    [facility] DRAW   [facility] Lab Draw     [facility] ONC INFUSION 60    1:30 PM   (60 min.)    ONCOLOGY INFUSION   [facility] 4       5     6     7     8     9     10     11       12     13     14     15     16     17     18       19     20     21     22     23     24  Happy Birthday!     [facility] DRAW   12:30 PM   (15 min.)    [facility] DRAW   [facility] Lab Draw     [facility] RETURN   12:35 PM   (50 min.)   Deyanira Costello PA-C   [facility]     [facility] ONC INFUSION 120    2:00 PM   (120 min.)    ONCOLOGY INFUSION   [facility] 25       26     27     28     29     30                            Lab Results:  Recent Results (from the past 12 hour(s))   CBC with platelets differential    Collection Time: 10/13/17  1:03 PM   Result Value Ref Range    WBC 3.8 (L) 4.0 - 11.0 10e9/L    RBC Count 2.96 (L) 3.8 - 5.2 10e12/L    Hemoglobin 9.8 (L) 11.7 - 15.7 g/dL    Hematocrit 30.4 (L) 35.0 - 47.0 %     (H) 78 - 100 fl    MCH 33.1 (H) 26.5 - 33.0 pg    MCHC 32.2 31.5 - 36.5 g/dL    RDW 16.7 (H) 10.0 - 15.0 %    Platelet Count 324 150 - 450 10e9/L    Diff Method Manual Differential     % Neutrophils 68.1 %    % Lymphocytes 15.0 %    % Monocytes 12.4 %    % Eosinophils 0.0 %    % Basophils 0.9 %    % Metamyelocytes 0.9 %    % Myelocytes 2.7 %    Nucleated RBCs 1 (H) 0 /100    Absolute Neutrophil 2.6 1.6 - 8.3 10e9/L    Absolute Lymphocytes 0.6 (L) 0.8 - 5.3 10e9/L    Absolute Monocytes 0.5 0.0 - 1.3 10e9/L    Absolute Eosinophils 0.0 0.0 - 0.7 10e9/L    Absolute Basophils 0.0 0.0 - 0.2 10e9/L    Absolute Metamyelocytes 0.0 0 10e9/L    Absolute Myelocytes 0.1 (H) 0 10e9/L    Absolute Nucleated RBC 0.0     Anisocytosis Slight     Poikilocytosis Slight

## 2017-10-13 NOTE — NURSING NOTE
Port accessed by RN, pt tolerated well. Labs collected and sent, line flushed with NS and heparin. Vitals taken and pt checked in for next appt.   Aurea Hair

## 2017-10-13 NOTE — MR AVS SNAPSHOT
After Visit Summary   10/13/2017    Keren Erickson    MRN: [id number]           Patient Information     Date Of Birth          1955        Visit Information        Provider Department      10/13/2017 1:00 PM  28 ATC;  ONCOLOGY INFUSION [facility]        Today's Diagnoses     Metastatic breast cancer (H)    -  1    Brain metastasis (H)        Carcinoma of breast metastatic to multiple sites, unspecified laterality (H)          Care Instructions    Contact Numbers  [facility] Nurse Triage: [phone]  After Hours Nurse Line:  [phone]    Please call the [facility] Nurse Triage line or after hours number if you experience a temperature greater than or equal to 100.5, shaking chills, have uncontrolled nausea, vomiting and/or diarrhea, dizziness, shortness of breath, chest pain, bleeding, unexplained bruising, or if you have any other new/concerning symptoms, questions or concerns.     If you are having any concerning symptoms or wish to speak to a provider before your next infusion visit, please call your care coordinator or triage to notify them so we can adequately serve you.     If you need a refill on a narcotic prescription or other medication, please call triage before your infusion appointment.           October 2017 Sunday Monday Tuesday Wednesday Thursday Friday Saturday   1     2     3     4     5     6     [facility] DRAW   11:30 AM   (15 min.)   [facility] DRAW   [facility] Lab Draw     P RETURN   11:45 AM   (50 min.)   Deyanira Costello PA-C   [facility] ONC INFUSION 120    1:00 PM   (120 min.)    ONCOLOGY INFUSION   [facility] 7       8     9     10     11     12     13     [facility] DRAW   12:30 PM   (15 min.)    [facility] DRAW   [facility] Lab Draw     [facility] ONC INFUSION 60    1:00 PM   (60 min.)    ONCOLOGY INFUSION   [facility] 14       15     16     17     18     19     20     21       22     23     24     25     26     27     [facility] DRAW   11:30 AM   (15 min.)   [facility] DRAW   [facility] Lab Draw     UMP RETURN   11:45 AM   (50 min.)   Deyanira Costello PA-C   [facility]     UMP ONC INFUSION 120    2:00 PM   (120 min.)    ONCOLOGY INFUSION   [facility] 28       29 30 31 November 2017 Sunday Monday Tuesday Wednesday Thursday Friday Saturday                  1     2     3     [facility] DRAW    1:00 PM   (15 min.)   [facility] DRAW   [facility] Draw     [facility] ONC INFUSION 60    1:30 PM   (60 min.)    ONCOLOGY INFUSION   [facility] 4       5     6     7     8     9     10     11       12     13     14     15     16     17     18       19     20     21     22     23     24  Happy Birthday!     [facility] DRAW   12:30 PM   (15 min.)    [facility] DRAW   [facility] Draw     [facility] RETURN   12:35 PM   (50 min.)   Deyanira Costello PA-C   [facility]     [facility] ONC INFUSION 120    2:00 PM   (120 min.)   UC ONCOLOGY INFUSION   [facility] 25       26     27     28     29     30                            Lab Results:  Recent Results (from the past 12 hour(s))   CBC with platelets differential    Collection Time: 10/13/17  1:03 PM   Result Value Ref Range    WBC 3.8 (L) 4.0 - 11.0 10e9/L    RBC Count 2.96 (L) 3.8 - 5.2 10e12/L    Hemoglobin 9.8 (L) 11.7 - 15.7 g/dL    Hematocrit 30.4 (L) 35.0 - 47.0 %     (H) 78 - 100 fl    MCH 33.1 (H) 26.5 - 33.0 pg    MCHC 32.2 31.5 - 36.5 g/dL    RDW 16.7 (H) 10.0 - 15.0 %    Platelet Count 324 150 - 450 10e9/L    Diff Method Manual Differential     % Neutrophils 68.1 %    % Lymphocytes 15.0 %    % Monocytes 12.4 %    % Eosinophils 0.0 %    % Basophils 0.9 %    % Metamyelocytes 0.9 %     % Myelocytes 2.7 %    Nucleated RBCs 1 (H) 0 /100    Absolute Neutrophil 2.6 1.6 - 8.3 10e9/L    Absolute Lymphocytes 0.6 (L) 0.8 - 5.3 10e9/L    Absolute Monocytes 0.5 0.0 - 1.3 10e9/L    Absolute Eosinophils 0.0 0.0 - 0.7 10e9/L    Absolute Basophils 0.0 0.0 - 0.2 10e9/L    Absolute Metamyelocytes 0.0 0 10e9/L    Absolute Myelocytes 0.1 (H) 0 10e9/L    Absolute Nucleated RBC 0.0     Anisocytosis Slight     Poikilocytosis Slight              Follow-ups after your visit        Your next 10 appointments already scheduled     Oct 27, 2017 11:30 AM CDT   [facility] Draw with  [facility] DRAW   [facility] Draw ([facility])    [address] [address]   [phone]            Oct 27, 2017 12:00 PM CDT   (Arrive by 11:45 AM)   Return Visit with Deyanira Costello PA-C   [facility] ([facility])    [address] [place] [id number]   [phone]            Oct 27, 2017  2:00 PM CDT   Infusion 120 with UC ONCOLOGY INFUSION, UC 11 ATC   [facility] ([facility])    [address] [address]   [phone]            Nov 03, 2017  1:00 PM CDT   [facility] Draw with [facility] DRAW   [facility] Draw ([facility])    [address] [address]   [phone]            Nov 03, 2017  1:30 PM CDT   Infusion 60 with UC ONCOLOGY INFUSION, UC 22 ATC   [facility] ([facility])    [address] [place] [address]   [phone]            Nov 24, 2017 12:30 PM CST   [facility] Draw with [facility] DRAW   [facility] Draw ([facility])    [address] [address]   [phone]            Nov 24, 2017  12:50 PM CST   (Arrive by 12:35 PM)   Return Visit with Deyanira Costello PA-C   [facility] ([facility])    [address] [place] [id number] [phone]            Nov 24, 2017  2:00 PM CST   Infusion 120 with UC ONCOLOGY INFUSION   [facility] ([facility])    [address] [place] [address] [phone]              Who to contact     If you have questions or need follow up information about today's clinic visit or your schedule please contact [facility] directly at [phone].  Normal or non-critical lab and imaging results will be communicated to you by [employer], letter or phone within 4 business days after the clinic has received the results. If you do not hear from us within 7 days, please contact the clinic through [employer] or phone. If you have a critical or abnormal lab result, we will notify you by phone as soon as possible.  Submit refill requests through "[employer]" or call your pharmacy and they will forward the refill request to us. Please allow 3 business days for your refill to be completed.          Additional Information About Your Visit        "[employer] Information     "[employer] gives you secure access to your electronic health record. If you see a primary care provider, you can also send messages to your care team and make appointments. If you have questions, please call your primary care clinic.  If you do not have a primary care provider, please call [phone] and they will assist you.        Care EveryWhere ID     This is your Care EveryWhere ID. This could be used by other organizations to access your [place] medical records  [id number]        Your Vitals Were     Pulse Temperature Respirations Pulse Oximetry BMI (Body Mass Index)       79 98.4  F (36.9  C) (Oral) 18 97% 22.09 kg/m2        Blood Pressure from Last 3  Encounters:   10/13/17 106/73   10/06/17 113/76   09/22/17 115/74    Weight from Last 3 Encounters:   10/13/17 54.8 kg (120 lb 12.8 oz)   10/06/17 54.5 kg (120 lb 1.6 oz)   09/22/17 54.2 kg (119 lb 8 oz)              We Performed the Following     CBC with platelets differential        Primary Care Provider Office Phone # Fax #    Kelly Bg Hart [phone]       [address] [facility] [address]        Equal Access to Services     [facility]: Hadii keturah ibarra hadasho Soomaali, waaxda luqadaha, qaybta kaalmada annabel, freddie escobedo . So [facility] [phone].    ATENCIÓN: Si habla español, tiene a ramires disposición servicios gratuitos de asistencia lingüística. [facility] [phone].    We comply with applicable federal civil rights laws and [place] laws. We do not discriminate on the basis of race, color, national origin, age, disability, sex, sexual orientation, or gender identity.            Thank you!     Thank you for choosing [facility] CLINIC  for your care. Our goal is always to provide you with excellent care. Hearing back from our patients is one way we can continue to improve our services. Please take a few minutes to complete the written survey that you may receive in the mail after your visit with us. Thank you!             Your Updated Medication List - Protect others around you: Learn how to safely use, store and throw away your medicines at www.disposemymeds.org.          This list is accurate as of: 10/13/17  3:19 PM.  Always use your most recent med list.                   Brand Name Dispense Instructions for use Diagnosis    ALEVE PO      Take 220 mg by mouth daily        desmopressin 0.2 MG tablet    DDAVP    45 tablet    Take  1/2 tablet once daily by mouth    Diabetes insipidus (H)       meclizine 25 MG tablet    ANTIVERT    30 tablet    Take 1 tablet (25 mg) by mouth 3 times daily as needed for dizziness    BPPV (benign paroxysmal  positional vertigo), right       Multi-vitamin Tabs tablet      Take 1 tablet by mouth daily        omeprazole 20 MG tablet     30 tablet    Take 1 tablet (20 mg) by mouth daily    Gastroesophageal reflux disease without esophagitis       prochlorperazine 10 MG tablet    COMPAZINE    90 tablet    Take 1 tablet (10 mg) by mouth every 6 hours as needed for nausea or vomiting    Nausea       VITAMIN E BLEND PO      Take by mouth daily

## 2017-10-13 NOTE — PROGRESS NOTES
Infusion Nursing Note:  Keren Erickson presents today for Cycle 18 Day 8 Gemzar.    Patient seen by provider today: No   present during visit today: Not Applicable.    Note: patient voiced no new complaints today, see doc flow sheet for assessment.      Discussed with patient X-Geva on hold due to dental work needing to be done.  Pt stated she has not yet made the appointment and will make next week.     Barbara Lakhnai from pharmacy spoke with patient about Desmopressin drug and advised no change in dosing until she discusses with Dr. Mishra, Endocrinologist.    Intravenous Access:  Implanted Port accessed in lab.    Treatment Conditions:  Lab Results   Component Value Date    HGB 9.8 10/13/2017     Lab Results   Component Value Date    WBC 3.8 10/13/2017      Lab Results   Component Value Date    ANEU 2.6 10/13/2017     Lab Results   Component Value Date     10/13/2017      Results reviewed, labs MET treatment parameters, ok to proceed with treatment.    Post Infusion Assessment:  Patient tolerated infusion without incident.  Blood return noted pre and post infusion.  Site patent and intact, free from redness, edema or discomfort.  No evidence of extravasations.  Access discontinued per protocol.    Discharge Plan:   Patient declined prescription refills.  Discharge instructions reviewed with: Patient and Family.  Patient and/or family verbalized understanding of discharge instructions and all questions answered.  AVS to patient via [employer].  Patient will return 10/27 for next lab, provider, and chemo appointment.   Patient discharged in stable condition accompanied by: self and friend.  Departure Mode: Wheelchair.    Patricia Deluca RN

## 2017-10-27 ENCOUNTER — ONCOLOGY VISIT (OUTPATIENT)
Dept: ONCOLOGY | Facility: CLINIC | Age: 62
End: 2017-10-27
Attending: INTERNAL MEDICINE
Payer: MEDICARE

## 2017-10-27 VITALS
BODY MASS INDEX: 22.73 KG/M2 | TEMPERATURE: 97.9 F | HEART RATE: 80 BPM | HEIGHT: 62 IN | DIASTOLIC BLOOD PRESSURE: 77 MMHG | OXYGEN SATURATION: 96 % | WEIGHT: 123.5 LBS | SYSTOLIC BLOOD PRESSURE: 119 MMHG | RESPIRATION RATE: 16 BRPM

## 2017-10-27 DIAGNOSIS — C50.919 METASTATIC BREAST CANCER: ICD-10-CM

## 2017-10-27 DIAGNOSIS — C50.919 METASTATIC BREAST CANCER: Primary | ICD-10-CM

## 2017-10-27 DIAGNOSIS — C50.919 CARCINOMA OF BREAST METASTATIC TO MULTIPLE SITES, UNSPECIFIED LATERALITY (H): ICD-10-CM

## 2017-10-27 DIAGNOSIS — C79.31 BRAIN METASTASIS: ICD-10-CM

## 2017-10-27 DIAGNOSIS — C50.919 CARCINOMA OF BREAST METASTATIC TO MULTIPLE SITES, UNSPECIFIED LATERALITY (H): Primary | ICD-10-CM

## 2017-10-27 LAB
BASOPHILS # BLD AUTO: 0.1 10E9/L (ref 0–0.2)
BASOPHILS NFR BLD AUTO: 0.8 %
CANCER AG27-29 SERPL-ACNC: 21 U/ML (ref 0–39)
CEA SERPL-MCNC: 1.2 UG/L (ref 0–2.5)
DIFFERENTIAL METHOD BLD: ABNORMAL
EOSINOPHIL # BLD AUTO: 0 10E9/L (ref 0–0.7)
EOSINOPHIL NFR BLD AUTO: 0.1 %
ERYTHROCYTE [DISTWIDTH] IN BLOOD BY AUTOMATED COUNT: 17 % (ref 10–15)
HCT VFR BLD AUTO: 32.8 % (ref 35–47)
HGB BLD-MCNC: 10.6 G/DL (ref 11.7–15.7)
IMM GRANULOCYTES # BLD: 0.1 10E9/L (ref 0–0.4)
IMM GRANULOCYTES NFR BLD: 1.5 %
LYMPHOCYTES # BLD AUTO: 1 10E9/L (ref 0.8–5.3)
LYMPHOCYTES NFR BLD AUTO: 10.9 %
MCH RBC QN AUTO: 32.6 PG (ref 26.5–33)
MCHC RBC AUTO-ENTMCNC: 32.3 G/DL (ref 31.5–36.5)
MCV RBC AUTO: 101 FL (ref 78–100)
MONOCYTES # BLD AUTO: 1.2 10E9/L (ref 0–1.3)
MONOCYTES NFR BLD AUTO: 13.6 %
NEUTROPHILS # BLD AUTO: 6.4 10E9/L (ref 1.6–8.3)
NEUTROPHILS NFR BLD AUTO: 73.1 %
NRBC # BLD AUTO: 0 10*3/UL
NRBC BLD AUTO-RTO: 1 /100
PLATELET # BLD AUTO: 387 10E9/L (ref 150–450)
RBC # BLD AUTO: 3.25 10E12/L (ref 3.8–5.2)
WBC # BLD AUTO: 8.7 10E9/L (ref 4–11)

## 2017-10-27 PROCEDURE — 86300 IMMUNOASSAY TUMOR CA 15-3: CPT | Performed by: PHYSICIAN ASSISTANT

## 2017-10-27 PROCEDURE — 99214 OFFICE O/P EST MOD 30 MIN: CPT | Mod: ZP | Performed by: PHYSICIAN ASSISTANT

## 2017-10-27 PROCEDURE — 25000128 H RX IP 250 OP 636: Mod: ZF | Performed by: PHYSICIAN ASSISTANT

## 2017-10-27 PROCEDURE — 99212 OFFICE O/P EST SF 10 MIN: CPT | Mod: ZF

## 2017-10-27 PROCEDURE — 96417 CHEMO IV INFUS EACH ADDL SEQ: CPT

## 2017-10-27 PROCEDURE — 82378 CARCINOEMBRYONIC ANTIGEN: CPT | Performed by: PHYSICIAN ASSISTANT

## 2017-10-27 PROCEDURE — 96375 TX/PRO/DX INJ NEW DRUG ADDON: CPT

## 2017-10-27 PROCEDURE — 96413 CHEMO IV INFUSION 1 HR: CPT

## 2017-10-27 PROCEDURE — 85025 COMPLETE CBC W/AUTO DIFF WBC: CPT | Performed by: PHYSICIAN ASSISTANT

## 2017-10-27 RX ORDER — DIPHENHYDRAMINE HYDROCHLORIDE 50 MG/ML
50 INJECTION INTRAMUSCULAR; INTRAVENOUS
Status: CANCELLED
Start: 2017-10-27

## 2017-10-27 RX ORDER — HEPARIN SODIUM (PORCINE) LOCK FLUSH IV SOLN 100 UNIT/ML 100 UNIT/ML
5 SOLUTION INTRAVENOUS EVERY 8 HOURS
Status: CANCELLED | OUTPATIENT
Start: 2017-11-03

## 2017-10-27 RX ORDER — EPINEPHRINE 1 MG/ML
0.3 INJECTION, SOLUTION, CONCENTRATE INTRAVENOUS EVERY 5 MIN PRN
Status: CANCELLED | OUTPATIENT
Start: 2017-10-27

## 2017-10-27 RX ORDER — DIPHENHYDRAMINE HYDROCHLORIDE 50 MG/ML
50 INJECTION INTRAMUSCULAR; INTRAVENOUS
Status: CANCELLED
Start: 2017-11-03

## 2017-10-27 RX ORDER — ACETAMINOPHEN 325 MG/1
650 TABLET ORAL
Status: CANCELLED | OUTPATIENT
Start: 2017-10-27

## 2017-10-27 RX ORDER — HEPARIN SODIUM (PORCINE) LOCK FLUSH IV SOLN 100 UNIT/ML 100 UNIT/ML
5 SOLUTION INTRAVENOUS EVERY 8 HOURS
Status: DISCONTINUED | OUTPATIENT
Start: 2017-10-27 | End: 2017-10-27 | Stop reason: HOSPADM

## 2017-10-27 RX ORDER — MEPERIDINE HYDROCHLORIDE 25 MG/ML
25 INJECTION INTRAMUSCULAR; INTRAVENOUS; SUBCUTANEOUS EVERY 30 MIN PRN
Status: CANCELLED | OUTPATIENT
Start: 2017-11-03

## 2017-10-27 RX ORDER — EPINEPHRINE 1 MG/ML
0.3 INJECTION, SOLUTION, CONCENTRATE INTRAVENOUS EVERY 5 MIN PRN
Status: CANCELLED | OUTPATIENT
Start: 2017-11-03

## 2017-10-27 RX ORDER — HEPARIN SODIUM (PORCINE) LOCK FLUSH IV SOLN 100 UNIT/ML 100 UNIT/ML
5 SOLUTION INTRAVENOUS EVERY 8 HOURS
Status: CANCELLED | OUTPATIENT
Start: 2017-10-27

## 2017-10-27 RX ORDER — METHYLPHENIDATE HYDROCHLORIDE 5 MG/1
TABLET ORAL
Qty: 30 TABLET | Refills: 0 | Status: SHIPPED | OUTPATIENT
Start: 2017-10-27 | End: 2017-12-01

## 2017-10-27 RX ORDER — EPINEPHRINE 0.3 MG/.3ML
0.3 INJECTION SUBCUTANEOUS EVERY 5 MIN PRN
Status: CANCELLED | OUTPATIENT
Start: 2017-10-27

## 2017-10-27 RX ORDER — MEPERIDINE HYDROCHLORIDE 25 MG/ML
25 INJECTION INTRAMUSCULAR; INTRAVENOUS; SUBCUTANEOUS EVERY 30 MIN PRN
Status: CANCELLED | OUTPATIENT
Start: 2017-10-27

## 2017-10-27 RX ORDER — ALBUTEROL SULFATE 90 UG/1
1-2 AEROSOL, METERED RESPIRATORY (INHALATION)
Status: CANCELLED
Start: 2017-11-03

## 2017-10-27 RX ORDER — ALBUTEROL SULFATE 0.83 MG/ML
2.5 SOLUTION RESPIRATORY (INHALATION)
Status: CANCELLED | OUTPATIENT
Start: 2017-10-27

## 2017-10-27 RX ORDER — ALBUTEROL SULFATE 0.83 MG/ML
2.5 SOLUTION RESPIRATORY (INHALATION)
Status: CANCELLED | OUTPATIENT
Start: 2017-11-03

## 2017-10-27 RX ORDER — LORAZEPAM 2 MG/ML
0.5 INJECTION INTRAMUSCULAR EVERY 4 HOURS PRN
Status: CANCELLED
Start: 2017-10-27

## 2017-10-27 RX ORDER — SODIUM CHLORIDE 9 MG/ML
1000 INJECTION, SOLUTION INTRAVENOUS CONTINUOUS PRN
Status: CANCELLED
Start: 2017-10-27

## 2017-10-27 RX ORDER — EPINEPHRINE 0.3 MG/.3ML
0.3 INJECTION SUBCUTANEOUS EVERY 5 MIN PRN
Status: CANCELLED | OUTPATIENT
Start: 2017-11-03

## 2017-10-27 RX ORDER — METHYLPREDNISOLONE SODIUM SUCCINATE 125 MG/2ML
125 INJECTION, POWDER, LYOPHILIZED, FOR SOLUTION INTRAMUSCULAR; INTRAVENOUS
Status: CANCELLED
Start: 2017-10-27

## 2017-10-27 RX ORDER — DIPHENHYDRAMINE HCL 25 MG
50 CAPSULE ORAL
Status: CANCELLED | OUTPATIENT
Start: 2017-10-27

## 2017-10-27 RX ORDER — SODIUM CHLORIDE 9 MG/ML
1000 INJECTION, SOLUTION INTRAVENOUS CONTINUOUS PRN
Status: CANCELLED
Start: 2017-11-03

## 2017-10-27 RX ORDER — LORAZEPAM 2 MG/ML
0.5 INJECTION INTRAMUSCULAR EVERY 4 HOURS PRN
Status: CANCELLED
Start: 2017-11-03

## 2017-10-27 RX ORDER — METHYLPREDNISOLONE SODIUM SUCCINATE 125 MG/2ML
125 INJECTION, POWDER, LYOPHILIZED, FOR SOLUTION INTRAMUSCULAR; INTRAVENOUS
Status: CANCELLED
Start: 2017-11-03

## 2017-10-27 RX ORDER — ALBUTEROL SULFATE 90 UG/1
1-2 AEROSOL, METERED RESPIRATORY (INHALATION)
Status: CANCELLED
Start: 2017-10-27

## 2017-10-27 RX ADMIN — TRASTUZUMAB 300 MG: 150 INJECTION, POWDER, LYOPHILIZED, FOR SOLUTION INTRAVENOUS at 14:56

## 2017-10-27 RX ADMIN — SODIUM CHLORIDE, PRESERVATIVE FREE 5 ML: 5 INJECTION INTRAVENOUS at 15:31

## 2017-10-27 RX ADMIN — GEMCITABINE 1460 MG: 38 INJECTION, SOLUTION INTRAVENOUS at 14:23

## 2017-10-27 RX ADMIN — SODIUM CHLORIDE 250 ML: 9 INJECTION, SOLUTION INTRAVENOUS at 13:44

## 2017-10-27 RX ADMIN — DEXAMETHASONE SODIUM PHOSPHATE 12 MG: 10 INJECTION, SOLUTION INTRAMUSCULAR; INTRAVENOUS at 13:45

## 2017-10-27 RX ADMIN — SODIUM CHLORIDE, PRESERVATIVE FREE 5 ML: 5 INJECTION INTRAVENOUS at 11:39

## 2017-10-27 ASSESSMENT — PAIN SCALES - GENERAL: PAINLEVEL: MILD PAIN (2)

## 2017-10-27 NOTE — LETTER
10/27/2017      RE: Keren Erickson  [address] [facility] [address]       HEMATOLOGY/ONCOLOGY PROGRESS NOTE  Oct 27, 2017    REASON FOR VISIT: follow-up of metastatic breast cancer, triple positive    DIAGNOSIS:   The patient is a 60-year-old female who presented to the hospital initially in 09/2013 with complaints of dyspnea. Workup revealed a large pericardial effusion and pleural effusion. Physical examination revealed a large untreated left breast mass encompassing the entire left breast. Biopsies revealed these were ER, LA and HER2-positive breast cancer. She had a thoracentesis and pericardial sclerosis performed. She was originally on Arimidex and Herceptin. In 01/2014, she was switched to tamoxifen and Herceptin due to arthralgias, and she ultimately developed progressive disease and was switched to Faslodex and Herceptin. In 01/2015, she was found to have progressive disease and was switched to T-DM1.     Initially when she was seen in late 02/2015, she complained of some V5 sensory deficit. This was subjective. I was not able to elicit this on examination. This persisted and further workup was performed with a brain MRI. This revealed what was initially thought to be 3 punctate brain metastases. She originally saw Radiation Oncology and Neurosurgery as well as underwent a lumbar puncture. Cytology from the lumbar puncture showed no evidence of leptomeningeal disease. She was treated with Gamma Knife radiation to 6 lesions, performed on 04/16/2015.  She initiated therapy with TDM1 in January 2015 and continued this through 6/26/2015 with overall stable disease. She has since been on a break from treatment. The patient presented earlier this month with recurrent shortness of breath.  Imaging revealed recurrent right-sided pleural effusion.  She has since undergone thoracentesis with cytology pending.  Clinically, however, there is evidence of disease progression. PET scan performed on  11/25/2015 demonstrated progression of disease at multiple sites. She restarted TDM1 on 11/27/2015, however experienced a mild transfusion reaction with shortness of breath and chest tightness, resolved upon stopping TDM1 and 125 mg of SoluMedrol. She had progression of disease on repeat imaging 2/17/2016, as well as new brain metastases. She started TDM1 with Perjeta on 3/4/2016. She underwent gamma knife to brain mets on 3/8/16.       In late May, she was found to have progressive brain metastases.  She received whole brain radiation.  She had a follow up brain MRI August 2016 that was stable.     In September 2016, she enrolled on Maries  trial and was randomized to the standard of care arm/Physician's Choice; started on gemzar and herceptin. She was recently hospitalized 1/27-2/3/17 for presumed HCAP. Trial was suspended. She continued on gemzar and heceptin with stable disease. Last restaging was 4/7/17 and stable. Gemzar was placed on hold from 4/10/2017 through 6/22/17 so that she could recover from pneumonia.    INTERVAL HISTORY:  Dominga presents for follow-up today prior to next cycle of treatment, accompanied by her friend.    Recently she has been more frustrated with fatigue. She has realized how much it has been impacting her QOL. She has been so hopeful that as her strength improves, which it has, that the fatigue would follow suit; but it hasn't. It makes her question if she can tolerate this chemotherapy long term. She wonders if she will need a break at some point. She wishes to continue today, though. It was better last week after she felt that she mentally overcame the fatigue. Otherwise no new symptoms. Remains with body aches after chemo, controlled with 1 aleve daily. Appetite is still down but she manages with close attention to her intake. Dizziness remains improved with the meclizine. No new neuro symptoms, actually feels the left sided weakness continues to improve.    ROS: 10  "point ROS was conducted and was otherwise negative except for as above.  No f/s/c.  No chest pain/cough/dyspnea.  Daily BM, no  issues. Skin is dry, but no rash.     PHYSICAL EXAMINATION:     /77  Pulse 80  Temp 97.9  F (36.6  C) (Oral)  Resp 16  Ht 1.575 m (5' 2\")  Wt 56 kg (123 lb 8 oz)  SpO2 96%  BMI 22.59 kg/m2    Wt Readings from Last 4 Encounters:   10/27/17 56 kg (123 lb 8 oz)   10/13/17 54.8 kg (120 lb 12.8 oz)   10/06/17 54.5 kg (120 lb 1.6 oz)   09/22/17 54.2 kg (119 lb 8 oz)     Constitutional: Alert, oriented, cachectic female in no visible distress. Able to ambulate independently  but chooses wheelchair for longer distances in clinic  Eyes: PERRL. Anicteric sclerae.    ENT/Mouth: OM moist and pink without lesions or thrush.    CV: RRR, no murmurs appreciated.  Resp: CTAB slightly diminished R base, stable. Breathing comfortably.  Extremities: No lower extremity edema appreciated.  Skin: Warm, dry. No bruising or petechiae noted. No rash  Lymph: No palpable LAD  Neuro: CN II-XII grossly intact.     LABS:   Results for HEIDI RUIZ (MRN [id number]) as of 10/27/2017 12:55   Ref. Range 10/27/2017 11:46   WBC Latest Ref Range: 4.0 - 11.0 10e9/L 8.7   Hemoglobin Latest Ref Range: 11.7 - 15.7 g/dL 10.6 (L)   Hematocrit Latest Ref Range: 35.0 - 47.0 % 32.8 (L)   Platelet Count Latest Ref Range: 150 - 450 10e9/L 387     IMPRESSION/PLAN:  Dominga is a 61-year-old female with history of metastatic ER-positive, HER-2 positive breast cancer, with involvement of the bone, history of pleural effusions treated with pleurodesis and PleurX placement, and with treated brain metastases, previously with stable disease on TDM1, with progressive disease after treatment break.  She was started on Bosque  clinical trial and randomized to physician's choice, and started on Gemzar/Herceptin on 9/29/16.    1. Breast cancer:  Continues on Herceptin/Gemzar. Her tumor markers have remained low and " stable. Today is cycle 19 day 1. She continues to tolerate this overall well, with body aches and fatigue. The fatigue has more bothersome to Dominga, emotionally. She has worked hard physically to improve her conditioning and was hopeful the fatigue would also improve, but it has not. It is affecting her QOL. She wishes to continue with chemotherapy. We discussed a dose reduction, however she preferred to manage as below. She will keep us updated on how she is doing. She will have repeat imaging with CT CAP and brain MRI in December.     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 She started desmopressin 50 mcg with good response clinically.     3. Brain mets: Numbness of tongue and V2 and V3 on right are stable. Continue to monitor.    4. FEN: Weight overall stable.  She is eating better.     5. Pain: Chronic mild aches/pains secondary to disease and with myalgias on Gemzar. No current pain today, not taking narcotics.     6. Bone mets: on Xgeva every 3 months. Last 5/1/17. On hold for dental work. She has made a dental appointment.    7. Mood: She is overall coping well.     8. She has an advanced directive.  She has a POLST.  DNR/DNI.  Her power of  is listed in the computer.     9. Fatigue: Ongoing but not improving with the improvement in her conditioning through PT/OT. She is frustrated by this and realizing it is affecting her QOL. She does not tolerate steroids well. We will trial ritalin. Possible SE reviewed. She will start with 5 mg in the morning PRN, may add a noon dose should she tolerate it well and find it effective.    10. Cardiac monitoring: She is due for an echo in November, however she really would like to have it with the other imaging studies given it is so difficult for her to find rides. She has no s/s of CHF so I am ok delaying it by 2 weeks.    11. ENT: chronic dizziness since WBRT, improved with meclizine Rx by ENT for concern of inner ear  issue.    Deyanira Costello PA-C

## 2017-10-27 NOTE — PATIENT INSTRUCTIONS
October 2017 Sunday Monday Tuesday Wednesday Thursday Friday Saturday   1     2     3     4     5     6     [facility] DRAW   11:30 AM   (15 min.)   [facility]   [facility] Draw     [facility] RETURN   11:45 AM   (50 min.)   Deyanira Costello PA-C   [facility]     [facility] ONC INFUSION 120    1:00 PM   (120 min.)    ONCOLOGY INFUSION   [facility] 7       8     9     10     11     12     13     [facility] DRAW   12:30 PM   (15 min.)    [facility] DRAW   [facility] Draw     [facility] ONC INFUSION 60    1:00 PM   (60 min.)    ONCOLOGY INFUSION   [facility] 14       15     16     17     18     19     20     21       22     23     24     25     26     27     [facility] DRAW   11:30 AM   (15 min.)    [facility] DRAW   [facility] Draw     [facility] RETURN   11:45 AM   (50 min.)   Deyanira Costello PA-C   [facility]     [facility] ONC INFUSION 120    2:00 PM   (120 min.)    ONCOLOGY INFUSION   [facility] 28       29     30     31 November 2017 Sunday Monday Tuesday Wednesday Thursday Friday Saturday                  1     2     3     [facility] DRAW    1:00 PM   (15 min.)    [facility] DRAW   [facility] Draw     [facility] ONC INFUSION 60    1:30 PM   (60 min.)    ONCOLOGY INFUSION   [facility] 4       5     6     7     8     9     10     11       12     13     14     15     16     17     18       19     20     21     22     23     24  Happy Birthday!     [facility] DRAW   12:30 PM   (15 min.)    [facility]     [facility] RETURN   12:35 PM   (50 min.)   Deyanira Costello PA-C   [facility]     [facility] ONC INFUSION 120    2:00 PM   (120 min.)    ONCOLOGY INFUSION   [facility] 25       26     27     28     29     30                             Lab Results:  Recent Results (from the past 12 hour(s))   CBC with platelets differential    Collection Time: 10/27/17 11:46 AM   Result Value Ref Range    WBC 8.7 4.0 - 11.0 10e9/L    RBC Count 3.25 (L) 3.8 - 5.2 10e12/L    Hemoglobin 10.6 (L) 11.7 - 15.7 g/dL    Hematocrit 32.8 (L) 35.0 - 47.0 %     (H) 78 - 100 fl    MCH 32.6 26.5 - 33.0 pg    MCHC 32.3 31.5 - 36.5 g/dL    RDW 17.0 (H) 10.0 - 15.0 %    Platelet Count 387 150 - 450 10e9/L    Diff Method Automated Method     % Neutrophils 73.1 %    % Lymphocytes 10.9 %    % Monocytes 13.6 %    % Eosinophils 0.1 %    % Basophils 0.8 %    % Immature Granulocytes 1.5 %    Nucleated RBCs 1 (H) 0 /100    Absolute Neutrophil 6.4 1.6 - 8.3 10e9/L    Absolute Lymphocytes 1.0 0.8 - 5.3 10e9/L    Absolute Monocytes 1.2 0.0 - 1.3 10e9/L    Absolute Eosinophils 0.0 0.0 - 0.7 10e9/L    Absolute Basophils 0.1 0.0 - 0.2 10e9/L    Abs Immature Granulocytes 0.1 0 - 0.4 10e9/L    Absolute Nucleated RBC 0.0      Contact Numbers    [facility] Main Line: [phone]  [facility] Triage:  [phone]    Call triage with chills and/or temperature greater than or equal to 100.5, uncontrolled nausea/vomiting, diarrhea, constipation, dizziness, shortness of breath, chest pain, bleeding, unexplained bruising, or any new/concerning symptoms, questions/concerns.     If you are having any concerning symptoms or wish to speak to a provider before your next infusion visit, please call your care coordinator or triage to notify them so we can adequately serve you.       After Hours: [phone]    If after hours, weekends, or holidays, call main hospital  and ask for Oncology doctor on call.

## 2017-10-27 NOTE — NURSING NOTE
Chief Complaint   Patient presents with     Port Draw     accessed wiht power needle heparin locked, vitals checked

## 2017-10-27 NOTE — MR AVS SNAPSHOT
After Visit Summary   10/27/2017    Keren Erickson    MRN: [id number]           Patient Information     Date Of Birth          1955        Visit Information        Provider Department      10/27/2017 12:00 PM Deyanira Costello PA-C [facility]        Today's Diagnoses     Carcinoma of breast metastatic to multiple sites, unspecified laterality (H)    -  1    Brain metastasis (H)        Metastatic breast cancer (H)           Follow-ups after your visit        Your next 10 appointments already scheduled     Oct 27, 2017  2:00 PM CDT   Infusion 120 with [facility] ONCOLOGY INFUSION, UC 11 ATC   [facility] ([facility])    [address] [place] [address]   [phone]            Nov 03, 2017  1:00 PM CDT   [facility] Draw with [facility] DRAW   [facility] ([facility])    [address] [place] [address]   [phone]            Nov 03, 2017  1:30 PM CDT   Infusion 60 with UC ONCOLOGY INFUSION, UC 22 ATC   [facility] ([facility])    [address] [place] [address]   [phone]            Nov 24, 2017 12:30 PM CST   [facility] Lab Draw with [facility] DRAW   [facility] ([facility])    [address] [place] [address]   [phone]            Nov 24, 2017 12:50 PM CST   (Arrive by 12:35 PM)   Return Visit with Deyanira Costello PA-C   [facility] ([facility])    [address] [place] [address]   [phone]            Nov 24, 2017  2:00 PM CST   Infusion 120 with UC ONCOLOGY INFUSION, UC 11 ATC   [facility] ([facility])    [address] [place] [address]  [phone]            Dec 01, 2017  1:00 PM [facility]   [facility]ic Lab Draw with  MASONIC LAB DRAW   [facility] Lab Draw ([facility])    [address] [facility]  [address] [facility] [address] [phone]              Who to contact     If you have questions or need follow up information about today's clinic visit or your schedule please contact [facility] directly at [phone].  Normal or non-critical lab and imaging results will be communicated to you by [employer], letter or phone within 4 business days after the clinic has received the results. If you do not hear from us within 7 days, please contact the clinic through [facility] or phone. If you have a critical or abnormal lab result, we will notify you by phone as soon as possible.  Submit refill requests through [employer] or call your pharmacy and they will forward the refill request to us. Please allow 3 business days for your refill to be completed.          Additional Information About Your Visit        [employer] Information     [employer] gives you secure access to your electronic health record. If you see a primary care provider, you can also send messages to your care team and make appointments. If you have questions, please call your primary care clinic.  If you do not have a primary care provider, please call [phone] and they will assist you.        Care EveryWhere ID     This is your Care EveryWhere ID. This could be used by other organizations to access your [facility] medical records  [id number]        Your Vitals Were     Pulse Temperature Respirations Height Pulse Oximetry BMI (Body Mass Index)    80 97.9  F (36.6  C) (Oral) 16 1.575 m (5' 2\") 96% 22.59 kg/m2       Blood Pressure from Last 3 Encounters:   10/27/17 119/77   10/13/17 106/73   10/06/17 113/76    Weight from Last 3 Encounters:   10/27/17 56 kg (123 lb 8 oz)   10/13/17 54.8 kg (120 lb 12.8 oz)   10/06/17 54.5 kg (120 lb 1.6 " oz)              Today, you had the following     No orders found for display         Today's Medication Changes          These changes are accurate as of: 10/27/17  1:46 PM.  If you have any questions, ask your nurse or doctor.               Start taking these medicines.        Dose/Directions    methylphenidate 5 MG tablet   Commonly known as:  RITALIN   Used for:  Carcinoma of breast metastatic to multiple sites, unspecified laterality (H)   Started by:  Deyanira Costello PA-C        Take 5 mg once daily as needed for fatigue   Quantity:  30 tablet   Refills:  0            Where to get your medicines      Some of these will need a paper prescription and others can be bought over the counter.  Ask your nurse if you have questions.     Bring a paper prescription for each of these medications     methylphenidate 5 MG tablet                Primary Care Provider Office Phone # Fax #    Kelly Bg Hart [phone]       [address] [facility] [address]        Equal Access to Services     [facility]IL[facility] : Hadii keturah handleyo Sophoebe, waaxda luqadaha, qaybta kaalmada adeirineoyajb, freddie escobedo . So [facility] [phone].    ATENCIÓN: Si habla español, tiene a ramires disposición servicios gratuitos de asistencia lingüística. Jossame al [phone].    We comply with applicable federal civil rights laws and [place] laws. We do not discriminate on the basis of race, color, national origin, age, disability, sex, sexual orientation, or gender identity.            Thank you!     Thank you for choosing [facility]  for your care. Our goal is always to provide you with excellent care. Hearing back from our patients is one way we can continue to improve our services. Please take a few minutes to complete the written survey that you may receive in the mail after your visit with us. Thank you!             Your Updated Medication List - Protect others around you:  Learn how to safely use, store and throw away your medicines at www.disposemymeds.org.          This list is accurate as of: 10/27/17  1:46 PM.  Always use your most recent med list.                   Brand Name Dispense Instructions for use Diagnosis    ALEVE PO      Take 220 mg by mouth daily        desmopressin 0.2 MG tablet    DDAVP    45 tablet    Take  1/2 tablet once daily by mouth    Diabetes insipidus (H)       meclizine 25 MG tablet    ANTIVERT    30 tablet    Take 1 tablet (25 mg) by mouth 3 times daily as needed for dizziness    BPPV (benign paroxysmal positional vertigo), right       methylphenidate 5 MG tablet    RITALIN    30 tablet    Take 5 mg once daily as needed for fatigue    Carcinoma of breast metastatic to multiple sites, unspecified laterality (H)       Multi-vitamin Tabs tablet      Take 1 tablet by mouth daily        omeprazole 20 MG tablet     30 tablet    Take 1 tablet (20 mg) by mouth daily    Gastroesophageal reflux disease without esophagitis       prochlorperazine 10 MG tablet    COMPAZINE    90 tablet    Take 1 tablet (10 mg) by mouth every 6 hours as needed for nausea or vomiting    Nausea       VITAMIN D (CHOLECALCIFEROL) PO      Take 5,000 Units by mouth daily        VITAMIN E BLEND PO      Take by mouth daily

## 2017-10-27 NOTE — MR AVS SNAPSHOT
After Visit Summary   10/27/2017    Keren Erickson    MRN: [id number]           Patient Information     Date Of Birth          1955        Visit Information        Provider Department      10/27/2017 2:00 PM UC 11 ATC;  ONCOLOGY INFUSION [facility]        Today's Diagnoses     Metastatic breast cancer (H)    -  1    Brain metastasis (H)        Carcinoma of breast metastatic to multiple sites, unspecified laterality (H)          Care Instructions          October 2017 Sunday Monday Tuesday Wednesday Thursday Friday Saturday   1     2     3     4     5     6     [facility] DRAW   11:30 AM   (15 min.)    [facility] DRAW   [facility] Lab Draw     [facility] RETURN   11:45 AM   (50 min.)   Deyanira Costello PA-C   [facility]     [facility] ONC INFUSION 120    1:00 PM   (120 min.)    ONCOLOGY INFUSION   [facility] 7       8     9     10     11     12     13     [facility] DRAW   12:30 PM   (15 min.)    [facility] DRAW   [facility] Lab Draw     [facility] ONC INFUSION 60    1:00 PM   (60 min.)    ONCOLOGY INFUSION   [facility] 14       15     16     17     18     19     20     21       22     23     24     25     26     27     [facility] DRAW   11:30 AM   (15 min.)    [facility]   [facility]     [facility] RETURN   11:45 AM   (50 min.)   Deyanira Costello PA-C   [facility]     [facility] ONC INFUSION 120    2:00 PM   (120 min.)    ONCOLOGY INFUSION   [facility] 28       29 30 31 November 2017 Sunday Monday Tuesday Wednesday Thursday Friday Saturday                  1     2     3     [facility] DRAW    1:00 PM   (15 min.)    [facility] DRAW   [facility] Lab Draw     [facility] ONC INFUSION 60    1:30 PM   (60 min.)    ONCOLOGY INFUSION   [facility] 4        5     6     7     8     9     10     11       12     13     14     15     16     17     18       19     20     21     22     23     24  Happy Birthday!     [facility] LAB DRAW   12:30 PM   (15 min.)   [facility] LAB DRAW   [facility] Lab Draw     [facility] RETURN   12:35 PM   (50 min.)   Deyanira Costello PA-C   [facility] ONC INFUSION 120    2:00 PM   (120 min.)    ONCOLOGY INFUSION   [facility] 25       26     27     28     29     30                            Lab Results:  Recent Results (from the past 12 hour(s))   CBC with platelets differential    Collection Time: 10/27/17 11:46 AM   Result Value Ref Range    WBC 8.7 4.0 - 11.0 10e9/L    RBC Count 3.25 (L) 3.8 - 5.2 10e12/L    Hemoglobin 10.6 (L) 11.7 - 15.7 g/dL    Hematocrit 32.8 (L) 35.0 - 47.0 %     (H) 78 - 100 fl    MCH 32.6 26.5 - 33.0 pg    MCHC 32.3 31.5 - 36.5 g/dL    RDW 17.0 (H) 10.0 - 15.0 %    Platelet Count 387 150 - 450 10e9/L    Diff Method Automated Method     % Neutrophils 73.1 %    % Lymphocytes 10.9 %    % Monocytes 13.6 %    % Eosinophils 0.1 %    % Basophils 0.8 %    % Immature Granulocytes 1.5 %    Nucleated RBCs 1 (H) 0 /100    Absolute Neutrophil 6.4 1.6 - 8.3 10e9/L    Absolute Lymphocytes 1.0 0.8 - 5.3 10e9/L    Absolute Monocytes 1.2 0.0 - 1.3 10e9/L    Absolute Eosinophils 0.0 0.0 - 0.7 10e9/L    Absolute Basophils 0.1 0.0 - 0.2 10e9/L    Abs Immature Granulocytes 0.1 0 - 0.4 10e9/L    Absolute Nucleated RBC 0.0      Contact Numbers    [facility] Main Line: [phone]  [facility] Triage:  [phone]    Call triage with chills and/or temperature greater than or equal to 100.5, uncontrolled nausea/vomiting, diarrhea, constipation, dizziness, shortness of breath, chest pain, bleeding, unexplained bruising, or any new/concerning symptoms, questions/concerns.     If you are having any concerning symptoms or wish to speak to a provider before your next infusion visit, please call  your care coordinator or triage to notify them so we can adequately serve you.       After Hours: [phone]    If after hours, weekends, or holidays, call main hospital  and ask for Oncology doctor on call.             Follow-ups after your visit        Your next 10 appointments already scheduled     Oct 27, 2017  2:00 PM CDT   Infusion 120 with UC ONCOLOGY INFUSION, UC 11 ATC   [facility] ([facility])    [address]  [address] [facility] [place] [address]   [phone]            Nov 03, 2017  1:00 PM CDT   [facility] Draw with [facility] DRAW   [facility] ([facility])    [address] [address] [phone]            Nov 03, 2017  1:30 PM CDT   Infusion 60 with UC ONCOLOGY INFUSION, UC 22 ATC   [facility] ([facility])    [address] [place] [id number] [phone]            Nov 24, 2017 12:30 PM CST   [facility] Lab Draw with [facility] LAB DRAW   [facility] Lab Draw ([facility])    [address] [address] [phone]            Nov 24, 2017 12:50 PM CST   (Arrive by 12:35 PM)   Return Visit with Deyanira Costello PA-C   [facility] ([facility])    [address] [place] [address] [phone]            Nov 24, 2017  2:00 PM CST   Infusion 120 with UC ONCOLOGY INFUSION, UC 11 ATC   [facility])    [address] [place] [address] [phone]            Dec 01, 2017  1:00 PM CST   [facility] Lab Draw with [facility] LAB DRAW   [facility] Draw ([facility])    [address] [place] [address] [phone]               Who to contact     If you have questions or need follow up information about today's clinic visit or your schedule please contact [facility] directly at [phone].  Normal or non-critical lab and imaging results will be communicated to you by [employer], letter or phone within 4 business days after the clinic has received the results. If you do not hear from us within 7 days, please contact the clinic through [employer] or phone. If you have a critical or abnormal lab result, we will notify you by phone as soon as possible.  Submit refill requests through [employer] or call your pharmacy and they will forward the refill request to us. Please allow 3 business days for your refill to be completed.          Additional Information About Your Visit        [employer] Information     [employer] gives you secure access to your electronic health record. If you see a primary care provider, you can also send messages to your care team and make appointments. If you have questions, please call your primary care clinic.  If you do not have a primary care provider, please call [phone] and they will assist you.        Care EveryWhere ID     This is your Care EveryWhere ID. This could be used by other organizations to access your [facility] medical records  [id number]         Blood Pressure from Last 3 Encounters:   10/27/17 119/77   10/13/17 106/73   10/06/17 113/76    Weight from Last 3 Encounters:   10/27/17 56 kg (123 lb 8 oz)   10/13/17 54.8 kg (120 lb 12.8 oz)   10/06/17 54.5 kg (120 lb 1.6 oz)              We Performed the Following     Ca27.29  breast tumor marker     CBC with platelets differential     CEA          Today's Medication Changes          These changes are accurate as of: 10/27/17  1:36 PM.  If you have any questions, ask your nurse or doctor.               Start taking these medicines.        Dose/Directions    methylphenidate 5 MG tablet   Commonly known as:  RITALIN   Used for:  Carcinoma of  breast metastatic to multiple sites, unspecified laterality (H)   Started by:  Deyanira Costello PA-C        Take 5 mg once daily as needed for fatigue   Quantity:  30 tablet   Refills:  0            Where to get your medicines      Some of these will need a paper prescription and others can be bought over the counter.  Ask your nurse if you have questions.     Bring a paper prescription for each of these medications     methylphenidate 5 MG tablet                Primary Care Provider Office Phone # Fax #    Kelly Bg Hart [phone]       [address] [facility] [address]        Equal Access to Services     [facility]: Hadii aad ku hadasho Soomaali, waaxda luqadaha, qaybta kaalmada adeirineoyajb, freddie escobedo . So [facility] [phone].    ATENCIÓN: Si habla español, tiene a ramires disposición servicios gratuitos de asistencia lingüística. [facility] [phone].    We comply with applicable federal civil rights laws and [place] laws. We do not discriminate on the basis of race, color, national origin, age, disability, sex, sexual orientation, or gender identity.            Thank you!     Thank you for choosing [facility]  for your care. Our goal is always to provide you with excellent care. Hearing back from our patients is one way we can continue to improve our services. Please take a few minutes to complete the written survey that you may receive in the mail after your visit with us. Thank you!             Your Updated Medication List - Protect others around you: Learn how to safely use, store and throw away your medicines at www.disposemymeds.org.          This list is accurate as of: 10/27/17  1:36 PM.  Always use your most recent med list.                   Brand Name Dispense Instructions for use Diagnosis    ALEVE PO      Take 220 mg by mouth daily        desmopressin 0.2 MG tablet    DDAVP    45 tablet    Take  1/2 tablet once daily  by mouth    Diabetes insipidus (H)       meclizine 25 MG tablet    ANTIVERT    30 tablet    Take 1 tablet (25 mg) by mouth 3 times daily as needed for dizziness    BPPV (benign paroxysmal positional vertigo), right       methylphenidate 5 MG tablet    RITALIN    30 tablet    Take 5 mg once daily as needed for fatigue    Carcinoma of breast metastatic to multiple sites, unspecified laterality (H)       Multi-vitamin Tabs tablet      Take 1 tablet by mouth daily        omeprazole 20 MG tablet     30 tablet    Take 1 tablet (20 mg) by mouth daily    Gastroesophageal reflux disease without esophagitis       prochlorperazine 10 MG tablet    COMPAZINE    90 tablet    Take 1 tablet (10 mg) by mouth every 6 hours as needed for nausea or vomiting    Nausea       VITAMIN D (CHOLECALCIFEROL) PO      Take 5,000 Units by mouth daily        VITAMIN E BLEND PO      Take by mouth daily

## 2017-10-27 NOTE — PROGRESS NOTES
Infusion Nursing Note:  Keren Erickson presents today for Cycle 19 Day 1 Gemzar, Herceptin.    Patient seen by provider today: Yes: Deyanira RAMSEY prior to infusion.    Treatment Conditions:  Lab Results   Component Value Date    HGB 10.6 10/27/2017     Lab Results   Component Value Date    WBC 8.7 10/27/2017      Lab Results   Component Value Date    ANEU 6.4 10/27/2017     Lab Results   Component Value Date     10/27/2017      Lab Results   Component Value Date     10/06/2017                   Lab Results   Component Value Date    POTASSIUM 3.9 10/06/2017           Lab Results   Component Value Date    MAG 2.4 04/10/2017            Lab Results   Component Value Date    CR 0.65 10/06/2017                   Lab Results   Component Value Date    OMA 8.8 10/06/2017                Lab Results   Component Value Date    BILITOTAL 0.4 10/06/2017           Lab Results   Component Value Date    ALBUMIN 3.3 10/06/2017                    Lab Results   Component Value Date    ALT 23 10/06/2017           Lab Results   Component Value Date    AST 25 10/06/2017     Results reviewed, labs MET treatment parameters, ok to proceed with treatment.    Intravenous Access:  Implanted Port.    Note: No complaints or concerns today from Dominga. New Rx from Deyanira given for Ritalin to help patient stamina on the difficult recovery days post chemo.    Post Infusion Assessment:  Patient tolerated infusion without incident.  Blood return noted pre and post infusion.  Site patent and intact, free from redness, edema or discomfort.  No evidence of extravasations.  Access discontinued per protocol.    Discharge Plan:   Prescription refills given for Desmopressin, Ritalin.  Discharge instructions reviewed with: Patient and Family.  Patient and/or family verbalized understanding of discharge instructions and all questions answered.  Copy of AVS reviewed with patient and/or family.  Patient will return 11/3/17 for next  appointment.  Patient discharged in stable condition accompanied by: daughter.  Departure Mode: Wheelchair.    Cherise Schreiber RN

## 2017-10-27 NOTE — NURSING NOTE
"Oncology Rooming Note    October 27, 2017 11:59 AM   Keren Erickson is a 61 year old female who presents for:    Chief Complaint   Patient presents with     Port Draw     accessed wiht power needle heparin locked, vitals checked     Oncology Clinic Visit     Return: Metastatic breast cancer     Initial Vitals: /77  Pulse 80  Temp 97.9  F (36.6  C) (Oral)  Resp 16  Ht 1.575 m (5' 2\")  Wt 56 kg (123 lb 8 oz)  SpO2 96%  BMI 22.59 kg/m2 Estimated body mass index is 22.59 kg/(m^2) as calculated from the following:    Height as of this encounter: 1.575 m (5' 2\").    Weight as of this encounter: 56 kg (123 lb 8 oz). Body surface area is 1.57 meters squared.  Mild Pain (2) Comment: Data Unavailable   No LMP recorded. Patient is postmenopausal.  Allergies reviewed: Yes  Medications reviewed: Yes    Medications: Medication refills not needed today.  Pharmacy name entered into [employer]: [facility] [place], [place] - [address] [facility] [phone]    Clinical concerns: No New Concerns        10 minutes for nursing intake (face to face time)     Bhargav Olsen MA              "

## 2017-10-27 NOTE — PROGRESS NOTES
HEMATOLOGY/ONCOLOGY PROGRESS NOTE  Oct 27, 2017    REASON FOR VISIT: follow-up of metastatic breast cancer, triple positive    DIAGNOSIS:   The patient is a 60-year-old female who presented to the hospital initially in 09/2013 with complaints of dyspnea. Workup revealed a large pericardial effusion and pleural effusion. Physical examination revealed a large untreated left breast mass encompassing the entire left breast. Biopsies revealed these were ER, NJ and HER2-positive breast cancer. She had a thoracentesis and pericardial sclerosis performed. She was originally on Arimidex and Herceptin. In 01/2014, she was switched to tamoxifen and Herceptin due to arthralgias, and she ultimately developed progressive disease and was switched to Faslodex and Herceptin. In 01/2015, she was found to have progressive disease and was switched to T-DM1.     Initially when she was seen in late 02/2015, she complained of some V5 sensory deficit. This was subjective. I was not able to elicit this on examination. This persisted and further workup was performed with a brain MRI. This revealed what was initially thought to be 3 punctate brain metastases. She originally saw Radiation Oncology and Neurosurgery as well as underwent a lumbar puncture. Cytology from the lumbar puncture showed no evidence of leptomeningeal disease. She was treated with Gamma Knife radiation to 6 lesions, performed on 04/16/2015.  She initiated therapy with TDM1 in January 2015 and continued this through 6/26/2015 with overall stable disease. She has since been on a break from treatment. The patient presented earlier this month with recurrent shortness of breath.  Imaging revealed recurrent right-sided pleural effusion.  She has since undergone thoracentesis with cytology pending.  Clinically, however, there is evidence of disease progression. PET scan performed on 11/25/2015 demonstrated progression of disease at multiple sites. She restarted TDM1 on  11/27/2015, however experienced a mild transfusion reaction with shortness of breath and chest tightness, resolved upon stopping TDM1 and 125 mg of SoluMedrol. She had progression of disease on repeat imaging 2/17/2016, as well as new brain metastases. She started TDM1 with Perjeta on 3/4/2016. She underwent gamma knife to brain mets on 3/8/16.       In late May, she was found to have progressive brain metastases.  She received whole brain radiation.  She had a follow up brain MRI August 2016 that was stable.     In September 2016, she enrolled on [facility]  trial and was randomized to the standard of care arm/Physician's Choice; started on gemzar and herceptin. She was recently hospitalized 1/27-2/3/17 for presumed HCAP. Trial was suspended. She continued on gemzar and heceptin with stable disease. Last restaging was 4/7/17 and stable. Gemzar was placed on hold from 4/10/2017 through 6/22/17 so that she could recover from pneumonia.    INTERVAL HISTORY:  Dominga presents for follow-up today prior to next cycle of treatment, accompanied by her friend.    Recently she has been more frustrated with fatigue. She has realized how much it has been impacting her QOL. She has been so hopeful that as her strength improves, which it has, that the fatigue would follow suit; but it hasn't. It makes her question if she can tolerate this chemotherapy long term. She wonders if she will need a break at some point. She wishes to continue today, though. It was better last week after she felt that she mentally overcame the fatigue. Otherwise no new symptoms. Remains with body aches after chemo, controlled with 1 aleve daily. Appetite is still down but she manages with close attention to her intake. Dizziness remains improved with the meclizine. No new neuro symptoms, actually feels the left sided weakness continues to improve.    ROS: 10 point ROS was conducted and was otherwise negative except for as above.  No f/s/c.  No  "chest pain/cough/dyspnea.  Daily BM, no  issues. Skin is dry, but no rash.     PHYSICAL EXAMINATION:     /77  Pulse 80  Temp 97.9  F (36.6  C) (Oral)  Resp 16  Ht 1.575 m (5' 2\")  Wt 56 kg (123 lb 8 oz)  SpO2 96%  BMI 22.59 kg/m2    Wt Readings from Last 4 Encounters:   10/27/17 56 kg (123 lb 8 oz)   10/13/17 54.8 kg (120 lb 12.8 oz)   10/06/17 54.5 kg (120 lb 1.6 oz)   09/22/17 54.2 kg (119 lb 8 oz)     Constitutional: Alert, oriented, cachectic female in no visible distress. Able to ambulate independently  but chooses wheelchair for longer distances in clinic  Eyes: PERRL. Anicteric sclerae.    ENT/Mouth: OM moist and pink without lesions or thrush.    CV: RRR, no murmurs appreciated.  Resp: CTAB slightly diminished R base, stable. Breathing comfortably.  Extremities: No lower extremity edema appreciated.  Skin: Warm, dry. No bruising or petechiae noted. No rash  Lymph: No palpable LAD  Neuro: CN II-XII grossly intact.     LABS:   Results for HEIDI RUIZ (MRN [id number]) as of 10/27/2017 12:55   Ref. Range 10/27/2017 11:46   WBC Latest Ref Range: 4.0 - 11.0 10e9/L 8.7   Hemoglobin Latest Ref Range: 11.7 - 15.7 g/dL 10.6 (L)   Hematocrit Latest Ref Range: 35.0 - 47.0 % 32.8 (L)   Platelet Count Latest Ref Range: 150 - 450 10e9/L 387     IMPRESSION/PLAN:  Dominga is a 61-year-old female with history of metastatic ER-positive, HER-2 positive breast cancer, with involvement of the bone, history of pleural effusions treated with pleurodesis and PleurX placement, and with treated brain metastases, previously with stable disease on TDM1, with progressive disease after treatment break.  She was started on [facility]  clinical trial and randomized to physician's choice, and started on Gemzar/Herceptin on 9/29/16.    1. Breast cancer:  Continues on Herceptin/Gemzar. Her tumor markers have remained low and stable. Today is cycle 19 day 1. She continues to tolerate this overall well, with body " aches and fatigue. The fatigue has more bothersome to Dominga, emotionally. She has worked hard physically to improve her conditioning and was hopeful the fatigue would also improve, but it has not. It is affecting her QOL. She wishes to continue with chemotherapy. We discussed a dose reduction, however she preferred to manage as below. She will keep us updated on how she is doing. She will have repeat imaging with CT CAP and brain MRI in December.     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 She started desmopressin 50 mcg with good response clinically.     3. Brain mets: Numbness of tongue and V2 and V3 on right are stable. Continue to monitor.    4. FEN: Weight overall stable.  She is eating better.     5. Pain: Chronic mild aches/pains secondary to disease and with myalgias on Gemzar. No current pain today, not taking narcotics.     6. Bone mets: on Xgeva every 3 months. Last 5/1/17. On hold for dental work. She has made a dental appointment.    7. Mood: She is overall coping well.     8. She has an advanced directive.  She has a POLST.  DNR/DNI.  Her power of  is listed in the computer.     9. Fatigue: Ongoing but not improving with the improvement in her conditioning through PT/OT. She is frustrated by this and realizing it is affecting her QOL. She does not tolerate steroids well. We will trial ritalin. Possible SE reviewed. She will start with 5 mg in the morning PRN, may add a noon dose should she tolerate it well and find it effective.    10. Cardiac monitoring: She is due for an echo in November, however she really would like to have it with the other imaging studies given it is so difficult for her to find rides. She has no s/s of CHF so I am ok delaying it by 2 weeks.    11. ENT: chronic dizziness since WBRT, improved with meclizine Rx by ENT for concern of inner ear issue.    Deyanira Costello PA-C

## 2017-11-03 ENCOUNTER — INFUSION THERAPY VISIT (OUTPATIENT)
Dept: ONCOLOGY | Facility: CLINIC | Age: 62
End: 2017-11-03
Attending: INTERNAL MEDICINE
Payer: MEDICARE

## 2017-11-03 VITALS
BODY MASS INDEX: 22.48 KG/M2 | RESPIRATION RATE: 16 BRPM | WEIGHT: 122.9 LBS | TEMPERATURE: 98 F | DIASTOLIC BLOOD PRESSURE: 80 MMHG | HEART RATE: 79 BPM | SYSTOLIC BLOOD PRESSURE: 117 MMHG | OXYGEN SATURATION: 95 %

## 2017-11-03 DIAGNOSIS — C50.919 CARCINOMA OF BREAST METASTATIC TO MULTIPLE SITES, UNSPECIFIED LATERALITY (H): ICD-10-CM

## 2017-11-03 DIAGNOSIS — C50.919 METASTATIC BREAST CANCER: Primary | ICD-10-CM

## 2017-11-03 DIAGNOSIS — C79.31 BRAIN METASTASIS: ICD-10-CM

## 2017-11-03 LAB
BASOPHILS # BLD AUTO: 0.1 10E9/L (ref 0–0.2)
BASOPHILS NFR BLD AUTO: 1.8 %
DIFFERENTIAL METHOD BLD: ABNORMAL
EOSINOPHIL # BLD AUTO: 0 10E9/L (ref 0–0.7)
EOSINOPHIL NFR BLD AUTO: 0.3 %
ERYTHROCYTE [DISTWIDTH] IN BLOOD BY AUTOMATED COUNT: 16.5 % (ref 10–15)
HCT VFR BLD AUTO: 32.1 % (ref 35–47)
HGB BLD-MCNC: 10.4 G/DL (ref 11.7–15.7)
IMM GRANULOCYTES # BLD: 0.2 10E9/L (ref 0–0.4)
IMM GRANULOCYTES NFR BLD: 4.8 %
LYMPHOCYTES # BLD AUTO: 0.9 10E9/L (ref 0.8–5.3)
LYMPHOCYTES NFR BLD AUTO: 22.1 %
MCH RBC QN AUTO: 32.7 PG (ref 26.5–33)
MCHC RBC AUTO-ENTMCNC: 32.4 G/DL (ref 31.5–36.5)
MCV RBC AUTO: 101 FL (ref 78–100)
MONOCYTES # BLD AUTO: 0.5 10E9/L (ref 0–1.3)
MONOCYTES NFR BLD AUTO: 12.2 %
NEUTROPHILS # BLD AUTO: 2.3 10E9/L (ref 1.6–8.3)
NEUTROPHILS NFR BLD AUTO: 58.8 %
NRBC # BLD AUTO: 0 10*3/UL
NRBC BLD AUTO-RTO: 0 /100
PLATELET # BLD AUTO: 373 10E9/L (ref 150–450)
RBC # BLD AUTO: 3.18 10E12/L (ref 3.8–5.2)
WBC # BLD AUTO: 3.9 10E9/L (ref 4–11)

## 2017-11-03 PROCEDURE — 85025 COMPLETE CBC W/AUTO DIFF WBC: CPT | Performed by: PHYSICIAN ASSISTANT

## 2017-11-03 PROCEDURE — 96375 TX/PRO/DX INJ NEW DRUG ADDON: CPT

## 2017-11-03 PROCEDURE — 25000128 H RX IP 250 OP 636: Mod: ZF | Performed by: PHYSICIAN ASSISTANT

## 2017-11-03 PROCEDURE — 25000128 H RX IP 250 OP 636: Mod: ZF | Performed by: INTERNAL MEDICINE

## 2017-11-03 PROCEDURE — 96413 CHEMO IV INFUSION 1 HR: CPT

## 2017-11-03 RX ORDER — HEPARIN SODIUM (PORCINE) LOCK FLUSH IV SOLN 100 UNIT/ML 100 UNIT/ML
5 SOLUTION INTRAVENOUS EVERY 8 HOURS
Status: DISCONTINUED | OUTPATIENT
Start: 2017-11-03 | End: 2017-11-03 | Stop reason: HOSPADM

## 2017-11-03 RX ADMIN — SODIUM CHLORIDE, PRESERVATIVE FREE 5 ML: 5 INJECTION INTRAVENOUS at 15:12

## 2017-11-03 RX ADMIN — DEXAMETHASONE SODIUM PHOSPHATE 12 MG: 10 INJECTION, SOLUTION INTRAMUSCULAR; INTRAVENOUS at 14:13

## 2017-11-03 RX ADMIN — GEMCITABINE 1460 MG: 38 INJECTION, SOLUTION INTRAVENOUS at 14:39

## 2017-11-03 RX ADMIN — SODIUM CHLORIDE, PRESERVATIVE FREE 5 ML: 5 INJECTION INTRAVENOUS at 13:19

## 2017-11-03 RX ADMIN — SODIUM CHLORIDE 250 ML: 9 INJECTION, SOLUTION INTRAVENOUS at 14:14

## 2017-11-03 ASSESSMENT — PAIN SCALES - GENERAL: PAINLEVEL: NO PAIN (0)

## 2017-11-03 NOTE — PROGRESS NOTES
Infusion Nursing Note:  Keren Erickson presents today for Day 8 Cycle 19 Gemzar.    Patient seen by provider today: No    Note: Dominga reports doing well overall today. She continues to feel frustrated by lack of endurance, weakness and fatigue.  She started Ritalin this week and has felt some improvement with it. She otherwise offers no new changes or concerns today.     Intravenous Access:  Implanted Port.    Treatment Conditions:  Lab Results   Component Value Date    HGB 10.4 11/03/2017     Lab Results   Component Value Date    WBC 3.9 11/03/2017      Lab Results   Component Value Date    ANEU 2.3 11/03/2017     Lab Results   Component Value Date     11/03/2017      Results reviewed, labs MET treatment parameters, ok to proceed with treatment.    Post Infusion Assessment:  Patient tolerated infusion without incident.  Blood return noted pre and post infusion.  Access discontinued per protocol.    Discharge Plan:   Patient declined prescription refills.  AVS to patient via [employer].  Patient will return 11/24 for next appointment.   Patient discharged in stable condition accompanied by: friend.  Departure Mode: Wheelchair.    Anneliese Odell RN

## 2017-11-03 NOTE — PATIENT INSTRUCTIONS
Contact Numbers  [facility]: [phone]  (Choose Option 3 for triage RN)  After Hours: [phone]    Call triage with chills and/or temperature greater than or equal to 100.5, uncontrolled nausea/vomiting, diarrhea, constipation, dizziness, shortness of breath, chest pain, bleeding, unexplained bruising, or any new/concerning symptoms, questions/concerns.     If after hours, weekends, or holidays, call the main clinic number. Calls will be forwarded to the hospital , please ask for the adult oncology doctor on call.     If you are having any concerning symptoms or wish to speak to a provider before your next infusion visit, please call your care coordinator or triage to notify them so we can adequately serve you.     If you need a refill on a narcotic prescription, please call triage or your care coordinator before your infusion appointment.             November 2017 Sunday Monday Tuesday Wednesday Thursday Friday Saturday                  1     2     3     [facility]    1:00 PM   (15 min.)    [facility] DRAW   [facility] Lab Draw     [facility] ONC INFUSION 60    1:30 PM   (60 min.)    ONCOLOGY INFUSION   formerly [facility] 4       5     6     7     8     9     10     11       12     13     14     15     16     17     18       19     20     21     22     23     24  Happy Birthday!     [facility]   12:30 PM   (15 min.)    [facility] DRAW   [facility] Lab Draw     [facility] RETURN   12:35 PM   (50 min.)   Deyanira Costello PA-C   formerly [facility]     [facility] ONC INFUSION 120    2:00 PM   (120 min.)    ONCOLOGY INFUSION   formerly [facility] 25       26     27     28     29     30 December 2017 Sunday Monday Tuesday Wednesday Thursday Friday Saturday                            1     [facility]    1:00 PM   (15 min.)    [facility] DRAW   [facility] Lab Draw     [facility] ONC INFUSION  60    1:30 PM   (60 min.)    ONCOLOGY INFUSION   [facility] 2       3     4     5     6     7     8     9       10     11     12     13     14     ECH COMPLETE    2:30 PM   (60 min.)   ECHCR1   [facility] Echo     CT CHEST/ABDOMEN/PELVIS W    3:25 PM   (20 min.)   CT2   [facility] CT     MR BRAIN WWO    3:45 PM   (45 min.)   MR1   [facility] MRI 15     [facility] DRAW    1:30 PM   (15 min.)    [facility] LAB DRAW   [facility] Lab Draw     [facility] ONC INFUSION 120    2:00 PM   (120 min.)    ONCOLOGY INFUSION   [facility] 16       17     18     19     20     21     22     [facility] DRAW    8:00 AM   (15 min.)   [facility]   [facility] Lab Draw     [facility] RETURN    8:15 AM   (30 min.)   Marlen Harper MD   [facility]     [facility] ONC INFUSION 60    9:00 AM   (60 min.)    ONCOLOGY INFUSION   [facility] 23       24     25     26     27     28     29     30       31                                                Lab Results:  Recent Results (from the past 12 hour(s))   CBC with platelets differential    Collection Time: 11/03/17  1:26 PM   Result Value Ref Range    WBC 3.9 (L) 4.0 - 11.0 10e9/L    RBC Count 3.18 (L) 3.8 - 5.2 10e12/L    Hemoglobin 10.4 (L) 11.7 - 15.7 g/dL    Hematocrit 32.1 (L) 35.0 - 47.0 %     (H) 78 - 100 fl    MCH 32.7 26.5 - 33.0 pg    MCHC 32.4 31.5 - 36.5 g/dL    RDW 16.5 (H) 10.0 - 15.0 %    Platelet Count 373 150 - 450 10e9/L    Diff Method Automated Method     % Neutrophils 58.8 %    % Lymphocytes 22.1 %    % Monocytes 12.2 %    % Eosinophils 0.3 %    % Basophils 1.8 %    % Immature Granulocytes 4.8 %    Nucleated RBCs 0 0 /100    Absolute Neutrophil 2.3 1.6 - 8.3 10e9/L    Absolute Lymphocytes 0.9 0.8 - 5.3 10e9/L    Absolute Monocytes 0.5 0.0 - 1.3 10e9/L    Absolute Eosinophils 0.0 0.0 - 0.7 10e9/L    Absolute Basophils 0.1 0.0 - 0.2 10e9/L    Abs  Immature Granulocytes 0.2 0 - 0.4 10e9/L    Absolute Nucleated RBC 0.0

## 2017-11-03 NOTE — MR AVS SNAPSHOT
After Visit Summary   11/3/2017    Keren Erickson    MRN: [id number]           Patient Information     Date Of Birth          1955        Visit Information        Provider Department      11/3/2017 1:30 PM  22 ATC;  ONCOLOGY INFUSION [facility]        Today's Diagnoses     Metastatic breast cancer (H)    -  1    Brain metastasis (H)        Carcinoma of breast metastatic to multiple sites, unspecified laterality (H)          Care Instructions    Contact Numbers  [facility]: [phone]  (Choose Option 3 for triage RN)  After Hours: [phone]    Call triage with chills and/or temperature greater than or equal to 100.5, uncontrolled nausea/vomiting, diarrhea, constipation, dizziness, shortness of breath, chest pain, bleeding, unexplained bruising, or any new/concerning symptoms, questions/concerns.     If after hours, weekends, or holidays, call the main clinic number. Calls will be forwarded to the hospital , please ask for the adult oncology doctor on call.     If you are having any concerning symptoms or wish to speak to a provider before your next infusion visit, please call your care coordinator or triage to notify them so we can adequately serve you.     If you need a refill on a narcotic prescription, please call triage or your care coordinator before your infusion appointment.             November 2017 Sunday Monday Tuesday Wednesday Thursday Friday Saturday                  1     2     3     [facility] DRAW    1:00 PM   (15 min.)    [facility] LAB DRAW   [facility] Lab Draw     [facility] ONC INFUSION 60    1:30 PM   (60 min.)    ONCOLOGY INFUSION   [facility] 4       5     6     7     8     9     10     11       12     13     14     15     16     17     18       19     20     21     22     23     24  Happy Birthday!     [facility]   12:30 PM   (15 min.)    [facility]     [facility] RETURN   12:35 PM   (50 min.)   Deyanira Costello PA-C   [facility]     [facility] ONC INFUSION 120    2:00 PM   (120 min.)    ONCOLOGY INFUSION   [facility] 25       26     27     28     29 30 December 2017 Sunday Monday Tuesday Wednesday Thursday Friday Saturday                            1     [facility]    1:00 PM   (15 min.)    [facility] DRAW   [facility] Draw     [facility] ONC INFUSION 60    1:30 PM   (60 min.)    ONCOLOGY INFUSION   [facility] 2       3     4     5     6     7     8     9       10     11     12     13     14     ECH COMPLETE    2:30 PM   (60 min.)   ECHCR1   [facility] Echo     CT CHEST/ABDOMEN/PELVIS W    3:25 PM   (20 min.)   CT2   [facility] CT     MR BRAIN WWO    3:45 PM   (45 min.)   MR1   [facility] MRI 15     [facility]    1:30 PM   (15 min.)    [facility] DRAW   [facility] Draw     [facility] ONC INFUSION 120    2:00 PM   (120 min.)    ONCOLOGY INFUSION   [facility] 16       17     18     19     20     21     22     [facility]    8:00 AM   (15 min.)    [facility] DRAW   [facility] Draw     [facility] RETURN    8:15 AM   (30 min.)   Marlen Harper MD   [facility]     [facility] ONC INFUSION 60    9:00 AM   (60 min.)    ONCOLOGY INFUSION   [facility] 23       24     25     26     27     28     29     30       31                                                Lab Results:  Recent Results (from the past 12 hour(s))   CBC with platelets differential    Collection Time: 11/03/17  1:26 PM   Result Value Ref Range    WBC 3.9 (L) 4.0 - 11.0 10e9/L    RBC Count 3.18 (L) 3.8 - 5.2 10e12/L    Hemoglobin 10.4 (L) 11.7 - 15.7 g/dL    Hematocrit 32.1 (L) 35.0 - 47.0 %     (H) 78 - 100 fl    MCH 32.7 26.5 - 33.0 pg    MCHC 32.4 31.5 - 36.5 g/dL     RDW 16.5 (H) 10.0 - 15.0 %    Platelet Count 373 150 - 450 10e9/L    Diff Method Automated Method     % Neutrophils 58.8 %    % Lymphocytes 22.1 %    % Monocytes 12.2 %    % Eosinophils 0.3 %    % Basophils 1.8 %    % Immature Granulocytes 4.8 %    Nucleated RBCs 0 0 /100    Absolute Neutrophil 2.3 1.6 - 8.3 10e9/L    Absolute Lymphocytes 0.9 0.8 - 5.3 10e9/L    Absolute Monocytes 0.5 0.0 - 1.3 10e9/L    Absolute Eosinophils 0.0 0.0 - 0.7 10e9/L    Absolute Basophils 0.1 0.0 - 0.2 10e9/L    Abs Immature Granulocytes 0.2 0 - 0.4 10e9/L    Absolute Nucleated RBC 0.0                Follow-ups after your visit        Your next 10 appointments already scheduled     Nov 24, 2017 12:30 PM CST   [facility] Draw with  [facility] DRAW   [facility] Lab Draw ([facility])    [address] [id number] [phone]            Nov 24, 2017 12:50 PM CST   (Arrive by 12:35 PM)   Return Visit with Deyanira Costello PA-C   [facility] ([facility])    [address] [place] [id number] [phone]            Nov 24, 2017  2:00 PM CST   Infusion 120 with UC ONCOLOGY INFUSION, UC 11 ATC   [facility] ([facility])    [address] [address] [phone]            Dec 01, 2017  1:00 PM CST   [facility] Draw with [facility] DRAW   [facility])    [address] [address] [phone]            Dec 01, 2017  1:30 PM CST   Infusion 60 with UC ONCOLOGY INFUSION, UC 22 ATC   [facility] ([facility])    [address] [address] [phone]            Dec 14, 2017  2:30 PM CST   Ech Complete with UCECHCR1   [facility] ([facility])    [address] [facility]  3rd Floor  [facility] [id number] [phone]           1. Please bring or wear a comfortable two-piece outfit. 2. You may eat, drink and take your normal medicines. 3. For any questions that cannot be answered, please contact the ordering physician            Dec 14, 2017  3:40 PM CST   (Arrive by 3:25 PM)   CT CHEST/ABDOMEN/PELVIS W CONTRAST with UCCT2   [facility] CT ([facility])    [address] [place] [address]  [address]st [facility] [id number] [phone]           Please bring any scans or X-rays taken at other hospitals, if similar tests were done. Also bring a list of your medicines, including vitamins, minerals and over-the-counter drugs. It is safest to leave personal items at home.  Be sure to tell your doctor:   If you have any allergies.   If there s any chance you are pregnant.   If you are breastfeeding.   If you have any special needs.  You may have contrast for this exam. To prepare:   Do not eat or drink for 2 hours before your exam. If you need to take medicine, you may take it with small sips of water. (We may ask you to take liquid medicine as well.)   The day before your exam, drink extra fluids at least six 8-ounce glasses (unless your doctor tells you to restrict your fluids).  Patients over 70 or patients with diabetes or kidney problems:   If you haven t had a blood test (creatinine test) within the last 30 days, go to your clinic or Diagnostic Imaging Department for this test.  If you have diabetes:   If your kidney function is normal, continue taking your metformin (Avandamet, Glucophage, Glucovance, Metaglip) on the day of your exam.   If your kidney function is abnormal, wait 48 hours before restarting this medicine.  You will have oral contrast for this exam:   You will drink the contrast at home. Get this from your clinic or Diagnostic Imaging Department. Please follow the directions given.  Please wear loose  clothing, such as a sweat suit or jogging clothes. Avoid snaps, zippers and other metal. We may ask you to undress and put on a hospital gown.  If you have any questions, please call the Imaging Department where you will have your exam.            Dec 14, 2017  4:00 PM CST   (Arrive by 3:45 PM)   MR BRAIN W/O & W CONTRAST with UC1   [facility] MRI ([facility])    [address] [address] [phone]           Take your medicines as usual, unless your doctor tells you not to. Bring a list of your current medicines to your exam (including vitamins, minerals and over-the-counter drugs).  You will be given intravenous contrast for this exam. To prepare:   The day before your exam, drink extra fluids at least six 8-ounce glasses (unless your doctor tells you to restrict your fluids).   Have a blood test (creatinine test) within 30 days of your exam. Go to your clinic or Diagnostic Imaging Department for this test.  The MRI machine uses a strong magnet. Please wear clothes without metal (snaps, zippers). A sweatsuit works well, or we may give you a hospital gown.  Please remove any body piercings and hair extensions before you arrive. You will also remove watches, jewelry, hairpins, wallets, dentures, partial dental plates and hearing aids. You may wear contact lenses, and you may be able to wear your rings. We have a safe place to keep your personal items, but it is safer to leave them at home.   **IMPORTANT** THE INSTRUCTIONS BELOW ARE ONLY FOR THOSE PATIENTS WHO HAVE BEEN TOLD THEY WILL RECEIVE SEDATION OR GENERAL ANESTHESIA DURING THEIR MRI PROCEDURE:  IF YOU WILL RECEIVE SEDATION (take medicine to help you relax during your exam):   You must get the medicine from your doctor before you arrive. Bring the medicine to the exam. Do not take it at home.   Arrive one hour early. Bring someone who can take you home after the test. Your medicine  will make you sleepy. After the exam, you may not drive, take a bus or take a taxi by yourself.   No eating 8 hours before your exam. You may have clear liquids up until 4 hours before your exam. (Clear liquids include water, clear tea, black coffee and fruit juice without pulp.)  IF YOU WILL RECEIVE ANESTHESIA (be asleep for your exam):   Arrive 1 1/2 hours early. Bring someone who can take you home after the test. You may not drive, take a bus or take a taxi by yourself.   No eating 8 hours before your exam. You may have clear liquids up until 4 hours before your exam. (Clear liquids include water, clear tea, black coffee and fruit juice without pulp.)  Please call the Imaging Department at your exam site with any questions.              Who to contact     If you have questions or need follow up information about today's clinic visit or your schedule please contact [facility] directly at [phone].  Normal or non-critical lab and imaging results will be communicated to you by Doisthart, letter or phone within 4 business days after the clinic has received the results. If you do not hear from us within 7 days, please contact the clinic through Yellowsmith or phone. If you have a critical or abnormal lab result, we will notify you by phone as soon as possible.  Submit refill requests through Yellowsmith or call your pharmacy and they will forward the refill request to us. Please allow 3 business days for your refill to be completed.          Additional Information About Your Visit        Yellowsmith Information     Yellowsmith gives you secure access to your electronic health record. If you see a primary care provider, you can also send messages to your care team and make appointments. If you have questions, please call your primary care clinic.  If you do not have a primary care provider, please call [phone] and they will assist you.        Care EveryWhere ID     This is your Care EveryWhere ID. This could  be used by other organizations to access your [facility] medical records  [id number]        Your Vitals Were     Pulse Temperature Respirations Pulse Oximetry BMI (Body Mass Index)       79 98  F (36.7  C) 16 95% 22.48 kg/m2        Blood Pressure from Last 3 Encounters:   11/03/17 117/80   10/27/17 119/77   10/13/17 106/73    Weight from Last 3 Encounters:   11/03/17 55.7 kg (122 lb 14.4 oz)   10/27/17 56 kg (123 lb 8 oz)   10/13/17 54.8 kg (120 lb 12.8 oz)              We Performed the Following     CBC with platelets differential        Primary Care Provider Office Phone # Fax #    Kelly Hart [phone]       [address] [facility] [address]        Equal Access to Services     [facility]: Hadii aad ku hadasho [place], waaxda luqadaha, qaybta kaalmada adeirineoyajb, freddie escobedo . So [facility] [phone].    ATENCIÓN: Si habla español, tiene a ramires disposición servicios gratuitos de asistencia lingüística. Kyung al [phone].    We comply with applicable federal civil rights laws and [place] laws. We do not discriminate on the basis of race, color, national origin, age, disability, sex, sexual orientation, or gender identity.            Thank you!     Thank you for choosing [facility]  for your care. Our goal is always to provide you with excellent care. Hearing back from our patients is one way we can continue to improve our services. Please take a few minutes to complete the written survey that you may receive in the mail after your visit with us. Thank you!             Your Updated Medication List - Protect others around you: Learn how to safely use, store and throw away your medicines at www.disposemymeds.org.          This list is accurate as of: 11/3/17  3:29 PM.  Always use your most recent med list.                   Brand Name Dispense Instructions for use Diagnosis    ALEVE PO      Take 220 mg by mouth daily        desmopressin  0.2 MG tablet    DDAVP    45 tablet    Take  1/2 tablet once daily by mouth    Diabetes insipidus (H)       meclizine 25 MG tablet    ANTIVERT    30 tablet    Take 1 tablet (25 mg) by mouth 3 times daily as needed for dizziness    BPPV (benign paroxysmal positional vertigo), right       methylphenidate 5 MG tablet    RITALIN    30 tablet    Take 5 mg once daily as needed for fatigue    Carcinoma of breast metastatic to multiple sites, unspecified laterality (H)       Multi-vitamin Tabs tablet      Take 1 tablet by mouth daily        omeprazole 20 MG tablet     30 tablet    Take 1 tablet (20 mg) by mouth daily    Gastroesophageal reflux disease without esophagitis       prochlorperazine 10 MG tablet    COMPAZINE    90 tablet    Take 1 tablet (10 mg) by mouth every 6 hours as needed for nausea or vomiting    Nausea       VITAMIN D (CHOLECALCIFEROL) PO      Take 5,000 Units by mouth daily        VITAMIN E BLEND PO      Take by mouth daily

## 2017-11-20 ASSESSMENT — ENCOUNTER SYMPTOMS
VOMITING: 0
BOWEL INCONTINENCE: 0
SINUS CONGESTION: 0
POSTURAL DYSPNEA: 0
NUMBNESS: 1
CONSTIPATION: 0
JOINT SWELLING: 0
LIGHT-HEADEDNESS: 1
SLEEP DISTURBANCES DUE TO BREATHING: 0
BRUISES/BLEEDS EASILY: 0
COUGH DISTURBING SLEEP: 1
INCREASED ENERGY: 1
DOUBLE VISION: 1
NECK PAIN: 1
SPEECH CHANGE: 0
WEAKNESS: 1
LEG PAIN: 1
DEPRESSION: 0
BLOOD IN STOOL: 0
HYPERTENSION: 0
NECK MASS: 0
SMELL DISTURBANCE: 1
HALLUCINATIONS: 0
COUGH: 1
DYSPNEA ON EXERTION: 1
SHORTNESS OF BREATH: 1
EYE IRRITATION: 1
WEIGHT LOSS: 0
BLOATING: 1
HEMOPTYSIS: 0
NAUSEA: 1
HOARSE VOICE: 1
ABDOMINAL PAIN: 0
STIFFNESS: 1
DISTURBANCES IN COORDINATION: 1
EYE REDNESS: 1
ARTHRALGIAS: 1
RECTAL PAIN: 0
EYE WATERING: 1
PALPITATIONS: 1
PARALYSIS: 0
MUSCLE WEAKNESS: 1
PANIC: 0
DECREASED APPETITE: 1
FEVER: 0
TREMORS: 1
POLYPHAGIA: 0
SPUTUM PRODUCTION: 0
WHEEZING: 0
ALTERED TEMPERATURE REGULATION: 1
SINUS PAIN: 0
HEARTBURN: 1
WEIGHT GAIN: 0
NERVOUS/ANXIOUS: 1
ORTHOPNEA: 0
CHILLS: 1
DECREASED CONCENTRATION: 1
NIGHT SWEATS: 1
MYALGIAS: 1
FATIGUE: 1
TASTE DISTURBANCE: 1
MEMORY LOSS: 1
POLYDIPSIA: 0
INSOMNIA: 0
SEIZURES: 0
JAUNDICE: 0
SYNCOPE: 0
HEADACHES: 1
DIARRHEA: 0
EYE PAIN: 1
HYPOTENSION: 1
SWOLLEN GLANDS: 0
BACK PAIN: 1
MUSCLE CRAMPS: 0
SNORES LOUDLY: 1
EXERCISE INTOLERANCE: 1
TROUBLE SWALLOWING: 0
DIZZINESS: 1
TINGLING: 1
SORE THROAT: 0
LOSS OF CONSCIOUSNESS: 0

## 2017-11-24 ENCOUNTER — INFUSION THERAPY VISIT (OUTPATIENT)
Dept: ONCOLOGY | Facility: CLINIC | Age: 62
End: 2017-11-24
Attending: INTERNAL MEDICINE
Payer: MEDICARE

## 2017-11-24 ENCOUNTER — APPOINTMENT (OUTPATIENT)
Dept: LAB | Facility: CLINIC | Age: 62
End: 2017-11-24
Attending: INTERNAL MEDICINE
Payer: MEDICARE

## 2017-11-24 VITALS
WEIGHT: 119.7 LBS | TEMPERATURE: 97.7 F | BODY MASS INDEX: 22.03 KG/M2 | RESPIRATION RATE: 20 BRPM | HEART RATE: 80 BPM | DIASTOLIC BLOOD PRESSURE: 80 MMHG | OXYGEN SATURATION: 96 % | SYSTOLIC BLOOD PRESSURE: 118 MMHG | HEIGHT: 62 IN

## 2017-11-24 DIAGNOSIS — E23.2 DIABETES INSIPIDUS (H): ICD-10-CM

## 2017-11-24 DIAGNOSIS — C79.31 BRAIN METASTASIS: ICD-10-CM

## 2017-11-24 DIAGNOSIS — C50.919 METASTATIC BREAST CANCER: Primary | ICD-10-CM

## 2017-11-24 DIAGNOSIS — C50.919 CARCINOMA OF BREAST METASTATIC TO MULTIPLE SITES, UNSPECIFIED LATERALITY (H): ICD-10-CM

## 2017-11-24 DIAGNOSIS — C50.919 METASTATIC BREAST CANCER: ICD-10-CM

## 2017-11-24 LAB
ALBUMIN SERPL-MCNC: 3.6 G/DL (ref 3.4–5)
ALP SERPL-CCNC: 86 U/L (ref 40–150)
ALT SERPL W P-5'-P-CCNC: 21 U/L (ref 0–50)
ANION GAP SERPL CALCULATED.3IONS-SCNC: 6 MMOL/L (ref 3–14)
AST SERPL W P-5'-P-CCNC: 25 U/L (ref 0–45)
BASOPHILS # BLD AUTO: 0.1 10E9/L (ref 0–0.2)
BASOPHILS NFR BLD AUTO: 1.5 %
BILIRUB SERPL-MCNC: 0.2 MG/DL (ref 0.2–1.3)
BUN SERPL-MCNC: 14 MG/DL (ref 7–30)
CALCIUM SERPL-MCNC: 8.8 MG/DL (ref 8.5–10.1)
CANCER AG27-29 SERPL-ACNC: 18 U/ML (ref 0–39)
CEA SERPL-MCNC: 1.9 UG/L (ref 0–2.5)
CHLORIDE SERPL-SCNC: 109 MMOL/L (ref 94–109)
CO2 SERPL-SCNC: 26 MMOL/L (ref 20–32)
CREAT SERPL-MCNC: 0.68 MG/DL (ref 0.52–1.04)
DIFFERENTIAL METHOD BLD: ABNORMAL
EOSINOPHIL # BLD AUTO: 0 10E9/L (ref 0–0.7)
EOSINOPHIL NFR BLD AUTO: 0.1 %
ERYTHROCYTE [DISTWIDTH] IN BLOOD BY AUTOMATED COUNT: 16.4 % (ref 10–15)
GFR SERPL CREATININE-BSD FRML MDRD: 88 ML/MIN/1.7M2
GLUCOSE SERPL-MCNC: 87 MG/DL (ref 70–99)
HCT VFR BLD AUTO: 34.8 % (ref 35–47)
HGB BLD-MCNC: 11.4 G/DL (ref 11.7–15.7)
IMM GRANULOCYTES # BLD: 0 10E9/L (ref 0–0.4)
IMM GRANULOCYTES NFR BLD: 0.6 %
LYMPHOCYTES # BLD AUTO: 0.9 10E9/L (ref 0.8–5.3)
LYMPHOCYTES NFR BLD AUTO: 13.8 %
MCH RBC QN AUTO: 33 PG (ref 26.5–33)
MCHC RBC AUTO-ENTMCNC: 32.8 G/DL (ref 31.5–36.5)
MCV RBC AUTO: 101 FL (ref 78–100)
MONOCYTES # BLD AUTO: 1.1 10E9/L (ref 0–1.3)
MONOCYTES NFR BLD AUTO: 15.9 %
NEUTROPHILS # BLD AUTO: 4.5 10E9/L (ref 1.6–8.3)
NEUTROPHILS NFR BLD AUTO: 68.1 %
NRBC # BLD AUTO: 0 10*3/UL
NRBC BLD AUTO-RTO: 0 /100
PLATELET # BLD AUTO: 436 10E9/L (ref 150–450)
POTASSIUM SERPL-SCNC: 3.6 MMOL/L (ref 3.4–5.3)
PROT SERPL-MCNC: 6.3 G/DL (ref 6.8–8.8)
RBC # BLD AUTO: 3.45 10E12/L (ref 3.8–5.2)
SODIUM SERPL-SCNC: 142 MMOL/L (ref 133–144)
WBC # BLD AUTO: 6.7 10E9/L (ref 4–11)

## 2017-11-24 PROCEDURE — 86300 IMMUNOASSAY TUMOR CA 15-3: CPT | Performed by: INTERNAL MEDICINE

## 2017-11-24 PROCEDURE — 25000128 H RX IP 250 OP 636: Mod: ZF | Performed by: PHYSICIAN ASSISTANT

## 2017-11-24 PROCEDURE — 96417 CHEMO IV INFUS EACH ADDL SEQ: CPT

## 2017-11-24 PROCEDURE — 80053 COMPREHEN METABOLIC PANEL: CPT | Performed by: INTERNAL MEDICINE

## 2017-11-24 PROCEDURE — 96375 TX/PRO/DX INJ NEW DRUG ADDON: CPT

## 2017-11-24 PROCEDURE — 82378 CARCINOEMBRYONIC ANTIGEN: CPT | Performed by: INTERNAL MEDICINE

## 2017-11-24 PROCEDURE — 99214 OFFICE O/P EST MOD 30 MIN: CPT | Mod: ZP | Performed by: PHYSICIAN ASSISTANT

## 2017-11-24 PROCEDURE — 99212 OFFICE O/P EST SF 10 MIN: CPT | Mod: ZF

## 2017-11-24 PROCEDURE — 96413 CHEMO IV INFUSION 1 HR: CPT

## 2017-11-24 PROCEDURE — 85025 COMPLETE CBC W/AUTO DIFF WBC: CPT | Performed by: INTERNAL MEDICINE

## 2017-11-24 RX ORDER — SODIUM CHLORIDE 9 MG/ML
1000 INJECTION, SOLUTION INTRAVENOUS CONTINUOUS PRN
Status: CANCELLED
Start: 2017-11-24

## 2017-11-24 RX ORDER — ACETAMINOPHEN 325 MG/1
650 TABLET ORAL
Status: CANCELLED | OUTPATIENT
Start: 2017-12-15

## 2017-11-24 RX ORDER — ALBUTEROL SULFATE 90 UG/1
1-2 AEROSOL, METERED RESPIRATORY (INHALATION)
Status: CANCELLED
Start: 2017-11-24

## 2017-11-24 RX ORDER — LORAZEPAM 2 MG/ML
0.5 INJECTION INTRAMUSCULAR EVERY 4 HOURS PRN
Status: CANCELLED
Start: 2017-12-15

## 2017-11-24 RX ORDER — SODIUM CHLORIDE 9 MG/ML
1000 INJECTION, SOLUTION INTRAVENOUS CONTINUOUS PRN
Status: CANCELLED
Start: 2017-12-15

## 2017-11-24 RX ORDER — EPINEPHRINE 1 MG/ML
0.3 INJECTION, SOLUTION, CONCENTRATE INTRAVENOUS EVERY 5 MIN PRN
Status: CANCELLED | OUTPATIENT
Start: 2017-12-15

## 2017-11-24 RX ORDER — ACETAMINOPHEN 325 MG/1
650 TABLET ORAL
Status: CANCELLED | OUTPATIENT
Start: 2017-11-24

## 2017-11-24 RX ORDER — METHYLPREDNISOLONE SODIUM SUCCINATE 125 MG/2ML
125 INJECTION, POWDER, LYOPHILIZED, FOR SOLUTION INTRAMUSCULAR; INTRAVENOUS
Status: CANCELLED
Start: 2017-12-22

## 2017-11-24 RX ORDER — DIPHENHYDRAMINE HYDROCHLORIDE 50 MG/ML
50 INJECTION INTRAMUSCULAR; INTRAVENOUS
Status: CANCELLED
Start: 2017-12-15

## 2017-11-24 RX ORDER — HEPARIN SODIUM (PORCINE) LOCK FLUSH IV SOLN 100 UNIT/ML 100 UNIT/ML
5 SOLUTION INTRAVENOUS EVERY 8 HOURS
Status: CANCELLED | OUTPATIENT
Start: 2017-12-22

## 2017-11-24 RX ORDER — ALBUTEROL SULFATE 0.83 MG/ML
2.5 SOLUTION RESPIRATORY (INHALATION)
Status: CANCELLED | OUTPATIENT
Start: 2017-12-15

## 2017-11-24 RX ORDER — HEPARIN SODIUM (PORCINE) LOCK FLUSH IV SOLN 100 UNIT/ML 100 UNIT/ML
5 SOLUTION INTRAVENOUS EVERY 8 HOURS
Status: DISCONTINUED | OUTPATIENT
Start: 2017-11-24 | End: 2017-11-24 | Stop reason: HOSPADM

## 2017-11-24 RX ORDER — EPINEPHRINE 0.3 MG/.3ML
0.3 INJECTION SUBCUTANEOUS EVERY 5 MIN PRN
Status: CANCELLED | OUTPATIENT
Start: 2017-11-24

## 2017-11-24 RX ORDER — LORAZEPAM 2 MG/ML
0.5 INJECTION INTRAMUSCULAR EVERY 4 HOURS PRN
Status: CANCELLED
Start: 2017-11-24

## 2017-11-24 RX ORDER — DIPHENHYDRAMINE HYDROCHLORIDE 50 MG/ML
50 INJECTION INTRAMUSCULAR; INTRAVENOUS
Status: CANCELLED
Start: 2017-11-24

## 2017-11-24 RX ORDER — EPINEPHRINE 0.3 MG/.3ML
0.3 INJECTION SUBCUTANEOUS EVERY 5 MIN PRN
Status: CANCELLED | OUTPATIENT
Start: 2017-12-15

## 2017-11-24 RX ORDER — DIPHENHYDRAMINE HCL 25 MG
50 CAPSULE ORAL
Status: CANCELLED | OUTPATIENT
Start: 2017-12-15

## 2017-11-24 RX ORDER — HEPARIN SODIUM (PORCINE) LOCK FLUSH IV SOLN 100 UNIT/ML 100 UNIT/ML
5 SOLUTION INTRAVENOUS
Status: COMPLETED | OUTPATIENT
Start: 2017-11-24 | End: 2017-11-24

## 2017-11-24 RX ORDER — MEPERIDINE HYDROCHLORIDE 25 MG/ML
25 INJECTION INTRAMUSCULAR; INTRAVENOUS; SUBCUTANEOUS EVERY 30 MIN PRN
Status: CANCELLED | OUTPATIENT
Start: 2017-12-15

## 2017-11-24 RX ORDER — LORAZEPAM 2 MG/ML
0.5 INJECTION INTRAMUSCULAR EVERY 4 HOURS PRN
Status: CANCELLED
Start: 2017-12-22

## 2017-11-24 RX ORDER — EPINEPHRINE 1 MG/ML
0.3 INJECTION, SOLUTION, CONCENTRATE INTRAVENOUS EVERY 5 MIN PRN
Status: CANCELLED | OUTPATIENT
Start: 2017-12-22

## 2017-11-24 RX ORDER — ALBUTEROL SULFATE 90 UG/1
1-2 AEROSOL, METERED RESPIRATORY (INHALATION)
Status: CANCELLED
Start: 2017-12-15

## 2017-11-24 RX ORDER — ALBUTEROL SULFATE 0.83 MG/ML
2.5 SOLUTION RESPIRATORY (INHALATION)
Status: CANCELLED | OUTPATIENT
Start: 2017-12-22

## 2017-11-24 RX ORDER — METHYLPREDNISOLONE SODIUM SUCCINATE 125 MG/2ML
125 INJECTION, POWDER, LYOPHILIZED, FOR SOLUTION INTRAMUSCULAR; INTRAVENOUS
Status: CANCELLED
Start: 2017-11-24

## 2017-11-24 RX ORDER — HEPARIN SODIUM (PORCINE) LOCK FLUSH IV SOLN 100 UNIT/ML 100 UNIT/ML
5 SOLUTION INTRAVENOUS EVERY 8 HOURS
Status: CANCELLED | OUTPATIENT
Start: 2017-11-24

## 2017-11-24 RX ORDER — MEPERIDINE HYDROCHLORIDE 25 MG/ML
25 INJECTION INTRAMUSCULAR; INTRAVENOUS; SUBCUTANEOUS EVERY 30 MIN PRN
Status: CANCELLED | OUTPATIENT
Start: 2017-12-22

## 2017-11-24 RX ORDER — MEPERIDINE HYDROCHLORIDE 25 MG/ML
25 INJECTION INTRAMUSCULAR; INTRAVENOUS; SUBCUTANEOUS EVERY 30 MIN PRN
Status: CANCELLED | OUTPATIENT
Start: 2017-11-24

## 2017-11-24 RX ORDER — EPINEPHRINE 0.3 MG/.3ML
0.3 INJECTION SUBCUTANEOUS EVERY 5 MIN PRN
Status: CANCELLED | OUTPATIENT
Start: 2017-12-22

## 2017-11-24 RX ORDER — METHYLPREDNISOLONE SODIUM SUCCINATE 125 MG/2ML
125 INJECTION, POWDER, LYOPHILIZED, FOR SOLUTION INTRAMUSCULAR; INTRAVENOUS
Status: CANCELLED
Start: 2017-12-15

## 2017-11-24 RX ORDER — DIPHENHYDRAMINE HYDROCHLORIDE 50 MG/ML
50 INJECTION INTRAMUSCULAR; INTRAVENOUS
Status: CANCELLED
Start: 2017-12-22

## 2017-11-24 RX ORDER — EPINEPHRINE 1 MG/ML
0.3 INJECTION, SOLUTION, CONCENTRATE INTRAVENOUS EVERY 5 MIN PRN
Status: CANCELLED | OUTPATIENT
Start: 2017-11-24

## 2017-11-24 RX ORDER — DESMOPRESSIN ACETATE 0.2 MG/1
TABLET ORAL
Qty: 45 TABLET | Refills: 3 | Status: SHIPPED | OUTPATIENT
Start: 2017-11-24

## 2017-11-24 RX ORDER — SODIUM CHLORIDE 9 MG/ML
1000 INJECTION, SOLUTION INTRAVENOUS CONTINUOUS PRN
Status: CANCELLED
Start: 2017-12-22

## 2017-11-24 RX ORDER — ALBUTEROL SULFATE 0.83 MG/ML
2.5 SOLUTION RESPIRATORY (INHALATION)
Status: CANCELLED | OUTPATIENT
Start: 2017-11-24

## 2017-11-24 RX ORDER — ALBUTEROL SULFATE 90 UG/1
1-2 AEROSOL, METERED RESPIRATORY (INHALATION)
Status: CANCELLED
Start: 2017-12-22

## 2017-11-24 RX ORDER — DIPHENHYDRAMINE HCL 25 MG
50 CAPSULE ORAL
Status: CANCELLED | OUTPATIENT
Start: 2017-11-24

## 2017-11-24 RX ORDER — HEPARIN SODIUM (PORCINE) LOCK FLUSH IV SOLN 100 UNIT/ML 100 UNIT/ML
5 SOLUTION INTRAVENOUS EVERY 8 HOURS
Status: CANCELLED | OUTPATIENT
Start: 2017-12-15

## 2017-11-24 RX ADMIN — SODIUM CHLORIDE, PRESERVATIVE FREE 5 ML: 5 INJECTION INTRAVENOUS at 15:30

## 2017-11-24 RX ADMIN — GEMCITABINE 1460 MG: 38 INJECTION, SOLUTION INTRAVENOUS at 14:15

## 2017-11-24 RX ADMIN — SODIUM CHLORIDE, PRESERVATIVE FREE 5 ML: 5 INJECTION INTRAVENOUS at 12:54

## 2017-11-24 RX ADMIN — DEXAMETHASONE SODIUM PHOSPHATE 12 MG: 10 INJECTION, SOLUTION INTRAMUSCULAR; INTRAVENOUS at 14:05

## 2017-11-24 RX ADMIN — SODIUM CHLORIDE 250 ML: 9 INJECTION, SOLUTION INTRAVENOUS at 14:05

## 2017-11-24 RX ADMIN — TRASTUZUMAB 300 MG: 150 INJECTION, POWDER, LYOPHILIZED, FOR SOLUTION INTRAVENOUS at 14:58

## 2017-11-24 ASSESSMENT — PAIN SCALES - GENERAL: PAINLEVEL: MILD PAIN (2)

## 2017-11-24 NOTE — PATIENT INSTRUCTIONS
[facility] Main Line: [phone]    Call triage nurse with chills and/or temperature greater than or equal to 100.4, uncontrolled nausea/vomiting, diarrhea, constipation, dizziness, shortness of breath, chest pain, bleeding, unexplained bruising, or any new/concerning symptoms, questions/concerns.   If you are having any concerning symptoms or wish to speak to a provider before your next infusion visit, please call your care coordinator or triage to notify them so we can adequately serve you.   Triage Nurse Line: [phone]    If after hours, weekends, or holidays, call main hospital  and ask for Oncology doctor on call @ [phone]               November 2017 Sunday Monday Tuesday Wednesday Thursday Friday Saturday                  1     2     3     [facility]    1:00 PM   (15 min.)    [facility] LAB DRAW   [facility] Lab Draw     [facility] ONC INFUSION 60    1:30 PM   (60 min.)    ONCOLOGY INFUSION   [facility] 4       5     6     7     8     9     10     11       12     13     14     15     16     17     18       19     20     21     22     23     24  Happy Birthday!     [facility] DRAW   12:30 PM   (15 min.)    [facility] LAB DRAW   [facility] Lab Draw     [facility] RETURN   12:35 PM   (50 min.)   Deyanira Costello PA-C   [facility]     [facility] ONC INFUSION 120    2:00 PM   (120 min.)    ONCOLOGY INFUSION   [facility] 25       26     27     28     29     30 December 2017 Sunday Monday Tuesday Wednesday Thursday Friday Saturday                            1     [facility] DRAW    1:00 PM   (15 min.)    [facility] LAB DRAW   [facility] Lab Draw     [facility] ONC INFUSION 60    1:30 PM   (60 min.)    ONCOLOGY INFUSION   [facility] 2       3     4     5     6     7     8     9       10     11     12     13     14     ECH COMPLETE    2:30 PM   (60 min.)    UCECHCR1   [facility] Echo     CT CHEST/ABDOMEN/PELVIS W    3:25 PM   (20 min.)   UCCT2   [facility] CT     MR BRAIN WWO    3:45 PM   (45 min.)   MR1   [facility] MRI 15     [facility] LAB DRAW    1:30 PM   (15 min.)    [facility] LAB DRAW   [facility] Lab Draw     [facility] ONC INFUSION 120    2:00 PM   (120 min.)    ONCOLOGY INFUSION   [facility] 16       17     18     19     20     21     22     [facility] DRAW    8:00 AM   (15 min.)    [facility] LAB DRAW   [facility] Lab Draw     [facility] RETURN    8:15 AM   (30 min.)   Marlen Harper MD   [facility]     [facility] ONC INFUSION 60    9:00 AM   (60 min.)    ONCOLOGY INFUSION   [facility] 23       24     25     26     27     28     29     30       31                                                Lab Results:  Recent Results (from the past 12 hour(s))   CBC with platelets differential    Collection Time: 11/24/17  1:03 PM   Result Value Ref Range    WBC 6.7 4.0 - 11.0 10e9/L    RBC Count 3.45 (L) 3.8 - 5.2 10e12/L    Hemoglobin 11.4 (L) 11.7 - 15.7 g/dL    Hematocrit 34.8 (L) 35.0 - 47.0 %     (H) 78 - 100 fl    MCH 33.0 26.5 - 33.0 pg    MCHC 32.8 31.5 - 36.5 g/dL    RDW 16.4 (H) 10.0 - 15.0 %    Platelet Count 436 150 - 450 10e9/L    Diff Method Automated Method     % Neutrophils 68.1 %    % Lymphocytes 13.8 %    % Monocytes 15.9 %    % Eosinophils 0.1 %    % Basophils 1.5 %    % Immature Granulocytes 0.6 %    Nucleated RBCs 0 0 /100    Absolute Neutrophil 4.5 1.6 - 8.3 10e9/L    Absolute Lymphocytes 0.9 0.8 - 5.3 10e9/L    Absolute Monocytes 1.1 0.0 - 1.3 10e9/L    Absolute Eosinophils 0.0 0.0 - 0.7 10e9/L    Absolute Basophils 0.1 0.0 - 0.2 10e9/L    Abs Immature Granulocytes 0.0 0 - 0.4 10e9/L    Absolute Nucleated RBC 0.0    Comprehensive metabolic panel    Collection Time: 11/24/17  1:03 PM   Result Value Ref Range    Sodium 142 133 - 144 mmol/L    Potassium  3.6 3.4 - 5.3 mmol/L    Chloride 109 94 - 109 mmol/L    Carbon Dioxide 26 20 - 32 mmol/L    Anion Gap 6 3 - 14 mmol/L    Glucose 87 70 - 99 mg/dL    Urea Nitrogen 14 7 - 30 mg/dL    Creatinine 0.68 0.52 - 1.04 mg/dL    GFR Estimate 88 >60 mL/min/1.7m2    GFR Estimate If Black >90 >60 mL/min/1.7m2    Calcium 8.8 8.5 - 10.1 mg/dL    Bilirubin Total 0.2 0.2 - 1.3 mg/dL    Albumin 3.6 3.4 - 5.0 g/dL    Protein Total 6.3 (L) 6.8 - 8.8 g/dL    Alkaline Phosphatase 86 40 - 150 U/L    ALT 21 0 - 50 U/L    AST 25 0 - 45 U/L

## 2017-11-24 NOTE — MR AVS SNAPSHOT
After Visit Summary   11/24/2017    Keren Erickson    MRN: [id number]           Patient Information     Date Of Birth          1955        Visit Information        Provider Department      11/24/2017 2:00 PM  11 ATC;  ONCOLOGY INFUSION [facility]        Today's Diagnoses     Metastatic breast cancer (H)    -  1    Brain metastasis (H)        Carcinoma of breast metastatic to multiple sites, unspecified laterality (H)          Care Instructions    [facility] Main Line: [phone]    Call triage nurse with chills and/or temperature greater than or equal to 100.4, uncontrolled nausea/vomiting, diarrhea, constipation, dizziness, shortness of breath, chest pain, bleeding, unexplained bruising, or any new/concerning symptoms, questions/concerns.   If you are having any concerning symptoms or wish to speak to a provider before your next infusion visit, please call your care coordinator or triage to notify them so we can adequately serve you.   Triage Nurse Line: [phone]    If after hours, weekends, or holidays, call main hospital  and ask for Oncology doctor on call @ [phone]               November 2017 Sunday Monday Tuesday Wednesday Thursday Friday Saturday                  1     2     3     [facility] DRAW    1:00 PM   (15 min.)   [facility]   [facility] Lab Draw     [facility] ONC INFUSION 60    1:30 PM   (60 min.)    ONCOLOGY INFUSION   [facility] 4       5     6     7     8     9     10     11       12     13     14     15     16     17     18       19     20     21     22     23     24  Happy Birthday!     [facility] DRAW   12:30 PM   (15 min.)    [facility] LAB DRAW   [facility] Lab Draw     [facility] RETURN   12:35 PM   (50 min.)   Deyanira Costello PA-C   [facility]     [facility] ONC INFUSION 120    2:00 PM   (120 min.)    ONCOLOGY INFUSION   [facility]  Cancer Clinic 25       26     27     28     29 30 December 2017 Sunday Monday Tuesday Wednesday Thursday Friday Saturday                            1     [facility] D[facility]    1:00 PM   (15 min.)    [facility] LAB DRAW   [facility] Draw     [facility] ONC INFUSION 60    1:30 PM   (60 min.)   [facility] ONCOLOGY INFUSION   [facility] 2       3     4     5     6     7     8     9       10     11     12     13     14     ECH COMPLETE    2:30 PM   (60 min.)   UCECHCR1   [facility] Echo     CT CHEST/ABDOMEN/PELVIS W    3:25 PM   (20 min.)   CT2   [facility] CT     MR BRAIN WWO    3:45 PM   (45 min.)   MR1   [facility] MRI 15     P [facility] DRAW    1:30 PM   (15 min.)    [facility]     [facility] ONC INFUSION 120    2:00 PM   (120 min.)    ONCOLOGY INFUSION   [facility] 16       17     18     19     20     21     22     [facility] DRAW    8:00 AM   (15 min.)    [facility] LAB DRAW   [facility] Lab Draw     [facility] RETURN    8:15 AM   (30 min.)   Marlen Harper MD   [facility]     [facility] ONC INFUSION 60    9:00 AM   (60 min.)    ONCOLOGY INFUSION   [facility] 23       24     25     26     27     28     29     30       31                                                Lab Results:  Recent Results (from the past 12 hour(s))   CBC with platelets differential    Collection Time: 11/24/17  1:03 PM   Result Value Ref Range    WBC 6.7 4.0 - 11.0 10e9/L    RBC Count 3.45 (L) 3.8 - 5.2 10e12/L    Hemoglobin 11.4 (L) 11.7 - 15.7 g/dL    Hematocrit 34.8 (L) 35.0 - 47.0 %     (H) 78 - 100 fl    MCH 33.0 26.5 - 33.0 pg    MCHC 32.8 31.5 - 36.5 g/dL    RDW 16.4 (H) 10.0 - 15.0 %    Platelet Count 436 150 - 450 10e9/L    Diff Method Automated Method     % Neutrophils 68.1 %    % Lymphocytes 13.8 %    % Monocytes 15.9 %    % Eosinophils 0.1 %    %  Basophils 1.5 %    % Immature Granulocytes 0.6 %    Nucleated RBCs 0 0 /100    Absolute Neutrophil 4.5 1.6 - 8.3 10e9/L    Absolute Lymphocytes 0.9 0.8 - 5.3 10e9/L    Absolute Monocytes 1.1 0.0 - 1.3 10e9/L    Absolute Eosinophils 0.0 0.0 - 0.7 10e9/L    Absolute Basophils 0.1 0.0 - 0.2 10e9/L    Abs Immature Granulocytes 0.0 0 - 0.4 10e9/L    Absolute Nucleated RBC 0.0    Comprehensive metabolic panel    Collection Time: 11/24/17  1:03 PM   Result Value Ref Range    Sodium 142 133 - 144 mmol/L    Potassium 3.6 3.4 - 5.3 mmol/L    Chloride 109 94 - 109 mmol/L    Carbon Dioxide 26 20 - 32 mmol/L    Anion Gap 6 3 - 14 mmol/L    Glucose 87 70 - 99 mg/dL    Urea Nitrogen 14 7 - 30 mg/dL    Creatinine 0.68 0.52 - 1.04 mg/dL    GFR Estimate 88 >60 mL/min/1.7m2    GFR Estimate If Black >90 >60 mL/min/1.7m2    Calcium 8.8 8.5 - 10.1 mg/dL    Bilirubin Total 0.2 0.2 - 1.3 mg/dL    Albumin 3.6 3.4 - 5.0 g/dL    Protein Total 6.3 (L) 6.8 - 8.8 g/dL    Alkaline Phosphatase 86 40 - 150 U/L    ALT 21 0 - 50 U/L    AST 25 0 - 45 U/L             Follow-ups after your visit        Your next 10 appointments already scheduled     Dec 01, 2017  1:00 PM CST   [facility] Draw with [facility] DRAW   [facility] Lab Draw ([facility])    [address] [place] [address]  2nd [facility] [address] [phone]            Dec 01, 2017  1:30 PM CST   Infusion 60 with  ONCOLOGY INFUSION, UC 22 ATC   [facility] ([facility])    [address] [place] [address]  2nd [facility] [address] [phone]            Dec 14, 2017  2:30 PM CST   Ech Complete with UCECHCR1   [facility] Echo [facility])    [address] [facility]  3rd [facility] [address] [phone]           1. Please bring or wear a comfortable two-piece outfit. 2. You may eat, drink and take your normal medicines. 3. For any questions that cannot be answered, please  contact the ordering physician            Dec 14, 2017  3:40 PM CST   (Arrive by 3:25 PM)   CT CHEST/ABDOMEN/PELVIS W CONTRAST with UCCT2   [facility] CT ([facility])    [address] [address] [phone]           Please bring any scans or X-rays taken at other hospitals, if similar tests were done. Also bring a list of your medicines, including vitamins, minerals and over-the-counter drugs. It is safest to leave personal items at home.  Be sure to tell your doctor:   If you have any allergies.   If there s any chance you are pregnant.   If you are breastfeeding.   If you have any special needs.  You may have contrast for this exam. To prepare:   Do not eat or drink for 2 hours before your exam. If you need to take medicine, you may take it with small sips of water. (We may ask you to take liquid medicine as well.)   The day before your exam, drink extra fluids at least six 8-ounce glasses (unless your doctor tells you to restrict your fluids).  Patients over 70 or patients with diabetes or kidney problems:   If you haven t had a blood test (creatinine test) within the last 30 days, go to your clinic or Diagnostic Imaging Department for this test.  If you have diabetes:   If your kidney function is normal, continue taking your metformin (Avandamet, Glucophage, Glucovance, Metaglip) on the day of your exam.   If your kidney function is abnormal, wait 48 hours before restarting this medicine.  You will have oral contrast for this exam:   You will drink the contrast at home. Get this from your clinic or Diagnostic Imaging Department. Please follow the directions given.  Please wear loose clothing, such as a sweat suit or jogging clothes. Avoid snaps, zippers and other metal. We may ask you to undress and put on a hospital gown.  If you have any questions, please call the Imaging Department where you will have your exam.            Dec 14,  2017  4:00 PM CST   (Arrive by 3:45 PM)   MR BRAIN W/O & W CONTRAST with UCMR1   [facility] MRI ([facility])    [address] [facility]  1st [facility] [address] [phone]           Take your medicines as usual, unless your doctor tells you not to. Bring a list of your current medicines to your exam (including vitamins, minerals and over-the-counter drugs).  You will be given intravenous contrast for this exam. To prepare:   The day before your exam, drink extra fluids at least six 8-ounce glasses (unless your doctor tells you to restrict your fluids).   Have a blood test (creatinine test) within 30 days of your exam. Go to your clinic or Diagnostic Imaging Department for this test.  The MRI machine uses a strong magnet. Please wear clothes without metal (snaps, zippers). A sweatsuit works well, or we may give you a hospital gown.  Please remove any body piercings and hair extensions before you arrive. You will also remove watches, jewelry, hairpins, wallets, dentures, partial dental plates and hearing aids. You may wear contact lenses, and you may be able to wear your rings. We have a safe place to keep your personal items, but it is safer to leave them at home.   **IMPORTANT** THE INSTRUCTIONS BELOW ARE ONLY FOR THOSE PATIENTS WHO HAVE BEEN TOLD THEY WILL RECEIVE SEDATION OR GENERAL ANESTHESIA DURING THEIR MRI PROCEDURE:  IF YOU WILL RECEIVE SEDATION (take medicine to help you relax during your exam):   You must get the medicine from your doctor before you arrive. Bring the medicine to the exam. Do not take it at home.   Arrive one hour early. Bring someone who can take you home after the test. Your medicine will make you sleepy. After the exam, you may not drive, take a bus or take a taxi by yourself.   No eating 8 hours before your exam. You may have clear liquids up until 4 hours before your exam. (Clear liquids include water, clear tea, black coffee and fruit  juice without pulp.)  IF YOU WILL RECEIVE ANESTHESIA (be asleep for your exam):   Arrive 1 1/2 hours early. Bring someone who can take you home after the test. You may not drive, take a bus or take a taxi by yourself.   No eating 8 hours before your exam. You may have clear liquids up until 4 hours before your exam. (Clear liquids include water, clear tea, black coffee and fruit juice without pulp.)  Please call the Imaging Department at your exam site with any questions.            Dec 15, 2017  1:30 PM CST   [facility] Lab Draw with [facility] DRAW   [facility])    [address] [place] [address] [phone]            Dec 15, 2017  2:00 PM CST   Infusion 120 with UC ONCOLOGY INFUSION, UC 11 ATC   [facility] ([facility])    [address] [place] [address] [phone]            Dec 22, 2017  8:00 AM CST   [facility] Lab Draw with [facility] DRAW   [facility])    [address] [place] [address] [phone]              Who to contact     If you have questions or need follow up information about today's clinic visit or your schedule please contact [facility] directly at [phone].  Normal or non-critical lab and imaging results will be communicated to you by MyChart, letter or phone within 4 business days after the clinic has received the results. If you do not hear from us within 7 days, please contact the clinic through MyChart or phone. If you have a critical or abnormal lab result, we will notify you by phone as soon as possible.  Submit refill requests through [employer] or call your pharmacy and they will forward the refill request to us. Please allow 3 business days for your refill to be completed.          Additional Information About Your Visit         [employer] Information     [employer] gives you secure access to your electronic health record. If you see a primary care provider, you can also send messages to your care team and make appointments. If you have questions, please call your primary care clinic.  If you do not have a primary care provider, please call [phone] and they will assist you.        Care EveryWhere ID     This is your Care EveryWhere ID. This could be used by other organizations to access your [place] medical records  [id number]         Blood Pressure from Last 3 Encounters:   11/24/17 118/80   11/03/17 117/80   10/27/17 119/77    Weight from Last 3 Encounters:   11/24/17 54.3 kg (119 lb 11.2 oz)   11/03/17 55.7 kg (122 lb 14.4 oz)   10/27/17 56 kg (123 lb 8 oz)              We Performed the Following     Ca27.29  breast tumor marker     CBC with platelets differential     CEA     Comprehensive metabolic panel          Where to get your medicines      These medications were sent to [address] [place] [address] [address] [id number]    Hours:  TRANSPLANT PHONE NUMBER [phone] Phone:  [phone]     desmopressin 0.2 MG tablet          Primary Care Provider Office Phone # Fax #    Kelly Hart [phone]       [address] [facility] [address]        Equal Access to Services     [facility] : Hadii keturah handleyo [place], waaxda luqadaha, qaybta kaalmada freddie jin . So [facility] [phone].    ATENCIÓN: Si habla español, tiene a ramires disposición servicios gratuitos de asistencia lingüística. Llame al [phone].    We comply with applicable federal civil rights laws and [place] laws. We do not discriminate on the basis of race, color, national origin, age, disability, sex, sexual orientation, or gender identity.            Thank you!     Thank you for choosing [facility]  for your care. Our goal is always to provide you with excellent care. Hearing back from our patients is one way we can continue to improve our services. Please take a few minutes to complete the written survey that you may receive in the mail after your visit with us. Thank you!             Your Updated Medication List - Protect others around you: Learn how to safely use, store and throw away your medicines at www.disposemymeds.org.          This list is accurate as of: 11/24/17  3:42 PM.  Always use your most recent med list.                   Brand Name Dispense Instructions for use Diagnosis    ALEVE PO      Take 220 mg by mouth daily        desmopressin 0.2 MG tablet    DDAVP    45 tablet    Take  1/2 tablet once daily by mouth    Diabetes insipidus (H)       meclizine 25 MG tablet    ANTIVERT    30 tablet    Take 1 tablet (25 mg) by mouth 3 times daily as needed for dizziness    BPPV (benign paroxysmal positional vertigo), right       methylphenidate 5 MG tablet    RITALIN    30 tablet    Take 5 mg once daily as needed for fatigue    Carcinoma of breast metastatic to multiple sites, unspecified laterality (H)       Multi-vitamin Tabs tablet      Take 1 tablet by mouth daily        omeprazole 20 MG tablet     30 tablet    Take 1 tablet (20 mg) by mouth daily    Gastroesophageal reflux disease without esophagitis       prochlorperazine 10 MG tablet    COMPAZINE    90 tablet    Take 1 tablet (10 mg) by mouth every 6 hours as needed for nausea or vomiting    Nausea       VITAMIN D (CHOLECALCIFEROL) PO      Take 5,000 Units by mouth daily        VITAMIN E BLEND PO      Take by mouth daily

## 2017-11-24 NOTE — NURSING NOTE
"Chief Complaint   Patient presents with     Blood Draw     labs drawn from port by rn.  vs taken     Port accessed with 20 gauge 3/4\" gripper needle and labs drawn by rn.  Port flushed with NS and heparin.  Pt tolerated well.  VS taken.  Pt checked in for next appt.  Yas Alvares RN      "

## 2017-11-24 NOTE — PROGRESS NOTES
HEMATOLOGY/ONCOLOGY PROGRESS NOTE  Nov 24, 2017    REASON FOR VISIT: follow-up of metastatic breast cancer, triple positive    DIAGNOSIS:   The patient is a 60-year-old female who presented to the hospital initially in 09/2013 with complaints of dyspnea. Workup revealed a large pericardial effusion and pleural effusion. Physical examination revealed a large untreated left breast mass encompassing the entire left breast. Biopsies revealed these were ER, MI and HER2-positive breast cancer. She had a thoracentesis and pericardial sclerosis performed. She was originally on Arimidex and Herceptin. In 01/2014, she was switched to tamoxifen and Herceptin due to arthralgias, and she ultimately developed progressive disease and was switched to Faslodex and Herceptin. In 01/2015, she was found to have progressive disease and was switched to T-DM1.     Initially when she was seen in late 02/2015, she complained of some V5 sensory deficit. This was subjective. I was not able to elicit this on examination. This persisted and further workup was performed with a brain MRI. This revealed what was initially thought to be 3 punctate brain metastases. She originally saw Radiation Oncology and Neurosurgery as well as underwent a lumbar puncture. Cytology from the lumbar puncture showed no evidence of leptomeningeal disease. She was treated with Gamma Knife radiation to 6 lesions, performed on 04/16/2015.  She initiated therapy with TDM1 in January 2015 and continued this through 6/26/2015 with overall stable disease. She has since been on a break from treatment. The patient presented earlier this month with recurrent shortness of breath.  Imaging revealed recurrent right-sided pleural effusion.  She has since undergone thoracentesis with cytology pending.  Clinically, however, there is evidence of disease progression. PET scan performed on 11/25/2015 demonstrated progression of disease at multiple sites. She restarted TDM1 on  11/27/2015, however experienced a mild transfusion reaction with shortness of breath and chest tightness, resolved upon stopping TDM1 and 125 mg of SoluMedrol. She had progression of disease on repeat imaging 2/17/2016, as well as new brain metastases. She started TDM1 with Perjeta on 3/4/2016. She underwent gamma knife to brain mets on 3/8/16.       In late May, she was found to have progressive brain metastases.  She received whole brain radiation.  She had a follow up brain MRI August 2016 that was stable.     In September 2016, she enrolled on [place]  trial and was randomized to the standard of care arm/Physician's Choice; started on gemzar and herceptin. She was recently hospitalized 1/27-2/3/17 for presumed HCAP. Trial was suspended. She continued on gemzar and heceptin with stable disease. Last restaging was 4/7/17 and stable. Gemzar was placed on hold from 4/10/2017 through 6/22/17 so that she could recover from pneumonia.    INTERVAL HISTORY:  Dominga presents for follow-up today prior to next cycle of treatment, accompanied by her friend.    She feels fatigue is a little better. She used RItalin a few times and found it helpful, but forgot to take it most days. She would realize late in the day and didn't want to risk taking it in the afternoon. She otherwise has no new Melo. Occasional aches from gemzar are stable. No new pains. She continues to work on conditioning, now doing squats, 10 sit to stands, and at least 4 laps around her house daily. Eating stable. Episodic dizziness stable without any new neurologic concerns. Occasional headaches at baseline, unchanged. No other concerns.    ROS: 10 point ROS was conducted and was otherwise negative except for as above.  No f/s/c.  No chest pain/cough/dyspnea.  Daily BM, no  issues.    PHYSICAL EXAMINATION:     /80 (BP Location: Right arm, Patient Position: Sitting, Cuff Size: Adult Regular)  Pulse 80  Temp 97.7  F (36.5  C) (Oral)   Resp 20  Wt 54.3 kg (119 lb 11.2 oz)  SpO2 96%  BMI 21.89 kg/m2    Wt Readings from Last 4 Encounters:   11/24/17 54.3 kg (119 lb 11.2 oz)   11/03/17 55.7 kg (122 lb 14.4 oz)   10/27/17 56 kg (123 lb 8 oz)   10/13/17 54.8 kg (120 lb 12.8 oz)     Constitutional: Alert, oriented, cachectic female in no visible distress. Able to ambulate independently  but chooses wheelchair for longer distances in clinic  Eyes: PERRL. Anicteric sclerae.    ENT/Mouth: OM moist and pink without lesions or thrush.    CV: RRR, no murmurs appreciated.  Resp: CTAB slightly diminished R base, stable. Breathing comfortably.  Extremities: No lower extremity edema appreciated.  Skin: Warm, dry. No bruising or petechiae noted. No rash  Lymph: No palpable LAD  Neuro: CN II-XII grossly intact.     LABS:   Results for HEIDI RUIZ (MRN [id number]) as of 10/27/2017 12:55   Ref. Range 10/27/2017 11:46   WBC Latest Ref Range: 4.0 - 11.0 10e9/L 8.7   Hemoglobin Latest Ref Range: 11.7 - 15.7 g/dL 10.6 (L)   Hematocrit Latest Ref Range: 35.0 - 47.0 % 32.8 (L)   Platelet Count Latest Ref Range: 150 - 450 10e9/L 387   Results for HEIDI RUIZ (MRN [id number]) as of 11/24/2017 15:06   Ref. Range 11/24/2017 13:03   Sodium Latest Ref Range: 133 - 144 mmol/L 142   Potassium Latest Ref Range: 3.4 - 5.3 mmol/L 3.6   Chloride Latest Ref Range: 94 - 109 mmol/L 109   Carbon Dioxide Latest Ref Range: 20 - 32 mmol/L 26   Urea Nitrogen Latest Ref Range: 7 - 30 mg/dL 14   Creatinine Latest Ref Range: 0.52 - 1.04 mg/dL 0.68   GFR Estimate Latest Ref Range: >60 mL/min/1.7m2 88   GFR Estimate If Black Latest Ref Range: >60 mL/min/1.7m2 >90   Calcium Latest Ref Range: 8.5 - 10.1 mg/dL 8.8   Anion Gap Latest Ref Range: 3 - 14 mmol/L 6   Albumin Latest Ref Range: 3.4 - 5.0 g/dL 3.6   Protein Total Latest Ref Range: 6.8 - 8.8 g/dL 6.3 (L)   Bilirubin Total Latest Ref Range: 0.2 - 1.3 mg/dL 0.2   Alkaline Phosphatase Latest Ref Range: 40 - 150 U/L 86    ALT Latest Ref Range: 0 - 50 U/L 21   AST Latest Ref Range: 0 - 45 U/L 25   Glucose Latest Ref Range: 70 - 99 mg/dL 87     IMPRESSION/PLAN:  Dominga is a 61-year-old female with history of metastatic ER-positive, HER-2 positive breast cancer, with involvement of the bone, history of pleural effusions treated with pleurodesis and PleurX placement, and with treated brain metastases, previously with stable disease on TDM1, with progressive disease after treatment break.  She was started on [place]  clinical trial and randomized to physician's choice, and started on Gemzar/Herceptin on 9/29/16.    1. Breast cancer:  Continues on Herceptin/Gemzar. Her tumor markers have remained low and stable. Today is cycle 20 day 1. Overall, she is tolerating well with fatigue being her main side effect. She feels this is stable to slightly improved today and wishes to continue. We will continue with cycle 20. She will have repeat imaging with CT CAP and brain MRI in December. She is contemplating the idea of a break from treatment at some point.    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 She started desmopressin 50 mcg with good response clinically.     3. Brain mets: Numbness of tongue and V2 and V3 on right are stable. Continue to monitor.    4. FEN: Weight overall stable.  She is eating better.     5. Pain: Chronic mild aches/pains secondary to disease and with myalgias on Gemzar. No current pain today, not taking narcotics.     6. Bone mets: on Xgeva every 3 months. Last 5/1/17. On hold for dental work. She has made a dental appointment.    7. Mood: She is overall coping well.     8. She has an advanced directive.  She has a POLST.  DNR/DNI.  Her power of  is listed in the computer.     9. Fatigue: Ongoing but not improving with the improvement in her conditioning through PT/OT. Slightly improved since last visit and finding Ritalin helpful when she remembers to use it.  Encouraged more regular use following chemotherapy.    10. Cardiac monitoring: She is due for an echo in November, however she really would like to have it with the other imaging studies given it is so difficult for her to find rides. She has no s/s of CHF so I am ok delaying it by just 2 weeks.    11. ENT: chronic dizziness since WBRT, improved with meclizine Rx by ENT for concern of inner ear issue.    Deyanira Costello PA-C

## 2017-11-24 NOTE — MR AVS SNAPSHOT
After Visit Summary   11/24/2017    Keren Erickson    MRN: [id number]           Patient Information     Date Of Birth          1955        Visit Information        Provider Department      11/24/2017 12:50 PM Deyanira Costello PA-C [facility]        Today's Diagnoses     Diabetes insipidus (H)        Brain metastasis (H)        Carcinoma of breast metastatic to multiple sites, unspecified laterality (H)        Metastatic breast cancer (H)           Follow-ups after your visit        Your next 10 appointments already scheduled     Dec 01, 2017  1:00 PM CST   [facility] Lab Draw with  [facility] LAB DRAW   [facility] Lab Draw ([facility])    [address] [facility]  2nd [facility] [id number] [phone]            Dec 01, 2017  1:30 PM CST   Infusion 60 with  ONCOLOGY INFUSION, UC 22 ATC   [facility] ([facility])    [address] [place] [address]  2nd [facility] [id number] [phone]            Dec 14, 2017  2:30 PM CST   Ech Complete with UCECHCR1   [facility] Echo ([facility])    [address] [place] [address]  3rd Floor  [facility] [id number] [phone]           1. Please bring or wear a comfortable two-piece outfit. 2. You may eat, drink and take your normal medicines. 3. For any questions that cannot be answered, please contact the ordering physician            Dec 14, 2017  3:40 PM CST   (Arrive by 3:25 PM)   CT CHEST/ABDOMEN/PELVIS W CONTRAST with UCCT2   [facility] Imaging [place] [facility] ([facility])    [address] [place] [address]  1st Floor  [facility] [id number] [phone]           Please bring any scans or X-rays taken at other hospitals, if similar tests were done. Also bring a list of your medicines, including vitamins, minerals and over-the-counter drugs. It is safest to leave personal items at home.  Be sure to tell  your doctor:   If you have any allergies.   If there s any chance you are pregnant.   If you are breastfeeding.   If you have any special needs.  You may have contrast for this exam. To prepare:   Do not eat or drink for 2 hours before your exam. If you need to take medicine, you may take it with small sips of water. (We may ask you to take liquid medicine as well.)   The day before your exam, drink extra fluids at least six 8-ounce glasses (unless your doctor tells you to restrict your fluids).  Patients over 70 or patients with diabetes or kidney problems:   If you haven t had a blood test (creatinine test) within the last 30 days, go to your clinic or Diagnostic Imaging Department for this test.  If you have diabetes:   If your kidney function is normal, continue taking your metformin (Avandamet, Glucophage, Glucovance, Metaglip) on the day of your exam.   If your kidney function is abnormal, wait 48 hours before restarting this medicine.  You will have oral contrast for this exam:   You will drink the contrast at home. Get this from your clinic or Diagnostic Imaging Department. Please follow the directions given.  Please wear loose clothing, such as a sweat suit or jogging clothes. Avoid snaps, zippers and other metal. We may ask you to undress and put on a hospital gown.  If you have any questions, please call the Imaging Department where you will have your exam.            Dec 14, 2017  4:00 PM CST   (Arrive by 3:45 PM)   MR BRAIN W/O & W CONTRAST with UCMR1   [facility] MRI ([facility])    [address] [address] [phone]           Take your medicines as usual, unless your doctor tells you not to. Bring a list of your current medicines to your exam (including vitamins, minerals and over-the-counter drugs).  You will be given intravenous contrast for this exam. To prepare:   The day before your exam, drink extra fluids at least six  8-ounce glasses (unless your doctor tells you to restrict your fluids).   Have a blood test (creatinine test) within 30 days of your exam. Go to your clinic or Diagnostic Imaging Department for this test.  The MRI machine uses a strong magnet. Please wear clothes without metal (snaps, zippers). A sweatsuit works well, or we may give you a hospital gown.  Please remove any body piercings and hair extensions before you arrive. You will also remove watches, jewelry, hairpins, wallets, dentures, partial dental plates and hearing aids. You may wear contact lenses, and you may be able to wear your rings. We have a safe place to keep your personal items, but it is safer to leave them at home.   **IMPORTANT** THE INSTRUCTIONS BELOW ARE ONLY FOR THOSE PATIENTS WHO HAVE BEEN TOLD THEY WILL RECEIVE SEDATION OR GENERAL ANESTHESIA DURING THEIR MRI PROCEDURE:  IF YOU WILL RECEIVE SEDATION (take medicine to help you relax during your exam):   You must get the medicine from your doctor before you arrive. Bring the medicine to the exam. Do not take it at home.   Arrive one hour early. Bring someone who can take you home after the test. Your medicine will make you sleepy. After the exam, you may not drive, take a bus or take a taxi by yourself.   No eating 8 hours before your exam. You may have clear liquids up until 4 hours before your exam. (Clear liquids include water, clear tea, black coffee and fruit juice without pulp.)  IF YOU WILL RECEIVE ANESTHESIA (be asleep for your exam):   Arrive 1 1/2 hours early. Bring someone who can take you home after the test. You may not drive, take a bus or take a taxi by yourself.   No eating 8 hours before your exam. You may have clear liquids up until 4 hours before your exam. (Clear liquids include water, clear tea, black coffee and fruit juice without pulp.)  Please call the Imaging Department at your exam site with any questions.            Dec 15, 2017  1:30 PM [facility] Lab Draw with   [facility] LAB DRAW   [facility] Lab Draw ([facility])    [address] [facility]  2nd [facility] [address] [phone]            Dec 15, 2017  2:00 PM CST   Infusion 120 with UC ONCOLOGY INFUSION, UC 11 ATC   [facility] ([facility])    [address] [place] [id number]   [phone]            Dec 22, 2017  8:00 AM CST   [facility] Lab Draw with  [facility] LAB DRAW   [facility] Lab Draw ([facility])    [address] [place] [id number] [phone]              Who to contact     If you have questions or need follow up information about today's clinic visit or your schedule please contact [facility] directly at [phone].  Normal or non-critical lab and imaging results will be communicated to you by MyChart, letter or phone within 4 business days after the clinic has received the results. If you do not hear from us within 7 days, please contact the clinic through [employer] or phone. If you have a critical or abnormal lab result, we will notify you by phone as soon as possible.  Submit refill requests through [employer] or call your pharmacy and they will forward the refill request to us. Please allow 3 business days for your refill to be completed.          Additional Information About Your Visit        [employer] [facility] Information     [employer] gives you secure access to your electronic health record. If you see a primary care provider, you can also send messages to your care team and make appointments. If you have questions, please call your primary care clinic.  If you do not have a primary care provider, please call [phone] and they will assist you.        Care EveryWhere ID     This is your Care EveryWhere ID. This could be used by other organizations to access your [place] medical records  [id number]        Your Vitals Were      "Pulse Temperature Respirations Height Pulse Oximetry Breastfeeding?    80 97.7  F (36.5  C) (Oral) 20 1.575 m (5' 2\") 96% No    BMI (Body Mass Index)                   21.89 kg/m2            Blood Pressure from Last 3 Encounters:   11/24/17 118/80   11/03/17 117/80   10/27/17 119/77    Weight from Last 3 Encounters:   11/24/17 54.3 kg (119 lb 11.2 oz)   11/03/17 55.7 kg (122 lb 14.4 oz)   10/27/17 56 kg (123 lb 8 oz)              Today, you had the following     No orders found for display         Where to get your medicines      These medications were sent to [place], [place] - [address] [facility] [address]  [address] [place] [address] [address] [place] [address]    Hours:  TRANSPLANT PHONE NUMBER [phone] Phone:  [phone]     desmopressin 0.2 MG tablet          Primary Care Provider Office Phone # Fax #    Kelly Hart [phone]       [address] [facility] [address]        Equal Access to Services     [facility]: Hadii keturah ibarra hadasho [place], waaxda luqadaha, qaybta kaalmada adeirineoyada, freddie escobedo . So [facility] [phone].    ATENCIÓN: Si habla español, tiene a ramires disposición servicios gratuitos de asistencia lingüística. Llame al [phone].    We comply with applicable federal civil rights laws and [place] laws. We do not discriminate on the basis of race, color, national origin, age, disability, sex, sexual orientation, or gender identity.            Thank you!     Thank you for choosing [facility]  for your care. Our goal is always to provide you with excellent care. Hearing back from our patients is one way we can continue to improve our services. Please take a few minutes to complete the written survey that you may receive in the mail after your visit with us. Thank you!             Your Updated Medication List - Protect others around you: Learn how to safely use, store and throw away your " medicines at www.disposemymeds.org.          This list is accurate as of: 11/24/17  3:08 PM.  Always use your most recent med list.                   Brand Name Dispense Instructions for use Diagnosis    ALEVE PO      Take 220 mg by mouth daily        desmopressin 0.2 MG tablet    DDAVP    45 tablet    Take  1/2 tablet once daily by mouth    Diabetes insipidus (H)       meclizine 25 MG tablet    ANTIVERT    30 tablet    Take 1 tablet (25 mg) by mouth 3 times daily as needed for dizziness    BPPV (benign paroxysmal positional vertigo), right       methylphenidate 5 MG tablet    RITALIN    30 tablet    Take 5 mg once daily as needed for fatigue    Carcinoma of breast metastatic to multiple sites, unspecified laterality (H)       Multi-vitamin Tabs tablet      Take 1 tablet by mouth daily        omeprazole 20 MG tablet     30 tablet    Take 1 tablet (20 mg) by mouth daily    Gastroesophageal reflux disease without esophagitis       prochlorperazine 10 MG tablet    COMPAZINE    90 tablet    Take 1 tablet (10 mg) by mouth every 6 hours as needed for nausea or vomiting    Nausea       VITAMIN D (CHOLECALCIFEROL) PO      Take 5,000 Units by mouth daily        VITAMIN E BLEND PO      Take by mouth daily

## 2017-11-24 NOTE — PROGRESS NOTES
Infusion Nursing Note:  Keren Erickson presents today for Cycle 20 Day 1 Gemzar, Herceptin.    Patient seen by provider today: Yes: Deyanira RAMSEY   present during visit today: Not Applicable.    Note:  Xgeva continues to be on hold pending dental work.  She has a dental appointment on 12/4/17.    Intravenous Access:  Implanted Port.    Treatment Conditions:  Lab Results   Component Value Date    HGB 11.4 11/24/2017     Lab Results   Component Value Date    WBC 6.7 11/24/2017      Lab Results   Component Value Date    ANEU 4.5 11/24/2017     Lab Results   Component Value Date     11/24/2017      Lab Results   Component Value Date     11/24/2017                   Lab Results   Component Value Date    POTASSIUM 3.6 11/24/2017           Lab Results   Component Value Date    MAG 2.4 04/10/2017            Lab Results   Component Value Date    CR 0.68 11/24/2017                   Lab Results   Component Value Date    OMA 8.8 11/24/2017                Lab Results   Component Value Date    BILITOTAL 0.2 11/24/2017           Lab Results   Component Value Date    ALBUMIN 3.6 11/24/2017                    Lab Results   Component Value Date    ALT 21 11/24/2017           Lab Results   Component Value Date    AST 25 11/24/2017     Results reviewed, labs MET treatment parameters, ok to proceed with treatment.  ECHO/MUGA completed 8/24/17  EF 55-60%.   Next echo scheduled for 12/14/17.          Post Infusion Assessment:  Patient tolerated infusion without incident.  Blood return noted pre and post infusion.  Site patent and intact, free from redness, edema or discomfort.  No evidence of extravasations.  Access discontinued per protocol.    Discharge Plan:   Prescription refills given for Desmopressin.  AVS to patient via [employer].  Patient will return 12/1/17 for labs,chemo for next appointment.   Patient discharged in stable condition accompanied by: friend.  Departure Mode: Ambulatory.  Face to  Face time: 0.    Mandy Grider RN

## 2017-11-24 NOTE — NURSING NOTE
"Oncology Rooming Note    November 24, 2017 1:14 PM   Keren Erickson is a 62 year old female who presents for:    Chief Complaint   Patient presents with     Blood Draw     labs drawn from port by rn.  vs taken     Oncology Clinic Visit     return patient visit for pre-infusion follow up related to breast cancer      Initial Vitals: /80 (BP Location: Right arm, Patient Position: Sitting, Cuff Size: Adult Regular)  Pulse 80  Temp 97.7  F (36.5  C) (Oral)  Resp 20  Ht 1.575 m (5' 2\")  Wt 54.3 kg (119 lb 11.2 oz)  SpO2 96%  Breastfeeding? No  BMI 21.89 kg/m2 Estimated body mass index is 21.89 kg/(m^2) as calculated from the following:    Height as of this encounter: 1.575 m (5' 2\").    Weight as of this encounter: 54.3 kg (119 lb 11.2 oz). Body surface area is 1.54 meters squared.  Mild Pain (2) Comment: Data Unavailable   No LMP recorded. Patient is postmenopausal.  Allergies reviewed: Yes  Medications reviewed: Yes    Medications: MEDICATION REFILLS NEEDED TODAY. Provider was notified.  Pharmacy name entered into [facility]: [facility] [place], [place] - [address] [place] [address] [address]    Clinical concerns: patient feels dizzy today rosa was notified.    6 minutes for nursing intake (face to face time)     Leigh Ann Li CMA              "

## 2017-11-24 NOTE — LETTER
11/24/2017      RE: Keren Erickson  [address] [facility] [address]       HEMATOLOGY/ONCOLOGY PROGRESS NOTE  Nov 24, 2017    REASON FOR VISIT: follow-up of metastatic breast cancer, triple positive    DIAGNOSIS:   The patient is a 60-year-old female who presented to the hospital initially in 09/2013 with complaints of dyspnea. Workup revealed a large pericardial effusion and pleural effusion. Physical examination revealed a large untreated left breast mass encompassing the entire left breast. Biopsies revealed these were ER, NE and HER2-positive breast cancer. She had a thoracentesis and pericardial sclerosis performed. She was originally on Arimidex and Herceptin. In 01/2014, she was switched to tamoxifen and Herceptin due to arthralgias, and she ultimately developed progressive disease and was switched to Faslodex and Herceptin. In 01/2015, she was found to have progressive disease and was switched to T-DM1.     Initially when she was seen in late 02/2015, she complained of some V5 sensory deficit. This was subjective. I was not able to elicit this on examination. This persisted and further workup was performed with a brain MRI. This revealed what was initially thought to be 3 punctate brain metastases. She originally saw Radiation Oncology and Neurosurgery as well as underwent a lumbar puncture. Cytology from the lumbar puncture showed no evidence of leptomeningeal disease. She was treated with Gamma Knife radiation to 6 lesions, performed on 04/16/2015.  She initiated therapy with TDM1 in January 2015 and continued this through 6/26/2015 with overall stable disease. She has since been on a break from treatment. The patient presented earlier this month with recurrent shortness of breath.  Imaging revealed recurrent right-sided pleural effusion.  She has since undergone thoracentesis with cytology pending.  Clinically, however, there is evidence of disease progression. PET scan performed on  11/25/2015 demonstrated progression of disease at multiple sites. She restarted TDM1 on 11/27/2015, however experienced a mild transfusion reaction with shortness of breath and chest tightness, resolved upon stopping TDM1 and 125 mg of SoluMedrol. She had progression of disease on repeat imaging 2/17/2016, as well as new brain metastases. She started TDM1 with Perjeta on 3/4/2016. She underwent gamma knife to brain mets on 3/8/16.       In late May, she was found to have progressive brain metastases.  She received whole brain radiation.  She had a follow up brain MRI August 2016 that was stable.     In September 2016, she enrolled on [place]  trial and was randomized to the standard of care arm/Physician's Choice; started on gemzar and herceptin. She was recently hospitalized 1/27-2/3/17 for presumed HCAP. Trial was suspended. She continued on gemzar and heceptin with stable disease. Last restaging was 4/7/17 and stable. Gemzar was placed on hold from 4/10/2017 through 6/22/17 so that she could recover from pneumonia.    INTERVAL HISTORY:  Dominga presents for follow-up today prior to next cycle of treatment, accompanied by her friend.    She feels fatigue is a little better. She used RItalin a few times and found it helpful, but forgot to take it most days. She would realize late in the day and didn't want to risk taking it in the afternoon. She otherwise has no new Melo. Occasional aches from gemzar are stable. No new pains. She continues to work on conditioning, now doing squats, 10 sit to stands, and at least 4 laps around her house daily. Eating stable. Episodic dizziness stable without any new neurologic concerns. Occasional headaches at baseline, unchanged. No other concerns.    ROS: 10 point ROS was conducted and was otherwise negative except for as above.  No f/s/c.  No chest pain/cough/dyspnea.  Daily BM, no  issues.    PHYSICAL EXAMINATION:     /80 (BP Location: Right arm, Patient  Position: Sitting, Cuff Size: Adult Regular)  Pulse 80  Temp 97.7  F (36.5  C) (Oral)  Resp 20  Wt 54.3 kg (119 lb 11.2 oz)  SpO2 96%  BMI 21.89 kg/m2    Wt Readings from Last 4 Encounters:   11/24/17 54.3 kg (119 lb 11.2 oz)   11/03/17 55.7 kg (122 lb 14.4 oz)   10/27/17 56 kg (123 lb 8 oz)   10/13/17 54.8 kg (120 lb 12.8 oz)     Constitutional: Alert, oriented, cachectic female in no visible distress. Able to ambulate independently  but chooses wheelchair for longer distances in clinic  Eyes: PERRL. Anicteric sclerae.    ENT/Mouth: OM moist and pink without lesions or thrush.    CV: RRR, no murmurs appreciated.  Resp: CTAB slightly diminished R base, stable. Breathing comfortably.  Extremities: No lower extremity edema appreciated.  Skin: Warm, dry. No bruising or petechiae noted. No rash  Lymph: No palpable LAD  Neuro: CN II-XII grossly intact.     LABS:   Results for HEIDI RUIZ (MRN [id number]) as of 10/27/2017 12:55   Ref. Range 10/27/2017 11:46   WBC Latest Ref Range: 4.0 - 11.0 10e9/L 8.7   Hemoglobin Latest Ref Range: 11.7 - 15.7 g/dL 10.6 (L)   Hematocrit Latest Ref Range: 35.0 - 47.0 % 32.8 (L)   Platelet Count Latest Ref Range: 150 - 450 10e9/L 387   Results for HEIDI RUIZ (MRN [id number]) as of 11/24/2017 15:06   Ref. Range 11/24/2017 13:03   Sodium Latest Ref Range: 133 - 144 mmol/L 142   Potassium Latest Ref Range: 3.4 - 5.3 mmol/L 3.6   Chloride Latest Ref Range: 94 - 109 mmol/L 109   Carbon Dioxide Latest Ref Range: 20 - 32 mmol/L 26   Urea Nitrogen Latest Ref Range: 7 - 30 mg/dL 14   Creatinine Latest Ref Range: 0.52 - 1.04 mg/dL 0.68   GFR Estimate Latest Ref Range: >60 mL/min/1.7m2 88   GFR Estimate If Black Latest Ref Range: >60 mL/min/1.7m2 >90   Calcium Latest Ref Range: 8.5 - 10.1 mg/dL 8.8   Anion Gap Latest Ref Range: 3 - 14 mmol/L 6   Albumin Latest Ref Range: 3.4 - 5.0 g/dL 3.6   Protein Total Latest Ref Range: 6.8 - 8.8 g/dL 6.3 (L)   Bilirubin Total  Latest Ref Range: 0.2 - 1.3 mg/dL 0.2   Alkaline Phosphatase Latest Ref Range: 40 - 150 U/L 86   ALT Latest Ref Range: 0 - 50 U/L 21   AST Latest Ref Range: 0 - 45 U/L 25   Glucose Latest Ref Range: 70 - 99 mg/dL 87     IMPRESSION/PLAN:  Dominga is a 61-year-old female with history of metastatic ER-positive, HER-2 positive breast cancer, with involvement of the bone, history of pleural effusions treated with pleurodesis and PleurX placement, and with treated brain metastases, previously with stable disease on TDM1, with progressive disease after treatment break.  She was started on [place]  clinical trial and randomized to physician's choice, and started on Gemzar/Herceptin on 9/29/16.    1. Breast cancer:  Continues on Herceptin/Gemzar. Her tumor markers have remained low and stable. Today is cycle 20 day 1. Overall, she is tolerating well with fatigue being her main side effect. She feels this is stable to slightly improved today and wishes to continue. We will continue with cycle 20. She will have repeat imaging with CT CAP and brain MRI in December. She is contemplating the idea of a break from treatment at some point.    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 She started desmopressin 50 mcg with good response clinically.     3. Brain mets: Numbness of tongue and V2 and V3 on right are stable. Continue to monitor.    4. FEN: Weight overall stable.  She is eating better.     5. Pain: Chronic mild aches/pains secondary to disease and with myalgias on Gemzar. No current pain today, not taking narcotics.     6. Bone mets: on Xgeva every 3 months. Last 5/1/17. On hold for dental work. She has made a dental appointment.    7. Mood: She is overall coping well.     8. She has an advanced directive.  She has a POLST.  DNR/DNI.  Her power of  is listed in the computer.     9. Fatigue: Ongoing but not improving with the improvement in her conditioning through PT/OT.  Slightly improved since last visit and finding Ritalin helpful when she remembers to use it. Encouraged more regular use following chemotherapy.    10. Cardiac monitoring: She is due for an echo in November, however she really would like to have it with the other imaging studies given it is so difficult for her to find rides. She has no s/s of CHF so I am ok delaying it by just 2 weeks.    11. ENT: chronic dizziness since WBRT, improved with meclizine Rx by ENT for concern of inner ear issue.    Deyanira Costello PA-C

## 2017-12-01 ENCOUNTER — INFUSION THERAPY VISIT (OUTPATIENT)
Dept: ONCOLOGY | Facility: CLINIC | Age: 62
End: 2017-12-01
Attending: INTERNAL MEDICINE
Payer: MEDICARE

## 2017-12-01 ENCOUNTER — APPOINTMENT (OUTPATIENT)
Dept: LAB | Facility: CLINIC | Age: 62
End: 2017-12-01
Attending: INTERNAL MEDICINE
Payer: MEDICARE

## 2017-12-01 VITALS
HEART RATE: 81 BPM | TEMPERATURE: 98 F | WEIGHT: 120.4 LBS | BODY MASS INDEX: 22.02 KG/M2 | OXYGEN SATURATION: 97 % | DIASTOLIC BLOOD PRESSURE: 82 MMHG | SYSTOLIC BLOOD PRESSURE: 117 MMHG

## 2017-12-01 DIAGNOSIS — C50.919 METASTATIC BREAST CANCER: Primary | ICD-10-CM

## 2017-12-01 DIAGNOSIS — C79.31 BRAIN METASTASIS: ICD-10-CM

## 2017-12-01 DIAGNOSIS — C50.919 CARCINOMA OF BREAST METASTATIC TO MULTIPLE SITES, UNSPECIFIED LATERALITY (H): ICD-10-CM

## 2017-12-01 LAB
BASOPHILS # BLD AUTO: 0.1 10E9/L (ref 0–0.2)
BASOPHILS NFR BLD AUTO: 1.4 %
DIFFERENTIAL METHOD BLD: ABNORMAL
EOSINOPHIL # BLD AUTO: 0 10E9/L (ref 0–0.7)
EOSINOPHIL NFR BLD AUTO: 0.3 %
ERYTHROCYTE [DISTWIDTH] IN BLOOD BY AUTOMATED COUNT: 15.8 % (ref 10–15)
HCT VFR BLD AUTO: 34.2 % (ref 35–47)
HGB BLD-MCNC: 11.1 G/DL (ref 11.7–15.7)
IMM GRANULOCYTES # BLD: 0.1 10E9/L (ref 0–0.4)
IMM GRANULOCYTES NFR BLD: 1.7 %
LYMPHOCYTES # BLD AUTO: 0.9 10E9/L (ref 0.8–5.3)
LYMPHOCYTES NFR BLD AUTO: 26.6 %
MCH RBC QN AUTO: 32.1 PG (ref 26.5–33)
MCHC RBC AUTO-ENTMCNC: 32.5 G/DL (ref 31.5–36.5)
MCV RBC AUTO: 99 FL (ref 78–100)
MONOCYTES # BLD AUTO: 0.4 10E9/L (ref 0–1.3)
MONOCYTES NFR BLD AUTO: 9.9 %
NEUTROPHILS # BLD AUTO: 2.1 10E9/L (ref 1.6–8.3)
NEUTROPHILS NFR BLD AUTO: 60.1 %
NRBC # BLD AUTO: 0 10*3/UL
NRBC BLD AUTO-RTO: 1 /100
PLATELET # BLD AUTO: 274 10E9/L (ref 150–450)
RBC # BLD AUTO: 3.46 10E12/L (ref 3.8–5.2)
WBC # BLD AUTO: 3.5 10E9/L (ref 4–11)

## 2017-12-01 PROCEDURE — 85025 COMPLETE CBC W/AUTO DIFF WBC: CPT | Performed by: INTERNAL MEDICINE

## 2017-12-01 PROCEDURE — 96413 CHEMO IV INFUSION 1 HR: CPT

## 2017-12-01 PROCEDURE — 25000128 H RX IP 250 OP 636: Mod: ZF | Performed by: INTERNAL MEDICINE

## 2017-12-01 PROCEDURE — 96375 TX/PRO/DX INJ NEW DRUG ADDON: CPT

## 2017-12-01 RX ORDER — ALBUTEROL SULFATE 0.83 MG/ML
2.5 SOLUTION RESPIRATORY (INHALATION)
Status: CANCELLED | OUTPATIENT
Start: 2017-12-01

## 2017-12-01 RX ORDER — METHYLPREDNISOLONE SODIUM SUCCINATE 125 MG/2ML
125 INJECTION, POWDER, LYOPHILIZED, FOR SOLUTION INTRAMUSCULAR; INTRAVENOUS
Status: CANCELLED
Start: 2017-12-01

## 2017-12-01 RX ORDER — HEPARIN SODIUM (PORCINE) LOCK FLUSH IV SOLN 100 UNIT/ML 100 UNIT/ML
5 SOLUTION INTRAVENOUS EVERY 8 HOURS
Status: DISCONTINUED | OUTPATIENT
Start: 2017-12-01 | End: 2017-12-01 | Stop reason: HOSPADM

## 2017-12-01 RX ORDER — DIPHENHYDRAMINE HYDROCHLORIDE 50 MG/ML
50 INJECTION INTRAMUSCULAR; INTRAVENOUS
Status: CANCELLED
Start: 2017-12-01

## 2017-12-01 RX ORDER — SODIUM CHLORIDE 9 MG/ML
1000 INJECTION, SOLUTION INTRAVENOUS CONTINUOUS PRN
Status: CANCELLED
Start: 2017-12-01

## 2017-12-01 RX ORDER — HEPARIN SODIUM (PORCINE) LOCK FLUSH IV SOLN 100 UNIT/ML 100 UNIT/ML
5 SOLUTION INTRAVENOUS ONCE
Status: COMPLETED | OUTPATIENT
Start: 2017-12-01 | End: 2017-12-01

## 2017-12-01 RX ORDER — HEPARIN SODIUM (PORCINE) LOCK FLUSH IV SOLN 100 UNIT/ML 100 UNIT/ML
5 SOLUTION INTRAVENOUS EVERY 8 HOURS
Status: CANCELLED | OUTPATIENT
Start: 2017-12-01

## 2017-12-01 RX ORDER — EPINEPHRINE 1 MG/ML
0.3 INJECTION, SOLUTION, CONCENTRATE INTRAVENOUS EVERY 5 MIN PRN
Status: CANCELLED | OUTPATIENT
Start: 2017-12-01

## 2017-12-01 RX ORDER — EPINEPHRINE 0.3 MG/.3ML
0.3 INJECTION SUBCUTANEOUS EVERY 5 MIN PRN
Status: CANCELLED | OUTPATIENT
Start: 2017-12-01

## 2017-12-01 RX ORDER — METHYLPHENIDATE HYDROCHLORIDE 5 MG/1
TABLET ORAL
Qty: 30 TABLET | Refills: 0 | Status: SHIPPED | OUTPATIENT
Start: 2017-12-01 | End: 2018-01-01

## 2017-12-01 RX ORDER — ALBUTEROL SULFATE 90 UG/1
1-2 AEROSOL, METERED RESPIRATORY (INHALATION)
Status: CANCELLED
Start: 2017-12-01

## 2017-12-01 RX ORDER — MEPERIDINE HYDROCHLORIDE 25 MG/ML
25 INJECTION INTRAMUSCULAR; INTRAVENOUS; SUBCUTANEOUS EVERY 30 MIN PRN
Status: CANCELLED | OUTPATIENT
Start: 2017-12-01

## 2017-12-01 RX ORDER — LORAZEPAM 2 MG/ML
0.5 INJECTION INTRAMUSCULAR EVERY 4 HOURS PRN
Status: CANCELLED
Start: 2017-12-01

## 2017-12-01 RX ADMIN — GEMCITABINE 1460 MG: 38 INJECTION, SOLUTION INTRAVENOUS at 14:14

## 2017-12-01 RX ADMIN — SODIUM CHLORIDE, PRESERVATIVE FREE 5 ML: 5 INJECTION INTRAVENOUS at 14:55

## 2017-12-01 RX ADMIN — SODIUM CHLORIDE, PRESERVATIVE FREE 5 ML: 5 INJECTION INTRAVENOUS at 13:05

## 2017-12-01 RX ADMIN — DEXAMETHASONE SODIUM PHOSPHATE 12 MG: 10 INJECTION, SOLUTION INTRAMUSCULAR; INTRAVENOUS at 14:10

## 2017-12-01 RX ADMIN — SODIUM CHLORIDE 250 ML: 9 INJECTION, SOLUTION INTRAVENOUS at 14:09

## 2017-12-01 ASSESSMENT — PAIN SCALES - GENERAL: PAINLEVEL: NO PAIN (1)

## 2017-12-01 NOTE — PATIENT INSTRUCTIONS
Contact Numbers    [facility] Main Line: [phone]  [facility] Triage:  [phone]    Call triage with chills and/or temperature greater than or equal to 100.5, uncontrolled nausea/vomiting, diarrhea, constipation, dizziness, shortness of breath, chest pain, bleeding, unexplained bruising, or any new/concerning symptoms, questions/concerns.     If you are having any concerning symptoms or wish to speak to a provider before your next infusion visit, please call your care coordinator or triage to notify them so we can adequately serve you.       After Hours: [phone]    If after hours, weekends, or holidays, call main hospital  and ask for Oncology doctor on call.         December 2017 Sunday Monday Tuesday Wednesday Thursday Friday Saturday                            1     [facility] DRAW    1:00 PM   (15 min.)    [facility] DRAW   [facility] Draw     [facility] ONC INFUSION 60    1:30 PM   (60 min.)    ONCOLOGY INFUSION   [facility] 2       3     4     5     6     7     8     9       10     11     12     13     14     ECH COMPLETE    2:30 PM   (60 min.)   ECHCR1   [facility] Echo     CT CHEST/ABDOMEN/PELVIS W    3:25 PM   (20 min.)   CT2   [facility] CT     MR BRAIN WWO    3:45 PM   (45 min.)   MR1   [facility] MRI 15     [facility] DRAW    1:30 PM   (15 min.)    [facility]     [facility] ONC INFUSION 120    2:00 PM   (120 min.)    ONCOLOGY INFUSION   [facility] 16       17     18     19     20     21     22     [facility] DRAW    8:00 AM   (15 min.)    [facility]     [facility] RETURN    8:15 AM   (30 min.)   Marlen Harper MD   [facility]     [facility] ONC INFUSION 60    9:00 AM   (60 min.)    ONCOLOGY INFUSION   [facility] 23       24     25     26     27     28     29     30       31                                               January 2018 Sunday Monday Tuesday Wednesday Thursday Friday Saturday        1     2     3     4     5     6       7     8     9     10     11     12     13       14     15     16     17     18     19     20       21     22     23     24     25     26     27       28     29     30     31                                 Lab Results:  Recent Results (from the past 12 hour(s))   CBC with platelets differential    Collection Time: 12/01/17  1:19 PM   Result Value Ref Range    WBC 3.5 (L) 4.0 - 11.0 10e9/L    RBC Count 3.46 (L) 3.8 - 5.2 10e12/L    Hemoglobin 11.1 (L) 11.7 - 15.7 g/dL    Hematocrit 34.2 (L) 35.0 - 47.0 %    MCV 99 78 - 100 fl    MCH 32.1 26.5 - 33.0 pg    MCHC 32.5 31.5 - 36.5 g/dL    RDW 15.8 (H) 10.0 - 15.0 %    Platelet Count 274 150 - 450 10e9/L    Diff Method Automated Method     % Neutrophils 60.1 %    % Lymphocytes 26.6 %    % Monocytes 9.9 %    % Eosinophils 0.3 %    % Basophils 1.4 %    % Immature Granulocytes 1.7 %    Nucleated RBCs 1 (H) 0 /100    Absolute Neutrophil 2.1 1.6 - 8.3 10e9/L    Absolute Lymphocytes 0.9 0.8 - 5.3 10e9/L    Absolute Monocytes 0.4 0.0 - 1.3 10e9/L    Absolute Eosinophils 0.0 0.0 - 0.7 10e9/L    Absolute Basophils 0.1 0.0 - 0.2 10e9/L    Abs Immature Granulocytes 0.1 0 - 0.4 10e9/L    Absolute Nucleated RBC 0.0

## 2017-12-01 NOTE — PROGRESS NOTES
Infusion Nursing Note:  Keren Erickson presents today for Cycle 20, day 8 Gemzar.    Patient seen by provider today: No    Note: Patient presents to clinic today feeling generally well with no questions.  Continues to have fatigue, but not abnormal from other cycles.    Intravenous Access:  Implanted Port.    Treatment Conditions:  Lab Results   Component Value Date    HGB 11.1 12/01/2017     Lab Results   Component Value Date    WBC 3.5 12/01/2017      Lab Results   Component Value Date    ANEU 2.1 12/01/2017     Lab Results   Component Value Date     12/01/2017      Results reviewed, labs MET treatment parameters, ok to proceed with treatment.    Post Infusion Assessment:  Patient tolerated infusion without incident.  Blood return noted pre and post infusion.  Site patent and intact, free from redness, edema or discomfort.  No evidence of extravasations.  Access discontinued per protocol.    Discharge Plan:   Patient declined prescription refills.  Discharge instructions reviewed with: Patient.  Patient and/or family verbalized understanding of discharge instructions and all questions answered.  Copy of AVS reviewed with patient and/or family.  Patient will return 12/15/2017 for next appointment.  Departure Mode: Ambulatory.    Britney Ball RN

## 2017-12-01 NOTE — MR AVS SNAPSHOT
After Visit Summary   12/1/2017    Keren Erickson    MRN: [id number]           Patient Information     Date Of Birth          1955        Visit Information        Provider Department      12/1/2017 1:30 PM  22 ATC;  ONCOLOGY INFUSION [facility]        Today's Diagnoses     Metastatic breast cancer (H)    -  1    Brain metastasis (H)        Carcinoma of breast metastatic to multiple sites, unspecified laterality (H)          Care Instructions    Contact Numbers    [facility] Main Line: [phone]  [facility] Triage:  [phone]    Call triage with chills and/or temperature greater than or equal to 100.5, uncontrolled nausea/vomiting, diarrhea, constipation, dizziness, shortness of breath, chest pain, bleeding, unexplained bruising, or any new/concerning symptoms, questions/concerns.     If you are having any concerning symptoms or wish to speak to a provider before your next infusion visit, please call your care coordinator or triage to notify them so we can adequately serve you.       After Hours: [phone]    If after hours, weekends, or holidays, call main hospital  and ask for Oncology doctor on call.         December 2017 Sunday Monday Tuesday Wednesday Thursday Friday Saturday                            1     [facility] DRAW    1:00 PM   (15 min.)   [facility]   [facility] Lab Draw     [facility] ONC INFUSION 60    1:30 PM   (60 min.)    ONCOLOGY INFUSION   [facility] 2       3     4     5     6     7     8     9       10     11     12     13     14     ECH COMPLETE    2:30 PM   (60 min.)   ECHCR1   [facility]     CT CHEST/ABDOMEN/PELVIS W    3:25 PM   (20 min.)   CT2   [facility] CT     MR BRAIN WWO    3:45 PM   (45 min.)   MR1   [facility] MRI 15     [facility] DRAW    1:30 PM   (15 min.)   [facility] D[facility]   [facility] Lab Draw     [facility] ONC INFUSION 120    2:00 PM   (120  min.)    ONCOLOGY INFUSION   [facility] 16       17     18     19     20     21     22     [facility] DRAW    8:00 AM   (15 min.)   [facility] DRAW   [facility] Lab Draw     [facility] RETURN    8:15 AM   (30 min.)   Marlen Harper MD   [facility] ONC INFUSION 60    9:00 AM   (60 min.)    ONCOLOGY INFUSION   [facility] 23       24     25     26     27     28     29     30       31 January 2018 Sunday Monday Tuesday Wednesday Thursday Friday Saturday        1     2     3     4     5     6       7     8     9     10     11     12     13       14     15     16     17     18     19     20       21     22     23     24     25     26     27       28     29     30     31                                 Lab Results:  Recent Results (from the past 12 hour(s))   CBC with platelets differential    Collection Time: 12/01/17  1:19 PM   Result Value Ref Range    WBC 3.5 (L) 4.0 - 11.0 10e9/L    RBC Count 3.46 (L) 3.8 - 5.2 10e12/L    Hemoglobin 11.1 (L) 11.7 - 15.7 g/dL    Hematocrit 34.2 (L) 35.0 - 47.0 %    MCV 99 78 - 100 fl    MCH 32.1 26.5 - 33.0 pg    MCHC 32.5 31.5 - 36.5 g/dL    RDW 15.8 (H) 10.0 - 15.0 %    Platelet Count 274 150 - 450 10e9/L    Diff Method Automated Method     % Neutrophils 60.1 %    % Lymphocytes 26.6 %    % Monocytes 9.9 %    % Eosinophils 0.3 %    % Basophils 1.4 %    % Immature Granulocytes 1.7 %    Nucleated RBCs 1 (H) 0 /100    Absolute Neutrophil 2.1 1.6 - 8.3 10e9/L    Absolute Lymphocytes 0.9 0.8 - 5.3 10e9/L    Absolute Monocytes 0.4 0.0 - 1.3 10e9/L    Absolute Eosinophils 0.0 0.0 - 0.7 10e9/L    Absolute Basophils 0.1 0.0 - 0.2 10e9/L    Abs Immature Granulocytes 0.1 0 - 0.4 10e9/L    Absolute Nucleated RBC 0.0                Follow-ups after your visit        Your next 10 appointments already scheduled     Dec 14, 2017  2:30 PM CST   Ech Complete with KALEIGHCR1    [facility] Echo ([facility])    [address]  3rd Floor  [facility] [id number] [phone]           1. Please bring or wear a comfortable two-piece outfit. 2. You may eat, drink and take your normal medicines. 3. For any questions that cannot be answered, please contact the ordering physician            Dec 14, 2017  3:40 PM CST   (Arrive by 3:25 PM)   CT CHEST/ABDOMEN/PELVIS W CONTRAST with UCCT2   [facility] CT ([facility])    [address]  [address] [facility] [id number] [phone]           Please bring any scans or X-rays taken at other hospitals, if similar tests were done. Also bring a list of your medicines, including vitamins, minerals and over-the-counter drugs. It is safest to leave personal items at home.  Be sure to tell your doctor:   If you have any allergies.   If there s any chance you are pregnant.   If you are breastfeeding.   If you have any special needs.  You may have contrast for this exam. To prepare:   Do not eat or drink for 2 hours before your exam. If you need to take medicine, you may take it with small sips of water. (We may ask you to take liquid medicine as well.)   The day before your exam, drink extra fluids at least six 8-ounce glasses (unless your doctor tells you to restrict your fluids).  Patients over 70 or patients with diabetes or kidney problems:   If you haven t had a blood test (creatinine test) within the last 30 days, go to your clinic or Diagnostic Imaging Department for this test.  If you have diabetes:   If your kidney function is normal, continue taking your metformin (Avandamet, Glucophage, Glucovance, Metaglip) on the day of your exam.   If your kidney function is abnormal, wait 48 hours before restarting this medicine.  You will have oral contrast for this exam:   You will drink the contrast at home. Get this from your clinic or Diagnostic Imaging Department. Please  follow the directions given.  Please wear loose clothing, such as a sweat suit or jogging clothes. Avoid snaps, zippers and other metal. We may ask you to undress and put on a hospital gown.  If you have any questions, please call the Imaging Department where you will have your exam.            Dec 14, 2017  4:00 PM CST   (Arrive by 3:45 PM)   MR BRAIN W/O & W CONTRAST with UC1   [facility] MRI ([facility])    [address] [address] [phone]           Take your medicines as usual, unless your doctor tells you not to. Bring a list of your current medicines to your exam (including vitamins, minerals and over-the-counter drugs).  You will be given intravenous contrast for this exam. To prepare:   The day before your exam, drink extra fluids at least six 8-ounce glasses (unless your doctor tells you to restrict your fluids).   Have a blood test (creatinine test) within 30 days of your exam. Go to your clinic or Diagnostic Imaging Department for this test.  The MRI machine uses a strong magnet. Please wear clothes without metal (snaps, zippers). A sweatsuit works well, or we may give you a hospital gown.  Please remove any body piercings and hair extensions before you arrive. You will also remove watches, jewelry, hairpins, wallets, dentures, partial dental plates and hearing aids. You may wear contact lenses, and you may be able to wear your rings. We have a safe place to keep your personal items, but it is safer to leave them at home.   **IMPORTANT** THE INSTRUCTIONS BELOW ARE ONLY FOR THOSE PATIENTS WHO HAVE BEEN TOLD THEY WILL RECEIVE SEDATION OR GENERAL ANESTHESIA DURING THEIR MRI PROCEDURE:  IF YOU WILL RECEIVE SEDATION (take medicine to help you relax during your exam):   You must get the medicine from your doctor before you arrive. Bring the medicine to the exam. Do not take it at home.   Arrive one hour early. Bring someone who can  take you home after the test. Your medicine will make you sleepy. After the exam, you may not drive, take a bus or take a taxi by yourself.   No eating 8 hours before your exam. You may have clear liquids up until 4 hours before your exam. (Clear liquids include water, clear tea, black coffee and fruit juice without pulp.)  IF YOU WILL RECEIVE ANESTHESIA (be asleep for your exam):   Arrive 1 1/2 hours early. Bring someone who can take you home after the test. You may not drive, take a bus or take a taxi by yourself.   No eating 8 hours before your exam. You may have clear liquids up until 4 hours before your exam. (Clear liquids include water, clear tea, black coffee and fruit juice without pulp.)  Please call the Imaging Department at your exam site with any questions.            Dec 15, 2017  1:30 PM CST   [facility] Draw with [facility] DRAW   [facility] ([facility])    [address] [place] [address]   [phone]            Dec 15, 2017  2:00 PM CST   Infusion 120 with UC ONCOLOGY INFUSION, UC 11 ATC   [facility] ([facility])    [address] [place] [address]   [phone]            Dec 22, 2017  8:00 AM CST   [facility] Lab Draw with UC [facility] DRAW   [facility] ([facility])    [address] [place] [address]   [phone]            Dec 22, 2017  8:30 AM CST   (Arrive by 8:15 AM)   Return Visit with Marlen Harper MD   [facility] ([facility])    [address] [place] [address]   [phone]            Dec 22, 2017  9:00 AM CST   Infusion 60 with UC ONCOLOGY INFUSION, UC 29 ATC   [facility] ([facility])    [address] [place] [address]    [phone]              Who to contact     If you have questions or need follow up information about today's clinic visit or your schedule please contact [facility] directly at [phone].  Normal or non-critical lab and imaging results will be communicated to you by MyChart, letter or phone within 4 business days after the clinic has received the results. If you do not hear from us within 7 days, please contact the clinic through MyChart or phone. If you have a critical or abnormal lab result, we will notify you by phone as soon as possible.  Submit refill requests through [employer] or call your pharmacy and they will forward the refill request to us. Please allow 3 business days for your refill to be completed.          Additional Information About Your Visit        [employer] Information     [employer] gives you secure access to your electronic health record. If you see a primary care provider, you can also send messages to your care team and make appointments. If you have questions, please call your primary care clinic.  If you do not have a primary care provider, please call [phone] and they will assist you.        Care EveryWhere ID     This is your Care EveryWhere ID. This could be used by other organizations to access your [facility] medical records  [id number]        Your Vitals Were     Pulse Temperature Pulse Oximetry BMI (Body Mass Index)          81 98  F (36.7  C) (Oral) 97% 22.02 kg/m2         Blood Pressure from Last 3 Encounters:   12/01/17 117/82   11/24/17 118/80   11/03/17 117/80    Weight from Last 3 Encounters:   12/01/17 54.6 kg (120 lb 6.4 oz)   11/24/17 54.3 kg (119 lb 11.2 oz)   11/03/17 55.7 kg (122 lb 14.4 oz)              We Performed the Following     CBC with platelets differential        Primary Care Provider Office Phone # Fax #    Kelly Hart [phone]       [address] [facility] [address]        Equal Access to Services      CLAUDIA BOND : Hadii aad ku laura Joshi, waaxda luqadaha, qaybta kaalmada adefrancheska, freddie kendrick doriestephen ha katelinradha escobedo . So [facility] [phone].    ATENCIÓN: Si habla español, tiene a ramires disposición servicios gratuitos de asistencia lingüística. Jossame al [phone].    We comply with applicable federal civil rights laws and [place] laws. We do not discriminate on the basis of race, color, national origin, age, disability, sex, sexual orientation, or gender identity.            Thank you!     Thank you for choosing [facility]  for your care. Our goal is always to provide you with excellent care. Hearing back from our patients is one way we can continue to improve our services. Please take a few minutes to complete the written survey that you may receive in the mail after your visit with us. Thank you!             Your Updated Medication List - Protect others around you: Learn how to safely use, store and throw away your medicines at www.disposemymeds.org.          This list is accurate as of: 12/1/17  2:03 PM.  Always use your most recent med list.                   Brand Name Dispense Instructions for use Diagnosis    ALEVE PO      Take 220 mg by mouth daily        desmopressin 0.2 MG tablet    DDAVP    45 tablet    Take  1/2 tablet once daily by mouth    Diabetes insipidus (H)       meclizine 25 MG tablet    ANTIVERT    30 tablet    Take 1 tablet (25 mg) by mouth 3 times daily as needed for dizziness    BPPV (benign paroxysmal positional vertigo), right       methylphenidate 5 MG tablet    RITALIN    30 tablet    Take 5 mg once daily as needed for fatigue    Carcinoma of breast metastatic to multiple sites, unspecified laterality (H)       Multi-vitamin Tabs tablet      Take 1 tablet by mouth daily        omeprazole 20 MG tablet     30 tablet    Take 1 tablet (20 mg) by mouth daily    Gastroesophageal reflux disease without esophagitis       prochlorperazine 10 MG tablet     COMPAZINE    90 tablet    Take 1 tablet (10 mg) by mouth every 6 hours as needed for nausea or vomiting    Nausea       VITAMIN D (CHOLECALCIFEROL) PO      Take 5,000 Units by mouth daily        VITAMIN E BLEND PO      Take by mouth daily

## 2017-12-01 NOTE — NURSING NOTE
Chief Complaint   Patient presents with     Port Draw     labs drawn via port by RN     /82 (BP Location: Right arm, Patient Position: Chair, Cuff Size: Adult Regular)  Pulse 81  Temp 98  F (36.7  C) (Oral)  Wt 54.6 kg (120 lb 6.4 oz)  SpO2 97%  BMI 22.02 kg/m2    Vitals taken. Port accessed by RN. Labs collected and sent. Line flushed with NS & Heparin. Pt tolerated well. Pt checked in for next appointment.    Abida Sam RN

## 2017-12-03 ENCOUNTER — HEALTH MAINTENANCE LETTER (OUTPATIENT)
Age: 62
End: 2017-12-03

## 2017-12-14 ENCOUNTER — RADIANT APPOINTMENT (OUTPATIENT)
Dept: CT IMAGING | Facility: CLINIC | Age: 62
End: 2017-12-14
Attending: INTERNAL MEDICINE
Payer: MEDICARE

## 2017-12-14 ENCOUNTER — RADIANT APPOINTMENT (OUTPATIENT)
Dept: CARDIOLOGY | Facility: CLINIC | Age: 62
End: 2017-12-14
Attending: PHYSICIAN ASSISTANT
Payer: MEDICARE

## 2017-12-14 ENCOUNTER — ONCOLOGY VISIT (OUTPATIENT)
Dept: ONCOLOGY | Facility: CLINIC | Age: 62
End: 2017-12-14
Attending: PHYSICIAN ASSISTANT
Payer: MEDICARE

## 2017-12-14 ENCOUNTER — RADIANT APPOINTMENT (OUTPATIENT)
Dept: MRI IMAGING | Facility: CLINIC | Age: 62
End: 2017-12-14
Attending: INTERNAL MEDICINE
Payer: MEDICARE

## 2017-12-14 VITALS
BODY MASS INDEX: 22.03 KG/M2 | HEART RATE: 80 BPM | OXYGEN SATURATION: 100 % | RESPIRATION RATE: 17 BRPM | HEIGHT: 62 IN | TEMPERATURE: 97.5 F | SYSTOLIC BLOOD PRESSURE: 130 MMHG | WEIGHT: 119.7 LBS | DIASTOLIC BLOOD PRESSURE: 84 MMHG

## 2017-12-14 DIAGNOSIS — C50.919 CARCINOMA OF BREAST METASTATIC TO MULTIPLE SITES, UNSPECIFIED LATERALITY (H): ICD-10-CM

## 2017-12-14 DIAGNOSIS — C50.919 METASTATIC BREAST CANCER: ICD-10-CM

## 2017-12-14 DIAGNOSIS — Z51.81 ENCOUNTER FOR THERAPEUTIC DRUG MONITORING: ICD-10-CM

## 2017-12-14 DIAGNOSIS — I26.99 OTHER ACUTE PULMONARY EMBOLISM WITHOUT ACUTE COR PULMONALE (H): Primary | ICD-10-CM

## 2017-12-14 LAB — RADIOLOGIST FLAGS: ABNORMAL

## 2017-12-14 PROCEDURE — 99212 OFFICE O/P EST SF 10 MIN: CPT | Mod: ZF

## 2017-12-14 PROCEDURE — 99215 OFFICE O/P EST HI 40 MIN: CPT | Mod: ZP | Performed by: PHYSICIAN ASSISTANT

## 2017-12-14 RX ORDER — GADOBUTROL 604.72 MG/ML
7.5 INJECTION INTRAVENOUS ONCE
Status: COMPLETED | OUTPATIENT
Start: 2017-12-14 | End: 2017-12-14

## 2017-12-14 RX ORDER — IOPAMIDOL 755 MG/ML
73 INJECTION, SOLUTION INTRAVASCULAR ONCE
Status: COMPLETED | OUTPATIENT
Start: 2017-12-14 | End: 2017-12-14

## 2017-12-14 RX ADMIN — GADOBUTROL 5.5 ML: 604.72 INJECTION INTRAVENOUS at 16:40

## 2017-12-14 RX ADMIN — IOPAMIDOL 73 ML: 755 INJECTION, SOLUTION INTRAVASCULAR at 15:49

## 2017-12-14 ASSESSMENT — PAIN SCALES - GENERAL: PAINLEVEL: NO PAIN (0)

## 2017-12-14 NOTE — DISCHARGE INSTRUCTIONS
MRI Contrast Discharge Instructions    The IV contrast you received today will pass out of your body in your  urine. This will happen in the next 24 hours. You will not feel this process.  Your urine will not change color.    Drink at least 4 extra glasses of water or juice today (unless your doctor  has restricted your fluids). This reduces the stress on your kidneys.  You may take your regular medicines.    If you are on dialysis: It is best to have dialysis today.    If you have a reaction: Most reactions happen right away. If you have  any new symptoms after leaving the hospital (such as hives or swelling),  call your hospital at the correct number below. Or call your family doctor.  If you have breathing distress or wheezing, call 911.    Special instructions: ***    I have read and understand the above information.    Signature:______________________________________ Date:___________    Staff:__________________________________________ Date:___________     Time:__________    [facility] Departments:    ___[place]: [phone]  ___[facility]: [phone]  ___[place]: [phone] ___[facility]: [phone]  ___[facility]: [phone]  ___[facility]: [phone]  ___[phone]  ___[facility]: [phone]  ___[phone]

## 2017-12-14 NOTE — LETTER
12/14/2017      RE: Keren Erickson  [address] [address]       HEMATOLOGY/ONCOLOGY PROGRESS NOTE  Dec 14, 2017    REASON FOR VISIT: add-on incidental PE    DIAGNOSIS:   The patient is a 60-year-old female who presented to the hospital initially in 09/2013 with complaints of dyspnea. Workup revealed a large pericardial effusion and pleural effusion. Physical examination revealed a large untreated left breast mass encompassing the entire left breast. Biopsies revealed these were ER, RI and HER2-positive breast cancer. She had a thoracentesis and pericardial sclerosis performed. She was originally on Arimidex and Herceptin. In 01/2014, she was switched to tamoxifen and Herceptin due to arthralgias, and she ultimately developed progressive disease and was switched to Faslodex and Herceptin. In 01/2015, she was found to have progressive disease and was switched to T-DM1.     Initially when she was seen in late 02/2015, she complained of some V5 sensory deficit. This was subjective. I was not able to elicit this on examination. This persisted and further workup was performed with a brain MRI. This revealed what was initially thought to be 3 punctate brain metastases. She originally saw Radiation Oncology and Neurosurgery as well as underwent a lumbar puncture. Cytology from the lumbar puncture showed no evidence of leptomeningeal disease. She was treated with Gamma Knife radiation to 6 lesions, performed on 04/16/2015.  She initiated therapy with TDM1 in January 2015 and continued this through 6/26/2015 with overall stable disease. She has since been on a break from treatment. The patient presented earlier this month with recurrent shortness of breath.  Imaging revealed recurrent right-sided pleural effusion.  She has since undergone thoracentesis with cytology pending.  Clinically, however, there is evidence of disease progression. PET scan performed on 11/25/2015 demonstrated progression of  "disease at multiple sites. She restarted TDM1 on 11/27/2015, however experienced a mild transfusion reaction with shortness of breath and chest tightness, resolved upon stopping TDM1 and 125 mg of SoluMedrol. She had progression of disease on repeat imaging 2/17/2016, as well as new brain metastases. She started TDM1 with Perjeta on 3/4/2016. She underwent gamma knife to brain mets on 3/8/16.       In late May, she was found to have progressive brain metastases.  She received whole brain radiation.  She had a follow up brain MRI August 2016 that was stable.     In September 2016, she enrolled on [place]  trial and was randomized to the standard of care arm/Physician's Choice; started on gemzar and herceptin. She was recently hospitalized 1/27-2/3/17 for presumed HCAP. Trial was suspended. She continued on gemzar and heceptin with stable disease. Last restaging was 4/7/17 and stable. Gemzar was placed on hold from 4/10/2017 through 6/22/17 so that she could recover from pneumonia.    INTERVAL HISTORY:  Dominga presents after her restaging CT CAP showed several new PEs in R lung. She had otherwise stable disease on her scan. She notes her stamina has been worse recently in the last few weeks. She feels like she becomes SOB with walking to and from the bathroom. She is still exercising but she is not able to gain stamina. She has some chest tightness when exercising her arms at times. No pleuritic pain or tachycardia. She has been intermittently dizzy--this does not sound new. No leg swelling or pain.     PHYSICAL EXAMINATION:     /84  Pulse 80  Temp 97.5  F (36.4  C) (Oral)  Resp 17  Ht 1.575 m (5' 2\")  Wt 54.3 kg (119 lb 11.2 oz)  SpO2 100%  Breastfeeding? No  BMI 21.89 kg/m2    Wt Readings from Last 4 Encounters:   12/14/17 54.3 kg (119 lb 11.2 oz)   12/01/17 54.6 kg (120 lb 6.4 oz)   11/24/17 54.3 kg (119 lb 11.2 oz)   11/03/17 55.7 kg (122 lb 14.4 oz)     Constitutional: Alert, oriented, " cachectic female in no visible distress. In a wheelchair  Eyes: Anicteric sclerae.    ENT/Mouth: OM moist and pink without lesions or thrush.    CV: RRR, no murmurs appreciated.  Resp: CTAB slightly diminished R base  Extremities: No lower extremity edema appreciated.    LABS: Nothing new today, reviewed from 12/1    Imaging: CT CAP 12/14   IMPRESSION:   1. New lobar and segmental pulmonary emboli in the right lung.  Unchanged dilation of the right atrium and right ventricle. No  findings to suggest acute or worsening right heart strain.  2. Unchanged subcentimeter nodule in the medial right breast.  Correlation with mammographic findings recommended.  3. No new pulmonary nodules, and no lymphadenopathy in the chest,  abdomen, or pelvis.  4. Stable diffuse osseous skull sclerotic metastatic lesions including  old rib fractures.    IMPRESSION/PLAN:  Dominga is a 62-year-old female with history of metastatic ER-positive, HER-2 positive breast cancer, with involvement of the bone, history of pleural effusions treated with pleurodesis and PleurX placement, and with treated brain metastases, previously with stable disease on TDM1, with progressive disease after treatment break.  She was started on [place]  clinical trial and randomized to physician's choice, and started on Gemzar/Herceptin on 9/29/16.    1. Breast cancer:  Continues on Herceptin/Gemzar. Her CT CAP today showed stable disease. Brain MRI is pending. She will return tomorrow for chemotherapy and see Dr. Harper next week.    2. R lobar and segmental pulmonary emboli: Okay to treat outpatient given no hypoxia, hypotension, or tachycardia. She had an echo today showing stable HF and no R heart strain. Rx for Lovenox 1 mg/kg BID-->50 mg BID based on her weight. She will have to waste 0.1 mL. She was given her first injection and teaching from myself. Discussed treatment timeline will be indefinite with metastatic disease. Discussed calling us with any  excessive bleeding or spontaneous bruising to check a level.     Greater than 40 minutes was spent with this patient with greater than 20 minutes spent in counseling and coordination of care.    Esmer Oconnor PA-C    [facility] [address] [address] [phone]

## 2017-12-14 NOTE — MR AVS SNAPSHOT
After Visit Summary   12/14/2017    Keren Erickson    MRN: [id number]           Patient Information     Date Of Birth          1955        Visit Information        Provider Department      12/14/2017 5:50 PM Esmer Oconnor PA Formerly [facility]        Today's Diagnoses     Other acute pulmonary embolism without acute cor pulmonale (H)    -  1       Follow-ups after your visit        Your next 10 appointments already scheduled     Dec 15, 2017  1:30 PM CST   [facility] Draw with [facility] DRAW   [facility] Draw ([facility])    [address] [place] [id number]   [phone]            Dec 15, 2017  2:00 PM CST   Infusion 120 with UC ONCOLOGY INFUSION, UC 11 ATC   Formerly [facility] ([facility])    [address] [place] [id number] [phone]            Dec 22, 2017  8:00 AM CST   [facility] Lab Draw with [facility] LAB DRAW   [facility] Draw ([facility])    [address] [place] [id number] [phone]            Dec 22, 2017  8:30 AM CST   (Arrive by 8:15 AM)   Return Visit with Marlen Harper MD   Formerly [facility] ([facility])    [address] [place] [id number] [phone]            Dec 22, 2017  9:00 AM CST   Infusion 60 with UC ONCOLOGY INFUSION, UC 29 ATC   Formerly [facility] ([facility])    [address] [place] [id number] [phone]              Who to contact     If you have questions or need follow up information about today's clinic visit or your schedule please contact [facility] directly at [phone].  Normal or non-critical lab and imaging results will be communicated to you by MyChart, letter or phone  "within 4 business days after the clinic has received the results. If you do not hear from us within 7 days, please contact the clinic through [employer] or phone. If you have a critical or abnormal lab result, we will notify you by phone as soon as possible.  Submit refill requests through [employer] or call your pharmacy and they will forward the refill request to us. Please allow 3 business days for your refill to be completed.          Additional Information About Your Visit        [employer] Information     [employer] gives you secure access to your electronic health record. If you see a primary care provider, you can also send messages to your care team and make appointments. If you have questions, please call your primary care clinic.  If you do not have a primary care provider, please call [phone] and they will assist you.        Care EveryWhere ID     This is your Care EveryWhere ID. This could be used by other organizations to access your [place] medical records  [id number]        Your Vitals Were     Pulse Temperature Respirations Height Pulse Oximetry Breastfeeding?    80 97.5  F (36.4  C) (Oral) 17 1.575 m (5' 2\") 100% No    BMI (Body Mass Index)                   21.89 kg/m2            Blood Pressure from Last 3 Encounters:   12/14/17 130/84   12/01/17 117/82   11/24/17 118/80    Weight from Last 3 Encounters:   12/14/17 54.3 kg (119 lb 11.2 oz)   12/01/17 54.6 kg (120 lb 6.4 oz)   11/24/17 54.3 kg (119 lb 11.2 oz)              Today, you had the following     No orders found for display         Today's Medication Changes          These changes are accurate as of: 12/14/17  6:14 PM.  If you have any questions, ask your nurse or doctor.               Start taking these medicines.        Dose/Directions    enoxaparin 60 MG/0.6ML injection   Commonly known as:  LOVENOX   Used for:  Other acute pulmonary embolism without acute cor pulmonale (H)   Started by:  Esmer Oconnor PA        Dose:  60 mg "   Inject 0.6 mLs (60 mg) Subcutaneous 2 times daily   Quantity:  60 Syringe   Refills:  3            Where to get your medicines      These medications were sent to [place], [place] - [address]  [address], [facility] [address]    Hours:  TRANSPLANT PHONE NUMBER [phone] Phone:  [phone]     enoxaparin 60 MG/0.6ML injection                Primary Care Provider Office Phone # Fax #    Kelly Bg Hart [phone]       [address] [facility] [address]        Equal Access to Services     [facility] : Hadii aad ku hadasho Soomaali, waaxda luqadaha, qaybta kaalmada adeegyada, freddie oshean adeirineo escobedo . So [facility] [phone].    ATENCIÓN: Si habla español, tiene a ramires disposición servicios gratuitos de asistencia lingüística. Llame al [phone].    We comply with applicable federal civil rights laws and [place] laws. We do not discriminate on the basis of race, color, national origin, age, disability, sex, sexual orientation, or gender identity.            Thank you!     Thank you for choosing [facility]  for your care. Our goal is always to provide you with excellent care. Hearing back from our patients is one way we can continue to improve our services. Please take a few minutes to complete the written survey that you may receive in the mail after your visit with us. Thank you!             Your Updated Medication List - Protect others around you: Learn how to safely use, store and throw away your medicines at www.disposemymeds.org.          This list is accurate as of: 12/14/17  6:14 PM.  Always use your most recent med list.                   Brand Name Dispense Instructions for use Diagnosis    ALEVE PO      Take 220 mg by mouth daily        desmopressin 0.2 MG tablet    DDAVP    45 tablet    Take  1/2 tablet once daily by mouth    Diabetes insipidus (H)       enoxaparin 60  MG/0.6ML injection    LOVENOX    60 Syringe    Inject 0.6 mLs (60 mg) Subcutaneous 2 times daily    Other acute pulmonary embolism without acute cor pulmonale (H)       meclizine 25 MG tablet    ANTIVERT    30 tablet    Take 1 tablet (25 mg) by mouth 3 times daily as needed for dizziness    BPPV (benign paroxysmal positional vertigo), right       methylphenidate 5 MG tablet    RITALIN    30 tablet    Take 5 mg once daily as needed for fatigue    Carcinoma of breast metastatic to multiple sites, unspecified laterality (H)       Multi-vitamin Tabs tablet      Take 1 tablet by mouth daily        omeprazole 20 MG tablet     30 tablet    Take 1 tablet (20 mg) by mouth daily    Gastroesophageal reflux disease without esophagitis       prochlorperazine 10 MG tablet    COMPAZINE    90 tablet    Take 1 tablet (10 mg) by mouth every 6 hours as needed for nausea or vomiting    Nausea       VITAMIN D (CHOLECALCIFEROL) PO      Take 5,000 Units by mouth daily        VITAMIN E BLEND PO      Take by mouth daily

## 2017-12-14 NOTE — NURSING NOTE
"Oncology Rooming Note    December 14, 2017 5:20 PM   Keren Erickson is a 62 year old female who presents for:    Chief Complaint   Patient presents with     Oncology Clinic Visit     return patient visit for CT scan follow up related to breast cancer     Initial Vitals: /84  Pulse 80  Temp 97.5  F (36.4  C) (Oral)  Resp 17  Ht 1.575 m (5' 2\")  Wt 54.3 kg (119 lb 11.2 oz)  SpO2 100%  Breastfeeding? No  BMI 21.89 kg/m2 Estimated body mass index is 21.89 kg/(m^2) as calculated from the following:    Height as of this encounter: 1.575 m (5' 2\").    Weight as of this encounter: 54.3 kg (119 lb 11.2 oz). Body surface area is 1.54 meters squared.  No Pain (0) Comment: Data Unavailable   No LMP recorded. Patient is postmenopausal.  Allergies reviewed: Yes  Medications reviewed: Yes    Medications: Medication refills not needed today.  Pharmacy name entered into [facility]: [facility] [place], [place] - [id number]    Clinical concerns: right rib soreness and general pain  ernestina was notified.    6 minutes for nursing intake (face to face time)     Leigh Ann Li CMA              "

## 2017-12-14 NOTE — DISCHARGE INSTRUCTIONS

## 2017-12-15 ENCOUNTER — INFUSION THERAPY VISIT (OUTPATIENT)
Dept: ONCOLOGY | Facility: CLINIC | Age: 62
End: 2017-12-15
Attending: INTERNAL MEDICINE
Payer: MEDICARE

## 2017-12-15 ENCOUNTER — APPOINTMENT (OUTPATIENT)
Dept: LAB | Facility: CLINIC | Age: 62
End: 2017-12-15
Attending: INTERNAL MEDICINE
Payer: MEDICARE

## 2017-12-15 VITALS
OXYGEN SATURATION: 95 % | DIASTOLIC BLOOD PRESSURE: 82 MMHG | WEIGHT: 117 LBS | SYSTOLIC BLOOD PRESSURE: 120 MMHG | RESPIRATION RATE: 18 BRPM | HEART RATE: 83 BPM | BODY MASS INDEX: 21.4 KG/M2 | TEMPERATURE: 97.6 F

## 2017-12-15 DIAGNOSIS — C50.919 METASTATIC BREAST CANCER: Primary | ICD-10-CM

## 2017-12-15 DIAGNOSIS — C50.919 CARCINOMA OF BREAST METASTATIC TO MULTIPLE SITES, UNSPECIFIED LATERALITY (H): ICD-10-CM

## 2017-12-15 DIAGNOSIS — C79.31 BRAIN METASTASIS: ICD-10-CM

## 2017-12-15 LAB
ALBUMIN SERPL-MCNC: 3.1 G/DL (ref 3.4–5)
ALP SERPL-CCNC: 109 U/L (ref 40–150)
ALT SERPL W P-5'-P-CCNC: 14 U/L (ref 0–50)
ANION GAP SERPL CALCULATED.3IONS-SCNC: 9 MMOL/L (ref 3–14)
AST SERPL W P-5'-P-CCNC: 18 U/L (ref 0–45)
BASOPHILS # BLD AUTO: 0.1 10E9/L (ref 0–0.2)
BASOPHILS NFR BLD AUTO: 0.7 %
BILIRUB SERPL-MCNC: 0.4 MG/DL (ref 0.2–1.3)
BUN SERPL-MCNC: 9 MG/DL (ref 7–30)
CALCIUM SERPL-MCNC: 8.9 MG/DL (ref 8.5–10.1)
CANCER AG27-29 SERPL-ACNC: 23 U/ML (ref 0–39)
CEA SERPL-MCNC: 1.5 UG/L (ref 0–2.5)
CHLORIDE SERPL-SCNC: 106 MMOL/L (ref 94–109)
CO2 SERPL-SCNC: 26 MMOL/L (ref 20–32)
CREAT SERPL-MCNC: 0.63 MG/DL (ref 0.52–1.04)
DIFFERENTIAL METHOD BLD: ABNORMAL
EOSINOPHIL # BLD AUTO: 0 10E9/L (ref 0–0.7)
EOSINOPHIL NFR BLD AUTO: 0.1 %
ERYTHROCYTE [DISTWIDTH] IN BLOOD BY AUTOMATED COUNT: 15.9 % (ref 10–15)
GFR SERPL CREATININE-BSD FRML MDRD: >90 ML/MIN/1.7M2
GLUCOSE SERPL-MCNC: 91 MG/DL (ref 70–99)
HCT VFR BLD AUTO: 34.8 % (ref 35–47)
HGB BLD-MCNC: 11 G/DL (ref 11.7–15.7)
IMM GRANULOCYTES # BLD: 0.1 10E9/L (ref 0–0.4)
IMM GRANULOCYTES NFR BLD: 0.9 %
LYMPHOCYTES # BLD AUTO: 1 10E9/L (ref 0.8–5.3)
LYMPHOCYTES NFR BLD AUTO: 14 %
MCH RBC QN AUTO: 31.6 PG (ref 26.5–33)
MCHC RBC AUTO-ENTMCNC: 31.6 G/DL (ref 31.5–36.5)
MCV RBC AUTO: 100 FL (ref 78–100)
MONOCYTES # BLD AUTO: 1 10E9/L (ref 0–1.3)
MONOCYTES NFR BLD AUTO: 13.9 %
NEUTROPHILS # BLD AUTO: 4.8 10E9/L (ref 1.6–8.3)
NEUTROPHILS NFR BLD AUTO: 70.4 %
NRBC # BLD AUTO: 0 10*3/UL
NRBC BLD AUTO-RTO: 0 /100
PLATELET # BLD AUTO: 506 10E9/L (ref 150–450)
POTASSIUM SERPL-SCNC: 3.5 MMOL/L (ref 3.4–5.3)
PROT SERPL-MCNC: 6.9 G/DL (ref 6.8–8.8)
RBC # BLD AUTO: 3.48 10E12/L (ref 3.8–5.2)
SODIUM SERPL-SCNC: 140 MMOL/L (ref 133–144)
WBC # BLD AUTO: 6.9 10E9/L (ref 4–11)

## 2017-12-15 PROCEDURE — 85025 COMPLETE CBC W/AUTO DIFF WBC: CPT | Performed by: PHYSICIAN ASSISTANT

## 2017-12-15 PROCEDURE — 82378 CARCINOEMBRYONIC ANTIGEN: CPT | Performed by: PHYSICIAN ASSISTANT

## 2017-12-15 PROCEDURE — 25000128 H RX IP 250 OP 636: Mod: ZF | Performed by: PHYSICIAN ASSISTANT

## 2017-12-15 PROCEDURE — 86300 IMMUNOASSAY TUMOR CA 15-3: CPT | Performed by: PHYSICIAN ASSISTANT

## 2017-12-15 PROCEDURE — 36415 COLL VENOUS BLD VENIPUNCTURE: CPT

## 2017-12-15 PROCEDURE — 96417 CHEMO IV INFUS EACH ADDL SEQ: CPT

## 2017-12-15 PROCEDURE — 96413 CHEMO IV INFUSION 1 HR: CPT

## 2017-12-15 PROCEDURE — 25000128 H RX IP 250 OP 636: Mod: ZF | Performed by: INTERNAL MEDICINE

## 2017-12-15 PROCEDURE — 36593 DECLOT VASCULAR DEVICE: CPT

## 2017-12-15 PROCEDURE — 80053 COMPREHEN METABOLIC PANEL: CPT | Performed by: PHYSICIAN ASSISTANT

## 2017-12-15 PROCEDURE — 96375 TX/PRO/DX INJ NEW DRUG ADDON: CPT

## 2017-12-15 RX ORDER — HEPARIN SODIUM (PORCINE) LOCK FLUSH IV SOLN 100 UNIT/ML 100 UNIT/ML
5 SOLUTION INTRAVENOUS EVERY 8 HOURS
Status: DISCONTINUED | OUTPATIENT
Start: 2017-12-15 | End: 2017-12-15 | Stop reason: HOSPADM

## 2017-12-15 RX ADMIN — DEXAMETHASONE SODIUM PHOSPHATE 12 MG: 10 INJECTION, SOLUTION INTRAMUSCULAR; INTRAVENOUS at 15:19

## 2017-12-15 RX ADMIN — SODIUM CHLORIDE 250 ML: 9 INJECTION, SOLUTION INTRAVENOUS at 15:19

## 2017-12-15 RX ADMIN — GEMCITABINE 1460 MG: 38 INJECTION, SOLUTION INTRAVENOUS at 15:31

## 2017-12-15 RX ADMIN — TRASTUZUMAB 300 MG: 150 INJECTION, POWDER, LYOPHILIZED, FOR SOLUTION INTRAVENOUS at 16:05

## 2017-12-15 RX ADMIN — SODIUM CHLORIDE, PRESERVATIVE FREE 5 ML: 5 INJECTION INTRAVENOUS at 16:38

## 2017-12-15 RX ADMIN — ALTEPLASE 2 MG: 2.2 INJECTION, POWDER, LYOPHILIZED, FOR SOLUTION INTRAVENOUS at 14:23

## 2017-12-15 ASSESSMENT — PAIN SCALES - GENERAL: PAINLEVEL: MILD PAIN (2)

## 2017-12-15 NOTE — PROGRESS NOTES
HEMATOLOGY/ONCOLOGY PROGRESS NOTE  Dec 14, 2017    REASON FOR VISIT: add-on incidental PE    DIAGNOSIS:   The patient is a 60-year-old female who presented to the hospital initially in 09/2013 with complaints of dyspnea. Workup revealed a large pericardial effusion and pleural effusion. Physical examination revealed a large untreated left breast mass encompassing the entire left breast. Biopsies revealed these were ER, SD and HER2-positive breast cancer. She had a thoracentesis and pericardial sclerosis performed. She was originally on Arimidex and Herceptin. In 01/2014, she was switched to tamoxifen and Herceptin due to arthralgias, and she ultimately developed progressive disease and was switched to Faslodex and Herceptin. In 01/2015, she was found to have progressive disease and was switched to T-DM1.     Initially when she was seen in late 02/2015, she complained of some V5 sensory deficit. This was subjective. I was not able to elicit this on examination. This persisted and further workup was performed with a brain MRI. This revealed what was initially thought to be 3 punctate brain metastases. She originally saw Radiation Oncology and Neurosurgery as well as underwent a lumbar puncture. Cytology from the lumbar puncture showed no evidence of leptomeningeal disease. She was treated with Gamma Knife radiation to 6 lesions, performed on 04/16/2015.  She initiated therapy with TDM1 in January 2015 and continued this through 6/26/2015 with overall stable disease. She has since been on a break from treatment. The patient presented earlier this month with recurrent shortness of breath.  Imaging revealed recurrent right-sided pleural effusion.  She has since undergone thoracentesis with cytology pending.  Clinically, however, there is evidence of disease progression. PET scan performed on 11/25/2015 demonstrated progression of disease at multiple sites. She restarted TDM1 on 11/27/2015, however experienced a mild  "transfusion reaction with shortness of breath and chest tightness, resolved upon stopping TDM1 and 125 mg of SoluMedrol. She had progression of disease on repeat imaging 2/17/2016, as well as new brain metastases. She started TDM1 with Perjeta on 3/4/2016. She underwent gamma knife to brain mets on 3/8/16.       In late May, she was found to have progressive brain metastases.  She received whole brain radiation.  She had a follow up brain MRI August 2016 that was stable.     In September 2016, she enrolled on [place]  trial and was randomized to the standard of care arm/Physician's Choice; started on gemzar and herceptin. She was recently hospitalized 1/27-2/3/17 for presumed HCAP. Trial was suspended. She continued on gemzar and heceptin with stable disease. Last restaging was 4/7/17 and stable. Gemzar was placed on hold from 4/10/2017 through 6/22/17 so that she could recover from pneumonia.    INTERVAL HISTORY:  Dominga presents after her restaging CT CAP showed several new PEs in R lung. She had otherwise stable disease on her scan. She notes her stamina has been worse recently in the last few weeks. She feels like she becomes SOB with walking to and from the bathroom. She is still exercising but she is not able to gain stamina. She has some chest tightness when exercising her arms at times. No pleuritic pain or tachycardia. She has been intermittently dizzy--this does not sound new. No leg swelling or pain.     PHYSICAL EXAMINATION:     /84  Pulse 80  Temp 97.5  F (36.4  C) (Oral)  Resp 17  Ht 1.575 m (5' 2\")  Wt 54.3 kg (119 lb 11.2 oz)  SpO2 100%  Breastfeeding? No  BMI 21.89 kg/m2    Wt Readings from Last 4 Encounters:   12/14/17 54.3 kg (119 lb 11.2 oz)   12/01/17 54.6 kg (120 lb 6.4 oz)   11/24/17 54.3 kg (119 lb 11.2 oz)   11/03/17 55.7 kg (122 lb 14.4 oz)     Constitutional: Alert, oriented, cachectic female in no visible distress. In a wheelchair  Eyes: Anicteric sclerae.  "   ENT/Mouth: OM moist and pink without lesions or thrush.    CV: RRR, no murmurs appreciated.  Resp: CTAB slightly diminished R base  Extremities: No lower extremity edema appreciated.    LABS: Nothing new today, reviewed from 12/1    Imaging: CT CAP 12/14   IMPRESSION:   1. New lobar and segmental pulmonary emboli in the right lung.  Unchanged dilation of the right atrium and right ventricle. No  findings to suggest acute or worsening right heart strain.  2. Unchanged subcentimeter nodule in the medial right breast.  Correlation with mammographic findings recommended.  3. No new pulmonary nodules, and no lymphadenopathy in the chest,  abdomen, or pelvis.  4. Stable diffuse osseous skull sclerotic metastatic lesions including  old rib fractures.    IMPRESSION/PLAN:  Dominga is a 62-year-old female with history of metastatic ER-positive, HER-2 positive breast cancer, with involvement of the bone, history of pleural effusions treated with pleurodesis and PleurX placement, and with treated brain metastases, previously with stable disease on TDM1, with progressive disease after treatment break.  She was started on [place]  clinical trial and randomized to physician's choice, and started on Gemzar/Herceptin on 9/29/16.    1. Breast cancer:  Continues on Herceptin/Gemzar. Her CT CAP today showed stable disease. Brain MRI is pending. She will return tomorrow for chemotherapy and see Dr. Harper next week.    2. R lobar and segmental pulmonary emboli: Okay to treat outpatient given no hypoxia, hypotension, or tachycardia. She had an echo today showing stable HF and no R heart strain. Rx for Lovenox 1 mg/kg BID-->50 mg BID based on her weight. She will have to waste 0.1 mL. She was given her first injection and teaching from myself. Discussed treatment timeline will be indefinite with metastatic disease. Discussed calling us with any excessive bleeding or spontaneous bruising to check a level.     Greater than 40  minutes was spent with this patient with greater than 20 minutes spent in counseling and coordination of care.    Esmer Oconnor PA-C    [facility] [address] [id number] [phone]

## 2017-12-15 NOTE — MR AVS SNAPSHOT
After Visit Summary   12/15/2017    Keren Erickson    MRN: [id number]           Patient Information     Date Of Birth          1955        Visit Information        Provider Department      12/15/2017 2:00 PM  11 ATC;  ONCOLOGY INFUSION [facility]        Today's Diagnoses     Metastatic breast cancer (H)    -  1    Brain metastasis (H)        Carcinoma of breast metastatic to multiple sites, unspecified laterality (H)          Care Instructions    Contact Numbers    [facility] [place]: [phone]  [facility] [place]:  [phone]    Call triage with chills and/or temperature greater than or equal to 100.5, uncontrolled nausea/vomiting, diarrhea, constipation, dizziness, shortness of breath, chest pain, bleeding, unexplained bruising, or any new/concerning symptoms, questions/concerns.     If you are having any concerning symptoms or wish to speak to a provider before your next infusion visit, please call your care coordinator or triage to notify them so we can adequately serve you.       After Hours: [phone]    If after hours, weekends, or holidays, call main hospital  and ask for Oncology doctor on call.         December 2017 Sunday Monday Tuesday Wednesday Thursday Friday Saturday                            1     [facility] LAB DRAW    1:00 PM   (15 min.)   [facility] LAB DRAW   [facility] Lab Draw     UMP ONC INFUSION 60    1:30 PM   (60 min.)    ONCOLOGY INFUSION   [facility] 2       3     4     5     6     7     8     9       10     11     12     13     14     ECH COMPLETE    2:30 PM   (60 min.)   ECHCR1   [facility]     CT CHEST/ABDOMEN/PELVIS W    3:25 PM   (20 min.)   CT2   [facility] CT     MR BRAIN WWO    3:45 PM   (45 min.)   MR1   [facility] MRI     UMP RETURN    5:35 PM   (50 min.)   Esmer Oconnor PA   [facility] 15     [facility] LAB DRAW    1:30 PM    (15 min.)    [facility]   [facility]     P ONC INFUSION 120    2:00 PM   (120 min.)    ONCOLOGY INFUSION   [facility] 16       17     18     19     20     21     22     [facility] LAB DRAW    8:00 AM   (15 min.)    M[facility]ONIC LAB DRAW   [facility] Lab Draw     UMP RETURN    8:15 AM   (30 min.)   Marlen Harper MD   [facility]    9:00 AM   (60 min.)    ONCOLOGY INFUSION   [facility] 23       24     25     26     27     28     29     30       31                                              January 2018 Sunday Monday Tuesday Wednesday Thursday Friday Saturday        1     2     3     4     5     6       7     8     9     10     11     12     13       14     15     16     17     18     19     20       21     22     23     24     25     26     27       28     29     30     31                                Recent Results (from the past 24 hour(s))   CBC with platelets differential    Collection Time: 12/15/17  2:11 PM   Result Value Ref Range    WBC 6.9 4.0 - 11.0 10e9/L    RBC Count 3.48 (L) 3.8 - 5.2 10e12/L    Hemoglobin 11.0 (L) 11.7 - 15.7 g/dL    Hematocrit 34.8 (L) 35.0 - 47.0 %     78 - 100 fl    MCH 31.6 26.5 - 33.0 pg    MCHC 31.6 31.5 - 36.5 g/dL    RDW 15.9 (H) 10.0 - 15.0 %    Platelet Count 506 (H) 150 - 450 10e9/L    Diff Method Automated Method     % Neutrophils 70.4 %    % Lymphocytes 14.0 %    % Monocytes 13.9 %    % Eosinophils 0.1 %    % Basophils 0.7 %    % Immature Granulocytes 0.9 %    Nucleated RBCs 0 0 /100    Absolute Neutrophil 4.8 1.6 - 8.3 10e9/L    Absolute Lymphocytes 1.0 0.8 - 5.3 10e9/L    Absolute Monocytes 1.0 0.0 - 1.3 10e9/L    Absolute Eosinophils 0.0 0.0 - 0.7 10e9/L    Absolute Basophils 0.1 0.0 - 0.2 10e9/L    Abs Immature Granulocytes 0.1 0 - 0.4 10e9/L    Absolute Nucleated RBC 0.0    Comprehensive metabolic panel    Collection Time: 12/15/17  2:11  PM   Result Value Ref Range    Sodium 140 133 - 144 mmol/L    Potassium 3.5 3.4 - 5.3 mmol/L    Chloride 106 94 - 109 mmol/L    Carbon Dioxide 26 20 - 32 mmol/L    Anion Gap 9 3 - 14 mmol/L    Glucose 91 70 - 99 mg/dL    Urea Nitrogen 9 7 - 30 mg/dL    Creatinine 0.63 0.52 - 1.04 mg/dL    GFR Estimate >90 >60 mL/min/1.7m2    GFR Estimate If Black >90 >60 mL/min/1.7m2    Calcium 8.9 8.5 - 10.1 mg/dL    Bilirubin Total 0.4 0.2 - 1.3 mg/dL    Albumin 3.1 (L) 3.4 - 5.0 g/dL    Protein Total 6.9 6.8 - 8.8 g/dL    Alkaline Phosphatase 109 40 - 150 U/L    ALT 14 0 - 50 U/L    AST 18 0 - 45 U/L                 Follow-ups after your visit        Your next 10 appointments already scheduled     Dec 22, 2017  8:00 AM CST   [facility] Lab Draw with UC [facility] LAB DRAW   [facility] Lab Draw ([facility])    [address] [place] [id number] [phone]            Dec 22, 2017  8:30 AM CST   (Arrive by 8:15 AM)   Return Visit with Marlen Harper MD   [facility] ([facility])    [address] [place] [id number] [phone]            Dec 22, 2017  9:00 AM CST   Infusion 60 with  ONCOLOGY INFUSION, UC 29 ATC   [facility] ([facility])    [address] [place] [id number] [phone]              Who to contact     If you have questions or need follow up information about today's clinic visit or your schedule please contact [facility] directly at [phone].  Normal or non-critical lab and imaging results will be communicated to you by MyChart, letter or phone within 4 business days after the clinic has received the results. If you do not hear from us within 7 days, please contact the clinic through MyChart or phone. If you have a critical or abnormal lab result, we will notify you by phone as soon as  possible.  Submit refill requests through [employer] or call your pharmacy and they will forward the refill request to us. Please allow 3 business days for your refill to be completed.          Additional Information About Your Visit        [employer] Information     [employer] gives you secure access to your electronic health record. If you see a primary care provider, you can also send messages to your care team and make appointments. If you have questions, please call your primary care clinic.  If you do not have a primary care provider, please call [phone] and they will assist you.        Care EveryWhere ID     This is your Care EveryWhere ID. This could be used by other organizations to access your [place] medical records  [id number]        Your Vitals Were     Pulse Temperature Respirations Pulse Oximetry BMI (Body Mass Index)       83 97.6  F (36.4  C) (Oral) 18 95% 21.4 kg/m2        Blood Pressure from Last 3 Encounters:   12/15/17 120/82   12/14/17 130/84   12/01/17 117/82    Weight from Last 3 Encounters:   12/15/17 53.1 kg (117 lb)   12/14/17 54.3 kg (119 lb 11.2 oz)   12/01/17 54.6 kg (120 lb 6.4 oz)              We Performed the Following     Ca27.29  breast tumor marker     CBC with platelets differential     CEA     Comprehensive metabolic panel        Primary Care Provider Office Phone # Fax #    Kelly Hart [phone]       [address] [facility] [address]        Equal Access to Services     CLAUDIA BOND : Hadii keturah Joshi, waaxda luqadaha, qaybta kaalmada annabel, freddie keys. So [facility] [phone].    ATENCIÓN: Si habla español, tiene a ramires disposición servicios gratuitos de asistencia lingüística. Llame al [phone].    We comply with applicable federal civil rights laws and [place] laws. We do not discriminate on the basis of race, color, national origin, age, disability, sex, sexual orientation, or gender  identity.            Thank you!     Thank you for choosing [facility]  for your care. Our goal is always to provide you with excellent care. Hearing back from our patients is one way we can continue to improve our services. Please take a few minutes to complete the written survey that you may receive in the mail after your visit with us. Thank you!             Your Updated Medication List - Protect others around you: Learn how to safely use, store and throw away your medicines at www.disposemymeds.org.          This list is accurate as of: 12/15/17  5:00 PM.  Always use your most recent med list.                   Brand Name Dispense Instructions for use Diagnosis    desmopressin 0.2 MG tablet    DDAVP    45 tablet    Take  1/2 tablet once daily by mouth    Diabetes insipidus (H)       LOVENOX 60 MG/0.6ML injection   Generic drug:  enoxaparin     60 Syringe    Inject 0.5 mLs (50 mg) Subcutaneous 2 times daily    Other acute pulmonary embolism without acute cor pulmonale (H)       meclizine 25 MG tablet    ANTIVERT    30 tablet    Take 1 tablet (25 mg) by mouth 3 times daily as needed for dizziness    BPPV (benign paroxysmal positional vertigo), right       methylphenidate 5 MG tablet    RITALIN    30 tablet    Take 5 mg once daily as needed for fatigue    Carcinoma of breast metastatic to multiple sites, unspecified laterality (H)       Multi-vitamin Tabs tablet      Take 1 tablet by mouth daily        omeprazole 20 MG tablet     30 tablet    Take 1 tablet (20 mg) by mouth daily    Gastroesophageal reflux disease without esophagitis       prochlorperazine 10 MG tablet    COMPAZINE    90 tablet    Take 1 tablet (10 mg) by mouth every 6 hours as needed for nausea or vomiting    Nausea       VITAMIN D (CHOLECALCIFEROL) PO      Take 5,000 Units by mouth daily        VITAMIN E BLEND PO      Take by mouth daily

## 2017-12-15 NOTE — PATIENT INSTRUCTIONS
Contact Numbers    [facility] Main Line: [phone]  [facility] Triage:  [phone]    Call triage with chills and/or temperature greater than or equal to 100.5, uncontrolled nausea/vomiting, diarrhea, constipation, dizziness, shortness of breath, chest pain, bleeding, unexplained bruising, or any new/concerning symptoms, questions/concerns.     If you are having any concerning symptoms or wish to speak to a provider before your next infusion visit, please call your care coordinator or triage to notify them so we can adequately serve you.       After Hours: [phone]    If after hours, weekends, or holidays, call main hospital  and ask for Oncology doctor on call.         December 2017 Sunday Monday Tuesday Wednesday Thursday Friday Saturday                            1     [facility] LAB DRAW    1:00 PM   (15 min.)    M[facility]IC LAB DRAW   [facility] Lab Draw     UMP ONC INFUSION 60    1:30 PM   (60 min.)    ONCOLOGY INFUSION   [facility] 2       3     4     5     6     7     8     9       10     11     12     13     14     ECH COMPLETE    2:30 PM   (60 min.)   ECHCR1   [facility] Echo     CT CHEST/ABDOMEN/PELVIS W    3:25 PM   (20 min.)   CT2   [facility] CT     MR BRAIN WWO    3:45 PM   (45 min.)   MR1   [facility] MRI     UMP RETURN    5:35 PM   (50 min.)   Esmer Oconnor PA   [facility] 15     [facility] LAB DRAW    1:30 PM   (15 min.)    [facility] LAB [facility]RAW   [facility]     UMP ONC INFUSION 120    2:00 PM   (120 min.)    ONCOLOGY INFUSION   [facility] 16       17     18     19     20     21     22     [facility] LAB DRAW    8:00 AM   (15 min.)    M[facility]IC LAB DRAW   [facility] Lab Draw     UMP RETURN    8:15 AM   (30 min.)   Marlen Harper MD   [facility]     UMP ONC INFUSION 60    9:00 AM   (60 min.)    ONCOLOGY INFUSION   [facility]  [facility] 23       24     25     26     27     28     29     30       31 January 2018 Sunday Monday Tuesday Wednesday Thursday Friday Saturday        1     2     3     4     5     6       7     8     9     10     11     12     13       14     15     16     17     18     19     20       21     22     23     24     25     26     27       28     29     30     31                                Recent Results (from the past 24 hour(s))   CBC with platelets differential    Collection Time: 12/15/17  2:11 PM   Result Value Ref Range    WBC 6.9 4.0 - 11.0 10e9/L    RBC Count 3.48 (L) 3.8 - 5.2 10e12/L    Hemoglobin 11.0 (L) 11.7 - 15.7 g/dL    Hematocrit 34.8 (L) 35.0 - 47.0 %     78 - 100 fl    MCH 31.6 26.5 - 33.0 pg    MCHC 31.6 31.5 - 36.5 g/dL    RDW 15.9 (H) 10.0 - 15.0 %    Platelet Count 506 (H) 150 - 450 10e9/L    Diff Method Automated Method     % Neutrophils 70.4 %    % Lymphocytes 14.0 %    % Monocytes 13.9 %    % Eosinophils 0.1 %    % Basophils 0.7 %    % Immature Granulocytes 0.9 %    Nucleated RBCs 0 0 /100    Absolute Neutrophil 4.8 1.6 - 8.3 10e9/L    Absolute Lymphocytes 1.0 0.8 - 5.3 10e9/L    Absolute Monocytes 1.0 0.0 - 1.3 10e9/L    Absolute Eosinophils 0.0 0.0 - 0.7 10e9/L    Absolute Basophils 0.1 0.0 - 0.2 10e9/L    Abs Immature Granulocytes 0.1 0 - 0.4 10e9/L    Absolute Nucleated RBC 0.0    Comprehensive metabolic panel    Collection Time: 12/15/17  2:11 PM   Result Value Ref Range    Sodium 140 133 - 144 mmol/L    Potassium 3.5 3.4 - 5.3 mmol/L    Chloride 106 94 - 109 mmol/L    Carbon Dioxide 26 20 - 32 mmol/L    Anion Gap 9 3 - 14 mmol/L    Glucose 91 70 - 99 mg/dL    Urea Nitrogen 9 7 - 30 mg/dL    Creatinine 0.63 0.52 - 1.04 mg/dL    GFR Estimate >90 >60 mL/min/1.7m2    GFR Estimate If Black >90 >60 mL/min/1.7m2    Calcium 8.9 8.5 - 10.1 mg/dL    Bilirubin Total 0.4 0.2 - 1.3 mg/dL    Albumin 3.1 (L) 3.4 - 5.0 g/dL    Protein Total 6.9 6.8 -  8.8 g/dL    Alkaline Phosphatase 109 40 - 150 U/L    ALT 14 0 - 50 U/L    AST 18 0 - 45 U/L

## 2017-12-15 NOTE — PROGRESS NOTES
Infusion Nursing Note:  Keren Erickson presents today for Cycle 21 Day 1 Gemzar, Herceptin.  Patient seen by provider today: No    Note: Patient states she feels ok today. Endorses PASTOR, generalized fatigue, poor appetite. She was started on Lovenox yesterday for a PE. Reports that she has been able to do the injections with no complications. Encouraged patient to eat small, frequent, high protein meals. Verbalized understanding. No other concerns at this time.     Intravenous Access:  Implanted Port.    Treatment Conditions:  Lab Results   Component Value Date    HGB 11.0 12/15/2017     Lab Results   Component Value Date    WBC 6.9 12/15/2017      Lab Results   Component Value Date    ANEU 4.8 12/15/2017     Lab Results   Component Value Date     12/15/2017      Results reviewed, labs MET treatment parameters, ok to proceed with treatment.  ECHO/MUGA completed 12/14/17  EF 55-60%.      Post Infusion Assessment:  Patient tolerated infusion without incident.  Blood return noted pre and post infusion.  Access discontinued per protocol.    Discharge Plan:   Patient declined prescription refills.  Discharge instructions reviewed with: Patient.  Patient verbalized understanding of discharge instructions and all questions answered.  AVS to patient via [employer].  Patient will return 12/22/17 for next appointment.   Patient discharged in stable condition accompanied by: friend.  Face to Face time: 0.    RENE TIMMONS RN

## 2017-12-15 NOTE — NURSING NOTE
Chief Complaint   Patient presents with     Port Draw     Unable to drawn labs from port. Occluded. TPA instilled 1424. Labs drawn via  by RN. VS taken.     Shanita Gibson RN

## 2017-12-22 ENCOUNTER — APPOINTMENT (OUTPATIENT)
Dept: LAB | Facility: CLINIC | Age: 62
End: 2017-12-22
Attending: INTERNAL MEDICINE
Payer: MEDICARE

## 2017-12-22 ENCOUNTER — ONCOLOGY VISIT (OUTPATIENT)
Dept: ONCOLOGY | Facility: CLINIC | Age: 62
End: 2017-12-22
Attending: INTERNAL MEDICINE
Payer: MEDICARE

## 2017-12-22 VITALS
HEART RATE: 77 BPM | DIASTOLIC BLOOD PRESSURE: 83 MMHG | SYSTOLIC BLOOD PRESSURE: 133 MMHG | RESPIRATION RATE: 18 BRPM | OXYGEN SATURATION: 96 % | TEMPERATURE: 98 F

## 2017-12-22 DIAGNOSIS — C79.31 BRAIN METASTASIS: ICD-10-CM

## 2017-12-22 DIAGNOSIS — C50.919 CARCINOMA OF BREAST METASTATIC TO MULTIPLE SITES, UNSPECIFIED LATERALITY (H): Primary | ICD-10-CM

## 2017-12-22 PROCEDURE — 99215 OFFICE O/P EST HI 40 MIN: CPT | Mod: ZP | Performed by: INTERNAL MEDICINE

## 2017-12-22 PROCEDURE — 36591 DRAW BLOOD OFF VENOUS DEVICE: CPT

## 2017-12-22 PROCEDURE — 99213 OFFICE O/P EST LOW 20 MIN: CPT | Mod: ZF

## 2017-12-22 PROCEDURE — 25000128 H RX IP 250 OP 636: Mod: ZF | Performed by: INTERNAL MEDICINE

## 2017-12-22 RX ORDER — HEPARIN SODIUM (PORCINE) LOCK FLUSH IV SOLN 100 UNIT/ML 100 UNIT/ML
5 SOLUTION INTRAVENOUS DAILY PRN
Status: DISCONTINUED | OUTPATIENT
Start: 2017-12-22 | End: 2017-12-26 | Stop reason: HOSPADM

## 2017-12-22 RX ADMIN — SODIUM CHLORIDE, PRESERVATIVE FREE 5 ML: 5 INJECTION INTRAVENOUS at 08:48

## 2017-12-22 ASSESSMENT — PAIN SCALES - GENERAL: PAINLEVEL: MILD PAIN (2)

## 2017-12-22 NOTE — MR AVS SNAPSHOT
After Visit Summary   12/22/2017    Keren Erickson    MRN: [id number]           Patient Information     Date Of Birth          1955        Visit Information        Provider Department      12/22/2017 8:30 AM Marlen Harper MD [facility]         Follow-ups after your visit        Your next 10 appointments already scheduled     Jan 05, 2018  9:45 AM CST   [facility] Lab Draw with UC [facility] LAB DRAW   [facility]    [address] [place] [id number]   [phone]            Jan 05, 2018 10:20 AM CST   (Arrive by 10:05 AM)   Return Visit with Carmen Alexis PA-C   [facility] ([facility])    [address] [place] [phone]   [phone]            Jan 05, 2018  1:00 PM CST   Infusion 60 with UC ONCOLOGY INFUSION, UC 11 ATC   [facility] ([facility])    [address] [place] [id number]   [phone]            Jan 26, 2018  9:00 AM CST   Masonic Lab Draw with UC MASONIC LAB DRAW   [facility] Lab Draw ([facility])    [address] [place] [phone]   [phone]            Jan 26, 2018  9:30 AM CST   (Arrive by 9:15 AM)   Return Visit with Deyanira Costello PA-C   [facility] ([facility])    [address] [place] [phone]   [phone]            Jan 26, 2018 10:30 AM CST   Infusion 60 with UC ONCOLOGY INFUSION, UC 31 ATC   [facility] ([facility])    [address] [place] [phone]   [phone]            Feb 16, 2018 11:30 AM CST   [facility] Lab Draw with UC [facility] LAB DRAW   [facility]     [address] [id number] [phone]            Feb 16, 2018 12:00 PM CST   (Arrive by 11:45 AM)   Return Visit with Deyanira Costello PA-C   [facility] ([facility])    [address] [phone] [phone]              Who to contact     If you have questions or need follow up information about today's clinic visit or your schedule please contact [facility] directly at [phone].  Normal or non-critical lab and imaging results will be communicated to you by [employer], letter or phone within 4 business days after the clinic has received the results. If you do not hear from us within 7 days, please contact the clinic through [employer] or phone. If you have a critical or abnormal lab result, we will notify you by phone as soon as possible.  Submit refill requests through [employer] or call your pharmacy and they will forward the refill request to us. Please allow 3 business days for your refill to be completed.          Additional Information About Your Visit        [employer] Information     [employer] gives you secure access to your electronic health record. If you see a primary care provider, you can also send messages to your care team and make appointments. If you have questions, please call your primary care clinic.  If you do not have a primary care provider, please call [phone] and they will assist you.        Care EveryWhere ID     This is your Care EveryWhere ID. This could be used by other organizations to access your [facility] medical records  [id number]        Your Vitals Were     Pulse Temperature Respirations Pulse Oximetry          77 98  F (36.7  C) 18 96%         Blood Pressure from Last 3 Encounters:   12/22/17 133/83   12/15/17 120/82   12/14/17 130/84    Weight from Last 3 Encounters:   12/15/17 53.1 kg (117 lb)   12/14/17 54.3 kg (119 lb 11.2 oz)    12/01/17 54.6 kg (120 lb 6.4 oz)              Today, you had the following     No orders found for display       Primary Care Provider Office Phone # Fax #    Kelly Hart [phone]       [address] [facility] [address]        Equal Access to Services     [facility] : Hadii aad ku hadasho Soomaali, waaxda luqadaha, qaybta kaalmada adeegyada, waxay leenain dorien hectoririneo quintero gregg . So [facility] [phone].    ATENCIÓN: Si habla español, tiene a ramires disposición servicios gratuitos de asistencia lingüística. [facility] [phone].    We comply with applicable federal civil rights laws and [place] laws. We do not discriminate on the basis of race, color, national origin, age, disability, sex, sexual orientation, or gender identity.            Thank you!     Thank you for choosing [facility]  for your care. Our goal is always to provide you with excellent care. Hearing back from our patients is one way we can continue to improve our services. Please take a few minutes to complete the written survey that you may receive in the mail after your visit with us. Thank you!             Your Updated Medication List - Protect others around you: Learn how to safely use, store and throw away your medicines at www.disposemymeds.org.          This list is accurate as of: 12/22/17 10:23 AM.  Always use your most recent med list.                   Brand Name Dispense Instructions for use Diagnosis    desmopressin 0.2 MG tablet    DDAVP    45 tablet    Take  1/2 tablet once daily by mouth    Diabetes insipidus (H)       LOVENOX 60 MG/0.6ML injection   Generic drug:  enoxaparin     60 Syringe    Inject 0.5 mLs (50 mg) Subcutaneous 2 times daily    Other acute pulmonary embolism without acute cor pulmonale (H)       meclizine 25 MG tablet    ANTIVERT    30 tablet    Take 1 tablet (25 mg) by mouth 3 times daily as needed for dizziness    BPPV (benign paroxysmal positional vertigo),  right       methylphenidate 5 MG tablet    RITALIN    30 tablet    Take 5 mg once daily as needed for fatigue    Carcinoma of breast metastatic to multiple sites, unspecified laterality (H)       Multi-vitamin Tabs tablet      Take 1 tablet by mouth daily        omeprazole 20 MG tablet     30 tablet    Take 1 tablet (20 mg) by mouth daily    Gastroesophageal reflux disease without esophagitis       prochlorperazine 10 MG tablet    COMPAZINE    90 tablet    Take 1 tablet (10 mg) by mouth every 6 hours as needed for nausea or vomiting    Nausea       TYLENOL PO      Take 500 mg by mouth once        VITAMIN D (CHOLECALCIFEROL) PO      Take 5,000 Units by mouth daily        VITAMIN E BLEND PO      Take by mouth daily

## 2017-12-22 NOTE — LETTER
12/22/2017       RE: Keren Erickson  [address] [facility] [address]     Dear Colleague,    Thank you for referring your patient, Keren Erickson, to the [facility]. Please see a copy of my visit note below.    HEMATOLOGY/ONCOLOGY PROGRESS NOTE  Dec 22, 2017    REASON FOR VISIT: follow-up of metastatic breast cancer, triple positive    DIAGNOSIS:   Keren Erickson is a 62 year old woman who presented to the hospital initially in 09/2013 with complaints of dyspnea. Workup revealed a large pericardial effusion and pleural effusion. Physical examination revealed a large untreated left breast mass encompassing the entire left breast. Biopsies revealed these were ER, TN and HER2-positive breast cancer. She had a thoracentesis and pericardial sclerosis performed. She was originally on Arimidex and Herceptin. In 01/2014, she was switched to tamoxifen and Herceptin due to arthralgias, and she ultimately developed progressive disease and was switched to Faslodex and Herceptin. In 01/2015, she was found to have progressive disease and was switched to T-DM1.     Initially when she was seen in late 02/2015, she complained of some V5 sensory deficit. This was subjective. I was not able to elicit this on examination. This persisted and further workup was performed with a brain MRI. This revealed what was initially thought to be 3 punctate brain metastases. She originally saw Radiation Oncology and Neurosurgery as well as underwent a lumbar puncture. Cytology from the lumbar puncture showed no evidence of leptomeningeal disease. She was treated with Gamma Knife radiation to 6 lesions, performed on 04/16/2015.  She initiated therapy with TDM1 in January 2015 and continued this through 6/26/2015 with overall stable disease. She has since been on a break from treatment. The patient presented earlier this month with recurrent shortness of breath.  Imaging revealed recurrent  right-sided pleural effusion.  She has since undergone thoracentesis with cytology pending.  Clinically, however, there is evidence of disease progression. PET scan performed on 11/25/2015 demonstrated progression of disease at multiple sites. She restarted TDM1 on 11/27/2015, however experienced a mild transfusion reaction with shortness of breath and chest tightness, resolved upon stopping TDM1 and 125 mg of SoluMedrol. She had progression of disease on repeat imaging 2/17/2016, as well as new brain metastases. She started TDM1 with Perjeta on 3/4/2016. She underwent gamma knife to brain mets on 3/8/16.       In late May 2016, she was found to have progressive brain metastases.  She received whole brain radiation.  She had a follow up brain MRI August 2016 that was stable.     In September 2016, she enrolled on [place]  trial and was randomized to the standard of care arm/Physician's Choice; started on gemzar and herceptin. She was hospitalized 1/27-2/3/17 for presumed HCAP. Trial was suspended. She continued on gemzar and heceptin with stable disease. Last restaging was 4/7/17 and stable. Gemzar was placed on hold from 4/10/2017 through 6/22/17 so that she could recover from pneumonia.    INTERVAL HISTORY:  Dominga presents today for follow up on herceptin and gemzar.  Denia comes in today for followup with restaging scans.  When she had these restaging scans done last week, she was found to have an incidental pulmonary embolus.  She was started on Lovenox.  She reports that since starting on the Lovenox she thinks overall that her headache, her dizziness and her shortness of breath have all been improving and she is very happy about this.  She has had no peripheral edema.      She has had no issues with fevers or chills and no chest pain.  She says she is eating and drinking well.  Her biggest complaint has been sort of fatigue and her overall stamina.  She uses a walker to get around at home.  When  she comes to other facilities like here for appointments, she specifically will use a wheelchair given the larger distances to cover.  She feels still somewhat frail and is trying to improve her stamina and strength.       She has had no falls and no head injuries.  She thinks overall her dizziness is stable, maybe slightly better.             Her 10-point review of systems is otherwise negative.      PHYSICAL EXAMINATION:     /83  Pulse 77  Temp 98  F (36.7  C)  Resp 18  SpO2 96%    Wt Readings from Last 4 Encounters:   12/15/17 53.1 kg (117 lb)   12/14/17 54.3 kg (119 lb 11.2 oz)   12/01/17 54.6 kg (120 lb 6.4 oz)   11/24/17 54.3 kg (119 lb 11.2 oz)     Constitutional: Alert, oriented, thin female in no visible distress  Eyes: PERRL. EOMI. MMM without oral lesions. Anicteric sclerae.    CV: RRR, no murmurs   Resp: CTAB slightly diminished at bases. Breathing comfortably.  Abdomen: Soft, non-tender, non-distended. Bowel sounds present.   Extremities: No lower extremity edema   Skin: Warm, dry. No bruising or petechiae. No rash  Lymph: No palpable cervical or supraclavicular LAD  Neuro: CN III-XII grossly intact. Ambulates several steps with slow shuffling gate, today she is in a wheelchair and a full gait assessment was not performed.  Persistent nystagmus with left lateral gaze, this is not new.  5/5 strength in LE proximal and distal.      LABS:    Lab Results   Component Value Date    WBC 6.9 12/15/2017     Lab Results   Component Value Date    RBC 3.48 12/15/2017     Lab Results   Component Value Date    HGB 11.0 12/15/2017     Lab Results   Component Value Date    HCT 34.8 12/15/2017     No components found for: MCT  Lab Results   Component Value Date     12/15/2017     Lab Results   Component Value Date    MCH 31.6 12/15/2017     Lab Results   Component Value Date    MCHC 31.6 12/15/2017     Lab Results   Component Value Date    RDW 15.9 12/15/2017     Lab Results   Component Value Date      12/15/2017       Recent Labs   Lab Test  12/15/17   1411  11/24/17   1303   NA  140  142   POTASSIUM  3.5  3.6   CHLORIDE  106  109   CO2  26  26   ANIONGAP  9  6   GLC  91  87   BUN  9  14   CR  0.63  0.68   OMA  8.9  8.8     Liver Function Studies -   Recent Labs   Lab Test  12/15/17   1411   PROTTOTAL  6.9   ALBUMIN  3.1*   BILITOTAL  0.4   ALKPHOS  109   AST  18   ALT  14       IMPRESSION/PLAN:  Dominga is a 62-year-old woman with history of metastatic ER-positive, HER-2 positive breast cancer, with involvement of the bone, history of pleural effusions treated with pleurodesis and PleurX placement, and with treated brain metastases. She was started on [place]  clinical trial and randomized to physician's choice, on Gemzar/Herceptin since 9/29/16.    1.  Denia remains on Gemzar and Herceptin for her breast cancer.  We discussed that she had a CT scan of the chest, abdomen and pelvis and a brain MRI that revealed no evidence of progressive disease.        I would like her to continue on her Gemzar day 1 and day 8 in conjunction with Herceptin on day 1 every 21 days.  1.  Given her overall weakness and fatigue as well as declining physical stamina, I discussed having her hold her Gemzar for the next 6 weeks.  She is in agreement with this plan.  We will reassess at that time whether we would like her to restart her Gemzar.       I did discuss that if she is going to remain off therapy longer than that, it would be reasonable to consider adding an aromatase inhibitor back into her plan.  At this time, we will have her seen back in    3 weeks with Herceptin and continue to reevaluate this question.       2.  Brain metastases.  These appear stable on overall brain MRI.      3.  Central diabetes insipidus.  This was diagnosed as an inpatient in the setting of hypernatremia.  She was started on desmopressin.  She has been seeing Endocrinology.       4.  Nutrition.  Her weight is overall stable.       5.  She is due for Xgeva.  We will go ahead and administer this with her next Herceptin infusion.  She completed dental work this past Monday.       6.  She does have a POLST and is DNR/DNI.  Her power of  is listed in the computer.         Again, thank you for allowing me to participate in the care of your patient.      Sincerely,    Marlen Harper MD

## 2017-12-22 NOTE — NURSING NOTE
"Oncology Rooming Note    December 22, 2017 9:02 AM   Keren Erickson is a 62 year old female who presents for:    Chief Complaint   Patient presents with     Port Draw     Oncology Clinic Visit     Return: Breast Ca     Initial Vitals: /83  Pulse 77  Temp 98  F (36.7  C)  Resp 18  SpO2 96% Estimated body mass index is 21.4 kg/(m^2) as calculated from the following:    Height as of 12/14/17: 1.575 m (5' 2\").    Weight as of 12/15/17: 53.1 kg (117 lb). There is no height or weight on file to calculate BSA.  Mild Pain (2) Comment: right hip   No LMP recorded. Patient is postmenopausal.  Allergies reviewed: Yes  Medications reviewed: Yes    Medications: Medication refills not needed today.  Pharmacy name entered into [employer]: [facility] [place], [place] - [address] [facility] [phone]    Clinical concerns: found blood clots on scans so was started on Lovonox Dr. Harper was NOT notified.    10 minutes for nursing intake (face to face time)     Boone Guevara CMA              "

## 2017-12-22 NOTE — NURSING NOTE
Port de accessed per provider, site within normal limits, bandage placed over site, Pt tolerated well  Samia Grant MA

## 2017-12-22 NOTE — NURSING NOTE
Port accessed by RN, pt tolerated well. Labs collected and sent, line flushed with NS and heparin. Vitals taken and pt checked in for next appt.   Aurea Hair  Pt feeling weak so no weight done in small area.

## 2017-12-22 NOTE — PROGRESS NOTES
HEMATOLOGY/ONCOLOGY PROGRESS NOTE  Dec 22, 2017    REASON FOR VISIT: follow-up of metastatic breast cancer, triple positive    DIAGNOSIS:   Keren Erickson is a 62 year old woman who presented to the hospital initially in 09/2013 with complaints of dyspnea. Workup revealed a large pericardial effusion and pleural effusion. Physical examination revealed a large untreated left breast mass encompassing the entire left breast. Biopsies revealed these were ER, [place] and HER2-positive breast cancer. She had a thoracentesis and pericardial sclerosis performed. She was originally on Arimidex and Herceptin. In 01/2014, she was switched to tamoxifen and Herceptin due to arthralgias, and she ultimately developed progressive disease and was switched to Faslodex and Herceptin. In 01/2015, she was found to have progressive disease and was switched to T-DM1.     Initially when she was seen in late 02/2015, she complained of some V5 sensory deficit. This was subjective. I was not able to elicit this on examination. This persisted and further workup was performed with a brain MRI. This revealed what was initially thought to be 3 punctate brain metastases. She originally saw Radiation Oncology and Neurosurgery as well as underwent a lumbar puncture. Cytology from the lumbar puncture showed no evidence of leptomeningeal disease. She was treated with Gamma Knife radiation to 6 lesions, performed on 04/16/2015.  She initiated therapy with TDM1 in January 2015 and continued this through 6/26/2015 with overall stable disease. She has since been on a break from treatment. The patient presented earlier this month with recurrent shortness of breath.  Imaging revealed recurrent right-sided pleural effusion.  She has since undergone thoracentesis with cytology pending.  Clinically, however, there is evidence of disease progression. PET scan performed on 11/25/2015 demonstrated progression of disease at multiple sites. She restarted TDM1  on 11/27/2015, however experienced a mild transfusion reaction with shortness of breath and chest tightness, resolved upon stopping TDM1 and 125 mg of SoluMedrol. She had progression of disease on repeat imaging 2/17/2016, as well as new brain metastases. She started TDM1 with Perjeta on 3/4/2016. She underwent gamma knife to brain mets on 3/8/16.       In late May 2016, she was found to have progressive brain metastases.  She received whole brain radiation.  She had a follow up brain MRI August 2016 that was stable.     In September 2016, she enrolled on [place]  trial and was randomized to the standard of care arm/Physician's Choice; started on gemzar and herceptin. She was hospitalized 1/27-2/3/17 for presumed HCAP. Trial was suspended. She continued on gemzar and heceptin with stable disease. Last restaging was 4/7/17 and stable. Gemzar was placed on hold from 4/10/2017 through 6/22/17 so that she could recover from pneumonia.    INTERVAL HISTORY:  Dominga presents today for follow up on herceptin and gemzar.  Denia comes in today for followup with restaging scans.  When she had these restaging scans done last week, she was found to have an incidental pulmonary embolus.  She was started on Lovenox.  She reports that since starting on the Lovenox she thinks overall that her headache, her dizziness and her shortness of breath have all been improving and she is very happy about this.  She has had no peripheral edema.      She has had no issues with fevers or chills and no chest pain.  She says she is eating and drinking well.  Her biggest complaint has been sort of fatigue and her overall stamina.  She uses a walker to get around at home.  When she comes to other facilities like here for appointments, she specifically will use a wheelchair given the larger distances to cover.  She feels still somewhat frail and is trying to improve her stamina and strength.       She has had no falls and no head  injuries.  She thinks overall her dizziness is stable, maybe slightly better.             Her 10-point review of systems is otherwise negative.      PHYSICAL EXAMINATION:     /83  Pulse 77  Temp 98  F (36.7  C)  Resp 18  SpO2 96%    Wt Readings from Last 4 Encounters:   12/15/17 53.1 kg (117 lb)   12/14/17 54.3 kg (119 lb 11.2 oz)   12/01/17 54.6 kg (120 lb 6.4 oz)   11/24/17 54.3 kg (119 lb 11.2 oz)     Constitutional: Alert, oriented, thin female in no visible distress  Eyes: PERRL. EOMI. MMM without oral lesions. Anicteric sclerae.    CV: RRR, no murmurs   Resp: CTAB slightly diminished at bases. Breathing comfortably.  Abdomen: Soft, non-tender, non-distended. Bowel sounds present.   Extremities: No lower extremity edema   Skin: Warm, dry. No bruising or petechiae. No rash  Lymph: No palpable cervical or supraclavicular LAD  Neuro: CN III-XII grossly intact. Ambulates several steps with slow shuffling gate, today she is in a wheelchair and a full gait assessment was not performed.  Persistent nystagmus with left lateral gaze, this is not new.  5/5 strength in LE proximal and distal.      LABS:    Lab Results   Component Value Date    WBC 6.9 12/15/2017     Lab Results   Component Value Date    RBC 3.48 12/15/2017     Lab Results   Component Value Date    HGB 11.0 12/15/2017     Lab Results   Component Value Date    HCT 34.8 12/15/2017     No components found for: MCT  Lab Results   Component Value Date     12/15/2017     Lab Results   Component Value Date    MCH 31.6 12/15/2017     Lab Results   Component Value Date    MCHC 31.6 12/15/2017     Lab Results   Component Value Date    RDW 15.9 12/15/2017     Lab Results   Component Value Date     12/15/2017       Recent Labs   Lab Test  12/15/17   1411  11/24/17   1303   NA  140  142   POTASSIUM  3.5  3.6   CHLORIDE  106  109   CO2  26  26   ANIONGAP  9  6   GLC  91  87   BUN  9  14   CR  0.63  0.68   OMA  8.9  8.8     Liver Function Studies  -   Recent Labs   Lab Test  12/15/17   1411   PROTTOTAL  6.9   ALBUMIN  3.1*   BILITOTAL  0.4   ALKPHOS  109   AST  18   ALT  14       IMPRESSION/PLAN:  Dominga is a 62-year-old woman with history of metastatic ER-positive, HER-2 positive breast cancer, with involvement of the bone, history of pleural effusions treated with pleurodesis and PleurX placement, and with treated brain metastases. She was started on [place]  clinical trial and randomized to physician's choice, on Gemzar/Herceptin since 9/29/16.    1.  Denia remains on Gemzar and Herceptin for her breast cancer.  We discussed that she had a CT scan of the chest, abdomen and pelvis and a brain MRI that revealed no evidence of progressive disease.        I would like her to continue on her Gemzar day 1 and day 8 in conjunction with Herceptin on day 1 every 21 days.  1.  Given her overall weakness and fatigue as well as declining physical stamina, I discussed having her hold her Gemzar for the next 6 weeks.  She is in agreement with this plan.  We will reassess at that time whether we would like her to restart her Gemzar.       I did discuss that if she is going to remain off therapy longer than that, it would be reasonable to consider adding an aromatase inhibitor back into her plan.  At this time, we will have her seen back in    3 weeks with Herceptin and continue to reevaluate this question.       2.  Brain metastases.  These appear stable on overall brain MRI.      3.  Central diabetes insipidus.  This was diagnosed as an inpatient in the setting of hypernatremia.  She was started on desmopressin.  She has been seeing Endocrinology.       4.  Nutrition.  Her weight is overall stable.      5.  She is due for Xgeva.  We will go ahead and administer this with her next Herceptin infusion.  She completed dental work this past Monday.       6.  She does have a POLST and is DNR/DNI.  Her power of  is listed in the computer.

## 2018-01-01 ENCOUNTER — RADIANT APPOINTMENT (OUTPATIENT)
Dept: MRI IMAGING | Facility: CLINIC | Age: 63
End: 2018-01-01
Attending: INTERNAL MEDICINE
Payer: MEDICARE

## 2018-01-01 ENCOUNTER — TELEPHONE (OUTPATIENT)
Dept: OPHTHALMOLOGY | Facility: CLINIC | Age: 63
End: 2018-01-01

## 2018-01-01 ENCOUNTER — APPOINTMENT (OUTPATIENT)
Dept: LAB | Facility: CLINIC | Age: 63
End: 2018-01-01
Attending: INTERNAL MEDICINE
Payer: MEDICARE

## 2018-01-01 ENCOUNTER — RADIANT APPOINTMENT (OUTPATIENT)
Dept: ULTRASOUND IMAGING | Facility: CLINIC | Age: 63
End: 2018-01-01
Attending: INTERNAL MEDICINE
Payer: MEDICARE

## 2018-01-01 ENCOUNTER — APPOINTMENT (OUTPATIENT)
Dept: LAB | Facility: CLINIC | Age: 63
End: 2018-01-01
Attending: PHYSICIAN ASSISTANT
Payer: MEDICARE

## 2018-01-01 ENCOUNTER — INFUSION THERAPY VISIT (OUTPATIENT)
Dept: ONCOLOGY | Facility: CLINIC | Age: 63
End: 2018-01-01
Attending: INTERNAL MEDICINE
Payer: MEDICARE

## 2018-01-01 ENCOUNTER — RADIANT APPOINTMENT (OUTPATIENT)
Dept: MRI IMAGING | Facility: CLINIC | Age: 63
End: 2018-01-01
Attending: PHYSICIAN ASSISTANT
Payer: MEDICARE

## 2018-01-01 ENCOUNTER — CARE COORDINATION (OUTPATIENT)
Dept: ONCOLOGY | Facility: CLINIC | Age: 63
End: 2018-01-01

## 2018-01-01 ENCOUNTER — OFFICE VISIT (OUTPATIENT)
Dept: OPHTHALMOLOGY | Facility: CLINIC | Age: 63
End: 2018-01-01
Attending: OPHTHALMOLOGY
Payer: MEDICARE

## 2018-01-01 ENCOUNTER — RADIANT APPOINTMENT (OUTPATIENT)
Dept: CARDIOLOGY | Facility: CLINIC | Age: 63
End: 2018-01-01
Attending: PHYSICIAN ASSISTANT
Payer: MEDICARE

## 2018-01-01 ENCOUNTER — ONCOLOGY VISIT (OUTPATIENT)
Dept: ONCOLOGY | Facility: CLINIC | Age: 63
End: 2018-01-01
Attending: PHYSICIAN ASSISTANT
Payer: MEDICARE

## 2018-01-01 ENCOUNTER — APPOINTMENT (OUTPATIENT)
Dept: LAB | Facility: CLINIC | Age: 63
End: 2018-01-01
Attending: FAMILY MEDICINE
Payer: MEDICARE

## 2018-01-01 ENCOUNTER — DOCUMENTATION ONLY (OUTPATIENT)
Dept: ONCOLOGY | Facility: CLINIC | Age: 63
End: 2018-01-01

## 2018-01-01 ENCOUNTER — ALLIED HEALTH/NURSE VISIT (OUTPATIENT)
Dept: ONCOLOGY | Facility: CLINIC | Age: 63
End: 2018-01-01

## 2018-01-01 ENCOUNTER — PRE VISIT (OUTPATIENT)
Dept: UROLOGY | Facility: CLINIC | Age: 63
End: 2018-01-01

## 2018-01-01 ENCOUNTER — OFFICE VISIT (OUTPATIENT)
Dept: ENDOCRINOLOGY | Facility: CLINIC | Age: 63
End: 2018-01-01
Payer: MEDICARE

## 2018-01-01 ENCOUNTER — TELEPHONE (OUTPATIENT)
Dept: ONCOLOGY | Facility: CLINIC | Age: 63
End: 2018-01-01

## 2018-01-01 ENCOUNTER — RADIANT APPOINTMENT (OUTPATIENT)
Dept: CT IMAGING | Facility: CLINIC | Age: 63
End: 2018-01-01
Attending: PHYSICIAN ASSISTANT
Payer: MEDICARE

## 2018-01-01 ENCOUNTER — RADIANT APPOINTMENT (OUTPATIENT)
Dept: CT IMAGING | Facility: CLINIC | Age: 63
End: 2018-01-01
Attending: INTERNAL MEDICINE
Payer: MEDICARE

## 2018-01-01 ENCOUNTER — HOME INFUSION (PRE-WILLOW HOME INFUSION) (OUTPATIENT)
Dept: PHARMACY | Facility: CLINIC | Age: 63
End: 2018-01-01

## 2018-01-01 ENCOUNTER — TELEPHONE (OUTPATIENT)
Dept: UROLOGY | Facility: CLINIC | Age: 63
End: 2018-01-01

## 2018-01-01 VITALS
HEART RATE: 88 BPM | TEMPERATURE: 96.1 F | SYSTOLIC BLOOD PRESSURE: 141 MMHG | DIASTOLIC BLOOD PRESSURE: 94 MMHG | RESPIRATION RATE: 18 BRPM | OXYGEN SATURATION: 98 % | WEIGHT: 113.7 LBS | BODY MASS INDEX: 20.79 KG/M2

## 2018-01-01 VITALS
WEIGHT: 117.9 LBS | OXYGEN SATURATION: 94 % | RESPIRATION RATE: 16 BRPM | BODY MASS INDEX: 21.7 KG/M2 | TEMPERATURE: 97.7 F | SYSTOLIC BLOOD PRESSURE: 142 MMHG | HEART RATE: 77 BPM | DIASTOLIC BLOOD PRESSURE: 92 MMHG | HEIGHT: 62 IN

## 2018-01-01 VITALS
HEIGHT: 62 IN | DIASTOLIC BLOOD PRESSURE: 84 MMHG | WEIGHT: 116.5 LBS | HEART RATE: 81 BPM | SYSTOLIC BLOOD PRESSURE: 125 MMHG | BODY MASS INDEX: 21.44 KG/M2 | RESPIRATION RATE: 18 BRPM | TEMPERATURE: 96.9 F | OXYGEN SATURATION: 95 %

## 2018-01-01 VITALS
HEART RATE: 67 BPM | BODY MASS INDEX: 20.64 KG/M2 | DIASTOLIC BLOOD PRESSURE: 93 MMHG | SYSTOLIC BLOOD PRESSURE: 137 MMHG | TEMPERATURE: 97.5 F | OXYGEN SATURATION: 97 % | WEIGHT: 112.9 LBS | RESPIRATION RATE: 16 BRPM

## 2018-01-01 VITALS
BODY MASS INDEX: 21.38 KG/M2 | WEIGHT: 116.2 LBS | HEIGHT: 62 IN | RESPIRATION RATE: 18 BRPM | OXYGEN SATURATION: 98 % | SYSTOLIC BLOOD PRESSURE: 113 MMHG | DIASTOLIC BLOOD PRESSURE: 79 MMHG | TEMPERATURE: 97.3 F | HEART RATE: 75 BPM

## 2018-01-01 VITALS
TEMPERATURE: 97.1 F | HEART RATE: 82 BPM | OXYGEN SATURATION: 97 % | BODY MASS INDEX: 21.29 KG/M2 | HEIGHT: 62 IN | SYSTOLIC BLOOD PRESSURE: 136 MMHG | DIASTOLIC BLOOD PRESSURE: 90 MMHG | RESPIRATION RATE: 16 BRPM | WEIGHT: 115.7 LBS

## 2018-01-01 VITALS
BODY MASS INDEX: 20.39 KG/M2 | DIASTOLIC BLOOD PRESSURE: 92 MMHG | WEIGHT: 111.5 LBS | HEART RATE: 81 BPM | OXYGEN SATURATION: 93 % | SYSTOLIC BLOOD PRESSURE: 138 MMHG | TEMPERATURE: 97.8 F

## 2018-01-01 VITALS
OXYGEN SATURATION: 97 % | RESPIRATION RATE: 16 BRPM | SYSTOLIC BLOOD PRESSURE: 118 MMHG | BODY MASS INDEX: 20.79 KG/M2 | TEMPERATURE: 97.4 F | DIASTOLIC BLOOD PRESSURE: 81 MMHG | WEIGHT: 113.7 LBS | HEART RATE: 78 BPM

## 2018-01-01 VITALS — SYSTOLIC BLOOD PRESSURE: 122 MMHG | DIASTOLIC BLOOD PRESSURE: 74 MMHG | HEART RATE: 74 BPM

## 2018-01-01 VITALS
BODY MASS INDEX: 20.66 KG/M2 | RESPIRATION RATE: 16 BRPM | HEART RATE: 66 BPM | SYSTOLIC BLOOD PRESSURE: 121 MMHG | TEMPERATURE: 97.9 F | DIASTOLIC BLOOD PRESSURE: 82 MMHG | WEIGHT: 113 LBS | OXYGEN SATURATION: 95 %

## 2018-01-01 VITALS
DIASTOLIC BLOOD PRESSURE: 81 MMHG | RESPIRATION RATE: 16 BRPM | TEMPERATURE: 97.6 F | WEIGHT: 116.6 LBS | OXYGEN SATURATION: 97 % | HEART RATE: 89 BPM | SYSTOLIC BLOOD PRESSURE: 128 MMHG | BODY MASS INDEX: 21.46 KG/M2 | HEIGHT: 62 IN

## 2018-01-01 VITALS
BODY MASS INDEX: 20.98 KG/M2 | HEIGHT: 62 IN | HEART RATE: 84 BPM | RESPIRATION RATE: 16 BRPM | SYSTOLIC BLOOD PRESSURE: 132 MMHG | DIASTOLIC BLOOD PRESSURE: 85 MMHG | OXYGEN SATURATION: 95 % | TEMPERATURE: 97.9 F | WEIGHT: 114 LBS

## 2018-01-01 DIAGNOSIS — C50.919 CARCINOMA OF BREAST METASTATIC TO MULTIPLE SITES, UNSPECIFIED LATERALITY (H): ICD-10-CM

## 2018-01-01 DIAGNOSIS — C50.919 METASTATIC BREAST CANCER: Primary | ICD-10-CM

## 2018-01-01 DIAGNOSIS — H04.123 DRY EYES, BILATERAL: Primary | ICD-10-CM

## 2018-01-01 DIAGNOSIS — C79.31 BRAIN METASTASIS: ICD-10-CM

## 2018-01-01 DIAGNOSIS — C79.51 BONE METASTASIS: ICD-10-CM

## 2018-01-01 DIAGNOSIS — H01.02B SQUAMOUS BLEPHARITIS OF UPPER AND LOWER EYELIDS OF BOTH EYES: ICD-10-CM

## 2018-01-01 DIAGNOSIS — H40.1121 PRIMARY OPEN ANGLE GLAUCOMA OF LEFT EYE, MILD STAGE: ICD-10-CM

## 2018-01-01 DIAGNOSIS — E06.3 HYPOTHYROIDISM DUE TO HASHIMOTO'S THYROIDITIS: ICD-10-CM

## 2018-01-01 DIAGNOSIS — G51.4 SUPERIOR OBLIQUE MYOKYMIA OF LEFT EYE: ICD-10-CM

## 2018-01-01 DIAGNOSIS — C50.919 METASTATIC BREAST CANCER: ICD-10-CM

## 2018-01-01 DIAGNOSIS — I26.99 OTHER ACUTE PULMONARY EMBOLISM WITHOUT ACUTE COR PULMONALE (H): ICD-10-CM

## 2018-01-01 DIAGNOSIS — Z71.9 VISIT FOR COUNSELING: Primary | ICD-10-CM

## 2018-01-01 DIAGNOSIS — Z51.81 ENCOUNTER FOR THERAPEUTIC DRUG MONITORING: ICD-10-CM

## 2018-01-01 DIAGNOSIS — C50.919 CARCINOMA OF BREAST METASTATIC TO MULTIPLE SITES, UNSPECIFIED LATERALITY (H): Primary | ICD-10-CM

## 2018-01-01 DIAGNOSIS — H25.13 NUCLEAR SENILE CATARACT OF BOTH EYES: ICD-10-CM

## 2018-01-01 DIAGNOSIS — D49.89 NEOPLASM OF UNSPECIFIED BEHAVIOR OF OTHER SPECIFIED SITES: ICD-10-CM

## 2018-01-01 DIAGNOSIS — H40.003 GLAUCOMA SUSPECT OF BOTH EYES: Primary | ICD-10-CM

## 2018-01-01 DIAGNOSIS — E23.2 DIABETES INSIPIDUS (H): ICD-10-CM

## 2018-01-01 DIAGNOSIS — H53.032 STRABISMIC AMBLYOPIA OF LEFT EYE: ICD-10-CM

## 2018-01-01 DIAGNOSIS — R31.0 GROSS HEMATURIA: Primary | ICD-10-CM

## 2018-01-01 DIAGNOSIS — H40.003 GLAUCOMA SUSPECT OF BOTH EYES: ICD-10-CM

## 2018-01-01 DIAGNOSIS — C79.51 BONE METASTASIS: Primary | ICD-10-CM

## 2018-01-01 DIAGNOSIS — H01.00A BLEPHARITIS OF UPPER AND LOWER EYELIDS OF BOTH EYES, UNSPECIFIED TYPE: ICD-10-CM

## 2018-01-01 DIAGNOSIS — R31.9 HEMATURIA: Primary | ICD-10-CM

## 2018-01-01 DIAGNOSIS — N32.89 BENIGN BLADDER MASS: ICD-10-CM

## 2018-01-01 DIAGNOSIS — R94.6 BORDERLINE ABNORMAL TFTS: ICD-10-CM

## 2018-01-01 DIAGNOSIS — H01.00B BLEPHARITIS OF UPPER AND LOWER EYELIDS OF BOTH EYES, UNSPECIFIED TYPE: ICD-10-CM

## 2018-01-01 DIAGNOSIS — Z85.3 HISTORY OF BREAST CANCER: ICD-10-CM

## 2018-01-01 DIAGNOSIS — R31.9 HEMATURIA: ICD-10-CM

## 2018-01-01 DIAGNOSIS — C79.89 SECONDARY MALIGNANT NEOPLASM OF OTHER SPECIFIED SITES (H): ICD-10-CM

## 2018-01-01 DIAGNOSIS — C79.31 BRAIN METASTASIS: Primary | ICD-10-CM

## 2018-01-01 DIAGNOSIS — N39.0 URINARY TRACT INFECTION: Primary | ICD-10-CM

## 2018-01-01 DIAGNOSIS — C79.89 SECONDARY MALIGNANT NEOPLASM OF OTHER SPECIFIED SITES (CODE): ICD-10-CM

## 2018-01-01 DIAGNOSIS — H01.02A SQUAMOUS BLEPHARITIS OF UPPER AND LOWER EYELIDS OF BOTH EYES: ICD-10-CM

## 2018-01-01 DIAGNOSIS — E23.2 DIABETES INSIPIDUS (H): Primary | ICD-10-CM

## 2018-01-01 LAB
ALBUMIN SERPL-MCNC: 3.5 G/DL (ref 3.4–5)
ALBUMIN SERPL-MCNC: 3.6 G/DL (ref 3.4–5)
ALBUMIN SERPL-MCNC: 3.7 G/DL (ref 3.4–5)
ALBUMIN SERPL-MCNC: 3.7 G/DL (ref 3.4–5)
ALBUMIN SERPL-MCNC: 3.8 G/DL (ref 3.4–5)
ALBUMIN UR-MCNC: 100 MG/DL
ALBUMIN UR-MCNC: NEGATIVE MG/DL
ALP SERPL-CCNC: 110 U/L (ref 40–150)
ALP SERPL-CCNC: 112 U/L (ref 40–150)
ALP SERPL-CCNC: 76 U/L (ref 40–150)
ALP SERPL-CCNC: 79 U/L (ref 40–150)
ALP SERPL-CCNC: 82 U/L (ref 40–150)
ALP SERPL-CCNC: 88 U/L (ref 40–150)
ALP SERPL-CCNC: 89 U/L (ref 40–150)
ALP SERPL-CCNC: 94 U/L (ref 40–150)
ALT SERPL W P-5'-P-CCNC: 15 U/L (ref 0–50)
ALT SERPL W P-5'-P-CCNC: 19 U/L (ref 0–50)
ALT SERPL W P-5'-P-CCNC: 19 U/L (ref 0–50)
ALT SERPL W P-5'-P-CCNC: 20 U/L (ref 0–50)
ALT SERPL W P-5'-P-CCNC: 20 U/L (ref 0–50)
ALT SERPL W P-5'-P-CCNC: 22 U/L (ref 0–50)
ALT SERPL W P-5'-P-CCNC: 26 U/L (ref 0–50)
ALT SERPL W P-5'-P-CCNC: 28 U/L (ref 0–50)
ANION GAP SERPL CALCULATED.3IONS-SCNC: 10 MMOL/L (ref 3–14)
ANION GAP SERPL CALCULATED.3IONS-SCNC: 12 MMOL/L (ref 3–14)
ANION GAP SERPL CALCULATED.3IONS-SCNC: 5 MMOL/L (ref 3–14)
ANION GAP SERPL CALCULATED.3IONS-SCNC: 6 MMOL/L (ref 3–14)
ANION GAP SERPL CALCULATED.3IONS-SCNC: 7 MMOL/L (ref 3–14)
ANION GAP SERPL CALCULATED.3IONS-SCNC: 8 MMOL/L (ref 3–14)
APPEARANCE UR: ABNORMAL
APPEARANCE UR: CLEAR
AST SERPL W P-5'-P-CCNC: 19 U/L (ref 0–45)
AST SERPL W P-5'-P-CCNC: 20 U/L (ref 0–45)
AST SERPL W P-5'-P-CCNC: 20 U/L (ref 0–45)
AST SERPL W P-5'-P-CCNC: 22 U/L (ref 0–45)
AST SERPL W P-5'-P-CCNC: 24 U/L (ref 0–45)
AST SERPL W P-5'-P-CCNC: 24 U/L (ref 0–45)
AST SERPL W P-5'-P-CCNC: 26 U/L (ref 0–45)
AST SERPL W P-5'-P-CCNC: 30 U/L (ref 0–45)
BACTERIA #/AREA URNS HPF: ABNORMAL /HPF
BACTERIA SPEC CULT: ABNORMAL
BACTERIA SPEC CULT: ABNORMAL
BASOPHILS # BLD AUTO: 0 10E9/L (ref 0–0.2)
BASOPHILS # BLD AUTO: 0.1 10E9/L (ref 0–0.2)
BASOPHILS NFR BLD AUTO: 0.5 %
BASOPHILS NFR BLD AUTO: 0.5 %
BASOPHILS NFR BLD AUTO: 0.7 %
BASOPHILS NFR BLD AUTO: 0.8 %
BASOPHILS NFR BLD AUTO: 0.9 %
BASOPHILS NFR BLD AUTO: 1 %
BASOPHILS NFR BLD AUTO: 1.2 %
BILIRUB SERPL-MCNC: 0.3 MG/DL (ref 0.2–1.3)
BILIRUB SERPL-MCNC: 0.4 MG/DL (ref 0.2–1.3)
BILIRUB UR QL STRIP: NEGATIVE
BILIRUB UR QL STRIP: NEGATIVE
BUN SERPL-MCNC: 10 MG/DL (ref 7–30)
BUN SERPL-MCNC: 11 MG/DL (ref 7–30)
BUN SERPL-MCNC: 12 MG/DL (ref 7–30)
BUN SERPL-MCNC: 12 MG/DL (ref 7–30)
BUN SERPL-MCNC: 13 MG/DL (ref 7–30)
BUN SERPL-MCNC: 14 MG/DL (ref 7–30)
BUN SERPL-MCNC: 15 MG/DL (ref 7–30)
BUN SERPL-MCNC: 9 MG/DL (ref 7–30)
BUN SERPL-MCNC: 9 MG/DL (ref 7–30)
CALCIUM SERPL-MCNC: 10 MG/DL (ref 8.5–10.1)
CALCIUM SERPL-MCNC: 9 MG/DL (ref 8.5–10.1)
CALCIUM SERPL-MCNC: 9 MG/DL (ref 8.5–10.1)
CALCIUM SERPL-MCNC: 9.2 MG/DL (ref 8.5–10.1)
CALCIUM SERPL-MCNC: 9.2 MG/DL (ref 8.5–10.1)
CALCIUM SERPL-MCNC: 9.4 MG/DL (ref 8.5–10.1)
CALCIUM SERPL-MCNC: 9.6 MG/DL (ref 8.5–10.1)
CANCER AG27-29 SERPL-ACNC: 14 U/ML (ref 0–39)
CANCER AG27-29 SERPL-ACNC: 18 U/ML (ref 0–39)
CANCER AG27-29 SERPL-ACNC: 18 U/ML (ref 0–39)
CANCER AG27-29 SERPL-ACNC: 19 U/ML (ref 0–39)
CANCER AG27-29 SERPL-ACNC: 21 U/ML (ref 0–39)
CANCER AG27-29 SERPL-ACNC: 21 U/ML (ref 0–39)
CANCER AG27-29 SERPL-ACNC: 22 U/ML (ref 0–39)
CANCER AG27-29 SERPL-ACNC: 23 U/ML (ref 0–39)
CANCER AG27-29 SERPL-ACNC: 24 U/ML (ref 0–39)
CEA SERPL-MCNC: 1.8 UG/L (ref 0–2.5)
CEA SERPL-MCNC: 10.5 UG/L (ref 0–2.5)
CEA SERPL-MCNC: 2.3 UG/L (ref 0–2.5)
CEA SERPL-MCNC: 4.6 UG/L (ref 0–2.5)
CEA SERPL-MCNC: 6.4 UG/L (ref 0–2.5)
CEA SERPL-MCNC: 6.6 UG/L (ref 0–2.5)
CEA SERPL-MCNC: 7.6 UG/L (ref 0–2.5)
CEA SERPL-MCNC: 8.4 UG/L (ref 0–2.5)
CEA SERPL-MCNC: 9.8 UG/L (ref 0–2.5)
CHLORIDE SERPL-SCNC: 102 MMOL/L (ref 94–109)
CHLORIDE SERPL-SCNC: 103 MMOL/L (ref 94–109)
CHLORIDE SERPL-SCNC: 104 MMOL/L (ref 94–109)
CHLORIDE SERPL-SCNC: 105 MMOL/L (ref 94–109)
CHLORIDE SERPL-SCNC: 105 MMOL/L (ref 94–109)
CHLORIDE SERPL-SCNC: 106 MMOL/L (ref 94–109)
CO2 SERPL-SCNC: 25 MMOL/L (ref 20–32)
CO2 SERPL-SCNC: 26 MMOL/L (ref 20–32)
CO2 SERPL-SCNC: 27 MMOL/L (ref 20–32)
CO2 SERPL-SCNC: 28 MMOL/L (ref 20–32)
CO2 SERPL-SCNC: 29 MMOL/L (ref 20–32)
COLOR UR AUTO: YELLOW
COLOR UR AUTO: YELLOW
COPATH REPORT: NORMAL
CREAT SERPL-MCNC: 0.56 MG/DL (ref 0.52–1.04)
CREAT SERPL-MCNC: 0.63 MG/DL (ref 0.52–1.04)
CREAT SERPL-MCNC: 0.64 MG/DL (ref 0.52–1.04)
CREAT SERPL-MCNC: 0.64 MG/DL (ref 0.52–1.04)
CREAT SERPL-MCNC: 0.69 MG/DL (ref 0.52–1.04)
CREAT SERPL-MCNC: 0.7 MG/DL (ref 0.52–1.04)
CREAT SERPL-MCNC: 0.72 MG/DL (ref 0.52–1.04)
CREAT SERPL-MCNC: 0.73 MG/DL (ref 0.52–1.04)
CREAT SERPL-MCNC: 0.74 MG/DL (ref 0.52–1.04)
DIFFERENTIAL METHOD BLD: ABNORMAL
DIFFERENTIAL METHOD BLD: NORMAL
DIFFERENTIAL METHOD BLD: NORMAL
EOSINOPHIL # BLD AUTO: 0 10E9/L (ref 0–0.7)
EOSINOPHIL NFR BLD AUTO: 0 %
EOSINOPHIL NFR BLD AUTO: 0.2 %
EOSINOPHIL NFR BLD AUTO: 0.3 %
ERYTHROCYTE [DISTWIDTH] IN BLOOD BY AUTOMATED COUNT: 13.9 % (ref 10–15)
ERYTHROCYTE [DISTWIDTH] IN BLOOD BY AUTOMATED COUNT: 14.1 % (ref 10–15)
ERYTHROCYTE [DISTWIDTH] IN BLOOD BY AUTOMATED COUNT: 14.3 % (ref 10–15)
ERYTHROCYTE [DISTWIDTH] IN BLOOD BY AUTOMATED COUNT: 14.6 % (ref 10–15)
ERYTHROCYTE [DISTWIDTH] IN BLOOD BY AUTOMATED COUNT: 15.1 % (ref 10–15)
ERYTHROCYTE [DISTWIDTH] IN BLOOD BY AUTOMATED COUNT: 15.2 % (ref 10–15)
ERYTHROCYTE [DISTWIDTH] IN BLOOD BY AUTOMATED COUNT: 15.3 % (ref 10–15)
GFR SERPL CREATININE-BSD FRML MDRD: 80 ML/MIN/1.7M2
GFR SERPL CREATININE-BSD FRML MDRD: 80 ML/MIN/1.7M2
GFR SERPL CREATININE-BSD FRML MDRD: 82 ML/MIN/1.7M2
GFR SERPL CREATININE-BSD FRML MDRD: 85 ML/MIN/1.7M2
GFR SERPL CREATININE-BSD FRML MDRD: 86 ML/MIN/1.7M2
GFR SERPL CREATININE-BSD FRML MDRD: >90 ML/MIN/1.7M2
GLUCOSE SERPL-MCNC: 103 MG/DL (ref 70–99)
GLUCOSE SERPL-MCNC: 71 MG/DL (ref 70–99)
GLUCOSE SERPL-MCNC: 78 MG/DL (ref 70–99)
GLUCOSE SERPL-MCNC: 82 MG/DL (ref 70–99)
GLUCOSE SERPL-MCNC: 83 MG/DL (ref 70–99)
GLUCOSE SERPL-MCNC: 83 MG/DL (ref 70–99)
GLUCOSE SERPL-MCNC: 88 MG/DL (ref 70–99)
GLUCOSE SERPL-MCNC: 88 MG/DL (ref 70–99)
GLUCOSE SERPL-MCNC: 90 MG/DL (ref 70–99)
GLUCOSE UR STRIP-MCNC: NEGATIVE MG/DL
GLUCOSE UR STRIP-MCNC: NEGATIVE MG/DL
HCT VFR BLD AUTO: 38.9 % (ref 35–47)
HCT VFR BLD AUTO: 39.6 % (ref 35–47)
HCT VFR BLD AUTO: 39.7 % (ref 35–47)
HCT VFR BLD AUTO: 39.7 % (ref 35–47)
HCT VFR BLD AUTO: 40.2 % (ref 35–47)
HCT VFR BLD AUTO: 40.3 % (ref 35–47)
HCT VFR BLD AUTO: 41.6 % (ref 35–47)
HGB BLD-MCNC: 13 G/DL (ref 11.7–15.7)
HGB BLD-MCNC: 13 G/DL (ref 11.7–15.7)
HGB BLD-MCNC: 13.3 G/DL (ref 11.7–15.7)
HGB BLD-MCNC: 13.3 G/DL (ref 11.7–15.7)
HGB BLD-MCNC: 13.4 G/DL (ref 11.7–15.7)
HGB BLD-MCNC: 13.5 G/DL (ref 11.7–15.7)
HGB BLD-MCNC: 13.9 G/DL (ref 11.7–15.7)
HGB UR QL STRIP: ABNORMAL
HGB UR QL STRIP: NEGATIVE
IMM GRANULOCYTES # BLD: 0 10E9/L (ref 0–0.4)
IMM GRANULOCYTES NFR BLD: 0.3 %
IMM GRANULOCYTES NFR BLD: 0.4 %
IMM GRANULOCYTES NFR BLD: 0.4 %
IMM GRANULOCYTES NFR BLD: 0.5 %
IMM GRANULOCYTES NFR BLD: 0.5 %
IMM GRANULOCYTES NFR BLD: 0.7 %
IMM GRANULOCYTES NFR BLD: 0.7 %
KETONES UR STRIP-MCNC: NEGATIVE MG/DL
KETONES UR STRIP-MCNC: NEGATIVE MG/DL
LEUKOCYTE ESTERASE UR QL STRIP: ABNORMAL
LEUKOCYTE ESTERASE UR QL STRIP: ABNORMAL
LYMPHOCYTES # BLD AUTO: 1 10E9/L (ref 0.8–5.3)
LYMPHOCYTES # BLD AUTO: 1.1 10E9/L (ref 0.8–5.3)
LYMPHOCYTES # BLD AUTO: 1.1 10E9/L (ref 0.8–5.3)
LYMPHOCYTES # BLD AUTO: 1.3 10E9/L (ref 0.8–5.3)
LYMPHOCYTES # BLD AUTO: 1.4 10E9/L (ref 0.8–5.3)
LYMPHOCYTES NFR BLD AUTO: 16.4 %
LYMPHOCYTES NFR BLD AUTO: 19 %
LYMPHOCYTES NFR BLD AUTO: 20.4 %
LYMPHOCYTES NFR BLD AUTO: 21.3 %
LYMPHOCYTES NFR BLD AUTO: 21.9 %
LYMPHOCYTES NFR BLD AUTO: 23.2 %
LYMPHOCYTES NFR BLD AUTO: 23.6 %
Lab: ABNORMAL
MCH RBC QN AUTO: 31.7 PG (ref 26.5–33)
MCH RBC QN AUTO: 31.9 PG (ref 26.5–33)
MCH RBC QN AUTO: 32.1 PG (ref 26.5–33)
MCH RBC QN AUTO: 32.4 PG (ref 26.5–33)
MCH RBC QN AUTO: 32.9 PG (ref 26.5–33)
MCH RBC QN AUTO: 33.1 PG (ref 26.5–33)
MCH RBC QN AUTO: 33.2 PG (ref 26.5–33)
MCHC RBC AUTO-ENTMCNC: 32.7 G/DL (ref 31.5–36.5)
MCHC RBC AUTO-ENTMCNC: 32.8 G/DL (ref 31.5–36.5)
MCHC RBC AUTO-ENTMCNC: 33.1 G/DL (ref 31.5–36.5)
MCHC RBC AUTO-ENTMCNC: 33.4 G/DL (ref 31.5–36.5)
MCHC RBC AUTO-ENTMCNC: 33.5 G/DL (ref 31.5–36.5)
MCHC RBC AUTO-ENTMCNC: 33.5 G/DL (ref 31.5–36.5)
MCHC RBC AUTO-ENTMCNC: 34.4 G/DL (ref 31.5–36.5)
MCV RBC AUTO: 96 FL (ref 78–100)
MCV RBC AUTO: 96 FL (ref 78–100)
MCV RBC AUTO: 97 FL (ref 78–100)
MCV RBC AUTO: 97 FL (ref 78–100)
MCV RBC AUTO: 98 FL (ref 78–100)
MCV RBC AUTO: 98 FL (ref 78–100)
MCV RBC AUTO: 99 FL (ref 78–100)
MONOCYTES # BLD AUTO: 0.6 10E9/L (ref 0–1.3)
MONOCYTES # BLD AUTO: 0.6 10E9/L (ref 0–1.3)
MONOCYTES # BLD AUTO: 0.7 10E9/L (ref 0–1.3)
MONOCYTES # BLD AUTO: 0.8 10E9/L (ref 0–1.3)
MONOCYTES NFR BLD AUTO: 10.4 %
MONOCYTES NFR BLD AUTO: 11.3 %
MONOCYTES NFR BLD AUTO: 11.5 %
MONOCYTES NFR BLD AUTO: 11.5 %
MONOCYTES NFR BLD AUTO: 11.6 %
MONOCYTES NFR BLD AUTO: 11.7 %
MONOCYTES NFR BLD AUTO: 13.6 %
MUCOUS THREADS #/AREA URNS LPF: PRESENT /LPF
NEUTROPHILS # BLD AUTO: 3.5 10E9/L (ref 1.6–8.3)
NEUTROPHILS # BLD AUTO: 3.7 10E9/L (ref 1.6–8.3)
NEUTROPHILS # BLD AUTO: 3.8 10E9/L (ref 1.6–8.3)
NEUTROPHILS # BLD AUTO: 3.8 10E9/L (ref 1.6–8.3)
NEUTROPHILS # BLD AUTO: 3.9 10E9/L (ref 1.6–8.3)
NEUTROPHILS # BLD AUTO: 4.1 10E9/L (ref 1.6–8.3)
NEUTROPHILS # BLD AUTO: 4.3 10E9/L (ref 1.6–8.3)
NEUTROPHILS NFR BLD AUTO: 63 %
NEUTROPHILS NFR BLD AUTO: 63.1 %
NEUTROPHILS NFR BLD AUTO: 64 %
NEUTROPHILS NFR BLD AUTO: 65.8 %
NEUTROPHILS NFR BLD AUTO: 66.8 %
NEUTROPHILS NFR BLD AUTO: 67.9 %
NEUTROPHILS NFR BLD AUTO: 72 %
NITRATE UR QL: NEGATIVE
NITRATE UR QL: NEGATIVE
NRBC # BLD AUTO: 0 10*3/UL
NRBC BLD AUTO-RTO: 0 /100
PH UR STRIP: 6 PH (ref 5–7)
PH UR STRIP: 6 PH (ref 5–7)
PLATELET # BLD AUTO: 244 10E9/L (ref 150–450)
PLATELET # BLD AUTO: 248 10E9/L (ref 150–450)
PLATELET # BLD AUTO: 252 10E9/L (ref 150–450)
PLATELET # BLD AUTO: 272 10E9/L (ref 150–450)
PLATELET # BLD AUTO: 276 10E9/L (ref 150–450)
PLATELET # BLD AUTO: 281 10E9/L (ref 150–450)
PLATELET # BLD AUTO: 318 10E9/L (ref 150–450)
POTASSIUM SERPL-SCNC: 3.6 MMOL/L (ref 3.4–5.3)
POTASSIUM SERPL-SCNC: 3.7 MMOL/L (ref 3.4–5.3)
POTASSIUM SERPL-SCNC: 3.9 MMOL/L (ref 3.4–5.3)
POTASSIUM SERPL-SCNC: 4 MMOL/L (ref 3.4–5.3)
POTASSIUM SERPL-SCNC: 4.1 MMOL/L (ref 3.4–5.3)
POTASSIUM SERPL-SCNC: 4.2 MMOL/L (ref 3.4–5.3)
PROT SERPL-MCNC: 6.6 G/DL (ref 6.8–8.8)
PROT SERPL-MCNC: 6.7 G/DL (ref 6.8–8.8)
PROT SERPL-MCNC: 6.8 G/DL (ref 6.8–8.8)
PROT SERPL-MCNC: 6.9 G/DL (ref 6.8–8.8)
PROT SERPL-MCNC: 7 G/DL (ref 6.8–8.8)
PROT SERPL-MCNC: 7.3 G/DL (ref 6.8–8.8)
RBC # BLD AUTO: 4.04 10E12/L (ref 3.8–5.2)
RBC # BLD AUTO: 4.05 10E12/L (ref 3.8–5.2)
RBC # BLD AUTO: 4.1 10E12/L (ref 3.8–5.2)
RBC # BLD AUTO: 4.1 10E12/L (ref 3.8–5.2)
RBC # BLD AUTO: 4.11 10E12/L (ref 3.8–5.2)
RBC # BLD AUTO: 4.17 10E12/L (ref 3.8–5.2)
RBC # BLD AUTO: 4.2 10E12/L (ref 3.8–5.2)
RBC #/AREA URNS AUTO: <1 /HPF (ref 0–2)
RBC #/AREA URNS AUTO: >182 /HPF (ref 0–2)
SODIUM SERPL-SCNC: 137 MMOL/L (ref 133–144)
SODIUM SERPL-SCNC: 138 MMOL/L (ref 133–144)
SODIUM SERPL-SCNC: 139 MMOL/L (ref 133–144)
SODIUM SERPL-SCNC: 140 MMOL/L (ref 133–144)
SODIUM SERPL-SCNC: 140 MMOL/L (ref 133–144)
SODIUM SERPL-SCNC: 141 MMOL/L (ref 133–144)
SOURCE: ABNORMAL
SOURCE: ABNORMAL
SP GR UR STRIP: 1.01 (ref 1–1.03)
SP GR UR STRIP: 1.01 (ref 1–1.03)
SPECIMEN SOURCE: ABNORMAL
T3 SERPL-MCNC: 66 NG/DL (ref 60–181)
T4 FREE SERPL-MCNC: 0.95 NG/DL (ref 0.76–1.46)
UROBILINOGEN UR STRIP-MCNC: 0 MG/DL (ref 0–2)
UROBILINOGEN UR STRIP-MCNC: 0 MG/DL (ref 0–2)
WBC # BLD AUTO: 5.5 10E9/L (ref 4–11)
WBC # BLD AUTO: 5.6 10E9/L (ref 4–11)
WBC # BLD AUTO: 5.7 10E9/L (ref 4–11)
WBC # BLD AUTO: 6 10E9/L (ref 4–11)
WBC # BLD AUTO: 6 10E9/L (ref 4–11)
WBC # BLD AUTO: 6.1 10E9/L (ref 4–11)
WBC # BLD AUTO: 6.2 10E9/L (ref 4–11)
WBC #/AREA URNS AUTO: 1 /HPF (ref 0–5)
WBC #/AREA URNS AUTO: >182 /HPF (ref 0–5)
WBC CLUMPS #/AREA URNS HPF: PRESENT /HPF

## 2018-01-01 PROCEDURE — G0463 HOSPITAL OUTPT CLINIC VISIT: HCPCS | Mod: ZF

## 2018-01-01 PROCEDURE — 99214 OFFICE O/P EST MOD 30 MIN: CPT | Mod: ZP | Performed by: PHYSICIAN ASSISTANT

## 2018-01-01 PROCEDURE — 82378 CARCINOEMBRYONIC ANTIGEN: CPT | Performed by: INTERNAL MEDICINE

## 2018-01-01 PROCEDURE — 25000128 H RX IP 250 OP 636: Performed by: INTERNAL MEDICINE

## 2018-01-01 PROCEDURE — 87086 URINE CULTURE/COLONY COUNT: CPT | Performed by: INTERNAL MEDICINE

## 2018-01-01 PROCEDURE — 92133 CPTRZD OPH DX IMG PST SGM ON: CPT | Mod: ZF | Performed by: OPHTHALMOLOGY

## 2018-01-01 PROCEDURE — 80053 COMPREHEN METABOLIC PANEL: CPT | Performed by: INTERNAL MEDICINE

## 2018-01-01 PROCEDURE — 86300 IMMUNOASSAY TUMOR CA 15-3: CPT | Performed by: INTERNAL MEDICINE

## 2018-01-01 PROCEDURE — 36591 DRAW BLOOD OFF VENOUS DEVICE: CPT

## 2018-01-01 PROCEDURE — 87088 URINE BACTERIA CULTURE: CPT | Performed by: INTERNAL MEDICINE

## 2018-01-01 PROCEDURE — 25000128 H RX IP 250 OP 636: Mod: ZF | Performed by: PHYSICIAN ASSISTANT

## 2018-01-01 PROCEDURE — 96372 THER/PROPH/DIAG INJ SC/IM: CPT | Mod: 59

## 2018-01-01 PROCEDURE — 80053 COMPREHEN METABOLIC PANEL: CPT | Performed by: PHYSICIAN ASSISTANT

## 2018-01-01 PROCEDURE — 82378 CARCINOEMBRYONIC ANTIGEN: CPT | Performed by: PHYSICIAN ASSISTANT

## 2018-01-01 PROCEDURE — 25000128 H RX IP 250 OP 636: Mod: ZF | Performed by: INTERNAL MEDICINE

## 2018-01-01 PROCEDURE — 99214 OFFICE O/P EST MOD 30 MIN: CPT | Mod: ZP | Performed by: INTERNAL MEDICINE

## 2018-01-01 PROCEDURE — 85025 COMPLETE CBC W/AUTO DIFF WBC: CPT | Performed by: INTERNAL MEDICINE

## 2018-01-01 PROCEDURE — 96413 CHEMO IV INFUSION 1 HR: CPT

## 2018-01-01 PROCEDURE — 84439 ASSAY OF FREE THYROXINE: CPT | Performed by: INTERNAL MEDICINE

## 2018-01-01 PROCEDURE — 86300 IMMUNOASSAY TUMOR CA 15-3: CPT | Performed by: PHYSICIAN ASSISTANT

## 2018-01-01 PROCEDURE — 25000128 H RX IP 250 OP 636: Mod: JW,ZF | Performed by: PHYSICIAN ASSISTANT

## 2018-01-01 PROCEDURE — 85025 COMPLETE CBC W/AUTO DIFF WBC: CPT | Performed by: PHYSICIAN ASSISTANT

## 2018-01-01 PROCEDURE — G0463 HOSPITAL OUTPT CLINIC VISIT: HCPCS

## 2018-01-01 PROCEDURE — 81001 URINALYSIS AUTO W/SCOPE: CPT | Performed by: INTERNAL MEDICINE

## 2018-01-01 PROCEDURE — 88112 CYTOPATH CELL ENHANCE TECH: CPT | Performed by: INTERNAL MEDICINE

## 2018-01-01 PROCEDURE — 92083 EXTENDED VISUAL FIELD XM: CPT | Mod: ZF | Performed by: OPHTHALMOLOGY

## 2018-01-01 PROCEDURE — 25000128 H RX IP 250 OP 636: Performed by: PHYSICIAN ASSISTANT

## 2018-01-01 PROCEDURE — 87186 SC STD MICRODIL/AGAR DIL: CPT | Performed by: INTERNAL MEDICINE

## 2018-01-01 PROCEDURE — 80048 BASIC METABOLIC PNL TOTAL CA: CPT | Performed by: INTERNAL MEDICINE

## 2018-01-01 PROCEDURE — 84480 ASSAY TRIIODOTHYRONINE (T3): CPT | Performed by: INTERNAL MEDICINE

## 2018-01-01 RX ORDER — IOPAMIDOL 755 MG/ML
70 INJECTION, SOLUTION INTRAVASCULAR ONCE
Status: COMPLETED | OUTPATIENT
Start: 2018-01-01 | End: 2018-01-01

## 2018-01-01 RX ORDER — DIPHENHYDRAMINE HCL 25 MG
50 CAPSULE ORAL
Status: CANCELLED | OUTPATIENT
Start: 2018-01-01

## 2018-01-01 RX ORDER — SODIUM CHLORIDE 9 MG/ML
1000 INJECTION, SOLUTION INTRAVENOUS CONTINUOUS PRN
Status: CANCELLED
Start: 2018-01-01

## 2018-01-01 RX ORDER — EPINEPHRINE 0.3 MG/.3ML
0.3 INJECTION SUBCUTANEOUS EVERY 5 MIN PRN
Status: CANCELLED | OUTPATIENT
Start: 2018-01-01

## 2018-01-01 RX ORDER — EPINEPHRINE 1 MG/ML
0.3 INJECTION, SOLUTION INTRAMUSCULAR; SUBCUTANEOUS EVERY 5 MIN PRN
Status: CANCELLED | OUTPATIENT
Start: 2018-01-01

## 2018-01-01 RX ORDER — LORAZEPAM 2 MG/ML
0.5 INJECTION INTRAMUSCULAR EVERY 4 HOURS PRN
Status: CANCELLED
Start: 2018-01-01

## 2018-01-01 RX ORDER — ACETAMINOPHEN 325 MG/1
650 TABLET ORAL
Status: CANCELLED | OUTPATIENT
Start: 2018-01-01

## 2018-01-01 RX ORDER — ALBUTEROL SULFATE 0.83 MG/ML
2.5 SOLUTION RESPIRATORY (INHALATION)
Status: CANCELLED | OUTPATIENT
Start: 2018-01-01

## 2018-01-01 RX ORDER — METHYLPHENIDATE HYDROCHLORIDE 5 MG/1
TABLET ORAL
Qty: 30 TABLET | Refills: 0 | Status: SHIPPED | OUTPATIENT
Start: 2018-01-01

## 2018-01-01 RX ORDER — HEPARIN SODIUM (PORCINE) LOCK FLUSH IV SOLN 100 UNIT/ML 100 UNIT/ML
5 SOLUTION INTRAVENOUS EVERY 8 HOURS
Status: DISCONTINUED | OUTPATIENT
Start: 2018-01-01 | End: 2018-01-01 | Stop reason: HOSPADM

## 2018-01-01 RX ORDER — ALBUTEROL SULFATE 90 UG/1
1-2 AEROSOL, METERED RESPIRATORY (INHALATION)
Status: CANCELLED
Start: 2018-01-01

## 2018-01-01 RX ORDER — METHYLPREDNISOLONE SODIUM SUCCINATE 125 MG/2ML
125 INJECTION, POWDER, LYOPHILIZED, FOR SOLUTION INTRAMUSCULAR; INTRAVENOUS
Status: CANCELLED
Start: 2018-01-01

## 2018-01-01 RX ORDER — DIPHENHYDRAMINE HYDROCHLORIDE 50 MG/ML
50 INJECTION INTRAMUSCULAR; INTRAVENOUS
Status: CANCELLED
Start: 2018-01-01

## 2018-01-01 RX ORDER — HEPARIN SODIUM (PORCINE) LOCK FLUSH IV SOLN 100 UNIT/ML 100 UNIT/ML
5 SOLUTION INTRAVENOUS EVERY 8 HOURS
Status: CANCELLED | OUTPATIENT
Start: 2018-01-01

## 2018-01-01 RX ORDER — MEPERIDINE HYDROCHLORIDE 25 MG/ML
25 INJECTION INTRAMUSCULAR; INTRAVENOUS; SUBCUTANEOUS EVERY 30 MIN PRN
Status: CANCELLED | OUTPATIENT
Start: 2018-01-01

## 2018-01-01 RX ORDER — GADOBUTROL 604.72 MG/ML
7.5 INJECTION INTRAVENOUS ONCE
Status: COMPLETED | OUTPATIENT
Start: 2018-01-01 | End: 2018-01-01

## 2018-01-01 RX ORDER — METHYLPHENIDATE HYDROCHLORIDE 5 MG/1
TABLET ORAL
Qty: 30 TABLET | Refills: 0 | Status: SHIPPED | OUTPATIENT
Start: 2018-01-01 | End: 2018-01-01

## 2018-01-01 RX ORDER — HEPARIN SODIUM (PORCINE) LOCK FLUSH IV SOLN 100 UNIT/ML 100 UNIT/ML
5 SOLUTION INTRAVENOUS DAILY PRN
Status: DISCONTINUED | OUTPATIENT
Start: 2018-01-01 | End: 2018-01-01 | Stop reason: HOSPADM

## 2018-01-01 RX ORDER — EPINEPHRINE 1 MG/ML
0.3 INJECTION, SOLUTION, CONCENTRATE INTRAVENOUS EVERY 5 MIN PRN
Status: CANCELLED | OUTPATIENT
Start: 2018-01-01

## 2018-01-01 RX ORDER — HEPARIN SODIUM (PORCINE) LOCK FLUSH IV SOLN 100 UNIT/ML 100 UNIT/ML
5 SOLUTION INTRAVENOUS
Status: COMPLETED | OUTPATIENT
Start: 2018-01-01 | End: 2018-01-01

## 2018-01-01 RX ORDER — EXEMESTANE 25 MG/1
25 TABLET ORAL DAILY
Qty: 90 TABLET | Refills: 3 | Status: SHIPPED | OUTPATIENT
Start: 2018-01-01

## 2018-01-01 RX ORDER — EXEMESTANE 25 MG/1
25 TABLET ORAL DAILY
Qty: 30 TABLET | Refills: 3 | Status: SHIPPED | OUTPATIENT
Start: 2018-01-01 | End: 2018-01-01

## 2018-01-01 RX ORDER — CARBOXYMETHYLCELLULOSE SODIUM 5 MG/ML
1 SOLUTION/ DROPS OPHTHALMIC 4 TIMES DAILY PRN
Qty: 1 BOTTLE | Refills: 11 | Status: SHIPPED | OUTPATIENT
Start: 2018-01-01

## 2018-01-01 RX ORDER — HEPARIN SODIUM (PORCINE) LOCK FLUSH IV SOLN 100 UNIT/ML 100 UNIT/ML
5 SOLUTION INTRAVENOUS EVERY 8 HOURS PRN
Status: DISCONTINUED | OUTPATIENT
Start: 2018-01-01 | End: 2018-01-01 | Stop reason: HOSPADM

## 2018-01-01 RX ORDER — HEPARIN SODIUM (PORCINE) LOCK FLUSH IV SOLN 100 UNIT/ML 100 UNIT/ML
5 SOLUTION INTRAVENOUS ONCE
Status: COMPLETED | OUTPATIENT
Start: 2018-01-01 | End: 2018-01-01

## 2018-01-01 RX ORDER — HEPARIN SODIUM (PORCINE) LOCK FLUSH IV SOLN 100 UNIT/ML 100 UNIT/ML
500 SOLUTION INTRAVENOUS ONCE
Status: COMPLETED | OUTPATIENT
Start: 2018-01-01 | End: 2018-01-01

## 2018-01-01 RX ORDER — IOPAMIDOL 755 MG/ML
72 INJECTION, SOLUTION INTRAVASCULAR ONCE
Status: COMPLETED | OUTPATIENT
Start: 2018-01-01 | End: 2018-01-01

## 2018-01-01 RX ORDER — MINERAL OIL, WHITE PETROLATUM .03; .94 G/G; G/G
1 OINTMENT OPHTHALMIC AT BEDTIME
Qty: 1 TUBE | Refills: 11 | Status: SHIPPED | OUTPATIENT
Start: 2018-01-01

## 2018-01-01 RX ORDER — TIMOLOL MALEATE 5 MG/ML
1 SOLUTION/ DROPS OPHTHALMIC 2 TIMES DAILY
Qty: 1 BOTTLE | Refills: 3 | Status: SHIPPED | OUTPATIENT
Start: 2018-01-01

## 2018-01-01 RX ORDER — CIPROFLOXACIN 500 MG/1
500 TABLET, FILM COATED ORAL 2 TIMES DAILY
Qty: 14 TABLET | Refills: 0 | Status: SHIPPED | OUTPATIENT
Start: 2018-01-01 | End: 2018-01-01

## 2018-01-01 RX ADMIN — Medication 5 ML: at 13:37

## 2018-01-01 RX ADMIN — GADOBUTROL 5 ML: 604.72 INJECTION INTRAVENOUS at 11:19

## 2018-01-01 RX ADMIN — SODIUM CHLORIDE, PRESERVATIVE FREE 5 ML: 5 INJECTION INTRAVENOUS at 12:19

## 2018-01-01 RX ADMIN — Medication 6 ML: at 12:00

## 2018-01-01 RX ADMIN — Medication 5 ML: at 12:00

## 2018-01-01 RX ADMIN — SODIUM CHLORIDE 250 ML: 9 INJECTION, SOLUTION INTRAVENOUS at 13:57

## 2018-01-01 RX ADMIN — DENOSUMAB 120 MG: 120 INJECTION SUBCUTANEOUS at 13:15

## 2018-01-01 RX ADMIN — SODIUM CHLORIDE, PRESERVATIVE FREE 5 ML: 5 INJECTION INTRAVENOUS at 10:26

## 2018-01-01 RX ADMIN — IOPAMIDOL 70 ML: 755 INJECTION, SOLUTION INTRAVASCULAR at 10:18

## 2018-01-01 RX ADMIN — SODIUM CHLORIDE, PRESERVATIVE FREE 5 ML: 5 INJECTION INTRAVENOUS at 12:03

## 2018-01-01 RX ADMIN — GADOBUTROL 5 ML: 604.72 INJECTION INTRAVENOUS at 12:07

## 2018-01-01 RX ADMIN — TRASTUZUMAB 300 MG: 150 INJECTION, POWDER, LYOPHILIZED, FOR SOLUTION INTRAVENOUS at 10:26

## 2018-01-01 RX ADMIN — DENOSUMAB 120 MG: 120 INJECTION SUBCUTANEOUS at 13:35

## 2018-01-01 RX ADMIN — DENOSUMAB 120 MG: 120 INJECTION SUBCUTANEOUS at 11:42

## 2018-01-01 RX ADMIN — SODIUM CHLORIDE, PRESERVATIVE FREE 5 ML: 5 INJECTION INTRAVENOUS at 12:00

## 2018-01-01 RX ADMIN — SODIUM CHLORIDE 250 ML: 9 INJECTION, SOLUTION INTRAVENOUS at 13:09

## 2018-01-01 RX ADMIN — SODIUM CHLORIDE, PRESERVATIVE FREE 5 ML: 5 INJECTION INTRAVENOUS at 13:53

## 2018-01-01 RX ADMIN — TRASTUZUMAB 300 MG: 150 INJECTION, POWDER, LYOPHILIZED, FOR SOLUTION INTRAVENOUS at 13:15

## 2018-01-01 RX ADMIN — TRASTUZUMAB 300 MG: 150 INJECTION, POWDER, LYOPHILIZED, FOR SOLUTION INTRAVENOUS at 10:24

## 2018-01-01 RX ADMIN — SODIUM CHLORIDE 250 ML: 9 INJECTION, SOLUTION INTRAVENOUS at 11:27

## 2018-01-01 RX ADMIN — SODIUM CHLORIDE, PRESERVATIVE FREE 5 ML: 5 INJECTION INTRAVENOUS at 11:04

## 2018-01-01 RX ADMIN — DENOSUMAB 120 MG: 120 INJECTION SUBCUTANEOUS at 11:06

## 2018-01-01 RX ADMIN — SODIUM CHLORIDE, PRESERVATIVE FREE 5 ML: 5 INJECTION INTRAVENOUS at 14:38

## 2018-01-01 RX ADMIN — HEPARIN SODIUM (PORCINE) LOCK FLUSH IV SOLN 100 UNIT/ML 500 UNITS: 100 SOLUTION at 13:42

## 2018-01-01 RX ADMIN — SODIUM CHLORIDE, PRESERVATIVE FREE 5 ML: 5 INJECTION INTRAVENOUS at 13:51

## 2018-01-01 RX ADMIN — DENOSUMAB 120 MG: 120 INJECTION SUBCUTANEOUS at 13:19

## 2018-01-01 RX ADMIN — TRASTUZUMAB 300 MG: 150 INJECTION, POWDER, LYOPHILIZED, FOR SOLUTION INTRAVENOUS at 14:00

## 2018-01-01 RX ADMIN — TRASTUZUMAB 300 MG: 150 INJECTION, POWDER, LYOPHILIZED, FOR SOLUTION INTRAVENOUS at 11:27

## 2018-01-01 RX ADMIN — SODIUM CHLORIDE, PRESERVATIVE FREE 5 ML: 5 INJECTION INTRAVENOUS at 08:34

## 2018-01-01 RX ADMIN — SODIUM CHLORIDE, PRESERVATIVE FREE 5 ML: 5 INJECTION INTRAVENOUS at 09:28

## 2018-01-01 RX ADMIN — SODIUM CHLORIDE, PRESERVATIVE FREE 5 ML: 5 INJECTION INTRAVENOUS at 09:37

## 2018-01-01 RX ADMIN — TRASTUZUMAB 430 MG: 150 INJECTION, POWDER, LYOPHILIZED, FOR SOLUTION INTRAVENOUS at 12:05

## 2018-01-01 RX ADMIN — TRASTUZUMAB 300 MG: 150 INJECTION, POWDER, LYOPHILIZED, FOR SOLUTION INTRAVENOUS at 11:29

## 2018-01-01 RX ADMIN — SODIUM CHLORIDE, PRESERVATIVE FREE 5 ML: 5 INJECTION INTRAVENOUS at 11:54

## 2018-01-01 RX ADMIN — SODIUM CHLORIDE, PRESERVATIVE FREE 5 ML: 5 INJECTION INTRAVENOUS at 14:35

## 2018-01-01 RX ADMIN — Medication 5 ML: at 09:54

## 2018-01-01 RX ADMIN — SODIUM CHLORIDE, PRESERVATIVE FREE 5 ML: 5 INJECTION INTRAVENOUS at 09:46

## 2018-01-01 RX ADMIN — SODIUM CHLORIDE, PRESERVATIVE FREE 5 ML: 5 INJECTION INTRAVENOUS at 11:31

## 2018-01-01 RX ADMIN — SODIUM CHLORIDE, PRESERVATIVE FREE 5 ML: 5 INJECTION INTRAVENOUS at 10:55

## 2018-01-01 RX ADMIN — TRASTUZUMAB 300 MG: 150 INJECTION, POWDER, LYOPHILIZED, FOR SOLUTION INTRAVENOUS at 13:09

## 2018-01-01 RX ADMIN — IOPAMIDOL 72 ML: 755 INJECTION, SOLUTION INTRAVASCULAR at 10:58

## 2018-01-01 RX ADMIN — HEPARIN SODIUM (PORCINE) LOCK FLUSH IV SOLN 100 UNIT/ML 5 ML: 100 SOLUTION at 11:14

## 2018-01-01 ASSESSMENT — VISUAL ACUITY
OD_SC: J2+
OD_SC: 20/20
OS_SC+: -2
OS_SC+: -1
OD_SC: 20/25
METHOD: SNELLEN - LINEAR
METHOD: SNELLEN - LINEAR
OS_SC: 20/50
OS_PH_SC: 20/40
OS_SC+: -1
OD_SC+: -2
METHOD: SNELLEN - LINEAR
OS_SC: 20/40
OD_SC: 20/20
OD_SC+: -2
OS_SC: J2+
OS_SC: 20/30

## 2018-01-01 ASSESSMENT — REFRACTION_MANIFEST
OS_ADD: +1.25
OD_ADD: +1.25
OD_AXIS: 010
OS_AXIS: 135
OD_CYLINDER: +0.75
OS_CYLINDER: +1.00
OD_SPHERE: -0.50
OS_SPHERE: -0.03

## 2018-01-01 ASSESSMENT — PAIN SCALES - GENERAL
PAINLEVEL: NO PAIN (0)
PAINLEVEL: MILD PAIN (3)
PAINLEVEL: MILD PAIN (3)
PAINLEVEL: MILD PAIN (2)
PAINLEVEL: MODERATE PAIN (4)
PAINLEVEL: MILD PAIN (3)
PAINLEVEL: MILD PAIN (3)
PAINLEVEL: MILD PAIN (2)
PAINLEVEL: MILD PAIN (3)
PAINLEVEL: NO PAIN (0)

## 2018-01-01 ASSESSMENT — ENCOUNTER SYMPTOMS
HEMOPTYSIS: 0
LOSS OF CONSCIOUSNESS: 0
BRUISES/BLEEDS EASILY: 0
HEADACHES: 1
NIGHT SWEATS: 0
NECK PAIN: 1
TINGLING: 1
SHORTNESS OF BREATH: 1
EYE REDNESS: 0
POLYDIPSIA: 0
DIZZINESS: 1
SWOLLEN GLANDS: 0
WEAKNESS: 1
JOINT SWELLING: 0
ALTERED TEMPERATURE REGULATION: 0
COUGH: 1
TREMORS: 1
HALLUCINATIONS: 0
SEIZURES: 0
POSTURAL DYSPNEA: 0
WEIGHT GAIN: 0
MYALGIAS: 1
SPUTUM PRODUCTION: 0
EYE PAIN: 1
WEIGHT LOSS: 0
BACK PAIN: 1
DISTURBANCES IN COORDINATION: 1
DECREASED APPETITE: 1
PARALYSIS: 0
EYE WATERING: 0
MEMORY LOSS: 1
CHILLS: 0
WHEEZING: 0
FATIGUE: 1
NUMBNESS: 1
DYSPNEA ON EXERTION: 1
ARTHRALGIAS: 1
MUSCLE WEAKNESS: 1
EYE IRRITATION: 1
POLYPHAGIA: 0
DOUBLE VISION: 1
MUSCLE CRAMPS: 1
STIFFNESS: 1
SNORES LOUDLY: 0
SPEECH CHANGE: 0
BREAST MASS: 0
BREAST PAIN: 1
INCREASED ENERGY: 0
FEVER: 0

## 2018-01-01 ASSESSMENT — CONF VISUAL FIELD
OS_NORMAL: 1
OD_NORMAL: 1
OS_NORMAL: 1
OD_NORMAL: 1

## 2018-01-01 ASSESSMENT — PACHYMETRY
OS_CT(UM): 582
OD_CT(UM): 590

## 2018-01-01 ASSESSMENT — TONOMETRY
IOP_METHOD: TONOPEN
OD_IOP_MMHG: 16
IOP_METHOD: ICARE
OD_IOP_MMHG: 18
OS_IOP_MMHG: 18
OS_IOP_MMHG: 17

## 2018-01-01 ASSESSMENT — EXTERNAL EXAM - RIGHT EYE
OD_EXAM: NORMAL

## 2018-01-01 ASSESSMENT — SLIT LAMP EXAM - LIDS
COMMENTS: BLEPHARITIS

## 2018-01-01 ASSESSMENT — CUP TO DISC RATIO
OS_RATIO: 0.6
OD_RATIO: 0.75
OD_RATIO: 0.75
OS_RATIO: 0.6

## 2018-01-05 ENCOUNTER — INFUSION THERAPY VISIT (OUTPATIENT)
Dept: ONCOLOGY | Facility: CLINIC | Age: 63
End: 2018-01-05
Attending: INTERNAL MEDICINE
Payer: MEDICARE

## 2018-01-05 ENCOUNTER — APPOINTMENT (OUTPATIENT)
Dept: LAB | Facility: CLINIC | Age: 63
End: 2018-01-05
Attending: INTERNAL MEDICINE
Payer: MEDICARE

## 2018-01-05 VITALS
HEART RATE: 78 BPM | TEMPERATURE: 97.9 F | RESPIRATION RATE: 20 BRPM | WEIGHT: 118.7 LBS | DIASTOLIC BLOOD PRESSURE: 83 MMHG | BODY MASS INDEX: 21.71 KG/M2 | SYSTOLIC BLOOD PRESSURE: 135 MMHG | OXYGEN SATURATION: 96 %

## 2018-01-05 DIAGNOSIS — C79.31 BRAIN METASTASIS: ICD-10-CM

## 2018-01-05 DIAGNOSIS — D49.89 NEOPLASM OF UNSPECIFIED BEHAVIOR OF OTHER SPECIFIED SITES: ICD-10-CM

## 2018-01-05 DIAGNOSIS — C50.919 CARCINOMA OF BREAST METASTATIC TO MULTIPLE SITES, UNSPECIFIED LATERALITY (H): Primary | ICD-10-CM

## 2018-01-05 DIAGNOSIS — C50.919 METASTATIC BREAST CANCER: Primary | ICD-10-CM

## 2018-01-05 DIAGNOSIS — C79.51 BONE METASTASIS: ICD-10-CM

## 2018-01-05 DIAGNOSIS — H53.002 AMBLYOPIA OF LEFT EYE: ICD-10-CM

## 2018-01-05 DIAGNOSIS — C50.919 METASTATIC BREAST CANCER: ICD-10-CM

## 2018-01-05 DIAGNOSIS — C50.919 CARCINOMA OF BREAST METASTATIC TO MULTIPLE SITES, UNSPECIFIED LATERALITY (H): ICD-10-CM

## 2018-01-05 DIAGNOSIS — R11.0 NAUSEA: ICD-10-CM

## 2018-01-05 LAB
ALBUMIN SERPL-MCNC: 3.6 G/DL (ref 3.4–5)
ALP SERPL-CCNC: 124 U/L (ref 40–150)
ALT SERPL W P-5'-P-CCNC: 20 U/L (ref 0–50)
ANION GAP SERPL CALCULATED.3IONS-SCNC: 8 MMOL/L (ref 3–14)
AST SERPL W P-5'-P-CCNC: 21 U/L (ref 0–45)
BASOPHILS # BLD AUTO: 0.1 10E9/L (ref 0–0.2)
BASOPHILS NFR BLD AUTO: 1.3 %
BILIRUB SERPL-MCNC: 0.4 MG/DL (ref 0.2–1.3)
BUN SERPL-MCNC: 11 MG/DL (ref 7–30)
CALCIUM SERPL-MCNC: 8.7 MG/DL (ref 8.5–10.1)
CHLORIDE SERPL-SCNC: 103 MMOL/L (ref 94–109)
CO2 SERPL-SCNC: 26 MMOL/L (ref 20–32)
CREAT SERPL-MCNC: 0.58 MG/DL (ref 0.52–1.04)
DIFFERENTIAL METHOD BLD: ABNORMAL
EOSINOPHIL # BLD AUTO: 0 10E9/L (ref 0–0.7)
EOSINOPHIL NFR BLD AUTO: 0 %
ERYTHROCYTE [DISTWIDTH] IN BLOOD BY AUTOMATED COUNT: 16.1 % (ref 10–15)
GFR SERPL CREATININE-BSD FRML MDRD: >90 ML/MIN/1.7M2
GLUCOSE SERPL-MCNC: 90 MG/DL (ref 70–99)
HCT VFR BLD AUTO: 36.8 % (ref 35–47)
HGB BLD-MCNC: 12.1 G/DL (ref 11.7–15.7)
IMM GRANULOCYTES # BLD: 0.1 10E9/L (ref 0–0.4)
IMM GRANULOCYTES NFR BLD: 1.1 %
LYMPHOCYTES # BLD AUTO: 0.9 10E9/L (ref 0.8–5.3)
LYMPHOCYTES NFR BLD AUTO: 16.9 %
MCH RBC QN AUTO: 32.4 PG (ref 26.5–33)
MCHC RBC AUTO-ENTMCNC: 32.9 G/DL (ref 31.5–36.5)
MCV RBC AUTO: 98 FL (ref 78–100)
MONOCYTES # BLD AUTO: 0.8 10E9/L (ref 0–1.3)
MONOCYTES NFR BLD AUTO: 14.2 %
NEUTROPHILS # BLD AUTO: 3.7 10E9/L (ref 1.6–8.3)
NEUTROPHILS NFR BLD AUTO: 66.5 %
NRBC # BLD AUTO: 0 10*3/UL
NRBC BLD AUTO-RTO: 0 /100
PLATELET # BLD AUTO: 286 10E9/L (ref 150–450)
POTASSIUM SERPL-SCNC: 3.8 MMOL/L (ref 3.4–5.3)
PROT SERPL-MCNC: 7 G/DL (ref 6.8–8.8)
RBC # BLD AUTO: 3.74 10E12/L (ref 3.8–5.2)
SODIUM SERPL-SCNC: 137 MMOL/L (ref 133–144)
WBC # BLD AUTO: 5.5 10E9/L (ref 4–11)

## 2018-01-05 PROCEDURE — 96413 CHEMO IV INFUSION 1 HR: CPT

## 2018-01-05 PROCEDURE — 25000128 H RX IP 250 OP 636: Mod: ZF | Performed by: PHYSICIAN ASSISTANT

## 2018-01-05 PROCEDURE — 80053 COMPREHEN METABOLIC PANEL: CPT | Performed by: INTERNAL MEDICINE

## 2018-01-05 PROCEDURE — 96372 THER/PROPH/DIAG INJ SC/IM: CPT | Mod: 59

## 2018-01-05 PROCEDURE — 85025 COMPLETE CBC W/AUTO DIFF WBC: CPT | Performed by: INTERNAL MEDICINE

## 2018-01-05 PROCEDURE — 25000128 H RX IP 250 OP 636: Mod: ZF | Performed by: INTERNAL MEDICINE

## 2018-01-05 PROCEDURE — 99214 OFFICE O/P EST MOD 30 MIN: CPT | Mod: ZP | Performed by: PHYSICIAN ASSISTANT

## 2018-01-05 PROCEDURE — G0463 HOSPITAL OUTPT CLINIC VISIT: HCPCS | Mod: ZF

## 2018-01-05 RX ORDER — DIPHENHYDRAMINE HYDROCHLORIDE 50 MG/ML
50 INJECTION INTRAMUSCULAR; INTRAVENOUS
Status: CANCELLED
Start: 2018-01-05

## 2018-01-05 RX ORDER — METHYLPREDNISOLONE SODIUM SUCCINATE 125 MG/2ML
125 INJECTION, POWDER, LYOPHILIZED, FOR SOLUTION INTRAMUSCULAR; INTRAVENOUS
Status: CANCELLED
Start: 2018-01-05

## 2018-01-05 RX ORDER — ONDANSETRON 4 MG/1
4 TABLET, FILM COATED ORAL EVERY 8 HOURS PRN
Qty: 18 TABLET | Refills: 3 | Status: SHIPPED | OUTPATIENT
Start: 2018-01-05

## 2018-01-05 RX ORDER — HEPARIN SODIUM (PORCINE) LOCK FLUSH IV SOLN 100 UNIT/ML 100 UNIT/ML
5 SOLUTION INTRAVENOUS EVERY 8 HOURS
Status: DISCONTINUED | OUTPATIENT
Start: 2018-01-05 | End: 2018-01-01 | Stop reason: HOSPADM

## 2018-01-05 RX ORDER — MEPERIDINE HYDROCHLORIDE 25 MG/ML
25 INJECTION INTRAMUSCULAR; INTRAVENOUS; SUBCUTANEOUS EVERY 30 MIN PRN
Status: CANCELLED | OUTPATIENT
Start: 2018-01-05

## 2018-01-05 RX ORDER — LORAZEPAM 2 MG/ML
0.5 INJECTION INTRAMUSCULAR EVERY 4 HOURS PRN
Status: CANCELLED
Start: 2018-01-05

## 2018-01-05 RX ORDER — HEPARIN SODIUM (PORCINE) LOCK FLUSH IV SOLN 100 UNIT/ML 100 UNIT/ML
5 SOLUTION INTRAVENOUS EVERY 8 HOURS
Status: DISCONTINUED | OUTPATIENT
Start: 2018-01-05 | End: 2018-01-05 | Stop reason: HOSPADM

## 2018-01-05 RX ORDER — ALBUTEROL SULFATE 90 UG/1
1-2 AEROSOL, METERED RESPIRATORY (INHALATION)
Status: CANCELLED
Start: 2018-01-05

## 2018-01-05 RX ORDER — DIPHENHYDRAMINE HCL 25 MG
50 CAPSULE ORAL
Status: CANCELLED | OUTPATIENT
Start: 2018-01-05

## 2018-01-05 RX ORDER — SODIUM CHLORIDE 9 MG/ML
1000 INJECTION, SOLUTION INTRAVENOUS CONTINUOUS PRN
Status: CANCELLED
Start: 2018-01-05

## 2018-01-05 RX ORDER — EPINEPHRINE 1 MG/ML
0.3 INJECTION, SOLUTION, CONCENTRATE INTRAVENOUS EVERY 5 MIN PRN
Status: CANCELLED | OUTPATIENT
Start: 2018-01-05

## 2018-01-05 RX ORDER — HEPARIN SODIUM (PORCINE) LOCK FLUSH IV SOLN 100 UNIT/ML 100 UNIT/ML
5 SOLUTION INTRAVENOUS EVERY 8 HOURS
Status: CANCELLED | OUTPATIENT
Start: 2018-01-05

## 2018-01-05 RX ORDER — ACETAMINOPHEN 325 MG/1
650 TABLET ORAL
Status: CANCELLED | OUTPATIENT
Start: 2018-01-05

## 2018-01-05 RX ORDER — EPINEPHRINE 0.3 MG/.3ML
0.3 INJECTION SUBCUTANEOUS EVERY 5 MIN PRN
Status: CANCELLED | OUTPATIENT
Start: 2018-01-05

## 2018-01-05 RX ORDER — ALBUTEROL SULFATE 0.83 MG/ML
2.5 SOLUTION RESPIRATORY (INHALATION)
Status: CANCELLED | OUTPATIENT
Start: 2018-01-05

## 2018-01-05 RX ADMIN — SODIUM CHLORIDE, PRESERVATIVE FREE 5 ML: 5 INJECTION INTRAVENOUS at 13:06

## 2018-01-05 RX ADMIN — SODIUM CHLORIDE, PRESERVATIVE FREE 5 ML: 5 INJECTION INTRAVENOUS at 10:25

## 2018-01-05 RX ADMIN — DENOSUMAB 120 MG: 120 INJECTION SUBCUTANEOUS at 13:16

## 2018-01-05 RX ADMIN — TRASTUZUMAB 300 MG: 150 INJECTION, POWDER, LYOPHILIZED, FOR SOLUTION INTRAVENOUS at 12:34

## 2018-01-05 ASSESSMENT — PAIN SCALES - GENERAL: PAINLEVEL: MILD PAIN (2)

## 2018-01-05 NOTE — NURSING NOTE
"Oncology Rooming Note    January 5, 2018 10:35 AM   Keren Erickson is a 62 year old female who presents for:    Chief Complaint   Patient presents with     Oncology Clinic Visit     F/U Breast ca     Port Draw     Port labs collected by RN.     Initial Vitals: /83 (BP Location: Right arm, Patient Position: Sitting)  Pulse 78  Temp 97.9  F (36.6  C) (Oral)  Resp 20  Wt 53.8 kg (118 lb 11.2 oz)  SpO2 96%  BMI 21.71 kg/m2 Estimated body mass index is 21.71 kg/(m^2) as calculated from the following:    Height as of 12/14/17: 1.575 m (5' 2\").    Weight as of this encounter: 53.8 kg (118 lb 11.2 oz). Body surface area is 1.53 meters squared.  Mild Pain (2) Comment: RT Hip and rt shoulder and back   No LMP recorded. Patient is postmenopausal.  Allergies reviewed: Yes  Medications reviewed: Yes    Medications: MEDICATION REFILLS NEEDED TODAY. Provider was notified.( Lovenox needed)  Pharmacy name entered into [facility]: [facility] [place], [place] - [address] [place] [address] [phone]    Clinical concerns: none Carmen was NOT notified.    10 minutes for nursing intake (face to face time)     ZANA JAMA LPN            "

## 2018-01-05 NOTE — PROGRESS NOTES
HEMATOLOGY/ONCOLOGY PROGRESS NOTE  Jan 5, 2018    REASON FOR VISIT: Metastatic breast cancer prior to Herceptin    DIAGNOSIS:   The patient is a 60-year-old female who presented to the hospital initially in 09/2013 with complaints of dyspnea. Workup revealed a large pericardial effusion and pleural effusion. Physical examination revealed a large untreated left breast mass encompassing the entire left breast. Biopsies revealed these were ER, TX and HER2-positive breast cancer. She had a thoracentesis and pericardial sclerosis performed. She was originally on Arimidex and Herceptin. In 01/2014, she was switched to tamoxifen and Herceptin due to arthralgias, and she ultimately developed progressive disease and was switched to Faslodex and Herceptin. In 01/2015, she was found to have progressive disease and was switched to T-DM1.     Initially when she was seen in late 02/2015, she complained of some V5 sensory deficit. This was subjective. I was not able to elicit this on examination. This persisted and further workup was performed with a brain MRI. This revealed what was initially thought to be 3 punctate brain metastases. She originally saw Radiation Oncology and Neurosurgery as well as underwent a lumbar puncture. Cytology from the lumbar puncture showed no evidence of leptomeningeal disease. She was treated with Gamma Knife radiation to 6 lesions, performed on 04/16/2015.  She initiated therapy with TDM1 in January 2015 and continued this through 6/26/2015 with overall stable disease. She has since been on a break from treatment. The patient presented earlier this month with recurrent shortness of breath.  Imaging revealed recurrent right-sided pleural effusion.  She has since undergone thoracentesis with cytology pending.  Clinically, however, there is evidence of disease progression. PET scan performed on 11/25/2015 demonstrated progression of disease at multiple sites. She restarted TDM1 on 11/27/2015, however  "experienced a mild transfusion reaction with shortness of breath and chest tightness, resolved upon stopping TDM1 and 125 mg of SoluMedrol. She had progression of disease on repeat imaging 2/17/2016, as well as new brain metastases. She started TDM1 with Perjeta on 3/4/2016. She underwent gamma knife to brain mets on 3/8/16.       In late May, she was found to have progressive brain metastases.  She received whole brain radiation.  She had a follow up brain MRI August 2016 that was stable.     In September 2016, she enrolled on [place]  trial and was randomized to the standard of care arm/Physician's Choice; started on gemzar and herceptin. She was recently hospitalized 1/27-2/3/17 for presumed HCAP. Trial was suspended. She continued on gemzar and heceptin with stable disease. Last restaging was 4/7/17 and stable. Gemzar was placed on hold from 4/10/2017 through 6/22/17 so that she could recover from pneumonia.    INTERVAL HISTORY:  Dominga states that her energy and fatigue have really improved off Gemzar. She is able to now walk a few steps with her walker. She denies any recent falls. She states her breathing has improved. She denies any cough, pleuritic pain or hemoptysis. She is on lovenox twice daily and does not have any bleeding/bruising concerns. She has felt less dizziness. She has not needed any meclizine. Appetite is low--she takes about 2 hours to eat a meal as she states she gets discomfort in her sternal area if she eats any faster. Denies heartburn, dysphagia, odynophagia. She has nausea almost every day but does not take compazine because it makes her dizzy. She denies any vomiting. Denies abdominal pain, diarrhea, constipation. She has history of left eye nystagmus and \"lazy eye\" that she feels has gotten worse in the past couple months. She attributes this as well to gemzar. She does not have an ophthalmologist or someone who follows this. She has tingling and numbness in right upper " extremity that she states is stable. She has intermittent left sided headaches a couple times per week that she states are getting better. She did complete dental work--stated she had 3 caries that were filled.     Denies fever, chills, chest pain, dysuria, back pain. Does have diffuse joint aches but these have recently improved.    PHYSICAL EXAMINATION:     There were no vitals taken for this visit.    Wt Readings from Last 4 Encounters:   12/15/17 53.1 kg (117 lb)   12/14/17 54.3 kg (119 lb 11.2 oz)   12/01/17 54.6 kg (120 lb 6.4 oz)   11/24/17 54.3 kg (119 lb 11.2 oz)     Constitutional: Alert, oriented, cachectic female in no visible distress. In a wheelchair  Eyes: Anicteric sclerae. PERRL. Left eye does not track in left lateral direction but stays midline, will track medially. No visible nystagmus. Other EOM intact.  ENT/Mouth: OM moist and pink without lesions or thrush.    CV: RRR, no murmurs appreciated.  Resp: CTAB slightly diminished R base  Extremities: No lower extremity edema appreciated.  Neuro: CN II-XII grossly intact except for ocular movements as noted above. Strength in bilateral UE and LE 5/5. Grossly nonfocal.    LABS:   Results for HEIDI RUIZ (MRN [id number]) as of 1/5/2018 13:14   1/5/2018 10:26   Sodium 137   Potassium 3.8   Chloride 103   Carbon Dioxide 26   Urea Nitrogen 11   Creatinine 0.58   GFR Estimate >90   GFR Estimate If Black >90   Calcium 8.7   Anion Gap 8   Albumin 3.6   Protein Total 7.0   Bilirubin Total 0.4   Alkaline Phosphatase 124   ALT 20   AST 21   Glucose 90   WBC 5.5   Hemoglobin 12.1   Hematocrit 36.8   Platelet Count 286   RBC Count 3.74 (L)   MCV 98   MCH 32.4   MCHC 32.9   RDW 16.1 (H)   Diff Method Automated Method   % Neutrophils 66.5   % Lymphocytes 16.9   % Monocytes 14.2   % Eosinophils 0.0   % Basophils 1.3   % Immature Granulocytes 1.1   Nucleated RBCs 0   Absolute Neutrophil 3.7   Absolute Lymphocytes 0.9   Absolute Monocytes 0.8   Absolute  Eosinophils 0.0   Absolute Basophils 0.1   Abs Immature Granulocytes 0.1   Absolute Nucleated RBC 0.0         IMPRESSION/PLAN:  Dominga is a 62-year-old female with history of metastatic ER-positive, HER-2 positive breast cancer, with involvement of the bone, history of pleural effusions treated with pleurodesis and PleurX placement, and with treated brain metastases, previously with stable disease on TDM1, with progressive disease after treatment break.  She was started on [place]  clinical trial and randomized to physician's choice, and started on Gemzar/Herceptin on 9/29/16.    1. Breast cancer:  CT scan of the chest, abdomen and pelvis and a brain MRI from 12/14 revealed no evidence of progressive disease. Dr. aHrper would like her to continue on her Gemzar day 1 and day 8 in conjunction with Herceptin on day 1 every 21 days. However, given her overall weakness and fatigue as well as declining physical stamina, decision made to holding her Gemzar for  6 weeks.  Last dose of Gemzar was 12/14. Patient states today she would like to continue with Herceptin alone as her fatigue and weakness have improved.  Per Dr. Harper, if she is going to remain off therapy longer than 6 weeks, it would be reasonable to consider adding an aromatase inhibitor back into her plan. Labs reviewed, proceed with Herceptin today.  --Scheduled for labs, Deyanira Costello and infusion on 1/26, 2/16.  --Scheduled to see Dr. Harper on 33/12. Will add labs, infusion. She is also due for Echo in mid-March. Will ask Dr. Harper if she wants CT and brain MRI at that time as well to schedule them together for patient convenience.      2.  Brain metastases.  These appear stable on overall brain MRI.     3.  Bone mets: She is due for Xgeva.  Last Xgeva on 5/1/17. We will go ahead and administer this today.  She completed dental work in December. Will plan q6 weeks.     4. R lobar and segmental pulmonary emboli: On Lovenox 1 mg/kg BID-->50 mg BID  based on her weight. Discussed calling us with any excessive bleeding or spontaneous bruising to check a level.     5. Nausea: Gave patient rx for Zofran as she is not tolerating compazine well due to dizziness.      6.  Central diabetes insipidus.  This was diagnosed as an inpatient in the setting of hypernatremia.  She was started on desmopressin.  She has been seeing Endocrinology. She was supposed to follow up with endocrinology in March with US Thyroid and neck. Will schedule these appointments.       7.  Nutrition.  Her weight is overall stable.       8.  She does have a POLST and is DNR/DNI.  Her power of  is listed in the computer.     Mirna Philippe PA-C  [facility]  [address] [place], [place] [address] [phone]     The patient was seen in conjunction with Mirna Philippe PA-C who served as a scribe for today's visit. I have reviewed and edited the note and agree with the above findings and plan.  Carmen Alexis PA-C

## 2018-01-05 NOTE — MR AVS SNAPSHOT
After Visit Summary   1/5/2018    Keren Erickson    MRN: [id number]           Patient Information     Date Of Birth          1955        Visit Information        Provider Department      1/5/2018 1:00 PM  11 ATC;  ONCOLOGY INFUSION [facility]        Today's Diagnoses     Metastatic breast cancer (H)    -  1    Brain metastasis (H)        Carcinoma of breast metastatic to multiple sites, unspecified laterality (H)        Bone metastasis (H)          Care Instructions    Contact Numbers    [facility] Main Line: [phone]  [facility] Triage:  [phone]    Call triage with chills and/or temperature greater than or equal to 100.5, uncontrolled nausea/vomiting, diarrhea, constipation, dizziness, shortness of breath, chest pain, bleeding, unexplained bruising, or any new/concerning symptoms, questions/concerns.     If you are having any concerning symptoms or wish to speak to a provider before your next infusion visit, please call your care coordinator or triage to notify them so we can adequately serve you.       After Hours: [phone]    If after hours, weekends, or holidays, call main hospital  and ask for Oncology doctor on call.         January 2018 Sunday Monday Tuesday Wednesday Thursday Friday Saturday        1     2     3     4     5     [facility] DRAW    9:45 AM   (15 min.)   [facility]   [facility] Lab Draw     [facility] RETURN   10:05 AM   (50 min.)   Carmen Alexis PA-C   [facility] ONC INFUSION 60    1:00 PM   (60 min.)    ONCOLOGY INFUSION   [facility] 6       7     8     9     10     11     12     13       14     15     16     17     18     19     20       21     22     23     24     25     26     [facility] DRAW    9:00 AM   (15 min.)    [facility] DRAW   [facility] Lab Draw     [facility] RETURN    9:15 AM   (50 min.)   Deyanira Costello PA-C   [facility]  Cancer [facility] ONC INFUSION 60   10:30 AM   (60 min.)   UC ONCOLOGY INFUSION   [facility] 27       28     29     30     31 February 2018 Sunday Monday Tuesday Wednesday Thursday Friday Saturday                       1     2     3       4     5     6     7     8     9     10       11     12     13     14     15     16     [facility] DRAW   11:30 AM   (15 min.)   [facility] DRAW   [facility] Lab Draw     [facility] RETURN   11:45 AM   (50 min.)   Deyanira Costello PA-C   [facility] ONC INFUSION 60    1:30 PM   (60 min.)    ONCOLOGY INFUSION   [facility] 17       18     19     20     21     22     23     24       25     26     27     28                                 Lab Results:  Recent Results (from the past 12 hour(s))   Comprehensive metabolic panel    Collection Time: 01/05/18 10:26 AM   Result Value Ref Range    Sodium 137 133 - 144 mmol/L    Potassium 3.8 3.4 - 5.3 mmol/L    Chloride 103 94 - 109 mmol/L    Carbon Dioxide 26 20 - 32 mmol/L    Anion Gap 8 3 - 14 mmol/L    Glucose 90 70 - 99 mg/dL    Urea Nitrogen 11 7 - 30 mg/dL    Creatinine 0.58 0.52 - 1.04 mg/dL    GFR Estimate >90 >60 mL/min/1.7m2    GFR Estimate If Black >90 >60 mL/min/1.7m2    Calcium 8.7 8.5 - 10.1 mg/dL    Bilirubin Total 0.4 0.2 - 1.3 mg/dL    Albumin 3.6 3.4 - 5.0 g/dL    Protein Total 7.0 6.8 - 8.8 g/dL    Alkaline Phosphatase 124 40 - 150 U/L    ALT 20 0 - 50 U/L    AST 21 0 - 45 U/L   *CBC with platelets differential    Collection Time: 01/05/18 10:26 AM   Result Value Ref Range    WBC 5.5 4.0 - 11.0 10e9/L    RBC Count 3.74 (L) 3.8 - 5.2 10e12/L    Hemoglobin 12.1 11.7 - 15.7 g/dL    Hematocrit 36.8 35.0 - 47.0 %    MCV 98 78 - 100 fl    MCH 32.4 26.5 - 33.0 pg    MCHC 32.9 31.5 - 36.5 g/dL    RDW 16.1 (H) 10.0 - 15.0 %    Platelet Count 286 150 - 450 10e9/L    Diff Method Automated Method     % Neutrophils  66.5 %    % Lymphocytes 16.9 %    % Monocytes 14.2 %    % Eosinophils 0.0 %    % Basophils 1.3 %    % Immature Granulocytes 1.1 %    Nucleated RBCs 0 0 /100    Absolute Neutrophil 3.7 1.6 - 8.3 10e9/L    Absolute Lymphocytes 0.9 0.8 - 5.3 10e9/L    Absolute Monocytes 0.8 0.0 - 1.3 10e9/L    Absolute Eosinophils 0.0 0.0 - 0.7 10e9/L    Absolute Basophils 0.1 0.0 - 0.2 10e9/L    Abs Immature Granulocytes 0.1 0 - 0.4 10e9/L    Absolute Nucleated RBC 0.0                Follow-ups after your visit        Your next 10 appointments already scheduled     Jan 05, 2018  1:00 PM CST   Infusion 60 with UC ONCOLOGY INFUSION, UC 11 ATC   [facility])    [address] [place] [address]  [address]  [facility] [address]   [phone]            Jan 26, 2018  9:00 AM CST   [facility] Draw with  [facility] DRAW   [facility] Draw ([facility])    [address] [place] [address]  [address]  [facility] [address]   [phone]            Jan 26, 2018  9:30 AM CST   (Arrive by 9:15 AM)   Return Visit with JORGE Heaton [facility])    [address] [place] [address]  [address]  [facility] [address]   [phone]            Jan 26, 2018 10:30 AM CST   Infusion 60 with UC ONCOLOGY INFUSION, UC 31 ATC   [facility])    [address] [place] [address]  [address]  [facility] [address]   [phone]            Feb 16, 2018 11:30 AM CST   [facility] Lab Draw with  [facility] LAB DRAW   [facility] Draw ([facility])    [address] [place] [address]  [address]  [facility] [address]   [phone]            Feb 16, 2018 12:00 PM CST   (Arrive by 11:45 AM)   Return Visit with JORGE Heaton [facility])    [address] [place] [address]  [address]  [place]  [place] [id number] [phone]            Feb 16, 2018  1:30 PM CST   Infusion 60 with UC ONCOLOGY INFUSION, UC 25 ATC   [facility]    [address] [facility]  [address]  [facility] [id number] [phone]              Who to contact     If you have questions or need follow up information about today's clinic visit or your schedule please contact [facility] CANCER [facility] directly at [phone].  Normal or non-critical lab and imaging results will be communicated to you by [employer], letter or phone within 4 business days after the clinic has received the results. If you do not hear from us within 7 days, please contact the clinic through [employer] or phone. If you have a critical or abnormal lab result, we will notify you by phone as soon as possible.  Submit refill requests through [employer] or call your pharmacy and they will forward the refill request to us. Please allow 3 business days for your refill to be completed.          Additional Information About Your Visit        UXPinhart Information     [employer] gives you secure access to your electronic health record. If you see a primary care provider, you can also send messages to your care team and make appointments. If you have questions, please call your primary care clinic.  If you do not have a primary care provider, please call [phone] and they will assist you.        Care EveryWhere ID     This is your Care EveryWhere ID. This could be used by other organizations to access your [place] medical records  [id number]         Blood Pressure from Last 3 Encounters:   01/05/18 135/83   12/22/17 133/83   12/15/17 120/82    Weight from Last 3 Encounters:   01/05/18 53.8 kg (118 lb 11.2 oz)   12/15/17 53.1 kg (117 lb)   12/14/17 54.3 kg (119 lb 11.2 oz)              We Performed the Following     *CBC with platelets differential     Comprehensive metabolic panel          Today's Medication Changes           These changes are accurate as of: 1/5/18 12:48 PM.  If you have any questions, ask your nurse or doctor.               Start taking these medicines.        Dose/Directions    ondansetron 4 MG tablet   Commonly known as:  ZOFRAN   Used for:  Nausea   Started by:  Carmen Alexis PA-C        Dose:  4 mg   Take 1 tablet (4 mg) by mouth every 8 hours as needed for nausea   Quantity:  18 tablet   Refills:  3            Where to get your medicines      These medications were sent to [place], [place] - [address]  [address] [address]    Hours:  TRANSPLANT PHONE NUMBER [phone] Phone:  [phone]     ondansetron 4 MG tablet                Primary Care Provider Office Phone # Fax #    Kelly Hart [phone]       [address] [facility] [address]        Equal Access to Services     CLAUDIA BOND : Hadii keturah ibarra hadasho [place], waaxda luqadaha, qaybta kaalmada adeegyada, freddie escobedo . So [facility] [phone].    ATENCIÓN: Si habla español, tiene a ramires disposición servicios gratuitos de asistencia lingüística. Llame al [phone].    We comply with applicable federal civil rights laws and [place] laws. We do not discriminate on the basis of race, color, national origin, age, disability, sex, sexual orientation, or gender identity.            Thank you!     Thank you for choosing [facility] CANCER [facility]  for your care. Our goal is always to provide you with excellent care. Hearing back from our patients is one way we can continue to improve our services. Please take a few minutes to complete the written survey that you may receive in the mail after your visit with us. Thank you!             Your Updated Medication List - Protect others around you: Learn how to safely use, store and throw away your medicines at www.disposemymeds.org.          This list is accurate as  of: 1/5/18 12:48 PM.  Always use your most recent med list.                   Brand Name Dispense Instructions for use Diagnosis    desmopressin 0.2 MG tablet    DDAVP    45 tablet    Take  1/2 tablet once daily by mouth    Diabetes insipidus (H)       LOVENOX 60 MG/0.6ML injection   Generic drug:  enoxaparin     60 Syringe    Inject 0.5 mLs (50 mg) Subcutaneous 2 times daily    Other acute pulmonary embolism without acute cor pulmonale (H)       meclizine 25 MG tablet    ANTIVERT    30 tablet    Take 1 tablet (25 mg) by mouth 3 times daily as needed for dizziness    BPPV (benign paroxysmal positional vertigo), right       methylphenidate 5 MG tablet    RITALIN    30 tablet    Take 5 mg once daily as needed for fatigue    Carcinoma of breast metastatic to multiple sites, unspecified laterality (H)       Multi-vitamin Tabs tablet      Take 1 tablet by mouth daily        omeprazole 20 MG tablet     30 tablet    Take 1 tablet (20 mg) by mouth daily    Gastroesophageal reflux disease without esophagitis       ondansetron 4 MG tablet    ZOFRAN    18 tablet    Take 1 tablet (4 mg) by mouth every 8 hours as needed for nausea    Nausea       prochlorperazine 10 MG tablet    COMPAZINE    90 tablet    Take 1 tablet (10 mg) by mouth every 6 hours as needed for nausea or vomiting    Nausea       TYLENOL PO      Take 500 mg by mouth once        VITAMIN D (CHOLECALCIFEROL) PO      Take 5,000 Units by mouth daily        VITAMIN E BLEND PO      Take by mouth daily

## 2018-01-05 NOTE — Clinical Note
1/5/2018       RE: Keren Erickson  [address] [facility] [address]     Dear Colleague,    Thank you for referring your patient, Keren Erickson, to the [facility]. Please see a copy of my visit note below.    HEMATOLOGY/ONCOLOGY PROGRESS NOTE  Jan 5, 2018    REASON FOR VISIT: add-on incidental PE    DIAGNOSIS:   The patient is a 60-year-old female who presented to the hospital initially in 09/2013 with complaints of dyspnea. Workup revealed a large pericardial effusion and pleural effusion. Physical examination revealed a large untreated left breast mass encompassing the entire left breast. Biopsies revealed these were ER, CT and HER2-positive breast cancer. She had a thoracentesis and pericardial sclerosis performed. She was originally on Arimidex and Herceptin. In 01/2014, she was switched to tamoxifen and Herceptin due to arthralgias, and she ultimately developed progressive disease and was switched to Faslodex and Herceptin. In 01/2015, she was found to have progressive disease and was switched to T-DM1.     Initially when she was seen in late 02/2015, she complained of some V5 sensory deficit. This was subjective. I was not able to elicit this on examination. This persisted and further workup was performed with a brain MRI. This revealed what was initially thought to be 3 punctate brain metastases. She originally saw Radiation Oncology and Neurosurgery as well as underwent a lumbar puncture. Cytology from the lumbar puncture showed no evidence of leptomeningeal disease. She was treated with Gamma Knife radiation to 6 lesions, performed on 04/16/2015.  She initiated therapy with TDM1 in January 2015 and continued this through 6/26/2015 with overall stable disease. She has since been on a break from treatment. The patient presented earlier this month with recurrent shortness of breath.  Imaging revealed recurrent right-sided pleural effusion.  She has since undergone  thoracentesis with cytology pending.  Clinically, however, there is evidence of disease progression. PET scan performed on 11/25/2015 demonstrated progression of disease at multiple sites. She restarted TDM1 on 11/27/2015, however experienced a mild transfusion reaction with shortness of breath and chest tightness, resolved upon stopping TDM1 and 125 mg of SoluMedrol. She had progression of disease on repeat imaging 2/17/2016, as well as new brain metastases. She started TDM1 with Perjeta on 3/4/2016. She underwent gamma knife to brain mets on 3/8/16.       In late May, she was found to have progressive brain metastases.  She received whole brain radiation.  She had a follow up brain MRI August 2016 that was stable.     In September 2016, she enrolled on [place]  trial and was randomized to the standard of care arm/Physician's Choice; started on gemzar and herceptin. She was recently hospitalized 1/27-2/3/17 for presumed HCAP. Trial was suspended. She continued on gemzar and heceptin with stable disease. Last restaging was 4/7/17 and stable. Gemzar was placed on hold from 4/10/2017 through 6/22/17 so that she could recover from pneumonia.    INTERVAL HISTORY:  Dominga presents after her restaging CT CAP showed several new PEs in R lung. She had otherwise stable disease on her scan. She notes her stamina has been worse recently in the last few weeks. She feels like she becomes SOB with walking to and from the bathroom. She is still exercising but she is not able to gain stamina. She has some chest tightness when exercising her arms at times. No pleuritic pain or tachycardia. She has been intermittently dizzy--this does not sound new. No leg swelling or pain.     PHYSICAL EXAMINATION:     There were no vitals taken for this visit.    Wt Readings from Last 4 Encounters:   12/15/17 53.1 kg (117 lb)   12/14/17 54.3 kg (119 lb 11.2 oz)   12/01/17 54.6 kg (120 lb 6.4 oz)   11/24/17 54.3 kg (119 lb 11.2 oz)      Constitutional: Alert, oriented, cachectic female in no visible distress. In a wheelchair  Eyes: Anicteric sclerae.    ENT/Mouth: OM moist and pink without lesions or thrush.    CV: RRR, no murmurs appreciated.  Resp: CTAB slightly diminished R base  Extremities: No lower extremity edema appreciated.    LABS:         IMPRESSION/PLAN:  Dominga is a 62-year-old female with history of metastatic ER-positive, HER-2 positive breast cancer, with involvement of the bone, history of pleural effusions treated with pleurodesis and PleurX placement, and with treated brain metastases, previously with stable disease on TDM1, with progressive disease after treatment break.  She was started on [place]  clinical trial and randomized to physician's choice, and started on Gemzar/Herceptin on 9/29/16.    1. Breast cancer:  Continues on Herceptin/Gemzar.  CT scan of the chest, abdomen and pelvis and a brain MRI from 12/14 revealed no evidence of progressive disease. Dr. Harper would like her to continue on her Gemzar day 1 and day 8 in conjunction with Herceptin on day 1 every 21 days. However, given her overall weakness and fatigue as well as declining physical stamina, she discussed holdingher Gemzar for the next 6 weeks.   Patient was in agreement with this plan.  We will reassess at that time whether we would like her to restart her Gemzar.        Also discussed that if she is going to remain off therapy longer than that, it would be reasonable to consider adding an aromatase inhibitor back into her plan.  At this time, we will have her seen back in    3 weeks with Herceptin and continue to reevaluate this question.        2.  Brain metastases.  These appear stable on overall brain MRI.     3.   Bone mets: She is due for Xgeva.   Last Xgeva on 5/1/17. We will go ahead and administer this today.  She completed dental work in December.      4. R lobar and segmental pulmonary emboli: On Lovenox 1 mg/kg BID-->50 mg BID based  on her weight. Discussed calling us with any excessive bleeding or spontaneous bruising to check a level.       5.  Central diabetes insipidus.  This was diagnosed as an inpatient in the setting of hypernatremia.  She was started on desmopressin.  She has been seeing Endocrinology.        6.  Nutrition.  Her weight is overall stable.       7.  She does have a POLST and is DNR/DNI.  Her power of  is listed in the computer.                        Again, thank you for allowing me to participate in the care of your patient.      Sincerely,    Carmen Alexis PA-C

## 2018-01-05 NOTE — PROGRESS NOTES
Infusion Nursing Note:  Keren Erickson presents today for cycle 2 day 1 Herceptin.    Patient seen by provider today: Yes: Carmen Alexis PA-C   present during visit today: Not Applicable.    Note: Proceed with treatment.    Intravenous Access:  Implanted Port.    Treatment Conditions:  Lab Results   Component Value Date    HGB 12.1 01/05/2018     Lab Results   Component Value Date    WBC 5.5 01/05/2018      Lab Results   Component Value Date    ANEU 3.7 01/05/2018     Lab Results   Component Value Date     01/05/2018      Lab Results   Component Value Date     01/05/2018                   Lab Results   Component Value Date    POTASSIUM 3.8 01/05/2018           Lab Results   Component Value Date    MAG 2.4 04/10/2017            Lab Results   Component Value Date    CR 0.58 01/05/2018                   Lab Results   Component Value Date    OMA 8.7 01/05/2018                Lab Results   Component Value Date    BILITOTAL 0.4 01/05/2018           Lab Results   Component Value Date    ALBUMIN 3.6 01/05/2018                    Lab Results   Component Value Date    ALT 20 01/05/2018           Lab Results   Component Value Date    AST 21 01/05/2018     ECHO/MUGA completed 12/14/17  EF 55-60%.      Post Infusion Assessment:  Patient tolerated infusion without incident.  Patient tolerated injection into upper right quadrant without incident.  Blood return noted pre and post infusion.  Site patent and intact, free from redness, edema or discomfort.  No evidence of extravasations.  Access discontinued per protocol.    Discharge Plan:   Prescription refills given for zofran, lovenox.  Discharge instructions reviewed with: Patient.  Patient and/or family verbalized understanding of discharge instructions and all questions answered.  Copy of AVS reviewed with patient and/or family.  Patient will return 1/26/18 for next appointment.  Patient discharged in stable condition accompanied by:  .  Departure Mode: Ambulatory.    Magda Cintron RN

## 2018-01-05 NOTE — NURSING NOTE
Chief Complaint   Patient presents with     Oncology Clinic Visit     F/U Breast ca     Port Draw     Port labs collected by RN.

## 2018-01-05 NOTE — MR AVS SNAPSHOT
After Visit Summary   1/5/2018    Keren Erickson    MRN: [id number]           Patient Information     Date Of Birth          1955        Visit Information        Provider Department      1/5/2018 10:20 AM Carmen Alexis PA-C [facility]        Today's Diagnoses     Carcinoma of breast metastatic to multiple sites, unspecified laterality (H)    -  1    Nausea        Amblyopia of left eye        Brain metastasis (H)        Metastatic breast cancer (H)        Neoplasm of unspecified behavior of other specified sites            Follow-ups after your visit        Additional Services     OPHTHALMOLOGY ADULT REFERRAL       Your provider has referred you to: [facility]: Eye Clinic [facility] [phone]   [email or web address]    Please be aware that coverage of these services is subject to the terms and limitations of your health insurance plan.  Call member services at your health plan with any benefit or coverage questions.      Please bring the following with you to your appointment:    (1) Any X-Rays, CTs or MRIs which have been performed.  Contact the facility where they were done to arrange for  prior to your scheduled appointment.    (2) List of current medications  (3) This referral request   (4) Any documents/labs given to you for this referral                  Your next 10 appointments already scheduled     Jan 26, 2018  9:00 AM CST   [facility] Draw with  [facility] DRAW   [facility] Lab Draw ([facility])    [address] [place] [address]  [address] [place] [address] [phone]            Jan 26, 2018  9:30 AM CST   (Arrive by 9:15 AM)   Return Visit with Deyanira Costello PA-C   [facility] Clinic ([facility])    [address] [place] [address]  [address] [place] [address] [phone]            Jan 26, 2018 10:30 AM CST   Infusion 60 with CANELO  ONCOLOGY INFUSION, UC 31 ATC   [facility] ([facility])    [address]  [address]  [facility] [address]   [phone]            Jan 26, 2018  1:00 PM CST   NEW GENERAL with Damon Sullivan, DO   Eye Clinic ([facility])    [address]  [address]  [facility] [address]   [phone]            Feb 16, 2018 11:30 AM CST   [facility] Lab Draw with [facility] DRAW   [facility] Lab Draw ([facility])    [address]  [address]  [facility] [address]   [phone]            Feb 16, 2018 12:00 PM CST   (Arrive by 11:45 AM)   Return Visit with Deyanira Costello PA-C   [facility] ([facility])    [address]  [address]  [facility] [address]   [phone]            Feb 16, 2018  1:30 PM CST   Infusion 60 with  ONCOLOGY INFUSION, UC 25 ATC   [facility] ([facility])    [address]  [address]  [facility] [address]   [phone]            Mar 07, 2018 10:40 AM CST   (Arrive by 10:25 AM)   CT ABDOMEN PELVIS W/O & W CONTRAST with UCCT1   [facility] CT ([facility])    [address]  1st [address]  [facility] [address]   [phone]           Please bring any scans or X-rays taken at other hospitals, if similar tests were done. Also bring a list of your medicines, including vitamins, minerals and over-the-counter drugs. It is safest to leave personal items at home.  Be sure to tell your doctor:   If you have any allergies.   If there s any chance you are pregnant.   If you are breastfeeding.   If you have any special needs.  You may have contrast for this exam. To prepare:   Do not eat or drink for 2 hours before your exam. If you need to take medicine, you may take it with small sips of water. (We may ask  you to take liquid medicine as well.)   The day before your exam, drink extra fluids at least six 8-ounce glasses (unless your doctor tells you to restrict your fluids).  Patients over 70 or patients with diabetes or kidney problems:   If you haven t had a blood test (creatinine test) within the last 30 days, go to your clinic or Diagnostic Imaging Department for this test.  If you have diabetes:   If your kidney function is normal, continue taking your metformin (Avandamet, Glucophage, Glucovance, Metaglip) on the day of your exam.   If your kidney function is abnormal, wait 48 hours before restarting this medicine.  You will have oral contrast for this exam:   You will drink the contrast at home. Get this from your clinic or Diagnostic Imaging Department. Please follow the directions given.  Please wear loose clothing, such as a sweat suit or jogging clothes. Avoid snaps, zippers and other metal. We may ask you to undress and put on a hospital gown.  If you have any questions, please call the Imaging Department where you will have your exam.              Who to contact     If you have questions or need follow up information about today's clinic visit or your schedule please contact [facility] CLINIC directly at [phone].  Normal or non-critical lab and imaging results will be communicated to you by Eyevensyshart, letter or phone within 4 business days after the clinic has received the results. If you do not hear from us within 7 days, please contact the clinic through Blockboardt or phone. If you have a critical or abnormal lab result, we will notify you by phone as soon as possible.  Submit refill requests through [employer] or call your pharmacy and they will forward the refill request to us. Please allow 3 business days for your refill to be completed.          Additional Information About Your Visit        [employer] Information     [employer] gives you secure access to your electronic health record. If you see  a primary care provider, you can also send messages to your care team and make appointments. If you have questions, please call your primary care clinic.  If you do not have a primary care provider, please call [phone] and they will assist you.        Care EveryWhere ID     This is your Care EveryWhere ID. This could be used by other organizations to access your [facility] medical records  [id number]        Your Vitals Were     Pulse Temperature Respirations Pulse Oximetry BMI (Body Mass Index)       78 97.9  F (36.6  C) (Oral) 20 96% 21.71 kg/m2        Blood Pressure from Last 3 Encounters:   01/05/18 135/83   12/22/17 133/83   12/15/17 120/82    Weight from Last 3 Encounters:   01/05/18 53.8 kg (118 lb 11.2 oz)   12/15/17 53.1 kg (117 lb)   12/14/17 54.3 kg (119 lb 11.2 oz)              We Performed the Following     OPHTHALMOLOGY ADULT REFERRAL          Today's Medication Changes          These changes are accurate as of: 1/5/18 11:59 PM.  If you have any questions, ask your nurse or doctor.               Start taking these medicines.        Dose/Directions    ondansetron 4 MG tablet   Commonly known as:  ZOFRAN   Used for:  Nausea   Started by:  Carmen Alexis PA-C        Dose:  4 mg   Take 1 tablet (4 mg) by mouth every 8 hours as needed for nausea   Quantity:  18 tablet   Refills:  3            Where to get your medicines      These medications were sent to [address] [address]    Hours:  TRANSPLANT PHONE NUMBER [phone] Phone:  [phone]     ondansetron 4 MG tablet                Primary Care Provider Office Phone # Fax #    Kelly Hart [phone]       [address] [facility] [address]        Equal Access to Services     CLAUDIA BOND AH: Jerry sanchez [place], waaxda luqadaha, qaybta freddie wilson.  So [facility] [phone].    ATENCIÓN: Si mino kumari, tiene a ramires disposición servicios gratuitos de asistencia lingüística. Kyung palacios [phone].    We comply with applicable federal civil rights laws and [place] laws. We do not discriminate on the basis of race, color, national origin, age, disability, sex, sexual orientation, or gender identity.            Thank you!     Thank you for choosing [facility]  for your care. Our goal is always to provide you with excellent care. Hearing back from our patients is one way we can continue to improve our services. Please take a few minutes to complete the written survey that you may receive in the mail after your visit with us. Thank you!             Your Updated Medication List - Protect others around you: Learn how to safely use, store and throw away your medicines at www.disposemymeds.org.          This list is accurate as of: 1/5/18 11:59 PM.  Always use your most recent med list.                   Brand Name Dispense Instructions for use Diagnosis    desmopressin 0.2 MG tablet    DDAVP    45 tablet    Take  1/2 tablet once daily by mouth    Diabetes insipidus (H)       LOVENOX 60 MG/0.6ML injection   Generic drug:  enoxaparin     60 Syringe    Inject 0.5 mLs (50 mg) Subcutaneous 2 times daily    Other acute pulmonary embolism without acute cor pulmonale (H)       meclizine 25 MG tablet    ANTIVERT    30 tablet    Take 1 tablet (25 mg) by mouth 3 times daily as needed for dizziness    BPPV (benign paroxysmal positional vertigo), right       methylphenidate 5 MG tablet    RITALIN    30 tablet    Take 5 mg once daily as needed for fatigue    Carcinoma of breast metastatic to multiple sites, unspecified laterality (H)       Multi-vitamin Tabs tablet      Take 1 tablet by mouth daily        omeprazole 20 MG tablet     30 tablet    Take 1 tablet (20 mg) by mouth daily    Gastroesophageal reflux disease without esophagitis       ondansetron 4 MG tablet     ZOFRAN    18 tablet    Take 1 tablet (4 mg) by mouth every 8 hours as needed for nausea    Nausea       prochlorperazine 10 MG tablet    COMPAZINE    90 tablet    Take 1 tablet (10 mg) by mouth every 6 hours as needed for nausea or vomiting    Nausea       TYLENOL PO      Take 500 mg by mouth once        VITAMIN D (CHOLECALCIFEROL) PO      Take 5,000 Units by mouth daily        VITAMIN E BLEND PO      Take by mouth daily

## 2018-01-05 NOTE — PATIENT INSTRUCTIONS
Contact Numbers    [facility] Main Line: [phone]  [facility] Triage:  [phone]    Call triage with chills and/or temperature greater than or equal to 100.5, uncontrolled nausea/vomiting, diarrhea, constipation, dizziness, shortness of breath, chest pain, bleeding, unexplained bruising, or any new/concerning symptoms, questions/concerns.     If you are having any concerning symptoms or wish to speak to a provider before your next infusion visit, please call your care coordinator or triage to notify them so we can adequately serve you.       After Hours: [phone]    If after hours, weekends, or holidays, call main hospital  and ask for Oncology doctor on call.         January 2018 Sunday Monday Tuesday Wednesday Thursday Friday Saturday        1     2     3     4     5     [facility] D[facility]    9:45 AM   (15 min.)   [facility]   [facility] Draw     [facility] RETURN   10:05 AM   (50 min.)   Carmen Alexis PA-C   [facility]     [facility] ONC INFUSION 60    1:00 PM   (60 min.)    ONCOLOGY INFUSION   [facility] 6       7     8     9     10     11     12     13       14     15     16     17     18     19     20       21     22     23     24     25     26     [facility] D[facility]    9:00 AM   (15 min.)   [facility]   [facility] Draw     [facility] RETURN    9:15 AM   (50 min.)   Deyanira Costello PA-C   [facility]     [facility] ONC INFUSION 60   10:30 AM   (60 min.)    ONCOLOGY INFUSION   [facility] 27       28     29     30     31                               February 2018 Sunday Monday Tuesday Wednesday Thursday Friday Saturday                       1     2     3       4     5     6     7     8     9     10       11     12     13     14     15     16     [facility] D[facility]   11:30 AM   (15 min.)   [facility]   [facility] Draw     [facility] RETURN   11:45 AM   (50 min.)   Trang  Deyanira Romero PA-C   [facility]     [facility] ONC INFUSION 60    1:30 PM   (60 min.)   [facility] ONCOLOGY INFUSION   [facility] 17       18     19     20     21     22     23     24       25     26     27     28                                 Lab Results:  Recent Results (from the past 12 hour(s))   Comprehensive metabolic panel    Collection Time: 01/05/18 10:26 AM   Result Value Ref Range    Sodium 137 133 - 144 mmol/L    Potassium 3.8 3.4 - 5.3 mmol/L    Chloride 103 94 - 109 mmol/L    Carbon Dioxide 26 20 - 32 mmol/L    Anion Gap 8 3 - 14 mmol/L    Glucose 90 70 - 99 mg/dL    Urea Nitrogen 11 7 - 30 mg/dL    Creatinine 0.58 0.52 - 1.04 mg/dL    GFR Estimate >90 >60 mL/min/1.7m2    GFR Estimate If Black >90 >60 mL/min/1.7m2    Calcium 8.7 8.5 - 10.1 mg/dL    Bilirubin Total 0.4 0.2 - 1.3 mg/dL    Albumin 3.6 3.4 - 5.0 g/dL    Protein Total 7.0 6.8 - 8.8 g/dL    Alkaline Phosphatase 124 40 - 150 U/L    ALT 20 0 - 50 U/L    AST 21 0 - 45 U/L   *CBC with platelets differential    Collection Time: 01/05/18 10:26 AM   Result Value Ref Range    WBC 5.5 4.0 - 11.0 10e9/L    RBC Count 3.74 (L) 3.8 - 5.2 10e12/L    Hemoglobin 12.1 11.7 - 15.7 g/dL    Hematocrit 36.8 35.0 - 47.0 %    MCV 98 78 - 100 fl    MCH 32.4 26.5 - 33.0 pg    MCHC 32.9 31.5 - 36.5 g/dL    RDW 16.1 (H) 10.0 - 15.0 %    Platelet Count 286 150 - 450 10e9/L    Diff Method Automated Method     % Neutrophils 66.5 %    % Lymphocytes 16.9 %    % Monocytes 14.2 %    % Eosinophils 0.0 %    % Basophils 1.3 %    % Immature Granulocytes 1.1 %    Nucleated RBCs 0 0 /100    Absolute Neutrophil 3.7 1.6 - 8.3 10e9/L    Absolute Lymphocytes 0.9 0.8 - 5.3 10e9/L    Absolute Monocytes 0.8 0.0 - 1.3 10e9/L    Absolute Eosinophils 0.0 0.0 - 0.7 10e9/L    Absolute Basophils 0.1 0.0 - 0.2 10e9/L    Abs Immature Granulocytes 0.1 0 - 0.4 10e9/L    Absolute Nucleated RBC 0.0

## 2018-01-05 NOTE — LETTER
1/5/2018      RE: Keren Erickson  [address] [facility] [id number]       HEMATOLOGY/ONCOLOGY PROGRESS NOTE  Jan 5, 2018    REASON FOR VISIT: Metastatic breast cancer prior to Herceptin    DIAGNOSIS:   The patient is a 60-year-old female who presented to the hospital initially in 09/2013 with complaints of dyspnea. Workup revealed a large pericardial effusion and pleural effusion. Physical examination revealed a large untreated left breast mass encompassing the entire left breast. Biopsies revealed these were ER, [place] and HER2-positive breast cancer. She had a thoracentesis and pericardial sclerosis performed. She was originally on Arimidex and Herceptin. In 01/2014, she was switched to tamoxifen and Herceptin due to arthralgias, and she ultimately developed progressive disease and was switched to Faslodex and Herceptin. In 01/2015, she was found to have progressive disease and was switched to T-DM1.     Initially when she was seen in late 02/2015, she complained of some V5 sensory deficit. This was subjective. I was not able to elicit this on examination. This persisted and further workup was performed with a brain MRI. This revealed what was initially thought to be 3 punctate brain metastases. She originally saw Radiation Oncology and Neurosurgery as well as underwent a lumbar puncture. Cytology from the lumbar puncture showed no evidence of leptomeningeal disease. She was treated with Gamma Knife radiation to 6 lesions, performed on 04/16/2015.  She initiated therapy with TDM1 in January 2015 and continued this through 6/26/2015 with overall stable disease. She has since been on a break from treatment. The patient presented earlier this month with recurrent shortness of breath.  Imaging revealed recurrent right-sided pleural effusion.  She has since undergone thoracentesis with cytology pending.  Clinically, however, there is evidence of disease progression. PET scan performed on 11/25/2015  "demonstrated progression of disease at multiple sites. She restarted TDM1 on 11/27/2015, however experienced a mild transfusion reaction with shortness of breath and chest tightness, resolved upon stopping TDM1 and 125 mg of SoluMedrol. She had progression of disease on repeat imaging 2/17/2016, as well as new brain metastases. She started TDM1 with Perjeta on 3/4/2016. She underwent gamma knife to brain mets on 3/8/16.       In late May, she was found to have progressive brain metastases.  She received whole brain radiation.  She had a follow up brain MRI August 2016 that was stable.     In September 2016, she enrolled on [place]  trial and was randomized to the standard of care arm/Physician's Choice; started on gemzar and herceptin. She was recently hospitalized 1/27-2/3/17 for presumed HCAP. Trial was suspended. She continued on gemzar and heceptin with stable disease. Last restaging was 4/7/17 and stable. Gemzar was placed on hold from 4/10/2017 through 6/22/17 so that she could recover from pneumonia.    INTERVAL HISTORY:  Dominga states that her energy and fatigue have really improved off Gemzar. She is able to now walk a few steps with her walker. She denies any recent falls. She states her breathing has improved. She denies any cough, pleuritic pain or hemoptysis. She is on lovenox twice daily and does not have any bleeding/bruising concerns. She has felt less dizziness. She has not needed any meclizine. Appetite is low--she takes about 2 hours to eat a meal as she states she gets discomfort in her sternal area if she eats any faster. Denies heartburn, dysphagia, odynophagia. She has nausea almost every day but does not take compazine because it makes her dizzy. She denies any vomiting. Denies abdominal pain, diarrhea, constipation. She has history of left eye nystagmus and \"lazy eye\" that she feels has gotten worse in the past couple months. She attributes this as well to gemzar. She does not have " an ophthalmologist or someone who follows this. She has tingling and numbness in right upper extremity that she states is stable. She has intermittent left sided headaches a couple times per week that she states are getting better. She did complete dental work--stated she had 3 caries that were filled.     Denies fever, chills, chest pain, dysuria, back pain. Does have diffuse joint aches but these have recently improved.    PHYSICAL EXAMINATION:     There were no vitals taken for this visit.    Wt Readings from Last 4 Encounters:   12/15/17 53.1 kg (117 lb)   12/14/17 54.3 kg (119 lb 11.2 oz)   12/01/17 54.6 kg (120 lb 6.4 oz)   11/24/17 54.3 kg (119 lb 11.2 oz)     Constitutional: Alert, oriented, cachectic female in no visible distress. In a wheelchair  Eyes: Anicteric sclerae. PERRL. Left eye does not track in left lateral direction but stays midline, will track medially. No visible nystagmus. Other EOM intact.  ENT/Mouth: OM moist and pink without lesions or thrush.    CV: RRR, no murmurs appreciated.  Resp: CTAB slightly diminished R base  Extremities: No lower extremity edema appreciated.  Neuro: CN II-XII grossly intact except for ocular movements as noted above. Strength in bilateral UE and LE 5/5. Grossly nonfocal.    LABS:   Results for HEIDI RUIZ (MRN [id number]) as of 1/5/2018 13:14   1/5/2018 10:26   Sodium 137   Potassium 3.8   Chloride 103   Carbon Dioxide 26   Urea Nitrogen 11   Creatinine 0.58   GFR Estimate >90   GFR Estimate If Black >90   Calcium 8.7   Anion Gap 8   Albumin 3.6   Protein Total 7.0   Bilirubin Total 0.4   Alkaline Phosphatase 124   ALT 20   AST 21   Glucose 90   WBC 5.5   Hemoglobin 12.1   Hematocrit 36.8   Platelet Count 286   RBC Count 3.74 (L)   MCV 98   MCH 32.4   MCHC 32.9   RDW 16.1 (H)   Diff Method Automated Method   % Neutrophils 66.5   % Lymphocytes 16.9   % Monocytes 14.2   % Eosinophils 0.0   % Basophils 1.3   % Immature Granulocytes 1.1   Nucleated RBCs  0   Absolute Neutrophil 3.7   Absolute Lymphocytes 0.9   Absolute Monocytes 0.8   Absolute Eosinophils 0.0   Absolute Basophils 0.1   Abs Immature Granulocytes 0.1   Absolute Nucleated RBC 0.0         IMPRESSION/PLAN:  Dominga is a 62-year-old female with history of metastatic ER-positive, HER-2 positive breast cancer, with involvement of the bone, history of pleural effusions treated with pleurodesis and PleurX placement, and with treated brain metastases, previously with stable disease on TDM1, with progressive disease after treatment break.  She was started on [place]  clinical trial and randomized to physician's choice, and started on Gemzar/Herceptin on 9/29/16.    1. Breast cancer:  CT scan of the chest, abdomen and pelvis and a brain MRI from 12/14 revealed no evidence of progressive disease. Dr. Harper would like her to continue on her Gemzar day 1 and day 8 in conjunction with Herceptin on day 1 every 21 days. However, given her overall weakness and fatigue as well as declining physical stamina, decision made to holding her Gemzar for  6 weeks.   Last dose of Gemzar was 12/14. Patient states today she would like to continue with Herceptin alone as her fatigue and weakness have improved.  Per Dr. Harper, if she is going to remain off therapy longer than 6 weeks, it would be reasonable to consider adding an aromatase inhibitor back into her plan. Labs reviewed, proceed with Herceptin today.  --Scheduled for labs, Deyanira Costello and infusion on 1/26, 2/16.  --Scheduled to see Dr. Harper on 33/12. Will add labs, infusion. She is also due for Echo in mid-March. Will ask Dr. Harper if she wants CT and brain MRI at that time as well to schedule them together for patient convenience.      2.  Brain metastases.  These appear stable on overall brain MRI.     3.   Bone mets: She is due for Xgeva.   Last Xgeva on 5/1/17. We will go ahead and administer this today.  She completed dental work in December. Will plan  q6 weeks.     4. R lobar and segmental pulmonary emboli: On Lovenox 1 mg/kg BID-->50 mg BID based on her weight. Discussed calling us with any excessive bleeding or spontaneous bruising to check a level.     5. Nausea: Gave patient rx for Zofran as she is not tolerating compazine well due to dizziness.      6.  Central diabetes insipidus.  This was diagnosed as an inpatient in the setting of hypernatremia.  She was started on desmopressin.  She has been seeing Endocrinology. She was supposed to follow up with endocrinology in March with US Thyroid and neck. Will schedule these appointments.       7.  Nutrition.  Her weight is overall stable.       8.  She does have a POLST and is DNR/DNI.  Her power of  is listed in the computer.     Mirna Philippe PA-C  [facility]  [address] [place], [place] [address] [phone]     The patient was seen in conjunction with Mirna Philippe PA-C who served as a scribe for today's visit. I have reviewed and edited the note and agree with the above findings and plan.  Carmen Alexis PA-C

## 2018-01-26 NOTE — PROGRESS NOTES
Infusion Nursing Note:  Keren Erickson presents today for cycle 23, day 1 Herceptin.    Patient seen by provider today: Yes: BRAULIO Chandra   present during visit today: Not Applicable.    Intravenous Access:  Implanted Port.    Treatment Conditions:  Lab Results   Component Value Date    HGB 13.0 01/26/2018     Lab Results   Component Value Date    WBC 5.7 01/26/2018      Lab Results   Component Value Date    ANEU 3.9 01/26/2018     Lab Results   Component Value Date     01/26/2018      Lab Results   Component Value Date     01/26/2018                   Lab Results   Component Value Date    POTASSIUM 3.9 01/26/2018           Lab Results   Component Value Date    MAG 2.4 04/10/2017            Lab Results   Component Value Date    CR 0.64 01/26/2018                   Lab Results   Component Value Date    OMA 9.0 01/26/2018                Lab Results   Component Value Date    BILITOTAL 0.3 01/26/2018           Lab Results   Component Value Date    ALBUMIN 3.6 01/26/2018                    Lab Results   Component Value Date    ALT 20 01/26/2018           Lab Results   Component Value Date    AST 24 01/26/2018       Results reviewed, labs MET treatment parameters, ok to proceed with treatment.  ECHO/MUGA completed 1/5/2018  EF 55-60%.    Post Infusion Assessment:  Patient tolerated infusion without incident.  Blood return noted pre and post infusion.  Site patent and intact, free from redness, edema or discomfort.  No evidence of extravasations.  Access discontinued per protocol.    Discharge Plan:   Patient declined prescription refills.  Discharge instructions reviewed with: Patient.  Patient and/or family verbalized understanding of discharge instructions and all questions answered.  Copy of AVS reviewed with patient and/or family.  Patient will return 2/16/2018 for next appointment.  Patient discharged in stable condition accompanied by: self.  Departure Mode:  Wheelchair.    Ag Mills RN

## 2018-01-26 NOTE — PROGRESS NOTES
HEMATOLOGY/ONCOLOGY PROGRESS NOTE  Jan 26, 2018    REASON FOR VISIT: Metastatic breast cancer prior to Herceptin    DIAGNOSIS:   The patient is a 60-year-old female who presented to the hospital initially in 09/2013 with complaints of dyspnea. Workup revealed a large pericardial effusion and pleural effusion. Physical examination revealed a large untreated left breast mass encompassing the entire left breast. Biopsies revealed these were ER, [place] and HER2-positive breast cancer. She had a thoracentesis and pericardial sclerosis performed. She was originally on Arimidex and Herceptin. In 01/2014, she was switched to tamoxifen and Herceptin due to arthralgias, and she ultimately developed progressive disease and was switched to Faslodex and Herceptin. In 01/2015, she was found to have progressive disease and was switched to T-DM1.     Initially when she was seen in late 02/2015, she complained of some V5 sensory deficit. This was subjective. I was not able to elicit this on examination. This persisted and further workup was performed with a brain MRI. This revealed what was initially thought to be 3 punctate brain metastases. She originally saw Radiation Oncology and Neurosurgery as well as underwent a lumbar puncture. Cytology from the lumbar puncture showed no evidence of leptomeningeal disease. She was treated with Gamma Knife radiation to 6 lesions, performed on 04/16/2015.  She initiated therapy with TDM1 in January 2015 and continued this through 6/26/2015 with overall stable disease. She has since been on a break from treatment. The patient presented earlier this month with recurrent shortness of breath.  Imaging revealed recurrent right-sided pleural effusion.  She has since undergone thoracentesis with cytology pending.  Clinically, however, there is evidence of disease progression. PET scan performed on 11/25/2015 demonstrated progression of disease at multiple sites. She restarted TDM1 on 11/27/2015, however  experienced a mild transfusion reaction with shortness of breath and chest tightness, resolved upon stopping TDM1 and 125 mg of SoluMedrol. She had progression of disease on repeat imaging 2/17/2016, as well as new brain metastases. She started TDM1 with Perjeta on 3/4/2016. She underwent gamma knife to brain mets on 3/8/16.       In late May, she was found to have progressive brain metastases.  She received whole brain radiation.  She had a follow up brain MRI August 2016 that was stable.     In September 2016, she enrolled on [place]  trial and was randomized to the standard of care arm/Physician's Choice; started on gemzar and herceptin. She was recently hospitalized 1/27-2/3/17 for presumed HCAP. Trial was suspended. She continued on gemzar and heceptin with stable disease. Last restaging was 4/7/17 and stable. Gemzar was placed on hold from 4/10/2017 through 6/22/17 so that she could recover from pneumonia.    She has been receiving Herceptin only and her last dose was on 1/5.  She has been receiving it every 3 weeks and is here today for cycle 23.     INTERVAL HISTORY: Dominga is here today with her friend and doing much better.  She has been tolerating the Herceptin infusions well.  She feels that her overall fatigue is much better although she does still take approximately 2 naps a day.  She also feels that her breathing is much improved.  She said she has more endurance than she had in the past and denies any shortness of breath today.  She states her appetite is relatively poor, however she continues to eat throughout the day.  She states she grazes on food and she admits to 2 pound weight loss in the last 3 weeks.  We discussed that this is unintentional.  She says her weight continues to fluctuate.  She does take an 64 ounces of fluids per day.  She continues to have chronic peripheral neuropathy of both hands, right greater than left.  She has noted minimal improvement with the discontinuation  "of Mena.  She is right-hand dominant and she has a difficult time writing, however minimal improvement.  She does have some peripheral neuropathy in her right toes and she does require a cane to ambulate, nothing worsening.  She has been receiving occasional hot flashes throughout the week, but more notable shortly after the Herceptin infusion.  She has a chronic cough and nothing worsening recently.  Admits to ongoing intermittent headaches, however nothing worsening.  She has had noted some visual changes and actually has an appointment with her ophthalmologist today.  Occasional nausea and Zofran seems to work well for her.  Denies fever chills, rashes, abdominal pain,, bleeding, vomiting, diarrhea or constipation.      Denies fever, chills, chest pain, dysuria, back pain. Does have diffuse joint aches but these have recently improved.    PHYSICAL EXAMINATION:     /84 (BP Location: Right arm, Patient Position: Sitting, Cuff Size: Adult Regular)  Pulse 81  Temp 96.9  F (36.1  C) (Oral)  Resp 18  Ht 1.575 m (5' 2.01\")  Wt 52.8 kg (116 lb 8 oz)  SpO2 95%  BMI 21.3 kg/m2     Wt Readings from Last 4 Encounters:   01/26/18 52.8 kg (116 lb 8 oz)   01/05/18 53.8 kg (118 lb 11.2 oz)   12/15/17 53.1 kg (117 lb)   12/14/17 54.3 kg (119 lb 11.2 oz)     Constitutional: Alert, oriented, cachectic female in no visible distress. In a wheelchair  Eyes: Anicteric sclerae. PERRL. Left eye does not track in left lateral direction but stays midline, will track medially. No visible nystagmus. Other EOM intact.  ENT/Mouth: OM moist and pink without lesions or thrush.    CV: RRR, no murmurs appreciated.  Resp: CTAB slightly diminished R base  Extremities: No lower extremity edema appreciated.  Neuro: CN II-XII grossly intact except for ocular movements as noted above. Strength in bilateral UE and LE 5/5. Grossly nonfocal.    LABS:   Results for HEIDI RUIZ (MRN [id number]) as of 1/26/2018 12:33   1/26/2018 " 09:35   Sodium 139   Potassium 3.9   Chloride 104   Carbon Dioxide 28   Urea Nitrogen 10   Creatinine 0.64   GFR Estimate >90   GFR Estimate If Black >90   Calcium 9.0   Anion Gap 7   Albumin 3.6   Protein Total 7.0   Bilirubin Total 0.3   Alkaline Phosphatase 110   ALT 20   AST 24   Glucose 83   WBC 5.7   Hemoglobin 13.0   Hematocrit 39.7   Platelet Count 244   RBC Count 4.05   MCV 98   MCH 32.1   MCHC 32.7   RDW 15.3 (H)   Diff Method Automated Method   % Neutrophils 67.9   % Lymphocytes 19.0   % Monocytes 11.5   % Eosinophils 0.0   % Basophils 1.2   % Immature Granulocytes 0.4   Nucleated RBCs 0   Absolute Neutrophil 3.9   Absolute Lymphocytes 1.1   Absolute Monocytes 0.7   Absolute Eosinophils 0.0   Absolute Basophils 0.1   Abs Immature Granulocytes 0.0   Absolute Nucleated RBC 0.0       IMPRESSION/PLAN:  Dominga is a 62-year-old female with history of metastatic ER-positive, HER-2 positive breast cancer, with involvement of the bone, history of pleural effusions treated with pleurodesis and PleurX placement, and with treated brain metastases, previously with stable disease on TDM1, with progressive disease after treatment break.  She was started on [place]  clinical trial and randomized to physician's choice, and started on Gemzar/Herceptin on 9/29/16.    1. Breast cancer:  CT scan of the chest, abdomen and pelvis and a brain MRI from 12/14 revealed no evidence of progressive disease. Dr. Harper would like her to continue on her Gemzar day 1 and day 8 in conjunction with Herceptin on day 1 every 21 days. However, given her overall weakness and fatigue as well as declining physical stamina, decision made to holding her Gemzar for  6 weeks.  Last dose of Gemzar was 12/14. Patient states today she would like to continue with Herceptin alone as her fatigue and weakness have improved.  Per Dr. Harper, if she is going to remain off therapy longer than 6 weeks, it would be reasonable to consider adding an  aromatase inhibitor back into her plan. Labs reviewed, proceed with Herceptin today. Her tumor markers remain stable.  --2/16 with Deyanira Costello and infusion -- will re-eval AI at that time  --3/7 CT cap, Echo, Brain MRI,  --3/12 Dr Harper     2.  Brain metastases.  These appear stable on overall brain MRI.     3.  Bone mets: Last Xgeva on 1/5/18. Will plan q6 weeks.  She will be due at her next appointment 2/16/18.    4. R lobar and segmental pulmonary emboli: On Lovenox 1 mg/kg BID-->50 mg BID based on her weight.  No active bleeding at this time.  Discussed calling us with any excessive bleeding or spontaneous bruising to check a level.     5.  Peripheral neuropathy: Chronic and persistent, however overall slightly improving recently.  Still continues to have difficulty with right hand as far as writing and holding things, however she has noted slightly improvement.  Use a cane to ambulate.  We will continue to monitor.    6. Nausea: Much improved with Zofran at home.      7.  Central diabetes insipidus.  This was diagnosed as an inpatient in the setting of hypernatremia.  She was started on desmopressin.  She has been seeing Endocrinology ultrasound of the thyroid and neck are scheduled in early March..       8.  Nutrition.  Recommended increasing the protein in her diet using protein shakes or Ensure.  She is down 2 pounds from 3 weeks ago, however his weight has fluctuated over the last several months.  Blood work looks better with improved albumin today.       9.  She does have a POLST and is DNR/DNI.  Her power of  is listed in the computer.       Ernestina Brewer PA-C    The patient was seen in conjunction with Ernestina Brewer PA-C who served as a scribe for today's visit. I have reviewed the note and agree with the above findings and plan. -ECT

## 2018-01-26 NOTE — MR AVS SNAPSHOT
After Visit Summary   1/26/2018    Keren Erickson    MRN: [id number]           Patient Information     Date Of Birth          1955        Visit Information        Provider Department      1/26/2018 9:30 AM Deyanira Costello PA-C [facility]        Today's Diagnoses     Brain metastasis (H)        Carcinoma of breast metastatic to multiple sites, unspecified laterality (H)        Metastatic breast cancer (H)           Follow-ups after your visit        Your next 10 appointments already scheduled     Feb 16, 2018 11:30 AM CST   [facility] Lab Draw with UC MASONIC LAB DRAW   [facility] Draw ([facility])    [address]  [address]  [place] [facility] [address]   [phone]            Feb 16, 2018 12:00 PM CST   (Arrive by 11:45 AM)   Return Visit with Deyanira Costello PA-C   [facility] ([facility])    [address]  [address]  [place] [facility] [address]   [phone]            Feb 16, 2018  1:30 PM CST   Infusion 60 with  ONCOLOGY INFUSION, UC 25 ATC   [facility] ([facility])    [address]  [address]  [place] [facility] [address]   [phone]            Mar 07, 2018 10:40 AM CST   (Arrive by 10:25 AM)   CT ABDOMEN PELVIS W/O & W CONTRAST with UCCT1   [facility] ([facility])    [address]  1st Floor  [place] [address]   [phone]           Please bring any scans or X-rays taken at other hospitals, if similar tests were done. Also bring a list of your medicines, including vitamins, minerals and over-the-counter drugs. It is safest to leave personal items at home.  Be sure to tell your doctor:   If you have any allergies.   If there s any chance you are pregnant.   If you are breastfeeding.   If you have any special needs.  You may have contrast for this  exam. To prepare:   Do not eat or drink for 2 hours before your exam. If you need to take medicine, you may take it with small sips of water. (We may ask you to take liquid medicine as well.)   The day before your exam, drink extra fluids at least six 8-ounce glasses (unless your doctor tells you to restrict your fluids).  Patients over 70 or patients with diabetes or kidney problems:   If you haven t had a blood test (creatinine test) within the last 30 days, go to your clinic or Diagnostic Imaging Department for this test.  If you have diabetes:   If your kidney function is normal, continue taking your metformin (Avandamet, Glucophage, Glucovance, Metaglip) on the day of your exam.   If your kidney function is abnormal, wait 48 hours before restarting this medicine.  You will have oral contrast for this exam:   You will drink the contrast at home. Get this from your clinic or Diagnostic Imaging Department. Please follow the directions given.  Please wear loose clothing, such as a sweat suit or jogging clothes. Avoid snaps, zippers and other metal. We may ask you to undress and put on a hospital gown.  If you have any questions, please call the Imaging Department where you will have your exam.            Mar 07, 2018 11:00 AM CST   Ech Complete with [address] Echo [facility])    [address] [place] [address] [phone]           1. Please bring or wear a comfortable two-piece outfit. 2. You may eat, drink and take your normal medicines. 3. For any questions that cannot be answered, please contact the ordering physician            Mar 07, 2018 12:15 PM CST   (Arrive by 12:00 PM)   MR BRAIN W/O & W CONTRAST with [address] MRI ([facility])    [address] [place] [address] [phone]           Take your medicines as usual, unless your doctor tells you not to. Bring a list  of your current medicines to your exam (including vitamins, minerals and over-the-counter drugs).  You will be given intravenous contrast for this exam. To prepare:   The day before your exam, drink extra fluids at least six 8-ounce glasses (unless your doctor tells you to restrict your fluids).   Have a blood test (creatinine test) within 30 days of your exam. Go to your clinic or Diagnostic Imaging Department for this test.  The MRI machine uses a strong magnet. Please wear clothes without metal (snaps, zippers). A sweatsuit works well, or we may give you a hospital gown.  Please remove any body piercings and hair extensions before you arrive. You will also remove watches, jewelry, hairpins, wallets, dentures, partial dental plates and hearing aids. You may wear contact lenses, and you may be able to wear your rings. We have a safe place to keep your personal items, but it is safer to leave them at home.   **IMPORTANT** THE INSTRUCTIONS BELOW ARE ONLY FOR THOSE PATIENTS WHO HAVE BEEN TOLD THEY WILL RECEIVE SEDATION OR GENERAL ANESTHESIA DURING THEIR MRI PROCEDURE:  IF YOU WILL RECEIVE SEDATION (take medicine to help you relax during your exam):   You must get the medicine from your doctor before you arrive. Bring the medicine to the exam. Do not take it at home.   Arrive one hour early. Bring someone who can take you home after the test. Your medicine will make you sleepy. After the exam, you may not drive, take a bus or take a taxi by yourself.   No eating 8 hours before your exam. You may have clear liquids up until 4 hours before your exam. (Clear liquids include water, clear tea, black coffee and fruit juice without pulp.)  IF YOU WILL RECEIVE ANESTHESIA (be asleep for your exam):   Arrive 1 1/2 hours early. Bring someone who can take you home after the test. You may not drive, take a bus or take a taxi by yourself.   No eating 8 hours before your exam. You may have clear liquids up until 4 hours before your  exam. (Clear liquids include water, clear tea, black coffee and fruit juice without pulp.)  Please call the Imaging Department at your exam site with any questions.            Mar 12, 2018  8:00 AM CDT   Masonic Lab Draw with  popAD LAB DRAW   [facility] Lab Draw ([facility])    [address] [place] [address]  [address]  [facility] [address]   [phone]            Mar 12, 2018  8:30 AM CDT   (Arrive by 8:15 AM)   Return Active Treatment with Marlen Harper MD   [facility] ([facility])    [address] [place] [address]  [address]  [facility] [address] [phone]            Mar 12, 2018  9:30 AM CDT   Infusion 60 with UC ONCOLOGY INFUSION   [facility] ([facility])    [address] [place] [address]  [address]  [facility] [address] [phone]              Who to contact     If you have questions or need follow up information about today's clinic visit or your schedule please contact [facility] directly at [phone].  Normal or non-critical lab and imaging results will be communicated to you by MyChart, letter or phone within 4 business days after the clinic has received the results. If you do not hear from us within 7 days, please contact the clinic through [employer] or phone. If you have a critical or abnormal lab result, we will notify you by phone as soon as possible.  Submit refill requests through [employer] or call your pharmacy and they will forward the refill request to us. Please allow 3 business days for your refill to be completed.          Additional Information About Your Visit        MyChart Information     [employer] gives you secure access to your electronic health record. If you see a primary care provider, you can also send messages to your care team and make appointments. If you have questions, please call your primary care clinic.  If you do not have a primary  "care provider, please call [phone] and they will assist you.        Care EveryWhere ID     This is your Care EveryWhere ID. This could be used by other organizations to access your [facility] medical records  [id number]        Your Vitals Were     Pulse Temperature Respirations Height Pulse Oximetry BMI (Body Mass Index)    81 96.9  F (36.1  C) (Oral) 18 1.575 m (5' 2.01\") 95% 21.3 kg/m2       Blood Pressure from Last 3 Encounters:   01/26/18 125/84   01/05/18 135/83   12/22/17 133/83    Weight from Last 3 Encounters:   01/26/18 52.8 kg (116 lb 8 oz)   01/05/18 53.8 kg (118 lb 11.2 oz)   12/15/17 53.1 kg (117 lb)              Today, you had the following     No orders found for display       Primary Care Provider Office Phone # Fax #    Kellyhoward Hart [phone]       [address] [facility] [address]        Equal Access to Services     [facility]: Hadii keturah ibarra hadasho Soomaali, waaxda luqadaha, qaybta kaalmada adeegyajb, freddie escobedo . So [facility] [phone].    ATENCIÓN: Si habla español, tiene a ramires disposición servicios gratuitos de asistencia lingüística. Llame al [phone].    We comply with applicable federal civil rights laws and [place] laws. We do not discriminate on the basis of race, color, national origin, age, disability, sex, sexual orientation, or gender identity.            Thank you!     Thank you for choosing [facility]  for your care. Our goal is always to provide you with excellent care. Hearing back from our patients is one way we can continue to improve our services. Please take a few minutes to complete the written survey that you may receive in the mail after your visit with us. Thank you!             Your Updated Medication List - Protect others around you: Learn how to safely use, store and throw away your medicines at www.disposemymeds.org.          This list is accurate as of 1/26/18  2:00 PM.  Always use " your most recent med list.                   Brand Name Dispense Instructions for use Diagnosis    desmopressin 0.2 MG tablet    DDAVP    45 tablet    Take  1/2 tablet once daily by mouth    Diabetes insipidus (H)       LOVENOX 60 MG/0.6ML injection   Generic drug:  enoxaparin     60 Syringe    Inject 0.5 mLs (50 mg) Subcutaneous 2 times daily    Other acute pulmonary embolism without acute cor pulmonale (H)       meclizine 25 MG tablet    ANTIVERT    30 tablet    Take 1 tablet (25 mg) by mouth 3 times daily as needed for dizziness    BPPV (benign paroxysmal positional vertigo), right       methylphenidate 5 MG tablet    RITALIN    30 tablet    Take 5 mg once daily as needed for fatigue    Carcinoma of breast metastatic to multiple sites, unspecified laterality (H)       Multi-vitamin Tabs tablet      Take 1 tablet by mouth daily        omeprazole 20 MG tablet     30 tablet    Take 1 tablet (20 mg) by mouth daily    Gastroesophageal reflux disease without esophagitis       ondansetron 4 MG tablet    ZOFRAN    18 tablet    Take 1 tablet (4 mg) by mouth every 8 hours as needed for nausea    Nausea       prochlorperazine 10 MG tablet    COMPAZINE    90 tablet    Take 1 tablet (10 mg) by mouth every 6 hours as needed for nausea or vomiting    Nausea       TYLENOL PO      Take 500 mg by mouth once        VITAMIN D (CHOLECALCIFEROL) PO      Take 5,000 Units by mouth daily        VITAMIN E BLEND PO      Take by mouth daily

## 2018-01-26 NOTE — LETTER
1/26/2018      RE: Keren Erickson  [address] [address]       HEMATOLOGY/ONCOLOGY PROGRESS NOTE  Jan 26, 2018    REASON FOR VISIT: Metastatic breast cancer prior to Herceptin    DIAGNOSIS:   The patient is a 60-year-old female who presented to the hospital initially in 09/2013 with complaints of dyspnea. Workup revealed a large pericardial effusion and pleural effusion. Physical examination revealed a large untreated left breast mass encompassing the entire left breast. Biopsies revealed these were ER, SC and HER2-positive breast cancer. She had a thoracentesis and pericardial sclerosis performed. She was originally on Arimidex and Herceptin. In 01/2014, she was switched to tamoxifen and Herceptin due to arthralgias, and she ultimately developed progressive disease and was switched to Faslodex and Herceptin. In 01/2015, she was found to have progressive disease and was switched to T-DM1.     Initially when she was seen in late 02/2015, she complained of some V5 sensory deficit. This was subjective. I was not able to elicit this on examination. This persisted and further workup was performed with a brain MRI. This revealed what was initially thought to be 3 punctate brain metastases. She originally saw Radiation Oncology and Neurosurgery as well as underwent a lumbar puncture. Cytology from the lumbar puncture showed no evidence of leptomeningeal disease. She was treated with Gamma Knife radiation to 6 lesions, performed on 04/16/2015.  She initiated therapy with TDM1 in January 2015 and continued this through 6/26/2015 with overall stable disease. She has since been on a break from treatment. The patient presented earlier this month with recurrent shortness of breath.  Imaging revealed recurrent right-sided pleural effusion.  She has since undergone thoracentesis with cytology pending.  Clinically, however, there is evidence of disease progression. PET scan performed on 11/25/2015  demonstrated progression of disease at multiple sites. She restarted TDM1 on 11/27/2015, however experienced a mild transfusion reaction with shortness of breath and chest tightness, resolved upon stopping TDM1 and 125 mg of SoluMedrol. She had progression of disease on repeat imaging 2/17/2016, as well as new brain metastases. She started TDM1 with Perjeta on 3/4/2016. She underwent gamma knife to brain mets on 3/8/16.       In late May, she was found to have progressive brain metastases.  She received whole brain radiation.  She had a follow up brain MRI August 2016 that was stable.     In September 2016, she enrolled on [place]  trial and was randomized to the standard of care arm/Physician's Choice; started on gemzar and herceptin. She was recently hospitalized 1/27-2/3/17 for presumed HCAP. Trial was suspended. She continued on gemzar and heceptin with stable disease. Last restaging was 4/7/17 and stable. Gemzar was placed on hold from 4/10/2017 through 6/22/17 so that she could recover from pneumonia.    She has been receiving Herceptin only and her last dose was on 1/5.  She has been receiving it every 3 weeks and is here today for cycle 23.     INTERVAL HISTORY: Dominga is here today with her friend and doing much better.  She has been tolerating the Herceptin infusions well.  She feels that her overall fatigue is much better although she does still take approximately 2 naps a day.  She also feels that her breathing is much improved.  She said she has more endurance than she had in the past and denies any shortness of breath today.  She states her appetite is relatively poor, however she continues to eat throughout the day.  She states she grazes on food and she admits to 2 pound weight loss in the last 3 weeks.  We discussed that this is unintentional.  She says her weight continues to fluctuate.  She does take an 64 ounces of fluids per day.  She continues to have chronic peripheral neuropathy of  "both hands, right greater than left.  She has noted minimal improvement with the discontinuation of Gemzar.  She is right-hand dominant and she has a difficult time writing, however minimal improvement.  She does have some peripheral neuropathy in her right toes and she does require a cane to ambulate, nothing worsening.  She has been receiving occasional hot flashes throughout the week, but more notable shortly after the Herceptin infusion.  She has a chronic cough and nothing worsening recently.  Admits to ongoing intermittent headaches, however nothing worsening.  She has had noted some visual changes and actually has an appointment with her ophthalmologist today.  Occasional nausea and Zofran seems to work well for her.  Denies fever chills, rashes, abdominal pain,, bleeding, vomiting, diarrhea or constipation.      Denies fever, chills, chest pain, dysuria, back pain. Does have diffuse joint aches but these have recently improved.    PHYSICAL EXAMINATION:     /84 (BP Location: Right arm, Patient Position: Sitting, Cuff Size: Adult Regular)  Pulse 81  Temp 96.9  F (36.1  C) (Oral)  Resp 18  Ht 1.575 m (5' 2.01\")  Wt 52.8 kg (116 lb 8 oz)  SpO2 95%  BMI 21.3 kg/m2     Wt Readings from Last 4 Encounters:   01/26/18 52.8 kg (116 lb 8 oz)   01/05/18 53.8 kg (118 lb 11.2 oz)   12/15/17 53.1 kg (117 lb)   12/14/17 54.3 kg (119 lb 11.2 oz)     Constitutional: Alert, oriented, cachectic female in no visible distress. In a wheelchair  Eyes: Anicteric sclerae. PERRL. Left eye does not track in left lateral direction but stays midline, will track medially. No visible nystagmus. Other EOM intact.  ENT/Mouth: OM moist and pink without lesions or thrush.    CV: RRR, no murmurs appreciated.  Resp: CTAB slightly diminished R base  Extremities: No lower extremity edema appreciated.  Neuro: CN II-XII grossly intact except for ocular movements as noted above. Strength in bilateral UE and LE 5/5. Grossly " nonfocal.    LABS:   Results for HEIDI RUIZ (MRN [id number]) as of 1/26/2018 12:33   1/26/2018 09:35   Sodium 139   Potassium 3.9   Chloride 104   Carbon Dioxide 28   Urea Nitrogen 10   Creatinine 0.64   GFR Estimate >90   GFR Estimate If Black >90   Calcium 9.0   Anion Gap 7   Albumin 3.6   Protein Total 7.0   Bilirubin Total 0.3   Alkaline Phosphatase 110   ALT 20   AST 24   Glucose 83   WBC 5.7   Hemoglobin 13.0   Hematocrit 39.7   Platelet Count 244   RBC Count 4.05   MCV 98   MCH 32.1   MCHC 32.7   RDW 15.3 (H)   Diff Method Automated Method   % Neutrophils 67.9   % Lymphocytes 19.0   % Monocytes 11.5   % Eosinophils 0.0   % Basophils 1.2   % Immature Granulocytes 0.4   Nucleated RBCs 0   Absolute Neutrophil 3.9   Absolute Lymphocytes 1.1   Absolute Monocytes 0.7   Absolute Eosinophils 0.0   Absolute Basophils 0.1   Abs Immature Granulocytes 0.0   Absolute Nucleated RBC 0.0       IMPRESSION/PLAN:  Dominga is a 62-year-old female with history of metastatic ER-positive, HER-2 positive breast cancer, with involvement of the bone, history of pleural effusions treated with pleurodesis and PleurX placement, and with treated brain metastases, previously with stable disease on TDM1, with progressive disease after treatment break.  She was started on [place]  clinical trial and randomized to physician's choice, and started on Gemzar/Herceptin on 9/29/16.    1. Breast cancer:  CT scan of the chest, abdomen and pelvis and a brain MRI from 12/14 revealed no evidence of progressive disease. Dr. Harper would like her to continue on her Gemzar day 1 and day 8 in conjunction with Herceptin on day 1 every 21 days. However, given her overall weakness and fatigue as well as declining physical stamina, decision made to holding her Gemzar for  6 weeks.  Last dose of Gemzar was 12/14. Patient states today she would like to continue with Herceptin alone as her fatigue and weakness have improved.  Per Dr. Harper,  if she is going to remain off therapy longer than 6 weeks, it would be reasonable to consider adding an aromatase inhibitor back into her plan. Labs reviewed, proceed with Herceptin today. Her tumor markers remain stable.  --2/16 with Deyanira Costello and infusion -- will re-eval AI at that time  --3/7 CT cap, Echo, Brain MRI,  --3/12 Dr Harper     2.  Brain metastases.  These appear stable on overall brain MRI.     3.  Bone mets: Last Xgeva on 1/5/18. Will plan q6 weeks.  She will be due at her next appointment 2/16/18.    4. R lobar and segmental pulmonary emboli: On Lovenox 1 mg/kg BID-->50 mg BID based on her weight.  No active bleeding at this time.  Discussed calling us with any excessive bleeding or spontaneous bruising to check a level.     5.  Peripheral neuropathy: Chronic and persistent, however overall slightly improving recently.  Still continues to have difficulty with right hand as far as writing and holding things, however she has noted slightly improvement.  Use a cane to ambulate.  We will continue to monitor.    6. Nausea: Much improved with Zofran at home.      7.  Central diabetes insipidus.  This was diagnosed as an inpatient in the setting of hypernatremia.  She was started on desmopressin.  She has been seeing Endocrinology ultrasound of the thyroid and neck are scheduled in early March..       8.  Nutrition.  Recommended increasing the protein in her diet using protein shakes or Ensure.  She is down 2 pounds from 3 weeks ago, however his weight has fluctuated over the last several months.  Blood work looks better with improved albumin today.       9.  She does have a POLST and is DNR/DNI.  Her power of  is listed in the computer.       Ernestina Brewer PA-C    The patient was seen in conjunction with Ernestina Brewer PA-C who served as a scribe for today's visit. I have reviewed the note and agree with the above findings and plan. -ECT      Deyanira Costello PA-C

## 2018-01-26 NOTE — MR AVS SNAPSHOT
After Visit Summary   1/26/2018    Keren Erickson    MRN: [id number]           Patient Information     Date Of Birth          1955        Visit Information        Provider Department      1/26/2018 10:30 AM  31 ATC; [facility] ONCOLOGY INFUSION [facility]        Today's Diagnoses     Metastatic breast cancer (H)    -  1    Brain metastasis (H)        Carcinoma of breast metastatic to multiple sites, unspecified laterality (H)          Care Instructions          January 2018 Sunday Monday Tuesday Wednesday Thursday Friday Saturday        1     2     3     4     5     [facility] DRAW    9:45 AM   (15 min.)    [facility]     [facility] RETURN   10:05 AM   (50 min.)   Carmen Alexis PA-C   [facility]     [facility] ONC INFUSION 60    1:00 PM   (60 min.)    ONCOLOGY INFUSION   [facility] 6       7     8     9     10     11     12     13       14     15     16     17     18     19     20       21     22     23     24     25     26     [facility] DRAW    9:00 AM   (15 min.)    [facility] LAB DRAW   [facility] Lab Draw     [facility] RETURN    9:15 AM   (50 min.)   Deyanira Costello PA-C   [facility]     [facility] ONC INFUSION 60   10:30 AM   (60 min.)    ONCOLOGY INFUSION   [facility] 27       28     29 30 31 February 2018 Sunday Monday Tuesday Wednesday Thursday Friday Saturday                       1     2     3       4     5     6     7     8     9     10       11     12     13     14     15     16     [facility] DRAW   11:30 AM   (15 min.)    [facility]RAW   [facility]     [facility] RETURN   11:45 AM   (50 min.)   Deyanira Costello PA-C   [facility]     [facility] ONC INFUSION 60    1:30 PM   (60 min.)    ONCOLOGY INFUSION   [facility] 17       18      19     20     21     22     23     24       25     26     27     28                                 Lab Results:  Recent Results (from the past 12 hour(s))   Comprehensive metabolic panel    Collection Time: 01/26/18  9:35 AM   Result Value Ref Range    Sodium 139 133 - 144 mmol/L    Potassium 3.9 3.4 - 5.3 mmol/L    Chloride 104 94 - 109 mmol/L    Carbon Dioxide 28 20 - 32 mmol/L    Anion Gap 7 3 - 14 mmol/L    Glucose 83 70 - 99 mg/dL    Urea Nitrogen 10 7 - 30 mg/dL    Creatinine 0.64 0.52 - 1.04 mg/dL    GFR Estimate >90 >60 mL/min/1.7m2    GFR Estimate If Black >90 >60 mL/min/1.7m2    Calcium 9.0 8.5 - 10.1 mg/dL    Bilirubin Total 0.3 0.2 - 1.3 mg/dL    Albumin 3.6 3.4 - 5.0 g/dL    Protein Total 7.0 6.8 - 8.8 g/dL    Alkaline Phosphatase 110 40 - 150 U/L    ALT 20 0 - 50 U/L    AST 24 0 - 45 U/L   CBC with platelets differential    Collection Time: 01/26/18  9:35 AM   Result Value Ref Range    WBC 5.7 4.0 - 11.0 10e9/L    RBC Count 4.05 3.8 - 5.2 10e12/L    Hemoglobin 13.0 11.7 - 15.7 g/dL    Hematocrit 39.7 35.0 - 47.0 %    MCV 98 78 - 100 fl    MCH 32.1 26.5 - 33.0 pg    MCHC 32.7 31.5 - 36.5 g/dL    RDW 15.3 (H) 10.0 - 15.0 %    Platelet Count 244 150 - 450 10e9/L    Diff Method Automated Method     % Neutrophils 67.9 %    % Lymphocytes 19.0 %    % Monocytes 11.5 %    % Eosinophils 0.0 %    % Basophils 1.2 %    % Immature Granulocytes 0.4 %    Nucleated RBCs 0 0 /100    Absolute Neutrophil 3.9 1.6 - 8.3 10e9/L    Absolute Lymphocytes 1.1 0.8 - 5.3 10e9/L    Absolute Monocytes 0.7 0.0 - 1.3 10e9/L    Absolute Eosinophils 0.0 0.0 - 0.7 10e9/L    Absolute Basophils 0.1 0.0 - 0.2 10e9/L    Abs Immature Granulocytes 0.0 0 - 0.4 10e9/L    Absolute Nucleated RBC 0.0                Follow-ups after your visit        Your next 10 appointments already scheduled     Feb 16, 2018 11:30 AM [facility]   [facility] Lab Draw with  MASONIC LAB DRAW   [facility] Lab Draw ([facility])    [address]erra  [address]  [address]  [facility] [address]   [phone]            Feb 16, 2018 12:00 PM CST   (Arrive by 11:45 AM)   Return Visit with Deyanira Costello PA-C   [facility] ([facility])    [address]  [address]  [facility] [address]   [phone]            Feb 16, 2018  1:30 PM CST   Infusion 60 with UC ONCOLOGY INFUSION, UC 25 ATC   [facility] ([facility])    [address]  [address]  [facility] [address]   [phone]            Mar 07, 2018 10:40 AM CST   (Arrive by 10:25 AM)   CT ABDOMEN PELVIS W/O & W CONTRAST with UCCT1   [facility] CT ([facility])    [address]  1st Floor  [facility] [address]   [phone]           Please bring any scans or X-rays taken at other hospitals, if similar tests were done. Also bring a list of your medicines, including vitamins, minerals and over-the-counter drugs. It is safest to leave personal items at home.  Be sure to tell your doctor:   If you have any allergies.   If there s any chance you are pregnant.   If you are breastfeeding.   If you have any special needs.  You may have contrast for this exam. To prepare:   Do not eat or drink for 2 hours before your exam. If you need to take medicine, you may take it with small sips of water. (We may ask you to take liquid medicine as well.)   The day before your exam, drink extra fluids at least six 8-ounce glasses (unless your doctor tells you to restrict your fluids).  Patients over 70 or patients with diabetes or kidney problems:   If you haven t had a blood test (creatinine test) within the last 30 days, go to your clinic or Diagnostic Imaging Department for this test.  If you have diabetes:   If your kidney function is normal, continue taking your metformin (Avandamet, Glucophage, Glucovance, Metaglip) on the day of your exam.   If your kidney  function is abnormal, wait 48 hours before restarting this medicine.  You will have oral contrast for this exam:   You will drink the contrast at home. Get this from your clinic or Diagnostic Imaging Department. Please follow the directions given.  Please wear loose clothing, such as a sweat suit or jogging clothes. Avoid snaps, zippers and other metal. We may ask you to undress and put on a hospital gown.  If you have any questions, please call the Imaging Department where you will have your exam.            Mar 07, 2018 11:00 AM CST   Ech Complete with [address] Echo ([facility])    [address] [place] [id number] [phone]           1. Please bring or wear a comfortable two-piece outfit. 2. You may eat, drink and take your normal medicines. 3. For any questions that cannot be answered, please contact the ordering physician            Mar 07, 2018 12:15 PM CST   (Arrive by 12:00 PM)   MR BRAIN W/O & W CONTRAST with [address] MRI ([facility])    [address] [place] [id number] [phone]           Take your medicines as usual, unless your doctor tells you not to. Bring a list of your current medicines to your exam (including vitamins, minerals and over-the-counter drugs).  You will be given intravenous contrast for this exam. To prepare:   The day before your exam, drink extra fluids at least six 8-ounce glasses (unless your doctor tells you to restrict your fluids).   Have a blood test (creatinine test) within 30 days of your exam. Go to your clinic or Diagnostic Imaging Department for this test.  The MRI machine uses a strong magnet. Please wear clothes without metal (snaps, zippers). A sweatsuit works well, or we may give you a hospital gown.  Please remove any body piercings and hair extensions before you arrive. You will also remove watches, jewelry, hairpins,  wallets, dentures, partial dental plates and hearing aids. You may wear contact lenses, and you may be able to wear your rings. We have a safe place to keep your personal items, but it is safer to leave them at home.   **IMPORTANT** THE INSTRUCTIONS BELOW ARE ONLY FOR THOSE PATIENTS WHO HAVE BEEN TOLD THEY WILL RECEIVE SEDATION OR GENERAL ANESTHESIA DURING THEIR MRI PROCEDURE:  IF YOU WILL RECEIVE SEDATION (take medicine to help you relax during your exam):   You must get the medicine from your doctor before you arrive. Bring the medicine to the exam. Do not take it at home.   Arrive one hour early. Bring someone who can take you home after the test. Your medicine will make you sleepy. After the exam, you may not drive, take a bus or take a taxi by yourself.   No eating 8 hours before your exam. You may have clear liquids up until 4 hours before your exam. (Clear liquids include water, clear tea, black coffee and fruit juice without pulp.)  IF YOU WILL RECEIVE ANESTHESIA (be asleep for your exam):   Arrive 1 1/2 hours early. Bring someone who can take you home after the test. You may not drive, take a bus or take a taxi by yourself.   No eating 8 hours before your exam. You may have clear liquids up until 4 hours before your exam. (Clear liquids include water, clear tea, black coffee and fruit juice without pulp.)  Please call the Imaging Department at your exam site with any questions.            Mar 12, 2018  8:00 AM CDT   [facility] Draw with  [facility] LAB DRAW   [facility] Draw ([facility])    [address] [place] [address]  [address]  [facility] [address]   [phone]            Mar 12, 2018  8:30 AM CDT   (Arrive by 8:15 AM)   Return Active Treatment with Marlen Harper MD   [facility] ([facility])    [address] [place] [address]  [address]  [facility] [address]   [phone]            Mar 12, 2018  9:30 AM CDT   Infusion 60 with  UC ONCOLOGY INFUSION   [facility] ([facility])    [address]  [address]  [facility] [address] [phone]              Who to contact     If you have questions or need follow up information about today's clinic visit or your schedule please contact [facility] directly at [phone].  Normal or non-critical lab and imaging results will be communicated to you by MyChart, letter or phone within 4 business days after the clinic has received the results. If you do not hear from us within 7 days, please contact the clinic through [employer] or phone. If you have a critical or abnormal lab result, we will notify you by phone as soon as possible.  Submit refill requests through [employer] or call your pharmacy and they will forward the refill request to us. Please allow 3 business days for your refill to be completed.          Additional Information About Your Visit        [employer] Information     [employer] gives you secure access to your electronic health record. If you see a primary care provider, you can also send messages to your care team and make appointments. If you have questions, please call your primary care clinic.  If you do not have a primary care provider, please call [phone] and they will assist you.        Care EveryWhere ID     This is your Care EveryWhere ID. This could be used by other organizations to access your [facility] medical records  [id number]         Blood Pressure from Last 3 Encounters:   01/26/18 125/84   01/05/18 135/83   12/22/17 133/83    Weight from Last 3 Encounters:   01/26/18 52.8 kg (116 lb 8 oz)   01/05/18 53.8 kg (118 lb 11.2 oz)   12/15/17 53.1 kg (117 lb)              We Performed the Following     Ca27.29  breast tumor marker     CBC with platelets differential     CEA     Comprehensive metabolic panel        Primary Care Provider Office Phone # Fax #    Kelly Hart [phone]  [phone] [address] [facility] [address]        Equal Access to Services     DORISODILON VARUN : Hadii keturah ibarra enderalda Sebasali, waadriannada lindseykarliha, galita ankurkyjb haterrencejb, freddie marks hayamanda higginsarianaradha keys. So [facility] [phone].    ATENCIÓN: Si habla español, tiene a ramires disposición servicios gratuitos de asistencia lingüística. Llame al [phone].    We comply with applicable federal civil rights laws and [place] laws. We do not discriminate on the basis of race, color, national origin, age, disability, sex, sexual orientation, or gender identity.            Thank you!     Thank you for choosing [facility]  for your care. Our goal is always to provide you with excellent care. Hearing back from our patients is one way we can continue to improve our services. Please take a few minutes to complete the written survey that you may receive in the mail after your visit with us. Thank you!             Your Updated Medication List - Protect others around you: Learn how to safely use, store and throw away your medicines at www.disposemymeds.org.          This list is accurate as of 1/26/18 11:10 AM.  Always use your most recent med list.                   Brand Name Dispense Instructions for use Diagnosis    desmopressin 0.2 MG tablet    DDAVP    45 tablet    Take  1/2 tablet once daily by mouth    Diabetes insipidus (H)       LOVENOX 60 MG/0.6ML injection   Generic drug:  enoxaparin     60 Syringe    Inject 0.5 mLs (50 mg) Subcutaneous 2 times daily    Other acute pulmonary embolism without acute cor pulmonale (H)       meclizine 25 MG tablet    ANTIVERT    30 tablet    Take 1 tablet (25 mg) by mouth 3 times daily as needed for dizziness    BPPV (benign paroxysmal positional vertigo), right       methylphenidate 5 MG tablet    RITALIN    30 tablet    Take 5 mg once daily as needed for fatigue    Carcinoma of breast metastatic to multiple sites, unspecified laterality (H)        Multi-vitamin Tabs tablet      Take 1 tablet by mouth daily        omeprazole 20 MG tablet     30 tablet    Take 1 tablet (20 mg) by mouth daily    Gastroesophageal reflux disease without esophagitis       ondansetron 4 MG tablet    ZOFRAN    18 tablet    Take 1 tablet (4 mg) by mouth every 8 hours as needed for nausea    Nausea       prochlorperazine 10 MG tablet    COMPAZINE    90 tablet    Take 1 tablet (10 mg) by mouth every 6 hours as needed for nausea or vomiting    Nausea       TYLENOL PO      Take 500 mg by mouth once        VITAMIN D (CHOLECALCIFEROL) PO      Take 5,000 Units by mouth daily        VITAMIN E BLEND PO      Take by mouth daily

## 2018-01-26 NOTE — Clinical Note
1/26/2018       RE: Keren Erickson  [address] [facility] [address]     Dear Colleague,    Thank you for referring your patient, Keren Erickson, to the [facility]. Please see a copy of my visit note below.    HEMATOLOGY/ONCOLOGY PROGRESS NOTE  Jan 26, 2018    REASON FOR VISIT: Metastatic breast cancer prior to Herceptin    DIAGNOSIS:   The patient is a 60-year-old female who presented to the hospital initially in 09/2013 with complaints of dyspnea. Workup revealed a large pericardial effusion and pleural effusion. Physical examination revealed a large untreated left breast mass encompassing the entire left breast. Biopsies revealed these were ER, IA and HER2-positive breast cancer. She had a thoracentesis and pericardial sclerosis performed. She was originally on Arimidex and Herceptin. In 01/2014, she was switched to tamoxifen and Herceptin due to arthralgias, and she ultimately developed progressive disease and was switched to Faslodex and Herceptin. In 01/2015, she was found to have progressive disease and was switched to T-DM1.     Initially when she was seen in late 02/2015, she complained of some V5 sensory deficit. This was subjective. I was not able to elicit this on examination. This persisted and further workup was performed with a brain MRI. This revealed what was initially thought to be 3 punctate brain metastases. She originally saw Radiation Oncology and Neurosurgery as well as underwent a lumbar puncture. Cytology from the lumbar puncture showed no evidence of leptomeningeal disease. She was treated with Gamma Knife radiation to 6 lesions, performed on 04/16/2015.  She initiated therapy with TDM1 in January 2015 and continued this through 6/26/2015 with overall stable disease. She has since been on a break from treatment. The patient presented earlier this month with recurrent shortness of breath.  Imaging revealed recurrent right-sided pleural effusion.   "She has since undergone thoracentesis with cytology pending.  Clinically, however, there is evidence of disease progression. PET scan performed on 11/25/2015 demonstrated progression of disease at multiple sites. She restarted TDM1 on 11/27/2015, however experienced a mild transfusion reaction with shortness of breath and chest tightness, resolved upon stopping TDM1 and 125 mg of SoluMedrol. She had progression of disease on repeat imaging 2/17/2016, as well as new brain metastases. She started TDM1 with Perj[place] on 3/4/2016. She underwent gamma knife to brain mets on 3/8/16.       In late May, she was found to have progressive brain metastases.  She received whole brain radiation.  She had a follow up brain MRI August 2016 that was stable.     In September 2016, she enrolled on [place]  trial and was randomized to the standard of care arm/Physician's Choice; started on gemzar and herceptin. She was recently hospitalized 1/27-2/3/17 for presumed HCAP. Trial was suspended. She continued on gemzar and heceptin with stable disease. Last restaging was 4/7/17 and stable. Gemzar was placed on hold from 4/10/2017 through 6/22/17 so that she could recover from pneumonia.    INTERVAL HISTORY:  ***  Denies fever, chills, chest pain, dysuria, back pain. Does have diffuse joint aches but these have recently improved.    PHYSICAL EXAMINATION:     /84 (BP Location: Right arm, Patient Position: Sitting, Cuff Size: Adult Regular)  Pulse 81  Temp 96.9  F (36.1  C) (Oral)  Resp 18  Ht 1.575 m (5' 2.01\")  Wt 52.8 kg (116 lb 8 oz)  SpO2 95%  BMI 21.3 kg/m2 ***    Wt Readings from Last 4 Encounters:   01/26/18 52.8 kg (116 lb 8 oz)   01/05/18 53.8 kg (118 lb 11.2 oz)   12/15/17 53.1 kg (117 lb)   12/14/17 54.3 kg (119 lb 11.2 oz)     Constitutional: Alert, oriented, cachectic female in no visible distress. In a wheelchair  Eyes: Anicteric sclerae. PERRL. Left eye does not track in left lateral direction but stays " midline, will track medially. No visible nystagmus. Other EOM intact.  ENT/Mouth: OM moist and pink without lesions or thrush.    CV: RRR, no murmurs appreciated.  Resp: CTAB slightly diminished R base  Extremities: No lower extremity edema appreciated.  Neuro: CN II-XII grossly intact except for ocular movements as noted above. Strength in bilateral UE and LE 5/5. Grossly nonfocal.    LABS:   ***    IMPRESSION/PLAN:  Dominga is a 62-year-old female with history of metastatic ER-positive, HER-2 positive breast cancer, with involvement of the bone, history of pleural effusions treated with pleurodesis and PleurX placement, and with treated brain metastases, previously with stable disease on TDM1, with progressive disease after treatment break.  She was started on [place]  clinical trial and randomized to physician's choice, and started on Gemzar/Herceptin on 9/29/16.    1. Breast cancer:  CT scan of the chest, abdomen and pelvis and a brain MRI from 12/14 revealed no evidence of progressive disease. Dr. Harper would like her to continue on her Gemzar day 1 and day 8 in conjunction with Herceptin on day 1 every 21 days. However, given her overall weakness and fatigue as well as declining physical stamina, decision made to holding her Gemzar for  6 weeks.  Last dose of Gemzar was 12/14. Patient states today she would like to continue with Herceptin alone as her fatigue and weakness have improved.  Per Dr. Harper, if she is going to remain off therapy longer than 6 weeks, it would be reasonable to consider adding an aromatase inhibitor back into her plan. Labs reviewed, proceed with Herceptin today.  --Scheduled for labs, Deyanira Costello and infusion on 1/26, 2/16.  --Scheduled to see Dr. Harper on 33/12. Will add labs, infusion. She is also due for Echo in mid-March. Will ask Dr. Harper if she wants CT and brain MRI at that time as well to schedule them together for patient convenience.      2.  Brain metastases.   These appear stable on overall brain MRI.     3.  Bone mets: She is due for Xgeva.  Last Xgeva on 5/1/17. We will go ahead and administer this today.  She completed dental work in December. Will plan q6 weeks.     4. R lobar and segmental pulmonary emboli: On Lovenox 1 mg/kg BID-->50 mg BID based on her weight. Discussed calling us with any excessive bleeding or spontaneous bruising to check a level.     5. Nausea: Gave patient rx for Zofran as she is not tolerating compazine well due to dizziness.      6.  Central diabetes insipidus.  This was diagnosed as an inpatient in the setting of hypernatremia.  She was started on desmopressin.  She has been seeing Endocrinology. She was supposed to follow up with endocrinology in March with US Thyroid and neck. Will schedule these appointments.       7.  Nutrition.  Her weight is overall stable.       8.  She does have a POLST and is DNR/DNI.  Her power of  is listed in the computer.         Again, thank you for allowing me to participate in the care of your patient.      Sincerely,    Deyanira Costello PA-C

## 2018-01-26 NOTE — NURSING NOTE
Chief Complaints and History of Present Illnesses   Patient presents with     Consult For     vision changes after chemo      HPI    Affected eye(s):  Left   Symptoms:     No floaters   Flashes   No glare   No halos         Do you have eye pain now?:  No      Comments:  Image jumps and moves in [employer] LE got worse or started after chemo X 1.5 years  See cheri [facility]ourke light RE  Had breast cancer, brain tumors  Radhika JUAREZ 1:11 PM January 26, 2018

## 2018-01-26 NOTE — PATIENT INSTRUCTIONS
January 2018 Sunday Monday Tuesday Wednesday Thursday Friday Saturday        1     2     3     4     5     [facility] DRAW    9:45 AM   (15 min.)   [facility]   [facility] Lab Draw     [facility] RETURN   10:05 AM   (50 min.)   Carmen Alexis PA-C   [facility]     [facility] ONC INFUSION 60    1:00 PM   (60 min.)   [facility] ONCOLOGY INFUSION   [facility] 6       7     8     9     10     11     12     13       14     15     16     17     18     19     20       21     22     23     24     25     26     [facility] D[facility]    9:00 AM   (15 min.)   [facility]   [facility]     [facility] RETURN    9:15 AM   (50 min.)   Deyanira Costello PA-C   [facility]     [facility] ONC INFUSION 60   10:30 AM   (60 min.)   [facility] ONCOLOGY INFUSION   [facility] 27       28     29     30     31 February 2018 Sunday Monday Tuesday Wednesday Thursday Friday Saturday                       1     2     3       4     5     6     7     8     9     10       11     12     13     14     15     16     [facility] DRAW   11:30 AM   (15 min.)   [facility] DRAW   [facility] Lab Draw     [facility] RETURN   11:45 AM   (50 min.)   Deyanira Costello PA-C   [facility]     [facility] ONC INFUSION 60    1:30 PM   (60 min.)   [facility] ONCOLOGY INFUSION   [facility] 17       18     19     20     21     22     23     24       25     26     27     28                                 Lab Results:  Recent Results (from the past 12 hour(s))   Comprehensive metabolic panel    Collection Time: 01/26/18  9:35 AM   Result Value Ref Range    Sodium 139 133 - 144 mmol/L    Potassium 3.9 3.4 - 5.3 mmol/L    Chloride 104 94 - 109 mmol/L    Carbon Dioxide 28 20 - 32 mmol/L    Anion Gap 7 3 - 14 mmol/L    Glucose 83 70 - 99 mg/dL    Urea Nitrogen 10 7 - 30 mg/dL    Creatinine 0.64 0.52 - 1.04  mg/dL    GFR Estimate >90 >60 mL/min/1.7m2    GFR Estimate If Black >90 >60 mL/min/1.7m2    Calcium 9.0 8.5 - 10.1 mg/dL    Bilirubin Total 0.3 0.2 - 1.3 mg/dL    Albumin 3.6 3.4 - 5.0 g/dL    Protein Total 7.0 6.8 - 8.8 g/dL    Alkaline Phosphatase 110 40 - 150 U/L    ALT 20 0 - 50 U/L    AST 24 0 - 45 U/L   CBC with platelets differential    Collection Time: 01/26/18  9:35 AM   Result Value Ref Range    WBC 5.7 4.0 - 11.0 10e9/L    RBC Count 4.05 3.8 - 5.2 10e12/L    Hemoglobin 13.0 11.7 - 15.7 g/dL    Hematocrit 39.7 35.0 - 47.0 %    MCV 98 78 - 100 fl    MCH 32.1 26.5 - 33.0 pg    MCHC 32.7 31.5 - 36.5 g/dL    RDW 15.3 (H) 10.0 - 15.0 %    Platelet Count 244 150 - 450 10e9/L    Diff Method Automated Method     % Neutrophils 67.9 %    % Lymphocytes 19.0 %    % Monocytes 11.5 %    % Eosinophils 0.0 %    % Basophils 1.2 %    % Immature Granulocytes 0.4 %    Nucleated RBCs 0 0 /100    Absolute Neutrophil 3.9 1.6 - 8.3 10e9/L    Absolute Lymphocytes 1.1 0.8 - 5.3 10e9/L    Absolute Monocytes 0.7 0.0 - 1.3 10e9/L    Absolute Eosinophils 0.0 0.0 - 0.7 10e9/L    Absolute Basophils 0.1 0.0 - 0.2 10e9/L    Abs Immature Granulocytes 0.0 0 - 0.4 10e9/L    Absolute Nucleated RBC 0.0

## 2018-01-26 NOTE — PROGRESS NOTES
New patient for first eye exam in many years.  Image jumps and moves in [place] LE got worse or started after chemo X 1.5 years.  Vision is otherwise stable and seeing well.  Some ocular irritation both eyes, not using drops.              1. SO myokymia  Has noticed for past 1.5 years  Multiple brain scans in recent months following stereotactic brain surgery  No new mets reported in 12/17  After discussion with neuro-ophth no need for further investigation  May consider topical B-blocker if bothersome    2. GLAUCOMA suspect both eyes  Inferior notch OD  IOP okay today  Baseline tests and likely treatment at f/u    3. Cataracts both eyes  Mild  Observe    4. Blepharitis both eyes  Warm compresses  Artificial tears FOUR TIMES A DAY    5. history of metastatic Breast Cancer  No ocular mets  Continue to monitor    6. Amblyopia OS  Stable   Observe    F/u 1 month with retinal nerve fiber layer, campos visual field (HVF) 24-2, pachymetry, MRx    Complete documentation of historical and exam elements from today's encounter can be found in the full encounter summary report (not reduplicated in this progress note). I personally obtained the chief complaint(s) and history of present illness.  I confirmed and edited as necessary the review of systems, past medical/surgical history, family history, social history, and examination findings as documented by others; and I examined the patient myself. I personally reviewed the relevant tests, images, and reports as documented above. I formulated and edited as necessary the assessment and plan and discussed the findings and management plan with the patient and family.     Damon Sullivan, DO

## 2018-01-26 NOTE — MR AVS SNAPSHOT
After Visit Summary   1/26/2018    Keren Erickson    MRN: [id number]           Patient Information     Date Of Birth          1955        Visit Information        Provider Department      1/26/2018 12:45 PM Damon Sullivan, DO [facility]        Today's Diagnoses     Glaucoma suspect of both eyes    -  1       Follow-ups after your visit        Follow-up notes from your care team     Return in about 1 day (around 1/27/2018), or 1-2 mo for MRx,24-2, RNFL, NO Dilation needed, for Follow Up.      Your next 10 appointments already scheduled     Feb 16, 2018 11:30 AM CST   Masonic Lab Draw with  MASONIC LAB DRAW   [facility] Lab Draw ([facility])    [address]  [address]  [facility] [facility] [address]   [phone]            Feb 16, 2018 12:00 PM CST   (Arrive by 11:45 AM)   Return Visit with Deyanira Costello PA-C   [facility] ([facility])    [address] [place] [address]  [address]  [facility] [address]   [phone]            Feb 16, 2018  1:30 PM CST   Infusion 60 with  ONCOLOGY INFUSION, UC 25 ATC   [facility] ([facility])    [address]  [address]  [facility] [address]   [phone]            Mar 07, 2018 10:40 AM CST   (Arrive by 10:25 AM)   CT ABDOMEN PELVIS W/O & W CONTRAST with UCCT1   [facility] [place] [facility] ([facility])    [address] [place] [address]  1st Floor  [facility] [address] [phone]           Please bring any scans or X-rays taken at other hospitals, if similar tests were done. Also bring a list of your medicines, including vitamins, minerals and over-the-counter drugs. It is safest to leave personal items at home.  Be sure to tell your doctor:   If you have any allergies.   If there s any chance you are pregnant.   If you are breastfeeding.   If you have any special needs.  You may  have contrast for this exam. To prepare:   Do not eat or drink for 2 hours before your exam. If you need to take medicine, you may take it with small sips of water. (We may ask you to take liquid medicine as well.)   The day before your exam, drink extra fluids at least six 8-ounce glasses (unless your doctor tells you to restrict your fluids).  Patients over 70 or patients with diabetes or kidney problems:   If you haven t had a blood test (creatinine test) within the last 30 days, go to your clinic or Diagnostic Imaging Department for this test.  If you have diabetes:   If your kidney function is normal, continue taking your metformin (Avandamet, Glucophage, Glucovance, Metaglip) on the day of your exam.   If your kidney function is abnormal, wait 48 hours before restarting this medicine.  You will have oral contrast for this exam:   You will drink the contrast at home. Get this from your clinic or Diagnostic Imaging Department. Please follow the directions given.  Please wear loose clothing, such as a sweat suit or jogging clothes. Avoid snaps, zippers and other metal. We may ask you to undress and put on a hospital gown.  If you have any questions, please call the Imaging Department where you will have your exam.            Mar 07, 2018 11:00 AM CST   Ech Complete with [address] [facility])    [address] [place] [address] [phone]           1. Please bring or wear a comfortable two-piece outfit. 2. You may eat, drink and take your normal medicines. 3. For any questions that cannot be answered, please contact the ordering physician            Mar 07, 2018 12:15 PM CST   (Arrive by 12:00 PM)   MR BRAIN W/O & W CONTRAST with [address] [facility] MRI ([facility])    [address] [place] [id number] [phone]           Take your medicines as usual, unless your doctor tells  you not to. Bring a list of your current medicines to your exam (including vitamins, minerals and over-the-counter drugs).  You will be given intravenous contrast for this exam. To prepare:   The day before your exam, drink extra fluids at least six 8-ounce glasses (unless your doctor tells you to restrict your fluids).   Have a blood test (creatinine test) within 30 days of your exam. Go to your clinic or Diagnostic Imaging Department for this test.  The MRI machine uses a strong magnet. Please wear clothes without metal (snaps, zippers). A sweatsuit works well, or we may give you a hospital gown.  Please remove any body piercings and hair extensions before you arrive. You will also remove watches, jewelry, hairpins, wallets, dentures, partial dental plates and hearing aids. You may wear contact lenses, and you may be able to wear your rings. We have a safe place to keep your personal items, but it is safer to leave them at home.   **IMPORTANT** THE INSTRUCTIONS BELOW ARE ONLY FOR THOSE PATIENTS WHO HAVE BEEN TOLD THEY WILL RECEIVE SEDATION OR GENERAL ANESTHESIA DURING THEIR MRI PROCEDURE:  IF YOU WILL RECEIVE SEDATION (take medicine to help you relax during your exam):   You must get the medicine from your doctor before you arrive. Bring the medicine to the exam. Do not take it at home.   Arrive one hour early. Bring someone who can take you home after the test. Your medicine will make you sleepy. After the exam, you may not drive, take a bus or take a taxi by yourself.   No eating 8 hours before your exam. You may have clear liquids up until 4 hours before your exam. (Clear liquids include water, clear tea, black coffee and fruit juice without pulp.)  IF YOU WILL RECEIVE ANESTHESIA (be asleep for your exam):   Arrive 1 1/2 hours early. Bring someone who can take you home after the test. You may not drive, take a bus or take a taxi by yourself.   No eating 8 hours before your exam. You may have clear liquids up  until 4 hours before your exam. (Clear liquids include water, clear tea, black coffee and fruit juice without pulp.)  Please call the Imaging Department at your exam site with any questions.            Mar 12, 2018  8:00 AM CDT   Masonic Lab Draw with  MASONIC LAB DRAW   [facility] Lab Draw ([facility])    [address]  [address]  [facility] [address] [phone]            Mar 12, 2018  8:30 AM CDT   (Arrive by 8:15 AM)   Return Active Treatment with Marlen Harper MD   [facility] ([facility])    [address]  [address]  [facility] [id number] [phone]            Mar 12, 2018  9:30 AM CDT   Infusion 60 with  ONCOLOGY INFUSION   [facility] ([facility])    [address] [place] [address]  [address]  [facility] [id number] [phone]              Who to contact     Please call your clinic at [phone] to:    Ask questions about your health    Make or cancel appointments    Discuss your medicines    Learn about your test results    Speak to your doctor   If you have compliments or concerns about an experience at your clinic, or if you wish to file a complaint, please contact [facility] Patient Relations at [phone] or email us at [email or web address]         Additional Information About Your Visit        [employer] Information     [employer] gives you secure access to your electronic health record. If you see a primary care provider, you can also send messages to your care team and make appointments. If you have questions, please call your primary care clinic.  If you do not have a primary care provider, please call [phone] and they will assist you.      [employer] is an electronic gateway that provides easy, online access to your medical records. With [employer], you can request a clinic appointment, read your test results,  renew a prescription or communicate with your care team.     To access your existing account, please contact your [facility] or call [phone] for assistance.        Care EveryWhere ID     This is your Care EveryWhere ID. This could be used by other organizations to access your [facility] medical records  [id number]         Blood Pressure from Last 3 Encounters:   01/26/18 125/84   01/05/18 135/83   12/22/17 133/83    Weight from Last 3 Encounters:   01/26/18 52.8 kg (116 lb 8 oz)   01/05/18 53.8 kg (118 lb 11.2 oz)   12/15/17 53.1 kg (117 lb)              Today, you had the following     No orders found for display       Primary Care Provider Office Phone # Fax #    Kelly Hart [phone]       [address] [facility] [address]        Equal Access to Services     [facility]: Hadii keturah ku hadasho Soomaali, waaxda luqadaha, qaybta kaalmada adeegyada, freddie sternin haymaribeln dilcia escobedo . So [facility] [phone].    ATENCIÓN: Si habla español, tiene a ramires disposición servicios gratuitos de asistencia lingüística. Kyung al [phone].    We comply with applicable federal civil rights laws and [place] laws. We do not discriminate on the basis of race, color, national origin, age, disability, sex, sexual orientation, or gender identity.            Thank you!     Thank you for choosing EYE CLINIC  for your care. Our goal is always to provide you with excellent care. Hearing back from our patients is one way we can continue to improve our services. Please take a few minutes to complete the written survey that you may receive in the mail after your visit with us. Thank you!             Your Updated Medication List - Protect others around you: Learn how to safely use, store and throw away your medicines at www.disposemymeds.org.          This list is accurate as of 1/26/18  2:43 PM.  Always use your most recent med list.                   Brand Name  Dispense Instructions for use Diagnosis    desmopressin 0.2 MG tablet    DDAVP    45 tablet    Take  1/2 tablet once daily by mouth    Diabetes insipidus (H)       LOVENOX 60 MG/0.6ML injection   Generic drug:  enoxaparin     60 Syringe    Inject 0.5 mLs (50 mg) Subcutaneous 2 times daily    Other acute pulmonary embolism without acute cor pulmonale (H)       meclizine 25 MG tablet    ANTIVERT    30 tablet    Take 1 tablet (25 mg) by mouth 3 times daily as needed for dizziness    BPPV (benign paroxysmal positional vertigo), right       methylphenidate 5 MG tablet    RITALIN    30 tablet    Take 5 mg once daily as needed for fatigue    Carcinoma of breast metastatic to multiple sites, unspecified laterality (H)       Multi-vitamin Tabs tablet      Take 1 tablet by mouth daily        omeprazole 20 MG tablet     30 tablet    Take 1 tablet (20 mg) by mouth daily    Gastroesophageal reflux disease without esophagitis       ondansetron 4 MG tablet    ZOFRAN    18 tablet    Take 1 tablet (4 mg) by mouth every 8 hours as needed for nausea    Nausea       prochlorperazine 10 MG tablet    COMPAZINE    90 tablet    Take 1 tablet (10 mg) by mouth every 6 hours as needed for nausea or vomiting    Nausea       TYLENOL PO      Take 500 mg by mouth once        VITAMIN D (CHOLECALCIFEROL) PO      Take 5,000 Units by mouth daily        VITAMIN E BLEND PO      Take by mouth daily

## 2018-01-26 NOTE — NURSING NOTE
"Oncology Rooming Note    January 26, 2018 10:07 AM   Keren Erickson is a 62 year old female who presents for:    Chief Complaint   Patient presents with     Port Draw     Right port accessed with a gripper needle, labs drawn and sent, flushed with saline and heparin, vitals completed, checked into next appointment.     Oncology Clinic Visit     f/u Breast CA     Initial Vitals: /84 (BP Location: Right arm, Patient Position: Sitting, Cuff Size: Adult Regular)  Pulse 81  Temp 96.9  F (36.1  C) (Oral)  Resp 18  Ht 1.575 m (5' 2.01\")  Wt 52.8 kg (116 lb 8 oz)  SpO2 95%  BMI 21.3 kg/m2 Estimated body mass index is 21.3 kg/(m^2) as calculated from the following:    Height as of this encounter: 1.575 m (5' 2.01\").    Weight as of this encounter: 52.8 kg (116 lb 8 oz). Body surface area is 1.52 meters squared.  Mild Pain (2) Comment: rt sided entire body   No LMP recorded. Patient is postmenopausal.  Allergies reviewed: Yes  Medications reviewed: Yes    Medications: MEDICATION REFILLS NEEDED TODAY. Provider was notified.  Pharmacy name entered into [facility]: [facility] [place], [place] - [address] [place] [address] [id number]    Clinical concerns: none Deyanira was NOT notified.    10 minutes for nursing intake (face to face time)     ZANA JAMA LPN            "

## 2018-02-16 NOTE — NURSING NOTE
"Chief Complaint   Patient presents with     Oncology Clinic Visit     return patient visit for pre-infusion follow up related to breast cancer     Port Draw     port accessed and labs drawn by rn.  vs taken.     Port accessed with 20g 3/4\" gripper needle, labs drawn, port flushed with saline and heparin, vitals checked.  Vee Childress RN    "

## 2018-02-16 NOTE — MR AVS SNAPSHOT
After Visit Summary   2/16/2018    Keren Erickson    MRN: [id number]           Patient Information     Date Of Birth          1955        Visit Information        Provider Department      2/16/2018 1:30 PM CANELO 25 ATC;  ONCOLOGY INFUSION [facility]        Today's Diagnoses     Metastatic breast cancer (H)    -  1    Brain metastasis (H)        Carcinoma of breast metastatic to multiple sites, unspecified laterality (H)        Bone metastasis (H)          Care [facility] Main Line: [phone]    Call triage nurse with chills and/or temperature greater than or equal to 100.4, uncontrolled nausea/vomiting, diarrhea, constipation, dizziness, shortness of breath, chest pain, bleeding, unexplained bruising, or any new/concerning symptoms, questions/concerns.   If you are having any concerning symptoms or wish to speak to a provider before your next infusion visit, please call your care coordinator or triage to notify them so we can adequately serve you.   Triage Nurse Line: [phone]    If after hours, weekends, or holidays, call main hospital  and ask for Oncology doctor on call @ [phone]               February 2018 Sunday Monday Tuesday Wednesday Thursday Friday Saturday                       1     2     3       4     5     6     7     8     9     10       11     12     13     14     15     16     [facility] DRAW   11:30 AM   (15 min.)   [facility]   [facility] Lab Draw     [facility] RETURN   11:45 AM   (50 min.)   Deyanira Costello PA-C   [facility] ONC INFUSION 60    1:30 PM   (60 min.)    ONCOLOGY INFUSION   [facility] 17       18     19     20     21     22     23     24       25     26     27     28 March 2018 Sunday Monday Tuesday Wednesday Thursday Friday Saturday                       1     2     3       4      5     6     7     CT ABDOMEN PELVIS WWO   10:25 AM   (20 min.)   [address] [place] [address] CT     ECH COMPLETE   11:00 AM   (60 min.)   ECHCR2   [facility] Echo     MR BRAIN WWO   12:00 PM   (45 min.)   MR[address] [place] [address] MRI 8     9     10       11     12     [facility]    8:00 AM   (15 min.)    [facility] DRAW   [facility] Lab Draw     [facility] RETURN ACTIVE TREATMENT    8:15 AM   (30 min.)   Marlen Harper MD   [facility]     [facility] ONC INFUSION 60    9:30 AM   (60 min.)   [facility] ONCOLOGY INFUSION   [facility] 13     14     15     16     [facility] RETURN CORNEA   12:15 PM   (15 min.)   Damon Sullivan,    [facility] 17       18     19     20     21     22     23     24       25     26     27     28     US THYROID   10:00 AM   (45 min.)   [address] [place] [address] US     US HEAD NECK SOFT TISSUE   10:30 AM   (45 min.)   [address] US 29     30     31                  Lab Results:  Recent Results (from the past 12 hour(s))   CEA    Collection Time: 02/16/18 11:45 AM   Result Value Ref Range    CEA 2.3 0 - 2.5 ug/L   Ca27.29  breast tumor marker    Collection Time: 02/16/18 11:45 AM   Result Value Ref Range    CA 27-29 23 0 - 39 U/mL   Comprehensive metabolic panel    Collection Time: 02/16/18 11:47 AM   Result Value Ref Range    Sodium 139 133 - 144 mmol/L    Potassium 3.6 3.4 - 5.3 mmol/L    Chloride 104 94 - 109 mmol/L    Carbon Dioxide 28 20 - 32 mmol/L    Anion Gap 7 3 - 14 mmol/L    Glucose 103 (H) 70 - 99 mg/dL    Urea Nitrogen 12 7 - 30 mg/dL    Creatinine 0.56 0.52 - 1.04 mg/dL    GFR Estimate >90 >60 mL/min/1.7m2    GFR Estimate If Black >90 >60 mL/min/1.7m2    Calcium 9.0 8.5 - 10.1 mg/dL    Bilirubin Total 0.3 0.2 - 1.3 mg/dL    Albumin 3.6 3.4 - 5.0 g/dL    Protein Total 6.9 6.8 - 8.8 g/dL    Alkaline Phosphatase 112 40 - 150 U/L    ALT 20 0 - 50 U/L    AST 20 0 - 45 U/L   CBC with platelets differential     Collection Time: 02/16/18 11:47 AM   Result Value Ref Range    WBC 6.0 4.0 - 11.0 10e9/L    RBC Count 4.10 3.8 - 5.2 10e12/L    Hemoglobin 13.0 11.7 - 15.7 g/dL    Hematocrit 39.6 35.0 - 47.0 %    MCV 97 78 - 100 fl    MCH 31.7 26.5 - 33.0 pg    MCHC 32.8 31.5 - 36.5 g/dL    RDW 14.3 10.0 - 15.0 %    Platelet Count 252 150 - 450 10e9/L    Diff Method Automated Method     % Neutrophils 72.0 %    % Lymphocytes 16.4 %    % Monocytes 10.4 %    % Eosinophils 0.2 %    % Basophils 0.5 %    % Immature Granulocytes 0.5 %    Nucleated RBCs 0 0 /100    Absolute Neutrophil 4.3 1.6 - 8.3 10e9/L    Absolute Lymphocytes 1.0 0.8 - 5.3 10e9/L    Absolute Monocytes 0.6 0.0 - 1.3 10e9/L    Absolute Eosinophils 0.0 0.0 - 0.7 10e9/L    Absolute Basophils 0.0 0.0 - 0.2 10e9/L    Abs Immature Granulocytes 0.0 0 - 0.4 10e9/L    Absolute Nucleated RBC 0.0              Follow-ups after your visit        Your next 10 appointments already scheduled     Mar 07, 2018 10:40 AM CST   (Arrive by 10:25 AM)   CT ABDOMEN PELVIS W/O & W CONTRAST with UCCT1   [facility] CT ([facility])    [address] [address] [phone]           Please bring any scans or X-rays taken at other hospitals, if similar tests were done. Also bring a list of your medicines, including vitamins, minerals and over-the-counter drugs. It is safest to leave personal items at home.  Be sure to tell your doctor:   If you have any allergies.   If there s any chance you are pregnant.   If you are breastfeeding.  You may have contrast for this exam. To prepare:   Do not eat or drink for 2 hours before your exam. If you need to take medicine, you may take it with small sips of water. (We may ask you to take liquid medicine as well.)   The day before your exam, drink extra fluids at least six 8-ounce glasses (unless your doctor tells you to restrict your fluids).   You will be given instructions on how to  drink the contrast.  Patients over 70 or patients with diabetes or kidney problems:   If you haven t had a blood test (creatinine test) within the last 30 days, the Cardiologist/Radiologist may require you to get this test prior to your exam.  If you have diabetes:   Continue to take your metformin medication on the day of your exam  Please wear loose clothing, such as a sweat suit or jogging clothes. Avoid snaps, zippers and other metal. We may ask you to undress and put on a hospital gown.  If you have any questions, please call the Imaging Department where you will have your exam.            Mar 07, 2018 11:00 AM CST   Ech Complete with [address] Echo ([facility])    [address] [place] [address]  3rd [facility] [address] [phone]           1. Please bring or wear a comfortable two-piece outfit. 2. You may eat, drink and take your normal medicines. 3. For any questions that cannot be answered, please contact the ordering physician            Mar 07, 2018 12:15 PM CST   (Arrive by 12:00 PM)   MR BRAIN W/O & W CONTRAST with [address] MRI ([facility])    [address] [place] [id number] [phone]           Take your medicines as usual, unless your doctor tells you not to. Bring a list of your current medicines to your exam (including vitamins, minerals and over-the-counter drugs).  You may or may not receive intravenous (IV) contrast for this exam pending the discretion of the Radiologist.  You do not need to do anything special to prepare.  The MRI machine uses a strong magnet. Please wear clothes without metal (snaps, zippers). A sweatsuit works well, or we may give you a hospital gown.  Please remove any body piercings and hair extensions before you arrive. You will also remove watches, jewelry, hairpins, wallets, dentures, partial dental plates and hearing aids. You may wear contact lenses,  and you may be able to wear your rings. We have a safe place to keep your personal items, but it is safer to leave them at home.  **IMPORTANT** THE INSTRUCTIONS BELOW ARE ONLY FOR THOSE PATIENTS WHO HAVE BEEN PRESCRIBED SEDATION OR GENERAL ANESTHESIA DURING THEIR MRI PROCEDURE:  IF YOUR DOCTOR PRESCRIBED ORAL SEDATION (take medicine to help you relax during your exam):   You must get the medicine from your doctor (oral medication) before you arrive. Bring the medicine to the exam. Do not take it at home. You ll be told when to take it upon arriving for your exam.   Arrive one hour early. Bring someone who can take you home after the test. Your medicine will make you sleepy. After the exam, you may not drive, take a bus or take a taxi by yourself.  IF YOUR DOCTOR PRESCRIBED IV SEDATION:   Arrive one hour early. Bring someone who can take you home after the test. Your medicine will make you sleepy. After the exam, you may not drive, take a bus or take a taxi by yourself.   No eating 6 hours before your exam. You may have clear liquids up until 4 hours before your exam. (Clear liquids include water, clear tea, black coffee and fruit juice without pulp.)  IF YOUR DOCTOR PRESCRIBED ANESTHESIA (be asleep for your exam):   Arrive 1 1/2 hours early. Bring someone who can take you home after the test. You may not drive, take a bus or take a taxi by yourself.   No eating 8 hours before your exam. You may have clear liquids up until 4 hours before your exam. (Clear liquids include water, clear tea, black coffee and fruit juice without pulp.)   You will spend four to five hours in the recovery room.  Please call the Imaging Department at your exam site with any questions.            Mar 12, 2018  8:00 AM LUZ   [facility] Draw with  [facility] DRAW   [facility] Draw ([facility])    [address]  [address]  [facility] [address] [phone]            Mar 12, 2018  8:30 AM CDT    (Arrive by 8:15 AM)   Return Active Treatment with Marlen Harper MD   [facility] ([facility] [place])    [address]  [address]  [facility] [address]   [phone]            Mar 12, 2018  9:30 AM CDT   Infusion 60 with UC ONCOLOGY INFUSION, UC 21 ATC   [facility] ([facility])    [address]  [address]  [facility] [address]   [phone]            Mar 16, 2018 12:15 PM CDT   RETURN CORNEA with Damon Sullivan DO   [facility] Clinic ([facility])    [facility]  [address] [place] [address]  9th Fl Clin 9a  [facility] [address]   [phone]            Mar 28, 2018 10:00 AM CDT   US THYROID with UCUS1   [facility] US ([facility])    [address]  1st [facility] [address] [phone]           Please bring a list of your medicines (including vitamins, minerals and over-the-counter drugs). Also, tell your doctor about any allergies you may have. Wear comfortable clothes and leave your valuables at home.  You do not need to do anything special to prepare for your exam.  Please call the Imaging Department at your exam site with any questions.            Mar 28, 2018 10:45 AM CDT   (Arrive by 10:30 AM)   US HEAD NECK SOFT TISSUE with [address] [place] [address] Imaging Center US ([facility])    [address]  1st [facility] [address] [phone]           Please bring a list of your medicines (including vitamins, minerals and over-the-counter drugs). Also, tell your doctor about any allergies you may have. Wear comfortable clothes and leave your valuables at home.  You do not need to do anything special to prepare for your exam.  Please call the Imaging Department at your exam site with any questions.              Who to contact     If you have questions or need follow up information about  today's clinic visit or your schedule please contact [facility] directly at [phone].  Normal or non-critical lab and imaging results will be communicated to you by MyChart, letter or phone within 4 business days after the clinic has received the results. If you do not hear from us within 7 days, please contact the clinic through [employer] or phone. If you have a critical or abnormal lab result, we will notify you by phone as soon as possible.  Submit refill requests through [employer] or call your pharmacy and they will forward the refill request to us. Please allow 3 business days for your refill to be completed.          Additional Information About Your Visit        [employer] Information     [employer] gives you secure access to your electronic health record. If you see a primary care provider, you can also send messages to your care team and make appointments. If you have questions, please call your primary care clinic.  If you do not have a primary care provider, please call [phone] and they will assist you.        Care EveryWhere ID     This is your Care EveryWhere ID. This could be used by other organizations to access your [place] medical records  [id number]         Blood Pressure from Last 3 Encounters:   02/16/18 128/81   01/26/18 125/84   01/05/18 135/83    Weight from Last 3 Encounters:   02/16/18 52.9 kg (116 lb 9.6 oz)   01/26/18 52.8 kg (116 lb 8 oz)   01/05/18 53.8 kg (118 lb 11.2 oz)              We Performed the Following     Ca27.29  breast tumor marker     CBC with platelets differential     CEA     Comprehensive metabolic panel        Primary Care Provider Office Phone # Fax #    Kelly Hart [phone]       [address] [facility] [address]        Equal Access to Services     CLAUDIA BOND : Jerry Joshi, darrel schmidt, freddie hanna. So [facility] [phone].    ATENCIÓN: Si  mino kumari, tiene a ramires disposición servicios gratuitos de asistencia lingüística. Kyung palacios [phone].    We comply with applicable federal civil rights laws and [place] laws. We do not discriminate on the basis of race, color, national origin, age, disability, sex, sexual orientation, or gender identity.            Thank you!     Thank you for choosing [facility]  for your care. Our goal is always to provide you with excellent care. Hearing back from our patients is one way we can continue to improve our services. Please take a few minutes to complete the written survey that you may receive in the mail after your visit with us. Thank you!             Your Updated Medication List - Protect others around you: Learn how to safely use, store and throw away your medicines at www.disposemymeds.org.          This list is accurate as of 2/16/18  3:38 PM.  Always use your most recent med list.                   Brand Name Dispense Instructions for use Diagnosis    desmopressin 0.2 MG tablet    DDAVP    45 tablet    Take  1/2 tablet once daily by mouth    Diabetes insipidus (H)       LOVENOX 60 MG/0.6ML injection   Generic drug:  enoxaparin     60 Syringe    Inject 0.5 mLs (50 mg) Subcutaneous 2 times daily    Other acute pulmonary embolism without acute cor pulmonale (H)       meclizine 25 MG tablet    ANTIVERT    30 tablet    Take 1 tablet (25 mg) by mouth 3 times daily as needed for dizziness    BPPV (benign paroxysmal positional vertigo), right       methylphenidate 5 MG tablet    RITALIN    30 tablet    Take 5 mg once daily as needed for fatigue    Carcinoma of breast metastatic to multiple sites, unspecified laterality (H)       Multi-vitamin Tabs tablet      Take 1 tablet by mouth daily        omeprazole 20 MG tablet     30 tablet    Take 1 tablet (20 mg) by mouth daily    Gastroesophageal reflux disease without esophagitis       ondansetron 4 MG tablet    ZOFRAN    18 tablet    Take 1  tablet (4 mg) by mouth every 8 hours as needed for nausea    Nausea       prochlorperazine 10 MG tablet    COMPAZINE    90 tablet    Take 1 tablet (10 mg) by mouth every 6 hours as needed for nausea or vomiting    Nausea       TYLENOL PO      Take 500 mg by mouth once        VITAMIN D (CHOLECALCIFEROL) PO      Take 5,000 Units by mouth daily        VITAMIN E BLEND PO      Take by mouth daily

## 2018-02-16 NOTE — LETTER
2/16/2018       RE: Keren Erickson  [address] [facility] [address]     Dear Colleague,    Thank you for referring your patient, Keren Erickson, to the [facility]. Please see a copy of my visit note below.    HEMATOLOGY/ONCOLOGY PROGRESS NOTE  Feb 16, 2018    REASON FOR VISIT: Metastatic breast cancer    DIAGNOSIS:   The patient is a 60-year-old female who presented to the hospital initially in 09/2013 with complaints of dyspnea. Workup revealed a large pericardial effusion and pleural effusion. Physical examination revealed a large untreated left breast mass encompassing the entire left breast. Biopsies revealed these were ER, KS and HER2-positive breast cancer. She had a thoracentesis and pericardial sclerosis performed. She was originally on Arimidex and Herceptin. In 01/2014, she was switched to tamoxifen and Herceptin due to arthralgias, and she ultimately developed progressive disease and was switched to Faslodex and Herceptin. In 01/2015, she was found to have progressive disease and was switched to T-DM1.     Initially when she was seen in late 02/2015, she complained of some V5 sensory deficit. This was subjective. I was not able to elicit this on examination. This persisted and further workup was performed with a brain MRI. This revealed what was initially thought to be 3 punctate brain metastases. She originally saw Radiation Oncology and Neurosurgery as well as underwent a lumbar puncture. Cytology from the lumbar puncture showed no evidence of leptomeningeal disease. She was treated with Gamma Knife radiation to 6 lesions, performed on 04/16/2015.  She initiated therapy with TDM1 in January 2015 and continued this through 6/26/2015 with overall stable disease. She has since been on a break from treatment. The patient presented earlier this month with recurrent shortness of breath.  Imaging revealed recurrent right-sided pleural effusion.  She has since  undergone thoracentesis with cytology pending.  Clinically, however, there is evidence of disease progression. PET scan performed on 11/25/2015 demonstrated progression of disease at multiple sites. She restarted TDM1 on 11/27/2015, however experienced a mild transfusion reaction with shortness of breath and chest tightness, resolved upon stopping TDM1 and 125 mg of SoluMedrol. She had progression of disease on repeat imaging 2/17/2016, as well as new brain metastases. She started TDM1 with Perjeta on 3/4/2016. She underwent gamma knife to brain mets on 3/8/16.       In late May, she was found to have progressive brain metastases.  She received whole brain radiation.  She had a follow up brain MRI August 2016 that was stable.     In September 2016, she enrolled on [place]  trial and was randomized to the standard of care arm/Physician's Choice; started on gemzar and herceptin. She was recently hospitalized 1/27-2/3/17 for presumed HCAP. Trial was suspended. She continued on gemzar and heceptin with stable disease. Last restaging was 4/7/17 and stable. Gemzar was placed on hold from 4/10/2017 through 6/22/17 so that she could recover from pneumonia. Due to worsening fatigue, Gemzar was placed on hold staring 1/5/18, and she continued on Herceptin alone.    INTERVAL HISTORY: Ravi continues to feel like she is making small improvements since stopping Gemzar. Her QOL is better. She is more active, even doing a stationary bike with no resistance. She now feels that the improvements she is making are lasting, instead of fleeting. She has no new symptoms. Continues to have some diffuse achiness that waxes and wanes, worse in the cold. Uses Tylenol PRN with great effect. No new neuro symptoms, fevers, chills, change in PASTOR, SOB, n/v, bowel changes, urinary changes, bleeding, or swelling. She found out she may have glaucoma.    PHYSICAL EXAMINATION:     /81 (BP Location: Right arm, Patient Position: Sitting, Cuff  Size: Adult Regular)  Pulse 89  Temp 97.6  F (36.4  C) (Oral)  Resp 16  Wt 52.9 kg (116 lb 9.6 oz)  SpO2 97%  BMI 21.32 kg/m2     Wt Readings from Last 4 Encounters:   02/16/18 52.9 kg (116 lb 9.6 oz)   01/26/18 52.8 kg (116 lb 8 oz)   01/05/18 53.8 kg (118 lb 11.2 oz)   12/15/17 53.1 kg (117 lb)     Constitutional: Alert, oriented, cachectic female in no visible distress. In a wheelchair  Eyes: Anicteric sclerae. PERRL. Left eye does not track in left lateral direction but stays midline, will track medially. No visible nystagmus. Other EOM intact.  ENT/Mouth: OM moist and pink without lesions or thrush.    CV: RRR, no murmurs appreciated.  Resp: CTAB slightly diminished R base  Extremities: No lower extremity edema appreciated.  Neuro: CN II-XII grossly intact except for ocular movements as noted above. Strength in bilateral UE and LE 5/5. Grossly nonfocal.    LABS:     Results for HEIDI RUIZ (MRN [id number]) as of 2/16/2018 13:04   Ref. Range 2/16/2018 11:47   Sodium Latest Ref Range: 133 - 144 mmol/L 139   Potassium Latest Ref Range: 3.4 - 5.3 mmol/L 3.6   Chloride Latest Ref Range: 94 - 109 mmol/L 104   Carbon Dioxide Latest Ref Range: 20 - 32 mmol/L 28   Urea Nitrogen Latest Ref Range: 7 - 30 mg/dL 12   Creatinine Latest Ref Range: 0.52 - 1.04 mg/dL 0.56   GFR Estimate Latest Ref Range: >60 mL/min/1.7m2 >90   GFR Estimate If Black Latest Ref Range: >60 mL/min/1.7m2 >90   Calcium Latest Ref Range: 8.5 - 10.1 mg/dL 9.0   Anion Gap Latest Ref Range: 3 - 14 mmol/L 7   Albumin Latest Ref Range: 3.4 - 5.0 g/dL 3.6   Protein Total Latest Ref Range: 6.8 - 8.8 g/dL 6.9   Bilirubin Total Latest Ref Range: 0.2 - 1.3 mg/dL 0.3   Alkaline Phosphatase Latest Ref Range: 40 - 150 U/L 112   ALT Latest Ref Range: 0 - 50 U/L 20   AST Latest Ref Range: 0 - 45 U/L 20   Glucose Latest Ref Range: 70 - 99 mg/dL 103 (H)   WBC Latest Ref Range: 4.0 - 11.0 10e9/L 6.0   Hemoglobin Latest Ref Range: 11.7 - 15.7 g/dL  13.0   Hematocrit Latest Ref Range: 35.0 - 47.0 % 39.6   Platelet Count Latest Ref Range: 150 - 450 10e9/L 252     IMPRESSION/PLAN:  Dominga is a 62-year-old female with history of metastatic ER-positive, HER-2 positive breast cancer, with involvement of the bone, history of pleural effusions treated with pleurodesis and PleurX placement, and with treated brain metastases, previously with stable disease on TDM1, with progressive disease after treatment break.  She was started on [place]  clinical trial and randomized to physician's choice, and started on Gemzar/Herceptin on 9/29/16.    Breast cancer:  Stable disease on Gemzar/Herceptin, however Gemzar has been on old since 1/5 due to worsening fatigue. She was continued on Herceptin alone with plan to re-evaluate physical stamina after 6 weeks. Although Ravi is making improvements in her stamina, she does not wish to resume Gemzar at this time. Her QOL is improved off Gemzar. We discussed possiblity of an AI, to which she is open too. Per chart review, shew as on Arimidex back in 2014, and developed arthralgias. Will discuss with Dr. Harper. Otherwise, plan to continue with Herceptin today. She has restaging prior to next visit.     Brain metastases.   Stable on most recent MRI, without any new or worsening symptoms    Bone mets: Last Xgeva on 1/5/18, administered every 6 weeks. Due today    R lobar and segmental pulmonary emboli: On Lovenox 1 mg/kg BID    Peripheral neuropathy: Chronic and persistent, however overall slightly improving recently.  Still continues to have difficulty with right hand as far as writing and holding things, however she has noted slightly improvement.  Use a cane to ambulate.  We will continue to monitor.      Central diabetes insipidus.  This was diagnosed as an inpatient in the setting of hypernatremia.  She was started on desmopressin.  She has been seeing Endocrinology ultrasound of the thyroid and neck are scheduled in early  March..       Nutrition.    weight stable    She does have a POLST and is DNR/DNI.  Her power of  is listed in the computer.       Deyanira Majano PA-C

## 2018-02-16 NOTE — NURSING NOTE
"Oncology Rooming Note    February 16, 2018 11:51 AM   Keren Erickson is a 62 year old female who presents for:    Chief Complaint   Patient presents with     Oncology Clinic Visit     return patient visit for pre-infusion follow up related to breast cancer     Initial Vitals: /81 (BP Location: Right arm, Patient Position: Sitting, Cuff Size: Adult Regular)  Pulse 89  Temp 97.6  F (36.4  C) (Oral)  Resp 16  Ht 1.575 m (5' 2.01\")  Wt 52.9 kg (116 lb 9.6 oz)  SpO2 97%  Breastfeeding? No  BMI 21.32 kg/m2 Estimated body mass index is 21.32 kg/(m^2) as calculated from the following:    Height as of this encounter: 1.575 m (5' 2.01\").    Weight as of this encounter: 52.9 kg (116 lb 9.6 oz). Body surface area is 1.52 meters squared.  Mild Pain (2) Comment: hips and back   No LMP recorded. Patient is postmenopausal.  Allergies reviewed: Yes  Medications reviewed: Yes    Medications: MEDICATION REFILLS NEEDED TODAY. Provider was notified.  Pharmacy name entered into [facility]: [facility] [place], [place] - [address] [place] [address] [phone]    Clinical concerns: no concerns turnquist was notified.    6 minutes for nursing intake (face to face time)     Leigh Ann Li CMA              "

## 2018-02-16 NOTE — PATIENT INSTRUCTIONS
[facility] Main Line: [phone]    Call triage nurse with chills and/or temperature greater than or equal to 100.4, uncontrolled nausea/vomiting, diarrhea, constipation, dizziness, shortness of breath, chest pain, bleeding, unexplained bruising, or any new/concerning symptoms, questions/concerns.   If you are having any concerning symptoms or wish to speak to a provider before your next infusion visit, please call your care coordinator or triage to notify them so we can adequately serve you.   Triage Nurse Line: [phone]    If after hours, weekends, or holidays, call main hospital  and ask for Oncology doctor on call @ [phone]               February 2018 Sunday Monday Tuesday Wednesday Thursday Friday Saturday                       1     2     3       4     5     6     7     8     9     10       11     12     13     14     15     16     [facility] DRAW   11:30 AM   (15 min.)    [facility] DRAW   [facility] Draw     [facility] RETURN   11:45 AM   (50 min.)   Deyanira Costello PA-C   [facility] EDP ONC INFUSION 60    1:30 PM   (60 min.)    ONCOLOGY INFUSION   [facility] 17       18     19     20     21     22     23     24       25     26     27     28 March 2018 Sunday Monday Tuesday Wednesday Thursday Friday Saturday                       1     2     3       4     5     6     7     CT ABDOMEN PELVIS WWO   10:25 AM   (20 min.)   [address] [place] [address] CT     ECH COMPLETE   11:00 AM   (60 min.)   ECHCR2   [facility] Echo     MR BRAIN WWO   12:00 PM   (45 min.)   [address] [place] [address] MRI 8     9     10       11     12     [facility] DRAW    8:00 AM   (15 min.)    [facility] DRAW   [facility] Draw     [facility] RETURN ACTIVE TREATMENT    8:15 AM   (30 min.)   Marlen Harper MD   [facility]     [facility] ONC INFUSION 60    9:30 AM   (60  min.)   [facility] 13     14     15     16     [facility] RETURN CORNEA   12:15 PM   (15 min.)   Damon Sullivan, DO   [facility] 17       18     19     20     21     22     23     24       25     26     27     28     US THYROID   10:00 AM   (45 min.)   [address] US     US HEAD NECK SOFT TISSUE   10:30 AM   (45 min.)   [address] US 29     30     31                  Lab Results:  Recent Results (from the past 12 hour(s))   CEA    Collection Time: 02/16/18 11:45 AM   Result Value Ref Range    CEA 2.3 0 - 2.5 ug/L   Ca27.29  breast tumor marker    Collection Time: 02/16/18 11:45 AM   Result Value Ref Range    CA 27-29 23 0 - 39 U/mL   Comprehensive metabolic panel    Collection Time: 02/16/18 11:47 AM   Result Value Ref Range    Sodium 139 133 - 144 mmol/L    Potassium 3.6 3.4 - 5.3 mmol/L    Chloride 104 94 - 109 mmol/L    Carbon Dioxide 28 20 - 32 mmol/L    Anion Gap 7 3 - 14 mmol/L    Glucose 103 (H) 70 - 99 mg/dL    Urea Nitrogen 12 7 - 30 mg/dL    Creatinine 0.56 0.52 - 1.04 mg/dL    GFR Estimate >90 >60 mL/min/1.7m2    GFR Estimate If Black >90 >60 mL/min/1.7m2    Calcium 9.0 8.5 - 10.1 mg/dL    Bilirubin Total 0.3 0.2 - 1.3 mg/dL    Albumin 3.6 3.4 - 5.0 g/dL    Protein Total 6.9 6.8 - 8.8 g/dL    Alkaline Phosphatase 112 40 - 150 U/L    ALT 20 0 - 50 U/L    AST 20 0 - 45 U/L   CBC with platelets differential    Collection Time: 02/16/18 11:47 AM   Result Value Ref Range    WBC 6.0 4.0 - 11.0 10e9/L    RBC Count 4.10 3.8 - 5.2 10e12/L    Hemoglobin 13.0 11.7 - 15.7 g/dL    Hematocrit 39.6 35.0 - 47.0 %    MCV 97 78 - 100 fl    MCH 31.7 26.5 - 33.0 pg    MCHC 32.8 31.5 - 36.5 g/dL    RDW 14.3 10.0 - 15.0 %    Platelet Count 252 150 - 450 10e9/L    Diff Method Automated Method     % Neutrophils 72.0 %    % Lymphocytes 16.4 %    % Monocytes 10.4 %    % Eosinophils 0.2 %    % Basophils 0.5 %    % Immature Granulocytes 0.5 %     Nucleated RBCs 0 0 /100    Absolute Neutrophil 4.3 1.6 - 8.3 10e9/L    Absolute Lymphocytes 1.0 0.8 - 5.3 10e9/L    Absolute Monocytes 0.6 0.0 - 1.3 10e9/L    Absolute Eosinophils 0.0 0.0 - 0.7 10e9/L    Absolute Basophils 0.0 0.0 - 0.2 10e9/L    Abs Immature Granulocytes 0.0 0 - 0.4 10e9/L    Absolute Nucleated RBC 0.0

## 2018-02-16 NOTE — PROGRESS NOTES
Infusion Nursing Note:  Keren Erickson presents today for Cycle 24 Day 1 Herceptin.   Xgeva due as well.  Patient seen by provider today: Yes: Deyanira RAMSEY   present during visit today: Not Applicable.      Intravenous Access:  Implanted Port.    Treatment Conditions:  Lab Results   Component Value Date    HGB 13.0 02/16/2018     Lab Results   Component Value Date    WBC 6.0 02/16/2018      Lab Results   Component Value Date    ANEU 4.3 02/16/2018     Lab Results   Component Value Date     02/16/2018      Lab Results   Component Value Date     02/16/2018                   Lab Results   Component Value Date    POTASSIUM 3.6 02/16/2018           Lab Results   Component Value Date    MAG 2.4 04/10/2017            Lab Results   Component Value Date    CR 0.56 02/16/2018                   Lab Results   Component Value Date    OMA 9.0 02/16/2018                Lab Results   Component Value Date    BILITOTAL 0.3 02/16/2018           Lab Results   Component Value Date    ALBUMIN 3.6 02/16/2018                    Lab Results   Component Value Date    ALT 20 02/16/2018           Lab Results   Component Value Date    AST 20 02/16/2018       Results reviewed, labs MET treatment parameters, ok to proceed with treatment.  ECHO/MUGA completed 12/14/17  EF 55-60%.      Post Infusion Assessment:  Patient tolerated infusion without incident.  Tolerated SQ Xgeva injection to abdomen without incident.  Blood return noted pre and post infusion.  Site patent and intact, free from redness, edema or discomfort.  No evidence of extravasations.  Access discontinued per protocol.    Discharge Plan:   Patient declined prescription refills.  AVS to patient via [email or web address].  Patient will return 3/7 for CT, Echo, MRI and on 3/12 for labs, MD visit, chemo for next appointment.   Patient discharged in stable condition accompanied by: friend.  Departure Mode: Wheelchair.  Face to Face time: 0.    Mandy Grider,  RN

## 2018-02-16 NOTE — MR AVS SNAPSHOT
After Visit Summary   2/16/2018    Keren Erickson    MRN: [id number]           Patient Information     Date Of Birth          1955        Visit Information        Provider Department      2/16/2018 12:00 PM Deyanira Costello PA-C [facility]        Today's Diagnoses     Bone metastasis (H)        Brain metastasis (H)        Carcinoma of breast metastatic to multiple sites, unspecified laterality (H)        Metastatic breast cancer (H)           Follow-ups after your visit        Your next 10 appointments already scheduled     Feb 16, 2018  1:30 PM CST   Infusion 60 with UC ONCOLOGY INFUSION, UC 25 ATC   [facility] ([facility])    [address]  [address]  [place] [id number] [phone]            Mar 07, 2018 10:40 AM CST   (Arrive by 10:25 AM)   CT ABDOMEN PELVIS W/O & W CONTRAST with UCCT1   [facility] May CT ([facility])    [address]  1st Floor  [place] [id number] [phone]           Please bring any scans or X-rays taken at other hospitals, if similar tests were done. Also bring a list of your medicines, including vitamins, minerals and over-the-counter drugs. It is safest to leave personal items at home.  Be sure to tell your doctor:   If you have any allergies.   If there s any chance you are pregnant.   If you are breastfeeding.  You may have contrast for this exam. To prepare:   Do not eat or drink for 2 hours before your exam. If you need to take medicine, you may take it with small sips of water. (We may ask you to take liquid medicine as well.)   The day before your exam, drink extra fluids at least six 8-ounce glasses (unless your doctor tells you to restrict your fluids).   You will be given instructions on how to drink the contrast.  Patients over 70 or patients with diabetes or kidney problems:   If you haven t had a blood test  (creatinine test) within the last 30 days, the Cardiologist/Radiologist may require you to get this test prior to your exam.  If you have diabetes:   Continue to take your metformin medication on the day of your exam  Please wear loose clothing, such as a sweat suit or jogging clothes. Avoid snaps, zippers and other metal. We may ask you to undress and put on a hospital gown.  If you have any questions, please call the Imaging Department where you will have your exam.            Mar 07, 2018 11:00 AM CST   Ech Complete with [address] [place] ([facility])    [address] [place] [id number] [phone]           1. Please bring or wear a comfortable two-piece outfit. 2. You may eat, drink and take your normal medicines. 3. For any questions that cannot be answered, please contact the ordering physician            Mar 07, 2018 12:15 PM CST   (Arrive by 12:00 PM)   MR BRAIN W/O & W CONTRAST with [address] MRI ([facility])    [address] [place] [id number] [phone]           Take your medicines as usual, unless your doctor tells you not to. Bring a list of your current medicines to your exam (including vitamins, minerals and over-the-counter drugs).  You may or may not receive intravenous (IV) contrast for this exam pending the discretion of the Radiologist.  You do not need to do anything special to prepare.  The MRI machine uses a strong magnet. Please wear clothes without metal (snaps, zippers). A sweatsuit works well, or we may give you a hospital gown.  Please remove any body piercings and hair extensions before you arrive. You will also remove watches, jewelry, hairpins, wallets, dentures, partial dental plates and hearing aids. You may wear contact lenses, and you may be able to wear your rings. We have a safe place to keep your personal items, but it is safer to leave them  at home.  **IMPORTANT** THE INSTRUCTIONS BELOW ARE ONLY FOR THOSE PATIENTS WHO HAVE BEEN PRESCRIBED SEDATION OR GENERAL ANESTHESIA DURING THEIR MRI PROCEDURE:  IF YOUR DOCTOR PRESCRIBED ORAL SEDATION (take medicine to help you relax during your exam):   You must get the medicine from your doctor (oral medication) before you arrive. Bring the medicine to the exam. Do not take it at home. You ll be told when to take it upon arriving for your exam.   Arrive one hour early. Bring someone who can take you home after the test. Your medicine will make you sleepy. After the exam, you may not drive, take a bus or take a taxi by yourself.  IF YOUR DOCTOR PRESCRIBED IV SEDATION:   Arrive one hour early. Bring someone who can take you home after the test. Your medicine will make you sleepy. After the exam, you may not drive, take a bus or take a taxi by yourself.   No eating 6 hours before your exam. You may have clear liquids up until 4 hours before your exam. (Clear liquids include water, clear tea, black coffee and fruit juice without pulp.)  IF YOUR DOCTOR PRESCRIBED ANESTHESIA (be asleep for your exam):   Arrive 1 1/2 hours early. Bring someone who can take you home after the test. You may not drive, take a bus or take a taxi by yourself.   No eating 8 hours before your exam. You may have clear liquids up until 4 hours before your exam. (Clear liquids include water, clear tea, black coffee and fruit juice without pulp.)   You will spend four to five hours in the recovery room.  Please call the Imaging Department at your exam site with any questions.            Mar 12, 2018  8:00 AM CDT   [facility] Draw with  [facility] DRAW   [facility] Draw ([facility])    [address]  [address]  [facility] [address] [phone]            Mar 12, 2018  8:30 AM CDT   (Arrive by 8:15 AM)   Return Active Treatment with Marlen Harper MD   [facility] ([facility]  [facility] [place] [facility])    [address]  [address]  [facility] [address]   [phone]            Mar 12, 2018  9:30 AM CDT   Infusion 60 with UC ONCOLOGY INFUSION, UC 21 ATC   [facility] ([facility] [place])    [address]  [address]  [facility] [address]   [phone]            Mar 16, 2018 12:15 PM CDT   RETURN CORNEA with Damon Sullivan, DO   [facility] ([facility])    [address]  9th [address] Clin [address]a  [facility] [id number] [phone]              Who to contact     If you have questions or need follow up information about today's clinic visit or your schedule please contact [facility] directly at [phone].  Normal or non-critical lab and imaging results will be communicated to you by MyChart, letter or phone within 4 business days after the clinic has received the results. If you do not hear from us within 7 days, please contact the clinic through [employer] or phone. If you have a critical or abnormal lab result, we will notify you by phone as soon as possible.  Submit refill requests through [employer] or call your pharmacy and they will forward the refill request to us. Please allow 3 business days for your refill to be completed.          Additional Information About Your Visit        MyChart Information     [employer] gives you secure access to your electronic health record. If you see a primary care provider, you can also send messages to your care team and make appointments. If you have questions, please call your primary care clinic.  If you do not have a primary care provider, please call [phone] and they will assist you.        Care EveryWhere ID     This is your Care EveryWhere ID. This could be used by other organizations to access your [facility] medical records  [id number]        Your Vitals Were     Pulse Temperature Respirations Height Pulse Oximetry  "Breastfeeding?    89 97.6  F (36.4  C) (Oral) 16 1.575 m (5' 2.01\") 97% No    BMI (Body Mass Index)                   21.32 kg/m2            Blood Pressure from Last 3 Encounters:   02/16/18 128/81   01/26/18 125/84   01/05/18 135/83    Weight from Last 3 Encounters:   02/16/18 52.9 kg (116 lb 9.6 oz)   01/26/18 52.8 kg (116 lb 8 oz)   01/05/18 53.8 kg (118 lb 11.2 oz)              Today, you had the following     No orders found for display       Primary Care Provider Office Phone # Fax #    Kelly Hart [phone]       [address] [facility] [address]        Equal Access to Services     [facility] : Jerry Joshi, waseverino schmidt, qaybdarryl kaalmajb jin, freddie escobedo . [facility] [phone].    ATENCIÓN: Si habla español, tiene a ramires disposición servicios gratuitos de asistencia lingüística. [facility] [phone].    We comply with applicable federal civil rights laws and [place] laws. We do not discriminate on the basis of race, color, national origin, age, disability, sex, sexual orientation, or gender identity.            Thank you!     Thank you for choosing [facility]  for your care. Our goal is always to provide you with excellent care. Hearing back from our patients is one way we can continue to improve our services. Please take a few minutes to complete the written survey that you may receive in the mail after your visit with us. Thank you!             Your Updated Medication List - Protect others around you: Learn how to safely use, store and throw away your medicines at www.disposemymeds.org.          This list is accurate as of 2/16/18  1:05 PM.  Always use your most recent med list.                   Brand Name Dispense Instructions for use Diagnosis    desmopressin 0.2 MG tablet    DDAVP    45 tablet    Take  1/2 tablet once daily by mouth    Diabetes insipidus (H)       LOVENOX 60 MG/0.6ML injection "   Generic drug:  enoxaparin     60 Syringe    Inject 0.5 mLs (50 mg) Subcutaneous 2 times daily    Other acute pulmonary embolism without acute cor pulmonale (H)       meclizine 25 MG tablet    ANTIVERT    30 tablet    Take 1 tablet (25 mg) by mouth 3 times daily as needed for dizziness    BPPV (benign paroxysmal positional vertigo), right       methylphenidate 5 MG tablet    RITALIN    30 tablet    Take 5 mg once daily as needed for fatigue    Carcinoma of breast metastatic to multiple sites, unspecified laterality (H)       Multi-vitamin Tabs tablet      Take 1 tablet by mouth daily        omeprazole 20 MG tablet     30 tablet    Take 1 tablet (20 mg) by mouth daily    Gastroesophageal reflux disease without esophagitis       ondansetron 4 MG tablet    ZOFRAN    18 tablet    Take 1 tablet (4 mg) by mouth every 8 hours as needed for nausea    Nausea       prochlorperazine 10 MG tablet    COMPAZINE    90 tablet    Take 1 tablet (10 mg) by mouth every 6 hours as needed for nausea or vomiting    Nausea       TYLENOL PO      Take 500 mg by mouth once        VITAMIN D (CHOLECALCIFEROL) PO      Take 5,000 Units by mouth daily        VITAMIN E BLEND PO      Take by mouth daily

## 2018-02-16 NOTE — PROGRESS NOTES
HEMATOLOGY/ONCOLOGY PROGRESS NOTE  Feb 16, 2018    REASON FOR VISIT: Metastatic breast cancer    DIAGNOSIS:   The patient is a 60-year-old female who presented to the hospital initially in 09/2013 with complaints of dyspnea. Workup revealed a large pericardial effusion and pleural effusion. Physical examination revealed a large untreated left breast mass encompassing the entire left breast. Biopsies revealed these were ER, MS and HER2-positive breast cancer. She had a thoracentesis and pericardial sclerosis performed. She was originally on Arimidex and Herceptin. In 01/2014, she was switched to tamoxifen and Herceptin due to arthralgias, and she ultimately developed progressive disease and was switched to Faslodex and Herceptin. In 01/2015, she was found to have progressive disease and was switched to T-DM1.     Initially when she was seen in late 02/2015, she complained of some V5 sensory deficit. This was subjective. I was not able to elicit this on examination. This persisted and further workup was performed with a brain MRI. This revealed what was initially thought to be 3 punctate brain metastases. She originally saw Radiation Oncology and Neurosurgery as well as underwent a lumbar puncture. Cytology from the lumbar puncture showed no evidence of leptomeningeal disease. She was treated with Gamma Knife radiation to 6 lesions, performed on 04/16/2015.  She initiated therapy with TDM1 in January 2015 and continued this through 6/26/2015 with overall stable disease. She has since been on a break from treatment. The patient presented earlier this month with recurrent shortness of breath.  Imaging revealed recurrent right-sided pleural effusion.  She has since undergone thoracentesis with cytology pending.  Clinically, however, there is evidence of disease progression. PET scan performed on 11/25/2015 demonstrated progression of disease at multiple sites. She restarted TDM1 on 11/27/2015, however experienced a mild  transfusion reaction with shortness of breath and chest tightness, resolved upon stopping TDM1 and 125 mg of SoluMedrol. She had progression of disease on repeat imaging 2/17/2016, as well as new brain metastases. She started TDM1 with Perjeta on 3/4/2016. She underwent gamma knife to brain mets on 3/8/16.       In late May, she was found to have progressive brain metastases.  She received whole brain radiation.  She had a follow up brain MRI August 2016 that was stable.     In September 2016, she enrolled on [place]  trial and was randomized to the standard of care arm/Physician's Choice; started on gemzar and herceptin. She was recently hospitalized 1/27-2/3/17 for presumed HCAP. Trial was suspended. She continued on gemzar and heceptin with stable disease. Last restaging was 4/7/17 and stable. Gemzar was placed on hold from 4/10/2017 through 6/22/17 so that she could recover from pneumonia. Due to worsening fatigue, Gemzar was placed on hold staring 1/5/18, and she continued on Herceptin alone.    INTERVAL HISTORY: Ravi continues to feel like she is making small improvements since stopping Gemzar. Her QOL is better. She is more active, even doing a stationary bike with no resistance. She now feels that the improvements she is making are lasting, instead of fleeting. She has no new symptoms. Continues to have some diffuse achiness that waxes and wanes, worse in the cold. Uses Tylenol PRN with great effect. No new neuro symptoms, fevers, chills, change in PASTOR, SOB, n/v, bowel changes, urinary changes, bleeding, or swelling. She found out she may have glaucoma.    PHYSICAL EXAMINATION:     /81 (BP Location: Right arm, Patient Position: Sitting, Cuff Size: Adult Regular)  Pulse 89  Temp 97.6  F (36.4  C) (Oral)  Resp 16  Wt 52.9 kg (116 lb 9.6 oz)  SpO2 97%  BMI 21.32 kg/m2     Wt Readings from Last 4 Encounters:   02/16/18 52.9 kg (116 lb 9.6 oz)   01/26/18 52.8 kg (116 lb 8 oz)   01/05/18 53.8 kg  (118 lb 11.2 oz)   12/15/17 53.1 kg (117 lb)     Constitutional: Alert, oriented, cachectic female in no visible distress. In a wheelchair  Eyes: Anicteric sclerae. PERRL. Left eye does not track in left lateral direction but stays midline, will track medially. No visible nystagmus. Other EOM intact.  ENT/Mouth: OM moist and pink without lesions or thrush.    CV: RRR, no murmurs appreciated.  Resp: CTAB slightly diminished R base  Extremities: No lower extremity edema appreciated.  Neuro: CN II-XII grossly intact except for ocular movements as noted above. Strength in bilateral UE and LE 5/5. Grossly nonfocal.    LABS:     Results for HEIDI RUIZ (MRN [id number]) as of 2/16/2018 13:04   Ref. Range 2/16/2018 11:47   Sodium Latest Ref Range: 133 - 144 mmol/L 139   Potassium Latest Ref Range: 3.4 - 5.3 mmol/L 3.6   Chloride Latest Ref Range: 94 - 109 mmol/L 104   Carbon Dioxide Latest Ref Range: 20 - 32 mmol/L 28   Urea Nitrogen Latest Ref Range: 7 - 30 mg/dL 12   Creatinine Latest Ref Range: 0.52 - 1.04 mg/dL 0.56   GFR Estimate Latest Ref Range: >60 mL/min/1.7m2 >90   GFR Estimate If Black Latest Ref Range: >60 mL/min/1.7m2 >90   Calcium Latest Ref Range: 8.5 - 10.1 mg/dL 9.0   Anion Gap Latest Ref Range: 3 - 14 mmol/L 7   Albumin Latest Ref Range: 3.4 - 5.0 g/dL 3.6   Protein Total Latest Ref Range: 6.8 - 8.8 g/dL 6.9   Bilirubin Total Latest Ref Range: 0.2 - 1.3 mg/dL 0.3   Alkaline Phosphatase Latest Ref Range: 40 - 150 U/L 112   ALT Latest Ref Range: 0 - 50 U/L 20   AST Latest Ref Range: 0 - 45 U/L 20   Glucose Latest Ref Range: 70 - 99 mg/dL 103 (H)   WBC Latest Ref Range: 4.0 - 11.0 10e9/L 6.0   Hemoglobin Latest Ref Range: 11.7 - 15.7 g/dL 13.0   Hematocrit Latest Ref Range: 35.0 - 47.0 % 39.6   Platelet Count Latest Ref Range: 150 - 450 10e9/L 252     IMPRESSION/PLAN:  Dominga is a 62-year-old female with history of metastatic ER-positive, HER-2 positive breast cancer, with involvement of the  bone, history of pleural effusions treated with pleurodesis and PleurX placement, and with treated brain metastases, previously with stable disease on TDM1, with progressive disease after treatment break.  She was started on [place]  clinical trial and randomized to physician's choice, and started on Gemzar/Herceptin on 9/29/16.    Breast cancer:  Stable disease on Gemzar/Herceptin, however Gemzar has been on old since 1/5 due to worsening fatigue. She was continued on Herceptin alone with plan to re-evaluate physical stamina after 6 weeks. Although Ravi is making improvements in her stamina, she does not wish to resume Gemzar at this time. Her QOL is improved off Gemzar. We discussed possiblity of an AI, to which she is open too. Per chart review, shew as on Arimidex back in 2014, and developed arthralgias. Will discuss with Dr. Harper. Otherwise, plan to continue with Herceptin today. She has restaging prior to next visit.     Brain metastases.  Stable on most recent MRI, without any new or worsening symptoms    Bone mets: Last Xgeva on 1/5/18, administered every 6 weeks. Due today    R lobar and segmental pulmonary emboli: On Lovenox 1 mg/kg BID    Peripheral neuropathy: Chronic and persistent, however overall slightly improving recently.  Still continues to have difficulty with right hand as far as writing and holding things, however she has noted slightly improvement.  Use a cane to ambulate.  We will continue to monitor.      Central diabetes insipidus.  This was diagnosed as an inpatient in the setting of hypernatremia.  She was started on desmopressin.  She has been seeing Endocrinology ultrasound of the thyroid and neck are scheduled in early March..       Nutrition.    weight stable    She does have a POLST and is DNR/DNI.  Her power of  is listed in the computer.       Deyanira Majano PA-C      Addendum:  D/w Dr. Harper. Plan to start Exemstane.  ET

## 2018-02-16 NOTE — LETTER
2/16/2018      RE: Keren Erickson  [address] [facility] [address]       HEMATOLOGY/ONCOLOGY PROGRESS NOTE  Feb 16, 2018    REASON FOR VISIT: Metastatic breast cancer    DIAGNOSIS:   The patient is a 60-year-old female who presented to the hospital initially in 09/2013 with complaints of dyspnea. Workup revealed a large pericardial effusion and pleural effusion. Physical examination revealed a large untreated left breast mass encompassing the entire left breast. Biopsies revealed these were ER, CA and HER2-positive breast cancer. She had a thoracentesis and pericardial sclerosis performed. She was originally on Arimidex and Herceptin. In 01/2014, she was switched to tamoxifen and Herceptin due to arthralgias, and she ultimately developed progressive disease and was switched to Faslodex and Herceptin. In 01/2015, she was found to have progressive disease and was switched to T-DM1.     Initially when she was seen in late 02/2015, she complained of some V5 sensory deficit. This was subjective. I was not able to elicit this on examination. This persisted and further workup was performed with a brain MRI. This revealed what was initially thought to be 3 punctate brain metastases. She originally saw Radiation Oncology and Neurosurgery as well as underwent a lumbar puncture. Cytology from the lumbar puncture showed no evidence of leptomeningeal disease. She was treated with Gamma Knife radiation to 6 lesions, performed on 04/16/2015.  She initiated therapy with TDM1 in January 2015 and continued this through 6/26/2015 with overall stable disease. She has since been on a break from treatment. The patient presented earlier this month with recurrent shortness of breath.  Imaging revealed recurrent right-sided pleural effusion.  She has since undergone thoracentesis with cytology pending.  Clinically, however, there is evidence of disease progression. PET scan performed on 11/25/2015 demonstrated progression  of disease at multiple sites. She restarted TDM1 on 11/27/2015, however experienced a mild transfusion reaction with shortness of breath and chest tightness, resolved upon stopping TDM1 and 125 mg of SoluMedrol. She had progression of disease on repeat imaging 2/17/2016, as well as new brain metastases. She started TDM1 with Perjeta on 3/4/2016. She underwent gamma knife to brain mets on 3/8/16.       In late May, she was found to have progressive brain metastases.  She received whole brain radiation.  She had a follow up brain MRI August 2016 that was stable.     In September 2016, she enrolled on [place]  trial and was randomized to the standard of care arm/Physician's Choice; started on gemzar and herceptin. She was recently hospitalized 1/27-2/3/17 for presumed HCAP. Trial was suspended. She continued on gemzar and heceptin with stable disease. Last restaging was 4/7/17 and stable. Gemzar was placed on hold from 4/10/2017 through 6/22/17 so that she could recover from pneumonia. Due to worsening fatigue, Gemzar was placed on hold staring 1/5/18, and she continued on Herceptin alone.    INTERVAL HISTORY: Ravi continues to feel like she is making small improvements since stopping Gemzar. Her QOL is better. She is more active, even doing a stationary bike with no resistance. She now feels that the improvements she is making are lasting, instead of fleeting. She has no new symptoms. Continues to have some diffuse achiness that waxes and wanes, worse in the cold. Uses Tylenol PRN with great effect. No new neuro symptoms, fevers, chills, change in PASTOR, SOB, n/v, bowel changes, urinary changes, bleeding, or swelling. She found out she may have glaucoma.    PHYSICAL EXAMINATION:     /81 (BP Location: Right arm, Patient Position: Sitting, Cuff Size: Adult Regular)  Pulse 89  Temp 97.6  F (36.4  C) (Oral)  Resp 16  Wt 52.9 kg (116 lb 9.6 oz)  SpO2 97%  BMI 21.32 kg/m2     Wt Readings from Last 4  Encounters:   02/16/18 52.9 kg (116 lb 9.6 oz)   01/26/18 52.8 kg (116 lb 8 oz)   01/05/18 53.8 kg (118 lb 11.2 oz)   12/15/17 53.1 kg (117 lb)     Constitutional: Alert, oriented, cachectic female in no visible distress. In a wheelchair  Eyes: Anicteric sclerae. PERRL. Left eye does not track in left lateral direction but stays midline, will track medially. No visible nystagmus. Other EOM intact.  ENT/Mouth: OM moist and pink without lesions or thrush.    CV: RRR, no murmurs appreciated.  Resp: CTAB slightly diminished R base  Extremities: No lower extremity edema appreciated.  Neuro: CN II-XII grossly intact except for ocular movements as noted above. Strength in bilateral UE and LE 5/5. Grossly nonfocal.    LABS:     Results for HEIDI RUIZ (MRN [id number]) as of 2/16/2018 13:04   Ref. Range 2/16/2018 11:47   Sodium Latest Ref Range: 133 - 144 mmol/L 139   Potassium Latest Ref Range: 3.4 - 5.3 mmol/L 3.6   Chloride Latest Ref Range: 94 - 109 mmol/L 104   Carbon Dioxide Latest Ref Range: 20 - 32 mmol/L 28   Urea Nitrogen Latest Ref Range: 7 - 30 mg/dL 12   Creatinine Latest Ref Range: 0.52 - 1.04 mg/dL 0.56   GFR Estimate Latest Ref Range: >60 mL/min/1.7m2 >90   GFR Estimate If Black Latest Ref Range: >60 mL/min/1.7m2 >90   Calcium Latest Ref Range: 8.5 - 10.1 mg/dL 9.0   Anion Gap Latest Ref Range: 3 - 14 mmol/L 7   Albumin Latest Ref Range: 3.4 - 5.0 g/dL 3.6   Protein Total Latest Ref Range: 6.8 - 8.8 g/dL 6.9   Bilirubin Total Latest Ref Range: 0.2 - 1.3 mg/dL 0.3   Alkaline Phosphatase Latest Ref Range: 40 - 150 U/L 112   ALT Latest Ref Range: 0 - 50 U/L 20   AST Latest Ref Range: 0 - 45 U/L 20   Glucose Latest Ref Range: 70 - 99 mg/dL 103 (H)   WBC Latest Ref Range: 4.0 - 11.0 10e9/L 6.0   Hemoglobin Latest Ref Range: 11.7 - 15.7 g/dL 13.0   Hematocrit Latest Ref Range: 35.0 - 47.0 % 39.6   Platelet Count Latest Ref Range: 150 - 450 10e9/L 252     IMPRESSION/PLAN:  Dominga is a 62-year-old  female with history of metastatic ER-positive, HER-2 positive breast cancer, with involvement of the bone, history of pleural effusions treated with pleurodesis and PleurX placement, and with treated brain metastases, previously with stable disease on TDM1, with progressive disease after treatment break.  She was started on [place]  clinical trial and randomized to physician's choice, and started on Gemzar/Herceptin on 9/29/16.    Breast cancer:  Stable disease on Gemzar/Herceptin, however Gemzar has been on old since 1/5 due to worsening fatigue. She was continued on Herceptin alone with plan to re-evaluate physical stamina after 6 weeks. Although Ravi is making improvements in her stamina, she does not wish to resume Gemzar at this time. Her QOL is improved off Gemzar. We discussed possiblity of an AI, to which she is open too. Per chart review, shew as on Arimidex back in 2014, and developed arthralgias. Will discuss with Dr. Harper. Otherwise, plan to continue with Herceptin today. She has restaging prior to next visit.     Brain metastases.   Stable on most recent MRI, without any new or worsening symptoms    Bone mets: Last Xgeva on 1/5/18, administered every 6 weeks. Due today    R lobar and segmental pulmonary emboli: On Lovenox 1 mg/kg BID    Peripheral neuropathy: Chronic and persistent, however overall slightly improving recently.  Still continues to have difficulty with right hand as far as writing and holding things, however she has noted slightly improvement.  Use a cane to ambulate.  We will continue to monitor.      Central diabetes insipidus.  This was diagnosed as an inpatient in the setting of hypernatremia.  She was started on desmopressin.  She has been seeing Endocrinology ultrasound of the thyroid and neck are scheduled in early March..       Nutrition.    weight stable    She does have a POLST and is DNR/DNI.  Her power of  is listed in the computer.       Deyanira Majano,  JORGE Costello PA-C

## 2018-03-07 NOTE — DISCHARGE INSTRUCTIONS

## 2018-03-07 NOTE — DISCHARGE INSTRUCTIONS
MRI Contrast Discharge Instructions    The IV contrast you received today will pass out of your body in your  urine. This will happen in the next 24 hours. You will not feel this process.  Your urine will not change color.    Drink at least 4 extra glasses of water or juice today (unless your doctor  has restricted your fluids). This reduces the stress on your kidneys.  You may take your regular medicines.    If you are on dialysis: It is best to have dialysis today.    If you have a reaction: Most reactions happen right away. If you have  any new symptoms after leaving the hospital (such as hives or swelling),  call your hospital at the correct number below. Or call your family doctor.  If you have breathing distress or wheezing, call 911.    Special instructions: ***    I have read and understand the above information.    Signature:______________________________________ Date:___________    Staff:__________________________________________ Date:___________     Time:__________    [facility] [facility] Departments:    ___[place]: [phone]  ___[facility]: [phone]  ___[place]: [phone] ___[facility]: [phone]  ___[facility]: [phone]  ___[facility]: [phone]  ___[phone]  ___[facility]: [phone]  ___[phone]

## 2018-03-12 NOTE — LETTER
3/12/2018       RE: Keren Erickson  [address] [facility] [address]     Dear Colleague,    Thank you for referring your patient, Keren Erickson, to the [facility]. Please see a copy of my visit note below.    HEMATOLOGY/ONCOLOGY PROGRESS NOTE  Mar 12, 2018    REASON FOR VISIT: follow-up of metastatic breast cancer, triple positive    DIAGNOSIS:   Keren Erickson is a 62 year old woman who presented to the hospital initially in 09/2013 with complaints of dyspnea. Workup revealed a large pericardial effusion and pleural effusion. Physical examination revealed a large untreated left breast mass encompassing the entire left breast. Biopsies revealed these were ER, NC and HER2-positive breast cancer. She had a thoracentesis and pericardial sclerosis performed. She was originally on Arimidex and Herceptin. In 01/2014, she was switched to tamoxifen and Herceptin due to arthralgias, and she ultimately developed progressive disease and was switched to Faslodex and Herceptin. In 01/2015, she was found to have progressive disease and was switched to T-DM1.     Initially when she was seen in late 02/2015, she complained of some V5 sensory deficit. This was subjective. I was not able to elicit this on examination. This persisted and further workup was performed with a brain MRI. This revealed what was initially thought to be 3 punctate brain metastases. She originally saw Radiation Oncology and Neurosurgery as well as underwent a lumbar puncture. Cytology from the lumbar puncture showed no evidence of leptomeningeal disease. She was treated with Gamma Knife radiation to 6 lesions, performed on 04/16/2015.  She initiated therapy with TDM1 in January 2015 and continued this through 6/26/2015 with overall stable disease. She has since been on a break from treatment. The patient presented earlier this month with recurrent shortness of breath.  Imaging revealed recurrent  right-sided pleural effusion.  She has since undergone thoracentesis with cytology pending.  Clinically, however, there is evidence of disease progression. PET scan performed on 11/25/2015 demonstrated progression of disease at multiple sites. She restarted TDM1 on 11/27/2015, however experienced a mild transfusion reaction with shortness of breath and chest tightness, resolved upon stopping TDM1 and 125 mg of SoluMedrol. She had progression of disease on repeat imaging 2/17/2016, as well as new brain metastases. She started TDM1 with Perjeta on 3/4/2016. She underwent gamma knife to brain mets on 3/8/16.       In late May 2016, she was found to have progressive brain metastases.  She received whole brain radiation.  She had a follow up brain MRI August 2016 that was stable.     In September 2016, she enrolled on [place]  trial and was randomized to the standard of care arm/Physician's Choice; started on gemzar and herceptin. She was hospitalized 1/27-2/3/17 for presumed HCAP. Trial was suspended. She continued on gemzar and heceptin with stable disease. Last restaging was 4/7/17 and stable. Gemzar was placed on hold from 4/10/2017 through 6/22/17 so that she could recover from pneumonia.    INTERVAL HISTORY: Dominga discontinued gemzar with last infusion on 12/15/17.  She is feeling substantially stronger since then, and feels like she continues to regain strength.  She is now on herceptin and aromasin.      She has no f/c.  No chest pain or shortness of breath.  No n/v/d/c.  She continues to have difficulty with her eyes, and is scheduled to see opthamology on Friday.      She spends most of her time at home, walking around her apartment, and coming here with friends for her appts.  She has a few more hot flashes since going back on aromasin.       Her 10-point review of systems is otherwise negative.      PHYSICAL EXAMINATION:     BP (!) 142/92 (BP Location: Right arm, Patient Position: Sitting, Cuff  "Size: Adult Small)  Pulse 77  Temp 97.7  F (36.5  C) (Oral)  Resp 16  Ht 1.575 m (5' 2.01\")  Wt 53.5 kg (117 lb 14.4 oz)  SpO2 94%  BMI 21.56 kg/m2    Wt Readings from Last 4 Encounters:   03/12/18 53.5 kg (117 lb 14.4 oz)   02/16/18 52.9 kg (116 lb 9.6 oz)   01/26/18 52.8 kg (116 lb 8 oz)   01/05/18 53.8 kg (118 lb 11.2 oz)     Constitutional: Alert, oriented, thin female in no visible distress  Eyes: PERRL. EOMI. MMM without oral lesions. Anicteric sclerae.    CV: RRR, no murmurs   Resp: CTAB slightly diminished at bases. Breathing comfortably.  Abdomen: Soft, non-tender, non-distended. Bowel sounds present.   Extremities: No lower extremity edema   Skin: Warm, dry. No bruising or petechiae. No rash  Lymph: No palpable cervical or supraclavicular LAD  Neuro: CN III-XII grossly intact. Ambulates several steps with slow shuffling gate, today she is in a wheelchair and a full gait assessment was not performed.  Persistent nystagmus with left lateral gaze, this is not new.  5/5 strength in LE proximal and distal.  Breast exam: L breast with scarring in her left mid breast from prior tumor, no palpable firm nodules as was previously present, no axillary adenopathy.  R breast with fibroglandular tissue, no firm nodules or masses, no axillary adenopathy      LABS:       Lab Results   Component Value Date    WBC 5.6 03/12/2018     Lab Results   Component Value Date    RBC 4.17 03/12/2018     Lab Results   Component Value Date    HGB 13.3 03/12/2018     Lab Results   Component Value Date    HCT 40.2 03/12/2018     No components found for: MCT  Lab Results   Component Value Date    MCV 96 03/12/2018     Lab Results   Component Value Date    MCH 31.9 03/12/2018     Lab Results   Component Value Date    MCHC 33.1 03/12/2018     Lab Results   Component Value Date    RDW 14.6 03/12/2018     Lab Results   Component Value Date     03/12/2018       Recent Labs   Lab Test  02/16/18   1147  01/26/18   0935   NA  139  " 139   POTASSIUM  3.6  3.9   CHLORIDE  104  104   CO2  28  28   ANIONGAP  7  7   GLC  103*  83   BUN  12  10   CR  0.56  0.64   OMA  9.0  9.0     Liver Function Studies -   Recent Labs   Lab Test  02/16/18   1147   PROTTOTAL  6.9   ALBUMIN  3.6   BILITOTAL  0.3   ALKPHOS  112   AST  20   ALT  20       IMPRESSION/PLAN:  Dominga is a 62-year-old woman with history of metastatic ER-positive, HER-2 positive breast cancer, with involvement of the bone, history of pleural effusions treated with pleurodesis and PleurX placement, and with treated brain metastases. She was started on [place]  clinical trial and randomized to physician's choice, on Gemzar/Herceptin since 9/29/16.  She discontinued gemzar 121/5/17.  Now on herceptin and aromasin.      1.  Denia remains on aromasin and Herceptin for her breast cancer.  We discussed that she had a CT scan of the chest, abdomen and pelvis and a brain MRI that revealed no evidence of progressive disease.   There was a vague density in the right breast that is not present on exam.  Will continue to follow.      Will repeat imaging in 12 weeks.  Remain on herceptin and aromasin.     2.  Brain metastases.  These appear stable on overall brain MRI.      3.  Central diabetes insipidus.  This was diagnosed as an inpatient in the setting of hypernatremia.  She was started on desmopressin.  She has been seeing Endocrinology.       4.  Nutrition.  Her weight is overall stable.      5.  She is due for Xgeva.  We will go ahead and administer this with her next Herceptin infusion.        6.  She does have a POLST and is DNR/DNI.  Her power of  is listed in the computer.     7. Bladder nodule - we discussed this.  Will obtain UA and urine cytology.  Pt is not interested in further investigation at this time. Will follow closely with CT.    Marlen Harper MD

## 2018-03-12 NOTE — MR AVS SNAPSHOT
After Visit Summary   3/12/2018    Keren Erickson    MRN: [id number]           Patient Information     Date Of Birth          1955        Visit Information        Provider Department      3/12/2018 9:30 AM  21 ATC;  ONCOLOGY INFUSION [facility]        Today's Diagnoses     Metastatic breast cancer (H)    -  1    Brain metastasis (H)        Carcinoma of breast metastatic to multiple sites, unspecified laterality (H)        Benign bladder mass        Bone metastasis (H)          Care Instructions    Contact Numbers    [facility] [place]: [phone]  [facility] [place]:  [phone]    Call triage with chills and/or temperature greater than or equal to 100.5, uncontrolled nausea/vomiting, diarrhea, constipation, dizziness, shortness of breath, chest pain, bleeding, unexplained bruising, or any new/concerning symptoms, questions/concerns.     If you are having any concerning symptoms or wish to speak to a provider before your next infusion visit, please call your care coordinator or triage to notify them so we can adequately serve you.       After Hours: [phone]    If after hours, weekends, or holidays, call main hospital  and ask for Oncology doctor on call.         March 2018 Sunday Monday Tuesday Wednesday Thursday Friday Saturday                       1     2     3       4     5     6     7     CT ABDOMEN PELVIS WWO   10:25 AM   (20 min.)   UCCT1   [facility] CT     ECH COMPLETE   11:00 AM   (60 min.)   UCECHCR2   [facility] Echo     MR BRAIN WWO   12:00 PM   (45 min.)   [id number]   [facility] MRI 8     9     10       11     12     [facility]ON[facility] LAB DRAW    8:00 AM   (15 min.)   [facility] DRAW   [facility] Lab Draw     [facility] RETURN ACTIVE TREATMENT    8:15 AM   (30 min.)   Marlen Harper MD   [facility] ONC INFUSION 60    9:30 AM   (60 min.)    ONCOLOGY INFUSION   [facility]  13     14     15     16     [facility] RETURN CORNEA   12:15 PM   (15 min.)   Damon Sullivan, DO   [facility] 17       18     19     20     21     22     23     24       25     26     27     28     US THYROID   10:00 AM   (45 min.)   [address] Imaging Center US     US HEAD NECK SOFT TISSUE   10:30 AM   (45 min.)   [address] US 29     30     31 April 2018 Sunday Monday Tuesday Wednesday Thursday Friday Saturday   1     2     [facility] RETURN ENDOCRINE    1:15 PM   (30 min.)   Rolando Mishra MD   [facility] 3     4     5     [facility] LAB DRAW   10:15 AM   (15 min.)    [facility] LAB DRAW   [facility] Lab Draw     [facility] RETURN   10:35 AM   (50 min.)   Deyanira Costello PA-C   [facility]     [facility] ONC INFUSION 60   12:00 PM   (60 min.)    ONCOLOGY INFUSION   [facility] 6     7       8     9     10     11     12     13     14       15     16     17     18     19     20     21       22     23     24     25     26     27     [facility] LAB DRAW    9:45 AM   (15 min.)    [facility] LAB DRAW   [facility] Lab Draw     [facility] RETURN   10:05 AM   (50 min.)   Deyanira Costello PA-C   [facility]     [facility] ONC INFUSION 60   11:30 AM   (60 min.)    ONCOLOGY INFUSION   [facility] 28       29     30                                          Recent Results (from the past 24 hour(s))   Comprehensive metabolic panel    Collection Time: 03/12/18  8:43 AM   Result Value Ref Range    Sodium 139 133 - 144 mmol/L    Potassium 3.7 3.4 - 5.3 mmol/L    Chloride 104 94 - 109 mmol/L    Carbon Dioxide 29 20 - 32 mmol/L    Anion Gap 6 3 - 14 mmol/L    Glucose 90 70 - 99 mg/dL    Urea Nitrogen 9 7 - 30 mg/dL    Creatinine 0.63 0.52 - 1.04 mg/dL    GFR Estimate >90 >60 mL/min/1.7m2    GFR Estimate If Black >90 >60 mL/min/1.7m2    Calcium 9.2 8.5 - 10.1 mg/dL    Bilirubin Total 0.3  0.2 - 1.3 mg/dL    Albumin 3.7 3.4 - 5.0 g/dL    Protein Total 6.7 (L) 6.8 - 8.8 g/dL    Alkaline Phosphatase 89 40 - 150 U/L    ALT 15 0 - 50 U/L    AST 19 0 - 45 U/L   CBC with platelets differential    Collection Time: 03/12/18  8:43 AM   Result Value Ref Range    WBC 5.6 4.0 - 11.0 10e9/L    RBC Count 4.17 3.8 - 5.2 10e12/L    Hemoglobin 13.3 11.7 - 15.7 g/dL    Hematocrit 40.2 35.0 - 47.0 %    MCV 96 78 - 100 fl    MCH 31.9 26.5 - 33.0 pg    MCHC 33.1 31.5 - 36.5 g/dL    RDW 14.6 10.0 - 15.0 %    Platelet Count 248 150 - 450 10e9/L    Diff Method Automated Method     % Neutrophils 66.8 %    % Lymphocytes 20.4 %    % Monocytes 11.6 %    % Eosinophils 0.0 %    % Basophils 0.5 %    % Immature Granulocytes 0.7 %    Nucleated RBCs 0 0 /100    Absolute Neutrophil 3.7 1.6 - 8.3 10e9/L    Absolute Lymphocytes 1.1 0.8 - 5.3 10e9/L    Absolute Monocytes 0.7 0.0 - 1.3 10e9/L    Absolute Eosinophils 0.0 0.0 - 0.7 10e9/L    Absolute Basophils 0.0 0.0 - 0.2 10e9/L    Abs Immature Granulocytes 0.0 0 - 0.4 10e9/L    Absolute Nucleated RBC 0.0    Routine UA with micro reflex to culture    Collection Time: 03/12/18  9:45 AM   Result Value Ref Range    Color Urine Yellow     Appearance Urine Clear     Glucose Urine Negative NEG^Negative mg/dL    Bilirubin Urine Negative NEG^Negative    Ketones Urine Negative NEG^Negative mg/dL    Specific Gravity Urine 1.009 1.003 - 1.035    Blood Urine Negative NEG^Negative    pH Urine 6.0 5.0 - 7.0 pH    Protein Albumin Urine Negative NEG^Negative mg/dL    Urobilinogen mg/dL 0.0 0.0 - 2.0 mg/dL    Nitrite Urine Negative NEG^Negative    Leukocyte Esterase Urine Trace (A) NEG^Negative    Source Unspecified Urine     WBC Urine 1 0 - 5 /HPF    RBC Urine <1 0 - 2 /HPF    Mucous Urine Present (A) NEG^Negative /LPF                 Follow-ups after your visit        Your next 10 appointments already scheduled     Mar 16, 2018 12:15 PM CDT   RETURN CORNEA with Damon Sullivan,    Eye Clinic (P  [facility])    [address]  9th Fl Clin 9a  [facility] [address]   [phone]            Mar 28, 2018 10:00 AM CDT   US THYROID with [address] [place] [address] Imaging Center US ([facility] [place])    [address]  1st [facility] [address]   [phone]           Please bring a list of your medicines (including vitamins, minerals and over-the-counter drugs). Also, tell your doctor about any allergies you may have. Wear comfortable clothes and leave your valuables at home.  You do not need to do anything special to prepare for your exam.  Please call the Imaging Department at your exam site with any questions.            Mar 28, 2018 10:45 AM CDT   (Arrive by 10:30 AM)   US HEAD NECK SOFT TISSUE with [address] Imaging Center US ([facility] [place])    [address] [place] [address]  1st [facility] [address] [phone]           Please bring a list of your medicines (including vitamins, minerals and over-the-counter drugs). Also, tell your doctor about any allergies you may have. Wear comfortable clothes and leave your valuables at home.  You do not need to do anything special to prepare for your exam.  Please call the Imaging Department at your exam site with any questions.            Apr 02, 2018  1:30 PM CDT   (Arrive by 1:15 PM)   RETURN ENDOCRINE with Rolando Mishra MD   [facility] End[facility]inology ([facility])    [address] [facility] [address]  3rd Floor  [facility] [address]   [phone]            Apr 05, 2018 10:15 AM CDT   [facility] Lab Draw with  MASONIC LAB DRAW   [facility] Lab Draw ([facility])    [address]  [address]  [facility] [address]   [phone]            Apr 05, 2018 10:50 AM CDT   (Arrive by 10:35 AM)   Return Visit with Deyanira Costello PA-C   [facility] ([facility]  [place])    [address]  [address]  [facility] [address]   [phone]            Apr 05, 2018 12:00 PM CDT   Infusion 60 with UC ONCOLOGY INFUSION, UC 28 ATC   [facility]    [address] [place] [address]  [address]  [facility] [address]   [phone]            Apr 27, 2018  9:45 AM CDT   [facility] Lab Draw with  MASONIC LAB DRAW   [facility] Lab Draw ([facility])    [address] [place] [address]  [address]  [facility] [address]   [phone]            Apr 27, 2018 10:20 AM CDT   (Arrive by 10:05 AM)   Return Visit with Deyanira Costello PA-C   [facility] ([facility])    [address] [place] [address]  [address]  [facility] [address]   [phone]              Future tests that were ordered for you today     Open Future Orders        Priority Expected Expires Ordered    CT Chest/Abdomen/Pelvis w Contrast Routine  10/8/2018 3/12/2018            Who to contact     If you have questions or need follow up information about today's clinic visit or your schedule please contact [facility] directly at [phone].  Normal or non-critical lab and imaging results will be communicated to you by Tableau Softwarehart, letter or phone within 4 business days after the clinic has received the results. If you do not hear from us within 7 days, please contact the clinic through Tableau Software[employer] or phone. If you have a critical or abnormal lab result, we will notify you by phone as soon as possible.  Submit refill requests through [email or web address] or call your pharmacy and they will forward the refill request to us. Please allow 3 business days for your refill to be completed.          Additional Information About Your Visit        [email or web address] Information     [email or web address] gives you secure access to your electronic health record. If you see a primary care provider, you can also send messages to your care team  and make appointments. If you have questions, please call your primary care clinic.  If you do not have a primary care provider, please call [phone] and they will assist you.        Care EveryWhere ID     This is your Care EveryWhere ID. This could be used by other organizations to access your [place] medical records  [id number]         Blood Pressure from Last 3 Encounters:   03/12/18 (!) 142/92   02/16/18 128/81   01/26/18 125/84    Weight from Last 3 Encounters:   03/12/18 53.5 kg (117 lb 14.4 oz)   02/16/18 52.9 kg (116 lb 9.6 oz)   01/26/18 52.8 kg (116 lb 8 oz)              We Performed the Following     Ca27.29  breast tumor marker     CBC with platelets differential     CEA     Comprehensive metabolic panel     Routine UA with micro reflex to culture        Primary Care Provider Office Phone # Fax #    Kelly Hart [phone]       [address] [facility] [address]        Equal Access to Services     CLAUDIA BOND : Hadii aad ku hadasho Soomaali, waaxda luqadaha, qaybta kaalmada adeegyada, waxay kendrick escobedo . So [facility] [phone].    ATENCIÓN: Si habla español, tiene a ramires disposición servicios gratuitos de asistencia lingüística. [facility] [phone].    We comply with applicable federal civil rights laws and [place] laws. We do not discriminate on the basis of race, color, national origin, age, disability, sex, sexual orientation, or gender identity.            Thank you!     Thank you for choosing [facility] CANCER CLINIC  for your care. Our goal is always to provide you with excellent care. Hearing back from our patients is one way we can continue to improve our services. Please take a few minutes to complete the written survey that you may receive in the mail after your visit with us. Thank you!             Your Updated Medication List - Protect others around you: Learn how to safely use, store and throw away your medicines at  www.disposemymeds.org.          This list is accurate as of 3/12/18 11:01 AM.  Always use your most recent med list.                   Brand Name Dispense Instructions for use Diagnosis    desmopressin 0.2 MG tablet    DDAVP    45 tablet    Take  1/2 tablet once daily by mouth    Diabetes insipidus (H)       exemestane 25 MG tablet    AROMASIN    30 tablet    Take 1 tablet (25 mg) by mouth daily    Carcinoma of breast metastatic to multiple sites, unspecified laterality (H), Metastatic breast cancer (H)       LOVENOX 60 MG/0.6ML injection   Generic drug:  enoxaparin     60 Syringe    Inject 0.5 mLs (50 mg) Subcutaneous 2 times daily    Other acute pulmonary embolism without acute cor pulmonale (H)       meclizine 25 MG tablet    ANTIVERT    30 tablet    Take 1 tablet (25 mg) by mouth 3 times daily as needed for dizziness    BPPV (benign paroxysmal positional vertigo), right       methylphenidate 5 MG tablet    RITALIN    30 tablet    Take 5 mg once daily as needed for fatigue    Carcinoma of breast metastatic to multiple sites, unspecified laterality (H)       Multi-vitamin Tabs tablet      Take 1 tablet by mouth daily        omeprazole 20 MG tablet     30 tablet    Take 1 tablet (20 mg) by mouth daily    Gastroesophageal reflux disease without esophagitis       ondansetron 4 MG tablet    ZOFRAN    18 tablet    Take 1 tablet (4 mg) by mouth every 8 hours as needed for nausea    Nausea       prochlorperazine 10 MG tablet    COMPAZINE    90 tablet    Take 1 tablet (10 mg) by mouth every 6 hours as needed for nausea or vomiting    Nausea       TYLENOL PO      Take 500 mg by mouth once        VITAMIN D (CHOLECALCIFEROL) PO      Take 5,000 Units by mouth daily        VITAMIN E BLEND PO      Take by mouth daily

## 2018-03-12 NOTE — MR AVS SNAPSHOT
After Visit Summary   3/12/2018    Keren Erickson    MRN: [id number]           Patient Information     Date Of Birth          1955        Visit Information        Provider Department      3/12/2018 8:30 AM Marlne Harper MD [facility]        Today's Diagnoses     Carcinoma of breast metastatic to multiple sites, unspecified laterality (H)    -  1    Benign bladder mass          Care Instructions    Preventive Care:     Colorectal Cancer Screening: During our visit today, we discussed that it is recommended you receive colorectal cancer screening. Please call or make an appointment with your primary care provider to discuss this. You may also call the [facility] scheduling line ([phone]) to set up a colonoscopy appointment.  Preventive Care:     Breast Cancer Screening: During our visit today, we discussed that it is recommended you receive breast cancer screening. Please call or make an appointment with your primary care provider to discuss this with them. You may also call the [facility] scheduling line ([phone]) to set up a mammography appointment at the Breast Center within the [facility].               Follow-ups after your visit        Your next 10 appointments already scheduled     Mar 16, 2018 12:15 PM CDT   RETURN CORNEA with Damon Sullivan, DO   Eye Clinic ([facility])    [address]  [address] Clin 9a  [facility] [address] [phone]            Mar 28, 2018 10:00 AM CDT   US THYROID with UCUS1   [facility] Imaging Center US ([facility])    [address] [facility]  1st [facility] [address] [phone]           Please bring a list of your medicines (including vitamins, minerals and over-the-counter drugs). Also, tell your doctor about any allergies you may have. Wear comfortable clothes and leave your valuables at home.  You do not need  to do anything special to prepare for your exam.  Please call the Imaging Department at your exam site with any questions.            Mar 28, 2018 10:45 AM CDT   (Arrive by 10:30 AM)   US HEAD NECK SOFT TISSUE with UCUS1   [facility] Imaging Center US ([facility])    [address] [facility]  1st [facility] [address] [phone]           Please bring a list of your medicines (including vitamins, minerals and over-the-counter drugs). Also, tell your doctor about any allergies you may have. Wear comfortable clothes and leave your valuables at home.  You do not need to do anything special to prepare for your exam.  Please call the Imaging Department at your exam site with any questions.            Apr 02, 2018  1:30 PM CDT   (Arrive by 1:15 PM)   RETURN ENDOCRINE with Rolando Mishra MD   [facility] Endocrinology ([facility] [place])    [address] [place] [id number] [phone]              Future tests that were ordered for you today     Open Future Orders        Priority Expected Expires Ordered    CT Chest/Abdomen/Pelvis w Contrast Routine  10/8/2018 3/12/2018    Routine UA with micro reflex to culture Routine  3/12/2019 3/12/2018            Who to contact     If you have questions or need follow up information about today's clinic visit or your schedule please contact [facility]CER CLINIC directly at [phone].  Normal or non-critical lab and imaging results will be communicated to you by MyChart, letter or phone within 4 business days after the clinic has received the results. If you do not hear from us within 7 days, please contact the clinic through MyChart or phone. If you have a critical or abnormal lab result, we will notify you by phone as soon as possible.  Submit refill requests through Zdorovio or call your pharmacy and they will forward the refill request to us. Please allow 3 business days for your  "refill to be completed.          Additional Information About Your Visit        [employer] Information     [employer] gives you secure access to your electronic health record. If you see a primary care provider, you can also send messages to your care team and make appointments. If you have questions, please call your primary care clinic.  If you do not have a primary care provider, please call [phone] and they will assist you.        Care EveryWhere ID     This is your Care EveryWhere ID. This could be used by other organizations to access your [place] medical records  [id number]        Your Vitals Were     Pulse Temperature Respirations Height Pulse Oximetry BMI (Body Mass Index)    77 97.7  F (36.5  C) (Oral) 16 1.575 m (5' 2.01\") 94% 21.56 kg/m2       Blood Pressure from Last 3 Encounters:   03/12/18 (!) 142/92   02/16/18 128/81   01/26/18 125/84    Weight from Last 3 Encounters:   03/12/18 53.5 kg (117 lb 14.4 oz)   02/16/18 52.9 kg (116 lb 9.6 oz)   01/26/18 52.8 kg (116 lb 8 oz)              We Performed the Following     Cytology non gyn        Primary Care Provider Office Phone # Fax #    Kelly Hart [phone]       [address] [facility] [address]        Equal Access to Services     [facility]: Hadii aad ku hadasho Soomaali, waaxda luqadaha, qaybta kaalmada adeegyada, freddie keys. So [facility] [phone].    ATENCIÓN: Si habla español, tiene a ramires disposición servicios gratuitos de asistencia lingüística. [facility] [phone].    We comply with applicable federal civil rights laws and [place] laws. We do not discriminate on the basis of race, color, national origin, age, disability, sex, sexual orientation, or gender identity.            Thank you!     Thank you for choosing [facility]  for your care. Our goal is always to provide you with excellent care. Hearing back from our patients is one way we can continue to improve " our services. Please take a few minutes to complete the written survey that you may receive in the mail after your visit with us. Thank you!             Your Updated Medication List - Protect others around you: Learn how to safely use, store and throw away your medicines at www.disposemymeds.org.          This list is accurate as of 3/12/18  9:36 AM.  Always use your most recent med list.                   Brand Name Dispense Instructions for use Diagnosis    desmopressin 0.2 MG tablet    DDAVP    45 tablet    Take  1/2 tablet once daily by mouth    Diabetes insipidus (H)       exemestane 25 MG tablet    AROMASIN    30 tablet    Take 1 tablet (25 mg) by mouth daily    Carcinoma of breast metastatic to multiple sites, unspecified laterality (H), Metastatic breast cancer (H)       LOVENOX 60 MG/0.6ML injection   Generic drug:  enoxaparin     60 Syringe    Inject 0.5 mLs (50 mg) Subcutaneous 2 times daily    Other acute pulmonary embolism without acute cor pulmonale (H)       meclizine 25 MG tablet    ANTIVERT    30 tablet    Take 1 tablet (25 mg) by mouth 3 times daily as needed for dizziness    BPPV (benign paroxysmal positional vertigo), right       methylphenidate 5 MG tablet    RITALIN    30 tablet    Take 5 mg once daily as needed for fatigue    Carcinoma of breast metastatic to multiple sites, unspecified laterality (H)       Multi-vitamin Tabs tablet      Take 1 tablet by mouth daily        omeprazole 20 MG tablet     30 tablet    Take 1 tablet (20 mg) by mouth daily    Gastroesophageal reflux disease without esophagitis       ondansetron 4 MG tablet    ZOFRAN    18 tablet    Take 1 tablet (4 mg) by mouth every 8 hours as needed for nausea    Nausea       prochlorperazine 10 MG tablet    COMPAZINE    90 tablet    Take 1 tablet (10 mg) by mouth every 6 hours as needed for nausea or vomiting    Nausea       TYLENOL PO      Take 500 mg by mouth once        VITAMIN D (CHOLECALCIFEROL) PO      Take 5,000 Units by  mouth daily        VITAMIN E BLEND PO      Take by mouth daily

## 2018-03-12 NOTE — PROGRESS NOTES
HEMATOLOGY/ONCOLOGY PROGRESS NOTE  Mar 12, 2018    REASON FOR VISIT: follow-up of metastatic breast cancer, triple positive    DIAGNOSIS:   Keren Erickson is a 62 year old woman who presented to the hospital initially in 09/2013 with complaints of dyspnea. Workup revealed a large pericardial effusion and pleural effusion. Physical examination revealed a large untreated left breast mass encompassing the entire left breast. Biopsies revealed these were ER, CO and HER2-positive breast cancer. She had a thoracentesis and pericardial sclerosis performed. She was originally on Arimidex and Herceptin. In 01/2014, she was switched to tamoxifen and Herceptin due to arthralgias, and she ultimately developed progressive disease and was switched to Faslodex and Herceptin. In 01/2015, she was found to have progressive disease and was switched to T-DM1.     Initially when she was seen in late 02/2015, she complained of some V5 sensory deficit. This was subjective. I was not able to elicit this on examination. This persisted and further workup was performed with a brain MRI. This revealed what was initially thought to be 3 punctate brain metastases. She originally saw Radiation Oncology and Neurosurgery as well as underwent a lumbar puncture. Cytology from the lumbar puncture showed no evidence of leptomeningeal disease. She was treated with Gamma Knife radiation to 6 lesions, performed on 04/16/2015.  She initiated therapy with TDM1 in January 2015 and continued this through 6/26/2015 with overall stable disease. She has since been on a break from treatment. The patient presented earlier this month with recurrent shortness of breath.  Imaging revealed recurrent right-sided pleural effusion.  She has since undergone thoracentesis with cytology pending.  Clinically, however, there is evidence of disease progression. PET scan performed on 11/25/2015 demonstrated progression of disease at multiple sites. She restarted TDM1  "on 11/27/2015, however experienced a mild transfusion reaction with shortness of breath and chest tightness, resolved upon stopping TDM1 and 125 mg of SoluMedrol. She had progression of disease on repeat imaging 2/17/2016, as well as new brain metastases. She started TDM1 with Perjeta on 3/4/2016. She underwent gamma knife to brain mets on 3/8/16.       In late May 2016, she was found to have progressive brain metastases.  She received whole brain radiation.  She had a follow up brain MRI August 2016 that was stable.     In September 2016, she enrolled on Lares  trial and was randomized to the standard of care arm/Physician's Choice; started on gemzar and herceptin. She was hospitalized 1/27-2/3/17 for presumed HCAP. Trial was suspended. She continued on gemzar and heceptin with stable disease. Last restaging was 4/7/17 and stable. Gemzar was placed on hold from 4/10/2017 through 6/22/17 so that she could recover from pneumonia.    INTERVAL HISTORY: Dominga discontinued gemzar with last infusion on 12/15/17.  She is feeling substantially stronger since then, and feels like she continues to regain strength.  She is now on herceptin and aromasin.      She has no f/c.  No chest pain or shortness of breath.  No n/v/d/c.  She continues to have difficulty with her eyes, and is scheduled to see opthamology on Friday.      She spends most of her time at home, walking around her apartment, and coming here with friends for her appts.  She has a few more hot flashes since going back on aromasin.       Her 10-point review of systems is otherwise negative.      PHYSICAL EXAMINATION:     BP (!) 142/92 (BP Location: Right arm, Patient Position: Sitting, Cuff Size: Adult Small)  Pulse 77  Temp 97.7  F (36.5  C) (Oral)  Resp 16  Ht 1.575 m (5' 2.01\")  Wt 53.5 kg (117 lb 14.4 oz)  SpO2 94%  BMI 21.56 kg/m2    Wt Readings from Last 4 Encounters:   03/12/18 53.5 kg (117 lb 14.4 oz)   02/16/18 52.9 kg (116 lb 9.6 oz) "   01/26/18 52.8 kg (116 lb 8 oz)   01/05/18 53.8 kg (118 lb 11.2 oz)     Constitutional: Alert, oriented, thin female in no visible distress  Eyes: PERRL. EOMI. MMM without oral lesions. Anicteric sclerae.    CV: RRR, no murmurs   Resp: CTAB slightly diminished at bases. Breathing comfortably.  Abdomen: Soft, non-tender, non-distended. Bowel sounds present.   Extremities: No lower extremity edema   Skin: Warm, dry. No bruising or petechiae. No rash  Lymph: No palpable cervical or supraclavicular LAD  Neuro: CN III-XII grossly intact. Ambulates several steps with slow shuffling gate, today she is in a wheelchair and a full gait assessment was not performed.  Persistent nystagmus with left lateral gaze, this is not new.  5/5 strength in LE proximal and distal.  Breast exam: L breast with scarring in her left mid breast from prior tumor, no palpable firm nodules as was previously present, no axillary adenopathy.  R breast with fibroglandular tissue, no firm nodules or masses, no axillary adenopathy      LABS:       Lab Results   Component Value Date    WBC 5.6 03/12/2018     Lab Results   Component Value Date    RBC 4.17 03/12/2018     Lab Results   Component Value Date    HGB 13.3 03/12/2018     Lab Results   Component Value Date    HCT 40.2 03/12/2018     No components found for: MCT  Lab Results   Component Value Date    MCV 96 03/12/2018     Lab Results   Component Value Date    MCH 31.9 03/12/2018     Lab Results   Component Value Date    MCHC 33.1 03/12/2018     Lab Results   Component Value Date    RDW 14.6 03/12/2018     Lab Results   Component Value Date     03/12/2018       Recent Labs   Lab Test  02/16/18   1147  01/26/18   0935   NA  139  139   POTASSIUM  3.6  3.9   CHLORIDE  104  104   CO2  28  28   ANIONGAP  7  7   GLC  103*  83   BUN  12  10   CR  0.56  0.64   OMA  9.0  9.0     Liver Function Studies -   Recent Labs   Lab Test  02/16/18   1147   PROTTOTAL  6.9   ALBUMIN  3.6   BILITOTAL  0.3    ALKPHOS  112   AST  20   ALT  20       IMPRESSION/PLAN:  Dominga is a 62-year-old woman with history of metastatic ER-positive, HER-2 positive breast cancer, with involvement of the bone, history of pleural effusions treated with pleurodesis and PleurX placement, and with treated brain metastases. She was started on Nance  clinical trial and randomized to physician's choice, on Gemzar/Herceptin since 9/29/16.  She discontinued gemzar 121/5/17.  Now on herceptin and aromasin.      1.  Denia remains on aromasin and Herceptin for her breast cancer.  We discussed that she had a CT scan of the chest, abdomen and pelvis and a brain MRI that revealed no evidence of progressive disease.   There was a vague density in the right breast that is not present on exam.  Will continue to follow.      Will repeat imaging in 12 weeks.  Remain on herceptin and aromasin.     2.  Brain metastases.  These appear stable on overall brain MRI.      3.  Central diabetes insipidus.  This was diagnosed as an inpatient in the setting of hypernatremia.  She was started on desmopressin.  She has been seeing Endocrinology.       4.  Nutrition.  Her weight is overall stable.      5.  She is due for Xgeva.  We will go ahead and administer this with her next Herceptin infusion.        6.  She does have a POLST and is DNR/DNI.  Her power of  is listed in the computer.     7. Bladder nodule - we discussed this.  Will obtain UA and urine cytology.  Pt is not interested in further investigation at this time. Will follow closely with CT.    Marlen Harper MD

## 2018-03-12 NOTE — NURSING NOTE
"Chief Complaint   Patient presents with     Port Draw     port accessed and labs drawn by rn.  vs taken.     Port accessed with 20g 3/4\" gripper needle, labs drawn, port flushed with saline and heparin, vitals checked, pt checked in for next appointment.  Vee Childress RN    "

## 2018-03-12 NOTE — PROGRESS NOTES
Infusion Nursing Note:  Keren Erickson presents today for Cycle 25 Day 1 Herceptin, Xgeva.    Patient seen by provider today: Yes: Dr. Harper   present during visit today: Not Applicable.    Note: Patient states she is taking calcium and vitamin D. Patient denies jaw pain. Patient states she has no upcoming dental appointments.    Intravenous Access:  Implanted Port.    Treatment Conditions:  Lab Results   Component Value Date    HGB 13.3 03/12/2018     Lab Results   Component Value Date    WBC 5.6 03/12/2018      Lab Results   Component Value Date    ANEU 3.7 03/12/2018     Lab Results   Component Value Date     03/12/2018      Lab Results   Component Value Date     03/12/2018                   Lab Results   Component Value Date    POTASSIUM 3.7 03/12/2018           Lab Results   Component Value Date    CR 0.63 03/12/2018                   Lab Results   Component Value Date    OMA 9.2 03/12/2018                Lab Results   Component Value Date    BILITOTAL 0.3 03/12/2018           Lab Results   Component Value Date    ALBUMIN 3.7 03/12/2018                    Lab Results   Component Value Date    ALT 15 03/12/2018           Lab Results   Component Value Date    AST 19 03/12/2018       Results reviewed, labs MET treatment parameters, ok to proceed with treatment.  ECHO/MUGA completed 3/7/18  EF 55-60%.    Post Infusion Assessment:  Patient tolerated infusion without incident.  Patient tolerated ONE injection in LLQ of abdomen without incident.  Blood return noted pre and post infusion.  Site patent and intact, free from redness, edema or discomfort.  No evidence of extravasations.  Access discontinued per protocol.    Discharge Plan:   Prescription refills given for Lovenox.  Copy of AVS reviewed with patient and/or family. Patient will return 4/5/18 for next appointment.  Patient discharged in stable condition accompanied by: self and friend.  Departure Mode: Ambulatory.    Courtney  RYLEY Riley

## 2018-03-12 NOTE — PATIENT INSTRUCTIONS
Preventive Care:     Colorectal Cancer Screening: During our visit today, we discussed that it is recommended you receive colorectal cancer screening. Please call or make an appointment with your primary care provider to discuss this. You may also call the [facility] scheduling line ([phone]) to set up a colonoscopy appointment.  Preventive Care:     Breast Cancer Screening: During our visit today, we discussed that it is recommended you receive breast cancer screening. Please call or make an appointment with your primary care provider to discuss this with them. You may also call the  [facility] scheduling line ([phone]) to set up a mammography appointment at the [facility] within the [facility].

## 2018-03-12 NOTE — NURSING NOTE
"Oncology Rooming Note    March 12, 2018 8:50 AM   Keren Erickson is a 62 year old female who presents for:    Chief Complaint   Patient presents with     Port Draw     port accessed and labs drawn by rn.  vs taken.     Initial Vitals: BP (!) 142/92 (BP Location: Right arm, Patient Position: Sitting, Cuff Size: Adult Small)  Pulse 77  Temp 97.7  F (36.5  C) (Oral)  Resp 16  Ht 1.575 m (5' 2.01\")  Wt 53.5 kg (117 lb 14.4 oz)  SpO2 94%  BMI 21.56 kg/m2 Estimated body mass index is 21.56 kg/(m^2) as calculated from the following:    Height as of this encounter: 1.575 m (5' 2.01\").    Weight as of this encounter: 53.5 kg (117 lb 14.4 oz). Body surface area is 1.53 meters squared.  Mild Pain (3) Comment: right hip, back   No LMP recorded. Patient is postmenopausal.  Allergies reviewed: Yes  Medications reviewed: Yes    Medications: Medication refills not needed today.  Pharmacy name entered into [employer] [facility]: [facility] [place], [place] - [address] [place] [address] [phone]    Clinical concerns: pain level 3 in  right hip, back.  I informed pt to remind the Provider/GIL about  pain level in case i dont touch bases with them, if the provider was in the exam room while i attend on rooming the next pt. Pt verbalized understandings.  Liudmila Esteves CMA   pt did not have colonoscopy done yet, contact information has been added to AVS.    Pt has not had mammogram dont yet, contact information has been added to  AVS        6 minutes for nursing intake (face to face time)     Liudmila Esteves CMA                  "

## 2018-03-12 NOTE — PATIENT INSTRUCTIONS
Contact Numbers    [facility] [place]: [phone]  [facility] Triage:  [phone]    Call triage with chills and/or temperature greater than or equal to 100.5, uncontrolled nausea/vomiting, diarrhea, constipation, dizziness, shortness of breath, chest pain, bleeding, unexplained bruising, or any new/concerning symptoms, questions/concerns.     If you are having any concerning symptoms or wish to speak to a provider before your next infusion visit, please call your care coordinator or triage to notify them so we can adequately serve you.       After Hours: [phone]    If after hours, weekends, or holidays, call main hospital  and ask for Oncology doctor on call.         March 2018 Sunday Monday Tuesday Wednesday Thursday Friday Saturday                       1     2     3       4     5     6     7     CT ABDOMEN PELVIS WWO   10:25 AM   (20 min.)   [address] [place] [address] CT     ECH COMPLETE   11:00 AM   (60 min.)   [id number]   [facility] Echo     MR BRAIN WWO   12:00 PM   (45 min.)   [id number]   [facility] MRI 8     9     10       11     12     [facility] DRAW    8:00 AM   (15 min.)    [facility] DRAW   [facility] Lab Draw     [facility] RETURN ACTIVE TREATMENT    8:15 AM   (30 min.)   Marlen Harper MD   [facility]     [facility] ONC INFUSION 60    9:30 AM   (60 min.)   [facility] INFUSION   [facility] 13     14     15     16     [facility] RETURN CORNEA   12:15 PM   (15 min.)   Damon Sullivan,    [facility] 17       18     19     20     21     22     23     24       25     26     27     28     US THYROID   10:00 AM   (45 min.)   [address] [place] [address] US     US HEAD NECK SOFT TISSUE   10:30 AM   (45 min.)   [address] US 29     30     31                April 2018 Sunday Monday Tuesday Wednesday Thursday Friday Saturday   1     2     [facility] RETURN ENDOCRINE    1:15 PM   (30 min.)   Rolando Mishra MD     [facility] 3     4     5     [facility] DRAW   10:15 AM   (15 min.)    [facility] DRAW   [facility] Lab Draw     [facility] RETURN   10:35 AM   (50 min.)   Deyanira Costello PA-C [facility] ONC INFUSION 60   12:00 PM   (60 min.)    ONCOLOGY INFUSION   [facility] 6     7       8     9     10     11     12     13     14       15     16     17     18     19     20     21       22     23     24     25     26     27     [facility] DRAW    9:45 AM   (15 min.)    [facility] DRAW   [facility] Lab Draw     [facility] RETURN   10:05 AM   (50 min.)   Deyanira Costello PA-C [facility] ONC INFUSION 60   11:30 AM   (60 min.)    ONCOLOGY INFUSION   [facility] 28       29     30                                          Recent Results (from the past 24 hour(s))   Comprehensive metabolic panel    Collection Time: 03/12/18  8:43 AM   Result Value Ref Range    Sodium 139 133 - 144 mmol/L    Potassium 3.7 3.4 - 5.3 mmol/L    Chloride 104 94 - 109 mmol/L    Carbon Dioxide 29 20 - 32 mmol/L    Anion Gap 6 3 - 14 mmol/L    Glucose 90 70 - 99 mg/dL    Urea Nitrogen 9 7 - 30 mg/dL    Creatinine 0.63 0.52 - 1.04 mg/dL    GFR Estimate >90 >60 mL/min/1.7m2    GFR Estimate If Black >90 >60 mL/min/1.7m2    Calcium 9.2 8.5 - 10.1 mg/dL    Bilirubin Total 0.3 0.2 - 1.3 mg/dL    Albumin 3.7 3.4 - 5.0 g/dL    Protein Total 6.7 (L) 6.8 - 8.8 g/dL    Alkaline Phosphatase 89 40 - 150 U/L    ALT 15 0 - 50 U/L    AST 19 0 - 45 U/L   CBC with platelets differential    Collection Time: 03/12/18  8:43 AM   Result Value Ref Range    WBC 5.6 4.0 - 11.0 10e9/L    RBC Count 4.17 3.8 - 5.2 10e12/L    Hemoglobin 13.3 11.7 - 15.7 g/dL    Hematocrit 40.2 35.0 - 47.0 %    MCV 96 78 - 100 fl    MCH 31.9 26.5 - 33.0 pg    MCHC 33.1 31.5 - 36.5 g/dL    RDW 14.6 10.0 - 15.0 %    Platelet Count 248 150 - 450 10e9/L    Diff Method  Automated Method     % Neutrophils 66.8 %    % Lymphocytes 20.4 %    % Monocytes 11.6 %    % Eosinophils 0.0 %    % Basophils 0.5 %    % Immature Granulocytes 0.7 %    Nucleated RBCs 0 0 /100    Absolute Neutrophil 3.7 1.6 - 8.3 10e9/L    Absolute Lymphocytes 1.1 0.8 - 5.3 10e9/L    Absolute Monocytes 0.7 0.0 - 1.3 10e9/L    Absolute Eosinophils 0.0 0.0 - 0.7 10e9/L    Absolute Basophils 0.0 0.0 - 0.2 10e9/L    Abs Immature Granulocytes 0.0 0 - 0.4 10e9/L    Absolute Nucleated RBC 0.0    Routine UA with micro reflex to culture    Collection Time: 03/12/18  9:45 AM   Result Value Ref Range    Color Urine Yellow     Appearance Urine Clear     Glucose Urine Negative NEG^Negative mg/dL    Bilirubin Urine Negative NEG^Negative    Ketones Urine Negative NEG^Negative mg/dL    Specific Gravity Urine 1.009 1.003 - 1.035    Blood Urine Negative NEG^Negative    pH Urine 6.0 5.0 - 7.0 pH    Protein Albumin Urine Negative NEG^Negative mg/dL    Urobilinogen mg/dL 0.0 0.0 - 2.0 mg/dL    Nitrite Urine Negative NEG^Negative    Leukocyte Esterase Urine Trace (A) NEG^Negative    Source Unspecified Urine     WBC Urine 1 0 - 5 /HPF    RBC Urine <1 0 - 2 /HPF    Mucous Urine Present (A) NEG^Negative /LPF

## 2018-03-16 NOTE — MR AVS SNAPSHOT
After Visit Summary   3/16/2018    Keren Erickson    MRN: [id number]           Patient Information     Date Of Birth          1955        Visit Information        Provider Department      3/16/2018 12:15 PM Damon Sullivan,  Eye Clinic        Today's Diagnoses     Glaucoma suspect of both eyes    -  1    Primary open angle glaucoma of left eye, mild stage        Superior oblique myokymia of left eye           Follow-ups after your visit        Follow-up notes from your care team     Return in about 4 months (around 7/16/2018).      Your next 10 appointments already scheduled     Mar 28, 2018 10:00 AM CDT   US THYROID with [address] [place] [address] Imaging Center US ([facility])    [address] [place] [address] [phone]           Please bring a list of your medicines (including vitamins, minerals and over-the-counter drugs). Also, tell your doctor about any allergies you may have. Wear comfortable clothes and leave your valuables at home.  You do not need to do anything special to prepare for your exam.  Please call the Imaging Department at your exam site with any questions.            Mar 28, 2018 10:45 AM CDT   (Arrive by 10:30 AM)   US HEAD NECK SOFT TISSUE with [address] Imaging Center [facility] ([facility])    [address] [place] [address] [phone]           Please bring a list of your medicines (including vitamins, minerals and over-the-counter drugs). Also, tell your doctor about any allergies you may have. Wear comfortable clothes and leave your valuables at home.  You do not need to do anything special to prepare for your exam.  Please call the Imaging Department at your exam site with any questions.            Apr 02, 2018  1:30 PM CDT   (Arrive by 1:15 PM)   RETURN ENDOCRINE with Rolando Mishra MD   [facility] ([facility])    [address] [place] [address]  3rd [facility] [address]   [phone]            Apr 05, 2018 10:15 AM CDT   Masonic Lab Draw with UC MASONIC LAB DRAW   [facility] Masonic Lab Draw ([facility])    [address] [place] [address]  [address]  [facility] [address]   [phone]            Apr 05, 2018 10:50 AM CDT   (Arrive by 10:35 AM)   Return Visit with Deyanira Costello PA-C   [facility] ([facility])    [address] [place] [address]  [address]  [facility] [address]   [phone]            Apr 05, 2018 12:00 PM CDT   Infusion 60 with UC ONCOLOGY INFUSION, UC 28 ATC   [facility] ([facility])    [address] [place] [address]  [address]  [facility] [address]   [phone]            Apr 27, 2018  9:45 AM CDT   Masonic Lab Draw with UC MASONIC LAB DRAW   [facility] Masonic Lab Draw ([facility])    [address] [place] [address]  [address]  [facility] [address]   [phone]            Apr 27, 2018 10:20 AM CDT   (Arrive by 10:05 AM)   Return Visit with Deyanira Costello PA-C   [facility] ([facility])    [address] [place] [address]  [address]  [facility] [address]   [phone]            Apr 27, 2018 11:30 AM CDT   Infusion 60 with UC ONCOLOGY INFUSION   [facility] ([facility])    [address] [place] [address]  [address]  [facility] [address]   [phone]              Who to contact     Please call your clinic at [phone] to:    Ask questions about your health    Make or cancel appointments    Discuss your medicines    Learn about your test results    Speak to your doctor            Additional Information About Your Visit        MyChart Information     MyChart gives you secure access to your electronic health record. If you see a primary care provider, you can also send  messages to your care team and make appointments. If you have questions, please call your primary care clinic.  If you do not have a primary care provider, please call [phone] and they will assist you.      WebSideStory is an electronic gateway that provides easy, online access to your medical records. With WebSideStory, you can request a clinic appointment, read your test results, renew a prescription or communicate with your care team.     To access your existing account, please contact your [facility] or call [phone] for assistance.        Care EveryWhere ID     This is your Care EveryWhere ID. This could be used by other organizations to access your [facility] medical records  [id number]         Blood Pressure from Last 3 Encounters:   03/12/18 (!) 142/92   02/16/18 128/81   01/26/18 125/84    Weight from Last 3 Encounters:   03/12/18 53.5 kg (117 lb 14.4 oz)   02/16/18 52.9 kg (116 lb 9.6 oz)   01/26/18 52.8 kg (116 lb 8 oz)              We Performed the Following     OCT Optic Nerve RNFL Spectralis OU (both eyes)     OVF 24-2 Dynamic OU          Today's Medication Changes          These changes are accurate as of 3/16/18 11:59 PM.  If you have any questions, ask your nurse or doctor.               Start taking these medicines.        Dose/Directions    timolol 0.5 % ophthalmic solution   Commonly known as:  TIMOPTIC   Used for:  Primary open angle glaucoma of left eye, mild stage   Started by:  Damon Sullivan,         Dose:  1 drop   Place 1 drop into both eyes 2 times daily   Quantity:  1 Bottle   Refills:  3            Where to get your medicines      These medications were sent to [facility] [place], [place] - [address] [facility] [address] [place] [address] 1-[facility] [address]    Hours:  TRANSPLANT PHONE NUMBER [phone] Phone:  [phone]     timolol 0.5 % ophthalmic solution                Primary Care Provider Office Phone #  Fax #    Kelly Bg Hart [phone]       [address] [facility] [address]        Equal Access to Services     DORISODILON VARUN : Jerry keturah ibarra laura Joshi, jazminda lindseybaljit, galita kajazmyn jin, freddie vallestephen massimo. So [facility] [phone].    ATENCIÓN: Si habla español, tiene a ramires disposición servicios gratuitos de asistencia lingüística. Llame al [phone].    We comply with applicable federal civil rights laws and [place] laws. We do not discriminate on the basis of race, color, national origin, age, disability, sex, sexual orientation, or gender identity.            Thank you!     Thank you for choosing EYE CLINIC  for your care. Our goal is always to provide you with excellent care. Hearing back from our patients is one way we can continue to improve our services. Please take a few minutes to complete the written survey that you may receive in the mail after your visit with us. Thank you!             Your Updated Medication List - Protect others around you: Learn how to safely use, store and throw away your medicines at www.disposemymeds.org.          This list is accurate as of 3/16/18 11:59 PM.  Always use your most recent med list.                   Brand Name Dispense Instructions for use Diagnosis    desmopressin 0.2 MG tablet    DDAVP    45 tablet    Take  1/2 tablet once daily by mouth    Diabetes insipidus (H)       exemestane 25 MG tablet    AROMASIN    30 tablet    Take 1 tablet (25 mg) by mouth daily    Carcinoma of breast metastatic to multiple sites, unspecified laterality (H), Metastatic breast cancer (H)       LOVENOX 60 MG/0.6ML injection   Generic drug:  enoxaparin     60 Syringe    Inject 0.5 mLs (50 mg) Subcutaneous 2 times daily    Other acute pulmonary embolism without acute cor pulmonale (H)       meclizine 25 MG tablet    ANTIVERT    30 tablet    Take 1 tablet (25 mg) by mouth 3 times daily as needed for dizziness    BPPV (benign  paroxysmal positional vertigo), right       methylphenidate 5 MG tablet    RITALIN    30 tablet    Take 5 mg once daily as needed for fatigue    Carcinoma of breast metastatic to multiple sites, unspecified laterality (H)       Multi-vitamin Tabs tablet      Take 1 tablet by mouth daily        omeprazole 20 MG tablet     30 tablet    Take 1 tablet (20 mg) by mouth daily    Gastroesophageal reflux disease without esophagitis       ondansetron 4 MG tablet    ZOFRAN    18 tablet    Take 1 tablet (4 mg) by mouth every 8 hours as needed for nausea    Nausea       prochlorperazine 10 MG tablet    COMPAZINE    90 tablet    Take 1 tablet (10 mg) by mouth every 6 hours as needed for nausea or vomiting    Nausea       timolol 0.5 % ophthalmic solution    TIMOPTIC    1 Bottle    Place 1 drop into both eyes 2 times daily    Primary open angle glaucoma of left eye, mild stage       TYLENOL PO      Take 500 mg by mouth once        VITAMIN D (CHOLECALCIFEROL) PO      Take 5,000 Units by mouth daily        VITAMIN E BLEND PO      Take by mouth daily

## 2018-03-16 NOTE — PROGRESS NOTES
New patient for first eye exam in many years.  Image jumps and moves in [place] LE got worse or started after chemo X 1.5 years.  Vision is otherwise stable and seeing well.  Some ocular irritation both eyes, not using drops.              1. SO myokymia  Has noticed for past 1.5 years  Multiple brain scans in recent months following stereotactic brain surgery  Still bothersome  Will start trial of Timolol twice a day     2. GLAUCOMA suspect OD, Primary open angle glaucoma (POAG) OS based on HVF  Will start timolol twice a day both eyes  F/u 4 months    3. Cataracts both eyes  Mild  Observe    4. Blepharitis both eyes  Warm compresses  Artificial tears FOUR TIMES A DAY    5. history of metastatic Breast Cancer  No ocular mets  Continue to monitor    6. Amblyopia OS  Stable   Observe    F/u 4 months    Complete documentation of historical and exam elements from today's encounter can be found in the full encounter summary report (not reduplicated in this progress note). I personally obtained the chief complaint(s) and history of present illness.  I confirmed and edited as necessary the review of systems, past medical/surgical history, family history, social history, and examination findings as documented by others; and I examined the patient myself. I personally reviewed the relevant tests, images, and reports as documented above. I formulated and edited as necessary the assessment and plan and discussed the findings and management plan with the patient and family.     Damon Sullivan, DO

## 2018-03-16 NOTE — NURSING NOTE
"Chief Complaints and History of Present Illnesses   Patient presents with     Follow Up For     1mo f/u for Mrx, RNFL OCT, HVF 24-2, Pach     HPI    Affected eye(s):  Left   Symptoms:     No blurred vision   No decreased vision   Double vision   Wandering eye         Do you have eye pain now?:  No      Comments:  Patient here for a 1 mo f/u for Mrx, RNFL OCT, HVF 24-2, Pach    No decrease in VA since LV. Does not wear glasses at this time. Did not have any h/o amblyopia/strab treatments as child.   Comments on diplopia when both eyes are open at times. Occurs mostly when patient is tired. OS, per patient \"also has a bit of nystagmus when the double vision occurs.\"    Has noted a \"little bit of pressure that lasts for a few seconds\" OD. Not painful, on scale 1-10 is \"between a 1 and 2.\" Happens at random, not new since LV. Onset was \"this last year.\"     Confirms no use of gtts     Ocular hx:  Glaucoma suspect OU, Stab/Amblyopic OS, NS OU  Pertinent Medical Hx: SO myokymia, h/o breast cancer    Martina Michelle COT 12:57 PM March 16, 2018                  "

## 2018-04-02 NOTE — MR AVS SNAPSHOT
After Visit Summary   4/2/2018    Keren Erickson    MRN: [id number]           Patient Information     Date Of Birth          1955        Visit Information        Provider Department      4/2/2018 1:30 PM Rolando Mishra MD [facility]        Today's Diagnoses     Diabetes insipidus (H)    -  1    Borderline abnormal TFTs          Care Instructions    Labs on Thursday.   Continue Desmopressin.   No changes in medications today.             Follow-ups after your visit        Follow-up notes from your care team     Return in about 6 months (around 10/2/2018).      Your next 10 appointments already scheduled     Apr 05, 2018 10:15 AM CDT   Masonic Lab Draw with UC MASONIC LAB DRAW   [facility] Masonic Lab Draw ([facility])    [address] [place] [address]  [address] [place] [address]   [phone]            Apr 05, 2018 10:50 AM CDT   (Arrive by 10:35 AM)   Return Visit with Deyanira Costello PA-C   [facility] ([facility])    [address] [place] [address]  [address] [place] [address]   [phone]            Apr 05, 2018 12:00 PM CDT   Infusion 60 with UC ONCOLOGY INFUSION, UC 28 ATC   [facility] ([facility])    [address] [place] [address]  [address]  [facility] [address]   [phone]            Apr 27, 2018  9:45 AM CDT   Masonic Lab Draw with UC MASONIC LAB DRAW   [facility] Masonic Lab Draw ([facility])    [address] [place] [address]  [address]  [facility] [address]   [phone]            Apr 27, 2018 10:20 AM CDT   (Arrive by 10:05 AM)   Return Visit with Deyanira Costello PA-C   [facility] ([facility])    [address] [place] [address]  [address]  [facility] [address]   [phone]            Apr 27, 2018 11:30 AM CDT   Infusion 60 with UC ONCOLOGY INFUSION, UC 22 ATC    [facility] ([facility])    [address]  [address]  [facility] [address] [phone]            May 18, 2018  9:45 AM CDT   Masonic Lab Draw with UC MASONIC LAB DRAW   [facility] Draw ([facility])    [address]  [address]  [facility] [address] [phone]            May 18, 2018 10:20 AM CDT   (Arrive by 10:05 AM)   Return Visit with Deyanira Costello PA-C   [facility] ([facility])    [address]  [address]  [facility] [address] [phone]              Future tests that were ordered for you today     Open Future Orders        Priority Expected Expires Ordered    TSH Routine 4/5/2018 4/2/2019 4/2/2018    T4 free Routine 4/5/2018 4/2/2019 4/2/2018    Cortisol Routine 4/5/2018 4/2/2019 4/2/2018    Lutropin Routine 4/5/2018 4/2/2019 4/2/2018    Prolactin Routine 4/5/2018 4/2/2019 4/2/2018    Follicle stimulating hormone Routine 4/5/2018 4/2/2019 4/2/2018    Basic metabolic panel Routine 4/5/2018 4/2/2019 4/2/2018    Osmolality urine Routine 4/5/2018 4/2/2019 4/2/2018    Specific gravity urine Routine 4/5/2018 4/2/2019 4/2/2018            Who to contact     Please call your clinic at [phone] to:    Ask questions about your health    Make or cancel appointments    Discuss your medicines    Learn about your test results    Speak to your doctor            Additional Information About Your Visit        CTB Grouphart Information     Fulhamt gives you secure access to your electronic health record. If you see a primary care provider, you can also send messages to your care team and make appointments. If you have questions, please call your primary care clinic.  If you do not have a primary care provider, please call [phone] and they will assist you.      Sunible is an electronic gateway that provides easy, online access to your medical  records. With [employer] Connect, you can request a clinic appointment, read your test results, renew a prescription or communicate with your care team.     To access your existing account, please contact your [facility] or call [phone] for assistance.        Care EveryWhere ID     This is your Care EveryWhere ID. This could be used by other organizations to access your [facility] medical records  [id number]        Your Vitals Were     Pulse                   74            Blood Pressure from Last 3 Encounters:   04/02/18 122/74   03/12/18 (!) 142/92   02/16/18 128/81    Weight from Last 3 Encounters:   03/12/18 53.5 kg (117 lb 14.4 oz)   02/16/18 52.9 kg (116 lb 9.6 oz)   01/26/18 52.8 kg (116 lb 8 oz)               Primary Care Provider Office Phone # Fax #    Kelly Bg Hart [phone]       [address] [facility] [address]        Equal Access to Services     CLAUDIA BOND : Hadii aad ku hadasho Soomaali, waaxda luqadaha, qaybta kaalmada adeegyada, waxay idiin haymaribeln dilcia escobedo . So [facility] [phone].    ATENCIÓN: Si habla español, tiene a ramires disposición servicios gratuitos de asistencia lingüística. Llame al [phone].    We comply with applicable federal civil rights laws and [place] laws. We do not discriminate on the basis of race, color, national origin, age, disability, sex, sexual orientation, or gender identity.            Thank you!     Thank you for choosing [facility]  for your care. Our goal is always to provide you with excellent care. Hearing back from our patients is one way we can continue to improve our services. Please take a few minutes to complete the written survey that you may receive in the mail after your visit with us. Thank you!             Your Updated Medication List - Protect others around you: Learn how to safely use, store and throw away your medicines at www.disposemymeds.org.          This list is  accurate as of 4/2/18 11:59 PM.  Always use your most recent med list.                   Brand Name Dispense Instructions for use Diagnosis    desmopressin 0.2 MG tablet    DDAVP    45 tablet    Take  1/2 tablet once daily by mouth    Diabetes insipidus (H)       exemestane 25 MG tablet    AROMASIN    30 tablet    Take 1 tablet (25 mg) by mouth daily    Carcinoma of breast metastatic to multiple sites, unspecified laterality (H), Metastatic breast cancer (H)       LOVENOX 60 MG/0.6ML injection   Generic drug:  enoxaparin     60 Syringe    Inject 0.5 mLs (50 mg) Subcutaneous 2 times daily    Other acute pulmonary embolism without acute cor pulmonale (H)       meclizine 25 MG tablet    ANTIVERT    30 tablet    Take 1 tablet (25 mg) by mouth 3 times daily as needed for dizziness    BPPV (benign paroxysmal positional vertigo), right       methylphenidate 5 MG tablet    RITALIN    30 tablet    Take 5 mg once daily as needed for fatigue    Carcinoma of breast metastatic to multiple sites, unspecified laterality (H)       Multi-vitamin Tabs tablet      Take 1 tablet by mouth daily        omeprazole 20 MG tablet     30 tablet    Take 1 tablet (20 mg) by mouth daily    Gastroesophageal reflux disease without esophagitis       ondansetron 4 MG tablet    ZOFRAN    18 tablet    Take 1 tablet (4 mg) by mouth every 8 hours as needed for nausea    Nausea       prochlorperazine 10 MG tablet    COMPAZINE    90 tablet    Take 1 tablet (10 mg) by mouth every 6 hours as needed for nausea or vomiting    Nausea       timolol 0.5 % ophthalmic solution    TIMOPTIC    1 Bottle    Place 1 drop into both eyes 2 times daily    Primary open angle glaucoma of left eye, mild stage       TYLENOL PO      Take 500 mg by mouth once        VITAMIN D (CHOLECALCIFEROL) PO      Take 5,000 Units by mouth daily        VITAMIN E BLEND PO      Take by mouth daily

## 2018-04-02 NOTE — PROGRESS NOTES
Endocrinology follow up note  Patient is seen in the clinic for follow up. Had seen Dr Mendieta as inpatient in Jan 2017.       Hypernatremia.   60 yo female with history of ER FL and HER-2 positive metastatic breast cancer;   -- Treated initially with Arimidex and Herceptin  -- Switched to Tamoxifen and Herceptin in 1/14  -- Sensory deficit in 2/2015 --> MRI showed 3 punctate brain mets  -- Gamma Knife radiation to 6 lesions, performed on 04/16/2015  -- TDM1 in January 2015 and continued this through 6/26/2015   --  PET scan performed on 11/25/2015 demonstrated progression of disease at multiple sites  -- She restarted TDM1 on 11/27/2015, however experienced a mild transfusion reaction with shortness of breath and chest tightness, resolved upon stopping TDM1 and 125 mg of SoluMedrol  -- In late May, she was found to have progressive brain metastases. She received whole brain radiation.  -- currently on [place]  trial randomized to standard care arm. Trial suspended after [facility] 1/17  -- Now resuming Gemzar and Herceptin.     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   -- Labs showed high Na and low urine osm   -- improved with Desmopressin.   -- Na normalized since starting Desmopressin at night.   Currently on 50 mcg (0.5 tab at night)     Review of systems    She feels well today, and is here with her friend today.   She does not wake up at night to urinate. She has normal thirst.     HPA Axis:   Medications Not on steroids  Feeling well, has fatigue but is chronic and unchanged.   Dizziness -- abdi on turning right to left in the past.   No dizziness or syncope.   Energy level is better since being on Desmopressin.     Thyroid Axis  Energy level at baseline - fatigue  Appetite is improved.   Some weight gain  BM normal  Is not feeling  excessively hot or cold  No skin or hair changes    Sex hormones.   History of normal FSH for post-menopausal female.   Labs tested during acute illness.       Posterior pituitary   No polyuria or polydipsia after being on desmopressin.   No nocturia  Urination slightly increased during day but states that it is <2 L a day     Local compression  No headaches   No loss of vision  No diplopia  Has nystagmus, fast component to left.     Past Medical History:   Diagnosis Date     Arthritis      Breast cancer (H) 9/26/13    Lt breast     Breast mass, left     bx and told it was benign yrs ago     Family History   Problem Relation Age of Onset     Hypertension Father      Aneurysm Father 91     AAA     Hypertension Mother      Arthritis Mother      CANCER No family hx of      Social History     Social History     Marital status: Single     Spouse name: N/A     Number of children: 0     Years of education: N/A     Occupational History     chiropractor Self     Social History Main Topics     Smoking status: Never Smoker     Smokeless tobacco: Never Used     Alcohol use 0.6 oz/week     1 Standard drinks or equivalent per week      Comment: glass of wine wvery once in a while but nothing recently     Drug use: No     Sexual activity: Not Currently     Other Topics Concern     Not on file     Social History Narrative    Lives alone, has pet chickens       Current Outpatient Prescriptions   Medication     timolol (TIMOPTIC) 0.5 % ophthalmic solution     exemestane (AROMASIN) 25 MG tablet     ondansetron (ZOFRAN) 4 MG tablet     Acetaminophen (TYLENOL PO)     enoxaparin (LOVENOX) 60 MG/0.6ML injection     methylphenidate (RITALIN) 5 MG tablet     desmopressin (DDAVP) 0.2 MG tablet     VITAMIN D, CHOLECALCIFEROL, PO     meclizine (ANTIVERT) 25 MG tablet     prochlorperazine (COMPAZINE) 10 MG tablet     omeprazole 20 MG tablet     multivitamin, therapeutic with minerals (MULTI-VITAMIN) TABS tablet     VITAMIN E BLEND PO     No  current facility-administered medications for this visit.      Menstrual history  Had been pregnant but does not have children  Cycles were regular in past. During college years had 10 month of amenorrhea.   Menopause 55 year age.    Exam  /74  Pulse 74   General frail looking.   Eyes Normal visual field, nystagmus (fast component to left, chronic)   Diplopia +  ENT No adenopathy  Thyroid no goiter  Chest Clear to auscultation.   CVS S1 S2   Abdomen soft BS+  nontender  Reflexes normal,   Skin no rash  No peripheral edema.   Gait is unsteady. Uses a cane.     EXAMINATION: US THYROID, 3/28/2018 10:20 AM      COMPARISON: None.     HISTORY: Right sided thyroid nodule     Technique: Grayscale and color ultrasound imaging of the thyroid was  performed.     Findings:    Thyroid parenchyma: Mildly heterogeneous     The right lobe of the thyroid measures: 0.8 x 0.9 x 2.5 cm     The thyroid isthmus measures: 0.2      The left lobe of the thyroid measures: 1.3 x 1.2 x 3.3 cm      Right lobe:  No nodules     Isthmus:   No nodules     Left Lobe:   Nodule 1:  Nodule measurement: 0.3 x 0.3 x 0.3 cm ,   Echogenicity: Hypoechoic  Consistency: cystic  Calcifications: no  Hypervascular: no         Impression:  1. Mildly heterogeneous appearance of the thyroid.  2. Left thyroid cystic nodule without other suspicious features  measuring 0.3 cm.   NECK ULTRASOUND     CLINICAL HISTORY:     Hypermetabolic right-sided lymph node     FINDINGS:     Comparison study: 9/7/2016     Right side of neck:  Level 2: 1.1 x 0.3 x 1.1 cm lymph node  Level 3: No lymph nodes visualized  Level 4: No lymph nodes visualized  Level 5: No lymph nodes visualized  Level 6: Thyroid lobe     Left side of neck:  Level 2: No lymph nodes visualized  Level 3: No lymph nodes visualized  Level 4: No lymph nodes visualized  Level 5: No lymph nodes visualized  Level 6: Thyroid lobe         IMPRESSION:  1. 1.1 cm sonographically benign looking level 2 lymph node in  the  General location of a hypermetabolic lymph node on 9/7/2016 PET,  though a more hypermetabolic lymph node seen for the lateral to this  region is not currently visualized. No highly suspicious lymph nodes.  Assessment  62 female with metastatic breast cancer with whole brain irradiation who was found to have central DI.   Central diabetes insipidus:  She has known metastasis to the brain from the breast cancer and her latest MRI from 3/2018 showed no pituitary / hypothalamus involvement.  Therefore the central DI could be from very small metastasis we are not able to see on the MRI or previous radiation treatment.     ACTH, TSH and FSH tested, FSH is normal - but expected to be high in postmenopausal female. We will repeat this test along with other pituitary hormones. She was pregnant in past, but does not have children, post menopausal since age 55 with brief menopausal symptoms.     Thyroid nodule, Right sided: 3 mm benign looking   1.1 cm benign appearing lymph node on the right.   Slight irregularity on the right side on exam  PET scan: slightly hypermetabolic   Benign looking lymph node on FNa, patient not v keen to obtain FNA. Given benign appearance, it is reasonable to observe with serial US.     Plan:   Obtain AM pituitary labs and electrolytes annually.   Continue Desmopressin. If increased thirst and urination during the day time, plan to start 25 mcg in AM as well.     Rolando Mishra MD  [id number]  Endocrinology Service

## 2018-04-02 NOTE — LETTER
4/2/2018       RE: Keren Erickson  [address] [facility] [facility] [id number]     Dear Colleague,    Thank you for referring your patient, Keren Erickson, to the [facility] ENDOCRINOLOGY at [facility]. Please see a copy of my visit note below.    Endocrinology follow up note  Patient is seen in the clinic for follow up. Had seen Dr Mendieta as inpatient in Jan 2017.       Hypernatremia.   60 yo female with history of ER IA and HER-2 positive metastatic breast cancer;   -- Treated initially with Arimidex and Herceptin  -- Switched to Tamoxifen and Herceptin in 1/14  -- Sensory deficit in 2/2015 --> MRI showed 3 punctate brain mets  -- Gamma Knife radiation to 6 lesions, performed on 04/16/2015  -- TDM1 in January 2015 and continued this through 6/26/2015   --  PET scan performed on 11/25/2015 demonstrated progression of disease at multiple sites  -- She restarted TDM1 on 11/27/2015, however experienced a mild transfusion reaction with shortness of breath and chest tightness, resolved upon stopping TDM1 and 125 mg of SoluMedrol  -- In late May, she was found to have progressive brain metastases. She received whole brain radiation.  -- currently on [place]  trial randomized to standard care arm. Trial suspended after [facility] 1/17  -- Now resuming Gemzar and Herceptin.     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   -- Labs showed high Na and low urine osm   -- improved with Desmopressin.   -- Na normalized since starting Desmopressin at night.   Currently on 50 mcg (0.5 tab at night)     Review of systems    She feels well today, and is here with her friend today.   She does not wake up at night to urinate. She has normal thirst.     HPA Axis:   Medications Not on steroids  Feeling well, has fatigue  but is chronic and unchanged.   Dizziness -- abdi on turning right to left in the past.   No dizziness or syncope.   Energy level is better since being on Desmopressin.     Thyroid Axis  Energy level at baseline - fatigue  Appetite is improved.   Some weight gain  BM normal  Is not feeling excessively hot or cold  No skin or hair changes    Sex hormones.   History of normal FSH for post-menopausal female.   Labs tested during acute illness.       Posterior pituitary   No polyuria or polydipsia after being on desmopressin.   No nocturia  Urination slightly increased during day but states that it is <2 L a day     Local compression  No headaches   No loss of vision  No diplopia  Has nystagmus, fast component to left.     Past Medical History:   Diagnosis Date     Arthritis      Breast cancer (H) 9/26/13    Lt breast     Breast mass, left     bx and told it was benign yrs ago     Family History   Problem Relation Age of Onset     Hypertension Father      Aneurysm Father 91     AAA     Hypertension Mother      Arthritis Mother      CANCER No family hx of      Social History     Social History     Marital status: Single     Spouse name: N/A     Number of children: 0     Years of education: N/A     Occupational History     chiropractor Self     Social History Main Topics     Smoking status: Never Smoker     Smokeless tobacco: Never Used     Alcohol use 0.6 oz/week     1 Standard drinks or equivalent per week      Comment: glass of wine wvery once in a while but nothing recently     Drug use: No     Sexual activity: Not Currently     Other Topics Concern     Not on file     Social History Narrative    Lives alone, has pet chickens       Current Outpatient Prescriptions   Medication     timolol (TIMOPTIC) 0.5 % ophthalmic solution     exemestane (AROMASIN) 25 MG tablet     ondansetron (ZOFRAN) 4 MG tablet     Acetaminophen (TYLENOL PO)     enoxaparin (LOVENOX) 60 MG/0.6ML injection     methylphenidate (RITALIN) 5 MG tablet      desmopressin (DDAVP) 0.2 MG tablet     VITAMIN D, CHOLECALCIFEROL, PO     meclizine (ANTIVERT) 25 MG tablet     prochlorperazine (COMPAZINE) 10 MG tablet     omeprazole 20 MG tablet     multivitamin, therapeutic with minerals (MULTI-VITAMIN) TABS tablet     VITAMIN E BLEND PO     No current facility-administered medications for this visit.      Menstrual history  Had been pregnant but does not have children  Cycles were regular in past. During college years had 10 month of amenorrhea.   Menopause 55 year age.    Exam  /74  Pulse 74   General frail looking.   Eyes Normal visual field, nystagmus (fast component to left, chronic)   Diplopia +  ENT No adenopathy  Thyroid no goiter  Chest Clear to auscultation.   CVS S1 S2   Abdomen soft BS+  nontender  Reflexes normal,   Skin no rash  No peripheral edema.   Gait is unsteady. Uses a cane.     EXAMINATION: US THYROID, 3/28/2018 10:20 AM      COMPARISON: None.     HISTORY: Right sided thyroid nodule     Technique: Grayscale and color ultrasound imaging of the thyroid was  performed.     Findings:    Thyroid parenchyma: Mildly heterogeneous     The right lobe of the thyroid measures: 0.8 x 0.9 x 2.5 cm     The thyroid isthmus measures: 0.2      The left lobe of the thyroid measures: 1.3 x 1.2 x 3.3 cm      Right lobe:  No nodules     Isthmus:   No nodules     Left Lobe:   Nodule 1:  Nodule measurement: 0.3 x 0.3 x 0.3 cm ,   Echogenicity: Hypoechoic  Consistency: cystic  Calcifications: no  Hypervascular: no         Impression:  1. Mildly heterogeneous appearance of the thyroid.  2. Left thyroid cystic nodule without other suspicious features  measuring 0.3 cm.   NECK ULTRASOUND     CLINICAL HISTORY:     Hypermetabolic right-sided lymph node     FINDINGS:     Comparison study: 9/7/2016     Right side of neck:  Level 2: 1.1 x 0.3 x 1.1 cm lymph node  Level 3: No lymph nodes visualized  Level 4: No lymph nodes visualized  Level 5: No lymph nodes visualized  Level  6: Thyroid lobe     Left side of neck:  Level 2: No lymph nodes visualized  Level 3: No lymph nodes visualized  Level 4: No lymph nodes visualized  Level 5: No lymph nodes visualized  Level 6: Thyroid lobe         IMPRESSION:  1. 1.1 cm sonographically benign looking level 2 lymph node in the  General location of a hypermetabolic lymph node on 9/7/2016 PET,  though a more hypermetabolic lymph node seen for the lateral to this  region is not currently visualized. No highly suspicious lymph nodes.  Assessment  62 female with metastatic breast cancer with whole brain irradiation who was found to have central DI.   Central diabetes insipidus:  She has known metastasis to the brain from the breast cancer and her latest MRI from 3/2018 showed no pituitary / hypothalamus involvement.  Therefore the central DI could be from very small metastasis we are not able to see on the MRI or previous radiation treatment.     ACTH, TSH and FSH tested, FSH is normal - but expected to be high in postmenopausal female. We will repeat this test along with other pituitary hormones. She was pregnant in past, but does not have children, post menopausal since age 55 with brief menopausal symptoms.     Thyroid nodule, Right sided: 3 mm benign looking   1.1 cm benign appearing lymph node on the right.   Slight irregularity on the right side on exam  PET scan: slightly hypermetabolic   Benign looking lymph node on FNa, patient not v keen to obtain FNA. Given benign appearance, it is reasonable to observe with serial US.     Plan:   Obtain AM pituitary labs and electrolytes annually.   Continue Desmopressin. If increased thirst and urination during the day time, plan to start 25 mcg in AM as well.     Rolando Mishra MD  [id number]  Endocrinology Service

## 2018-04-27 NOTE — PROGRESS NOTES
HEMATOLOGY/ONCOLOGY PROGRESS NOTE  Apr 27, 2018    REASON FOR VISIT: Metastatic breast cancer    DIAGNOSIS:   The patient is a 60-year-old female who presented to the hospital initially in 09/2013 with complaints of dyspnea. Workup revealed a large pericardial effusion and pleural effusion. Physical examination revealed a large untreated left breast mass encompassing the entire left breast. Biopsies revealed these were ER, MI and HER2-positive breast cancer. She had a thoracentesis and pericardial sclerosis performed. She was originally on Arimidex and Herceptin. In 01/2014, she was switched to tamoxifen and Herceptin due to arthralgias, and she ultimately developed progressive disease and was switched to Faslodex and Herceptin. In 01/2015, she was found to have progressive disease and was switched to T-DM1.     Initially when she was seen in late 02/2015, she complained of some V5 sensory deficit. This was subjective. I was not able to elicit this on examination. This persisted and further workup was performed with a brain MRI. This revealed what was initially thought to be 3 punctate brain metastases. She originally saw Radiation Oncology and Neurosurgery as well as underwent a lumbar puncture. Cytology from the lumbar puncture showed no evidence of leptomeningeal disease. She was treated with Gamma Knife radiation to 6 lesions, performed on 04/16/2015.  She initiated therapy with TDM1 in January 2015 and continued this through 6/26/2015 with overall stable disease. She has since been on a break from treatment. The patient presented earlier this month with recurrent shortness of breath.  Imaging revealed recurrent right-sided pleural effusion.  She has since undergone thoracentesis with cytology pending.  Clinically, however, there is evidence of disease progression. PET scan performed on 11/25/2015 demonstrated progression of disease at multiple sites. She restarted TDM1 on 11/27/2015, however experienced a mild  "transfusion reaction with shortness of breath and chest tightness, resolved upon stopping TDM1 and 125 mg of SoluMedrol. She had progression of disease on repeat imaging 2/17/2016, as well as new brain metastases. She started TDM1 with Perjeta on 3/4/2016. She underwent gamma knife to brain mets on 3/8/16.       In late May, she was found to have progressive brain metastases.  She received whole brain radiation.  She had a follow up brain MRI August 2016 that was stable.     In September 2016, she enrolled on [place]  trial and was randomized to the standard of care arm/Physician's Choice; started on gemzar and herceptin. She was recently hospitalized 1/27-2/3/17 for presumed HCAP. Trial was suspended. She continued on gemzar and heceptin with stable disease. Last restaging was 4/7/17 and stable. Gemzar was placed on hold from 4/10/2017 through 6/22/17 so that she could recover from pneumonia. Due to worsening fatigue, Gemzar was placed on hold staring 1/5/18, and she continued on Herceptin alone. She started exemstane 2/21/18.    INTERVAL HISTORY: Ravi is here for routine follow-up. She is wondering if we have coverage for Herceptin yet. She is otherwise doing well without any new symptoms. She continues to feel she is getting stronger but notes that her continued issue is just stamina, which remains slow to improve. No appetite but feels she is compensating well with intake. No new pains, still with occasional aches managed with Tylenol. She has had a few hot flashes, nothing bothersome. No new neuro symptoms, fevers, chills, change in PASTOR, SOB, n/v, bowel changes, urinary changes, bleeding, or swelling.     PHYSICAL EXAMINATION:     /79  Pulse 75  Temp 97.3  F (36.3  C)  Resp 18  Ht 1.575 m (5' 2.01\")  Wt 52.7 kg (116 lb 3.2 oz)  SpO2 98%  BMI 21.25 kg/m2     Wt Readings from Last 4 Encounters:   04/27/18 52.7 kg (116 lb 3.2 oz)   03/12/18 53.5 kg (117 lb 14.4 oz)   02/16/18 52.9 kg (116 lb 9.6 " oz)   01/26/18 52.8 kg (116 lb 8 oz)     Constitutional: Alert, oriented, cachectic female in no visible distress. In a wheelchair  Eyes: Anicteric sclerae. PERRL. Left eye does not track in left lateral direction but stays midline, will track medially. No visible nystagmus. Other EOM intact.  ENT/Mouth: OM moist and pink without lesions or thrush.    CV: RRR, no murmurs appreciated.  Resp: CTAB slightly diminished R base  Extremities: No lower extremity edema appreciated.  Neuro: CN II-XII grossly intact except for ocular movements as noted above. Strength in bilateral UE and LE 5/5. Grossly nonfocal.    LABS:   Results for HEIDI RUIZ (MRN [id number]) as of 4/27/2018 10:33   Ref. Range 4/27/2018 10:03   WBC Latest Ref Range: 4.0 - 11.0 10e9/L 5.3   Hemoglobin Latest Ref Range: 11.7 - 15.7 g/dL 13.6   Hematocrit Latest Ref Range: 35.0 - 47.0 % 39.9   Platelet Count Latest Ref Range: 150 - 450 10e9/L 254     Results for HEIDI RUIZ (MRN [id number]) as of 5/1/2018 08:30   Ref. Range 12/15/2017 14:11 1/26/2018 09:35 2/16/2018 11:45 3/12/2018 08:43 4/27/2018 10:03   CEA Latest Ref Range: 0 - 2.5 ug/L 1.5 1.8 2.3 4.6 (H) 5.8 (H)     IMPRESSION/PLAN:  Dominga is a 62-year-old female with history of metastatic ER-positive, HER-2 positive breast cancer, with involvement of the bone, history of pleural effusions treated with pleurodesis and PleurX placement, and with treated brain metastases, previously with stable disease on TDM1, with progressive disease after treatment break.  She was started on [place]  clinical trial and randomized to physician's choice, and started on Gemzar/Herceptin on 9/29/16. She discontinued gemzar in December 2017 and now remains on Herceptin and Aromasin.    Breast cancer:  Herceptin has been on hold since 3/12/18 due to insurance coverage, she remains on Aromasin with stable disease March 2018. Unforatunetly we still do not have coverage. We filled out forms  for her today with assistance of financial and Shobha Alanis, RN. Tentatively rescheduled for 5/4, asked financial to re-evaluate situation on 5/3 so that we can avoid unnecessary visits if need be. Will need re-loading.     Brain metastases.  Stable on most recent MRI, without any new or worsening symptoms    Bone mets: administered every 6 weeks. Due when we resume Herceptin.    R lobar and segmental pulmonary emboli: On Lovenox 1 mg/kg BID      Central diabetes insipidus.  This was diagnosed as an inpatient in the setting of hypernatremia.  She was started on desmopressin.  She has been seeing Endocrinology ultrasound of the thyroid and neck are scheduled in early March..       Nutrition.    weight stable    She does have a POLST and is DNR/DNI.  Her power of  is listed in the computer.       Deyanira Majano PA-C

## 2018-04-27 NOTE — MR AVS SNAPSHOT
After Visit Summary   4/27/2018    Keren Erickson    MRN: [id number]           Patient Information     Date Of Birth          1955        Visit Information        Provider Department      4/27/2018 10:20 AM Deyanira Costello PA-C [facility]        Today's Diagnoses     Hypothyroidism due to Hashimoto's thyroiditis        Diabetes insipidus (H)           Follow-ups after your visit        Your next 10 appointments already scheduled     May 04, 2018 11:00 AM CDT   Infusion 180 with UC ONCOLOGY INFUSION, UC 24 ATC   [facility] ([facility])    [address] [place] [address]  [address]  [facility] [address]   [phone]            May 18, 2018  9:45 AM CDT   Masonic Lab Draw with UC MASONIC LAB DRAW   [facility] Lab Draw ([facility])    [address] [place] [address]  [address]  [facility] [address]   [phone]            May 18, 2018 10:20 AM CDT   (Arrive by 10:05 AM)   Return Visit with Deyanira Costello PA-C   [facility] ([facility])    [address] [place] [address]  [address]  [facility] [address]   [phone]            May 18, 2018 11:30 AM CDT   Infusion 60 with UC ONCOLOGY INFUSION, UC 22 ATC   [facility] ([facility])    [address]  [address]  [facility] [address]   [phone]            Jun 08, 2018  9:30 AM CDT   Masonic Lab Draw with UC MASONIC LAB DRAW   [facility] Mason[facility] Lab Draw ([facility])    [address] [place] [address]  [address]  [facility] [address]   [phone]            Jun 08, 2018 10:20 AM CDT   CT CHEST/ABDOMEN/PELVIS W CONTRAST with UCCT1   [facility] ([facility])    [address] [place] [address]  1st Floor  [facility] [address]   [phone]           Please bring any scans or X-rays taken at  other hospitals, if similar tests were done. Also bring a list of your medicines, including vitamins, minerals and over-the-counter drugs. It is safest to leave personal items at home.  Be sure to tell your doctor:   If you have any allergies.   If there s any chance you are pregnant.   If you are breastfeeding.  How to prepare:   Do not eat or drink for 2 hours before your exam. If you need to take medicine, you may take it with small sips of water. (We may ask you to take liquid medicine as well.)   Please wear loose clothing, such as a sweat suit or jogging clothes. Avoid snaps, zippers and other metal. We may ask you to undress and put on a hospital gown.  Please arrive 30 minutes early for your CT. Once in the department you might be asked to drink water 15-20 minutes prior to your exam.  If indicated you may be asked to drink an oral contrast in advance of your CT.  If this is the case, the imaging team will let you know or be in contact with you prior to your appointment  Patients over 70 or patients with diabetes or kidney problems:   If you haven t had a blood test (creatinine test) within the last 30 days, the Cardiologist/Radiologist may require you to get this test prior to your exam.  If you have diabetes:   Continue to take your metformin medication on the day of your exam  If you have any questions, please call the Imaging Department where you will have your exam.            Jun 08, 2018 11:00 AM CDT   Infusion 60 with UC ONCOLOGY INFUSION, UC 24 ATC   [facility] ([facility])    [address] [facility] [address]  [address]  [facility] [address] [phone]              Who to contact     If you have questions or need follow up information about today's clinic visit or your schedule please contact [facility] directly at [phone].  Normal or non-critical lab and imaging results will be communicated to you by MyChart, letter or phone within  "4 business days after the clinic has received the results. If you do not hear from us within 7 days, please contact the clinic through [employer] or phone. If you have a critical or abnormal lab result, we will notify you by phone as soon as possible.  Submit refill requests through [employer] or call your pharmacy and they will forward the refill request to us. Please allow 3 business days for your refill to be completed.          Additional Information About Your Visit        [employer] Information     [employer] gives you secure access to your electronic health record. If you see a primary care provider, you can also send messages to your care team and make appointments. If you have questions, please call your primary care clinic.  If you do not have a primary care provider, please call [phone] and they will assist you.        Care EveryWhere ID     This is your Care EveryWhere ID. This could be used by other organizations to access your [place] medical records  [id number]        Your Vitals Were     Pulse Temperature Respirations Height Pulse Oximetry BMI (Body Mass Index)    75 97.3  F (36.3  C) 18 1.575 m (5' 2.01\") 98% 21.25 kg/m2       Blood Pressure from Last 3 Encounters:   04/27/18 113/79   04/02/18 122/74   03/12/18 (!) 142/92    Weight from Last 3 Encounters:   04/27/18 52.7 kg (116 lb 3.2 oz)   03/12/18 53.5 kg (117 lb 14.4 oz)   02/16/18 52.9 kg (116 lb 9.6 oz)              We Performed the Following     Basic metabolic panel     T3 total     T4 free        Primary Care Provider Office Phone # Fax #    Kelly Hart [phone]       [address] [facility] [address]        Equal Access to Services     [facility] : Hadii keturah Joshi, darrel schmidt, qaybdarryl kaalmada annabel, freddie keys. So [facility] [phone].    ATENCIÓN: Si habla español, tiene a ramires disposición servicios gratuitos de asistencia lingüística. Llame al " [phone].    We comply with applicable federal civil rights laws and [place] laws. We do not discriminate on the basis of race, color, national origin, age, disability, sex, sexual orientation, or gender identity.            Thank you!     Thank you for choosing [facility]  for your care. Our goal is always to provide you with excellent care. Hearing back from our patients is one way we can continue to improve our services. Please take a few minutes to complete the written survey that you may receive in the mail after your visit with us. Thank you!             Your Updated Medication List - Protect others around you: Learn how to safely use, store and throw away your medicines at www.disposemymeds.org.          This list is accurate as of 4/27/18 11:59 PM.  Always use your most recent med list.                   Brand Name Dispense Instructions for use Diagnosis    desmopressin 0.2 MG tablet    DDAVP    45 tablet    Take  1/2 tablet once daily by mouth    Diabetes insipidus (H)       exemestane 25 MG tablet    AROMASIN    30 tablet    Take 1 tablet (25 mg) by mouth daily    Carcinoma of breast metastatic to multiple sites, unspecified laterality (H), Metastatic breast cancer (H)       * LOVENOX 60 MG/0.6ML injection   Generic drug:  enoxaparin     60 Syringe    Inject 0.5 mLs (50 mg) Subcutaneous 2 times daily    Other acute pulmonary embolism without acute cor pulmonale (H)       * enoxaparin 60 MG/0.6ML injection    LOVENOX    36 mL    INJECT THE CONTENTS OF ONE SYRINGE UNDER THE SKIN TWO TIMES A DAY    Other acute pulmonary embolism without acute cor pulmonale (H)       meclizine 25 MG tablet    ANTIVERT    30 tablet    Take 1 tablet (25 mg) by mouth 3 times daily as needed for dizziness    BPPV (benign paroxysmal positional vertigo), right       methylphenidate 5 MG tablet    RITALIN    30 tablet    Take 5 mg once daily as needed for fatigue    Carcinoma of breast metastatic to multiple  sites, unspecified laterality (H)       Multi-vitamin Tabs tablet      Take 1 tablet by mouth daily        omeprazole 20 MG tablet     30 tablet    Take 1 tablet (20 mg) by mouth daily    Gastroesophageal reflux disease without esophagitis       ondansetron 4 MG tablet    ZOFRAN    18 tablet    Take 1 tablet (4 mg) by mouth every 8 hours as needed for nausea    Nausea       prochlorperazine 10 MG tablet    COMPAZINE    90 tablet    Take 1 tablet (10 mg) by mouth every 6 hours as needed for nausea or vomiting    Nausea       timolol 0.5 % ophthalmic solution    TIMOPTIC    1 Bottle    Place 1 drop into both eyes 2 times daily    Primary open angle glaucoma of left eye, mild stage       TYLENOL PO      Take 500 mg by mouth once        VITAMIN D (CHOLECALCIFEROL) PO      Take 5,000 Units by mouth daily        VITAMIN E BLEND PO      Take by mouth daily        * Notice:  This list has 2 medication(s) that are the same as other medications prescribed for you. Read the directions carefully, and ask your doctor or other care provider to review them with you.

## 2018-04-27 NOTE — Clinical Note
4/27/2018       RE: Keren Erickson  [address] [address]     Dear Colleague,    Thank you for referring your patient, Keren Erickson, to the [facility]. Please see a copy of my visit note below.    HEMATOLOGY/ONCOLOGY PROGRESS NOTE  Apr 27, 2018    REASON FOR VISIT: Metastatic breast cancer    DIAGNOSIS:   The patient is a 60-year-old female who presented to the hospital initially in 09/2013 with complaints of dyspnea. Workup revealed a large pericardial effusion and pleural effusion. Physical examination revealed a large untreated left breast mass encompassing the entire left breast. Biopsies revealed these were ER, SD and HER2-positive breast cancer. She had a thoracentesis and pericardial sclerosis performed. She was originally on Arimidex and Herceptin. In 01/2014, she was switched to tamoxifen and Herceptin due to arthralgias, and she ultimately developed progressive disease and was switched to Faslodex and Herceptin. In 01/2015, she was found to have progressive disease and was switched to T-DM1.     Initially when she was seen in late 02/2015, she complained of some V5 sensory deficit. This was subjective. I was not able to elicit this on examination. This persisted and further workup was performed with a brain MRI. This revealed what was initially thought to be 3 punctate brain metastases. She originally saw Radiation Oncology and Neurosurgery as well as underwent a lumbar puncture. Cytology from the lumbar puncture showed no evidence of leptomeningeal disease. She was treated with Gamma Knife radiation to 6 lesions, performed on 04/16/2015.  She initiated therapy with TDM1 in January 2015 and continued this through 6/26/2015 with overall stable disease. She has since been on a break from treatment. The patient presented earlier this month with recurrent shortness of breath.  Imaging revealed recurrent right-sided pleural effusion.  She has since  undergone thoracentesis with cytology pending.  Clinically, however, there is evidence of disease progression. PET scan performed on 11/25/2015 demonstrated progression of disease at multiple sites. She restarted TDM1 on 11/27/2015, however experienced a mild transfusion reaction with shortness of breath and chest tightness, resolved upon stopping TDM1 and 125 mg of SoluMedrol. She had progression of disease on repeat imaging 2/17/2016, as well as new brain metastases. She started TDM1 with Perjeta on 3/4/2016. She underwent gamma knife to brain mets on 3/8/16.       In late May, she was found to have progressive brain metastases.  She received whole brain radiation.  She had a follow up brain MRI August 2016 that was stable.     In September 2016, she enrolled on [place]  trial and was randomized to the standard of care arm/Physician's Choice; started on gemzar and herceptin. She was recently hospitalized 1/27-2/3/17 for presumed HCAP. Trial was suspended. She continued on gemzar and heceptin with stable disease. Last restaging was 4/7/17 and stable. Gemzar was placed on hold from 4/10/2017 through 6/22/17 so that she could recover from pneumonia. Due to worsening fatigue, Gemzar was placed on hold staring 1/5/18, and she continued on Herceptin alone. She started exemstane 2/21/18.    INTERVAL HISTORY: Ravi is here for routine follow-up. ***No new neuro symptoms, fevers, chills, change in PASTOR, SOB, n/v, bowel changes, urinary changes, bleeding, or swelling. She found out she may have glaucoma.    PHYSICAL EXAMINATION:     There were no vitals taken for this visit.     Wt Readings from Last 4 Encounters:   03/12/18 53.5 kg (117 lb 14.4 oz)   02/16/18 52.9 kg (116 lb 9.6 oz)   01/26/18 52.8 kg (116 lb 8 oz)   01/05/18 53.8 kg (118 lb 11.2 oz)     Constitutional: Alert, oriented, cachectic female in no visible distress. In a wheelchair  Eyes: Anicteric sclerae. PERRL. Left eye does not track in left lateral  direction but stays midline, will track medially. No visible nystagmus. Other EOM intact.  ENT/Mouth: OM moist and pink without lesions or thrush.    CV: RRR, no murmurs appreciated.  Resp: CTAB slightly diminished R base  Extremities: No lower extremity edema appreciated.  Neuro: CN II-XII grossly intact except for ocular movements as noted above. Strength in bilateral UE and LE 5/5. Grossly nonfocal.    LABS:   Results for HEIDI RUIZ (MRN [id number]) as of 4/27/2018 10:33   Ref. Range 4/27/2018 10:03   WBC Latest Ref Range: 4.0 - 11.0 10e9/L 5.3   Hemoglobin Latest Ref Range: 11.7 - 15.7 g/dL 13.6   Hematocrit Latest Ref Range: 35.0 - 47.0 % 39.9   Platelet Count Latest Ref Range: 150 - 450 10e9/L 254       IMPRESSION/PLAN:  Dominga is a 62-year-old female with history of metastatic ER-positive, HER-2 positive breast cancer, with involvement of the bone, history of pleural effusions treated with pleurodesis and PleurX placement, and with treated brain metastases, previously with stable disease on TDM1, with progressive disease after treatment break.  She was started on [place]  clinical trial and randomized to physician's choice, and started on Gemzar/Herceptin on 9/29/16. She discontinued gemzar in December 2017 and now remains on Herceptin and Aromasin.    Breast cancer:  Herceptin has been on hold since 3/12/18 due to insurance coverage, she remains on Aromasin with stable disease March 2018. *** Will need re-loading today.     Brain metastases.  Stable on most recent MRI, without any new or worsening symptoms    Bone mets: administered every 6 weeks. Due today    R lobar and segmental pulmonary emboli: On Lovenox 1 mg/kg BID      Central diabetes insipidus.  This was diagnosed as an inpatient in the setting of hypernatremia.  She was started on desmopressin.  She has been seeing Endocrinology ultrasound of the thyroid and neck are scheduled in early March..       Nutrition.    weight  stable    She does have a POLST and is DNR/DNI.  Her power of  is listed in the computer.       Deyanira Costello PA-C        Again, thank you for allowing me to participate in the care of your patient.      Sincerely,    Deyanira Costello PA-C

## 2018-04-27 NOTE — NURSING NOTE
"Oncology Rooming Note    April 27, 2018 10:33 AM   Keren Erickson is a 62 year old female who presents for:    Chief Complaint   Patient presents with     Port Draw     Oncology Clinic Visit     f/u Breast CA     Initial Vitals: /79  Pulse 75  Temp 97.3  F (36.3  C)  Resp 18  Ht 1.575 m (5' 2.01\")  Wt 52.7 kg (116 lb 3.2 oz)  SpO2 98%  BMI 21.25 kg/m2 Estimated body mass index is 21.25 kg/(m^2) as calculated from the following:    Height as of this encounter: 1.575 m (5' 2.01\").    Weight as of this encounter: 52.7 kg (116 lb 3.2 oz). Body surface area is 1.52 meters squared.  Mild Pain (2) Comment: usual shoulder and hip pain not rated   No LMP recorded. Patient is postmenopausal.  Allergies reviewed: Yes  Medications reviewed: Yes    Medications: Medication refills not needed today.  Pharmacy name entered into [facility]: [facility] [place], [place] - [address] [phone]    Clinical concerns: none  Deyanira was NOT notified.    10 minutes for nursing intake (face to face time)     ZANA JAMA LPN            "

## 2018-04-27 NOTE — LETTER
4/27/2018      RE: Keren Erickson  [address] [place] [address]       HEMATOLOGY/ONCOLOGY PROGRESS NOTE  Apr 27, 2018    REASON FOR VISIT: Metastatic breast cancer    DIAGNOSIS:   The patient is a 60-year-old female who presented to the hospital initially in 09/2013 with complaints of dyspnea. Workup revealed a large pericardial effusion and pleural effusion. Physical examination revealed a large untreated left breast mass encompassing the entire left breast. Biopsies revealed these were ER, [place] and HER2-positive breast cancer. She had a thoracentesis and pericardial sclerosis performed. She was originally on Arimidex and Herceptin. In 01/2014, she was switched to tamoxifen and Herceptin due to arthralgias, and she ultimately developed progressive disease and was switched to Faslodex and Herceptin. In 01/2015, she was found to have progressive disease and was switched to T-DM1.     Initially when she was seen in late 02/2015, she complained of some V5 sensory deficit. This was subjective. I was not able to elicit this on examination. This persisted and further workup was performed with a brain MRI. This revealed what was initially thought to be 3 punctate brain metastases. She originally saw Radiation Oncology and Neurosurgery as well as underwent a lumbar puncture. Cytology from the lumbar puncture showed no evidence of leptomeningeal disease. She was treated with Gamma Knife radiation to 6 lesions, performed on 04/16/2015.  She initiated therapy with TDM1 in January 2015 and continued this through 6/26/2015 with overall stable disease. She has since been on a break from treatment. The patient presented earlier this month with recurrent shortness of breath.  Imaging revealed recurrent right-sided pleural effusion.  She has since undergone thoracentesis with cytology pending.  Clinically, however, there is evidence of disease progression. PET scan performed on 11/25/2015 demonstrated progression  "of disease at multiple sites. She restarted TDM1 on 11/27/2015, however experienced a mild transfusion reaction with shortness of breath and chest tightness, resolved upon stopping TDM1 and 125 mg of SoluMedrol. She had progression of disease on repeat imaging 2/17/2016, as well as new brain metastases. She started TDM1 with Perjeta on 3/4/2016. She underwent gamma knife to brain mets on 3/8/16.       In late May, she was found to have progressive brain metastases.  She received whole brain radiation.  She had a follow up brain MRI August 2016 that was stable.     In September 2016, she enrolled on [place]  trial and was randomized to the standard of care arm/Physician's Choice; started on gemzar and herceptin. She was recently hospitalized 1/27-2/3/17 for presumed HCAP. Trial was suspended. She continued on gemzar and heceptin with stable disease. Last restaging was 4/7/17 and stable. Gemzar was placed on hold from 4/10/2017 through 6/22/17 so that she could recover from pneumonia. Due to worsening fatigue, Gemzar was placed on hold staring 1/5/18, and she continued on Herceptin alone. She started exemstane 2/21/18.    INTERVAL HISTORY: Ravi is here for routine follow-up. She is wondering if we have coverage for Herceptin yet. She is otherwise doing well without any new symptoms. She continues to feel she is getting stronger but notes that her continued issue is just stamina, which remains slow to improve. No appetite but feels she is compensating well with intake. No new pains, still with occasional aches managed with Tylenol. She has had a few hot flashes, nothing bothersome. No new neuro symptoms, fevers, chills, change in PASTOR, SOB, n/v, bowel changes, urinary changes, bleeding, or swelling.     PHYSICAL EXAMINATION:     /79  Pulse 75  Temp 97.3  F (36.3  C)  Resp 18  Ht 1.575 m (5' 2.01\")  Wt 52.7 kg (116 lb 3.2 oz)  SpO2 98%  BMI 21.25 kg/m2     Wt Readings from Last 4 Encounters:   04/27/18 " 52.7 kg (116 lb 3.2 oz)   03/12/18 53.5 kg (117 lb 14.4 oz)   02/16/18 52.9 kg (116 lb 9.6 oz)   01/26/18 52.8 kg (116 lb 8 oz)     Constitutional: Alert, oriented, cachectic female in no visible distress. In a wheelchair  Eyes: Anicteric sclerae. PERRL. Left eye does not track in left lateral direction but stays midline, will track medially. No visible nystagmus. Other EOM intact.  ENT/Mouth: OM moist and pink without lesions or thrush.    CV: RRR, no murmurs appreciated.  Resp: CTAB slightly diminished R base  Extremities: No lower extremity edema appreciated.  Neuro: CN II-XII grossly intact except for ocular movements as noted above. Strength in bilateral UE and LE 5/5. Grossly nonfocal.    LABS:   Results for HEIDI RUIZ (MRN [id number]) as of 4/27/2018 10:33   Ref. Range 4/27/2018 10:03   WBC Latest Ref Range: 4.0 - 11.0 10e9/L 5.3   Hemoglobin Latest Ref Range: 11.7 - 15.7 g/dL 13.6   Hematocrit Latest Ref Range: 35.0 - 47.0 % 39.9   Platelet Count Latest Ref Range: 150 - 450 10e9/L 254     Results for HEIDI RUIZ (MRN [id number]) as of 5/1/2018 08:30   Ref. Range 12/15/2017 14:11 1/26/2018 09:35 2/16/2018 11:45 3/12/2018 08:43 4/27/2018 10:03   CEA Latest Ref Range: 0 - 2.5 ug/L 1.5 1.8 2.3 4.6 (H) 5.8 (H)     IMPRESSION/PLAN:  Dominga is a 62-year-old female with history of metastatic ER-positive, HER-2 positive breast cancer, with involvement of the bone, history of pleural effusions treated with pleurodesis and PleurX placement, and with treated brain metastases, previously with stable disease on TDM1, with progressive disease after treatment break.  She was started on [place]  clinical trial and randomized to physician's choice, and started on Gemzar/Herceptin on 9/29/16. She discontinued gemzar in December 2017 and now remains on Herceptin and Aromasin.    Breast cancer:  Herceptin has been on hold since 3/12/18 due to insurance coverage, she remains on Aromasin with  stable disease March 2018. Unforatunetly we still do not have coverage. We filled out forms for her today with assistance of financial and Shobha Alanis RN. Tentatively rescheduled for 5/4, asked financial to re-evaluate situation on 5/3 so that we can avoid unnecessary visits if need be. Will need re-loading.     Brain metastases.  Stable on most recent MRI, without any new or worsening symptoms    Bone mets: administered every 6 weeks. Due when we resume Herceptin.    R lobar and segmental pulmonary emboli: On Lovenox 1 mg/kg BID      Central diabetes insipidus.  This was diagnosed as an inpatient in the setting of hypernatremia.  She was started on desmopressin.  She has been seeing Endocrinology ultrasound of the thyroid and neck are scheduled in early March..       Nutrition.    weight stable    She does have a POLST and is DNR/DNI.  Her power of  is listed in the computer.           Deyanira Costello PA-C

## 2018-05-01 NOTE — PROGRESS NOTES
Received request from Miriam regarding [employer] re-certification paperwork.  Completed and submitted back to [employer] for renewal.  SW available as necessary.       CHIN Garcia, MSW   - [facility]  Phone: [phone]  Pager: [phone]  5/1/2018

## 2018-05-03 NOTE — PROGRESS NOTES
Dear Dominga,     Normal electrolytes, renal functions and thyroid hormone levels are noted.  No further changes are required at this time.    With Best Regards,   Rolando Mishra MD  Endocrinology Service.

## 2018-05-04 NOTE — PROGRESS NOTES
Infusion Nursing Note:  Keren Erickson presents for D1C26 Herceptin, Xgeva    Note: pt feeling well today, states her strength continues to improve every day, she was able to walk all the way from the lobby back to infusion with her walker. Pain remains stable in her hips. Per BRAULIO Chandra, labs do not need to be repeated today    Treatment Conditions:  ECHO/MUGA completed 3/7/18  EF 55-60%.    Intravenous Access:  Implanted Port.    Post Infusion Assessment:  Patient tolerated infusion without incident.  Patient tolerated injection without incident.  Blood return noted pre and post infusion.  No evidence of extravasations.  Access discontinued per protocol.    Discharge Plan:   Prescription refills given for lovenox.  Discharge instructions reviewed with: Patient and Family.  Patient and/or family verbalized understanding of discharge instructions and all questions answered.  Copy of AVS reviewed with patient and/or family.  Patient will return 5/25 for next appointment.  Patient discharged in stable condition accompanied by: self and brother.  Departure Mode: Ambulatory with walker.    Peggy Green RN

## 2018-05-04 NOTE — MR AVS SNAPSHOT
After Visit Summary   5/4/2018    Keren Erickson    MRN: [id number]           Patient Information     Date Of Birth          1955        Visit Information        Provider Department      5/4/2018 11:00 AM  24 ATC; UC ONCOLOGY INFUSION [facility]        Today's Diagnoses     Metastatic breast cancer (H)    -  1    Brain metastasis (H)        Carcinoma of breast metastatic to multiple sites, unspecified laterality (H)        Bone metastasis (H)          Care Instructions    Contact numbers:  Triage Main Line: [phone]  After hours: [phone]    Call with chills and/or temperature greater than or equal to 100.5 and questions or concerns.    If after hours, weekends, or holidays, call main hospital  at  [phone] and ask for Oncology doctor on call.           May 2018   Uday Monday Tuesday Wednesday Thursday Friday Saturday             1     2     3     4     UMP ONC INFUSION 180   11:00 AM   (180 min.)    ONCOLOGY INFUSION   [facility] 5       6     7     8     9     10     11     12       13     14     15     16     17     18     19       20     21     22     23     24     25     [facility] LAB DRAW   11:30 AM   (15 min.)    [facility] LAB DRAW   [facility] Lab Draw     UMP RETURN   11:45 AM   (50 min.)   Deyanira Costello PA-C   [facility]     UMP ONC INFUSION 60    1:00 PM   (60 min.)    ONCOLOGY INFUSION   [facility] 26       27     28     29     30     31 June 2018 Sunday Monday Tuesday Wednesday Thursday Friday Saturday                            1     2       3     4     5     6     7     8     [facility] LAB DRAW    9:30 AM   (15 min.)    [facility] LAB DRAW   [facility] Lab Draw     CT CHEST/ABDOMEN/PELVIS W   10:20 AM   (20 min.)   UCCT1   [facility] CT 9       10     11     12     UMP RETURN ACTIVE TREATMENT   12:15  PM   (30 min.)   Marlen Harper MD   [facility] 13     14     15     UMP ONC INFUSION 60   10:30 AM   (60 min.)   UC ONCOLOGY INFUSION   [facility] 16       17     18     19     20     21     22     23       24     25     26     27     28     29     30                  Lab Results:  No results found for this or any previous visit (from the past 12 hour(s)).            Follow-ups after your visit        Your next 10 appointments already scheduled     May 25, 2018 11:30 AM CDT   [facility] Lab Draw with [facility] DRAW   [facility] ([facility])    [address]  [address]  [facility] [place] [address]   [phone]            May 25, 2018 12:00 PM CDT   (Arrive by 11:45 AM)   Return Visit with Deyanira Costello PA-C   [facility] ([facility])    [address]  [address]  [place] [place] [address]   [phone]            May 25, 2018  1:00 PM CDT   Infusion 60 with UC ONCOLOGY INFUSION, UC 11 ATC   [facility] ([facility])    [address]  [address]  [place] [place] [address]   [phone]            Jun 08, 2018  9:30 AM CDT   [facility] Lab Draw with [facility] LAB DRAW   [facility] ([facility])    [address]  [address]  [place] [place] [address]   [phone]            Jun 08, 2018 10:20 AM CDT   CT CHEST/ABDOMEN/PELVIS W CONTRAST with UCCT1   [facility] [place] [facility] ([facility])    [address]  1st Floor  [place] [place] [address]   [phone]           Please bring any scans or X-rays taken at other hospitals, if similar tests were done. Also bring a list of your medicines, including vitamins, minerals and over-the-counter drugs. It is safest to leave personal items at home.  Be sure to tell your doctor:   If you have any  allergies.   If there s any chance you are pregnant.   If you are breastfeeding.  How to prepare:   Do not eat or drink for 2 hours before your exam. If you need to take medicine, you may take it with small sips of water. (We may ask you to take liquid medicine as well.)   Please wear loose clothing, such as a sweat suit or jogging clothes. Avoid snaps, zippers and other metal. We may ask you to undress and put on a hospital gown.  Please arrive 30 minutes early for your CT. Once in the department you might be asked to drink water 15-20 minutes prior to your exam.  If indicated you may be asked to drink an oral contrast in advance of your CT.  If this is the case, the imaging team will let you know or be in contact with you prior to your appointment  Patients over 70 or patients with diabetes or kidney problems:   If you haven t had a blood test (creatinine test) within the last 30 days, the Cardiologist/Radiologist may require you to get this test prior to your exam.  If you have diabetes:   Continue to take your metformin medication on the day of your exam  If you have any questions, please call the Imaging Department where you will have your exam.            Jun 12, 2018 12:30 PM CDT   (Arrive by 12:15 PM)   Return Active Treatment with Marlen Harper MD   [facility])    [address] [place] [address]  [address]  [facility] [address] [phone]            Kelvin 15, 2018 10:30 AM CDT   Infusion 60 with UC ONCOLOGY INFUSION, UC 31 ATC   [facility] ([facility])    [address] [place] [address]  [address]  [facility] [address]   [phone]            Jul 06, 2018  1:15 PM CDT   RETURN CORNEA with Damon Sullivan DO   [facility] ([facility])    [address]  [facility] [address]   [phone]              Future tests that were ordered for you today      Open Future Orders        Priority Expected Expires Ordered    CEA Routine  5/3/2019 5/3/2018    Ca27.29  breast tumor marker Routine  5/3/2019 5/3/2018            Who to contact     If you have questions or need follow up information about today's clinic visit or your schedule please contact [facility] directly at [phone].  Normal or non-critical lab and imaging results will be communicated to you by MyChart, letter or phone within 4 business days after the clinic has received the results. If you do not hear from us within 7 days, please contact the clinic through Medissehart or phone. If you have a critical or abnormal lab result, we will notify you by phone as soon as possible.  Submit refill requests through Tune or call your pharmacy and they will forward the refill request to us. Please allow 3 business days for your refill to be completed.          Additional Information About Your Visit        Medisse[employer] Management Information     Tune gives you secure access to your electronic health record. If you see a primary care provider, you can also send messages to your care team and make appointments. If you have questions, please call your primary care clinic.  If you do not have a primary care provider, please call [phone] and they will assist you.        Care EveryWhere ID     This is your Care EveryWhere ID. This could be used by other organizations to access your [place] medical records  [id number]        Your Vitals Were     Pulse Temperature Respirations Pulse Oximetry BMI (Body Mass Index)       88 96.1  F (35.6  C) (Oral) 18 98% 20.79 kg/m2        Blood Pressure from Last 3 Encounters:   05/04/18 (!) 141/94   04/27/18 113/79   04/02/18 122/74    Weight from Last 3 Encounters:   05/04/18 51.6 kg (113 lb 11.2 oz)   04/27/18 52.7 kg (116 lb 3.2 oz)   03/12/18 53.5 kg (117 lb 14.4 oz)               Primary Care Provider Office Phone # Fax #    Kelly Hart [phone]  [phone] [address] [facility] [address]        Equal Access to Services     CLAUDIA VARUN : Hadii keturah ibarra enderalda Sebasali, waadriannada lindseykarliha, qaselwynta ankurkyjb haterrencejb, freddie marks doriestephen higginsarianaradha keys. So [facility] [phone].    ATENCIÓN: Si habla español, tiene a ramires disposición servicios gratuitos de asistencia lingüística. Llame al [phone].    We comply with applicable federal civil rights laws and [place] laws. We do not discriminate on the basis of race, color, national origin, age, disability, sex, sexual orientation, or gender identity.            Thank you!     Thank you for choosing [facility]  for your care. Our goal is always to provide you with excellent care. Hearing back from our patients is one way we can continue to improve our services. Please take a few minutes to complete the written survey that you may receive in the mail after your visit with us. Thank you!             Your Updated Medication List - Protect others around you: Learn how to safely use, store and throw away your medicines at www.disposemymeds.org.          This list is accurate as of 5/4/18  1:04 PM.  Always use your most recent med list.                   Brand Name Dispense Instructions for use Diagnosis    desmopressin 0.2 MG tablet    DDAVP    45 tablet    Take  1/2 tablet once daily by mouth    Diabetes insipidus (H)       exemestane 25 MG tablet    AROMASIN    30 tablet    Take 1 tablet (25 mg) by mouth daily    Carcinoma of breast metastatic to multiple sites, unspecified laterality (H), Metastatic breast cancer (H)       * LOVENOX 60 MG/0.6ML injection   Generic drug:  enoxaparin     60 Syringe    Inject 0.5 mLs (50 mg) Subcutaneous 2 times daily    Other acute pulmonary embolism without acute cor pulmonale (H)       * enoxaparin 60 MG/0.6ML injection    LOVENOX    36 mL    INJECT THE CONTENTS OF ONE SYRINGE UNDER THE SKIN TWO TIMES A DAY    Other acute pulmonary embolism  without acute cor pulmonale (H)       meclizine 25 MG tablet    ANTIVERT    30 tablet    Take 1 tablet (25 mg) by mouth 3 times daily as needed for dizziness    BPPV (benign paroxysmal positional vertigo), right       methylphenidate 5 MG tablet    RITALIN    30 tablet    Take 5 mg once daily as needed for fatigue    Carcinoma of breast metastatic to multiple sites, unspecified laterality (H)       Multi-vitamin Tabs tablet      Take 1 tablet by mouth daily        omeprazole 20 MG tablet     30 tablet    Take 1 tablet (20 mg) by mouth daily    Gastroesophageal reflux disease without esophagitis       ondansetron 4 MG tablet    ZOFRAN    18 tablet    Take 1 tablet (4 mg) by mouth every 8 hours as needed for nausea    Nausea       prochlorperazine 10 MG tablet    COMPAZINE    90 tablet    Take 1 tablet (10 mg) by mouth every 6 hours as needed for nausea or vomiting    Nausea       timolol 0.5 % ophthalmic solution    TIMOPTIC    1 Bottle    Place 1 drop into both eyes 2 times daily    Primary open angle glaucoma of left eye, mild stage       TYLENOL PO      Take 500 mg by mouth once        VITAMIN D (CHOLECALCIFEROL) PO      Take 5,000 Units by mouth daily        VITAMIN E BLEND PO      Take by mouth daily        * Notice:  This list has 2 medication(s) that are the same as other medications prescribed for you. Read the directions carefully, and ask your doctor or other care provider to review them with you.

## 2018-05-08 NOTE — TELEPHONE ENCOUNTER
IRENEP to reschedule appt same day on 7/6/18 from afternoon to mornign per provider moving clinic or to schedule on a complete different day.    She rescheduled to same day of 7/6/18 @ 9:45am.    She will see us then or call if something else comes up.    Therese Mckeon COA 12:11 PM May 8, 2018

## 2018-05-25 NOTE — NURSING NOTE
Chief Complaint   Patient presents with     Port Draw     accessed with power needle, heparin locked vitals checked

## 2018-05-25 NOTE — LETTER
5/25/2018      RE: Keren Erickson  [address] [facility] [address]       HEMATOLOGY/ONCOLOGY PROGRESS NOTE  May 25, 2018    REASON FOR VISIT: Metastatic breast cancer    DIAGNOSIS:   The patient is a 60-year-old female who presented to the hospital initially in 09/2013 with complaints of dyspnea. Workup revealed a large pericardial effusion and pleural effusion. Physical examination revealed a large untreated left breast mass encompassing the entire left breast. Biopsies revealed these were ER, [place] and HER2-positive breast cancer. She had a thoracentesis and pericardial sclerosis performed. She was originally on Arimidex and Herceptin. In 01/2014, she was switched to tamoxifen and Herceptin due to arthralgias, and she ultimately developed progressive disease and was switched to Faslodex and Herceptin. In 01/2015, she was found to have progressive disease and was switched to T-DM1.     Initially when she was seen in late 02/2015, she complained of some V5 sensory deficit. This was subjective. I was not able to elicit this on examination. This persisted and further workup was performed with a brain MRI. This revealed what was initially thought to be 3 punctate brain metastases. She originally saw Radiation Oncology and Neurosurgery as well as underwent a lumbar puncture. Cytology from the lumbar puncture showed no evidence of leptomeningeal disease. She was treated with Gamma Knife radiation to 6 lesions, performed on 04/16/2015.  She initiated therapy with TDM1 in January 2015 and continued this through 6/26/2015 with overall stable disease. She has since been on a break from treatment. The patient presented earlier this month with recurrent shortness of breath.  Imaging revealed recurrent right-sided pleural effusion.  She has since undergone thoracentesis with cytology pending.  Clinically, however, there is evidence of disease progression. PET scan performed on 11/25/2015 demonstrated progression  of disease at multiple sites. She restarted TDM1 on 11/27/2015, however experienced a mild transfusion reaction with shortness of breath and chest tightness, resolved upon stopping TDM1 and 125 mg of SoluMedrol. She had progression of disease on repeat imaging 2/17/2016, as well as new brain metastases. She started TDM1 with Perjeta on 3/4/2016. She underwent gamma knife to brain mets on 3/8/16.       In late May, she was found to have progressive brain metastases.  She received whole brain radiation.  She had a follow up brain MRI August 2016 that was stable.     In September 2016, she enrolled on [place]  trial and was randomized to the standard of care arm/Physician's Choice; started on gemzar and herceptin. She was recently hospitalized 1/27-2/3/17 for presumed HCAP. Trial was suspended. She continued on gemzar and heceptin with stable disease. Last restaging was 4/7/17 and stable. Gemzar was placed on hold from 4/10/2017 through 6/22/17 so that she could recover from pneumonia. Due to worsening fatigue, Gemzar was placed on hold staring 1/5/18, and she continued on Herceptin alone. She started exemstane 2/21/18.    INTERVAL HISTORY: Ravi is here for routine follow-up. No new symptoms. She has been more active. Using a walker in clinic today and doing well with that. No new headaches or any new neuro symptoms, remains with neuropathy that is stable. Breathing is stable, remains feeling like her stamina isn't where she would like it to be, but is not having any worsening SOB or PASTOR. No new pains but notes more achiness with the increased activity. Appetite remains poor but she manages to eat, finding meat to palatable. Energy stable, using Ritalin PRN. No fevers, chills, change in PASTOR, SOB, n/v, bowel changes, urinary changes, bleeding, or swelling.     PHYSICAL EXAMINATION:     /85 (BP Location: Right arm, Patient Position: Sitting, Cuff Size: Adult Small)  Pulse 84  Temp 97.9  F (36.6  C)  Resp  "16  Ht 1.575 m (5' 2.01\")  Wt 51.7 kg (114 lb)  SpO2 95%  BMI 20.85 kg/m2     Wt Readings from Last 4 Encounters:   05/04/18 51.6 kg (113 lb 11.2 oz)   04/27/18 52.7 kg (116 lb 3.2 oz)   03/12/18 53.5 kg (117 lb 14.4 oz)   02/16/18 52.9 kg (116 lb 9.6 oz)     Constitutional: Alert, oriented, cachectic female in no visible distress. In a wheelchair  Eyes: Anicteric sclerae. PERRL. Left eye does not track in left lateral direction but stays midline, will track medially. No visible nystagmus. Other EOM intact.  ENT/Mouth: OM moist and pink without lesions or thrush.    CV: RRR, no murmurs appreciated.  Resp: CTAB slightly diminished R base  Extremities: No lower extremity edema appreciated.  Neuro: CN II-XII grossly intact except for ocular movements as noted above. Strength in bilateral UE and LE 5/5. Grossly nonfocal.    LABS:     IMPRESSION/PLAN:  Dominga is a 62-year-old female with history of metastatic ER-positive, HER-2 positive breast cancer, with involvement of the bone, history of pleural effusions treated with pleurodesis and PleurX placement, and with treated brain metastases, previously with stable disease on TDM1, with progressive disease after treatment break.  She was started on [place]  clinical trial and randomized to physician's choice, and started on Gemzar/Herceptin on 9/29/16. She discontinued gemzar in December 2017 and now remains on Herceptin and Aromasin.    Breast cancer:  Herceptin was on hold 3/12/18 to 5/4/2018 due to insurance coverage. Her CEA increaed off of Herceptin, pending today. She remains on Aromasin. We will continue Herceptin/Aromasin. She will follow-up with Dr. Harper with systemic restaging. She has no new neurologic symptoms, and she would like to hold off brain MRI at this time. Discussed with Dr. Harper   - Most recent echo 3/7/18 stable, continue monitoring q3m (june)    Brain metastases.  Stable on most recent MRI, without any new or worsening symptoms    Bone " mets: administered every 6 weeks. Continue.    R lobar and segmental pulmonary emboli: On Lovenox 1 mg/kg BID      Central diabetes insipidus.  This was diagnosed as an inpatient in the setting of hypernatremia.  She was started on desmopressin.   Follows with endocrinology.      Nutrition.  weight stable    SW: She would like to meet with SW again, msg sent.    Fatigue: Uses Ritalin 5 mg sparingly.    She does have a POLST and is DNR/DNI.  Her power of  is listed in the computer.       Deyanira Majano PA-C

## 2018-05-25 NOTE — PROGRESS NOTES
Infusion Nursing Note:  Keren Erickson presents today for C27D1 Herceptin.    Patient seen by provider today: Yes: Deyanira RAMSEY    Note: Patient offers up no new concerns to this RN after visiting with Deyanira RAMSEY.      Intravenous Access:  Implanted Port.      Treatment Conditions:  Lab Results   Component Value Date    HGB 13.3 05/25/2018     Lab Results   Component Value Date    WBC 6.0 05/25/2018      Lab Results   Component Value Date    ANEU 3.8 05/25/2018     Lab Results   Component Value Date     05/25/2018      Lab Results   Component Value Date     05/25/2018                   Lab Results   Component Value Date    POTASSIUM 4.0 05/25/2018           Lab Results   Component Value Date    MAG 2.4 04/10/2017            Lab Results   Component Value Date    CR 0.72 05/25/2018                   Lab Results   Component Value Date    OMA 9.4 05/25/2018                Lab Results   Component Value Date    BILITOTAL 0.3 05/25/2018           Lab Results   Component Value Date    ALBUMIN 3.6 05/25/2018                    Lab Results   Component Value Date    ALT 22 05/25/2018           Lab Results   Component Value Date    AST 22 05/25/2018       Results reviewed, labs MET treatment parameters, ok to proceed with treatment.      Post Infusion Assessment:  Patient tolerated injection without incident.  Blood return noted pre and post infusion.  Site patent and intact, free from redness, edema or discomfort.  No evidence of extravasations.  Access discontinued per protocol.    Discharge Plan:   Prescription refills given for Ritalin.  Patient and/or family verbalized understanding of discharge instructions and all questions answered.  AVS to patient via [email or web address].  Patient will return 6/8/18 for CT and ECHO, on 6/12/18 to see Dr. Harper and on 6/15/18 for C28 Herceptin for next appointment.   Patient discharged in stable condition accompanied by: friend.  Departure Mode:  Ambulatory.    Cherise Patel RN

## 2018-05-25 NOTE — MR AVS SNAPSHOT
After Visit Summary   5/25/2018    Keren Erickson    MRN: [id number]           Patient Information     Date Of Birth          1955        Visit Information        Provider Department      5/25/2018 1:00 PM  11 ATC;  ONCOLOGY INFUSION [facility]        Today's Diagnoses     Metastatic breast cancer (H)    -  1    Brain metastasis (H)        Carcinoma of breast metastatic to multiple sites, unspecified laterality (H)          Care Instructions    Contact Numbers    [facility] Main Line: [phone]    Triage Nurse Line: [phone]      Please call the [facility] Nurse Triage line if you experience a temperature greater than or equal to 100.5, shaking chills, have uncontrolled nausea, vomiting and/or diarrhea, dizziness, shortness of breath, bleeding not relieved with pressure, or if you have any other questions or concerns.     If it is after hours, weekends, or holidays, please call the [phone] and a nurse will be able to triage your call.    If you are having any concerning symptoms or wish to speak to a provider before your next infusion visit, please call your care coordinator or triage them so we can adequately serve you.      If you need to refill your narcotic prescription or other medication, please call triage before your infusion appointment.        May 2018   Uday Monday Tuesday Wednesday Thursday Friday Saturday             1     2     3     4     [facility] ONC INFUSION 180   11:00 AM   (180 min.)    ONCOLOGY INFUSION   [facility] 5       6     7     8     9     10     11     12       13     14     15     16     17     18     19       20     21     22     23     24     25     [facility]   11:30 AM   (15 min.)   [facility] LAB DRAW   [facility] Lab Draw     UMP RETURN   11:45 AM   (50 min.)   Deyanira Costello PA-C   [facility] ONC INFUSION 60    1:00 PM   (60  min.)   [facility] ONCOLOGY INFUSION   [facility] 26       27     28     29     30     31 June 2018 Sunday Monday Tuesday Wednesday Thursday Friday Saturday                            1     2       3     4     5     6     7     8     [facility] DRAW    9:30 AM   (15 min.)    [facility] LAB DRAW   [facility] Lab Draw     CT CHEST/ABDOMEN/PELVIS W   10:20 AM   (20 min.)   UCCT1   [facility] CT     ECH COMPLETE   11:30 AM   (60 min.)   UCECHCR1   [facility] 9       10     11     12     UMP RETURN ACTIVE TREATMENT   12:15 PM   (30 min.)   Marlen Harper MD   [facility] 13     14     15     UMP ONC INFUSION 60   10:30 AM   (60 min.)    ONCOLOGY INFUSION   [facility] 16       17     18     19     20     21     22     23       24     25     26     27     28     29     30                 Recent Results (from the past 24 hour(s))   Comprehensive metabolic panel    Collection Time: 05/25/18 12:05 PM   Result Value Ref Range    Sodium 138 133 - 144 mmol/L    Potassium 4.0 3.4 - 5.3 mmol/L    Chloride 105 94 - 109 mmol/L    Carbon Dioxide 27 20 - 32 mmol/L    Anion Gap 5 3 - 14 mmol/L    Glucose 78 70 - 99 mg/dL    Urea Nitrogen 14 7 - 30 mg/dL    Creatinine 0.72 0.52 - 1.04 mg/dL    GFR Estimate 82 >60 mL/min/1.7m2    GFR Estimate If Black >90 >60 mL/min/1.7m2    Calcium 9.4 8.5 - 10.1 mg/dL    Bilirubin Total 0.3 0.2 - 1.3 mg/dL    Albumin 3.6 3.4 - 5.0 g/dL    Protein Total 6.6 (L) 6.8 - 8.8 g/dL    Alkaline Phosphatase 76 40 - 150 U/L    ALT 22 0 - 50 U/L    AST 22 0 - 45 U/L   CBC with platelets differential    Collection Time: 05/25/18 12:05 PM   Result Value Ref Range    WBC 6.0 4.0 - 11.0 10e9/L    RBC Count 4.11 3.8 - 5.2 10e12/L    Hemoglobin 13.3 11.7 - 15.7 g/dL    Hematocrit 39.7 35.0 - 47.0 %    MCV 97 78 - 100 fl    MCH 32.4 26.5 - 33.0 pg    MCHC 33.5 31.5 - 36.5 g/dL    RDW 15.1 (H) 10.0 - 15.0 %     Platelet Count 276 150 - 450 10e9/L    Diff Method Automated Method     % Neutrophils 63.1 %    % Lymphocytes 21.9 %    % Monocytes 13.6 %    % Eosinophils 0.3 %    % Basophils 0.8 %    % Immature Granulocytes 0.3 %    Nucleated RBCs 0 0 /100    Absolute Neutrophil 3.8 1.6 - 8.3 10e9/L    Absolute Lymphocytes 1.3 0.8 - 5.3 10e9/L    Absolute Monocytes 0.8 0.0 - 1.3 10e9/L    Absolute Eosinophils 0.0 0.0 - 0.7 10e9/L    Absolute Basophils 0.1 0.0 - 0.2 10e9/L    Abs Immature Granulocytes 0.0 0 - 0.4 10e9/L    Absolute Nucleated RBC 0.0                  Follow-ups after your visit        Your next 10 appointments already scheduled     Jun 08, 2018  9:30 AM CDT   [facility] Lab Draw with  [facility] LAB DRAW   [facility] Draw ([facility])    [address]  [address]  [facility] [id number] [phone]            Jun 08, 2018 10:20 AM CDT   CT CHEST/ABDOMEN/PELVIS W CONTRAST with UCCT1   [facility] Imaging [place] [facility] ([facility])    [address]  1st Floor  [facility] [id number] [phone]           Please bring any scans or X-rays taken at other hospitals, if similar tests were done. Also bring a list of your medicines, including vitamins, minerals and over-the-counter drugs. It is safest to leave personal items at home.  Be sure to tell your doctor:   If you have any allergies.   If there s any chance you are pregnant.   If you are breastfeeding.  How to prepare:   Do not eat or drink for 2 hours before your exam. If you need to take medicine, you may take it with small sips of water. (We may ask you to take liquid medicine as well.)   Please wear loose clothing, such as a sweat suit or jogging clothes. Avoid snaps, zippers and other metal. We may ask you to undress and put on a hospital gown.  Please arrive 30 minutes early for your CT. Once in the department you might be asked to drink water 15-20 minutes prior to your exam.  If  indicated you may be asked to drink an oral contrast in advance of your CT.  If this is the case, the imaging team will let you know or be in contact with you prior to your appointment  Patients over 70 or patients with diabetes or kidney problems:   If you haven t had a blood test (creatinine test) within the last 30 days, the Cardiologist/Radiologist may require you to get this test prior to your exam.  If you have diabetes:   Continue to take your metformin medication on the day of your exam  If you have any questions, please call the Imaging Department where you will have your exam.            Jun 08, 2018 11:30 AM CDT   Ech Complete with UCECHCR1   [facility] Echo [facility])    [address]  [address] [facility] [address] [phone]           1. Please bring or wear a comfortable two-piece outfit. 2. You may eat, drink and take your normal medicines. 3. For any questions that cannot be answered, please contact the ordering physician            Jun 12, 2018 12:30 PM CDT   (Arrive by 12:15 PM)   Return Active Treatment with Marlen Harper MD   [facility] ([facility])    [address] [place] [address]  [address]  [facility] [address]   [phone]            Kelvin 15, 2018 10:30 AM CDT   Infusion 60 with  ONCOLOGY INFUSION, UC 31 ATC   [facility] ([facility])    [address] [place] [address]  [address]  [facility] [address]   [phone]            Jul 06, 2018  9:45 AM CDT   RETURN CORNEA with Damon Sullivan DO   [facility] Clinic ([facility])    [address]  [facility] [address]   [phone]            Oct 01, 2018  1:00 PM CDT   (Arrive by 12:45 PM)   RETURN ENDOCRINE with Rolando Mishra MD   [facility] Endocrinology [facility])    [address] [place] [address]  [id number] [phone]              Future tests that were ordered for you today     Open Future Orders        Priority Expected Expires Ordered    Echocardiogram Complete Routine  5/25/2019 5/25/2018            Who to contact     If you have questions or need follow up information about today's clinic visit or your schedule please contact [facility] CANCER [facility] directly at [phone].  Normal or non-critical lab and imaging results will be communicated to you by [employer], letter or phone within 4 business days after the clinic has received the results. If you do not hear from us within 7 days, please contact the clinic through [employer] or phone. If you have a critical or abnormal lab result, we will notify you by phone as soon as possible.  Submit refill requests through [employer] or call your pharmacy and they will forward the refill request to us. Please allow 3 business days for your refill to be completed.          Additional Information About Your Visit        General [employer] Information     [employer] gives you secure access to your electronic health record. If you see a primary care provider, you can also send messages to your care team and make appointments. If you have questions, please call your primary care clinic.  If you do not have a primary care provider, please call [phone] and they will assist you.        Care EveryWhere ID     This is your Care EveryWhere ID. This could be used by other organizations to access your [facility] medical records  [id number]         Blood Pressure from Last 3 Encounters:   05/25/18 132/85   05/04/18 (!) 141/94   04/27/18 113/79    Weight from Last 3 Encounters:   05/25/18 51.7 kg (114 lb)   05/04/18 51.6 kg (113 lb 11.2 oz)   04/27/18 52.7 kg (116 lb 3.2 oz)              We Performed the Following     CBC with platelets differential     Comprehensive metabolic panel          Where to get your medicines      Some of these will need a paper prescription and others can  be bought over the counter.  Ask your nurse if you have questions.     Bring a paper prescription for each of these medications     methylphenidate 5 MG tablet          Primary Care Provider Office Phone # Fax #    Kelly Hart [phone]       [address] [facility] [address]        Equal Access to Services     CLAUDIA BOND : Hadii keturah ku hadasho Soomaali, waaxda luqadaha, qaybta kaalmada adeirineoterrencejb, freddie higginsarianaradha keys. So [facility] [phone].    ATENCIÓN: Si habla español, tiene a ramires disposición servicios gratuitos de asistencia lingüística. [facility] [phone].    We comply with applicable federal civil rights laws and [place] laws. We do not discriminate on the basis of race, color, national origin, age, disability, sex, sexual orientation, or gender identity.            Thank you!     Thank you for choosing [facility] CLINIC  for your care. Our goal is always to provide you with excellent care. Hearing back from our patients is one way we can continue to improve our services. Please take a few minutes to complete the written survey that you may receive in the mail after your visit with us. Thank you!             Your Updated Medication List - Protect others around you: Learn how to safely use, store and throw away your medicines at www.disposemymeds.org.          This list is accurate as of 5/25/18  4:37 PM.  Always use your most recent med list.                   Brand Name Dispense Instructions for use Diagnosis    desmopressin 0.2 MG tablet    DDAVP    45 tablet    Take  1/2 tablet once daily by mouth    Diabetes insipidus (H)       exemestane 25 MG tablet    AROMASIN    30 tablet    Take 1 tablet (25 mg) by mouth daily    Carcinoma of breast metastatic to multiple sites, unspecified laterality (H), Metastatic breast cancer (H)       * LOVENOX 60 MG/0.6ML injection   Generic drug:  enoxaparin     60 Syringe    Inject 0.5 mLs (50 mg)  Subcutaneous 2 times daily    Other acute pulmonary embolism without acute cor pulmonale (H)       * enoxaparin 60 MG/0.6ML injection    LOVENOX    36 mL    INJECT THE CONTENTS OF ONE SYRINGE UNDER THE SKIN TWO TIMES A DAY    Other acute pulmonary embolism without acute cor pulmonale (H)       meclizine 25 MG tablet    ANTIVERT    30 tablet    Take 1 tablet (25 mg) by mouth 3 times daily as needed for dizziness    BPPV (benign paroxysmal positional vertigo), right       methylphenidate 5 MG tablet    RITALIN    30 tablet    Take 5 mg once daily as needed for fatigue    Carcinoma of breast metastatic to multiple sites, unspecified laterality (H)       Multi-vitamin Tabs tablet      Take 1 tablet by mouth daily        omeprazole 20 MG tablet     30 tablet    Take 1 tablet (20 mg) by mouth daily    Gastroesophageal reflux disease without esophagitis       ondansetron 4 MG tablet    ZOFRAN    18 tablet    Take 1 tablet (4 mg) by mouth every 8 hours as needed for nausea    Nausea       prochlorperazine 10 MG tablet    COMPAZINE    90 tablet    Take 1 tablet (10 mg) by mouth every 6 hours as needed for nausea or vomiting    Nausea       timolol 0.5 % ophthalmic solution    TIMOPTIC    1 Bottle    Place 1 drop into both eyes 2 times daily    Primary open angle glaucoma of left eye, mild stage       TYLENOL PO      Take 500 mg by mouth once        UNABLE TO FIND      MEDICATION NAME: Probiotic        VITAMIN D (CHOLECALCIFEROL) PO      Take 5,000 Units by mouth daily        VITAMIN E BLEND PO      Take by mouth daily        * Notice:  This list has 2 medication(s) that are the same as other medications prescribed for you. Read the directions carefully, and ask your doctor or other care provider to review them with you.

## 2018-05-25 NOTE — PROGRESS NOTES
Patient completed records for the [facility] and writer faxed paper work to Fax: [phone] as requested. Mailed records to patient's home address. Shobha Alanis RN, BSN

## 2018-05-25 NOTE — NURSING NOTE
"Oncology Rooming Note    May 25, 2018 12:23 PM   Keren Erickson is a 62 year old female who presents for:    Chief Complaint   Patient presents with     Port Draw     accessed with power needle, heparin locked vitals checked     Oncology Clinic Visit     Return visit related to Breast Cancer     Initial Vitals: /85 (BP Location: Right arm, Patient Position: Sitting, Cuff Size: Adult Small)  Pulse 84  Temp 97.9  F (36.6  C)  Resp 16  Ht 1.575 m (5' 2.01\")  Wt 51.7 kg (114 lb)  SpO2 95%  BMI 20.85 kg/m2 Estimated body mass index is 20.85 kg/(m^2) as calculated from the following:    Height as of this encounter: 1.575 m (5' 2.01\").    Weight as of this encounter: 51.7 kg (114 lb). Body surface area is 1.5 meters squared.  No Pain (0) Comment: Data Unavailable   No LMP recorded. Patient is postmenopausal.  Allergies reviewed: Yes  Medications reviewed: Yes    Medications: Medication refills not needed today.  Pharmacy name entered into [facility]: [facility] [place], [place] - [address] [place] [address] [phone]    Clinical concerns: no new concerns. Provider was notified.    10 minutes for nursing intake (face to face time)     Marlen Camacho LPN            "

## 2018-05-25 NOTE — PROGRESS NOTES
Oncology Distress Screening Follow-up  Clinical Social Work  [facility]    Identified Concern and Score From Distress Screening: How concerned are you about knowing what resources are available to help you? : 10  If you want to be contacted by one of our professionals, I can send a message to them right now. : Oncology Social Worker    Date of Distress Screenin18     Data: Pt is a 62 yr old female with metastatic breast cancer.    Intervention: See SW note from today     Education Provided: [employer]     Follow-up Required: SW available as necessary      Mia Jarquin (Martens), CHIN, MSW   - [facility]  Phone: [phone]  Pager: [phone]  2018

## 2018-05-25 NOTE — PROGRESS NOTES
Social Work Follow-Up Encounter Visit  Oncology Clinic    Data/Intervention:  Patient Name:  Keren Erickson  /Age:  1955 (62 year old)    Reason for Follow-Up:  Financial / PCA concerns     Collaborated With:    TAMMY Mcmillan  -patient  -patient's friend     Resources Provided:  [employer]    Assessment:  Met with pt and pt's friend during infusion appt to follow up re: financial / PCA concerns.  Pt described that she was approved for Medicare towards the end of last year and now as of end  was removed from her [place] form of insurance.  Pt states her MA coverage used to cover her PCA services but now that she doesn't have MA she needs to pay privately.  Pt states she is already working with someone from the [facility] as far as getting back on [place] as a secondary to Medicare and cover her PCA services.  Pt and friend wondering if there is any assistance for financially covering PCA services.  IRENE informed there is not any specific funds to assist with PCA services, but SW could apply for [employer] (and/or [employer]) to assist with other expenses to offset money to use towards payment of PCA services.  Pt in agreement with IRENE completing [employer] application via online portal.      Plan:  Previously provided patient with SW's contact information and availability.         Mia Jarquin (Martens), CHIN, MSW   - [facility]  Phone: [phone]  Pager: [phone]  2018

## 2018-05-25 NOTE — PATIENT INSTRUCTIONS
Contact Numbers    [facility] Main Line: [phone]    Triage Nurse Line: [phone]      Please call the [facility] Nurse Triage line if you experience a temperature greater than or equal to 100.5, shaking chills, have uncontrolled nausea, vomiting and/or diarrhea, dizziness, shortness of breath, bleeding not relieved with pressure, or if you have any other questions or concerns.     If it is after hours, weekends, or holidays, please call the [phone] and a nurse will be able to triage your call.    If you are having any concerning symptoms or wish to speak to a provider before your next infusion visit, please call your care coordinator or triage them so we can adequately serve you.      If you need to refill your narcotic prescription or other medication, please call triage before your infusion appointment.        May 2018   Uday Monday Tuesday Wednesday Thursday Friday Saturday             1     2     3     4     UMP ONC INFUSION 180   11:00 AM   (180 min.)    ONCOLOGY INFUSION   [facility] 5       6     7     8     9     10     11     12       13     14     15     16     17     18     19       20     21     22     23     24     25     P [facility] LAB DRAW   11:30 AM   (15 min.)    [facility] LAB DRAW   [facility] Lab Draw     UMP RETURN   11:45 AM   (50 min.)   Dey[facility]ira Costello [facility]-C   [facility]    1:00 PM   (60 min.)    ONCOLOGY INFUSION   [facility] 26       27     28     29     30     31 June 2018 Sunday Monday Tuesday Wednesday Thursday Friday Saturday                            1     2       3     4     5     6     7     8     P [facility] LAB DRAW    9:30 AM   (15 min.)    [facility] LAB DRAW   [facility] Lab Draw     CT CHEST/ABDOMEN/PELVIS W   10:20 AM   (20 min.)   UCCT1   [facility] CT     ECH COMPLETE   11:30 AM   (60 min.)    UCECHCR1   [facility] Echo 9       10     11     12     UMP RETURN ACTIVE TREATMENT   12:15 PM   (30 min.)   Marlen Harper MD   [facility] 13     14     15     UMP ONC INFUSION 60   10:30 AM   (60 min.)    ONCOLOGY INFUSION   [facility] 16       17     18     19     20     21     22     23       24     25     26     27     28     29     30                 Recent Results (from the past 24 hour(s))   Comprehensive metabolic panel    Collection Time: 05/25/18 12:05 PM   Result Value Ref Range    Sodium 138 133 - 144 mmol/L    Potassium 4.0 3.4 - 5.3 mmol/L    Chloride 105 94 - 109 mmol/L    Carbon Dioxide 27 20 - 32 mmol/L    Anion Gap 5 3 - 14 mmol/L    Glucose 78 70 - 99 mg/dL    Urea Nitrogen 14 7 - 30 mg/dL    Creatinine 0.72 0.52 - 1.04 mg/dL    GFR Estimate 82 >60 mL/min/1.7m2    GFR Estimate If Black >90 >60 mL/min/1.7m2    Calcium 9.4 8.5 - 10.1 mg/dL    Bilirubin Total 0.3 0.2 - 1.3 mg/dL    Albumin 3.6 3.4 - 5.0 g/dL    Protein Total 6.6 (L) 6.8 - 8.8 g/dL    Alkaline Phosphatase 76 40 - 150 U/L    ALT 22 0 - 50 U/L    AST 22 0 - 45 U/L   CBC with platelets differential    Collection Time: 05/25/18 12:05 PM   Result Value Ref Range    WBC 6.0 4.0 - 11.0 10e9/L    RBC Count 4.11 3.8 - 5.2 10e12/L    Hemoglobin 13.3 11.7 - 15.7 g/dL    Hematocrit 39.7 35.0 - 47.0 %    MCV 97 78 - 100 fl    MCH 32.4 26.5 - 33.0 pg    MCHC 33.5 31.5 - 36.5 g/dL    RDW 15.1 (H) 10.0 - 15.0 %    Platelet Count 276 150 - 450 10e9/L    Diff Method Automated Method     % Neutrophils 63.1 %    % Lymphocytes 21.9 %    % Monocytes 13.6 %    % Eosinophils 0.3 %    % Basophils 0.8 %    % Immature Granulocytes 0.3 %    Nucleated RBCs 0 0 /100    Absolute Neutrophil 3.8 1.6 - 8.3 10e9/L    Absolute Lymphocytes 1.3 0.8 - 5.3 10e9/L    Absolute Monocytes 0.8 0.0 - 1.3 10e9/L    Absolute Eosinophils 0.0 0.0 - 0.7 10e9/L    Absolute Basophils 0.1 0.0 - 0.2 10e9/L    Abs Immature Granulocytes 0.0 0 -  0.4 10e9/L    Absolute Nucleated RBC 0.0

## 2018-05-25 NOTE — PROGRESS NOTES
HEMATOLOGY/ONCOLOGY PROGRESS NOTE  May 25, 2018    REASON FOR VISIT: Metastatic breast cancer    DIAGNOSIS:   The patient is a 60-year-old female who presented to the hospital initially in 09/2013 with complaints of dyspnea. Workup revealed a large pericardial effusion and pleural effusion. Physical examination revealed a large untreated left breast mass encompassing the entire left breast. Biopsies revealed these were ER, FL and HER2-positive breast cancer. She had a thoracentesis and pericardial sclerosis performed. She was originally on Arimidex and Herceptin. In 01/2014, she was switched to tamoxifen and Herceptin due to arthralgias, and she ultimately developed progressive disease and was switched to Faslodex and Herceptin. In 01/2015, she was found to have progressive disease and was switched to T-DM1.     Initially when she was seen in late 02/2015, she complained of some V5 sensory deficit. This was subjective. I was not able to elicit this on examination. This persisted and further workup was performed with a brain MRI. This revealed what was initially thought to be 3 punctate brain metastases. She originally saw Radiation Oncology and Neurosurgery as well as underwent a lumbar puncture. Cytology from the lumbar puncture showed no evidence of leptomeningeal disease. She was treated with Gamma Knife radiation to 6 lesions, performed on 04/16/2015.  She initiated therapy with TDM1 in January 2015 and continued this through 6/26/2015 with overall stable disease. She has since been on a break from treatment. The patient presented earlier this month with recurrent shortness of breath.  Imaging revealed recurrent right-sided pleural effusion.  She has since undergone thoracentesis with cytology pending.  Clinically, however, there is evidence of disease progression. PET scan performed on 11/25/2015 demonstrated progression of disease at multiple sites. She restarted TDM1 on 11/27/2015, however experienced a mild  "transfusion reaction with shortness of breath and chest tightness, resolved upon stopping TDM1 and 125 mg of SoluMedrol. She had progression of disease on repeat imaging 2/17/2016, as well as new brain metastases. She started TDM1 with Perjeta on 3/4/2016. She underwent gamma knife to brain mets on 3/8/16.       In late May, she was found to have progressive brain metastases.  She received whole brain radiation.  She had a follow up brain MRI August 2016 that was stable.     In September 2016, she enrolled on [place]  trial and was randomized to the standard of care arm/Physician's Choice; started on gemzar and herceptin. She was recently hospitalized 1/27-2/3/17 for presumed HCAP. Trial was suspended. She continued on gemzar and heceptin with stable disease. Last restaging was 4/7/17 and stable. Gemzar was placed on hold from 4/10/2017 through 6/22/17 so that she could recover from pneumonia. Due to worsening fatigue, Gemzar was placed on hold staring 1/5/18, and she continued on Herceptin alone. She started exemstane 2/21/18.    INTERVAL HISTORY: Ravi is here for routine follow-up. No new symptoms. She has been more active. Using a walker in clinic today and doing well with that. No new headaches or any new neuro symptoms, remains with neuropathy that is stable. Breathing is stable, remains feeling like her stamina isn't where she would like it to be, but is not having any worsening SOB or PASTOR. No new pains but notes more achiness with the increased activity. Appetite remains poor but she manages to eat, finding meat to palatable. Energy stable, using Ritalin PRN. No fevers, chills, change in PASTOR, SOB, n/v, bowel changes, urinary changes, bleeding, or swelling.     PHYSICAL EXAMINATION:     /85 (BP Location: Right arm, Patient Position: Sitting, Cuff Size: Adult Small)  Pulse 84  Temp 97.9  F (36.6  C)  Resp 16  Ht 1.575 m (5' 2.01\")  Wt 51.7 kg (114 lb)  SpO2 95%  BMI 20.85 kg/m2     Wt " Readings from Last 4 Encounters:   05/04/18 51.6 kg (113 lb 11.2 oz)   04/27/18 52.7 kg (116 lb 3.2 oz)   03/12/18 53.5 kg (117 lb 14.4 oz)   02/16/18 52.9 kg (116 lb 9.6 oz)     Constitutional: Alert, oriented, cachectic female in no visible distress. In a wheelchair  Eyes: Anicteric sclerae. PERRL. Left eye does not track in left lateral direction but stays midline, will track medially. No visible nystagmus. Other EOM intact.  ENT/Mouth: OM moist and pink without lesions or thrush.    CV: RRR, no murmurs appreciated.  Resp: CTAB slightly diminished R base  Extremities: No lower extremity edema appreciated.  Neuro: CN II-XII grossly intact except for ocular movements as noted above. Strength in bilateral UE and LE 5/5. Grossly nonfocal.    LABS:     IMPRESSION/PLAN:  Dominga is a 62-year-old female with history of metastatic ER-positive, HER-2 positive breast cancer, with involvement of the bone, history of pleural effusions treated with pleurodesis and PleurX placement, and with treated brain metastases, previously with stable disease on TDM1, with progressive disease after treatment break.  She was started on [place]  clinical trial and randomized to physician's choice, and started on Gemzar/Herceptin on 9/29/16. She discontinued gemzar in December 2017 and now remains on Herceptin and Aromasin.    Breast cancer:  Herceptin was on hold 3/12/18 to 5/4/2018 due to insurance coverage. Her CEA increaed off of Herceptin, pending today. She remains on Aromasin. We will continue Herceptin/Aromasin. She will follow-up with Dr. Harper with systemic restaging. She has no new neurologic symptoms, and she would like to hold off brain MRI at this time. Discussed with Dr. Harper   - Most recent echo 3/7/18 stable, continue monitoring q3m (june)    Brain metastases.  Stable on most recent MRI, without any new or worsening symptoms    Bone mets: administered every 6 weeks. Continue.    R lobar and segmental pulmonary  emboli: On Lovenox 1 mg/kg BID      Central diabetes insipidus.  This was diagnosed as an inpatient in the setting of hypernatremia.  She was started on desmopressin.  Follows with endocrinology.      Nutrition.  weight stable    SW: She would like to meet with SW again, msg sent.    Fatigue: Uses Ritalin 5 mg sparingly.    She does have a POLST and is DNR/DNI.  Her power of  is listed in the computer.       Deyanira Majano PA-C

## 2018-05-25 NOTE — MR AVS SNAPSHOT
After Visit Summary   5/25/2018    Keren Erickson    MRN: [id number]           Patient Information     Date Of Birth          1955        Visit Information        Provider Department      5/25/2018 12:00 PM Deyanira Cosetllo PA-C [facility]        Today's Diagnoses     Carcinoma of breast metastatic to multiple sites, unspecified laterality (H)    -  1    Encounter for therapeutic drug monitoring        Metastatic breast cancer (H)        Brain metastasis (H)           Follow-ups after your visit        Your next 10 appointments already scheduled     Jun 08, 2018  9:30 AM CDT   [facility] Draw with  [facility] DRAW   [facility])    [address]  [address]  [facility] [id number] [phone]            Jun 08, 2018 10:20 AM CDT   CT CHEST/ABDOMEN/PELVIS W CONTRAST with UCCT1   [facility] [place] [facility] ([facility])    [address]  1st Floor  [facility] [id number] [phone]           Please bring any scans or X-rays taken at other hospitals, if similar tests were done. Also bring a list of your medicines, including vitamins, minerals and over-the-counter drugs. It is safest to leave personal items at home.  Be sure to tell your doctor:   If you have any allergies.   If there s any chance you are pregnant.   If you are breastfeeding.  How to prepare:   Do not eat or drink for 2 hours before your exam. If you need to take medicine, you may take it with small sips of water. (We may ask you to take liquid medicine as well.)   Please wear loose clothing, such as a sweat suit or jogging clothes. Avoid snaps, zippers and other metal. We may ask you to undress and put on a hospital gown.  Please arrive 30 minutes early for your CT. Once in the department you might be asked to drink water 15-20 minutes prior to your exam.  If indicated you may be asked to  drink an oral contrast in advance of your CT.  If this is the case, the imaging team will let you know or be in contact with you prior to your appointment  Patients over 70 or patients with diabetes or kidney problems:   If you haven t had a blood test (creatinine test) within the last 30 days, the Cardiologist/Radiologist may require you to get this test prior to your exam.  If you have diabetes:   Continue to take your metformin medication on the day of your exam  If you have any questions, please call the Imaging Department where you will have your exam.            Jun 08, 2018 11:30 AM CDT   Ech Complete with UCECHCR1   [facility] Echo [facility])    [address]  3rd [facility] [id number] [phone]           1. Please bring or wear a comfortable two-piece outfit. 2. You may eat, drink and take your normal medicines. 3. For any questions that cannot be answered, please contact the ordering physician            Jun 12, 2018 12:30 PM CDT   (Arrive by 12:15 PM)   Return Active Treatment with Marlen Harper MD   [facility] ([facility])    [address] [place] [address]  [address]  [facility] [address]   [phone]            Kelvin 15, 2018 10:30 AM CDT   Infusion 60 with  ONCOLOGY INFUSION, UC 31 ATC   [facility] ([facility])    [address] [place] [address]  [address]  [facility] [address]   [phone]            Jul 06, 2018  9:45 AM CDT   RETURN CORNEA with Damon Sullivan DO   [facility] ([facility])    [address]  [facility] [address]   [phone]            Oct 01, 2018  1:00 PM CDT   (Arrive by 12:45 PM)   RETURN ENDOCRINE with Rolando Mishra MD   [facility]in[facility])    [address] [place] [id number] [phone]          "     Future tests that were ordered for you today     Open Future Orders        Priority Expected Expires Ordered    Echocardiogram Complete Routine  5/25/2019 5/25/2018            Who to contact     If you have questions or need follow up information about today's clinic visit or your schedule please contact [facility] directly at [phone].  Normal or non-critical lab and imaging results will be communicated to you by MyChart, letter or phone within 4 business days after the clinic has received the results. If you do not hear from us within 7 days, please contact the clinic through [employer] or phone. If you have a critical or abnormal lab result, we will notify you by phone as soon as possible.  Submit refill requests through [employer] or call your pharmacy and they will forward the refill request to us. Please allow 3 business days for your refill to be completed.          Additional Information About Your Visit        [employer] Information     [employer] gives you secure access to your electronic health record. If you see a primary care provider, you can also send messages to your care team and make appointments. If you have questions, please call your primary care clinic.  If you do not have a primary care provider, please call [phone] and they will assist you.        Care EveryWhere ID     This is your Care EveryWhere ID. This could be used by other organizations to access your [place] medical records  [id number]        Your Vitals Were     Pulse Temperature Respirations Height Pulse Oximetry BMI (Body Mass Index)    84 97.9  F (36.6  C) 16 1.575 m (5' 2.01\") 95% 20.85 kg/m2       Blood Pressure from Last 3 Encounters:   05/25/18 132/85   05/04/18 (!) 141/94   04/27/18 113/79    Weight from Last 3 Encounters:   05/25/18 51.7 kg (114 lb)   05/04/18 51.6 kg (113 lb 11.2 oz)   04/27/18 52.7 kg (116 lb 3.2 oz)              We Performed the Following     Ca27.29  breast tumor marker     CEA  "         Where to get your medicines      Some of these will need a paper prescription and others can be bought over the counter.  Ask your nurse if you have questions.     Bring a paper prescription for each of these medications     methylphenidate 5 MG tablet          Primary Care Provider Office Phone # Fax #    Kelly Hart [phone]       [address] [facility] [address]        Equal Access to Services     [facility] : Hadii aad ku hadasho Soomaali, waaxda luqadaha, qaybta kaalmada adeegyada, waxay leenain hayaan adeirineo quintero laaicha ah. So [facility] [phone].    ATENCIÓN: Si habla español, tiene a ramires disposición servicios gratuitos de asistencia lingüística. Kyung al [phone].    We comply with applicable federal civil rights laws and [place] laws. We do not discriminate on the basis of race, color, national origin, age, disability, sex, sexual orientation, or gender identity.            Thank you!     Thank you for choosing [facility]  for your care. Our goal is always to provide you with excellent care. Hearing back from our patients is one way we can continue to improve our services. Please take a few minutes to complete the written survey that you may receive in the mail after your visit with us. Thank you!             Your Updated Medication List - Protect others around you: Learn how to safely use, store and throw away your medicines at www.disposemymeds.org.          This list is accurate as of 5/25/18  1:56 PM.  Always use your most recent med list.                   Brand Name Dispense Instructions for use Diagnosis    desmopressin 0.2 MG tablet    DDAVP    45 tablet    Take  1/2 tablet once daily by mouth    Diabetes insipidus (H)       exemestane 25 MG tablet    AROMASIN    30 tablet    Take 1 tablet (25 mg) by mouth daily    Carcinoma of breast metastatic to multiple sites, unspecified laterality (H), Metastatic breast cancer (H)       * LOVENOX  60 MG/0.6ML injection   Generic drug:  enoxaparin     60 Syringe    Inject 0.5 mLs (50 mg) Subcutaneous 2 times daily    Other acute pulmonary embolism without acute cor pulmonale (H)       * enoxaparin 60 MG/0.6ML injection    LOVENOX    36 mL    INJECT THE CONTENTS OF ONE SYRINGE UNDER THE SKIN TWO TIMES A DAY    Other acute pulmonary embolism without acute cor pulmonale (H)       meclizine 25 MG tablet    ANTIVERT    30 tablet    Take 1 tablet (25 mg) by mouth 3 times daily as needed for dizziness    BPPV (benign paroxysmal positional vertigo), right       methylphenidate 5 MG tablet    RITALIN    30 tablet    Take 5 mg once daily as needed for fatigue    Carcinoma of breast metastatic to multiple sites, unspecified laterality (H)       Multi-vitamin Tabs tablet      Take 1 tablet by mouth daily        omeprazole 20 MG tablet     30 tablet    Take 1 tablet (20 mg) by mouth daily    Gastroesophageal reflux disease without esophagitis       ondansetron 4 MG tablet    ZOFRAN    18 tablet    Take 1 tablet (4 mg) by mouth every 8 hours as needed for nausea    Nausea       prochlorperazine 10 MG tablet    COMPAZINE    90 tablet    Take 1 tablet (10 mg) by mouth every 6 hours as needed for nausea or vomiting    Nausea       timolol 0.5 % ophthalmic solution    TIMOPTIC    1 Bottle    Place 1 drop into both eyes 2 times daily    Primary open angle glaucoma of left eye, mild stage       TYLENOL PO      Take 500 mg by mouth once        UNABLE TO FIND      MEDICATION NAME: Probiotic        VITAMIN D (CHOLECALCIFEROL) PO      Take 5,000 Units by mouth daily        VITAMIN E BLEND PO      Take by mouth daily        * Notice:  This list has 2 medication(s) that are the same as other medications prescribed for you. Read the directions carefully, and ask your doctor or other care provider to review them with you.

## 2018-05-25 NOTE — MR AVS SNAPSHOT
After Visit Summary   5/25/2018    Keren Erickson    MRN: [id number]           Patient Information     Date Of Birth          1955        Visit Information        Provider Department      5/25/2018 2:27 PM Mia Jarquin MSW [facility]        Today's Diagnoses     Visit for counseling    -  1       Follow-ups after your visit        Your next 10 appointments already scheduled     Jun 08, 2018  9:30 AM CDT   [facility] Lab Draw with  [facility] LAB DRAW   [facility] ([facility])    [address]  [address]  [place] [address] [phone]            Jun 08, 2018 10:20 AM CDT   CT CHEST/ABDOMEN/PELVIS W CONTRAST with UCCT1   [facility] CT ([facility])    [address]  1st Floor  [place] [address] [phone]           Please bring any scans or X-rays taken at other hospitals, if similar tests were done. Also bring a list of your medicines, including vitamins, minerals and over-the-counter drugs. It is safest to leave personal items at home.  Be sure to tell your doctor:   If you have any allergies.   If there s any chance you are pregnant.   If you are breastfeeding.  How to prepare:   Do not eat or drink for 2 hours before your exam. If you need to take medicine, you may take it with small sips of water. (We may ask you to take liquid medicine as well.)   Please wear loose clothing, such as a sweat suit or jogging clothes. Avoid snaps, zippers and other metal. We may ask you to undress and put on a hospital gown.  Please arrive 30 minutes early for your CT. Once in the department you might be asked to drink water 15-20 minutes prior to your exam.  If indicated you may be asked to drink an oral contrast in advance of your CT.  If this is the case, the imaging team will let you know or be in contact with you prior to your appointment  Patients over 70 or  patients with diabetes or kidney problems:   If you haven t had a blood test (creatinine test) within the last 30 days, the Cardiologist/Radiologist may require you to get this test prior to your exam.  If you have diabetes:   Continue to take your metformin medication on the day of your exam  If you have any questions, please call the Imaging Department where you will have your exam.            Jun 08, 2018 11:30 AM CDT   Ech Complete with UCECHCR1   [facility] Echo ([facility])    [address]  3rd Floor  [place] [address] [phone]           1. Please bring or wear a comfortable two-piece outfit. 2. You may eat, drink and take your normal medicines. 3. For any questions that cannot be answered, please contact the ordering physician            Jun 12, 2018 12:30 PM CDT   (Arrive by 12:15 PM)   Return Active Treatment with Marlen Harper MD   [facility] ([facility])    [address]  [address]  [place] [address]   [phone]            Kelvin 15, 2018 10:30 AM CDT   Infusion 60 with  ONCOLOGY INFUSION, UC 31 ATC   [facility] ([facility])    [address]  [address]  [place] [address]   [phone]            Jul 06, 2018  9:45 AM CDT   RETURN CORNEA with Damon Sullivan DO   Eye Clinic ([facility])    [address]  [place] [address]   [phone]            Oct 01, 2018  1:00 PM CDT   (Arrive by 12:45 PM)   RETURN ENDOCRINE with Rolando Mishra MD   [facility] Endocrinology ([facility])    [address]  [address]rd [facility] [address] [phone]              Future tests that were ordered for you today     Open Future Orders        Priority Expected Expires Ordered    Echocardiogram Complete Routine  5/25/2019 5/25/2018             Who to contact     If you have questions or need follow up information about today's clinic visit or your schedule please contact [facility] directly at [phone].  Normal or non-critical lab and imaging results will be communicated to you by Fixstarshart, letter or phone within 4 business days after the clinic has received the results. If you do not hear from us within 7 days, please contact the clinic through Fixstarshart or phone. If you have a critical or abnormal lab result, we will notify you by phone as soon as possible.  Submit refill requests through [employer] or call your pharmacy and they will forward the refill request to us. Please allow 3 business days for your refill to be completed.          Additional Information About Your Visit        Start[employer] Weekend Information     [employer] gives you secure access to your electronic health record. If you see a primary care provider, you can also send messages to your care team and make appointments. If you have questions, please call your primary care clinic.  If you do not have a primary care provider, please call [phone] and they will assist you.        Care EveryWhere ID     This is your Care EveryWhere ID. This could be used by other organizations to access your [facility] medical records  [id number]         Blood Pressure from Last 3 Encounters:   05/25/18 132/85   05/04/18 (!) 141/94   04/27/18 113/79    Weight from Last 3 Encounters:   05/25/18 51.7 kg (114 lb)   05/04/18 51.6 kg (113 lb 11.2 oz)   04/27/18 52.7 kg (116 lb 3.2 oz)              Today, you had the following     No orders found for display         Where to get your medicines      Some of these will need a paper prescription and others can be bought over the counter.  Ask your nurse if you have questions.     Bring a paper prescription for each of these medications     methylphenidate 5 MG tablet          Primary Care Provider Office Phone # Fax #    Kelly Hart MD  [phone] [address] [facility] [address]        Equal Access to Services     DORISODILON VARUN : Hadii keturah ibarra laura Joshi, waadriannada luqbaljit, galita kakyda annabel, freddie leenain hayaastephen youngirineo quintero gregg keys. So [facility] [phone].    ATENCIÓN: Si habla español, tiene a ramires disposición servicios gratuitos de asistencia lingüística. Llame al [phone].    We comply with applicable federal civil rights laws and [place] laws. We do not discriminate on the basis of race, color, national origin, age, disability, sex, sexual orientation, or gender identity.            Thank you!     Thank you for choosing [facility]  for your care. Our goal is always to provide you with excellent care. Hearing back from our patients is one way we can continue to improve our services. Please take a few minutes to complete the written survey that you may receive in the mail after your visit with us. Thank you!             Your Updated Medication List - Protect others around you: Learn how to safely use, store and throw away your medicines at www.disposemymeds.org.          This list is accurate as of 5/25/18  3:15 PM.  Always use your most recent med list.                   Brand Name Dispense Instructions for use Diagnosis    desmopressin 0.2 MG tablet    DDAVP    45 tablet    Take  1/2 tablet once daily by mouth    Diabetes insipidus (H)       exemestane 25 MG tablet    AROMASIN    30 tablet    Take 1 tablet (25 mg) by mouth daily    Carcinoma of breast metastatic to multiple sites, unspecified laterality (H), Metastatic breast cancer (H)       * LOVENOX 60 MG/0.6ML injection   Generic drug:  enoxaparin     60 Syringe    Inject 0.5 mLs (50 mg) Subcutaneous 2 times daily    Other acute pulmonary embolism without acute cor pulmonale (H)       * enoxaparin 60 MG/0.6ML injection    LOVENOX    36 mL    INJECT THE CONTENTS OF ONE SYRINGE UNDER THE SKIN TWO TIMES A DAY    Other acute pulmonary  embolism without acute cor pulmonale (H)       meclizine 25 MG tablet    ANTIVERT    30 tablet    Take 1 tablet (25 mg) by mouth 3 times daily as needed for dizziness    BPPV (benign paroxysmal positional vertigo), right       methylphenidate 5 MG tablet    RITALIN    30 tablet    Take 5 mg once daily as needed for fatigue    Carcinoma of breast metastatic to multiple sites, unspecified laterality (H)       Multi-vitamin Tabs tablet      Take 1 tablet by mouth daily        omeprazole 20 MG tablet     30 tablet    Take 1 tablet (20 mg) by mouth daily    Gastroesophageal reflux disease without esophagitis       ondansetron 4 MG tablet    ZOFRAN    18 tablet    Take 1 tablet (4 mg) by mouth every 8 hours as needed for nausea    Nausea       prochlorperazine 10 MG tablet    COMPAZINE    90 tablet    Take 1 tablet (10 mg) by mouth every 6 hours as needed for nausea or vomiting    Nausea       timolol 0.5 % ophthalmic solution    TIMOPTIC    1 Bottle    Place 1 drop into both eyes 2 times daily    Primary open angle glaucoma of left eye, mild stage       TYLENOL PO      Take 500 mg by mouth once        UNABLE TO FIND      MEDICATION NAME: Probiotic        VITAMIN D (CHOLECALCIFEROL) PO      Take 5,000 Units by mouth daily        VITAMIN E BLEND PO      Take by mouth daily        * Notice:  This list has 2 medication(s) that are the same as other medications prescribed for you. Read the directions carefully, and ask your doctor or other care provider to review them with you.

## 2018-06-08 NOTE — DISCHARGE INSTRUCTIONS

## 2018-06-08 NOTE — NURSING NOTE
Chief Complaint   Patient presents with     Port Draw     Labs drawn via port by RN. Line flushed and hep locked, ready for CT scan - no other appointments, VS not taken.     Shanita Gibson RN

## 2018-06-08 NOTE — NURSING NOTE
Chief Complaint   Patient presents with     Port Flush     Port de-accessed by RN. Line flushed and hep locked.     Shanita Gibson RN

## 2018-06-12 NOTE — LETTER
6/12/2018       RE: Keren Erickson  [address] [address]     Dear Colleague,    Thank you for referring your patient, Keren Erickson, to the [facility]. Please see a copy of my visit note below.    HEMATOLOGY/ONCOLOGY PROGRESS NOTE  Jun 12, 2018    REASON FOR VISIT: follow-up of metastatic breast cancer, triple positive    DIAGNOSIS:   Keren Erickson is a 62 year old woman who presented to the hospital initially in 09/2013 with complaints of dyspnea. Workup revealed a large pericardial effusion and pleural effusion. Physical examination revealed a large untreated left breast mass encompassing the entire left breast. Biopsies revealed these were ER, HI and HER2-positive breast cancer. She had a thoracentesis and pericardial sclerosis performed. She was originally on Arimidex and Herceptin. In 01/2014, she was switched to tamoxifen and Herceptin due to arthralgias, and she ultimately developed progressive disease and was switched to Faslodex and Herceptin. In 01/2015, she was found to have progressive disease and was switched to T-DM1.     Initially when she was seen in late 02/2015, she complained of some V5 sensory deficit. This was subjective. I was not able to elicit this on examination. This persisted and further workup was performed with a brain MRI. This revealed what was initially thought to be 3 punctate brain metastases. She originally saw Radiation Oncology and Neurosurgery as well as underwent a lumbar puncture. Cytology from the lumbar puncture showed no evidence of leptomeningeal disease. She was treated with Gamma Knife radiation to 6 lesions, performed on 04/16/2015.  She initiated therapy with TDM1 in January 2015 and continued this through 6/26/2015 with overall stable disease. She has since been on a break from treatment. The patient presented earlier this month with recurrent shortness of breath.  Imaging revealed recurrent  right-sided pleural effusion.  She has since undergone thoracentesis with cytology pending.  Clinically, however, there was evidence of disease progression. PET scan performed on 11/25/2015 demonstrated progression of disease at multiple sites. She restarted TDM1 on 11/27/2015, however experienced a mild transfusion reaction with shortness of breath and chest tightness, resolved upon stopping TDM1 and 125 mg of SoluMedrol. She had progression of disease on repeat imaging 2/17/2016, as well as new brain metastases. She started TDM1 with Perjeta on 3/4/2016. She underwent gamma knife to brain mets on 3/8/16.       In late May 2016, she was found to have progressive brain metastases.  She received whole brain radiation.  She had a follow up brain MRI August 2016 that was stable.     In September 2016, she enrolled on [place]  trial and was randomized to the standard of care arm/Physician's Choice; started on gemzar and herceptin. She was hospitalized 1/27-2/3/17 for presumed HCAP. Trial was suspended. She continued on gemzar and heceptin with stable disease. Last restaging was 4/7/17 and stable. Gemzar was placed on hold from 4/10/2017 through 6/22/17 so that she could recover from pneumonia.    Dominga discontinued gemzar completely and remained on Aromasin and herceptin since Dec 2017.      INTERVAL HISTORY:  She is feeling substantially stronger since then, and feels like she continues to regain strength.  She is now on herceptin and aromasin.  She wishes she was gaining even more strength however.      She has no f/c.  No chest pain or shortness of breath.  No n/v/d/c.      She spends most of her time at home, walking around her apartment, and coming here with friends for her appts.      Her 10-point review of systems is otherwise negative.      PHYSICAL EXAMINATION:     /90 (BP Location: Right arm, Patient Position: Chair, Cuff Size: Adult Small)  Pulse 82  Temp 97.1  F (36.2  C) (Oral)  Resp 16   "Ht 1.575 m (5' 2.01\")  Wt 52.5 kg (115 lb 11.2 oz)  SpO2 97%  BMI 21.16 kg/m2    Wt Readings from Last 4 Encounters:   06/12/18 52.5 kg (115 lb 11.2 oz)   05/25/18 51.7 kg (114 lb)   05/04/18 51.6 kg (113 lb 11.2 oz)   04/27/18 52.7 kg (116 lb 3.2 oz)     Constitutional: Alert, oriented, thin female in no visible distress  Eyes: PERRL. EOMI. MMM without oral lesions. Anicteric sclerae.    CV: RRR, no murmurs   Resp: CTAB slightly diminished at bases. Breathing comfortably.  Abdomen: Soft, non-tender, non-distended. Bowel sounds present.   Extremities: No lower extremity edema   Skin: Warm, dry. No bruising or petechiae. No rash  Lymph: No palpable cervical or supraclavicular LAD  Neuro: CN III-XII grossly intact. Ambulates several steps with slow shuffling gate, today she is using a walker.  Persistent nystagmus with left lateral gaze, this is not new.  5/5 strength in LE proximal and distal.  Breast exam: L breast with scarring in her left mid breast from prior tumor, no palpable firm nodules as was previously present, no axillary adenopathy.  R breast with fibroglandular tissue, firm nodule measuring approximately 2 x 2 cm with no axillary adenopathy    LABS:  Lab Results   Component Value Date    WBC 6.0 05/25/2018     Lab Results   Component Value Date    RBC 4.11 05/25/2018     Lab Results   Component Value Date    HGB 13.3 05/25/2018     Lab Results   Component Value Date    HCT 39.7 05/25/2018     No components found for: MCT  Lab Results   Component Value Date    MCV 97 05/25/2018     Lab Results   Component Value Date    MCH 32.4 05/25/2018     Lab Results   Component Value Date    MCHC 33.5 05/25/2018     Lab Results   Component Value Date    RDW 15.1 05/25/2018     Lab Results   Component Value Date     05/25/2018       Recent Labs   Lab Test  05/25/18   1205  04/27/18   1003   NA  138  140  140   POTASSIUM  4.0  4.0  4.0   CHLORIDE  105  105  104   CO2  27  27  26   ANIONGAP  5  8  9   GLC "  78  71  69*   BUN  14  13  12   CR  0.72  0.64  0.65   OMA  9.4  9.4  9.3     Liver Function Studies -   Recent Labs   Lab Test  05/25/18   1205   PROTTOTAL  6.6*   ALBUMIN  3.6   BILITOTAL  0.3   ALKPHOS  76   AST  22   ALT  22     I personally reviewed her CT scan        IMPRESSION/PLAN:  Dominga is a 62-year-old woman with history of metastatic ER-positive, HER-2 positive breast cancer, with involvement of the bone, history of pleural effusions treated with pleurodesis and PleurX placement, and with treated brain metastases. She was started on [place]  clinical trial and randomized to physician's choice, on Gemzar/Herceptin since 9/29/16.  She discontinued gemzar 121/5/17.  Now on herceptin and aromasin.      1.  Denia remains on aromasin and Herceptin for her breast cancer.  We discussed that she had a CT scan of the chest, abdomen and pelvis.  SHe has slight progression in her breast.  We reviewed this.  Given her desire to continue to improve her performance status and the relatively stable changes, we discussed continuing the current treatment.    I would follow her breast clinically, with reimaging in 3 months.  If progression, consider adding palbociclib.     Will repeat imaging in 12 weeks.  Remain on herceptin and aromasin.     2.  Brain metastases.  These appear stable on overall brain MRI.  She will need brain MRI this summer.     3.  Central diabetes insipidus.  This was diagnosed as an inpatient in the setting of hypernatremia.  She was started on desmopressin.  She has been seeing Endocrinology.       4.  Nutrition.  Her weight is overall stable.      5.  She is due for Xgeva.  Continue this for her bone metastases.    6.  She does have a POLST and is DNR/DNI.  Her power of  is listed in the computer.     7. Bladder nodule - we discussed this.   Pt is not interested in further investigation at this time. Will follow closely with CT.  We rediscussed this today.    Marlen Harper,  MD

## 2018-06-12 NOTE — PROGRESS NOTES
HEMATOLOGY/ONCOLOGY PROGRESS NOTE  Jun 12, 2018    REASON FOR VISIT: follow-up of metastatic breast cancer, triple positive    DIAGNOSIS:   Keren Erickson is a 62 year old woman who presented to the hospital initially in 09/2013 with complaints of dyspnea. Workup revealed a large pericardial effusion and pleural effusion. Physical examination revealed a large untreated left breast mass encompassing the entire left breast. Biopsies revealed these were ER, [place] and HER2-positive breast cancer. She had a thoracentesis and pericardial sclerosis performed. She was originally on Arimidex and Herceptin. In 01/2014, she was switched to tamoxifen and Herceptin due to arthralgias, and she ultimately developed progressive disease and was switched to Faslodex and Herceptin. In 01/2015, she was found to have progressive disease and was switched to T-DM1.     Initially when she was seen in late 02/2015, she complained of some V5 sensory deficit. This was subjective. I was not able to elicit this on examination. This persisted and further workup was performed with a brain MRI. This revealed what was initially thought to be 3 punctate brain metastases. She originally saw Radiation Oncology and Neurosurgery as well as underwent a lumbar puncture. Cytology from the lumbar puncture showed no evidence of leptomeningeal disease. She was treated with Gamma Knife radiation to 6 lesions, performed on 04/16/2015.  She initiated therapy with TDM1 in January 2015 and continued this through 6/26/2015 with overall stable disease. She has since been on a break from treatment. The patient presented earlier this month with recurrent shortness of breath.  Imaging revealed recurrent right-sided pleural effusion.  She has since undergone thoracentesis with cytology pending.  Clinically, however, there was evidence of disease progression. PET scan performed on 11/25/2015 demonstrated progression of disease at multiple sites. She restarted TDM1  "on 11/27/2015, however experienced a mild transfusion reaction with shortness of breath and chest tightness, resolved upon stopping TDM1 and 125 mg of SoluMedrol. She had progression of disease on repeat imaging 2/17/2016, as well as new brain metastases. She started TDM1 with Perjeta on 3/4/2016. She underwent gamma knife to brain mets on 3/8/16.       In late May 2016, she was found to have progressive brain metastases.  She received whole brain radiation.  She had a follow up brain MRI August 2016 that was stable.     In September 2016, she enrolled on [place]  trial and was randomized to the standard of care arm/Physician's Choice; started on gemzar and herceptin. She was hospitalized 1/27-2/3/17 for presumed HCAP. Trial was suspended. She continued on gemzar and heceptin with stable disease. Last restaging was 4/7/17 and stable. Gemzar was placed on hold from 4/10/2017 through 6/22/17 so that she could recover from pneumonia.    Dominga discontinued gemzar completely and remained on Aromasin and herceptin since Dec 2017.      INTERVAL HISTORY:  She is feeling substantially stronger since then, and feels like she continues to regain strength.  She is now on herceptin and aromasin.  She wishes she was gaining even more strength however.      She has no f/c.  No chest pain or shortness of breath.  No n/v/d/c.      She spends most of her time at home, walking around her apartment, and coming here with friends for her appts.      Her 10-point review of systems is otherwise negative.      PHYSICAL EXAMINATION:     /90 (BP Location: Right arm, Patient Position: Chair, Cuff Size: Adult Small)  Pulse 82  Temp 97.1  F (36.2  C) (Oral)  Resp 16  Ht 1.575 m (5' 2.01\")  Wt 52.5 kg (115 lb 11.2 oz)  SpO2 97%  BMI 21.16 kg/m2    Wt Readings from Last 4 Encounters:   06/12/18 52.5 kg (115 lb 11.2 oz)   05/25/18 51.7 kg (114 lb)   05/04/18 51.6 kg (113 lb 11.2 oz)   04/27/18 52.7 kg (116 lb 3.2 oz) "     Constitutional: Alert, oriented, thin female in no visible distress  Eyes: PERRL. EOMI. MMM without oral lesions. Anicteric sclerae.    CV: RRR, no murmurs   Resp: CTAB slightly diminished at bases. Breathing comfortably.  Abdomen: Soft, non-tender, non-distended. Bowel sounds present.   Extremities: No lower extremity edema   Skin: Warm, dry. No bruising or petechiae. No rash  Lymph: No palpable cervical or supraclavicular LAD  Neuro: CN III-XII grossly intact. Ambulates several steps with slow shuffling gate, today she is using a walker.  Persistent nystagmus with left lateral gaze, this is not new.  5/5 strength in LE proximal and distal.  Breast exam: L breast with scarring in her left mid breast from prior tumor, no palpable firm nodules as was previously present, no axillary adenopathy.  R breast with fibroglandular tissue, firm nodule measuring approximately 2 x 2 cm with no axillary adenopathy    LABS:  Lab Results   Component Value Date    WBC 6.0 05/25/2018     Lab Results   Component Value Date    RBC 4.11 05/25/2018     Lab Results   Component Value Date    HGB 13.3 05/25/2018     Lab Results   Component Value Date    HCT 39.7 05/25/2018     No components found for: MCT  Lab Results   Component Value Date    MCV 97 05/25/2018     Lab Results   Component Value Date    MCH 32.4 05/25/2018     Lab Results   Component Value Date    MCHC 33.5 05/25/2018     Lab Results   Component Value Date    RDW 15.1 05/25/2018     Lab Results   Component Value Date     05/25/2018       Recent Labs   Lab Test  05/25/18   1205  04/27/18   1003   NA  138  140  140   POTASSIUM  4.0  4.0  4.0   CHLORIDE  105  105  104   CO2  27  27  26   ANIONGAP  5  8  9   GLC  78  71  69*   BUN  14  13  12   CR  0.72  0.64  0.65   OMA  9.4  9.4  9.3     Liver Function Studies -   Recent Labs   Lab Test  05/25/18   1205   PROTTOTAL  6.6*   ALBUMIN  3.6   BILITOTAL  0.3   ALKPHOS  76   AST  22   ALT  22     I personally  reviewed her CT scan        IMPRESSION/PLAN:  Dominga is a 62-year-old woman with history of metastatic ER-positive, HER-2 positive breast cancer, with involvement of the bone, history of pleural effusions treated with pleurodesis and PleurX placement, and with treated brain metastases. She was started on [place]  clinical trial and randomized to physician's choice, on Gemzar/Herceptin since 9/29/16.  She discontinued gemzar 121/5/17.  Now on herceptin and aromasin.      1.  Denia remains on aromasin and Herceptin for her breast cancer.  We discussed that she had a CT scan of the chest, abdomen and pelvis.  SHe has slight progression in her breast.  We reviewed this.  Given her desire to continue to improve her performance status and the relatively stable changes, we discussed continuing the current treatment.    I would follow her breast clinically, with reimaging in 3 months.  If progression, consider adding palbociclib.     Will repeat imaging in 12 weeks.  Remain on herceptin and aromasin.     2.  Brain metastases.  These appear stable on overall brain MRI.  She will need brain MRI this summer.     3.  Central diabetes insipidus.  This was diagnosed as an inpatient in the setting of hypernatremia.  She was started on desmopressin.  She has been seeing Endocrinology.       4.  Nutrition.  Her weight is overall stable.      5.  She is due for Xgeva.  Continue this for her bone metastases.    6.  She does have a POLST and is DNR/DNI.  Her power of  is listed in the computer.     7. Bladder nodule - we discussed this.   Pt is not interested in further investigation at this time. Will follow closely with CT.  We rediscussed this today.    Marlen Harper MD

## 2018-06-12 NOTE — MR AVS SNAPSHOT
After Visit Summary   6/12/2018    Keren Erickson    MRN: [id number]           Patient Information     Date Of Birth          1955        Visit Information        Provider Department      6/12/2018 12:30 PM Marlen Harper MD [facility]        Today's Diagnoses     Brain metastasis (H)    -  1    Metastatic breast cancer (H)        Secondary malignant neoplasm of other specified sites (CODE) (H)            Follow-ups after your visit        Your next 10 appointments already scheduled     Kelvin 15, 2018 10:30 AM CDT   Infusion 60 with UC ONCOLOGY INFUSION, UC 31 ATC   [facility] ([facility])    [address]  [address]  [place] [id number] [phone]            Jul 06, 2018  9:45 AM CDT   RETURN CORNEA with Damon Sullivan DO   [facility] ([facility])    [address]  [address] [address]  [place] [id number] [phone]            Oct 01, 2018  1:00 PM CDT   (Arrive by 12:45 PM)   RETURN ENDOCRINE with Rolando Mishra MD   [facility] ([facility])    [address]  3rd Floor  [place] [id number] [phone]              Future tests that were ordered for you today     Open Future Orders        Priority Expected Expires Ordered    Echocardiogram Limited Routine  6/12/2019 6/12/2018    MRI Brain w & w/o contrast Routine  9/10/2018 6/12/2018            Who to contact     If you have questions or need follow up information about today's clinic visit or your schedule please contact [facility] directly at [phone].  Normal or non-critical lab and imaging results will be communicated to you by MyChart, letter or phone within 4 business days after the clinic has received the results. If you do not hear from us within 7 days, please contact the clinic through MyChart or phone.  "If you have a critical or abnormal lab result, we will notify you by phone as soon as possible.  Submit refill requests through Quip or call your pharmacy and they will forward the refill request to us. Please allow 3 business days for your refill to be completed.          Additional Information About Your Visit        Team My Mobilehart Information     Quip gives you secure access to your electronic health record. If you see a primary care provider, you can also send messages to your care team and make appointments. If you have questions, please call your primary care clinic.  If you do not have a primary care provider, please call [phone] and they will assist you.        Care EveryWhere ID     This is your Care EveryWhere ID. This could be used by other organizations to access your [place] medical records  [id number]        Your Vitals Were     Pulse Temperature Respirations Height Pulse Oximetry BMI (Body Mass Index)    82 97.1  F (36.2  C) (Oral) 16 1.575 m (5' 2.01\") 97% 21.16 kg/m2       Blood Pressure from Last 3 Encounters:   06/12/18 136/90   05/25/18 132/85   05/04/18 (!) 141/94    Weight from Last 3 Encounters:   06/12/18 52.5 kg (115 lb 11.2 oz)   05/25/18 51.7 kg (114 lb)   05/04/18 51.6 kg (113 lb 11.2 oz)               Primary Care Provider Office Phone # Fax #    Kelly Bg Hart [phone]       [address] [facility] [place] [address]        Equal Access to Services     [facility] : Hadii aad ku hadasho Soomaali, waaxda luqadaha, qaybta kaalmada adeegyada, freddie escobedo . So [facility] [phone].    ATENCIÓN: Si habla español, tiene a ramires disposición servicios gratuitos de asistencia lingüística. Llame al [phone].    We comply with applicable federal civil rights laws and [place] laws. We do not discriminate on the basis of race, color, national origin, age, disability, sex, sexual orientation, or gender identity.            Thank you!     Thank " you for choosing [facility]  for your care. Our goal is always to provide you with excellent care. Hearing back from our patients is one way we can continue to improve our services. Please take a few minutes to complete the written survey that you may receive in the mail after your visit with us. Thank you!             Your Updated Medication List - Protect others around you: Learn how to safely use, store and throw away your medicines at www.disposemymeds.org.          This list is accurate as of 6/12/18 11:59 PM.  Always use your most recent med list.                   Brand Name Dispense Instructions for use Diagnosis    desmopressin 0.2 MG tablet    DDAVP    45 tablet    Take  1/2 tablet once daily by mouth    Diabetes insipidus (H)       exemestane 25 MG tablet    AROMASIN    30 tablet    Take 1 tablet (25 mg) by mouth daily    Carcinoma of breast metastatic to multiple sites, unspecified laterality (H), Metastatic breast cancer (H)       * LOVENOX 60 MG/0.6ML injection   Generic drug:  enoxaparin     60 Syringe    Inject 0.5 mLs (50 mg) Subcutaneous 2 times daily    Other acute pulmonary embolism without acute cor pulmonale (H)       * enoxaparin 60 MG/0.6ML injection    LOVENOX    36 mL    INJECT THE CONTENTS OF ONE SYRINGE UNDER THE SKIN TWO TIMES A DAY    Other acute pulmonary embolism without acute cor pulmonale (H)       meclizine 25 MG tablet    ANTIVERT    30 tablet    Take 1 tablet (25 mg) by mouth 3 times daily as needed for dizziness    BPPV (benign paroxysmal positional vertigo), right       methylphenidate 5 MG tablet    RITALIN    30 tablet    Take 5 mg once daily as needed for fatigue    Carcinoma of breast metastatic to multiple sites, unspecified laterality (H)       Multi-vitamin Tabs tablet      Take 1 tablet by mouth daily        omeprazole 20 MG tablet     30 tablet    Take 1 tablet (20 mg) by mouth daily    Gastroesophageal reflux disease without esophagitis        ondansetron 4 MG tablet    ZOFRAN    18 tablet    Take 1 tablet (4 mg) by mouth every 8 hours as needed for nausea    Nausea       prochlorperazine 10 MG tablet    COMPAZINE    90 tablet    Take 1 tablet (10 mg) by mouth every 6 hours as needed for nausea or vomiting    Nausea       timolol 0.5 % ophthalmic solution    TIMOPTIC    1 Bottle    Place 1 drop into both eyes 2 times daily    Primary open angle glaucoma of left eye, mild stage       TYLENOL PO      Take 500 mg by mouth once        UNABLE TO FIND      MEDICATION NAME: Probiotic        VITAMIN D (CHOLECALCIFEROL) PO      Take 5,000 Units by mouth daily        VITAMIN E BLEND PO      Take by mouth daily        * Notice:  This list has 2 medication(s) that are the same as other medications prescribed for you. Read the directions carefully, and ask your doctor or other care provider to review them with you.

## 2018-06-12 NOTE — NURSING NOTE
"Oncology Rooming Note    June 12, 2018 12:41 PM   Keren Erickson is a 62 year old female who presents for:    Chief Complaint   Patient presents with     Oncology Clinic Visit     Breast cancer      Initial Vitals: /90 (BP Location: Right arm, Patient Position: Chair, Cuff Size: Adult Small)  Pulse 82  Temp 97.1  F (36.2  C) (Oral)  Resp 16  Ht 1.575 m (5' 2.01\")  Wt 52.5 kg (115 lb 11.2 oz)  SpO2 97%  BMI 21.16 kg/m2 Estimated body mass index is 21.16 kg/(m^2) as calculated from the following:    Height as of this encounter: 1.575 m (5' 2.01\").    Weight as of this encounter: 52.5 kg (115 lb 11.2 oz). Body surface area is 1.52 meters squared.  Mild Pain (3) Comment: Data Unavailable   No LMP recorded. Patient is postmenopausal.  Allergies reviewed: Yes  Medications reviewed: Yes    Medications: Medication refills not needed today.  Pharmacy name entered into [employer]: [facility] [place], [place] - [address] [place] [address] [phone]    Clinical concerns: no new concerns     6 minutes for nursing intake (face to face time)     Nell Zheng CMA            "

## 2018-06-15 NOTE — MR AVS SNAPSHOT
After Visit Summary   6/15/2018    Keren Erickson    MRN: [id number]           Patient Information     Date Of Birth          1955        Visit Information        Provider Department      6/15/2018 10:30 AM  31 ATC;  ONCOLOGY INFUSION [facility]        Today's Diagnoses     Metastatic breast cancer (H)    -  1    Brain metastasis (H)        Carcinoma of breast metastatic to multiple sites, unspecified laterality (H)        Bone metastasis (H)          Care Instructions    Contact Numbers  [facility] Main Line: [phone]  [facility] NurseTriage and After Hours:  [phone]    Call triage with chills and/or temperature greater than or equal to 100.5, uncontrolled nausea/vomiting, diarrhea, constipation, dizziness, shortness of breath, chest pain, bleeding, unexplained bruising, or any new/concerning symptoms, questions/concerns.     If after hours, weekends, or holidays, call the nurse triage number. Calls may be forwarded to the hospital , please ask for the adult oncology doctor on call.     If you are having any concerning symptoms or wish to speak to a provider before your next infusion visit, please call your care coordinator or triage to notify them so we can adequately serve you.     If you need a refill on a narcotic prescription, please call triage or your care coordinator before your infusion appointment.             June 2018 Sunday Monday Tuesday Wednesday Thursday Friday Saturday                            1     2       3     4     5     6     7     8     [facility]    9:30 AM   (15 min.)    [facility]     CT CHEST/ABDOMEN/PELVIS W   10:20 AM   (20 min.)   UCCT1   [facility] CT     ECH COMPLETE   11:30 AM   (60 min.)   UCECHCR1   [facility] Echo     [facility] DRAW   12:45 PM   (15 min.)    [facility]   [facility] 9       10     11     12     [facility] RETURN ACTIVE  TREATMENT   12:15 PM   (30 min.)   Marlen Harper MD   [facility] 13     14     15     [facility] DRAW    9:45 AM   (15 min.)   [facility]   [facility] Draw     [facility] ONC INFUSION 60   10:30 AM   (60 min.)   UC ONCOLOGY INFUSION   [facility] 16       17     18     19     20     21     22     23       24     25     26     27     28     29     30                July 2018 Sunday Monday Tuesday Wednesday Thursday Friday Saturday   1     2     3     4     5     6     [facility] ONC INFUSION 60    8:00 AM   (60 min.)   [facility] ONCOLOGY INFUSION   [facility]     [facility] DRAW    9:00 AM   (15 min.)    [facility] LAB DRAW   [facility] Lab Draw     UMP RETURN CORNEA    9:45 AM   (15 min.)   Damon Sullivan,    [facility] 7       8     9     10     11     12     13     14       15     16     17     18     19     20     21       22     23     24     25     26     27     [facility] DRAW   11:30 AM   (15 min.)    [facility] DRAW   [facility] Lab Draw     [facility] RETURN   11:45 AM   (50 min.)   Deyanira Costello PA-C   [facility]     [facility] ONC INFUSION 60    1:00 PM   (60 min.)    ONCOLOGY INFUSION   [facility] 28       29     30     31                                      Lab Results:  Recent Results (from the past 12 hour(s))   CBC with platelets differential    Collection Time: 06/15/18 10:36 AM   Result Value Ref Range    WBC 6.1 4.0 - 11.0 10e9/L    RBC Count 4.10 3.8 - 5.2 10e12/L    Hemoglobin 13.5 11.7 - 15.7 g/dL    Hematocrit 40.3 35.0 - 47.0 %    MCV 98 78 - 100 fl    MCH 32.9 26.5 - 33.0 pg    MCHC 33.5 31.5 - 36.5 g/dL    RDW 15.2 (H) 10.0 - 15.0 %    Platelet Count 272 150 - 450 10e9/L    Diff Method Automated Method     % Neutrophils 63.0 %    % Lymphocytes 23.6 %    % Monocytes 11.7 %    % Eosinophils 0.0 %    % Basophils 1.0 %    % Immature Granulocytes 0.7 %     Nucleated RBCs 0 0 /100    Absolute Neutrophil 3.8 1.6 - 8.3 10e9/L    Absolute Lymphocytes 1.4 0.8 - 5.3 10e9/L    Absolute Monocytes 0.7 0.0 - 1.3 10e9/L    Absolute Eosinophils 0.0 0.0 - 0.7 10e9/L    Absolute Basophils 0.1 0.0 - 0.2 10e9/L    Abs Immature Granulocytes 0.0 0 - 0.4 10e9/L    Absolute Nucleated RBC 0.0                Follow-ups after your visit        Your next 10 appointments already scheduled     Jul 06, 2018  8:00 AM CDT   Infusion 60 with UC ONCOLOGY INFUSION, UC 16 ATC   [facility] ([facility])    [address]  [address]  [place] [address] [phone]            Jul 06, 2018  9:00 AM CDT   [facility] Draw with  [facility] DRAW   [facility]    [address]  [address]  [place] [address] [phone]            Jul 06, 2018  9:45 AM CDT   RETURN CORNEA with Damon Sullivan,    [facility] ([facility])    [address]  [place] [address]   [phone]            Jul 27, 2018 11:30 AM CDT   [facility] Lab Draw with  MASONIC LAB DRAW   [facility] Lab Draw ([facility])    [address]  [address]  [place] [address] [phone]            Jul 27, 2018 12:00 PM CDT   (Arrive by 11:45 AM)   Return Visit with Deyanira Costello PA-C   [facility] ([facility])    [address]  [address]  [place] [address] [phone]            Jul 27, 2018  1:00 PM CDT   Infusion 60 with UC ONCOLOGY INFUSION, UC 11 ATC   [facility] ([facility])    [address]  [address]  [place] [address]   [phone]            Aug 17, 2018 11:30 AM CDT   Infusion 60 with UC ONCOLOGY INFUSION, UC 22 ATC   [facility] ([facility])    [address]  [address]  [place] [address] [phone]              Who to contact     If you have questions or need follow up information about today's clinic visit or your schedule please contact [facility] directly at [phone].  Normal or non-critical lab and imaging results will be communicated to you by MyChart, letter or phone within 4 business days after the clinic has received the results. If you do not hear from us within 7 days, please contact the clinic through [employer] or phone. If you have a critical or abnormal lab result, we will notify you by phone as soon as possible.  Submit refill requests through [employer] or call your pharmacy and they will forward the refill request to us. Please allow 3 business days for your refill to be completed.          Additional Information About Your Visit        MyChart Information     [employer] gives you secure access to your electronic health record. If you see a primary care provider, you can also send messages to your care team and make appointments. If you have questions, please call your primary care clinic.  If you do not have a primary care provider, please call [phone] and they will assist you.        Care EveryWhere ID     This is your Care EveryWhere ID. This could be used by other organizations to access your [place] medical records  [id number]        Your Vitals Were     Pulse Temperature Respirations Pulse Oximetry BMI (Body Mass Index)       78 97.4  F (36.3  C) (Oral) 16 97% 20.79 kg/m2        Blood Pressure from Last 3 Encounters:   06/15/18 118/81   06/12/18 136/90   05/25/18 132/85    Weight from Last 3 Encounters:   06/15/18 51.6 kg (113 lb 11.2 oz)   06/12/18 52.5 kg (115 lb 11.2 oz)   05/25/18 51.7 kg (114 lb)              We Performed the Following     Ca27.29  breast tumor marker     CBC with platelets differential     CEA     Comprehensive metabolic panel        Primary Care  Provider Office Phone # Fax #    Kelly Bg Hart [phone]       [address] [facility] [address]        Equal Access to Services     DORISODILON VARUN : Jerry keturah ibarra enderalda Karenphoebe, darrel lindseybaljit, qaselwynta kakyda annabel, freddie vallestephen massimo. So [facility] [phone].    ATENCIÓN: Si habla español, tiene a ramires disposición servicios gratuitos de asistencia lingüística. Llame al [phone].    We comply with applicable federal civil rights laws and [place] laws. We do not discriminate on the basis of race, color, national origin, age, disability, sex, sexual orientation, or gender identity.            Thank you!     Thank you for choosing [facility]  for your care. Our goal is always to provide you with excellent care. Hearing back from our patients is one way we can continue to improve our services. Please take a few minutes to complete the written survey that you may receive in the mail after your visit with us. Thank you!             Your Updated Medication List - Protect others around you: Learn how to safely use, store and throw away your medicines at www.disposemymeds.org.          This list is accurate as of 6/15/18 12:15 PM.  Always use your most recent med list.                   Brand Name Dispense Instructions for use Diagnosis    desmopressin 0.2 MG tablet    DDAVP    45 tablet    Take  1/2 tablet once daily by mouth    Diabetes insipidus (H)       exemestane 25 MG tablet    AROMASIN    30 tablet    Take 1 tablet (25 mg) by mouth daily    Carcinoma of breast metastatic to multiple sites, unspecified laterality (H), Metastatic breast cancer (H)       * LOVENOX 60 MG/0.6ML injection   Generic drug:  enoxaparin     60 Syringe    Inject 0.5 mLs (50 mg) Subcutaneous 2 times daily    Other acute pulmonary embolism without acute cor pulmonale (H)       * enoxaparin 60 MG/0.6ML injection    LOVENOX    36 mL    INJECT THE CONTENTS OF ONE SYRINGE  UNDER THE SKIN TWO TIMES A DAY    Other acute pulmonary embolism without acute cor pulmonale (H)       meclizine 25 MG tablet    ANTIVERT    30 tablet    Take 1 tablet (25 mg) by mouth 3 times daily as needed for dizziness    BPPV (benign paroxysmal positional vertigo), right       methylphenidate 5 MG tablet    RITALIN    30 tablet    Take 5 mg once daily as needed for fatigue    Carcinoma of breast metastatic to multiple sites, unspecified laterality (H)       Multi-vitamin Tabs tablet      Take 1 tablet by mouth daily        omeprazole 20 MG tablet     30 tablet    Take 1 tablet (20 mg) by mouth daily    Gastroesophageal reflux disease without esophagitis       ondansetron 4 MG tablet    ZOFRAN    18 tablet    Take 1 tablet (4 mg) by mouth every 8 hours as needed for nausea    Nausea       prochlorperazine 10 MG tablet    COMPAZINE    90 tablet    Take 1 tablet (10 mg) by mouth every 6 hours as needed for nausea or vomiting    Nausea       timolol 0.5 % ophthalmic solution    TIMOPTIC    1 Bottle    Place 1 drop into both eyes 2 times daily    Primary open angle glaucoma of left eye, mild stage       TYLENOL PO      Take 500 mg by mouth once        UNABLE TO FIND      MEDICATION NAME: Probiotic        VITAMIN D (CHOLECALCIFEROL) PO      Take 5,000 Units by mouth daily        VITAMIN E BLEND PO      Take by mouth daily        * Notice:  This list has 2 medication(s) that are the same as other medications prescribed for you. Read the directions carefully, and ask your doctor or other care provider to review them with you.

## 2018-06-15 NOTE — PROGRESS NOTES
Infusion Nursing Note:  Keren Erickson presents today for Day 1 Cycle 28 Herceptin and Xgeva.    Patient seen by provider today: No   present during visit today: Not Applicable.    Note: Dominga presents today with a friend. She reports doing well overall. She was assessed by Dr. Harper on Tuesday and denies any changes since then.     Intravenous Access:  Implanted Port.    Treatment Conditions:  Lab Results   Component Value Date    HGB 13.5 06/15/2018     Lab Results   Component Value Date    WBC 6.1 06/15/2018      Lab Results   Component Value Date    ANEU 3.8 06/15/2018     Lab Results   Component Value Date     06/15/2018      Lab Results   Component Value Date     06/15/2018                   Lab Results   Component Value Date    POTASSIUM 4.0 06/15/2018           Lab Results   Component Value Date    MAG 2.4 04/10/2017            Lab Results   Component Value Date    CR 0.74 06/15/2018                   Lab Results   Component Value Date    OMA 9.6 06/15/2018                Lab Results   Component Value Date    BILITOTAL 0.4 06/15/2018           Lab Results   Component Value Date    ALBUMIN 3.7 06/15/2018                    Lab Results   Component Value Date    ALT 28 06/15/2018           Lab Results   Component Value Date    AST 30 06/15/2018       Results reviewed, labs MET treatment parameters, ok to proceed with treatment.  ECHO/MUGA completed 06/08/18  EF 60-65%.    Post Infusion Assessment:  Patient tolerated infusion without incident.  Patient tolerated injection without incident.  Xgeva given subcutaneously in right side abdomen.   Blood return noted pre and post infusion.  Access discontinued per protocol.    Discharge Plan:   Patient declined prescription refills.  Copy of AVS reviewed with patient and family.  Patient will return 07/06 for next appointment.  Patient discharged in stable condition accompanied by: friend.  Departure Mode: Ambulatory.    Anneliese  Cass, RN

## 2018-06-15 NOTE — PATIENT INSTRUCTIONS
Contact Numbers  [facility] Main Line: [phone]  [facility] NurseTriage and After Hours:  [phone]    Call triage with chills and/or temperature greater than or equal to 100.5, uncontrolled nausea/vomiting, diarrhea, constipation, dizziness, shortness of breath, chest pain, bleeding, unexplained bruising, or any new/concerning symptoms, questions/concerns.     If after hours, weekends, or holidays, call the nurse triage number. Calls may be forwarded to the hospital , please ask for the adult oncology doctor on call.     If you are having any concerning symptoms or wish to speak to a provider before your next infusion visit, please call your care coordinator or triage to notify them so we can adequately serve you.     If you need a refill on a narcotic prescription, please call triage or your care coordinator before your infusion appointment.             June 2018 Sunday Monday Tuesday Wednesday Thursday Friday Saturday                            1     2       3     4     5     6     7     8     [facility] DRAW    9:30 AM   (15 min.)    [facility] LAB DRAW   [facility] Lab Draw     CT CHEST/ABDOMEN/PELVIS W   10:20 AM   (20 min.)   UCCT1   [facility] CT     ECH COMPLETE   11:30 AM   (60 min.)   UCECHCR1   [facility] Echo     [facility] DRAW   12:45 PM   (15 min.)    [facility] DRAW   [facility] 9       10     11     12     [facility] RETURN ACTIVE TREATMENT   12:15 PM   (30 min.)   Marlen Harper MD   [facility] 13     14     15     P [facility] LAB DRAW    9:45 AM   (15 min.)    [facility] DRAW   [facility]     P ONC INFUSION 60   10:30 AM   (60 min.)    ONCOLOGY INFUSION   [facility] 16       17     18     19     20     21     22     23       24     25     26     27     28     29     30                July 2018 Sunday Monday Tuesday Wednesday Thursday Friday Saturday   1     2     3     4     5     6      [facility] ONC INFUSION 60    8:00 AM   (60 min.)   UC ONCOLOGY INFUSION   [facility] LAB DRAW    9:00 AM   (15 min.)    [facility] LAB DRAW   [facility] Lab Draw     UMP RETURN CORNEA    9:45 AM   (15 min.)   Damon Sullivan,    [facility] 7       8     9     10     11     12     13     14       15     16     17     18     19     20     21       22     23     24     25     26     27     [facility] LAB DRAW   11:30 AM   (15 min.)    [facility]     P RETURN   11:45 AM   (50 min.)   Deyanira Costello PA-C   [facility] ONC INFUSION 60    1:00 PM   (60 min.)    ONCOLOGY INFUSION   [facility] 28       29     30     31                                      Lab Results:  Recent Results (from the past 12 hour(s))   CBC with platelets differential    Collection Time: 06/15/18 10:36 AM   Result Value Ref Range    WBC 6.1 4.0 - 11.0 10e9/L    RBC Count 4.10 3.8 - 5.2 10e12/L    Hemoglobin 13.5 11.7 - 15.7 g/dL    Hematocrit 40.3 35.0 - 47.0 %    MCV 98 78 - 100 fl    MCH 32.9 26.5 - 33.0 pg    MCHC 33.5 31.5 - 36.5 g/dL    RDW 15.2 (H) 10.0 - 15.0 %    Platelet Count 272 150 - 450 10e9/L    Diff Method Automated Method     % Neutrophils 63.0 %    % Lymphocytes 23.6 %    % Monocytes 11.7 %    % Eosinophils 0.0 %    % Basophils 1.0 %    % Immature Granulocytes 0.7 %    Nucleated RBCs 0 0 /100    Absolute Neutrophil 3.8 1.6 - 8.3 10e9/L    Absolute Lymphocytes 1.4 0.8 - 5.3 10e9/L    Absolute Monocytes 0.7 0.0 - 1.3 10e9/L    Absolute Eosinophils 0.0 0.0 - 0.7 10e9/L    Absolute Basophils 0.1 0.0 - 0.2 10e9/L    Abs Immature Granulocytes 0.0 0 - 0.4 10e9/L    Absolute Nucleated RBC 0.0

## 2018-07-06 NOTE — PROGRESS NOTES
Infusion Nursing Note:  Keren Erickson presents today for Cycle 29, day 1 Herceptin.    Patient seen by provider today: No    Note: Patient presents to clinic today feeling generally well with no questions.      Intravenous Access:  Implanted Port.    Treatment Conditions:  Lab Results   Component Value Date    HGB 13.4 07/06/2018     Lab Results   Component Value Date    WBC 5.5 07/06/2018      Lab Results   Component Value Date    ANEU 3.5 07/06/2018     Lab Results   Component Value Date     07/06/2018      Lab Results   Component Value Date     07/06/2018                   Lab Results   Component Value Date    POTASSIUM 4.2 07/06/2018           Lab Results   Component Value Date    MAG 2.4 04/10/2017            Lab Results   Component Value Date    CR 0.70 07/06/2018                   Lab Results   Component Value Date    OMA 9.4 07/06/2018                Lab Results   Component Value Date    BILITOTAL 0.4 07/06/2018           Lab Results   Component Value Date    ALBUMIN 3.5 07/06/2018                    Lab Results   Component Value Date    ALT 26 07/06/2018           Lab Results   Component Value Date    AST 26 07/06/2018     Results reviewed, labs MET treatment parameters, ok to proceed with treatment.  ECHO/MUGA completed 6/8/2018 EF 60-65%    Post Infusion Assessment:  Patient tolerated infusion without incident.  Blood return noted pre and post infusion.  Site patent and intact, free from redness, edema or discomfort.  No evidence of extravasations.  Access discontinued per protocol.    Discharge Plan:   Prescription refills given for Lovenox.  Discharge instructions reviewed with: Patient.  Patient and/or family verbalized understanding of discharge instructions and all questions answered.  AVS to patient via [employer].  Patient will return 7/27/2018 for next appointment.   Patient discharged in stable condition accompanied by: friend.  Departure Mode: Wheelchair.    Britney  RYELY Ball

## 2018-07-06 NOTE — MR AVS SNAPSHOT
After Visit Summary   7/6/2018    Keren Erickson    MRN: [id number]           Patient Information     Date Of Birth          1955        Visit Information        Provider Department      7/6/2018 9:30 AM UC 25 ATC;  ONCOLOGY INFUSION [facility]        Today's Diagnoses     Metastatic breast cancer (H)    -  1    Brain metastasis (H)        Carcinoma of breast metastatic to multiple sites, unspecified laterality (H)          Care Instructions    Contact Numbers    [facility] [place]: [phone]  [facility] [place]:  [phone]    Call triage with chills and/or temperature greater than or equal to 100.5, uncontrolled nausea/vomiting, diarrhea, constipation, dizziness, shortness of breath, chest pain, bleeding, unexplained bruising, or any new/concerning symptoms, questions/concerns.     If you are having any concerning symptoms or wish to speak to a provider before your next infusion visit, please call your care coordinator or triage to notify them so we can adequately serve you.       After Hours: [phone]    If after hours, weekends, or holidays, call main hospital  and ask for Oncology doctor on call.         July 2018 Sunday Monday Tuesday Wednesday Thursday Friday Saturday   1     2     3     4     5     6     P [facility] LAB DRAW    9:00 AM   (15 min.)    [facility]   [facility]     P ONC INFUSION 60    9:30 AM   (60 min.)    ONCOLOGY INFUSION   [facility] 7       8     9     10     11     [facility] [place]    9:30 AM   (15 min.)   Bhargav Kasper MD   [facility] 12     13     14       15     16     17     18     19     20     21       22     23     24     25     26     27     [facility] DRAW   11:30 AM   (15 min.)    [facility] LAB DRAW   [facility] Lab Draw     [facility] RETURN   11:45 AM   (50 min.)   Deyanira Costello PA-C   [facility]     UMP ONC INFUSION 60    1:00 PM    (60 min.)   UC ONCOLOGY INFUSION   [facility] 28 29 30 31 August 2018 Sunday Monday Tuesday Wednesday Thursday Friday Saturday                  1     2     3     4       5     6     7     8     9     10     11       12     13     14     15     16     17     [facility] ONC INFUSION 60   11:30 AM   (60 min.)   UC ONCOLOGY INFUSION   [facility] 18       19     20     21     22     23     24     25       26     27     28     MR BRAIN WWO   11:00 AM   (60 min.)   UUMR2   [facility] [place] LAB DRAW    2:00 PM   (15 min.)    [facility] LAB DRAW   [facility] Lab Draw     [facility] RETURN ACTIVE TREATMENT    2:15 PM   (30 min.)   Marlen Harper MD   [facility] 29 30 31                       Lab Results:  Recent Results (from the past 12 hour(s))   Comprehensive metabolic panel    Collection Time: 07/06/18  9:30 AM   Result Value Ref Range    Sodium 137 133 - 144 mmol/L    Potassium 4.2 3.4 - 5.3 mmol/L    Chloride 102 94 - 109 mmol/L    Carbon Dioxide 28 20 - 32 mmol/L    Anion Gap 7 3 - 14 mmol/L    Glucose 88 70 - 99 mg/dL    Urea Nitrogen 9 7 - 30 mg/dL    Creatinine 0.70 0.52 - 1.04 mg/dL    GFR Estimate 85 >60 mL/min/1.7m2    GFR Estimate If Black >90 >60 mL/min/1.7m2    Calcium 9.4 8.5 - 10.1 mg/dL    Bilirubin Total 0.4 0.2 - 1.3 mg/dL    Albumin 3.5 3.4 - 5.0 g/dL    Protein Total 6.7 (L) 6.8 - 8.8 g/dL    Alkaline Phosphatase 82 40 - 150 U/L    ALT 26 0 - 50 U/L    AST 26 0 - 45 U/L   CBC with platelets differential    Collection Time: 07/06/18  9:30 AM   Result Value Ref Range    WBC 5.5 4.0 - 11.0 10e9/L    RBC Count 4.04 3.8 - 5.2 10e12/L    Hemoglobin 13.4 11.7 - 15.7 g/dL    Hematocrit 38.9 35.0 - 47.0 %    MCV 96 78 - 100 fl    MCH 33.2 (H) 26.5 - 33.0 pg    MCHC 34.4 31.5 - 36.5 g/dL    RDW 14.1 10.0 - 15.0 %    Platelet Count 281 150 - 450 10e9/L    Diff Method Automated  Method     % Neutrophils 64.0 %    % Lymphocytes 23.2 %    % Monocytes 11.3 %    % Eosinophils 0.2 %    % Basophils 0.9 %    % Immature Granulocytes 0.4 %    Nucleated RBCs 0 0 /100    Absolute Neutrophil 3.5 1.6 - 8.3 10e9/L    Absolute Lymphocytes 1.3 0.8 - 5.3 10e9/L    Absolute Monocytes 0.6 0.0 - 1.3 10e9/L    Absolute Eosinophils 0.0 0.0 - 0.7 10e9/L    Absolute Basophils 0.1 0.0 - 0.2 10e9/L    Abs Immature Granulocytes 0.0 0 - 0.4 10e9/L    Absolute Nucleated RBC 0.0                Follow-ups after your visit        Your next 10 appointments already scheduled     Jul 11, 2018  9:30 AM CDT   [facility] [place] with Bhargav Kasper MD   [facility] Clinic ([facility])    [address]  [facility]  [facility] [address]   [phone]            Jul 27, 2018 11:30 AM CDT   [facility] Lab Draw with  [facility] LAB DRAW   [facility] Lab Draw ([facility])    [address]  [address]  [place] [facility] [place] [address]   [phone]            Jul 27, 2018 12:00 PM CDT   (Arrive by 11:45 AM)   Return Visit with Deyanira Costello PA-C   [facility] ([facility])    [address] [place] [address]  [address]  [facility] [address]   [phone]            Jul 27, 2018  1:00 PM CDT   Infusion 60 with UC ONCOLOGY INFUSION, UC 11 ATC   [facility] ([facility])    [address]  [address]  [place] [facility] [place] [address]   [phone]            Aug 17, 2018 11:30 AM CDT   Infusion 60 with UC ONCOLOGY INFUSION, UC 22 ATC   [facility] ([facility])    [address] [place] [address]  [address]  [facility] [address]   [phone]            Aug 28, 2018 11:00 AM CDT   MR BRAIN W/O & W CONTRAST with UUMR2   [facility] [place], MRI ([facility])    [address]  [address]   [phone]           Take your medicines as usual, unless your doctor tells you not to. Bring a list of your current medicines to your exam (including vitamins, minerals and over-the-counter drugs).  You may or may not receive intravenous (IV) contrast for this exam pending the discretion of the Radiologist.  You do not need to do anything special to prepare.  The MRI machine uses a strong magnet. Please wear clothes without metal (snaps, zippers). A sweatsuit works well, or we may give you a hospital gown.  Please remove any body piercings and hair extensions before you arrive. You will also remove watches, jewelry, hairpins, wallets, dentures, partial dental plates and hearing aids. You may wear contact lenses, and you may be able to wear your rings. We have a safe place to keep your personal items, but it is safer to leave them at home.  **IMPORTANT** THE INSTRUCTIONS BELOW ARE ONLY FOR THOSE PATIENTS WHO HAVE BEEN PRESCRIBED SEDATION OR GENERAL ANESTHESIA DURING THEIR MRI PROCEDURE:  IF YOUR DOCTOR PRESCRIBED ORAL SEDATION (take medicine to help you relax during your exam):   You must get the medicine from your doctor (oral medication) before you arrive. Bring the medicine to the exam. Do not take it at home. You ll be told when to take it upon arriving for your exam.   Arrive one hour early. Bring someone who can take you home after the test. Your medicine will make you sleepy. After the exam, you may not drive, take a bus or take a taxi by yourself.  IF YOUR DOCTOR PRESCRIBED IV SEDATION:   Arrive one hour early. Bring someone who can take you home after the test. Your medicine will make you sleepy. After the exam, you may not drive, take a bus or take a taxi by yourself.   No eating 6 hours before your exam. You may have clear liquids up until 4 hours before your exam. (Clear liquids include water, clear tea, black coffee and fruit juice without pulp.)  IF YOUR DOCTOR PRESCRIBED ANESTHESIA (be  asleep for your exam):   Arrive 1 1/2 hours early. Bring someone who can take you home after the test. You may not drive, take a bus or take a taxi by yourself.   No eating 8 hours before your exam. You may have clear liquids up until 4 hours before your exam. (Clear liquids include water, clear tea, black coffee and fruit juice without pulp.)   You will spend four to five hours in the recovery room.  Please call the Imaging Department at your exam site with any questions.            Aug 28, 2018  2:00 PM CDT   [facility] Lab Draw with  [facility] LAB DRAW   [facility] Lab Draw ([facility])    [address] [place] [address]  [address]  [facility] [address] [phone]            Aug 28, 2018  2:30 PM CDT   (Arrive by 2:15 PM)   Return Active Treatment with Marlen Harper MD   [facility] ([facility])    [address] [place] [address]  [address]  [facility] [address] [phone]              Who to contact     If you have questions or need follow up information about today's clinic visit or your schedule please contact [facility] directly at [phone].  Normal or non-critical lab and imaging results will be communicated to you by [employer], letter or phone within 4 business days after the clinic has received the results. If you do not hear from us within 7 days, please contact the clinic through [employer] or phone. If you have a critical or abnormal lab result, we will notify you by phone as soon as possible.  Submit refill requests through [employer] or call your pharmacy and they will forward the refill request to us. Please allow 3 business days for your refill to be completed.          Additional Information About Your Visit        [employer] Information     [employer] gives you secure access to your electronic health record. If you see a primary care provider, you can also send messages to your care team and make appointments. If  you have questions, please call your primary care clinic.  If you do not have a primary care provider, please call [phone] and they will assist you.        Care EveryWhere ID     This is your Care EveryWhere ID. This could be used by other organizations to access your [place] medical records  [id number]        Your Vitals Were     Pulse Temperature Respirations Pulse Oximetry BMI (Body Mass Index)       66 97.9  F (36.6  C) 16 95% 20.66 kg/m2        Blood Pressure from Last 3 Encounters:   07/06/18 121/82   06/15/18 118/81   06/12/18 136/90    Weight from Last 3 Encounters:   07/06/18 51.3 kg (113 lb)   06/15/18 51.6 kg (113 lb 11.2 oz)   06/12/18 52.5 kg (115 lb 11.2 oz)              We Performed the Following     Ca27.29  breast tumor marker     CBC with platelets differential     CEA     Comprehensive metabolic panel        Primary Care Provider Office Phone # Fax #    Kelly aHrt [phone]       [address] [facility] [address]        Equal Access to Services     [facility]: Hadii keturah handleyo Sophoebe, waaxda luevelio, qaybta kaalmajb jin, freddie escobedo . So [facility] [phone].    ATENCIÓN: Si habla español, tiene a ramires disposición servicios gratuitos de asistencia lingüística. Kyung al [phone].    We comply with applicable federal civil rights laws and [place] laws. We do not discriminate on the basis of race, color, national origin, age, disability, sex, sexual orientation, or gender identity.            Thank you!     Thank you for choosing [facility]  for your care. Our goal is always to provide you with excellent care. Hearing back from our patients is one way we can continue to improve our services. Please take a few minutes to complete the written survey that you may receive in the mail after your visit with us. Thank you!             Your Updated Medication List - Protect others around you: Learn how to  safely use, store and throw away your medicines at www.disposemymeds.org.          This list is accurate as of 7/6/18 12:03 PM.  Always use your most recent med list.                   Brand Name Dispense Instructions for use Diagnosis    desmopressin 0.2 MG tablet    DDAVP    45 tablet    Take  1/2 tablet once daily by mouth    Diabetes insipidus (H)       exemestane 25 MG tablet    AROMASIN    30 tablet    Take 1 tablet (25 mg) by mouth daily    Carcinoma of breast metastatic to multiple sites, unspecified laterality (H), Metastatic breast cancer (H)       * LOVENOX 60 MG/0.6ML injection   Generic drug:  enoxaparin     60 Syringe    Inject 0.5 mLs (50 mg) Subcutaneous 2 times daily    Other acute pulmonary embolism without acute cor pulmonale (H)       * enoxaparin 60 MG/0.6ML injection    LOVENOX    36 mL    INJECT THE CONTENTS OF ONE SYRINGE UNDER THE SKIN TWO TIMES A DAY    Other acute pulmonary embolism without acute cor pulmonale (H)       meclizine 25 MG tablet    ANTIVERT    30 tablet    Take 1 tablet (25 mg) by mouth 3 times daily as needed for dizziness    BPPV (benign paroxysmal positional vertigo), right       methylphenidate 5 MG tablet    RITALIN    30 tablet    Take 5 mg once daily as needed for fatigue    Carcinoma of breast metastatic to multiple sites, unspecified laterality (H)       Multi-vitamin Tabs tablet      Take 1 tablet by mouth daily        omeprazole 20 MG tablet     30 tablet    Take 1 tablet (20 mg) by mouth daily    Gastroesophageal reflux disease without esophagitis       ondansetron 4 MG tablet    ZOFRAN    18 tablet    Take 1 tablet (4 mg) by mouth every 8 hours as needed for nausea    Nausea       prochlorperazine 10 MG tablet    COMPAZINE    90 tablet    Take 1 tablet (10 mg) by mouth every 6 hours as needed for nausea or vomiting    Nausea       timolol 0.5 % ophthalmic solution    TIMOPTIC    1 Bottle    Place 1 drop into both eyes 2 times daily    Primary open angle glaucoma  of left eye, mild stage       TYLENOL PO      Take 500 mg by mouth once        UNABLE TO FIND      MEDICATION NAME: Probiotic        VITAMIN D (CHOLECALCIFEROL) PO      Take 5,000 Units by mouth daily        VITAMIN E BLEND PO      Take by mouth daily        * Notice:  This list has 2 medication(s) that are the same as other medications prescribed for you. Read the directions carefully, and ask your doctor or other care provider to review them with you.

## 2018-07-06 NOTE — PATIENT INSTRUCTIONS
Contact Numbers    [facility] Main Line: [phone]  [facility] Triage:  [phone]    Call triage with chills and/or temperature greater than or equal to 100.5, uncontrolled nausea/vomiting, diarrhea, constipation, dizziness, shortness of breath, chest pain, bleeding, unexplained bruising, or any new/concerning symptoms, questions/concerns.     If you are having any concerning symptoms or wish to speak to a provider before your next infusion visit, please call your care coordinator or triage to notify them so we can adequately serve you.       After Hours: [phone]    If after hours, weekends, or holidays, call main hospital  and ask for Oncology doctor on call.         July 2018 Sunday Monday Tuesday Wednesday Thursday Friday Saturday   1     2     3     4     5     6     [facility] LAB DRAW    9:00 AM   (15 min.)    [facility]     UMP ONC INFUSION 60    9:30 AM   (60 min.)   UC ONCOLOGY INFUSION   [facility] 7       8     9     10     11     UMP NEW [place]NEA    9:30 AM   (15 min.)   Bhargav Kasper MD   [facility] 12     13     14       15     16     17     18     19     20     21       22     23     24     25     26     27     [facility] LAB DRAW   11:30 AM   (15 min.)    [facility] LAB DRAW   [facility] Lab Draw     UMP RETURN   11:45 AM   (50 min.)   Deyanira Costello PA-C   [facility]     UMP ONC INFUSION 60    1:00 PM   (60 min.)   UC ONCOLOGY INFUSION   [facility] 28       29     30     31 August 2018 Sunday Monday Tuesday Wednesday Thursday Friday Saturday                  1     2     3     4       5     6     7     8     9     10     11       12     13     14     15     16     17     UMP ONC INFUSION 60   11:30 AM   (60 min.)   UC ONCOLOGY INFUSION   [facility] 18       19     20     21     22     23     24     25       26     27      28     MR BRAIN WWO   11:00 AM   (60 min.)   UUMR2   [facility] [place], MRI     [facility] LAB DRAW    2:00 PM   (15 min.)    M[facility] LAB DRAW   [facility] Mason[facility] Lab Draw     [facility] RETURN ACTIVE TREATMENT    2:15 PM   (30 min.)   Marlen Harper MD   [facility] 29     30     31                       Lab Results:  Recent Results (from the past 12 hour(s))   Comprehensive metabolic panel    Collection Time: 07/06/18  9:30 AM   Result Value Ref Range    Sodium 137 133 - 144 mmol/L    Potassium 4.2 3.4 - 5.3 mmol/L    Chloride 102 94 - 109 mmol/L    Carbon Dioxide 28 20 - 32 mmol/L    Anion Gap 7 3 - 14 mmol/L    Glucose 88 70 - 99 mg/dL    Urea Nitrogen 9 7 - 30 mg/dL    Creatinine 0.70 0.52 - 1.04 mg/dL    GFR Estimate 85 >60 mL/min/1.7m2    GFR Estimate If Black >90 >60 mL/min/1.7m2    Calcium 9.4 8.5 - 10.1 mg/dL    Bilirubin Total 0.4 0.2 - 1.3 mg/dL    Albumin 3.5 3.4 - 5.0 g/dL    Protein Total 6.7 (L) 6.8 - 8.8 g/dL    Alkaline Phosphatase 82 40 - 150 U/L    ALT 26 0 - 50 U/L    AST 26 0 - 45 U/L   CBC with platelets differential    Collection Time: 07/06/18  9:30 AM   Result Value Ref Range    WBC 5.5 4.0 - 11.0 10e9/L    RBC Count 4.04 3.8 - 5.2 10e12/L    Hemoglobin 13.4 11.7 - 15.7 g/dL    Hematocrit 38.9 35.0 - 47.0 %    MCV 96 78 - 100 fl    MCH 33.2 (H) 26.5 - 33.0 pg    MCHC 34.4 31.5 - 36.5 g/dL    RDW 14.1 10.0 - 15.0 %    Platelet Count 281 150 - 450 10e9/L    Diff Method Automated Method     % Neutrophils 64.0 %    % Lymphocytes 23.2 %    % Monocytes 11.3 %    % Eosinophils 0.2 %    % Basophils 0.9 %    % Immature Granulocytes 0.4 %    Nucleated RBCs 0 0 /100    Absolute Neutrophil 3.5 1.6 - 8.3 10e9/L    Absolute Lymphocytes 1.3 0.8 - 5.3 10e9/L    Absolute Monocytes 0.6 0.0 - 1.3 10e9/L    Absolute Eosinophils 0.0 0.0 - 0.7 10e9/L    Absolute Basophils 0.1 0.0 - 0.2 10e9/L    Abs Immature Granulocytes 0.0 0 - 0.4 10e9/L    Absolute Nucleated RBC 0.0

## 2018-07-11 NOTE — LETTER
7/11/2018       RE: Keren Erickson  [address]  [place] [address]     Dear Colleague,    Thank you for referring your patient, Keren Erickson, to the EYE CLINIC at [facility]. Please see a copy of my visit note below.    HPI:   New patient for first eye exam in many years.  Image jumps and moves in [place] LE got worse or started after chemo X 1.5 years.  Vision is otherwise stable and seeing well.  Some ocular irritation both eyes, not using drops.      Interval Update:  - Reports some improvement, jumana, dry irritated, itchy. Denies pain, irritation, itchiness, epiphora, new floaters or flashing lights.      Meds:  Timolol twice a day   Artificial tears     Assessment & Plan:     1. Blepharitis both eyes  not using tears consistently   Warm compresses BID OU  Artificial tears FOUR TIMES A DAY, more PRN   Start AT ointment at bedtime     2. SO myokymia  Has noticed for past 1.5 years  Multiple brain scans in recent months following stereotactic brain surgery    3. GLAUCOMA suspect OD, Primary open angle glaucoma (POAG) OS   IOP 16/18 today from 18/17   OCT retinal nerve fiber layer: normal OD  campos visual field (HVF): nonspecific depression OS   Continue timolol twice a day both eyes    4. Cataracts both eyes  Mild  Observe    5. history of metastatic Breast Cancer  No ocular mets   Continue to monitor    6. Amblyopia OS  Stable   Observe    F/u 6 months      Again, thank you for allowing me to participate in the care of your patient.      Sincerely,    Bhargav Kasper MD

## 2018-07-11 NOTE — MR AVS SNAPSHOT
After Visit Summary   7/11/2018    Keren Erickson    MRN: [id number]           Patient Information     Date Of Birth          1955        Visit Information        Provider Department      7/11/2018 9:30 AM [facility]        Today's Diagnoses     Dry eyes, bilateral    -  1    Glaucoma suspect of both eyes        Superior oblique myokymia of left eye        Nuclear senile cataract of both eyes        Blepharitis of upper and lower eyelids of both eyes, unspecified type           Follow-ups after your visit        Follow-up notes from your care team     Return in about 6 months (around 1/11/2019) for Follow Up v/t.      Your next 10 appointments already scheduled     Jul 27, 2018 11:30 AM CDT   [facility] Lab Draw with UC [facility] LAB DRAW   [facility] Draw ([facility])    [address] [place] [address]  [address]  [facility] [address]   [phone]            Jul 27, 2018 12:00 PM CDT   (Arrive by 11:45 AM)   Return Visit with Deyanira Costello PA-C   [facility] ([facility])    [address] [place] [address]  [address]  [facility] [address]   [phone]            Jul 27, 2018  1:00 PM CDT   Infusion 60 with UC ONCOLOGY INFUSION, UC 11 ATC   [facility] ([facility])    [address]  [address]  [facility] [address]   [phone]            Aug 17, 2018 11:30 AM CDT   Infusion 60 with UC ONCOLOGY INFUSION, UC 22 ATC   [facility] ([facility])    [address] [place] [address]  [address]  [facility] [address]   [phone]            Aug 28, 2018 11:00 AM CDT   MR BRAIN W/O & W CONTRAST with UUMR2   [facility] [place], MRI ([facility])    [address] [facility] [address] [phone]           Take your medicines as usual, unless  your doctor tells you not to. Bring a list of your current medicines to your exam (including vitamins, minerals and over-the-counter drugs).  You may or may not receive intravenous (IV) contrast for this exam pending the discretion of the Radiologist.  You do not need to do anything special to prepare.  The MRI machine uses a strong magnet. Please wear clothes without metal (snaps, zippers). A sweatsuit works well, or we may give you a hospital gown.  Please remove any body piercings and hair extensions before you arrive. You will also remove watches, jewelry, hairpins, wallets, dentures, partial dental plates and hearing aids. You may wear contact lenses, and you may be able to wear your rings. We have a safe place to keep your personal items, but it is safer to leave them at home.  **IMPORTANT** THE INSTRUCTIONS BELOW ARE ONLY FOR THOSE PATIENTS WHO HAVE BEEN PRESCRIBED SEDATION OR GENERAL ANESTHESIA DURING THEIR MRI PROCEDURE:  IF YOUR DOCTOR PRESCRIBED ORAL SEDATION (take medicine to help you relax during your exam):   You must get the medicine from your doctor (oral medication) before you arrive. Bring the medicine to the exam. Do not take it at home. You ll be told when to take it upon arriving for your exam.   Arrive one hour early. Bring someone who can take you home after the test. Your medicine will make you sleepy. After the exam, you may not drive, take a bus or take a taxi by yourself.  IF YOUR DOCTOR PRESCRIBED IV SEDATION:   Arrive one hour early. Bring someone who can take you home after the test. Your medicine will make you sleepy. After the exam, you may not drive, take a bus or take a taxi by yourself.   No eating 6 hours before your exam. You may have clear liquids up until 4 hours before your exam. (Clear liquids include water, clear tea, black coffee and fruit juice without pulp.)  IF YOUR DOCTOR PRESCRIBED ANESTHESIA (be asleep for your exam):   Arrive 1 1/2 hours early. Bring someone who can  take you home after the test. You may not drive, take a bus or take a taxi by yourself.   No eating 8 hours before your exam. You may have clear liquids up until 4 hours before your exam. (Clear liquids include water, clear tea, black coffee and fruit juice without pulp.)   You will spend four to five hours in the recovery room.  Please call the Imaging Department at your exam site with any questions.            Aug 28, 2018  2:00 PM CDT   [facility] Lab Draw with UC [facility] LAB DRAW   [facility] Draw ([facility])    [address] [place] [address]  [address]  [facility] [id number] [phone]            Aug 28, 2018  2:30 PM CDT   (Arrive by 2:15 PM)   Return Active Treatment with Marlen Harper MD   [facility] ([facility])    [address] [place] [address]  [address]  [facility] [id number] [phone]            Sep 07, 2018 10:30 AM CDT   Infusion 60 with  ONCOLOGY INFUSION   [facility] ([facility])    [address] [place] [address]  [address]  [facility] [id number] [phone]              Who to contact     Please call your clinic at [phone] to:    Ask questions about your health    Make or cancel appointments    Discuss your medicines    Learn about your test results    Speak to your doctor            Additional Information About Your Visit        [employer] Information     [employer] gives you secure access to your electronic health record. If you see a primary care provider, you can also send messages to your care team and make appointments. If you have questions, please call your primary care clinic.  If you do not have a primary care provider, please call [phone] and they will assist you.      [employer] is an electronic gateway that provides easy, online access to your medical records. With [employer], you can request a clinic appointment, read your test results, renew a prescription or  communicate with your care team.     To access your existing account, please contact your [facility] or call [phone] for assistance.        Care EveryWhere ID     This is your Care EveryWhere ID. This could be used by other organizations to access your [place] medical records  [id number]         Blood Pressure from Last 3 Encounters:   07/06/18 121/82   06/15/18 118/81   06/12/18 136/90    Weight from Last 3 Encounters:   07/06/18 51.3 kg (113 lb)   06/15/18 51.6 kg (113 lb 11.2 oz)   06/12/18 52.5 kg (115 lb 11.2 oz)              Today, you had the following     No orders found for display         Today's Medication Changes          These changes are accurate as of 7/11/18 11:59 PM.  If you have any questions, ask your nurse or doctor.               Start taking these medicines.        Dose/Directions    Carboxymethylcellulose Sod PF 0.5 % Soln ophthalmic solution   Commonly known as:  REFRESH PLUS   Used for:  Dry eyes, bilateral        Dose:  1 drop   Place 1 drop into both eyes 4 times daily as needed for dry eyes   Quantity:  1 Bottle   Refills:  11       GENTEAL TEARS NIGHT-TIME Oint   Used for:  Dry eyes, bilateral        Dose:  1 Application   Apply 1 Application to eye At Bedtime   Quantity:  1 Tube   Refills:  11            Where to get your medicines      These medications were sent to [facility] [place] [id number] [place] [address]  [address] [place] [address]    Hours:  TRANSPLANT PHONE NUMBER [phone] Phone:  [phone]     Carboxymethylcellulose Sod PF 0.5 % Soln ophthalmic solution    GENTEAL TEARS NIGHT-TIME Oint                Primary Care Provider Office Phone # Fax #    Kelly Hart [phone]       [address] [facility] [address]        Equal Access to Services     CLAUDIA BOND AH: darrel Weaver, freddie hanna  ryan escobedo ah. Karne [facility] [phone].    ATENCIÓN: Si raola kenyetta, tiene a ramires disposición servicios gratuitos de asistencia lingüística. Kyung al [phone].    We comply with applicable federal civil rights laws and [place] laws. We do not discriminate on the basis of race, color, national origin, age, disability, sex, sexual orientation, or gender identity.            Thank you!     Thank you for choosing [facility]  for your care. Our goal is always to provide you with excellent care. Hearing back from our patients is one way we can continue to improve our services. Please take a few minutes to complete the written survey that you may receive in the mail after your visit with us. Thank you!             Your Updated Medication List - Protect others around you: Learn how to safely use, store and throw away your medicines at www.disposemymeds.org.          This list is accurate as of 7/11/18 11:59 PM.  Always use your most recent med list.                   Brand Name Dispense Instructions for use Diagnosis    Carboxymethylcellulose Sod PF 0.5 % Soln ophthalmic solution    REFRESH PLUS    1 Bottle    Place 1 drop into both eyes 4 times daily as needed for dry eyes    Dry eyes, bilateral       desmopressin 0.2 MG tablet    DDAVP    45 tablet    Take  1/2 tablet once daily by mouth    Diabetes insipidus (H)       exemestane 25 MG tablet    AROMASIN    30 tablet    Take 1 tablet (25 mg) by mouth daily    Carcinoma of breast metastatic to multiple sites, unspecified laterality (H), Metastatic breast cancer (H)       GENTEAL TEARS NIGHT-TIME Oint     1 Tube    Apply 1 Application to eye At Bedtime    Dry eyes, bilateral       * LOVENOX 60 MG/0.6ML injection   Generic drug:  enoxaparin     60 Syringe    Inject 0.5 mLs (50 mg) Subcutaneous 2 times daily    Other acute pulmonary embolism without acute cor pulmonale (H)       * enoxaparin 60 MG/0.6ML injection    LOVENOX    36 mL    INJECT THE CONTENTS  OF ONE SYRINGE UNDER THE SKIN TWO TIMES A DAY    Other acute pulmonary embolism without acute cor pulmonale (H)       meclizine 25 MG tablet    ANTIVERT    30 tablet    Take 1 tablet (25 mg) by mouth 3 times daily as needed for dizziness    BPPV (benign paroxysmal positional vertigo), right       methylphenidate 5 MG tablet    RITALIN    30 tablet    Take 5 mg once daily as needed for fatigue    Carcinoma of breast metastatic to multiple sites, unspecified laterality (H)       Multi-vitamin Tabs tablet      Take 1 tablet by mouth daily        omeprazole 20 MG tablet     30 tablet    Take 1 tablet (20 mg) by mouth daily    Gastroesophageal reflux disease without esophagitis       ondansetron 4 MG tablet    ZOFRAN    18 tablet    Take 1 tablet (4 mg) by mouth every 8 hours as needed for nausea    Nausea       prochlorperazine 10 MG tablet    COMPAZINE    90 tablet    Take 1 tablet (10 mg) by mouth every 6 hours as needed for nausea or vomiting    Nausea       timolol 0.5 % ophthalmic solution    TIMOPTIC    1 Bottle    Place 1 drop into both eyes 2 times daily    Primary open angle glaucoma of left eye, mild stage       TYLENOL PO      Take 500 mg by mouth once        UNABLE TO FIND      MEDICATION NAME: Probiotic        VITAMIN D (CHOLECALCIFEROL) PO      Take 5,000 Units by mouth daily        VITAMIN E BLEND PO      Take by mouth daily        * Notice:  This list has 2 medication(s) that are the same as other medications prescribed for you. Read the directions carefully, and ask your doctor or other care provider to review them with you.

## 2018-07-11 NOTE — PROGRESS NOTES
HPI:   New patient for first eye exam in many years.  Image jumps and moves in [place] LE got worse or started after chemo X 1.5 years.  Vision is otherwise stable and seeing well.  Some ocular irritation both eyes, not using drops.      Interval Update:  - Reports some improvement, jumana, dry irritated, itchy. Denies pain, irritation, itchiness, epiphora, new floaters or flashing lights.      Meds:  Timolol twice a day   Artificial tears     Assessment & Plan:     1. Blepharitis both eyes  not using tears consistently   Warm compresses BID OU  Artificial tears FOUR TIMES A DAY, more PRN   Start AT ointment at bedtime     2. SO myokymia  Has noticed for past 1.5 years  Multiple brain scans in recent months following stereotactic brain surgery    3. GLAUCOMA suspect OD, Primary open angle glaucoma (POAG) OS   IOP 16/18 today from 18/17   OCT retinal nerve fiber layer: normal OD  campos visual field (HVF): nonspecific depression OS   Continue timolol twice a day both eyes    4. Cataracts both eyes  Mild  Observe    5. history of metastatic Breast Cancer  No ocular mets   Continue to monitor    6. Amblyopia OS  Stable   Observe    F/u 6 months    Selvin Centeno MD  Ophthalmology Resident, PGY-3  [facility]      Attending Physician Attestation:  Complete documentation of historical and exam elements from today's encounter can be found in the full encounter summary report (not reduplicated in this progress note).  I personally obtained the chief complaint(s) and history of present illness.  I confirmed and edited as necessary the review of systems, past medical/surgical history, family history, social history, and examination findings as documented by others; and I examined the patient myself.  I personally reviewed the relevant tests, images, and reports as documented above.  I formulated and edited as necessary the assessment and plan and discussed the findings and management plan with the patient  and family. - Bhargav Kasper MD    I personally spent great than 40min with the patient, of which >50% of the time was spent face to face with the patient, counseling and coordinating care with the patient. We discussed the complexity of her diagnosis, the need for further information prior to proceeding with yet another surgery, and the unknown prognosis for the patient at this time.    Bhargav Kasper MD

## 2018-07-26 NOTE — PROGRESS NOTES
HEMATOLOGY/ONCOLOGY PROGRESS NOTE  Jul 27, 2018    REASON FOR VISIT: follow-up of metastatic breast cancer, triple positive    DIAGNOSIS:   Keren Erickson is a 62 year old woman who presented to the hospital initially in 09/2013 with complaints of dyspnea. Workup revealed a large pericardial effusion and pleural effusion. Physical examination revealed a large untreated left breast mass encompassing the entire left breast. Biopsies revealed these were ER, ME and HER2-positive breast cancer. She had a thoracentesis and pericardial sclerosis performed. She was originally on Arimidex and Herceptin. In 01/2014, she was switched to tamoxifen and Herceptin due to arthralgias, and she ultimately developed progressive disease and was switched to Faslodex and Herceptin. In 01/2015, she was found to have progressive disease and was switched to T-DM1.     Initially when she was seen in late 02/2015, she complained of some V5 sensory deficit. This was subjective. I was not able to elicit this on examination. This persisted and further workup was performed with a brain MRI. This revealed what was initially thought to be 3 punctate brain metastases. She originally saw Radiation Oncology and Neurosurgery as well as underwent a lumbar puncture. Cytology from the lumbar puncture showed no evidence of leptomeningeal disease. She was treated with Gamma Knife radiation to 6 lesions, performed on 04/16/2015.  She initiated therapy with TDM1 in January 2015 and continued this through 6/26/2015 with overall stable disease. She has since been on a break from treatment. The patient presented earlier this month with recurrent shortness of breath.  Imaging revealed recurrent right-sided pleural effusion.  She has since undergone thoracentesis with cytology pending.  Clinically, however, there was evidence of disease progression. PET scan performed on 11/25/2015 demonstrated progression of disease at multiple sites. She restarted  "TDM1 on 11/27/2015, however experienced a mild transfusion reaction with shortness of breath and chest tightness, resolved upon stopping TDM1 and 125 mg of SoluMedrol. She had progression of disease on repeat imaging 2/17/2016, as well as new brain metastases. She started TDM1 with Perjeta on 3/4/2016. She underwent gamma knife to brain mets on 3/8/16.       In late May 2016, she was found to have progressive brain metastases.  She received whole brain radiation.  She had a follow up brain MRI August 2016 that was stable.     In September 2016, she enrolled on [place]  trial and was randomized to the standard of care arm/Physician's Choice; started on gemzar and herceptin. She was hospitalized 1/27-2/3/17 for presumed HCAP. Trial was suspended. She continued on gemzar and heceptin with stable disease. Last restaging was 4/7/17 and stable. Gemzar was placed on hold from 4/10/2017 through 6/22/17 so that she could recover from pneumonia.    Dominga discontinued gemzar completely and remained on Aromasin and herceptin since Dec 2017.      INTERVAL HISTORY: Ravi is here with her friend. She has no new concerns. Feels she is getting stronger. Remains with poor appetite but feels she compensates well. Vision is improving, no new visual changes, headaches,or other neurologic symptoms. No new focal areas of pain. Occasional hot flashes, tolerable. Remains with \"chemo brain\", which she relates to poor short term memory, which she feels is chronic since radiation without change. She has some mild diarrhea after Herceptin, which resolves after a few days and is not to the degree that she feels she needs medication for. No fevers, infections, SOB, chest pain, abdominal pain, bleeding, or sweling.  Her 10-point review of systems is otherwise negative.      PHYSICAL EXAMINATION:     BP (!) 137/93 (BP Location: Right arm, Patient Position: Sitting, Cuff Size: Adult Small)  Pulse 67  Temp 97.5  F (36.4  C) (Oral)  Resp 16 "  Wt 51.2 kg (112 lb 14.4 oz)  SpO2 97%  BMI 20.64 kg/m2    Wt Readings from Last 4 Encounters:   07/06/18 51.3 kg (113 lb)   06/15/18 51.6 kg (113 lb 11.2 oz)   06/12/18 52.5 kg (115 lb 11.2 oz)   05/25/18 51.7 kg (114 lb)     Constitutional: Alert, oriented, thin female in no visible distress  Eyes: PERRL. EOMI. MMM without oral lesions. Anicteric sclerae.    CV: RRR, no murmurs   Resp: CTAB slightly diminished at bases. Breathing comfortably.  Abdomen: Soft, non-tender, non-distended. Bowel sounds present.   Extremities: No lower extremity edema   Skin: Warm, dry. No bruising or petechiae. No rash  Lymph: No palpable cervical or supraclavicular LAD  Neuro: CN III-XII grossly intact. Ambulates several steps with slow shuffling gate, today she is using a walker.  Persistent nystagmus with left lateral gaze, this is not new.  5/5 strength in LE proximal and distal.      LABS:  Results for HEIDI RUIZ (MRN [id number]) as of 7/27/2018 12:47   Ref. Range 7/27/2018 12:00   Sodium Latest Ref Range: 133 - 144 mmol/L 139   Potassium Latest Ref Range: 3.4 - 5.3 mmol/L 4.0   Chloride Latest Ref Range: 94 - 109 mmol/L 106   Carbon Dioxide Latest Ref Range: 20 - 32 mmol/L 26   Urea Nitrogen Latest Ref Range: 7 - 30 mg/dL 15   Creatinine Latest Ref Range: 0.52 - 1.04 mg/dL 0.69   GFR Estimate Latest Ref Range: >60 mL/min/1.7m2 86   GFR Estimate If Black Latest Ref Range: >60 mL/min/1.7m2 >90   Calcium Latest Ref Range: 8.5 - 10.1 mg/dL 9.2   Anion Gap Latest Ref Range: 3 - 14 mmol/L 7   Albumin Latest Ref Range: 3.4 - 5.0 g/dL 3.6   Protein Total Latest Ref Range: 6.8 - 8.8 g/dL 7.3   Bilirubin Total Latest Ref Range: 0.2 - 1.3 mg/dL 0.4   Alkaline Phosphatase Latest Ref Range: 40 - 150 U/L 88   ALT Latest Ref Range: 0 - 50 U/L 19   AST Latest Ref Range: 0 - 45 U/L 20   Glucose Latest Ref Range: 70 - 99 mg/dL 82   WBC Latest Ref Range: 4.0 - 11.0 10e9/L 6.2   Hemoglobin Latest Ref Range: 11.7 - 15.7 g/dL 13.9    Hematocrit Latest Ref Range: 35.0 - 47.0 % 41.6   Platelet Count Latest Ref Range: 150 - 450 10e9/L 318       IMPRESSION/PLAN:  Dominga is a 62-year-old woman with history of metastatic ER-positive, HER-2 positive breast cancer, with involvement of the bone, history of pleural effusions treated with pleurodesis and PleurX placement, and with treated brain metastases. She was started on [place]  clinical trial and randomized to physician's choice, on Gemzar/Herceptin since 9/29/16.  She discontinued gemzar 121/5/17.  Now on herceptin and aromasin.      1.  Denia remains on aromasin and Herceptin for her breast cancer. She had slight progression in the breast on restaging in June 2018. Given her desire to continue to improve her performance status and the relatively stable changes, she was continued on Aromasin and Herceptin. She continues to do well overall without any new side effects or symptoms.  - Repeat imaging in September. If progression, consider adding palbociclib.     2.  Brain metastases.  Routine MRI scheduled end of August. No new neuro symptoms.     3.  Central diabetes insipidus.  This was diagnosed as an inpatient in the setting of hypernatremia.  She was started on desmopressin.  She has been seeing Endocrinology.       4.  Nutrition.  Her weight is overall stable.      5.  She is due for Xgeva.  Continue this for her bone metastases.    6.  She does have a POLST and is DNR/DNI.  Her power of  is listed in the computer.         Deyanira Costello PA-C

## 2018-07-27 NOTE — MR AVS SNAPSHOT
After Visit Summary   7/27/2018    Keren Erickson    MRN: [id number]           Patient Information     Date Of Birth          1955        Visit Information        Provider Department      7/27/2018 12:00 PM Deyanira Costello PA-C [facility]        Today's Diagnoses     Carcinoma of breast metastatic to multiple sites, unspecified laterality (H)    -  1    Other acute pulmonary embolism without acute cor pulmonale (H)           Follow-ups after your visit        Your next 10 appointments already scheduled     Jul 27, 2018  1:00 PM CDT   Infusion 60 with UC ONCOLOGY INFUSION, UC 11 ATC   [facility] ([facility])    [address]  [address]  [place] [id number] [phone]            Aug 17, 2018 11:30 AM CDT   Infusion 60 with UC ONCOLOGY INFUSION, UC 22 ATC   [facility] ([facility])    [address]  [address]  [place] [id number] [phone]            Aug 17, 2018  1:00 PM CDT   MR BRAIN W/O & W CONTRAST with [id number]   [facility] ([facility])    [address]  1st Floor  [place] [id number] [phone]           Take your medicines as usual, unless your doctor tells you not to. Bring a list of your current medicines to your exam (including vitamins, minerals and over-the-counter drugs).  You may or may not receive intravenous (IV) contrast for this exam pending the discretion of the Radiologist.  You do not need to do anything special to prepare.  The MRI machine uses a strong magnet. Please wear clothes without metal (snaps, zippers). A sweatsuit works well, or we may give you a hospital gown.  Please remove any body piercings and hair extensions before you arrive. You will also remove watches, jewelry, hairpins, wallets, dentures, partial dental plates and hearing aids. You may  wear contact lenses, and you may be able to wear your rings. We have a safe place to keep your personal items, but it is safer to leave them at home.  **IMPORTANT** THE INSTRUCTIONS BELOW ARE ONLY FOR THOSE PATIENTS WHO HAVE BEEN PRESCRIBED SEDATION OR GENERAL ANESTHESIA DURING THEIR MRI PROCEDURE:  IF YOUR DOCTOR PRESCRIBED ORAL SEDATION (take medicine to help you relax during your exam):   You must get the medicine from your doctor (oral medication) before you arrive. Bring the medicine to the exam. Do not take it at home. You ll be told when to take it upon arriving for your exam.   Arrive one hour early. Bring someone who can take you home after the test. Your medicine will make you sleepy. After the exam, you may not drive, take a bus or take a taxi by yourself.  IF YOUR DOCTOR PRESCRIBED IV SEDATION:   Arrive one hour early. Bring someone who can take you home after the test. Your medicine will make you sleepy. After the exam, you may not drive, take a bus or take a taxi by yourself.   No eating 6 hours before your exam. You may have clear liquids up until 4 hours before your exam. (Clear liquids include water, clear tea, black coffee and fruit juice without pulp.)  IF YOUR DOCTOR PRESCRIBED ANESTHESIA (be asleep for your exam):   Arrive 1 1/2 hours early. Bring someone who can take you home after the test. You may not drive, take a bus or take a taxi by yourself.   No eating 8 hours before your exam. You may have clear liquids up until 4 hours before your exam. (Clear liquids include water, clear tea, black coffee and fruit juice without pulp.)   You will spend four to five hours in the recovery room.  Please call the Imaging Department at your exam site with any questions.            Aug 28, 2018  2:00 PM T   [facility] Lab Draw with  MASONIC LAB DRAW   [facility] Draw ([facility])    [address]  [address]  [place] [address] [phone]            Aug  28, 2018  2:30 PM CDT   (Arrive by 2:15 PM)   Return Active Treatment with Marlen Harper MD   [facility] ([facility])    [address]  [address]  [place] [address] [phone]            Sep 07, 2018 10:30 AM CDT   Infusion 60 with UC ONCOLOGY INFUSION, UC 31 ATC   [facility] ([facility])    [address]  [address]  [place] [address] [phone]            Oct 01, 2018  1:00 PM CDT   (Arrive by 12:45 PM)   RETURN ENDOCRINE with Rolando Mishra MD   [facility] ([facility])    [address] [place] [address]  3rd Floor  [facility] [address] [phone]              Who to contact     If you have questions or need follow up information about today's clinic visit or your schedule please contact [facility] directly at [phone].  Normal or non-critical lab and imaging results will be communicated to you by [employer], letter or phone within 4 business days after the clinic has received the results. If you do not hear from us within 7 days, please contact the clinic through [email or web address] or phone. If you have a critical or abnormal lab result, we will notify you by phone as soon as possible.  Submit refill requests through [employer] or call your pharmacy and they will forward the refill request to us. Please allow 3 business days for your refill to be completed.          Additional Information About Your Visit        [employer] Information     [employer] gives you secure access to your electronic health record. If you see a primary care provider, you can also send messages to your care team and make appointments. If you have questions, please call your primary care clinic.  If you do not have a primary care provider, please call [phone] and they will assist you.        Care EveryWhere ID     This is your Care EveryWhere ID. This could  be used by other organizations to access your [place] medical records  [id number]        Your Vitals Were     Pulse Temperature Respirations Pulse Oximetry BMI (Body Mass Index)       67 97.5  F (36.4  C) (Oral) 16 97% 20.64 kg/m2        Blood Pressure from Last 3 Encounters:   07/27/18 (!) 137/93   07/06/18 121/82   06/15/18 118/81    Weight from Last 3 Encounters:   07/27/18 51.2 kg (112 lb 14.4 oz)   07/06/18 51.3 kg (113 lb)   06/15/18 51.6 kg (113 lb 11.2 oz)              We Performed the Following     Ca27.29  breast tumor marker     CBC with platelets differential     CEA     Comprehensive metabolic panel          Today's Medication Changes          These changes are accurate as of 7/27/18 12:49 PM.  If you have any questions, ask your nurse or doctor.               Start taking these medicines.        Dose/Directions    methylphenidate 5 MG tablet   Commonly known as:  RITALIN   Used for:  Carcinoma of breast metastatic to multiple sites, unspecified laterality (H)   Started by:  Deyanira Costello PA-C        Take 5 mg once daily as needed for fatigue   Quantity:  30 tablet   Refills:  0         These medicines have changed or have updated prescriptions.        Dose/Directions    enoxaparin 60 MG/0.6ML injection   Commonly known as:  LOVENOX   This may have changed:  Another medication with the same name was removed. Continue taking this medication, and follow the directions you see here.   Used for:  Other acute pulmonary embolism without acute cor pulmonale (H)   Changed by:  Deyanira Costello PA-C        Dose:  50 mg   Inject 0.5 mLs (50 mg) Subcutaneous 2 times daily   Quantity:  60 Syringe   Refills:  3            Where to get your medicines      These medications were sent to [facility] [place], [place] - [address]  [address], [facility] [address]    Hours:  TRANSPLANT PHONE NUMBER [phone] Phone:  [phone]      enoxaparin 60 MG/0.6ML injection         Some of these will need a paper prescription and others can be bought over the counter.  Ask your nurse if you have questions.     Bring a paper prescription for each of these medications     methylphenidate 5 MG tablet                Primary Care Provider Office Phone # Fax #    Kelly Bg Hart [phone]       [address] [facility] [address]        Equal Access to Services     CLAUDIA BOND : Hadii aad ku hadasho Soomaali, waaxda luqadaha, qaybta kaalmada adeegyada, waxay idiin dorien adeirineo quintero lakellestephen keys. So [facility] [phone].    ATENCIÓN: Si mino kumari, tiene a ramires disposición servicios gratuitos de asistencia lingüística. Llame al [phone].    We comply with applicable federal civil rights laws and [place] laws. We do not discriminate on the basis of race, color, national origin, age, disability, sex, sexual orientation, or gender identity.            Thank you!     Thank you for choosing [facility]  for your care. Our goal is always to provide you with excellent care. Hearing back from our patients is one way we can continue to improve our services. Please take a few minutes to complete the written survey that you may receive in the mail after your visit with us. Thank you!             Your Updated Medication List - Protect others around you: Learn how to safely use, store and throw away your medicines at www.disposemymeds.org.          This list is accurate as of 7/27/18 12:49 PM.  Always use your most recent med list.                   Brand Name Dispense Instructions for use Diagnosis    Carboxymethylcellulose Sod PF 0.5 % Soln ophthalmic solution    REFRESH PLUS    1 Bottle    Place 1 drop into both eyes 4 times daily as needed for dry eyes    Dry eyes, bilateral       desmopressin 0.2 MG tablet    DDAVP    45 tablet    Take  1/2 tablet once daily by mouth    Diabetes insipidus (H)       enoxaparin 60 MG/0.6ML  injection    LOVENOX    60 Syringe    Inject 0.5 mLs (50 mg) Subcutaneous 2 times daily    Other acute pulmonary embolism without acute cor pulmonale (H)       exemestane 25 MG tablet    AROMASIN    30 tablet    Take 1 tablet (25 mg) by mouth daily    Carcinoma of breast metastatic to multiple sites, unspecified laterality (H), Metastatic breast cancer (H)       GENTEAL TEARS NIGHT-TIME Oint     1 Tube    Apply 1 Application to eye At Bedtime    Dry eyes, bilateral       meclizine 25 MG tablet    ANTIVERT    30 tablet    Take 1 tablet (25 mg) by mouth 3 times daily as needed for dizziness    BPPV (benign paroxysmal positional vertigo), right       methylphenidate 5 MG tablet    RITALIN    30 tablet    Take 5 mg once daily as needed for fatigue    Carcinoma of breast metastatic to multiple sites, unspecified laterality (H)       Multi-vitamin Tabs tablet      Take 1 tablet by mouth daily        omeprazole 20 MG tablet     30 tablet    Take 1 tablet (20 mg) by mouth daily    Gastroesophageal reflux disease without esophagitis       ondansetron 4 MG tablet    ZOFRAN    18 tablet    Take 1 tablet (4 mg) by mouth every 8 hours as needed for nausea    Nausea       prochlorperazine 10 MG tablet    COMPAZINE    90 tablet    Take 1 tablet (10 mg) by mouth every 6 hours as needed for nausea or vomiting    Nausea       timolol 0.5 % ophthalmic solution    TIMOPTIC    1 Bottle    Place 1 drop into both eyes 2 times daily    Primary open angle glaucoma of left eye, mild stage       TYLENOL PO      Take 500 mg by mouth once        UNABLE TO FIND      MEDICATION NAME: Probiotic        VITAMIN D (CHOLECALCIFEROL) PO      Take 5,000 Units by mouth daily        VITAMIN E BLEND PO      Take by mouth daily

## 2018-07-27 NOTE — MR AVS SNAPSHOT
After Visit Summary   7/27/2018    Keren Erickson    MRN: [id number]           Patient Information     Date Of Birth          1955        Visit Information        Provider Department      7/27/2018 1:00 PM  11 ATC;  ONCOLOGY INFUSION [facility]        Today's Diagnoses     Bone metastasis (H)    -  1    Carcinoma of breast metastatic to multiple sites, unspecified laterality (H)        Metastatic breast cancer (H)        Brain metastasis (H)          Care Instructions    Contact Numbers    [facility] Main Line: [phone]  [facility] Triage and after hours / weekends / holidays:  [phone]      Please call the triage or after hours line if you experience a temperature greater than or equal to 100.5, shaking chills, have uncontrolled nausea, vomiting and/or diarrhea, dizziness, shortness of breath, chest pain, bleeding, unexplained bruising, or if you have any other new/concerning symptoms, questions or concerns.      If you are having any concerning symptoms or wish to speak to a provider before your next infusion visit, please call your care coordinator or triage to notify them so we can adequately serve you.     If you need a refill on a narcotic prescription or other medication, please call before your infusion appointment.                   July 2018 Sunday Monday Tuesday Wednesday Thursday Friday Saturday   1     2     3     4     5     6     [facility] MASONIC LAB DRAW    9:00 AM   (15 min.)    [facility]   [facility]     [facility] ONC INFUSION 60    9:30 AM   (60 min.)    ONCOLOGY INFUSION   [facility] 7       8     9     10     11     [facility]    9:30 AM   (15 min.)   Bhargav Kasper MD   [facility] 12     13     14       15     16     17     18     19     20     21       22     23     24     25     26     27     [facility] DRAW   11:30 AM   (15 min.)    [facility] LAB DRAW   [facility] Mason[facility] Lab Draw     P  RETURN   11:45 AM   (50 min.)   Deyanira Costello PA-C   [facility]     [facility] ONC INFUSION 60    1:00 PM   (60 min.)   [facility] ONCOLOGY INFUSION   [facility] 28 29 30 31 August 2018 Sunday Monday Tuesday Wednesday Thursday Friday Saturday                  1     2     3     4       5     6     7     8     9     10     11       12     13     14     15     16     17     [facility] ONC INFUSION 60   11:30 AM   (60 min.)   [facility] ONCOLOGY INFUSION   [facility]     MR BRAIN WWO    1:00 PM   (45 min.)   [id number]   [facility] MRI 18       19     20     21     22     23     24     25       26     27     28     [facility] LAB DRAW    2:00 PM   (15 min.)    MASONIC LAB DRAW   [facility] Lab Draw     [facility] RETURN ACTIVE TREATMENT    2:15 PM   (30 min.)   Marlen Harper MD   [facility] 29 30 31                       Lab Results:  Recent Results (from the past 12 hour(s))   CBC with platelets differential    Collection Time: 07/27/18 12:00 PM   Result Value Ref Range    WBC 6.2 4.0 - 11.0 10e9/L    RBC Count 4.20 3.8 - 5.2 10e12/L    Hemoglobin 13.9 11.7 - 15.7 g/dL    Hematocrit 41.6 35.0 - 47.0 %    MCV 99 78 - 100 fl    MCH 33.1 (H) 26.5 - 33.0 pg    MCHC 33.4 31.5 - 36.5 g/dL    RDW 13.9 10.0 - 15.0 %    Platelet Count 318 150 - 450 10e9/L    Diff Method Automated Method     % Neutrophils 65.8 %    % Lymphocytes 21.3 %    % Monocytes 11.5 %    % Eosinophils 0.2 %    % Basophils 0.7 %    % Immature Granulocytes 0.5 %    Nucleated RBCs 0 0 /100    Absolute Neutrophil 4.1 1.6 - 8.3 10e9/L    Absolute Lymphocytes 1.3 0.8 - 5.3 10e9/L    Absolute Monocytes 0.7 0.0 - 1.3 10e9/L    Absolute Eosinophils 0.0 0.0 - 0.7 10e9/L    Absolute Basophils 0.0 0.0 - 0.2 10e9/L    Abs Immature Granulocytes 0.0 0 - 0.4 10e9/L    Absolute Nucleated RBC 0.0    Comprehensive metabolic panel     Collection Time: 07/27/18 12:00 PM   Result Value Ref Range    Sodium 139 133 - 144 mmol/L    Potassium 4.0 3.4 - 5.3 mmol/L    Chloride 106 94 - 109 mmol/L    Carbon Dioxide 26 20 - 32 mmol/L    Anion Gap 7 3 - 14 mmol/L    Glucose 82 70 - 99 mg/dL    Urea Nitrogen 15 7 - 30 mg/dL    Creatinine 0.69 0.52 - 1.04 mg/dL    GFR Estimate 86 >60 mL/min/1.7m2    GFR Estimate If Black >90 >60 mL/min/1.7m2    Calcium 9.2 8.5 - 10.1 mg/dL    Bilirubin Total 0.4 0.2 - 1.3 mg/dL    Albumin 3.6 3.4 - 5.0 g/dL    Protein Total 7.3 6.8 - 8.8 g/dL    Alkaline Phosphatase 88 40 - 150 U/L    ALT 19 0 - 50 U/L    AST 20 0 - 45 U/L               Follow-ups after your visit        Your next 10 appointments already scheduled     Aug 17, 2018 11:30 AM CDT   Infusion 60 with  ONCOLOGY INFUSION,  22 ATC   [facility] ([facility])    [address]  [address]  [place] [facility] [address] [phone]            Aug 17, 2018  1:00 PM CDT   MR BRAIN W/O & W CONTRAST with [id number]   [facility] MRI ([facility])    [address]  1st Floor  [facility] [id number] [phone]           Take your medicines as usual, unless your doctor tells you not to. Bring a list of your current medicines to your exam (including vitamins, minerals and over-the-counter drugs).  You may or may not receive intravenous (IV) contrast for this exam pending the discretion of the Radiologist.  You do not need to do anything special to prepare.  The MRI machine uses a strong magnet. Please wear clothes without metal (snaps, zippers). A sweatsuit works well, or we may give you a hospital gown.  Please remove any body piercings and hair extensions before you arrive. You will also remove watches, jewelry, hairpins, wallets, dentures, partial dental plates and hearing aids. You may wear contact lenses, and you may be able to wear your rings. We have a safe place to keep  your personal items, but it is safer to leave them at home.  **IMPORTANT** THE INSTRUCTIONS BELOW ARE ONLY FOR THOSE PATIENTS WHO HAVE BEEN PRESCRIBED SEDATION OR GENERAL ANESTHESIA DURING THEIR MRI PROCEDURE:  IF YOUR DOCTOR PRESCRIBED ORAL SEDATION (take medicine to help you relax during your exam):   You must get the medicine from your doctor (oral medication) before you arrive. Bring the medicine to the exam. Do not take it at home. You ll be told when to take it upon arriving for your exam.   Arrive one hour early. Bring someone who can take you home after the test. Your medicine will make you sleepy. After the exam, you may not drive, take a bus or take a taxi by yourself.  IF YOUR DOCTOR PRESCRIBED IV SEDATION:   Arrive one hour early. Bring someone who can take you home after the test. Your medicine will make you sleepy. After the exam, you may not drive, take a bus or take a taxi by yourself.   No eating 6 hours before your exam. You may have clear liquids up until 4 hours before your exam. (Clear liquids include water, clear tea, black coffee and fruit juice without pulp.)  IF YOUR DOCTOR PRESCRIBED ANESTHESIA (be asleep for your exam):   Arrive 1 1/2 hours early. Bring someone who can take you home after the test. You may not drive, take a bus or take a taxi by yourself.   No eating 8 hours before your exam. You may have clear liquids up until 4 hours before your exam. (Clear liquids include water, clear tea, black coffee and fruit juice without pulp.)   You will spend four to five hours in the recovery room.  Please call the Imaging Department at your exam site with any questions.            Aug 28, 2018  2:00 PM CDT   [facility] Lab Draw with  MASONIC LAB DRAW   [facility] Masonic Lab Draw ([facility])    [address]  [address]  [place] [facility] [address] [phone]            Aug 28, 2018  2:30 PM CDT   (Arrive by 2:15 PM)   Return Active Treatment with Marlen Pelletier  MD Leroy   [facility] ([facility])    [address]  [address]  [place] [id number] [phone]            Sep 07, 2018 10:30 AM CDT   Infusion 60 with UC ONCOLOGY INFUSION, UC 31 ATC   [facility] ([facility])    [address]  [address]  [place] [facility] [id number] [phone]            Oct 01, 2018  1:00 PM CDT   (Arrive by 12:45 PM)   RETURN ENDOCRINE with Rolando Mishra MD   [facility] ([facility])    [address]  3rd Floor  [place] [id number] [phone]              Who to contact     If you have questions or need follow up information about today's clinic visit or your schedule please contact [facility] directly at [phone].  Normal or non-critical lab and imaging results will be communicated to you by my[facility]art, letter or phone within 4 business days after the clinic has received the results. If you do not hear from us within 7 days, please contact the clinic through [facility] or phone. If you have a critical or abnormal lab result, we will notify you by phone as soon as possible.  Submit refill requests through [facility] or call your pharmacy and they will forward the refill request to us. Please allow 3 business days for your refill to be completed.          Additional Information About Your Visit        myDrugCostshart Information     [facility] gives you secure access to your electronic health record. If you see a primary care provider, you can also send messages to your care team and make appointments. If you have questions, please call your primary care clinic.  If you do not have a primary care provider, please call [phone] and they will assist you.        Care EveryWhere ID     This is your Care EveryWhere ID. This could be used by other organizations to access your [place] medical records  [id number]          Blood Pressure from Last 3 Encounters:   07/27/18 (!) 137/93   07/06/18 121/82   06/15/18 118/81    Weight from Last 3 Encounters:   07/27/18 51.2 kg (112 lb 14.4 oz)   07/06/18 51.3 kg (113 lb)   06/15/18 51.6 kg (113 lb 11.2 oz)              Today, you had the following     No orders found for display         Today's Medication Changes          These changes are accurate as of 7/27/18  1:30 PM.  If you have any questions, ask your nurse or doctor.               Start taking these medicines.        Dose/Directions    methylphenidate 5 MG tablet   Commonly known as:  RITALIN   Used for:  Carcinoma of breast metastatic to multiple sites, unspecified laterality (H)   Started by:  Deyanira Costello PA-C        Take 5 mg once daily as needed for fatigue   Quantity:  30 tablet   Refills:  0         These medicines have changed or have updated prescriptions.        Dose/Directions    enoxaparin 60 MG/0.6ML injection   Commonly known as:  LOVENOX   This may have changed:  Another medication with the same name was removed. Continue taking this medication, and follow the directions you see here.   Used for:  Other acute pulmonary embolism without acute cor pulmonale (H)   Changed by:  Deyanira Costello PA-C        Dose:  50 mg   Inject 0.5 mLs (50 mg) Subcutaneous 2 times daily   Quantity:  60 Syringe   Refills:  3            Where to get your medicines      These medications were sent to [address] [address]    Hours:  TRANSPLANT PHONE NUMBER [phone] Phone:  [phone]     enoxaparin 60 MG/0.6ML injection         Some of these will need a paper prescription and others can be bought over the counter.  Ask your nurse if you have questions.     Bring a paper prescription for each of these medications     methylphenidate 5 MG tablet                Primary Care Provider Office Phone # Fax #     Kelly Hatr [phone] [address] [facility] [address]        Equal Access to Services     CLAUDIA BOND : Hadii keturah ibarra laura Joshi, darrel lindseybaljit, becky jin, freddie keys. So [facility] [phone].    ATENCIÓN: Si habla español, tiene a ramires disposición servicios gratuitos de asistencia lingüística. Llame al [phone].    We comply with applicable federal civil rights laws and [place] laws. We do not discriminate on the basis of race, color, national origin, age, disability, sex, sexual orientation, or gender identity.            Thank you!     Thank you for choosing [facility]  for your care. Our goal is always to provide you with excellent care. Hearing back from our patients is one way we can continue to improve our services. Please take a few minutes to complete the written survey that you may receive in the mail after your visit with us. Thank you!             Your Updated Medication List - Protect others around you: Learn how to safely use, store and throw away your medicines at www.disposemymeds.org.          This list is accurate as of 7/27/18  1:30 PM.  Always use your most recent med list.                   Brand Name Dispense Instructions for use Diagnosis    Carboxymethylcellulose Sod PF 0.5 % Soln ophthalmic solution    REFRESH PLUS    1 Bottle    Place 1 drop into both eyes 4 times daily as needed for dry eyes    Dry eyes, bilateral       desmopressin 0.2 MG tablet    DDAVP    45 tablet    Take  1/2 tablet once daily by mouth    Diabetes insipidus (H)       enoxaparin 60 MG/0.6ML injection    LOVENOX    60 Syringe    Inject 0.5 mLs (50 mg) Subcutaneous 2 times daily    Other acute pulmonary embolism without acute cor pulmonale (H)       exemestane 25 MG tablet    AROMASIN    30 tablet    Take 1 tablet (25 mg) by mouth daily    Carcinoma of breast metastatic to multiple sites, unspecified  laterality (H), Metastatic breast cancer (H)       GENTEAL TEARS NIGHT-TIME Oint     1 Tube    Apply 1 Application to eye At Bedtime    Dry eyes, bilateral       meclizine 25 MG tablet    ANTIVERT    30 tablet    Take 1 tablet (25 mg) by mouth 3 times daily as needed for dizziness    BPPV (benign paroxysmal positional vertigo), right       methylphenidate 5 MG tablet    RITALIN    30 tablet    Take 5 mg once daily as needed for fatigue    Carcinoma of breast metastatic to multiple sites, unspecified laterality (H)       Multi-vitamin Tabs tablet      Take 1 tablet by mouth daily        omeprazole 20 MG tablet     30 tablet    Take 1 tablet (20 mg) by mouth daily    Gastroesophageal reflux disease without esophagitis       ondansetron 4 MG tablet    ZOFRAN    18 tablet    Take 1 tablet (4 mg) by mouth every 8 hours as needed for nausea    Nausea       prochlorperazine 10 MG tablet    COMPAZINE    90 tablet    Take 1 tablet (10 mg) by mouth every 6 hours as needed for nausea or vomiting    Nausea       timolol 0.5 % ophthalmic solution    TIMOPTIC    1 Bottle    Place 1 drop into both eyes 2 times daily    Primary open angle glaucoma of left eye, mild stage       TYLENOL PO      Take 500 mg by mouth once        UNABLE TO FIND      MEDICATION NAME: Probiotic        VITAMIN D (CHOLECALCIFEROL) PO      Take 5,000 Units by mouth daily        VITAMIN E BLEND PO      Take by mouth daily

## 2018-07-27 NOTE — PATIENT INSTRUCTIONS
Contact Numbers    [facility] Main Line: [phone]  [facility] Triage and after hours / weekends / holidays:  [phone]      Please call the triage or after hours line if you experience a temperature greater than or equal to 100.5, shaking chills, have uncontrolled nausea, vomiting and/or diarrhea, dizziness, shortness of breath, chest pain, bleeding, unexplained bruising, or if you have any other new/concerning symptoms, questions or concerns.      If you are having any concerning symptoms or wish to speak to a provider before your next infusion visit, please call your care coordinator or triage to notify them so we can adequately serve you.     If you need a refill on a narcotic prescription or other medication, please call before your infusion appointment.                   July 2018 Sunday Monday Tuesday Wednesday Thursday Friday Saturday   1     2     3     4     5     6     [facility] DRAW    9:00 AM   (15 min.)    [facility]   [facility]     [facility] ONC INFUSION 60    9:30 AM   (60 min.)   UC ONCOLOGY INFUSION   [facility] 7       8     9     10     11     [facility]    9:30 AM   (15 min.)   Bhargav Kasper MD   [facility] 12     13     14       15     16     17     18     19     20     21       22     23     24     25     26     27     [facility] DRAW   11:30 AM   (15 min.)   [facility] LAB DRAW   [facility] Lab Draw     [facility] RETURN   11:45 AM   (50 min.)   Deyanira Costello PA-C   [facility]     [facility] ONC INFUSION 60    1:00 PM   (60 min.)   UC ONCOLOGY INFUSION   [facility] 28 29 30 31 August 2018 Sunday Monday Tuesday Wednesday Thursday Friday Saturday                  1     2     3     4       5     6     7     8     9     10     11       12     13     14     15     16     17     [facility] ONC INFUSION 60   11:30 AM   (60 min.)   UC ONCOLOGY INFUSION   M  [facility]     MR BRAIN WWO    1:00 PM   (45 min.)   [id number]   [facility] MRI 18       19     20     21     22     23     24     25       26     27     28     [facility] MASONIC LAB DRAW    2:00 PM   (15 min.)   [facility] DRAW   [facility]     [facility] RETURN ACTIVE TREATMENT    2:15 PM   (30 min.)   Marlen Harper MD   [facility] 29     30     31                       Lab Results:  Recent Results (from the past 12 hour(s))   CBC with platelets differential    Collection Time: 07/27/18 12:00 PM   Result Value Ref Range    WBC 6.2 4.0 - 11.0 10e9/L    RBC Count 4.20 3.8 - 5.2 10e12/L    Hemoglobin 13.9 11.7 - 15.7 g/dL    Hematocrit 41.6 35.0 - 47.0 %    MCV 99 78 - 100 fl    MCH 33.1 (H) 26.5 - 33.0 pg    MCHC 33.4 31.5 - 36.5 g/dL    RDW 13.9 10.0 - 15.0 %    Platelet Count 318 150 - 450 10e9/L    Diff Method Automated Method     % Neutrophils 65.8 %    % Lymphocytes 21.3 %    % Monocytes 11.5 %    % Eosinophils 0.2 %    % Basophils 0.7 %    % Immature Granulocytes 0.5 %    Nucleated RBCs 0 0 /100    Absolute Neutrophil 4.1 1.6 - 8.3 10e9/L    Absolute Lymphocytes 1.3 0.8 - 5.3 10e9/L    Absolute Monocytes 0.7 0.0 - 1.3 10e9/L    Absolute Eosinophils 0.0 0.0 - 0.7 10e9/L    Absolute Basophils 0.0 0.0 - 0.2 10e9/L    Abs Immature Granulocytes 0.0 0 - 0.4 10e9/L    Absolute Nucleated RBC 0.0    Comprehensive metabolic panel    Collection Time: 07/27/18 12:00 PM   Result Value Ref Range    Sodium 139 133 - 144 mmol/L    Potassium 4.0 3.4 - 5.3 mmol/L    Chloride 106 94 - 109 mmol/L    Carbon Dioxide 26 20 - 32 mmol/L    Anion Gap 7 3 - 14 mmol/L    Glucose 82 70 - 99 mg/dL    Urea Nitrogen 15 7 - 30 mg/dL    Creatinine 0.69 0.52 - 1.04 mg/dL    GFR Estimate 86 >60 mL/min/1.7m2    GFR Estimate If Black >90 >60 mL/min/1.7m2    Calcium 9.2 8.5 - 10.1 mg/dL    Bilirubin Total 0.4 0.2 - 1.3 mg/dL    Albumin 3.6 3.4 - 5.0 g/dL    Protein Total 7.3 6.8 - 8.8  g/dL    Alkaline Phosphatase 88 40 - 150 U/L    ALT 19 0 - 50 U/L    AST 20 0 - 45 U/L

## 2018-07-27 NOTE — PROGRESS NOTES
Infusion Nursing Note:  Keren Erickson presents today for Day 1 Cycle 30 Herceptin, Xgeva.    Patient seen by provider today: Yes: BRAULIO Chandra   present during visit today: Not Applicable.    Note: Patient states she feels well overall and presents no new concerns not addressed in her appointment with BRAULIO Chandra prior to infusion today.    Intravenous Access:  Implanted Port.    Treatment Conditions:  Lab Results   Component Value Date    HGB 13.9 07/27/2018     Lab Results   Component Value Date    WBC 6.2 07/27/2018      Lab Results   Component Value Date    ANEU 4.1 07/27/2018     Lab Results   Component Value Date     07/27/2018      Lab Results   Component Value Date     07/27/2018                   Lab Results   Component Value Date    POTASSIUM 4.0 07/27/2018           Lab Results   Component Value Date    MAG 2.4 04/10/2017            Lab Results   Component Value Date    CR 0.69 07/27/2018                   Lab Results   Component Value Date    OMA 9.2 07/27/2018                Lab Results   Component Value Date    BILITOTAL 0.4 07/27/2018           Lab Results   Component Value Date    ALBUMIN 3.6 07/27/2018                    Lab Results   Component Value Date    ALT 19 07/27/2018           Lab Results   Component Value Date    AST 20 07/27/2018       Results reviewed, labs MET treatment parameters, ok to proceed with treatment.  ECHO/MUGA completed 6/8/18  EF 60-65%.      Post Infusion Assessment:  Patient tolerated infusion without incident.  Patient tolerated Xgeva injection into the subcutaneous tissue of the right upper arm without incident.  Blood return noted pre and post infusion.  Site patent and intact, free from redness, edema or discomfort.  No evidence of extravasations.  Access discontinued per protocol.    Discharge Plan:   Prescription refills given for Lovenox, Desmopressin, Aromasin, Ritalin.  Discharge instructions reviewed with:  Patient.  Patient and/or family verbalized understanding of discharge instructions and all questions answered.  Copy of AVS reviewed with patient and/or family.  Patient will return 8/17/18 for next appointment and MRI.  Patient discharged in stable condition accompanied by: self and friend.  Departure Mode: Ambulatory.  Face to Face time: 0 minutes.    Alexis Grande RN

## 2018-07-27 NOTE — NURSING NOTE
"Oncology Rooming Note    July 27, 2018 12:10 PM   Keren Erickson is a 62 year old female who presents for:    Chief Complaint   Patient presents with     Port Draw     port accessed and labs drawn by rn.  vs taken.     Oncology Clinic Visit     return - breast ca      Initial Vitals: BP (!) 137/93 (BP Location: Right arm, Patient Position: Sitting, Cuff Size: Adult Small)  Pulse 67  Temp 97.5  F (36.4  C) (Oral)  Resp 16  Wt 51.2 kg (112 lb 14.4 oz)  SpO2 97%  BMI 20.64 kg/m2 Estimated body mass index is 20.64 kg/(m^2) as calculated from the following:    Height as of 6/12/18: 1.575 m (5' 2.01\").    Weight as of this encounter: 51.2 kg (112 lb 14.4 oz). Body surface area is 1.5 meters squared.  Mild Pain (3) Comment: right side   No LMP recorded. Patient is postmenopausal.  Allergies reviewed: Yes  Medications reviewed: Yes    Medications: Medication refills not needed today.  Pharmacy name entered into [facility]: [facility] [place], [place] - [address] [place] [address] [phone]    Clinical concerns: no new concerns      6 minutes for nursing intake (face to face time)     Nell Zheng CMA            "

## 2018-07-27 NOTE — LETTER
7/27/2018      RE: Keren Erickson  [address] [facility] [address]          HEMATOLOGY/ONCOLOGY PROGRESS NOTE  Jul 27, 2018    REASON FOR VISIT: follow-up of metastatic breast cancer, triple positive    DIAGNOSIS:   Keren Erickson is a 62 year old woman who presented to the hospital initially in 09/2013 with complaints of dyspnea. Workup revealed a large pericardial effusion and pleural effusion. Physical examination revealed a large untreated left breast mass encompassing the entire left breast. Biopsies revealed these were ER, OH and HER2-positive breast cancer. She had a thoracentesis and pericardial sclerosis performed. She was originally on Arimidex and Herceptin. In 01/2014, she was switched to tamoxifen and Herceptin due to arthralgias, and she ultimately developed progressive disease and was switched to Faslodex and Herceptin. In 01/2015, she was found to have progressive disease and was switched to T-DM1.     Initially when she was seen in late 02/2015, she complained of some V5 sensory deficit. This was subjective. I was not able to elicit this on examination. This persisted and further workup was performed with a brain MRI. This revealed what was initially thought to be 3 punctate brain metastases. She originally saw Radiation Oncology and Neurosurgery as well as underwent a lumbar puncture. Cytology from the lumbar puncture showed no evidence of leptomeningeal disease. She was treated with Gamma Knife radiation to 6 lesions, performed on 04/16/2015.  She initiated therapy with TDM1 in January 2015 and continued this through 6/26/2015 with overall stable disease. She has since been on a break from treatment. The patient presented earlier this month with recurrent shortness of breath.  Imaging revealed recurrent right-sided pleural effusion.  She has since undergone thoracentesis with cytology pending.  Clinically, however, there was evidence of disease progression. PET scan  "performed on 11/25/2015 demonstrated progression of disease at multiple sites. She restarted TDM1 on 11/27/2015, however experienced a mild transfusion reaction with shortness of breath and chest tightness, resolved upon stopping TDM1 and 125 mg of SoluMedrol. She had progression of disease on repeat imaging 2/17/2016, as well as new brain metastases. She started TDM1 with Perjeta on 3/4/2016. She underwent gamma knife to brain mets on 3/8/16.       In late May 2016, she was found to have progressive brain metastases.  She received whole brain radiation.  She had a follow up brain MRI August 2016 that was stable.     In September 2016, she enrolled on [place]  trial and was randomized to the standard of care arm/Physician's Choice; started on gemzar and herceptin. She was hospitalized 1/27-2/3/17 for presumed HCAP. Trial was suspended. She continued on gemzar and heceptin with stable disease. Last restaging was 4/7/17 and stable. Gemzar was placed on hold from 4/10/2017 through 6/22/17 so that she could recover from pneumonia.    Dominga discontinued gemzar completely and remained on Aromasin and herceptin since Dec 2017.      INTERVAL HISTORY: Ravi is here with her friend. She has no new concerns. Feels she is getting stronger. Remains with poor appetite but feels she compensates well. Vision is improving, no new visual changes, headaches,or other neurologic symptoms. No new focal areas of pain. Occasional hot flashes, tolerable. Remains with \"chemo brain\", which she relates to poor short term memory, which she feels is chronic since radiation without change. She has some mild diarrhea after Herceptin, which resolves after a few days and is not to the degree that she feels she needs medication for. No fevers, infections, SOB, chest pain, abdominal pain, bleeding, or sweling.  Her 10-point review of systems is otherwise negative.      PHYSICAL EXAMINATION:     BP (!) 137/93 (BP Location: Right arm, Patient " Position: Sitting, Cuff Size: Adult Small)  Pulse 67  Temp 97.5  F (36.4  C) (Oral)  Resp 16  Wt 51.2 kg (112 lb 14.4 oz)  SpO2 97%  BMI 20.64 kg/m2    Wt Readings from Last 4 Encounters:   07/06/18 51.3 kg (113 lb)   06/15/18 51.6 kg (113 lb 11.2 oz)   06/12/18 52.5 kg (115 lb 11.2 oz)   05/25/18 51.7 kg (114 lb)     Constitutional: Alert, oriented, thin female in no visible distress  Eyes: PERRL. EOMI. MMM without oral lesions. Anicteric sclerae.    CV: RRR, no murmurs   Resp: CTAB slightly diminished at bases. Breathing comfortably.  Abdomen: Soft, non-tender, non-distended. Bowel sounds present.   Extremities: No lower extremity edema   Skin: Warm, dry. No bruising or petechiae. No rash  Lymph: No palpable cervical or supraclavicular LAD  Neuro: CN III-XII grossly intact. Ambulates several steps with slow shuffling gate, today she is using a walker.  Persistent nystagmus with left lateral gaze, this is not new.  5/5 strength in LE proximal and distal.      LABS:  Results for HEIDI RUIZ (MRN [id number]) as of 7/27/2018 12:47   Ref. Range 7/27/2018 12:00   Sodium Latest Ref Range: 133 - 144 mmol/L 139   Potassium Latest Ref Range: 3.4 - 5.3 mmol/L 4.0   Chloride Latest Ref Range: 94 - 109 mmol/L 106   Carbon Dioxide Latest Ref Range: 20 - 32 mmol/L 26   Urea Nitrogen Latest Ref Range: 7 - 30 mg/dL 15   Creatinine Latest Ref Range: 0.52 - 1.04 mg/dL 0.69   GFR Estimate Latest Ref Range: >60 mL/min/1.7m2 86   GFR Estimate If Black Latest Ref Range: >60 mL/min/1.7m2 >90   Calcium Latest Ref Range: 8.5 - 10.1 mg/dL 9.2   Anion Gap Latest Ref Range: 3 - 14 mmol/L 7   Albumin Latest Ref Range: 3.4 - 5.0 g/dL 3.6   Protein Total Latest Ref Range: 6.8 - 8.8 g/dL 7.3   Bilirubin Total Latest Ref Range: 0.2 - 1.3 mg/dL 0.4   Alkaline Phosphatase Latest Ref Range: 40 - 150 U/L 88   ALT Latest Ref Range: 0 - 50 U/L 19   AST Latest Ref Range: 0 - 45 U/L 20   Glucose Latest Ref Range: 70 - 99 mg/dL 82   WBC  Latest Ref Range: 4.0 - 11.0 10e9/L 6.2   Hemoglobin Latest Ref Range: 11.7 - 15.7 g/dL 13.9   Hematocrit Latest Ref Range: 35.0 - 47.0 % 41.6   Platelet Count Latest Ref Range: 150 - 450 10e9/L 318       IMPRESSION/PLAN:  Dominga is a 62-year-old woman with history of metastatic ER-positive, HER-2 positive breast cancer, with involvement of the bone, history of pleural effusions treated with pleurodesis and PleurX placement, and with treated brain metastases. She was started on [place]  clinical trial and randomized to physician's choice, on Gemzar/Herceptin since 9/29/16.  She discontinued gemzar 121/5/17.  Now on herceptin and aromasin.      1.  Denia remains on aromasin and Herceptin for her breast cancer. She had slight progression in the breast on restaging in June 2018. Given her desire to continue to improve her performance status and the relatively stable changes, she was continued on Aromasin and Herceptin. She continues to do well overall without any new side effects or symptoms.  - Repeat imaging in September. If progression, consider adding palbociclib.     2.  Brain metastases.  Routine MRI scheduled end of August. No new neuro symptoms.     3.  Central diabetes insipidus.  This was diagnosed as an inpatient in the setting of hypernatremia.  She was started on desmopressin.  She has been seeing Endocrinology.       4.  Nutrition.  Her weight is overall stable.      5.  She is due for Xgeva.  Continue this for her bone metastases.    6.  She does have a POLST and is DNR/DNI.  Her power of  is listed in the computer.         Deyanira Costello PA-C

## 2018-08-17 NOTE — TELEPHONE ENCOUNTER
"Pt called back to triage after RNCC sent her a mychart message because she cancelled her MRI and infusion today due to being sick. Pt stated for past 3 days she'd had small amounts of blood in her urine and with wiping. She did have slight low back pain but thought that was due to her increasing activity and physical therapy. Also noticed frequency and incontinent of urine though state today is better as she's been paying \"attention to the first urge to go\". Denied any lower abd or pelvic pain, fevers, chills, changes in bowel.     Advised pt to come in for a UA today, she declined stating she does not have a ride and if she could reschedule her MRI and infusion to Monday, she'd do the UA then. Informed her if she has a UTI, symptoms will get worse instead of better so  strongly suggest she go to UC over the weekend with worsening symptoms. Pt verbalized understanding. RNCC informed and asked writer to message scheduling to add lab apt with MRI and infusion for Monday. Pt will be contacted by scheduling with times.  "

## 2018-08-21 NOTE — PROGRESS NOTES
Spoke to patient with known UTI from UA/UC completed yesterday. Dr Harper recommended Cipro 500 mg BID x 7 days and the prescription was sent to the [facility] on [address] as patient requested. Instructed patient to check temperature if having shaking chills or back pain. Patient does not believe she has a temperature at this time and was instructed to check temperature to verify. Instructed to call nurse triage if temperature is >100.4, lower back or not improving with Cipro to be seen. Answered all patient's questions and verbalized understanding. Shobha Alanis RN, BSN.

## 2018-08-21 NOTE — PROGRESS NOTES
Called patient's home/cell phone and was unable to reach patient and left a VM to call writer as soon as possible to be seen today as Dr Harper recommended. Shobha Alanis RN, BSN

## 2018-08-24 NOTE — TELEPHONE ENCOUNTER
[facility] Call Center    Phone Message    May a detailed message be left on voicemail: yes    Reason for Call: Deja parkinson/ [facility] called needing to get patient scheduled in Urology but the first available with Terrence or Marielos as she has not had recent CT scan(last one 06/08) is out to 10/1 and per Dr Harper she needs to be seen a lot sooner than this. Please contact Dr Harper with any questions and call patient with earlier appointment if possible     Action Taken: Message routed to:  [facility] ([facility] UROLOGY

## 2018-08-25 NOTE — DISCHARGE INSTRUCTIONS
MRI Contrast Discharge Instructions    The IV contrast you received today will pass out of your body in your  urine. This will happen in the next 24 hours. You will not feel this process.  Your urine will not change color.    Drink at least 4 extra glasses of water or juice today (unless your doctor  has restricted your fluids). This reduces the stress on your kidneys.  You may take your regular medicines.    If you are on dialysis: It is best to have dialysis today.    If you have a reaction: Most reactions happen right away. If you have  any new symptoms after leaving the hospital (such as hives or swelling),  call your hospital at the correct number below. Or call your family doctor.  If you have breathing distress or wheezing, call 911.    Special instructions: ***    I have read and understand the above information.    Signature:______________________________________ Date:___________    Staff:__________________________________________ Date:___________     Time:__________    [facility] Radiology Departments:    ___[place]: [phone]  ___[facility]: [phone]  ___[place]: [phone] ___[facility]: [phone]  ___[facility]: [phone]  ___[facility]: [phone]  ___[phone]  ___[facility]: [phone]  ___[phone]

## 2018-08-28 NOTE — NURSING NOTE
"Oncology Rooming Note    August 28, 2018 2:54 PM   Keren Erickson is a 62 year old female who presents for:    Chief Complaint   Patient presents with     Lab Only     labs drawn via port, saline and heparin locked, vitals completed     Oncology Clinic Visit     return - breast ca      Initial Vitals: BP (!) 138/92  Pulse 81  Temp 97.8  F (36.6  C) (Oral)  Wt 50.6 kg (111 lb 8 oz)  SpO2 93%  BMI 20.39 kg/m2 Estimated body mass index is 20.39 kg/(m^2) as calculated from the following:    Height as of 6/12/18: 1.575 m (5' 2.01\").    Weight as of this encounter: 50.6 kg (111 lb 8 oz). Body surface area is 1.49 meters squared.  Mild Pain (3) Comment: tylenol    No LMP recorded. Patient is postmenopausal.  Allergies reviewed: Yes  Medications reviewed: Yes    Medications: Medication refills not needed today.  Pharmacy name entered into [facility]: [facility] [place], [place] - [address] [place] [address] [phone]    Clinical concerns: no new concerns      6  minutes for nursing intake (face to face time)     Nell Zheng CMA            "

## 2018-08-28 NOTE — NURSING NOTE
Chief Complaint   Patient presents with     Lab Only     labs drawn via port, saline and heparin locked, vitals completed

## 2018-08-28 NOTE — PROGRESS NOTES
Met with patient to further discuss meeting with [facility]. Patient willing to meet with [facility] in the near future to understand services that they can offer the patient. Orders placed for [facility] and spoke to the intake nurse to meet with patient in the near future.  Answered all patient's questions and verbalized understanding. Shobha Alanis RN, BSN.

## 2018-08-28 NOTE — MR AVS SNAPSHOT
After Visit Summary   8/28/2018    Keren Erickson    MRN: [id number]           Patient Information     Date Of Birth          1955        Visit Information        Provider Department      8/28/2018 4:32 PM Mia Jarquin MSW [facility]        Today's Diagnoses     Visit for counseling    -  1       Follow-ups after your visit        Your next 10 appointments already scheduled     Oct 01, 2018  1:00 PM CDT   (Arrive by 12:45 PM)   RETURN ENDOCRINE with Rolando Mishra MD   [facility] ([facility])    [address] [place] [id number] [phone]              Who to contact     If you have questions or need follow up information about today's clinic visit or your schedule please contact [facility] directly at [phone].  Normal or non-critical lab and imaging results will be communicated to you by MyChart, letter or phone within 4 business days after the clinic has received the results. If you do not hear from us within 7 days, please contact the clinic through [employer] or phone. If you have a critical or abnormal lab result, we will notify you by phone as soon as possible.  Submit refill requests through [employer] or call your pharmacy and they will forward the refill request to us. Please allow 3 business days for your refill to be completed.          Additional Information About Your Visit        MyChart Information     [employer] gives you secure access to your electronic health record. If you see a primary care provider, you can also send messages to your care team and make appointments. If you have questions, please call your primary care clinic.  If you do not have a primary care provider, please call [phone] and they will assist you.        Care EveryWhere ID     This is your Care EveryWhere ID. This could be used by other organizations to access your [facility]  medical records  [id number]         Blood Pressure from Last 3 Encounters:   08/28/18 (!) 138/92   07/27/18 (!) 137/93   07/06/18 121/82    Weight from Last 3 Encounters:   08/28/18 50.6 kg (111 lb 8 oz)   07/27/18 51.2 kg (112 lb 14.4 oz)   07/06/18 51.3 kg (113 lb)              Today, you had the following     No orders found for display         Where to get your medicines      These medications were sent to [place], [place] - [address]  [address], [facility] [address]    Hours:  TRANSPLANT PHONE NUMBER [phone] Phone:  [phone]     exemestane 25 MG tablet          Primary Care Provider Office Phone # Fax #    Kelly Hart [phone]       [address] [facility] [address]        Equal Access to Services     CLAUDIA BOND : Hadii keturah ibarra hadasho [place], waaxda luqadaha, qaybta kaalmada adeegyada, freddie escobedo . So [facility] [phone].    ATENCIÓN: Si habla espjaime, tiene a ramires disposición servicios gratuitos de asistencia lingüística. Llame al [phone].    We comply with applicable federal civil rights laws and [place] laws. We do not discriminate on the basis of race, color, national origin, age, disability, sex, sexual orientation, or gender identity.            Thank you!     Thank you for choosing [facility] [place] [facility]  for your care. Our goal is always to provide you with excellent care. Hearing back from our patients is one way we can continue to improve our services. Please take a few minutes to complete the written survey that you may receive in the mail after your visit with us. Thank you!             Your Updated Medication List - Protect others around you: Learn how to safely use, store and throw away your medicines at www.disposemymeds.org.          This list is accurate as of 8/28/18 11:59 PM.  Always use your most recent med list.                   Brand  Name Dispense Instructions for use Diagnosis    Carboxymethylcellulose Sod PF 0.5 % Soln ophthalmic solution    REFRESH PLUS    1 Bottle    Place 1 drop into both eyes 4 times daily as needed for dry eyes    Dry eyes, bilateral       ciprofloxacin 500 MG tablet    CIPRO    14 tablet    Take 1 tablet (500 mg) by mouth 2 times daily for 7 days    Urinary tract infection       desmopressin 0.2 MG tablet    DDAVP    45 tablet    Take  1/2 tablet once daily by mouth    Diabetes insipidus (H)       enoxaparin 60 MG/0.6ML injection    LOVENOX    60 Syringe    Inject 0.5 mLs (50 mg) Subcutaneous 2 times daily    Other acute pulmonary embolism without acute cor pulmonale (H)       exemestane 25 MG tablet    AROMASIN    90 tablet    Take 1 tablet (25 mg) by mouth daily    Carcinoma of breast metastatic to multiple sites, unspecified laterality (H), Metastatic breast cancer (H)       GENTEAL TEARS NIGHT-TIME Oint ophthalmic ointment     1 Tube    Apply 1 Application to eye At Bedtime    Dry eyes, bilateral       meclizine 25 MG tablet    ANTIVERT    30 tablet    Take 1 tablet (25 mg) by mouth 3 times daily as needed for dizziness    BPPV (benign paroxysmal positional vertigo), right       methylphenidate 5 MG tablet    RITALIN    30 tablet    Take 5 mg once daily as needed for fatigue    Carcinoma of breast metastatic to multiple sites, unspecified laterality (H)       Multi-vitamin Tabs tablet      Take 1 tablet by mouth daily        omeprazole 20 MG tablet     30 tablet    Take 1 tablet (20 mg) by mouth daily    Gastroesophageal reflux disease without esophagitis       ondansetron 4 MG tablet    ZOFRAN    18 tablet    Take 1 tablet (4 mg) by mouth every 8 hours as needed for nausea    Nausea       prochlorperazine 10 MG tablet    COMPAZINE    90 tablet    Take 1 tablet (10 mg) by mouth every 6 hours as needed for nausea or vomiting    Nausea       timolol 0.5 % ophthalmic solution    TIMOPTIC    1 Bottle    Place 1 drop into  both eyes 2 times daily    Primary open angle glaucoma of left eye, mild stage       TYLENOL PO      Take 500 mg by mouth once        UNABLE TO FIND      MEDICATION NAME: Probiotic        VITAMIN D (CHOLECALCIFEROL) PO      Take 5,000 Units by mouth daily        VITAMIN E BLEND PO      Take by mouth daily

## 2018-08-28 NOTE — LETTER
8/28/2018       RE: Keren Erickson  [address] [facility] [address]     Dear Colleague,    Thank you for referring your patient, Keren Erickson, to the [facility]. Please see a copy of my visit note below.       HEMATOLOGY/ONCOLOGY PROGRESS NOTE  Aug 28, 2018    REASON FOR VISIT: follow-up of metastatic breast cancer, triple positive    DIAGNOSIS:   Keren Erickson is a 62 year old woman who presented to the hospital initially in 09/2013 with complaints of dyspnea. Workup revealed a large pericardial effusion and pleural effusion. Physical examination revealed a large untreated left breast mass encompassing the entire left breast. Biopsies revealed these were ER, [place] and HER2-positive breast cancer. She had a thoracentesis and pericardial sclerosis performed. She was originally on Arimidex and Herceptin. In 01/2014, she was switched to tamoxifen and Herceptin due to arthralgias, and she ultimately developed progressive disease and was switched to Faslodex and Herceptin. In 01/2015, she was found to have progressive disease and was switched to T-DM1.     Initially when she was seen in late 02/2015, she complained of some V5 sensory deficit. This was subjective. I was not able to elicit this on examination. This persisted and further workup was performed with a brain MRI. This revealed what was initially thought to be 3 punctate brain metastases. She originally saw Radiation Oncology and Neurosurgery as well as underwent a lumbar puncture. Cytology from the lumbar puncture showed no evidence of leptomeningeal disease. She was treated with Gamma Knife radiation to 6 lesions, performed on 04/16/2015.  She initiated therapy with TDM1 in January 2015 and continued this through 6/26/2015 with overall stable disease. She has since been on a break from treatment. The patient presented earlier this month with recurrent shortness of breath.  Imaging revealed recurrent  right-sided pleural effusion.  She has since undergone thoracentesis with cytology pending.  Clinically, however, there was evidence of disease progression. PET scan performed on 11/25/2015 demonstrated progression of disease at multiple sites. She restarted TDM1 on 11/27/2015, however experienced a mild transfusion reaction with shortness of breath and chest tightness, resolved upon stopping TDM1 and 125 mg of SoluMedrol. She had progression of disease on repeat imaging 2/17/2016, as well as new brain metastases. She started TDM1 with Perjeta on 3/4/2016. She underwent gamma knife to brain mets on 3/8/16.       In late May 2016, she was found to have progressive brain metastases.  She received whole brain radiation.  She had a follow up brain MRI August 2016 that was stable.     In September 2016, she enrolled on [place]  trial and was randomized to the standard of care arm/Physician's Choice; started on gemzar and herceptin. She was hospitalized 1/27-2/3/17 for presumed HCAP. Trial was suspended. She continued on gemzar and heceptin with stable disease. Last restaging was 4/7/17 and stable. Gemzar was placed on hold from 4/10/2017 through 6/22/17 so that she could recover from pneumonia.    Dominga discontinued gemzar completely and remained on Aromasin and herceptin since Dec 2017.      INTERVAL HISTORY: Dominga is here to go over recent MRI results and follow up recent UTI.      She feels like the urinary tract infection really took a toll on her.  She has a lot of symptoms with the infection including frequency and urgency and pain.  She also had bleeding in her urine.  She improved with a course of ciprofloxacin, however this antibiotic also took a toll on her and made her feel not well.  She has lost weight in the last month which she relates to the infection and not having any appetite.  She feels weak, particularly on the right side.  She denies any significant headaches.  She is in a wheelchair  today because she feels so weak.  The infection has improved treatment and she denies any other significant pain.  She did miss a treatment of Herceptin while dealing with infection.      She had imaging performed this summer which unfortunately showed progression of disease.  No change in therapy.  She recently had MRI brain imaging which also unfortunately showed progression with a new metastatic lesion in her yvette.  She is very clear that she was never want whole brain radiation again and she is not sure if she would want gamma knife treatment.      No fevers, infections, chest pain, abdominal pain, bleeding, or swelling.  She does have the sensation of feeling like she cannot take a deep breath but denies cough.  Her 10-point review of systems is otherwise negative.      PHYSICAL EXAMINATION:     BP (!) 138/92  Pulse 81  Temp 97.8  F (36.6  C) (Oral)  Wt 50.6 kg (111 lb 8 oz)  SpO2 93%  BMI 20.39 kg/m2      Wt Readings from Last 4 Encounters:   07/27/18 51.2 kg (112 lb 14.4 oz)   07/06/18 51.3 kg (113 lb)   06/15/18 51.6 kg (113 lb 11.2 oz)   06/12/18 52.5 kg (115 lb 11.2 oz)     Constitutional: Alert, oriented, thin female in no visible distress, sitting in wheelchar  Eyes: EOMI. MMM without oral lesions. Anicteric sclerae.    CV: RRR  Resp: CTAB. Breathing comfortably.  Abdomen: Soft, non-tender, non-distended. Bowel sounds present.   Extremities: No lower extremity edema, thin  Skin: Warm, dry. No bruising or petechiae. No rash  Lymph: No palpable cervical or supraclavicular LAD  Neuro: CN III-XII grossly intact. 5/5 strength in LE proximal and distal. SLightly decreased hand  on right.      DATA:  MRI brain 8/28/18  Impression:  1. New focus of intracranial metastasis in the right ventrolateral  aspect of the yvette, consistent with progression of disease since  3/7/2018.  2. Stable enhancing treated metastases.    CT CAP 6/8/18  IMPRESSION: In this patient with history of metastatic  breast  carcinoma;  1. Evidence for progression of disease with increased size of right  breast nodularity and new/increased size of right lower lobe pulmonary  nodule suspicious for a metastatic lesion.  2. Stable diffuse sclerotic bony metastasis. No acute fracture. There  is notable involvement of the femoral necks and femoral heads. There  are severe degenerative changes of the hips which confound the  evaluation for fracture.  Old right sided rib fractures.  Old  fractures involving the posterior elements of L2.  3. Nonspecific nodular contour of liver.  4. Moderate hiatal hernia.  5. Small enhancing nodule at the right aspect of the urinary bladder  is again seen, concerning for hepatocellular carcinoma. If this has  not been previously investigated, urology consultation and cystoscopy  could be considered.    IMPRESSION/PLAN:  Dominga is a 62-year-old woman with history of metastatic ER-positive, HER-2 positive breast cancer, with involvement of the bone, history of pleural effusions treated with pleurodesis and PleurX placement, and with treated brain metastases. She was started on [place]  clinical trial and randomized to physician's choice, on Gemzar/Herceptin since 9/29/16.  She discontinued gemzar 121/5/17.  Now on herceptin and aromasin.      1.  Denia remains on aromasin and Herceptin for her breast cancer. She had slight progression in the breast on restaging in June 2018. Given her desire to continue to improve her performance status and the relatively stable changes, she was continued on Aromasin and Herceptin.     2.  Brain metastases.  Routine MRI with new metastatic lesion.  She is unsure if she wants treatment for this, she certainly doesn't want a change in her chemotherapy or IT chemo or whole brain radiation (which she would not be a candidate for anyway).  Will run her case by rad onc, and will have her return for discussion.  Dr Harper did bring up hospice with her as well, and  will have hospice meet with her for more information.      3.  Central diabetes insipidus.  This was diagnosed as an inpatient in the setting of hypernatremia.  She was started on desmopressin.  She has been seeing Endocrinology.       4.  UTI: s/p course of Cipro with improvement in symptoms     5.  Bone mets, continue Xgeva.      6.  She does have a POLST and is DNR/DNI.  Her power of  is listed in the computer. She will meet with hospice within next two weeks and we will see her in clinic then to discuss next steps.     Patient seen and discussed with attending physician, Dr. Harper.     Deja Mendez MD  Heme/Onc PGY6  08/29/2018      Addendum:  Pt was seen and evaluated by me with Dr Mendez.  On review of imaging personally by me and with radiology, pt has progressive disease.  We discussed that given her overall performance status, I would not recommend chemotherapy.  Dominga is in agreement and is not interested in chemotherapy.      I would like to review her imaging with radiation oncology to determine whether potential gamma knife could be considered.  I will call pt with this information.    We reviewed overall goals of care.  Given Dominga's gradually declining PS and progressive disease, palliative care and hospice would be reasonable.  We discussed the pros/cons.  She would like to stay at home as long as possible.  I will ask SW to see Dominga.  She understands eventually she will need a caregiver and NH may be most appropriate.  For now, will check on PCA arrangements ,etc.    She has a POLST on file; this was reviewed.  DNR/DNI.  Her emergency contact is updated in the EMR.      Will see pt back in 2 weeks to reevaluate and continue our discussions.    Marlen Harper MD

## 2018-08-28 NOTE — PROGRESS NOTES
HEMATOLOGY/ONCOLOGY PROGRESS NOTE  Aug 28, 2018    REASON FOR VISIT: follow-up of metastatic breast cancer, triple positive    DIAGNOSIS:   Keren Erickson is a 62 year old woman who presented to the hospital initially in 09/2013 with complaints of dyspnea. Workup revealed a large pericardial effusion and pleural effusion. Physical examination revealed a large untreated left breast mass encompassing the entire left breast. Biopsies revealed these were ER, [place] and HER2-positive breast cancer. She had a thoracentesis and pericardial sclerosis performed. She was originally on Arimidex and Herceptin. In 01/2014, she was switched to tamoxifen and Herceptin due to arthralgias, and she ultimately developed progressive disease and was switched to Faslodex and Herceptin. In 01/2015, she was found to have progressive disease and was switched to T-DM1.     Initially when she was seen in late 02/2015, she complained of some V5 sensory deficit. This was subjective. I was not able to elicit this on examination. This persisted and further workup was performed with a brain MRI. This revealed what was initially thought to be 3 punctate brain metastases. She originally saw Radiation Oncology and Neurosurgery as well as underwent a lumbar puncture. Cytology from the lumbar puncture showed no evidence of leptomeningeal disease. She was treated with Gamma Knife radiation to 6 lesions, performed on 04/16/2015.  She initiated therapy with TDM1 in January 2015 and continued this through 6/26/2015 with overall stable disease. She has since been on a break from treatment. The patient presented earlier this month with recurrent shortness of breath.  Imaging revealed recurrent right-sided pleural effusion.  She has since undergone thoracentesis with cytology pending.  Clinically, however, there was evidence of disease progression. PET scan performed on 11/25/2015 demonstrated progression of disease at multiple sites. She restarted  TDM1 on 11/27/2015, however experienced a mild transfusion reaction with shortness of breath and chest tightness, resolved upon stopping TDM1 and 125 mg of SoluMedrol. She had progression of disease on repeat imaging 2/17/2016, as well as new brain metastases. She started TDM1 with Perjeta on 3/4/2016. She underwent gamma knife to brain mets on 3/8/16.       In late May 2016, she was found to have progressive brain metastases.  She received whole brain radiation.  She had a follow up brain MRI August 2016 that was stable.     In September 2016, she enrolled on [place]  trial and was randomized to the standard of care arm/Physician's Choice; started on gemzar and herceptin. She was hospitalized 1/27-2/3/17 for presumed HCAP. Trial was suspended. She continued on gemzar and heceptin with stable disease. Last restaging was 4/7/17 and stable. Gemzar was placed on hold from 4/10/2017 through 6/22/17 so that she could recover from pneumonia.    Dominga discontinued gemzar completely and remained on Aromasin and herceptin since Dec 2017.      INTERVAL HISTORY: Dominga is here to go over recent MRI results and follow up recent UTI.      She feels like the urinary tract infection really took a toll on her.  She has a lot of symptoms with the infection including frequency and urgency and pain.  She also had bleeding in her urine.  She improved with a course of ciprofloxacin, however this antibiotic also took a toll on her and made her feel not well.  She has lost weight in the last month which she relates to the infection and not having any appetite.  She feels weak, particularly on the right side.  She denies any significant headaches.  She is in a wheelchair today because she feels so weak.  The infection has improved treatment and she denies any other significant pain.  She did miss a treatment of Herceptin while dealing with infection.      She had imaging performed this summer which unfortunately showed  progression of disease.  No change in therapy.  She recently had MRI brain imaging which also unfortunately showed progression with a new metastatic lesion in her yvette.  She is very clear that she was never want whole brain radiation again and she is not sure if she would want gamma knife treatment.      No fevers, infections, chest pain, abdominal pain, bleeding, or swelling.  She does have the sensation of feeling like she cannot take a deep breath but denies cough.  Her 10-point review of systems is otherwise negative.      PHYSICAL EXAMINATION:     BP (!) 138/92  Pulse 81  Temp 97.8  F (36.6  C) (Oral)  Wt 50.6 kg (111 lb 8 oz)  SpO2 93%  BMI 20.39 kg/m2      Wt Readings from Last 4 Encounters:   07/27/18 51.2 kg (112 lb 14.4 oz)   07/06/18 51.3 kg (113 lb)   06/15/18 51.6 kg (113 lb 11.2 oz)   06/12/18 52.5 kg (115 lb 11.2 oz)     Constitutional: Alert, oriented, thin female in no visible distress, sitting in wheelchar  Eyes: EOMI. MMM without oral lesions. Anicteric sclerae.    CV: RRR  Resp: CTAB. Breathing comfortably.  Abdomen: Soft, non-tender, non-distended. Bowel sounds present.   Extremities: No lower extremity edema, thin  Skin: Warm, dry. No bruising or petechiae. No rash  Lymph: No palpable cervical or supraclavicular LAD  Neuro: CN III-XII grossly intact. 5/5 strength in LE proximal and distal. SLightly decreased hand  on right.      DATA:  MRI brain 8/28/18  Impression:  1. New focus of intracranial metastasis in the right ventrolateral  aspect of the yvette, consistent with progression of disease since  3/7/2018.  2. Stable enhancing treated metastases.    CT CAP 6/8/18  IMPRESSION: In this patient with history of metastatic breast  carcinoma;  1. Evidence for progression of disease with increased size of right  breast nodularity and new/increased size of right lower lobe pulmonary  nodule suspicious for a metastatic lesion.  2. Stable diffuse sclerotic bony metastasis. No acute fracture.  There  is notable involvement of the femoral necks and femoral heads. There  are severe degenerative changes of the hips which confound the  evaluation for fracture.  Old right sided rib fractures.  Old  fractures involving the posterior elements of L2.  3. Nonspecific nodular contour of liver.  4. Moderate hiatal hernia.  5. Small enhancing nodule at the right aspect of the urinary bladder  is again seen, concerning for hepatocellular carcinoma. If this has  not been previously investigated, urology consultation and cystoscopy  could be considered.    IMPRESSION/PLAN:  Dominga is a 62-year-old woman with history of metastatic ER-positive, HER-2 positive breast cancer, with involvement of the bone, history of pleural effusions treated with pleurodesis and PleurX placement, and with treated brain metastases. She was started on [place]  clinical trial and randomized to physician's choice, on Gemzar/Herceptin since 9/29/16.  She discontinued gemzar 121/5/17.  Now on herceptin and aromasin.      1.  Denia remains on aromasin and Herceptin for her breast cancer. She had slight progression in the breast on restaging in June 2018. Given her desire to continue to improve her performance status and the relatively stable changes, she was continued on Aromasin and Herceptin.     2.  Brain metastases.  Routine MRI with new metastatic lesion.  She is unsure if she wants treatment for this, she certainly doesn't want a change in her chemotherapy or IT chemo or whole brain radiation (which she would not be a candidate for anyway).  Will run her case by rad onc, and will have her return for discussion.  Dr Harper did bring up hospice with her as well, and will have hospice meet with her for more information.      3.  Central diabetes insipidus.  This was diagnosed as an inpatient in the setting of hypernatremia.  She was started on desmopressin.  She has been seeing Endocrinology.       4.  UTI: s/p course of Cipro with  improvement in symptoms     5.  Bone mets, continue Xgeva.      6.  She does have a POLST and is DNR/DNI.  Her power of  is listed in the computer. She will meet with hospice within next two weeks and we will see her in clinic then to discuss next steps.     Patient seen and discussed with attending physician, Dr. Harper.     Deja Mendez MD  Heme/Onc PGY6  08/29/2018      Addendum:  Pt was seen and evaluated by me with Dr Mendez.  On review of imaging personally by me and with radiology, pt has progressive disease.  We discussed that given her overall performance status, I would not recommend chemotherapy.  Dominga is in agreement and is not interested in chemotherapy.      I would like to review her imaging with radiation oncology to determine whether potential gamma knife could be considered.  I will call pt with this information.    We reviewed overall goals of care.  Given Dominga's gradually declining PS and progressive disease, palliative care and hospice would be reasonable.  We discussed the pros/cons.  She would like to stay at home as long as possible.  I will ask SW to see Dominga.  She understands eventually she will need a caregiver and NH may be most appropriate.  For now, will check on PCA arrangements ,etc.    She has a POLST on file; this was reviewed.  DNR/DNI.  Her emergency contact is updated in the EMR.      Will see pt back in 2 weeks to reevaluate and continue our discussions.    Marlen Harper MD

## 2018-08-28 NOTE — PROGRESS NOTES
Social Work Follow-Up Encounter Visit  Oncology Clinic    Data/Intervention:  Patient Name:  Keren Erickson  /Age:  1955 (62 year old)    Reason for Follow-Up:  Hospice information    Collaborated With:   -RNCC - Shobha   -patient  -pt's friend    Resources Provided:  Hospice information    Assessment:  Met with pt after appt to follow up and offer additional information re: hospice services, after RNCC.  Pt's main questions for SW re: hospice services are related to insurance.  SW reviewed that hospice services are covered under Medicare.  Pt primarily inquiring as she continues to work with [facility] worker re: getting her MA back in order to cover her PCA services.  SW discussed that PCA services would be connected to MA and hospice to Medicare, able to have both as they try to work together to provide pt the most support.  Pt and friend stated understanding.     Plan:  Provided patient with SW's contact information and availability.       Addressed patient's distress through today. No other concerns at this time.  SW available as necessary.       Mia Jarquin (Martens), CHIN, MSW   - [facility]  Phone: [phone]  Pager: [phone]  2018

## 2018-08-28 NOTE — MR AVS SNAPSHOT
After Visit Summary   8/28/2018    Keren Erickson    MRN: [id number]           Patient Information     Date Of Birth          1955        Visit Information        Provider Department      8/28/2018 2:30 PM Marlne Harper MD [facility]        Today's Diagnoses     Metastatic breast cancer (H)        Brain metastasis (H)        Carcinoma of breast metastatic to multiple sites, unspecified laterality (H)           Follow-ups after your visit        Your next 10 appointments already scheduled     Oct 01, 2018  1:00 PM CDT   (Arrive by 12:45 PM)   RETURN ENDOCRINE with Rolando Mishra MD   [facility] Endocrinology ([facility])    [address]  [address] [facility] [address] [phone]              Who to contact     If you have questions or need follow up information about today's clinic visit or your schedule please contact [facility] directly at [phone].  Normal or non-critical lab and imaging results will be communicated to you by [employer], letter or phone within 4 business days after the clinic has received the results. If you do not hear from us within 7 days, please contact the clinic through [employer] or phone. If you have a critical or abnormal lab result, we will notify you by phone as soon as possible.  Submit refill requests through [employer] or call your pharmacy and they will forward the refill request to us. Please allow 3 business days for your refill to be completed.          Additional Information About Your Visit        [employer] Information     [employer] gives you secure access to your electronic health record. If you see a primary care provider, you can also send messages to your care team and make appointments. If you have questions, please call your primary care clinic.  If you do not have a primary care provider, please call [phone] and they will assist you.        Care  EveryWhere ID     This is your Care EveryWhere ID. This could be used by other organizations to access your [facility] medical records  [id number]        Your Vitals Were     Pulse Temperature Pulse Oximetry BMI (Body Mass Index)          81 97.8  F (36.6  C) (Oral) 93% 20.39 kg/m2         Blood Pressure from Last 3 Encounters:   08/28/18 (!) 138/92   07/27/18 (!) 137/93   07/06/18 121/82    Weight from Last 3 Encounters:   08/28/18 50.6 kg (111 lb 8 oz)   07/27/18 51.2 kg (112 lb 14.4 oz)   07/06/18 51.3 kg (113 lb)              We Performed the Following     Ca27.29  breast tumor marker     CEA     Comprehensive metabolic panel          Where to get your medicines      These medications were sent to [place], [place] - [address] [facility] [address] [place] [address] [address] [address]    Hours:  TRANSPLANT PHONE NUMBER [phone] Phone:  [phone]     exemestane 25 MG tablet          Primary Care Provider Office Phone # Fax #    Kelly Hart [phone]       [address] [facility] [address]        Equal Access to Services     CLAUDIA BOND AH: Hadii keturah ibarra haddewayneo Sokimberlyali, waaxda luqadaha, qaybta kaalmada adeegyada, freddie keys. So [facility] [phone].    ATENCIÓN: Si habla español, tiene a ramires disposición servicios gratuitos de asistencia lingüística. Llame al [phone].    We comply with applicable federal civil rights laws and [place] laws. We do not discriminate on the basis of race, color, national origin, age, disability, sex, sexual orientation, or gender identity.            Thank you!     Thank you for choosing [facility]  for your care. Our goal is always to provide you with excellent care. Hearing back from our patients is one way we can continue to improve our services. Please take a few minutes to complete the written survey that you may receive in the mail after your visit  with us. Thank you!             Your Updated Medication List - Protect others around you: Learn how to safely use, store and throw away your medicines at www.disposemymeds.org.          This list is accurate as of 8/28/18 11:59 PM.  Always use your most recent med list.                   Brand Name Dispense Instructions for use Diagnosis    Carboxymethylcellulose Sod PF 0.5 % Soln ophthalmic solution    REFRESH PLUS    1 Bottle    Place 1 drop into both eyes 4 times daily as needed for dry eyes    Dry eyes, bilateral       ciprofloxacin 500 MG tablet    CIPRO    14 tablet    Take 1 tablet (500 mg) by mouth 2 times daily for 7 days    Urinary tract infection       desmopressin 0.2 MG tablet    DDAVP    45 tablet    Take  1/2 tablet once daily by mouth    Diabetes insipidus (H)       enoxaparin 60 MG/0.6ML injection    LOVENOX    60 Syringe    Inject 0.5 mLs (50 mg) Subcutaneous 2 times daily    Other acute pulmonary embolism without acute cor pulmonale (H)       exemestane 25 MG tablet    AROMASIN    90 tablet    Take 1 tablet (25 mg) by mouth daily    Carcinoma of breast metastatic to multiple sites, unspecified laterality (H), Metastatic breast cancer (H)       GENTEAL TEARS NIGHT-TIME Oint ophthalmic ointment     1 Tube    Apply 1 Application to eye At Bedtime    Dry eyes, bilateral       meclizine 25 MG tablet    ANTIVERT    30 tablet    Take 1 tablet (25 mg) by mouth 3 times daily as needed for dizziness    BPPV (benign paroxysmal positional vertigo), right       methylphenidate 5 MG tablet    RITALIN    30 tablet    Take 5 mg once daily as needed for fatigue    Carcinoma of breast metastatic to multiple sites, unspecified laterality (H)       Multi-vitamin Tabs tablet      Take 1 tablet by mouth daily        omeprazole 20 MG tablet     30 tablet    Take 1 tablet (20 mg) by mouth daily    Gastroesophageal reflux disease without esophagitis       ondansetron 4 MG tablet    ZOFRAN    18 tablet    Take 1 tablet (4  mg) by mouth every 8 hours as needed for nausea    Nausea       prochlorperazine 10 MG tablet    COMPAZINE    90 tablet    Take 1 tablet (10 mg) by mouth every 6 hours as needed for nausea or vomiting    Nausea       timolol 0.5 % ophthalmic solution    TIMOPTIC    1 Bottle    Place 1 drop into both eyes 2 times daily    Primary open angle glaucoma of left eye, mild stage       TYLENOL PO      Take 500 mg by mouth once        UNABLE TO FIND      MEDICATION NAME: Probiotic        VITAMIN D (CHOLECALCIFEROL) PO      Take 5,000 Units by mouth daily        VITAMIN E BLEND PO      Take by mouth daily

## 2018-08-29 NOTE — TELEPHONE ENCOUNTER
MEDICAL RECORDS REQUEST   [facility]  Urology Clinic  [address] [place] [address]  PHONE: [phone]  Fax: [phone]        FUTURE VISIT INFORMATION                                                   Keren Avelarmiguel Erickson, : 1955 scheduled for future visit at [facility]    APPOINTMENT INFORMATION:    Date: 2018    Provider:  Gerald Molina    Reason for Visit/Diagnosis: gross hematuria    REFERRAL INFORMATION:    Referring provider:  Marlen Harper    Specialty: md    Referring providers clinic:  hematology    Clinic contact number:  [phone]    RECORDS REQUESTED FOR VISIT                                                     NOTES  STATUS/DETAILS   OFFICE NOTE from referring provider  yes   OFFICE NOTE from other specialist  yes   DISCHARGE SUMMARY from hospital  no   DISCHARGE REPORT from the ER  no   OPERATIVE REPORT  no   MEDICATION LIST  yes       PRE-VISIT CHECKLIST      Record collection complete Yes   Appointment appropriately scheduled           (right time/right provider) Yes   MyChart activation Yes   Questionnaire complete If no, please explain in process     Completed by: aDnay St

## 2018-08-30 NOTE — PROGRESS NOTES
Patient called asking to stop Herceptin and wants to cancel all appointments except her Endocrine appointment on October 1, 18. Will send message to scheduling to cancel appointments as patient requests. Patient currently has an appointment to meet with [facility] September 4, 18 asking to move up appointment. Will contact [facility] to arrange an appointment this week.  Answered all patient's questions and verbalized understanding. Shobha Alanis RN, BSN.

## 2018-09-27 NOTE — PROGRESS NOTES
This is a recent snapshot of the patient's [facility] medical record.  For current drug dose and complete information and questions, call [phone] or In Basket pool, fv home infusion ([id number])  CSN Number:  [id number]

## 2019-01-14 ENCOUNTER — HOME INFUSION (PRE-WILLOW HOME INFUSION) (OUTPATIENT)
Dept: PHARMACY | Facility: CLINIC | Age: 64
End: 2019-01-14

## 2019-01-14 ENCOUNTER — CARE COORDINATION (OUTPATIENT)
Dept: CARE COORDINATION | Facility: CLINIC | Age: 64
End: 2019-01-14

## 2019-01-14 NOTE — PROGRESS NOTES
Received notice of patient death.  Date of Death  1/12/2019  All appointments, orders and treatment plans cancelled.  Care TEAM and HIM notified

## 2019-01-16 NOTE — PROGRESS NOTES
This is a recent snapshot of the patient's [facility] medical record.  For current drug dose and complete information and questions, call [phone] or In [facility] pool, fv home infusion ([id number])  CSN Number:  [id number]

## 2020-10-02 NOTE — NURSING NOTE
"Chief Complaint   Patient presents with     Port Draw     labs drawn from port by rn.  vs taken     Port accessed with 20 gauge 3/4\" gripper needle and labs drawn by rn.  Port flushed with NS and heparin.  Pt tolerated well.  VS taken.  Pt checked in for next appt.  Yas Alvares RN    " Robbi Henning, PGY 1: Pt's labs are normal. Will get orthostatics. Will prescribe pt HCTZ 12.5 mg and give pt resources for finding a new PCP, since he doesn't have one currently. Robbi Henning, PGY 1: Pt's labs are normal. Will get orthostatics. Will give pt resources for finding a new PCP, since he doesn't have one currently. Robbi Henning, PGY 1: Orthostatics negative. Repeat BP was 194/126. Will prescribe hypertension medication regimen for 2 weeks while pt makes appointment with new PCP. Pt will be discharged home.

## 2022-01-31 NOTE — NURSING NOTE
Assessment & Plan     Acute swimmer's ear of left side  Patient with a sense of a clogged left ear for the past week.  On exam she has a significant amount of whitish material in the ear canal that was lavaged out.  Did not seem to be cotton.  But the ear canal itself is erythematous and swollen.  So it is likely purulence.  We will treat with a course of topical Cortisporin and this should resolve the issue.  Discussed proper ear care.  - neomycin-polymyxin-hydrocortisone (CORTISPORIN) 3.5-00931-5 otic suspension; Place 3 drops Into the left ear 4 times daily       See Patient Instructions    Return in about 1 week (around 2/7/2022) for If not improving as expected.    Courtney Jenkins MD  [facility] FRANCESCA Marr is a 23 year old who presents for the following health issues     History of Present Illness       She eats 2-3 servings of fruits and vegetables daily.She consumes 1 sweetened beverage(s) daily.She exercises with enough effort to increase her heart rate 10 to 19 minutes per day.  She exercises with enough effort to increase her heart rate 7 days per week. She is missing 3 dose(s) of medications per week.  She is not taking prescribed medications regularly due to remembering to take.       Concern - Possible Ear Infection  Onset: Started over a week ago  Description: LEFT EAR ONLY. Throughout the day she has to push on her ear as she feels there is something in there.  Feels she has water in it that won't come out.  Intensity: mild - 4/10 pain  Progression of Symptoms:  same  Accompanying Signs & Symptoms: running nose, feels at times she has a fever  Previous history of similar problem: No, not since childhood  Precipitating factors: Sore throat, but tested negative to COVID       Worsened by: showering when water gets in in, wearing a headset  Alleviating factors:        Improved by: N/A  Therapies tried and outcome: None      Review of Systems   Constitutional,  Chief Complaint   Patient presents with     Port Draw     accessed with power needle heparin locked, vitals checked        HEENT, cardiovascular, pulmonary, gi and gu systems are negative, except as otherwise noted.      Objective    There were no vitals taken for this visit.  There is no height or weight on file to calculate BMI.  Physical Exam   Alert, pleasant, upbeat, and in no apparent discomfort.  Right ear canal and membrane appear normal  Left ear canal is occluded by whitish matter that was lavaged out.  The canal itself is erythematous and swollen  Past labs reviewed with the patient.

## 2023-03-22 NOTE — PROGRESS NOTES
Dear Kelly Mireles:    I had the pleasure of meeting Keren Erickson in consultation today at the [facility] Otolaryngology [facility] at your request.    HISTORY OF PRESENT ILLNESS: HISTORY OF PRESENT ILLNESS:  The patient is a 61-year-old worked into the clinic today.  She was in Audiology and found to have decreased hearing on the right side with an apparent right serous otitis.  She has metastatic breast cancer and has had whole brain radiation.  She has noticed decreased hearing in the right ear since October.  Just pressure and fullness there as well, mild ache but not significant.  There has been no drainage.  Feels a little off balance as well.         ALLERGIES:    Allergies   Allergen Reactions     Nka [No Known Allergies]      Nkda [No Known Drug Allergies]        HABITS:   Alcohol Use: Yes 0.6 oz/week 1 Standard drinks or equivalent per week   Comment: glass of wine wvery once in a while but nothing recently     History   Smoking status     Never Smoker    Smokeless tobacco     Never Used         PAST MEDICAL HISTORY: Please see today's intake form (for the remainder of the PMH) which I reviewed and signed.  Past Medical History   Diagnosis Date     Arthritis      Breast mass, left      bx and told it was benign yrs ago     Breast cancer (H) 9/26/13     Lt breast       FAMILY HISTORY/SOCIAL HISTORY:   Family History   Problem Relation Age of Onset     Hypertension Father      Aneurysm Father 91     AAA     Hypertension Mother      Arthritis Mother      CANCER No family hx of     Social History     Social History     Marital Status: Single     Spouse Name: N/A     Number of Children: 0     Years of Education: N/A     Occupational History     chiropractor Self     Social History Main Topics     Smoking status: Never Smoker      Smokeless tobacco: Never Used     Alcohol Use: 0.6 oz/week     1 Standard drinks or equivalent per week      Comment: glass of wine wvery once in a while  [FreeTextEntry1] : 76 year old male, former smoker ( quit < 30 years ) with PMHx of pAFIB (with failed cardioversion, currently on Eliquis, Metoprolol ) Josue / Tachy sp PPM placement,  hematoma ( sp traumatic fall treated by drainage and complicated by infection ) HFpEF, KAMARI and Multiple Myeloma  here for follow up. \par \par HFpEF: Improving dyspnea. Will recheck BNP. Continue with Torsemide 20mg for now. Echocardiogram revealed preserved EF moderate mr and ar\par HTN: Low end of normal, asymptomatic. Continue to follow. Continue with Metoprolol  ER 50mg BID and Toresemide  20mg for now\par HLD: LDL goal < 100. Check non fasting labs sent today. \par KAMARI: Check labs. Adjust diuretic  as needed. \par will obtain nuclear ett before clearing for knee surgery but nothing recently     Drug Use: No     Sexual Activity: Not Currently     Other Topics Concern     Not on file     Social History Narrative    Lives alone, has pet chickens       REVIEW OF SYSTEMS: Please see today's intake form (for the remainder of the ROS) which I have reviewed and signed.    PHYSICIAL EXAMINATION:  Constitutional: The patient was well-groomed and in no acute distress.   Skin: Warm and pink.  Psychiatric: The patient's affect was calm, cooperative, and appropriate.   Respiratory: Breathing comfortably without stridor or exertion of accessory muscles.  Eyes: Pupils were equal and reactive. Extraocular movement intact.   Head: Normocephalic and atraumatic. No lesions or scars.  Ears: Both ears examined.  She does have bilateral cerumen.  She was placed in the microscope.  Under high-powered magnification, the right side was cleaned with curettes.  Following cleaning, the tympanic membrane looks intact.  It looks like she has a full middle ear effusion.  Left ear was cleaned with curette and microscope using similar techniques.  Tympanic membrane and middle ear look normal after cleaning.   Nose: Sinuses were nontender. Anterior rhinoscopy revealed midline septum and absence of purulence or polyps.  Oral Cavity: Normal tongue, floor of moth, buccal mucosa, and palate. No lesions or masses on inspection or palpation. No abnormal lymph tissue in the oropharynx.   Neck: The parotid is soft without masses. Supple with normal laryngeal and tracheal landmarks.   Lymphatic: There is no palpable lymphadenopathy or other masses in the neck.   Neurologic: Alert and oriented x 3. Cranial nerves III-XI within normal limits. Voice quality normal.  Cerebellar Function Tests:  Grossly normal    Audiogram:  AUDIOGRAM:  Audiogram was done and shows a bilateral high-frequency sensorineural hearing loss with a moderate conductive component to the right ear and flat tympanogram.  Tuning forks confirm the conductive  loss on the right side.         IMPRESSION AND PLAN: I talked with her for some time and went over the serous otitis, hearing loss, probably secondary to the radiation.  I talked with her about the trial of nasal spray and Valsalvas or just placing a tube.  With her other health issues and a full effusion present I would recommend we just go ahead and place a tube.  Risks of this were all discussed and she agrees and desires to have a tube placed.  Tube was placed without incident.  I am going to have her put drops in the ear for three days to prevent any infection.  If she does well I am just going follow up in six months.      PROCEDURE NOTE:  The patient again placed supine under the microscope.  Under high-powered magnification, the right ear was examined.  A drop of phenol was placed on the anterior inferior quadrant.  A myringotomy was made with the myringotomy blade.  A large amount of serous fluid was found and suctioned free.  A Paparella type 1 tube was placed and the procedure was terminated.         Thank you very much for the opportunity to participate in the care of your patient.    Rick L Nissen MD

## 2025-01-21 NOTE — MR AVS SNAPSHOT
After Visit Summary   3/28/2017    Keren Erickson    MRN: [id number]           Patient Information     Date Of Birth          1955        Visit Information        Provider Department      3/28/2017 3:00 PM  26 ATC;  ONCOLOGY INFUSION [facility]        Today's Diagnoses     Metastatic breast cancer (H)    -  1    Carcinoma of breast metastatic to multiple sites, unspecified laterality (H)        Brain metastasis (H)          Care Instructions    Contact Numbers    [facility] Main Line: [phone]  [facility] Triage:  [phone]    Call triage with chills and/or temperature greater than or equal to 100.5, uncontrolled nausea/vomiting, diarrhea, constipation, dizziness, shortness of breath, chest pain, bleeding, unexplained bruising, or any new/concerning symptoms, questions/concerns.     If you are having any concerning symptoms or wish to speak to a provider before your next infusion visit, please call your care coordinator or triage to notify them so we can adequately serve you.       After Hours: [phone]    If after hours, weekends, or holidays, call main hospital  and ask for Oncology doctor on call.         March 2017 Sunday Monday Tuesday Wednesday Thursday Friday Saturday                  1     2     3     4       5     6     7     [facility] LAB DRAW    1:00 PM   (15 min.)   [facility]   [facility] Lab Draw     [facility] ONC INFUSION 120    1:30 PM   (120 min.)    ONCOLOGY INFUSION   [facility] 8     9     10     11       12     13     [facility] RETURN ENDOCRINE   11:45 AM   (60 min.)   Rolando Mishra MD   [facility] Endocrinology 14     15     16     17     18       19     20     21     22     [facility] DRAW   12:30 PM   (15 min.)    [facility] LAB DRAW   [facility] Lab Draw     [facility] RETURN   12:45 PM   (50 min.)   Esmer Oconnor PA   [facility]     [facility] ONC INFUSION 120     1:30 PM   (120 min.)    ONCOLOGY INFUSION   [facility] 23     24     25       26     27     28     [facility] DRAW    2:30 PM   (15 min.)    [facility] LAB DRAW   [facility] Lab Draw     [facility] ONC INFUSION 60    3:00 PM   (60 min.)    ONCOLOGY INFUSION   [facility] 29 30 31 April 2017 Sunday Monday Tuesday Wednesday Thursday Friday Saturday                                 1       2     3     4     5     6     7     [facility] DRAW   11:00 AM   (15 min.)    MASON[facility] LAB DRAW   [facility] Lab Draw     CT CHEST/ABDOMEN/PELVIS W   11:25 AM   (20 min.)   [address] [place] [address] CT     MR BRAIN WWO   12:00 PM   (45 min.)   MR1   [facility] MRI 8       9     10     [facility] DRAW    1:00 PM   (15 min.)    [facility]     [facility] RETURN    1:15 PM   (30 min.)   Marlen Harper MD   [facility]     [facility] ONC INFUSION 120    2:00 PM   (120 min.)    ONCOLOGY INFUSION   [facility] 11     12     13     14     15       16     17     18     [facility] DRAW    2:00 PM   (15 min.)    [facility] LAB DRAW   [facility] Lab Draw     [facility] ONC INFUSION 60    2:30 PM   (60 min.)    ONCOLOGY INFUSION   [facility] 19     20     21     22       23     24     25     26     27     28     29       30                                                Lab Results:  Recent Results (from the past 12 hour(s))   CBC with platelets differential    Collection Time: 03/28/17  3:31 PM   Result Value Ref Range    WBC 4.7 4.0 - 11.0 10e9/L    RBC Count 3.23 (L) 3.8 - 5.2 10e12/L    Hemoglobin 10.7 (L) 11.7 - 15.7 g/dL    Hematocrit 32.2 (L) 35.0 - 47.0 %     78 - 100 fl    MCH 33.1 (H) 26.5 - 33.0 pg    MCHC 33.2 31.5 - 36.5 g/dL    RDW 17.0 (H) 10.0 - 15.0 %    Platelet Count 362 150 - 450 10e9/L    Diff Method Automated Method      % Neutrophils 67.2 %    % Lymphocytes 24.2 %    % Monocytes 4.1 %    % Eosinophils 0.0 %    % Basophils 1.5 %    % Immature Granulocytes 3.0 %    Nucleated RBCs 0 0 /100    Absolute Neutrophil 3.1 1.6 - 8.3 10e9/L    Absolute Lymphocytes 1.1 0.8 - 5.3 10e9/L    Absolute Monocytes 0.2 0.0 - 1.3 10e9/L    Absolute Eosinophils 0.0 0.0 - 0.7 10e9/L    Absolute Basophils 0.1 0.0 - 0.2 10e9/L    Abs Immature Granulocytes 0.1 0 - 0.4 10e9/L    Absolute Nucleated RBC 0.0              Follow-ups after your visit        Your next 10 appointments already scheduled     Apr 07, 2017 11:00 AM CDT   [facility] Lab Draw with  M[facility]IC LAB DRAW   [facility] Lab Draw ([facility])    [address] [place] [id number] [phone]            Apr 07, 2017 11:40 AM CDT   (Arrive by 11:25 AM)   CT CHEST/ABDOMEN/PELVIS W CONTRAST with UCCT1   [facility] ([facility])    [address] [place] [id number] [phone]           Please bring any scans or X-rays taken at other hospitals, if similar tests were done. Also bring a list of your medicines, including vitamins, minerals and over-the-counter drugs. It is safest to leave personal items at home.  Be sure to tell your doctor:   If you have any allergies.   If there s any chance you are pregnant.   If you are breastfeeding.   If you have any special needs.  You may have contrast for this exam. To prepare:   Do not eat or drink for 2 hours before your exam. If you need to take medicine, you may take it with small sips of water. (We may ask you to take liquid medicine as well.)   The day before your exam, drink extra fluids at least six 8-ounce glasses (unless your doctor tells you to restrict your fluids).  Patients over 70 or patients with diabetes or kidney problems:   If you haven t had a blood test (creatinine test) within the last 30 days, go to your clinic or  Diagnostic Imaging Department for this test.  If you have diabetes:   If your kidney function is normal, continue taking your metformin (Avandamet, Glucophage, Glucovance, Metaglip) on the day of your exam.   If your kidney function is abnormal, wait 48 hours before restarting this medicine.  You will have oral contrast for this exam:   You will drink the contrast at home. Get this from your clinic or Diagnostic Imaging Department. Please follow the directions given.  Please wear loose clothing, such as a sweat suit or jogging clothes. Avoid snaps, zippers and other metal. We may ask you to undress and put on a hospital gown.  If you have any questions, please call the Imaging Department where you will have your exam.            Apr 07, 2017 12:15 PM CDT   (Arrive by 12:00 PM)   MR BRAIN W/O & W CONTRAST with [address] MRI ([facility])    [address] [address] [phone]           Take your medicines as usual, unless your doctor tells you not to. Bring a list of your current medicines to your exam (including vitamins, minerals and over-the-counter drugs).  You will be given intravenous contrast for this exam. To prepare:   The day before your exam, drink extra fluids at least six 8-ounce glasses (unless your doctor tells you to restrict your fluids).   Have a blood test (creatinine test) within 30 days of your exam. Go to your clinic or Diagnostic Imaging Department for this test.  The MRI machine uses a strong magnet. Please wear clothes without metal (snaps, zippers). A sweatsuit works well, or we may give you a hospital gown.  Please remove any body piercings and hair extensions before you arrive. You will also remove watches, jewelry, hairpins, wallets, dentures, partial dental plates and hearing aids. You may wear contact lenses, and you may be able to wear your rings. We have a safe place to keep your personal items, but it  is safer to leave them at home.   **IMPORTANT** THE INSTRUCTIONS BELOW ARE ONLY FOR THOSE PATIENTS WHO HAVE BEEN TOLD THEY WILL RECEIVE SEDATION OR GENERAL ANESTHESIA DURING THEIR MRI PROCEDURE:  IF YOU WILL RECEIVE SEDATION (take medicine to help you relax during your exam):   You must get the medicine from your doctor before you arrive. Bring the medicine to the exam. Do not take it at home.   Arrive one hour early. Bring someone who can take you home after the test. Your medicine will make you sleepy. After the exam, you may not drive, take a bus or take a taxi by yourself.   No eating 8 hours before your exam. You may have clear liquids up until 4 hours before your exam. (Clear liquids include water, clear tea, black coffee and fruit juice without pulp.)  IF YOU WILL RECEIVE ANESTHESIA (be asleep for your exam):   Arrive 1 1/2 hours early. Bring someone who can take you home after the test. You may not drive, take a bus or take a taxi by yourself.   No eating 8 hours before your exam. You may have clear liquids up until 4 hours before your exam. (Clear liquids include water, clear tea, black coffee and fruit juice without pulp.)  Please call the Imaging Department at your exam site with any questions.            Apr 10, 2017  1:00 PM CDT   [facility] Lab Draw with  [facility] LAB DRAW   [facility] Lab Draw ([facility])    [address] [place] [id number] [phone]            Apr 10, 2017  1:30 PM CDT   (Arrive by 1:15 PM)   Return Visit with Marlen Harper MD   [facility])    [address] [place] [id number] [phone]            Apr 10, 2017  2:00 PM CDT   Infusion 120 with  ONCOLOGY INFUSION, UC 18 ATC   [facility] ([facility])    [address] [place] [id number] [phone]            Apr  18, 2017  2:00 PM CDT   Masonic Lab Draw with  MASONIC LAB DRAW   [facility] Lab Draw ([facility])    [address]  [address]  [facility] [address] [phone]            Apr 18, 2017  2:30 PM CDT   Infusion 60 with UC ONCOLOGY INFUSION, UC 29 ATC   [facility] ([facility])    [address]  [address] [facility] [address] [phone]              Who to contact     If you have questions or need follow up information about today's clinic visit or your schedule please contact [facility] directly at [phone].  Normal or non-critical lab and imaging results will be communicated to you by [employer]hart, letter or phone within 4 business days after the clinic has received the results. If you do not hear from us within 7 days, please contact the clinic through [employer] or phone. If you have a critical or abnormal lab result, we will notify you by phone as soon as possible.  Submit refill requests through [employer] or call your pharmacy and they will forward the refill request to us. Please allow 3 business days for your refill to be completed.          Additional Information About Your Visit        [employer] Information     [employer] gives you secure access to your electronic health record. If you see a primary care provider, you can also send messages to your care team and make appointments. If you have questions, please call your primary care clinic.  If you do not have a primary care provider, please call [phone] and they will assist you.        Care EveryWhere ID     This is your Care EveryWhere ID. This could be used by other organizations to access your [place] medical records  [id number]        Your Vitals Were     Pulse Temperature Respirations Pulse Oximetry BMI (Body Mass Index)       85 97  F (36.1  C) (Oral) 14 96% 20.11 kg/m2        Blood Pressure from Last 3 Encounters:   03/28/17  137/90   03/22/17 133/85   03/13/17 118/81    Weight from Last 3 Encounters:   03/28/17 51.5 kg (113 lb 8 oz)   03/22/17 51.3 kg (113 lb 1.6 oz)   03/13/17 51.3 kg (113 lb)              We Performed the Following     CBC with platelets differential     Treatment Conditions 2        Primary Care Provider Office Phone # Fax #    Kelly Bg Hart [phone]       [facility] [address] [facility] [address]        Thank you!     Thank you for choosing [facility]  for your care. Our goal is always to provide you with excellent care. Hearing back from our patients is one way we can continue to improve our services. Please take a few minutes to complete the written survey that you may receive in the mail after your visit with us. Thank you!             Your Updated Medication List - Protect others around you: Learn how to safely use, store and throw away your medicines at www.disposemymeds.org.          This list is accurate as of: 3/28/17  4:34 PM.  Always use your most recent med list.                   Brand Name Dispense Instructions for use    ALEVE PO      Take 220 mg by mouth daily       desmopressin 0.1 MG tablet    DDAVP    30 tablet    Take 1 tablet (100 mcg) by mouth At Bedtime          ADMIT

## (undated) DEVICE — KIT INTRODUCER FLUENT MICRO 5FRX10CM ECHO TIP KIT-038-04

## (undated) DEVICE — DECANTER BAG 2002S

## (undated) DEVICE — SU MONOCRYL 4-0 P-3 18" UND Y494G

## (undated) DEVICE — Device

## (undated) DEVICE — GOWN XLG DISP 9545

## (undated) DEVICE — GLOVE PROTEXIS POWDER FREE SMT 7.0  2D72PT70X

## (undated) DEVICE — DRSG TEGADERM 4X4 3/4" 1626W

## (undated) DEVICE — PREP CHLORAPREP W/ORANGE TINT 10.5ML 260715

## (undated) DEVICE — SYR 10ML LL W/O NDL

## (undated) DEVICE — KNIFE HANDLE W/15 BLADE 371615

## (undated) DEVICE — DILATOR VASC W/INTRO GW 5FRX19CM .038" 405500

## (undated) DEVICE — LINEN TOWEL PACK X5 5464

## (undated) DEVICE — GLOVE PROTEXIS POWDER FREE SMT 7.5  2D72PT75X

## (undated) DEVICE — SU VICRYL 3-0 SH 27" J316H

## (undated) DEVICE — LINEN GOWN XLG 5407

## (undated) DEVICE — SU DERMABOND ADVANCED .7ML DNX12

## (undated) DEVICE — COVER ULTRASOUND PROBE W/GEL FLEXI-FEEL 6"X58" LF  25-FF658

## (undated) RX ORDER — HEPARIN SODIUM (PORCINE) LOCK FLUSH IV SOLN 100 UNIT/ML 100 UNIT/ML
SOLUTION INTRAVENOUS
Status: DISPENSED
Start: 2018-01-01